# Patient Record
Sex: FEMALE | Race: WHITE | Employment: OTHER | URBAN - METROPOLITAN AREA
[De-identification: names, ages, dates, MRNs, and addresses within clinical notes are randomized per-mention and may not be internally consistent; named-entity substitution may affect disease eponyms.]

---

## 2017-01-02 ENCOUNTER — ANESTHESIA EVENT (OUTPATIENT)
Dept: OPERATING ROOM | Age: 52
End: 2017-01-02
Payer: MEDICARE

## 2017-01-03 ENCOUNTER — ANESTHESIA (OUTPATIENT)
Dept: OPERATING ROOM | Age: 52
End: 2017-01-03
Payer: MEDICARE

## 2017-01-03 ENCOUNTER — APPOINTMENT (OUTPATIENT)
Dept: GENERAL RADIOLOGY | Age: 52
End: 2017-01-03
Attending: ORTHOPAEDIC SURGERY
Payer: MEDICARE

## 2017-01-03 VITALS — DIASTOLIC BLOOD PRESSURE: 48 MMHG | TEMPERATURE: 96.6 F | OXYGEN SATURATION: 97 % | SYSTOLIC BLOOD PRESSURE: 80 MMHG

## 2017-01-03 PROCEDURE — 73630 X-RAY EXAM OF FOOT: CPT

## 2017-01-03 PROCEDURE — 2580000003 HC RX 258: Performed by: STUDENT IN AN ORGANIZED HEALTH CARE EDUCATION/TRAINING PROGRAM

## 2017-01-03 PROCEDURE — 2580000003 HC RX 258: Performed by: ORTHOPAEDIC SURGERY

## 2017-01-03 PROCEDURE — 6360000002 HC RX W HCPCS: Performed by: NURSE ANESTHETIST, CERTIFIED REGISTERED

## 2017-01-03 PROCEDURE — 6360000002 HC RX W HCPCS: Performed by: ORTHOPAEDIC SURGERY

## 2017-01-03 PROCEDURE — 76000 FLUOROSCOPY <1 HR PHYS/QHP: CPT

## 2017-01-03 PROCEDURE — 2500000003 HC RX 250 WO HCPCS: Performed by: NURSE ANESTHETIST, CERTIFIED REGISTERED

## 2017-01-03 RX ORDER — LIDOCAINE HYDROCHLORIDE 20 MG/ML
INJECTION, SOLUTION INFILTRATION; PERINEURAL PRN
Status: DISCONTINUED | OUTPATIENT
Start: 2017-01-03 | End: 2017-01-03 | Stop reason: SDUPTHER

## 2017-01-03 RX ORDER — DEXAMETHASONE SODIUM PHOSPHATE 4 MG/ML
INJECTION, SOLUTION INTRA-ARTICULAR; INTRALESIONAL; INTRAMUSCULAR; INTRAVENOUS; SOFT TISSUE PRN
Status: DISCONTINUED | OUTPATIENT
Start: 2017-01-03 | End: 2017-01-03 | Stop reason: SDUPTHER

## 2017-01-03 RX ORDER — PROPOFOL 10 MG/ML
INJECTION, EMULSION INTRAVENOUS PRN
Status: DISCONTINUED | OUTPATIENT
Start: 2017-01-03 | End: 2017-01-03 | Stop reason: SDUPTHER

## 2017-01-03 RX ORDER — FENTANYL CITRATE 50 UG/ML
INJECTION, SOLUTION INTRAMUSCULAR; INTRAVENOUS PRN
Status: DISCONTINUED | OUTPATIENT
Start: 2017-01-03 | End: 2017-01-03 | Stop reason: SDUPTHER

## 2017-01-03 RX ORDER — ONDANSETRON 2 MG/ML
INJECTION INTRAMUSCULAR; INTRAVENOUS PRN
Status: DISCONTINUED | OUTPATIENT
Start: 2017-01-03 | End: 2017-01-03 | Stop reason: SDUPTHER

## 2017-01-03 RX ORDER — MIDAZOLAM HYDROCHLORIDE 1 MG/ML
INJECTION INTRAMUSCULAR; INTRAVENOUS PRN
Status: DISCONTINUED | OUTPATIENT
Start: 2017-01-03 | End: 2017-01-03 | Stop reason: SDUPTHER

## 2017-01-03 RX ADMIN — PROPOFOL 180 MG: 10 INJECTION, EMULSION INTRAVENOUS at 07:35

## 2017-01-03 RX ADMIN — ONDANSETRON HYDROCHLORIDE 4 MG: 2 INJECTION, SOLUTION INTRAVENOUS at 07:40

## 2017-01-03 RX ADMIN — FENTANYL CITRATE 25 MCG: 50 INJECTION, SOLUTION INTRAMUSCULAR; INTRAVENOUS at 07:55

## 2017-01-03 RX ADMIN — DEXAMETHASONE SODIUM PHOSPHATE 4 MG: 4 INJECTION, SOLUTION INTRAMUSCULAR; INTRAVENOUS at 07:40

## 2017-01-03 RX ADMIN — LIDOCAINE HYDROCHLORIDE 50 MG: 20 INJECTION, SOLUTION INFILTRATION; PERINEURAL at 07:35

## 2017-01-03 RX ADMIN — SODIUM CHLORIDE, POTASSIUM CHLORIDE, SODIUM LACTATE AND CALCIUM CHLORIDE: 600; 310; 30; 20 INJECTION, SOLUTION INTRAVENOUS at 07:25

## 2017-01-03 RX ADMIN — MIDAZOLAM HYDROCHLORIDE 2 MG: 1 INJECTION, SOLUTION INTRAMUSCULAR; INTRAVENOUS at 07:30

## 2017-01-03 RX ADMIN — VANCOMYCIN HYDROCHLORIDE 1 G: 1 INJECTION, POWDER, LYOPHILIZED, FOR SOLUTION INTRAVENOUS at 07:33

## 2017-01-03 RX ADMIN — FENTANYL CITRATE 25 MCG: 50 INJECTION, SOLUTION INTRAMUSCULAR; INTRAVENOUS at 08:00

## 2017-01-03 RX ADMIN — FENTANYL CITRATE 50 MCG: 50 INJECTION, SOLUTION INTRAMUSCULAR; INTRAVENOUS at 07:35

## 2017-01-18 RX ORDER — FLUCONAZOLE 100 MG/1
100 TABLET ORAL DAILY
Qty: 5 TABLET | Refills: 0 | Status: SHIPPED | OUTPATIENT
Start: 2017-01-18 | End: 2017-01-25

## 2017-02-05 ENCOUNTER — HOSPITAL ENCOUNTER (EMERGENCY)
Age: 52
Discharge: HOME | End: 2017-02-05
Attending: EMERGENCY MEDICINE
Payer: MEDICARE

## 2017-02-05 VITALS
WEIGHT: 150 LBS | OXYGEN SATURATION: 97 % | RESPIRATION RATE: 18 BRPM | DIASTOLIC BLOOD PRESSURE: 66 MMHG | BODY MASS INDEX: 22.73 KG/M2 | SYSTOLIC BLOOD PRESSURE: 118 MMHG | TEMPERATURE: 97.5 F | HEART RATE: 109 BPM | HEIGHT: 68 IN

## 2017-02-05 DIAGNOSIS — R10.13 ABDOMINAL PAIN, EPIGASTRIC: Primary | ICD-10-CM

## 2017-02-05 DIAGNOSIS — K29.90 GASTRITIS AND DUODENITIS: ICD-10-CM

## 2017-02-05 LAB
ALBUMIN SERPL-MCNC: 3.8 G/DL (ref 3.9–4.9)
ALP BLD-CCNC: 98 U/L (ref 40–130)
ALT SERPL-CCNC: 6 U/L (ref 0–33)
ANION GAP SERPL CALCULATED.3IONS-SCNC: 16 MEQ/L (ref 7–13)
AST SERPL-CCNC: 12 U/L (ref 0–35)
BASOPHILS ABSOLUTE: 0 K/UL (ref 0–0.2)
BASOPHILS RELATIVE PERCENT: 0.4 %
BILIRUB SERPL-MCNC: 0.3 MG/DL (ref 0–1.2)
BILIRUBIN DIRECT: 0.2 MG/DL (ref 0–0.3)
BILIRUBIN, INDIRECT: 0.1 MG/DL (ref 0–0.6)
BUN BLDV-MCNC: 6 MG/DL (ref 6–20)
CALCIUM SERPL-MCNC: 9.3 MG/DL (ref 8.6–10.2)
CHLORIDE BLD-SCNC: 103 MEQ/L (ref 98–107)
CO2: 24 MEQ/L (ref 22–29)
CREAT SERPL-MCNC: 1.3 MG/DL (ref 0.5–0.9)
EOSINOPHILS ABSOLUTE: 0.1 K/UL (ref 0–0.7)
EOSINOPHILS RELATIVE PERCENT: 1.5 %
GFR AFRICAN AMERICAN: 52.6
GFR NON-AFRICAN AMERICAN: 43.5
GLUCOSE BLD-MCNC: 166 MG/DL (ref 60–115)
GLUCOSE BLD-MCNC: 169 MG/DL (ref 74–109)
HCT VFR BLD CALC: 40.9 % (ref 37–47)
HEMOGLOBIN: 13.8 G/DL (ref 12–16)
LIPASE: 28 U/L (ref 13–60)
LYMPHOCYTES ABSOLUTE: 1.5 K/UL (ref 1–4.8)
LYMPHOCYTES RELATIVE PERCENT: 17.1 %
MCH RBC QN AUTO: 29.1 PG (ref 27–31.3)
MCHC RBC AUTO-ENTMCNC: 33.7 % (ref 33–37)
MCV RBC AUTO: 86.3 FL (ref 82–100)
MONOCYTES ABSOLUTE: 0.5 K/UL (ref 0.2–0.8)
MONOCYTES RELATIVE PERCENT: 5.5 %
NEUTROPHILS ABSOLUTE: 6.6 K/UL (ref 1.4–6.5)
NEUTROPHILS RELATIVE PERCENT: 75.5 %
PDW BLD-RTO: 12.8 % (ref 11.5–14.5)
PERFORMED ON: ABNORMAL
PLATELET # BLD: 248 K/UL (ref 130–400)
POTASSIUM SERPL-SCNC: 3.3 MEQ/L (ref 3.5–5.1)
RBC # BLD: 4.74 M/UL (ref 4.2–5.4)
SODIUM BLD-SCNC: 143 MEQ/L (ref 132–144)
TOTAL PROTEIN: 6.9 G/DL (ref 6.4–8.1)
WBC # BLD: 8.8 K/UL (ref 4.8–10.8)

## 2017-02-05 PROCEDURE — 96375 TX/PRO/DX INJ NEW DRUG ADDON: CPT

## 2017-02-05 PROCEDURE — 99284 EMERGENCY DEPT VISIT MOD MDM: CPT

## 2017-02-05 PROCEDURE — 2580000003 HC RX 258: Performed by: EMERGENCY MEDICINE

## 2017-02-05 PROCEDURE — 93005 ELECTROCARDIOGRAM TRACING: CPT

## 2017-02-05 PROCEDURE — 2500000003 HC RX 250 WO HCPCS: Performed by: EMERGENCY MEDICINE

## 2017-02-05 PROCEDURE — 36415 COLL VENOUS BLD VENIPUNCTURE: CPT

## 2017-02-05 PROCEDURE — 80048 BASIC METABOLIC PNL TOTAL CA: CPT

## 2017-02-05 PROCEDURE — S0028 INJECTION, FAMOTIDINE, 20 MG: HCPCS | Performed by: EMERGENCY MEDICINE

## 2017-02-05 PROCEDURE — 96374 THER/PROPH/DIAG INJ IV PUSH: CPT

## 2017-02-05 PROCEDURE — 6360000002 HC RX W HCPCS: Performed by: EMERGENCY MEDICINE

## 2017-02-05 PROCEDURE — 80076 HEPATIC FUNCTION PANEL: CPT

## 2017-02-05 PROCEDURE — 83690 ASSAY OF LIPASE: CPT

## 2017-02-05 PROCEDURE — 85025 COMPLETE CBC W/AUTO DIFF WBC: CPT

## 2017-02-05 RX ORDER — 0.9 % SODIUM CHLORIDE 0.9 %
1000 INTRAVENOUS SOLUTION INTRAVENOUS ONCE
Status: COMPLETED | OUTPATIENT
Start: 2017-02-05 | End: 2017-02-05

## 2017-02-05 RX ORDER — SODIUM CHLORIDE 9 MG/ML
INJECTION, SOLUTION INTRAVENOUS CONTINUOUS
Status: DISCONTINUED | OUTPATIENT
Start: 2017-02-05 | End: 2017-02-05 | Stop reason: HOSPADM

## 2017-02-05 RX ORDER — SUCRALFATE ORAL 1 G/10ML
1 SUSPENSION ORAL 4 TIMES DAILY
Qty: 400 ML | Refills: 0 | Status: SHIPPED | OUTPATIENT
Start: 2017-02-05 | End: 2017-02-21

## 2017-02-05 RX ORDER — ONDANSETRON 2 MG/ML
4 INJECTION INTRAMUSCULAR; INTRAVENOUS ONCE
Status: COMPLETED | OUTPATIENT
Start: 2017-02-05 | End: 2017-02-05

## 2017-02-05 RX ADMIN — FAMOTIDINE 20 MG: 10 INJECTION, SOLUTION INTRAVENOUS at 15:27

## 2017-02-05 RX ADMIN — ONDANSETRON HYDROCHLORIDE 4 MG: 2 SOLUTION INTRAMUSCULAR; INTRAVENOUS at 15:27

## 2017-02-05 RX ADMIN — SODIUM CHLORIDE 1000 ML: 9 INJECTION, SOLUTION INTRAVENOUS at 15:27

## 2017-02-05 ASSESSMENT — ENCOUNTER SYMPTOMS
TROUBLE SWALLOWING: 0
BACK PAIN: 0
RHINORRHEA: 0
COUGH: 0
ABDOMINAL PAIN: 1
BLOOD IN STOOL: 0
DIARRHEA: 0
WHEEZING: 0
NAUSEA: 0
CHEST TIGHTNESS: 0
SHORTNESS OF BREATH: 0
EYE PAIN: 0
VOMITING: 0

## 2017-02-05 ASSESSMENT — PAIN SCALES - GENERAL: PAINLEVEL_OUTOF10: 10

## 2017-02-05 ASSESSMENT — PAIN DESCRIPTION - LOCATION: LOCATION: ABDOMEN

## 2017-02-05 ASSESSMENT — PAIN DESCRIPTION - DESCRIPTORS: DESCRIPTORS: BURNING

## 2017-02-05 ASSESSMENT — PAIN DESCRIPTION - PROGRESSION: CLINICAL_PROGRESSION: GRADUALLY WORSENING

## 2017-02-05 ASSESSMENT — PAIN DESCRIPTION - FREQUENCY: FREQUENCY: CONTINUOUS

## 2017-02-21 ENCOUNTER — OFFICE VISIT (OUTPATIENT)
Dept: INTERNAL MEDICINE | Age: 52
End: 2017-02-21

## 2017-02-21 VITALS
HEART RATE: 94 BPM | RESPIRATION RATE: 16 BRPM | SYSTOLIC BLOOD PRESSURE: 114 MMHG | TEMPERATURE: 98.7 F | HEIGHT: 68 IN | BODY MASS INDEX: 24.25 KG/M2 | DIASTOLIC BLOOD PRESSURE: 76 MMHG | OXYGEN SATURATION: 97 % | WEIGHT: 160 LBS

## 2017-02-21 DIAGNOSIS — E11.9 TYPE 2 DIABETES MELLITUS WITHOUT COMPLICATION, WITHOUT LONG-TERM CURRENT USE OF INSULIN (HCC): Primary | ICD-10-CM

## 2017-02-21 DIAGNOSIS — R10.13 ACUTE EPIGASTRIC PAIN: ICD-10-CM

## 2017-02-21 DIAGNOSIS — E11.9 TYPE 2 DIABETES MELLITUS WITHOUT COMPLICATION, WITHOUT LONG-TERM CURRENT USE OF INSULIN (HCC): ICD-10-CM

## 2017-02-21 LAB — HBA1C MFR BLD: 6 % (ref 4.8–5.9)

## 2017-02-21 PROCEDURE — G8427 DOCREV CUR MEDS BY ELIG CLIN: HCPCS | Performed by: INTERNAL MEDICINE

## 2017-02-21 PROCEDURE — 3045F PR MOST RECENT HEMOGLOBIN A1C LEVEL 7.0-9.0%: CPT | Performed by: INTERNAL MEDICINE

## 2017-02-21 PROCEDURE — G8484 FLU IMMUNIZE NO ADMIN: HCPCS | Performed by: INTERNAL MEDICINE

## 2017-02-21 PROCEDURE — 99214 OFFICE O/P EST MOD 30 MIN: CPT | Performed by: INTERNAL MEDICINE

## 2017-02-21 PROCEDURE — G8420 CALC BMI NORM PARAMETERS: HCPCS | Performed by: INTERNAL MEDICINE

## 2017-02-21 PROCEDURE — 1036F TOBACCO NON-USER: CPT | Performed by: INTERNAL MEDICINE

## 2017-02-21 RX ORDER — GREEN TEA/HOODIA GORDONII 315-12.5MG
1 CAPSULE ORAL 2 TIMES DAILY
Qty: 30 TABLET | Refills: 2 | Status: SHIPPED | OUTPATIENT
Start: 2017-02-21 | End: 2017-05-12 | Stop reason: SDUPTHER

## 2017-02-21 ASSESSMENT — ENCOUNTER SYMPTOMS
COUGH: 1
VOMITING: 0
ABDOMINAL PAIN: 1
CONSTIPATION: 0
SHORTNESS OF BREATH: 0
NAUSEA: 1
SORE THROAT: 0
BELCHING: 1
RHINORRHEA: 0
DIARRHEA: 1
FLATUS: 1

## 2017-02-21 ASSESSMENT — CROHNS DISEASE ACTIVITY INDEX (CDAI): CDAI SCORE: 0

## 2017-02-24 ENCOUNTER — HOSPITAL ENCOUNTER (EMERGENCY)
Age: 52
Discharge: HOME | End: 2017-02-24
Attending: EMERGENCY MEDICINE
Payer: MEDICARE

## 2017-02-24 VITALS
BODY MASS INDEX: 22.81 KG/M2 | WEIGHT: 150 LBS | SYSTOLIC BLOOD PRESSURE: 114 MMHG | RESPIRATION RATE: 16 BRPM | TEMPERATURE: 98.9 F | HEART RATE: 98 BPM | DIASTOLIC BLOOD PRESSURE: 70 MMHG | OXYGEN SATURATION: 96 %

## 2017-02-24 DIAGNOSIS — G43.909 MIGRAINE WITHOUT STATUS MIGRAINOSUS, NOT INTRACTABLE, UNSPECIFIED MIGRAINE TYPE: Primary | ICD-10-CM

## 2017-02-24 PROCEDURE — 99283 EMERGENCY DEPT VISIT LOW MDM: CPT

## 2017-02-24 PROCEDURE — 6370000000 HC RX 637 (ALT 250 FOR IP): Performed by: EMERGENCY MEDICINE

## 2017-02-24 PROCEDURE — 96372 THER/PROPH/DIAG INJ SC/IM: CPT

## 2017-02-24 PROCEDURE — 6360000002 HC RX W HCPCS: Performed by: EMERGENCY MEDICINE

## 2017-02-24 RX ORDER — PROMETHAZINE HYDROCHLORIDE 25 MG/ML
25 INJECTION, SOLUTION INTRAMUSCULAR; INTRAVENOUS ONCE
Status: COMPLETED | OUTPATIENT
Start: 2017-02-24 | End: 2017-02-24

## 2017-02-24 RX ORDER — OXYCODONE HYDROCHLORIDE AND ACETAMINOPHEN 5; 325 MG/1; MG/1
1-2 TABLET ORAL EVERY 6 HOURS PRN
Qty: 10 TABLET | Refills: 0 | Status: SHIPPED | OUTPATIENT
Start: 2017-02-24 | End: 2017-03-03

## 2017-02-24 RX ORDER — OXYCODONE HYDROCHLORIDE AND ACETAMINOPHEN 5; 325 MG/1; MG/1
2 TABLET ORAL ONCE
Status: COMPLETED | OUTPATIENT
Start: 2017-02-24 | End: 2017-02-24

## 2017-02-24 RX ORDER — PROMETHAZINE HYDROCHLORIDE 25 MG/1
25 TABLET ORAL EVERY 6 HOURS PRN
Qty: 20 TABLET | Refills: 0 | Status: SHIPPED | OUTPATIENT
Start: 2017-02-24 | End: 2017-03-03

## 2017-02-24 RX ADMIN — OXYCODONE HYDROCHLORIDE AND ACETAMINOPHEN 2 TABLET: 5; 325 TABLET ORAL at 02:57

## 2017-02-24 RX ADMIN — PROMETHAZINE HYDROCHLORIDE 25 MG: 25 INJECTION INTRAMUSCULAR; INTRAVENOUS at 02:58

## 2017-02-24 ASSESSMENT — ENCOUNTER SYMPTOMS
PHOTOPHOBIA: 1
RHINORRHEA: 0
SORE THROAT: 0
EYE PAIN: 0
DIARRHEA: 0
SINUS PRESSURE: 0
APNEA: 0
CONSTIPATION: 0
BACK PAIN: 0
COUGH: 0
ABDOMINAL DISTENTION: 0
VOMITING: 0
ABDOMINAL PAIN: 0
SHORTNESS OF BREATH: 0
COLOR CHANGE: 0
NAUSEA: 1
WHEEZING: 0

## 2017-02-24 ASSESSMENT — PAIN SCALES - GENERAL
PAINLEVEL_OUTOF10: 10
PAINLEVEL_OUTOF10: 7

## 2017-02-24 ASSESSMENT — PAIN DESCRIPTION - LOCATION: LOCATION: HEAD

## 2017-03-07 ENCOUNTER — OFFICE VISIT (OUTPATIENT)
Dept: GASTROENTEROLOGY | Age: 52
End: 2017-03-07

## 2017-03-07 VITALS
DIASTOLIC BLOOD PRESSURE: 89 MMHG | WEIGHT: 156 LBS | BODY MASS INDEX: 23.72 KG/M2 | SYSTOLIC BLOOD PRESSURE: 117 MMHG | HEART RATE: 101 BPM

## 2017-03-07 DIAGNOSIS — R10.13 ABDOMINAL PAIN, EPIGASTRIC: Primary | ICD-10-CM

## 2017-03-07 DIAGNOSIS — R13.19 OTHER DYSPHAGIA: ICD-10-CM

## 2017-03-07 DIAGNOSIS — R63.4 WEIGHT LOSS: ICD-10-CM

## 2017-03-07 PROCEDURE — G8427 DOCREV CUR MEDS BY ELIG CLIN: HCPCS | Performed by: INTERNAL MEDICINE

## 2017-03-07 PROCEDURE — G8484 FLU IMMUNIZE NO ADMIN: HCPCS | Performed by: INTERNAL MEDICINE

## 2017-03-07 PROCEDURE — G8420 CALC BMI NORM PARAMETERS: HCPCS | Performed by: INTERNAL MEDICINE

## 2017-03-07 PROCEDURE — 1036F TOBACCO NON-USER: CPT | Performed by: INTERNAL MEDICINE

## 2017-03-07 PROCEDURE — 99203 OFFICE O/P NEW LOW 30 MIN: CPT | Performed by: INTERNAL MEDICINE

## 2017-03-08 ENCOUNTER — ANESTHESIA (OUTPATIENT)
Dept: ENDOSCOPY | Age: 52
End: 2017-03-08
Payer: MEDICARE

## 2017-03-08 ENCOUNTER — HOSPITAL ENCOUNTER (OUTPATIENT)
Age: 52
Setting detail: OUTPATIENT SURGERY
Discharge: HOME | End: 2017-03-08
Attending: INTERNAL MEDICINE | Admitting: INTERNAL MEDICINE
Payer: MEDICARE

## 2017-03-08 ENCOUNTER — ANESTHESIA EVENT (OUTPATIENT)
Dept: ENDOSCOPY | Age: 52
End: 2017-03-08
Payer: MEDICARE

## 2017-03-08 ENCOUNTER — SURGERY (OUTPATIENT)
Age: 52
End: 2017-03-08

## 2017-03-08 VITALS
OXYGEN SATURATION: 99 % | DIASTOLIC BLOOD PRESSURE: 68 MMHG | SYSTOLIC BLOOD PRESSURE: 141 MMHG | RESPIRATION RATE: 15 BRPM

## 2017-03-08 VITALS
TEMPERATURE: 98.5 F | DIASTOLIC BLOOD PRESSURE: 76 MMHG | BODY MASS INDEX: 23.34 KG/M2 | WEIGHT: 154 LBS | HEART RATE: 82 BPM | OXYGEN SATURATION: 98 % | RESPIRATION RATE: 16 BRPM | SYSTOLIC BLOOD PRESSURE: 115 MMHG | HEIGHT: 68 IN

## 2017-03-08 PROCEDURE — 6360000002 HC RX W HCPCS: Performed by: NURSE ANESTHETIST, CERTIFIED REGISTERED

## 2017-03-08 PROCEDURE — 88305 TISSUE EXAM BY PATHOLOGIST: CPT

## 2017-03-08 PROCEDURE — 88342 IMHCHEM/IMCYTCHM 1ST ANTB: CPT

## 2017-03-08 PROCEDURE — 7100000010 HC PHASE II RECOVERY - FIRST 15 MIN: Performed by: INTERNAL MEDICINE

## 2017-03-08 PROCEDURE — 2500000003 HC RX 250 WO HCPCS: Performed by: NURSE ANESTHETIST, CERTIFIED REGISTERED

## 2017-03-08 PROCEDURE — 3609017100 HC EGD: Performed by: INTERNAL MEDICINE

## 2017-03-08 PROCEDURE — 2580000003 HC RX 258: Performed by: ANESTHESIOLOGY

## 2017-03-08 RX ORDER — LIDOCAINE HYDROCHLORIDE 20 MG/ML
INJECTION, SOLUTION INFILTRATION; PERINEURAL PRN
Status: DISCONTINUED | OUTPATIENT
Start: 2017-03-08 | End: 2017-03-08 | Stop reason: SDUPTHER

## 2017-03-08 RX ORDER — ONDANSETRON 2 MG/ML
4 INJECTION INTRAMUSCULAR; INTRAVENOUS
Status: DISCONTINUED | OUTPATIENT
Start: 2017-03-08 | End: 2017-03-08 | Stop reason: HOSPADM

## 2017-03-08 RX ORDER — PROPOFOL 10 MG/ML
INJECTION, EMULSION INTRAVENOUS PRN
Status: DISCONTINUED | OUTPATIENT
Start: 2017-03-08 | End: 2017-03-08 | Stop reason: SDUPTHER

## 2017-03-08 RX ORDER — SODIUM CHLORIDE 9 MG/ML
INJECTION, SOLUTION INTRAVENOUS CONTINUOUS
Status: DISCONTINUED | OUTPATIENT
Start: 2017-03-08 | End: 2017-03-08 | Stop reason: HOSPADM

## 2017-03-08 RX ADMIN — PROPOFOL 210 MG: 10 INJECTION, EMULSION INTRAVENOUS at 09:25

## 2017-03-08 RX ADMIN — SODIUM CHLORIDE: 900 INJECTION, SOLUTION INTRAVENOUS at 09:20

## 2017-03-08 RX ADMIN — LIDOCAINE HYDROCHLORIDE 30 MG: 20 INJECTION, SOLUTION INFILTRATION; PERINEURAL at 09:25

## 2017-03-08 ASSESSMENT — PAIN - FUNCTIONAL ASSESSMENT: PAIN_FUNCTIONAL_ASSESSMENT: 0-10

## 2017-03-16 ENCOUNTER — OFFICE VISIT (OUTPATIENT)
Dept: GASTROENTEROLOGY | Age: 52
End: 2017-03-16

## 2017-03-16 VITALS
BODY MASS INDEX: 23.87 KG/M2 | HEART RATE: 98 BPM | WEIGHT: 157 LBS | DIASTOLIC BLOOD PRESSURE: 84 MMHG | SYSTOLIC BLOOD PRESSURE: 124 MMHG

## 2017-03-16 DIAGNOSIS — R11.0 NAUSEA: ICD-10-CM

## 2017-03-16 DIAGNOSIS — R13.19 OTHER DYSPHAGIA: ICD-10-CM

## 2017-03-16 DIAGNOSIS — R68.81 EARLY SATIETY: Primary | ICD-10-CM

## 2017-03-16 PROCEDURE — G8420 CALC BMI NORM PARAMETERS: HCPCS | Performed by: INTERNAL MEDICINE

## 2017-03-16 PROCEDURE — G8484 FLU IMMUNIZE NO ADMIN: HCPCS | Performed by: INTERNAL MEDICINE

## 2017-03-16 PROCEDURE — 1036F TOBACCO NON-USER: CPT | Performed by: INTERNAL MEDICINE

## 2017-03-16 PROCEDURE — G8427 DOCREV CUR MEDS BY ELIG CLIN: HCPCS | Performed by: INTERNAL MEDICINE

## 2017-03-16 PROCEDURE — 99213 OFFICE O/P EST LOW 20 MIN: CPT | Performed by: INTERNAL MEDICINE

## 2017-03-17 LAB
EKG ATRIAL RATE: 117 BPM
EKG P AXIS: 42 DEGREES
EKG P-R INTERVAL: 136 MS
EKG Q-T INTERVAL: 300 MS
EKG QRS DURATION: 64 MS
EKG QTC CALCULATION (BAZETT): 418 MS
EKG R AXIS: 72 DEGREES
EKG T AXIS: 14 DEGREES
EKG VENTRICULAR RATE: 117 BPM

## 2017-03-20 ENCOUNTER — OFFICE VISIT (OUTPATIENT)
Dept: INTERNAL MEDICINE | Age: 52
End: 2017-03-20

## 2017-03-20 ENCOUNTER — TELEPHONE (OUTPATIENT)
Dept: INTERNAL MEDICINE | Age: 52
End: 2017-03-20

## 2017-03-20 VITALS
TEMPERATURE: 98.4 F | BODY MASS INDEX: 24.48 KG/M2 | HEART RATE: 96 BPM | WEIGHT: 156 LBS | RESPIRATION RATE: 14 BRPM | SYSTOLIC BLOOD PRESSURE: 108 MMHG | DIASTOLIC BLOOD PRESSURE: 70 MMHG | HEIGHT: 67 IN

## 2017-03-20 DIAGNOSIS — R79.89 HIGH SERUM CREATINE: Primary | ICD-10-CM

## 2017-03-20 DIAGNOSIS — R79.89 HIGH SERUM CREATINE: ICD-10-CM

## 2017-03-20 DIAGNOSIS — J01.10 ACUTE NON-RECURRENT FRONTAL SINUSITIS: ICD-10-CM

## 2017-03-20 DIAGNOSIS — E55.9 VITAMIN D DEFICIENCY: ICD-10-CM

## 2017-03-20 DIAGNOSIS — M32.9 SLE (SYSTEMIC LUPUS ERYTHEMATOSUS RELATED SYNDROME) (HCC): ICD-10-CM

## 2017-03-20 LAB
CREATININE URINE: 186.8 MG/DL
MICROALBUMIN UR-MCNC: <1.2 MG/DL
MICROALBUMIN/CREAT UR-RTO: NORMAL MG/G (ref 0–30)

## 2017-03-20 PROCEDURE — 99214 OFFICE O/P EST MOD 30 MIN: CPT | Performed by: INTERNAL MEDICINE

## 2017-03-20 PROCEDURE — G8427 DOCREV CUR MEDS BY ELIG CLIN: HCPCS | Performed by: INTERNAL MEDICINE

## 2017-03-20 PROCEDURE — G8420 CALC BMI NORM PARAMETERS: HCPCS | Performed by: INTERNAL MEDICINE

## 2017-03-20 PROCEDURE — 1036F TOBACCO NON-USER: CPT | Performed by: INTERNAL MEDICINE

## 2017-03-20 PROCEDURE — G8484 FLU IMMUNIZE NO ADMIN: HCPCS | Performed by: INTERNAL MEDICINE

## 2017-03-20 RX ORDER — AZITHROMYCIN 250 MG/1
TABLET, FILM COATED ORAL
Qty: 1 PACKET | Refills: 0 | Status: SHIPPED | OUTPATIENT
Start: 2017-03-20 | End: 2017-03-28 | Stop reason: ALTCHOICE

## 2017-03-20 RX ORDER — GUAIFENESIN 100 MG/5ML
10 SYRUP ORAL 2 TIMES DAILY PRN
Qty: 1 BOTTLE | Refills: 0 | Status: SHIPPED | OUTPATIENT
Start: 2017-03-20 | End: 2017-03-24 | Stop reason: SDUPTHER

## 2017-03-23 ENCOUNTER — TELEPHONE (OUTPATIENT)
Dept: INTERNAL MEDICINE | Age: 52
End: 2017-03-23

## 2017-03-23 PROBLEM — J01.10 ACUTE NON-RECURRENT FRONTAL SINUSITIS: Status: ACTIVE | Noted: 2017-03-23

## 2017-03-24 RX ORDER — GUAIFENESIN 100 MG/5ML
10 SYRUP ORAL 2 TIMES DAILY PRN
Qty: 1 BOTTLE | Refills: 0 | Status: SHIPPED | OUTPATIENT
Start: 2017-03-24 | End: 2017-04-03

## 2017-03-28 ENCOUNTER — OFFICE VISIT (OUTPATIENT)
Dept: INTERNAL MEDICINE | Age: 52
End: 2017-03-28

## 2017-03-28 VITALS
WEIGHT: 156 LBS | OXYGEN SATURATION: 98 % | SYSTOLIC BLOOD PRESSURE: 124 MMHG | DIASTOLIC BLOOD PRESSURE: 70 MMHG | RESPIRATION RATE: 14 BRPM | TEMPERATURE: 98.1 F | HEIGHT: 68 IN | BODY MASS INDEX: 23.64 KG/M2 | HEART RATE: 92 BPM

## 2017-03-28 DIAGNOSIS — H61.23 BILATERAL IMPACTED CERUMEN: ICD-10-CM

## 2017-03-28 DIAGNOSIS — J30.2 SEASONAL ALLERGIC RHINITIS, UNSPECIFIED ALLERGIC RHINITIS TRIGGER: Primary | ICD-10-CM

## 2017-03-28 PROCEDURE — 99213 OFFICE O/P EST LOW 20 MIN: CPT | Performed by: PHYSICIAN ASSISTANT

## 2017-03-28 PROCEDURE — 1036F TOBACCO NON-USER: CPT | Performed by: PHYSICIAN ASSISTANT

## 2017-03-28 PROCEDURE — G8484 FLU IMMUNIZE NO ADMIN: HCPCS | Performed by: PHYSICIAN ASSISTANT

## 2017-03-28 PROCEDURE — G8420 CALC BMI NORM PARAMETERS: HCPCS | Performed by: PHYSICIAN ASSISTANT

## 2017-03-28 PROCEDURE — G8427 DOCREV CUR MEDS BY ELIG CLIN: HCPCS | Performed by: PHYSICIAN ASSISTANT

## 2017-03-28 RX ORDER — FLUTICASONE PROPIONATE 50 MCG
1 SPRAY, SUSPENSION (ML) NASAL DAILY
Qty: 1 BOTTLE | Refills: 3 | Status: SHIPPED | OUTPATIENT
Start: 2017-03-28 | End: 2017-08-02

## 2017-03-28 RX ORDER — FEXOFENADINE HCL 180 MG/1
180 TABLET ORAL NIGHTLY PRN
Qty: 30 TABLET | Refills: 2 | Status: SHIPPED | OUTPATIENT
Start: 2017-03-28 | End: 2017-07-12 | Stop reason: ALTCHOICE

## 2017-03-28 RX ORDER — BENZONATATE 200 MG/1
200 CAPSULE ORAL 3 TIMES DAILY PRN
Qty: 30 CAPSULE | Refills: 0 | Status: SHIPPED | OUTPATIENT
Start: 2017-03-28 | End: 2017-04-07 | Stop reason: ALTCHOICE

## 2017-03-28 ASSESSMENT — ENCOUNTER SYMPTOMS
ABDOMINAL PAIN: 0
COUGH: 1
DIARRHEA: 0
VOMITING: 0
NAUSEA: 0
WHEEZING: 0
EYE PAIN: 0
SHORTNESS OF BREATH: 0
SORE THROAT: 1
SPUTUM PRODUCTION: 0
BACK PAIN: 0
CONSTIPATION: 0

## 2017-04-07 ENCOUNTER — OFFICE VISIT (OUTPATIENT)
Dept: INTERNAL MEDICINE | Age: 52
End: 2017-04-07

## 2017-04-07 VITALS
OXYGEN SATURATION: 98 % | HEART RATE: 82 BPM | WEIGHT: 158 LBS | TEMPERATURE: 98.2 F | RESPIRATION RATE: 16 BRPM | BODY MASS INDEX: 23.95 KG/M2 | DIASTOLIC BLOOD PRESSURE: 88 MMHG | HEIGHT: 68 IN | SYSTOLIC BLOOD PRESSURE: 118 MMHG

## 2017-04-07 DIAGNOSIS — H61.23 HEARING LOSS DUE TO CERUMEN IMPACTION, BILATERAL: Primary | ICD-10-CM

## 2017-04-07 PROCEDURE — G8420 CALC BMI NORM PARAMETERS: HCPCS | Performed by: PHYSICIAN ASSISTANT

## 2017-04-07 PROCEDURE — 1036F TOBACCO NON-USER: CPT | Performed by: PHYSICIAN ASSISTANT

## 2017-04-07 PROCEDURE — G8427 DOCREV CUR MEDS BY ELIG CLIN: HCPCS | Performed by: PHYSICIAN ASSISTANT

## 2017-04-07 PROCEDURE — 99214 OFFICE O/P EST MOD 30 MIN: CPT | Performed by: PHYSICIAN ASSISTANT

## 2017-04-07 PROCEDURE — 69209 REMOVE IMPACTED EAR WAX UNI: CPT | Performed by: PHYSICIAN ASSISTANT

## 2017-04-07 ASSESSMENT — ENCOUNTER SYMPTOMS
COUGH: 1
GASTROINTESTINAL NEGATIVE: 1
EYES NEGATIVE: 1

## 2017-04-11 ENCOUNTER — OFFICE VISIT (OUTPATIENT)
Dept: GASTROENTEROLOGY | Age: 52
End: 2017-04-11

## 2017-04-11 VITALS
SYSTOLIC BLOOD PRESSURE: 119 MMHG | WEIGHT: 156 LBS | DIASTOLIC BLOOD PRESSURE: 92 MMHG | BODY MASS INDEX: 23.72 KG/M2 | HEART RATE: 113 BPM

## 2017-04-11 DIAGNOSIS — R13.19 OTHER DYSPHAGIA: Primary | ICD-10-CM

## 2017-04-11 DIAGNOSIS — K31.84 GASTROPARESIS: ICD-10-CM

## 2017-04-11 PROCEDURE — 1036F TOBACCO NON-USER: CPT | Performed by: INTERNAL MEDICINE

## 2017-04-11 PROCEDURE — G8420 CALC BMI NORM PARAMETERS: HCPCS | Performed by: INTERNAL MEDICINE

## 2017-04-11 PROCEDURE — 99212 OFFICE O/P EST SF 10 MIN: CPT | Performed by: INTERNAL MEDICINE

## 2017-04-11 PROCEDURE — G8427 DOCREV CUR MEDS BY ELIG CLIN: HCPCS | Performed by: INTERNAL MEDICINE

## 2017-04-24 ENCOUNTER — OFFICE VISIT (OUTPATIENT)
Dept: INTERNAL MEDICINE | Age: 52
End: 2017-04-24

## 2017-04-24 VITALS
SYSTOLIC BLOOD PRESSURE: 118 MMHG | RESPIRATION RATE: 14 BRPM | TEMPERATURE: 98.1 F | OXYGEN SATURATION: 98 % | HEART RATE: 93 BPM | DIASTOLIC BLOOD PRESSURE: 74 MMHG | BODY MASS INDEX: 24.55 KG/M2 | HEIGHT: 68 IN | WEIGHT: 162 LBS

## 2017-04-24 DIAGNOSIS — R30.0 DYSURIA: ICD-10-CM

## 2017-04-24 DIAGNOSIS — M54.50 ACUTE LEFT-SIDED LOW BACK PAIN WITHOUT SCIATICA: Primary | ICD-10-CM

## 2017-04-24 LAB
AMORPHOUS: NORMAL
BACTERIA: NORMAL /HPF
BILIRUBIN URINE: NEGATIVE
BILIRUBIN, POC: NORMAL
BLOOD URINE, POC: NORMAL
BLOOD, URINE: NEGATIVE
CLARITY, POC: NORMAL
CLARITY: ABNORMAL
COLOR, POC: YELLOW
COLOR: YELLOW
GLUCOSE URINE, POC: NORMAL
GLUCOSE URINE: NEGATIVE MG/DL
KETONES, POC: NORMAL
KETONES, URINE: NEGATIVE MG/DL
LEUKOCYTE EST, POC: NORMAL
LEUKOCYTE ESTERASE, URINE: ABNORMAL
NITRITE, POC: NORMAL
NITRITE, URINE: NEGATIVE
PH UA: 5 (ref 5–9)
PH, POC: 5.5
PROTEIN UA: ABNORMAL MG/DL
PROTEIN, POC: NORMAL
RBC UA: NORMAL /HPF (ref 0–2)
SPECIFIC GRAVITY UA: 1.03 (ref 1–1.03)
SPECIFIC GRAVITY, POC: 1.03
UROBILINOGEN, POC: NORMAL
UROBILINOGEN, URINE: 0.2 E.U./DL
WBC UA: NORMAL /HPF (ref 0–5)

## 2017-04-24 PROCEDURE — 99213 OFFICE O/P EST LOW 20 MIN: CPT | Performed by: PHYSICIAN ASSISTANT

## 2017-04-24 PROCEDURE — G8427 DOCREV CUR MEDS BY ELIG CLIN: HCPCS | Performed by: PHYSICIAN ASSISTANT

## 2017-04-24 PROCEDURE — G8420 CALC BMI NORM PARAMETERS: HCPCS | Performed by: PHYSICIAN ASSISTANT

## 2017-04-24 PROCEDURE — 81002 URINALYSIS NONAUTO W/O SCOPE: CPT | Performed by: PHYSICIAN ASSISTANT

## 2017-04-24 PROCEDURE — 1036F TOBACCO NON-USER: CPT | Performed by: PHYSICIAN ASSISTANT

## 2017-04-24 RX ORDER — NAPROXEN 500 MG/1
500 TABLET ORAL 2 TIMES DAILY WITH MEALS
Qty: 60 TABLET | Refills: 3 | Status: SHIPPED | OUTPATIENT
Start: 2017-04-24 | End: 2017-05-12

## 2017-04-24 RX ORDER — CYCLOBENZAPRINE HCL 10 MG
10 TABLET ORAL EVERY 8 HOURS PRN
Qty: 30 TABLET | Refills: 0 | Status: SHIPPED | OUTPATIENT
Start: 2017-04-24 | End: 2017-05-04

## 2017-04-24 ASSESSMENT — ENCOUNTER SYMPTOMS
ABDOMINAL PAIN: 0
COUGH: 0
SORE THROAT: 0
DIARRHEA: 0
SHORTNESS OF BREATH: 0
VOMITING: 0
NAUSEA: 0

## 2017-04-26 LAB — URINE CULTURE, ROUTINE: NORMAL

## 2017-05-02 ENCOUNTER — TELEPHONE (OUTPATIENT)
Dept: INTERNAL MEDICINE | Age: 52
End: 2017-05-02

## 2017-05-02 RX ORDER — METHYLPREDNISOLONE 4 MG/1
TABLET ORAL
Qty: 1 KIT | Refills: 0 | Status: SHIPPED | OUTPATIENT
Start: 2017-05-02 | End: 2017-05-08

## 2017-05-06 ENCOUNTER — HOSPITAL ENCOUNTER (EMERGENCY)
Age: 52
Discharge: HOME | End: 2017-05-06
Attending: EMERGENCY MEDICINE
Payer: MEDICARE

## 2017-05-06 VITALS
DIASTOLIC BLOOD PRESSURE: 82 MMHG | BODY MASS INDEX: 22.73 KG/M2 | RESPIRATION RATE: 16 BRPM | HEIGHT: 68 IN | WEIGHT: 150 LBS | HEART RATE: 81 BPM | SYSTOLIC BLOOD PRESSURE: 127 MMHG | OXYGEN SATURATION: 98 % | TEMPERATURE: 98.2 F

## 2017-05-06 DIAGNOSIS — S33.5XXA LOW BACK SPRAIN, INITIAL ENCOUNTER: Primary | ICD-10-CM

## 2017-05-06 PROCEDURE — 99282 EMERGENCY DEPT VISIT SF MDM: CPT

## 2017-05-06 PROCEDURE — 6360000002 HC RX W HCPCS: Performed by: EMERGENCY MEDICINE

## 2017-05-06 PROCEDURE — 96372 THER/PROPH/DIAG INJ SC/IM: CPT

## 2017-05-06 RX ORDER — HYDROCODONE BITARTRATE AND ACETAMINOPHEN 5; 325 MG/1; MG/1
1 TABLET ORAL EVERY 6 HOURS PRN
Qty: 15 TABLET | Refills: 0 | Status: SHIPPED | OUTPATIENT
Start: 2017-05-06 | End: 2017-05-12 | Stop reason: ALTCHOICE

## 2017-05-06 RX ORDER — DIAZEPAM 5 MG/ML
5 INJECTION, SOLUTION INTRAMUSCULAR; INTRAVENOUS ONCE
Status: COMPLETED | OUTPATIENT
Start: 2017-05-06 | End: 2017-05-06

## 2017-05-06 RX ADMIN — DIAZEPAM 5 MG: 5 INJECTION, SOLUTION INTRAMUSCULAR; INTRAVENOUS at 17:05

## 2017-05-06 ASSESSMENT — ENCOUNTER SYMPTOMS
TROUBLE SWALLOWING: 0
SORE THROAT: 0
ABDOMINAL PAIN: 0
SHORTNESS OF BREATH: 0
EYE DISCHARGE: 0
EYE PAIN: 0
DIARRHEA: 0
VOICE CHANGE: 0
CHOKING: 0
CHEST TIGHTNESS: 0
BACK PAIN: 1
VOMITING: 0
SINUS PRESSURE: 0
BLOOD IN STOOL: 0
WHEEZING: 0
CONSTIPATION: 0
EYE REDNESS: 0
COUGH: 0
FACIAL SWELLING: 0
STRIDOR: 0

## 2017-05-06 ASSESSMENT — PAIN DESCRIPTION - PAIN TYPE: TYPE: ACUTE PAIN

## 2017-05-06 ASSESSMENT — PAIN DESCRIPTION - DESCRIPTORS: DESCRIPTORS: ACHING;SHOOTING;SORE

## 2017-05-06 ASSESSMENT — PAIN SCALES - GENERAL: PAINLEVEL_OUTOF10: 9

## 2017-05-06 ASSESSMENT — PAIN DESCRIPTION - FREQUENCY: FREQUENCY: CONTINUOUS

## 2017-05-06 ASSESSMENT — PAIN DESCRIPTION - LOCATION: LOCATION: BACK

## 2017-05-06 ASSESSMENT — PAIN DESCRIPTION - ORIENTATION: ORIENTATION: MID

## 2017-05-06 ASSESSMENT — PAIN DESCRIPTION - PROGRESSION: CLINICAL_PROGRESSION: GRADUALLY WORSENING

## 2017-05-06 ASSESSMENT — PAIN SCALES - WONG BAKER: WONGBAKER_NUMERICALRESPONSE: 0

## 2017-05-12 ENCOUNTER — HOSPITAL ENCOUNTER (OUTPATIENT)
Dept: GENERAL RADIOLOGY | Age: 52
Discharge: HOME | End: 2017-05-12
Payer: MEDICARE

## 2017-05-12 ENCOUNTER — OFFICE VISIT (OUTPATIENT)
Dept: INTERNAL MEDICINE | Age: 52
End: 2017-05-12

## 2017-05-12 VITALS
HEIGHT: 68 IN | HEART RATE: 69 BPM | TEMPERATURE: 98.1 F | DIASTOLIC BLOOD PRESSURE: 78 MMHG | BODY MASS INDEX: 25.16 KG/M2 | RESPIRATION RATE: 14 BRPM | WEIGHT: 166 LBS | OXYGEN SATURATION: 96 % | SYSTOLIC BLOOD PRESSURE: 118 MMHG

## 2017-05-12 DIAGNOSIS — M54.50 ACUTE MIDLINE LOW BACK PAIN WITHOUT SCIATICA: Primary | ICD-10-CM

## 2017-05-12 DIAGNOSIS — E11.65 TYPE 2 DIABETES MELLITUS WITH HYPERGLYCEMIA, WITHOUT LONG-TERM CURRENT USE OF INSULIN (HCC): ICD-10-CM

## 2017-05-12 DIAGNOSIS — M32.9 LUPUS (HCC): ICD-10-CM

## 2017-05-12 DIAGNOSIS — M62.830 MUSCLE SPASM OF BACK: ICD-10-CM

## 2017-05-12 DIAGNOSIS — K21.9 GASTROESOPHAGEAL REFLUX DISEASE WITHOUT ESOPHAGITIS: ICD-10-CM

## 2017-05-12 DIAGNOSIS — E03.9 HYPOTHYROIDISM, UNSPECIFIED TYPE: ICD-10-CM

## 2017-05-12 LAB
ALBUMIN SERPL-MCNC: 3.7 G/DL (ref 3.9–4.9)
ALP BLD-CCNC: 104 U/L (ref 40–130)
ALT SERPL-CCNC: 16 U/L (ref 0–33)
ANION GAP SERPL CALCULATED.3IONS-SCNC: 11 MEQ/L (ref 7–13)
AST SERPL-CCNC: 21 U/L (ref 0–35)
BILIRUB SERPL-MCNC: 0.4 MG/DL (ref 0–1.2)
BUN BLDV-MCNC: 11 MG/DL (ref 6–20)
CALCIUM SERPL-MCNC: 8.8 MG/DL (ref 8.6–10.2)
CHLORIDE BLD-SCNC: 99 MEQ/L (ref 98–107)
CHOLESTEROL, TOTAL: 189 MG/DL (ref 0–199)
CO2: 27 MEQ/L (ref 22–29)
CREAT SERPL-MCNC: 1.13 MG/DL (ref 0.5–0.9)
GFR AFRICAN AMERICAN: >60
GFR NON-AFRICAN AMERICAN: 51
GLOBULIN: 2.9 G/DL (ref 2.3–3.5)
GLUCOSE BLD-MCNC: 94 MG/DL (ref 74–109)
HDLC SERPL-MCNC: 92 MG/DL (ref 40–59)
LDL CHOLESTEROL CALCULATED: 32 MG/DL (ref 0–129)
POTASSIUM SERPL-SCNC: 4.5 MEQ/L (ref 3.5–5.1)
SODIUM BLD-SCNC: 137 MEQ/L (ref 132–144)
TOTAL PROTEIN: 6.6 G/DL (ref 6.4–8.1)
TRIGL SERPL-MCNC: 323 MG/DL (ref 0–200)
TSH SERPL DL<=0.05 MIU/L-ACNC: 2.5 UIU/ML (ref 0.27–4.2)

## 2017-05-12 PROCEDURE — G8427 DOCREV CUR MEDS BY ELIG CLIN: HCPCS | Performed by: PHYSICIAN ASSISTANT

## 2017-05-12 PROCEDURE — 1036F TOBACCO NON-USER: CPT | Performed by: PHYSICIAN ASSISTANT

## 2017-05-12 PROCEDURE — 3044F HG A1C LEVEL LT 7.0%: CPT | Performed by: PHYSICIAN ASSISTANT

## 2017-05-12 PROCEDURE — 36415 COLL VENOUS BLD VENIPUNCTURE: CPT | Performed by: PHYSICIAN ASSISTANT

## 2017-05-12 PROCEDURE — G8419 CALC BMI OUT NRM PARAM NOF/U: HCPCS | Performed by: PHYSICIAN ASSISTANT

## 2017-05-12 PROCEDURE — 99213 OFFICE O/P EST LOW 20 MIN: CPT | Performed by: PHYSICIAN ASSISTANT

## 2017-05-12 PROCEDURE — 72110 X-RAY EXAM L-2 SPINE 4/>VWS: CPT

## 2017-05-12 RX ORDER — BACLOFEN 10 MG/1
10 TABLET ORAL 3 TIMES DAILY
Qty: 30 TABLET | Refills: 1 | Status: SHIPPED | OUTPATIENT
Start: 2017-05-12 | End: 2017-07-12 | Stop reason: ALTCHOICE

## 2017-05-12 RX ORDER — GREEN TEA/HOODIA GORDONII 315-12.5MG
1 CAPSULE ORAL 2 TIMES DAILY
Qty: 60 TABLET | Refills: 3 | Status: SHIPPED | OUTPATIENT
Start: 2017-05-12 | End: 2017-05-14

## 2017-05-12 RX ORDER — PANTOPRAZOLE SODIUM 40 MG/1
40 TABLET, DELAYED RELEASE ORAL DAILY
Qty: 90 TABLET | Refills: 3 | Status: ON HOLD | OUTPATIENT
Start: 2017-05-12 | End: 2021-08-17

## 2017-05-12 ASSESSMENT — ENCOUNTER SYMPTOMS
EYE REDNESS: 0
CONSTIPATION: 0
BLURRED VISION: 0
NAUSEA: 0
BACK PAIN: 1
SORE THROAT: 0
WHEEZING: 0
EYE PAIN: 0
COUGH: 0
ABDOMINAL PAIN: 0
SHORTNESS OF BREATH: 0
SPUTUM PRODUCTION: 0
DIARRHEA: 0
HEARTBURN: 1

## 2017-05-12 ASSESSMENT — PATIENT HEALTH QUESTIONNAIRE - PHQ9
SUM OF ALL RESPONSES TO PHQ9 QUESTIONS 1 & 2: 0
2. FEELING DOWN, DEPRESSED OR HOPELESS: 0
SUM OF ALL RESPONSES TO PHQ QUESTIONS 1-9: 0
1. LITTLE INTEREST OR PLEASURE IN DOING THINGS: 0

## 2017-05-14 ENCOUNTER — HOSPITAL ENCOUNTER (EMERGENCY)
Age: 52
Discharge: HOME | End: 2017-05-14
Attending: EMERGENCY MEDICINE
Payer: MEDICARE

## 2017-05-14 VITALS
TEMPERATURE: 100 F | HEART RATE: 88 BPM | BODY MASS INDEX: 22.73 KG/M2 | SYSTOLIC BLOOD PRESSURE: 137 MMHG | HEIGHT: 68 IN | RESPIRATION RATE: 16 BRPM | WEIGHT: 150 LBS | OXYGEN SATURATION: 95 % | DIASTOLIC BLOOD PRESSURE: 88 MMHG

## 2017-05-14 DIAGNOSIS — G43.909 MIGRAINE WITHOUT STATUS MIGRAINOSUS, NOT INTRACTABLE, UNSPECIFIED MIGRAINE TYPE: Primary | ICD-10-CM

## 2017-05-14 PROCEDURE — 6360000002 HC RX W HCPCS: Performed by: EMERGENCY MEDICINE

## 2017-05-14 PROCEDURE — 99283 EMERGENCY DEPT VISIT LOW MDM: CPT

## 2017-05-14 PROCEDURE — 6370000000 HC RX 637 (ALT 250 FOR IP): Performed by: EMERGENCY MEDICINE

## 2017-05-14 PROCEDURE — 96372 THER/PROPH/DIAG INJ SC/IM: CPT

## 2017-05-14 RX ORDER — HYDROCODONE BITARTRATE AND ACETAMINOPHEN 5; 325 MG/1; MG/1
1 TABLET ORAL EVERY 6 HOURS PRN
Qty: 10 TABLET | Refills: 0 | Status: SHIPPED | OUTPATIENT
Start: 2017-05-14 | End: 2017-05-21

## 2017-05-14 RX ORDER — PROMETHAZINE HYDROCHLORIDE 25 MG/ML
25 INJECTION, SOLUTION INTRAMUSCULAR; INTRAVENOUS ONCE
Status: COMPLETED | OUTPATIENT
Start: 2017-05-14 | End: 2017-05-14

## 2017-05-14 RX ORDER — ONDANSETRON 4 MG/1
4 TABLET, ORALLY DISINTEGRATING ORAL ONCE
Status: COMPLETED | OUTPATIENT
Start: 2017-05-14 | End: 2017-05-14

## 2017-05-14 RX ORDER — PROMETHAZINE HYDROCHLORIDE 25 MG/1
25 TABLET ORAL EVERY 6 HOURS PRN
Qty: 20 TABLET | Refills: 0 | Status: SHIPPED | OUTPATIENT
Start: 2017-05-14 | End: 2017-05-21

## 2017-05-14 RX ORDER — HYDROCODONE BITARTRATE AND ACETAMINOPHEN 5; 325 MG/1; MG/1
1 TABLET ORAL ONCE
Status: COMPLETED | OUTPATIENT
Start: 2017-05-14 | End: 2017-05-14

## 2017-05-14 RX ADMIN — PROMETHAZINE HYDROCHLORIDE 25 MG: 25 INJECTION INTRAMUSCULAR; INTRAVENOUS at 01:06

## 2017-05-14 RX ADMIN — HYDROCODONE BITARTRATE AND ACETAMINOPHEN 1 TABLET: 5; 325 TABLET ORAL at 01:06

## 2017-05-14 RX ADMIN — ONDANSETRON 4 MG: 4 TABLET, ORALLY DISINTEGRATING ORAL at 01:06

## 2017-05-14 ASSESSMENT — ENCOUNTER SYMPTOMS
NAUSEA: 1
CONSTIPATION: 0
SINUS PRESSURE: 0
VOMITING: 0
PHOTOPHOBIA: 1
SORE THROAT: 0
APNEA: 0
COLOR CHANGE: 0
DIARRHEA: 0
RHINORRHEA: 0
COUGH: 0
ABDOMINAL PAIN: 0
WHEEZING: 0
BACK PAIN: 0
EYE PAIN: 0
ABDOMINAL DISTENTION: 0
SHORTNESS OF BREATH: 0

## 2017-05-14 ASSESSMENT — PAIN DESCRIPTION - PAIN TYPE: TYPE: ACUTE PAIN

## 2017-05-14 ASSESSMENT — PAIN DESCRIPTION - DESCRIPTORS: DESCRIPTORS: HEADACHE

## 2017-05-14 ASSESSMENT — PAIN DESCRIPTION - FREQUENCY: FREQUENCY: CONTINUOUS

## 2017-05-14 ASSESSMENT — PAIN SCALES - GENERAL
PAINLEVEL_OUTOF10: 10
PAINLEVEL_OUTOF10: 10

## 2017-06-08 RX ORDER — ROSUVASTATIN CALCIUM 40 MG/1
40 TABLET, COATED ORAL DAILY
Qty: 90 TABLET | Refills: 3 | Status: SHIPPED | OUTPATIENT
Start: 2017-06-08 | End: 2022-03-09

## 2017-07-12 ENCOUNTER — OFFICE VISIT (OUTPATIENT)
Dept: INTERNAL MEDICINE | Age: 52
End: 2017-07-12

## 2017-07-12 VITALS
SYSTOLIC BLOOD PRESSURE: 100 MMHG | HEIGHT: 68 IN | RESPIRATION RATE: 16 BRPM | OXYGEN SATURATION: 96 % | WEIGHT: 162.8 LBS | DIASTOLIC BLOOD PRESSURE: 82 MMHG | HEART RATE: 96 BPM | BODY MASS INDEX: 24.67 KG/M2 | TEMPERATURE: 99.2 F

## 2017-07-12 DIAGNOSIS — Z23 NEED FOR TETANUS BOOSTER: ICD-10-CM

## 2017-07-12 DIAGNOSIS — M25.539 ACUTE WRIST PAIN, UNSPECIFIED LATERALITY: ICD-10-CM

## 2017-07-12 DIAGNOSIS — M79.641 PAIN IN BOTH HANDS: ICD-10-CM

## 2017-07-12 DIAGNOSIS — M25.521 ARTHRALGIA OF BOTH ELBOWS: Primary | ICD-10-CM

## 2017-07-12 DIAGNOSIS — M79.642 PAIN IN BOTH HANDS: ICD-10-CM

## 2017-07-12 DIAGNOSIS — M25.522 ARTHRALGIA OF BOTH ELBOWS: Primary | ICD-10-CM

## 2017-07-12 PROCEDURE — G8420 CALC BMI NORM PARAMETERS: HCPCS | Performed by: PHYSICIAN ASSISTANT

## 2017-07-12 PROCEDURE — 99213 OFFICE O/P EST LOW 20 MIN: CPT | Performed by: PHYSICIAN ASSISTANT

## 2017-07-12 PROCEDURE — 90715 TDAP VACCINE 7 YRS/> IM: CPT | Performed by: PHYSICIAN ASSISTANT

## 2017-07-12 PROCEDURE — G8427 DOCREV CUR MEDS BY ELIG CLIN: HCPCS | Performed by: PHYSICIAN ASSISTANT

## 2017-07-12 PROCEDURE — 90471 IMMUNIZATION ADMIN: CPT | Performed by: PHYSICIAN ASSISTANT

## 2017-07-12 PROCEDURE — 1036F TOBACCO NON-USER: CPT | Performed by: PHYSICIAN ASSISTANT

## 2017-07-12 RX ORDER — HYDROCODONE BITARTRATE AND ACETAMINOPHEN 5; 325 MG/1; MG/1
1 TABLET ORAL EVERY 8 HOURS PRN
Qty: 21 TABLET | Refills: 0 | Status: SHIPPED | OUTPATIENT
Start: 2017-07-12 | End: 2017-07-19

## 2017-07-12 RX ORDER — METOCLOPRAMIDE 5 MG/1
5 TABLET ORAL 4 TIMES DAILY
COMMUNITY
End: 2017-11-21

## 2017-07-12 RX ORDER — PREDNISONE 1 MG/1
5 TABLET ORAL DAILY
COMMUNITY
End: 2017-11-21

## 2017-07-12 RX ORDER — PREGABALIN 75 MG/1
75 CAPSULE ORAL 2 TIMES DAILY
COMMUNITY
End: 2017-11-21

## 2017-07-12 ASSESSMENT — ENCOUNTER SYMPTOMS
BLURRED VISION: 0
EYE REDNESS: 0
WHEEZING: 0
EYE PAIN: 0
BACK PAIN: 0
VOMITING: 0
EYE DISCHARGE: 0
CONSTIPATION: 0
HEARTBURN: 0
ABDOMINAL PAIN: 0
NAUSEA: 0
DIARRHEA: 0
SHORTNESS OF BREATH: 0
COUGH: 0

## 2017-07-13 ENCOUNTER — TELEPHONE (OUTPATIENT)
Dept: INTERNAL MEDICINE | Age: 52
End: 2017-07-13

## 2017-07-26 ENCOUNTER — TELEPHONE (OUTPATIENT)
Dept: INTERNAL MEDICINE | Age: 52
End: 2017-07-26

## 2017-07-26 DIAGNOSIS — M25.522 ARTHRALGIA OF BOTH ELBOWS: ICD-10-CM

## 2017-07-26 DIAGNOSIS — M79.641 PAIN IN BOTH HANDS: ICD-10-CM

## 2017-07-26 DIAGNOSIS — M32.9: Primary | ICD-10-CM

## 2017-07-26 DIAGNOSIS — M79.642 PAIN IN BOTH HANDS: ICD-10-CM

## 2017-07-26 DIAGNOSIS — M32.9 SLE (SYSTEMIC LUPUS ERYTHEMATOSUS RELATED SYNDROME) (HCC): ICD-10-CM

## 2017-07-26 DIAGNOSIS — M25.521 ARTHRALGIA OF BOTH ELBOWS: ICD-10-CM

## 2017-07-26 DIAGNOSIS — M25.539 ACUTE WRIST PAIN, UNSPECIFIED LATERALITY: ICD-10-CM

## 2017-07-26 RX ORDER — HYDROCODONE BITARTRATE AND ACETAMINOPHEN 5; 325 MG/1; MG/1
1 TABLET ORAL EVERY 8 HOURS PRN
Qty: 21 TABLET | Refills: 0 | OUTPATIENT
Start: 2017-07-26 | End: 2017-08-02

## 2017-07-26 RX ORDER — ACETAMINOPHEN 325 MG/1
650 TABLET ORAL EVERY 6 HOURS PRN
Qty: 120 TABLET | Refills: 3 | Status: SHIPPED | OUTPATIENT
Start: 2017-07-26 | End: 2017-11-21

## 2017-08-02 ENCOUNTER — OFFICE VISIT (OUTPATIENT)
Dept: INTERNAL MEDICINE | Age: 52
End: 2017-08-02

## 2017-08-02 ENCOUNTER — TELEPHONE (OUTPATIENT)
Dept: INTERNAL MEDICINE | Age: 52
End: 2017-08-02

## 2017-08-02 VITALS
HEART RATE: 63 BPM | DIASTOLIC BLOOD PRESSURE: 84 MMHG | WEIGHT: 161.6 LBS | HEIGHT: 68 IN | OXYGEN SATURATION: 98 % | TEMPERATURE: 98 F | SYSTOLIC BLOOD PRESSURE: 112 MMHG | RESPIRATION RATE: 16 BRPM | BODY MASS INDEX: 24.49 KG/M2

## 2017-08-02 DIAGNOSIS — M32.9 SYSTEMIC LUPUS ERYTHEMATOSUS, UNSPECIFIED SLE TYPE, UNSPECIFIED ORGAN INVOLVEMENT STATUS (HCC): ICD-10-CM

## 2017-08-02 DIAGNOSIS — M25.50 ARTHRALGIA, UNSPECIFIED JOINT: ICD-10-CM

## 2017-08-02 DIAGNOSIS — E11.65 TYPE 2 DIABETES MELLITUS WITH HYPERGLYCEMIA, WITHOUT LONG-TERM CURRENT USE OF INSULIN (HCC): ICD-10-CM

## 2017-08-02 DIAGNOSIS — E78.1 HYPERTRIGLYCERIDEMIA: Primary | ICD-10-CM

## 2017-08-02 LAB
ALBUMIN SERPL-MCNC: 4.2 G/DL (ref 3.9–4.9)
ALP BLD-CCNC: 92 U/L (ref 40–130)
ALT SERPL-CCNC: 6 U/L (ref 0–33)
AMPHETAMINE SCREEN, URINE: POSITIVE
ANION GAP SERPL CALCULATED.3IONS-SCNC: 15 MEQ/L (ref 7–13)
AST SERPL-CCNC: 12 U/L (ref 0–35)
BARBITURATE SCREEN URINE: ABNORMAL
BENZODIAZEPINE SCREEN, URINE: ABNORMAL
BILIRUB SERPL-MCNC: 0.3 MG/DL (ref 0–1.2)
BUN BLDV-MCNC: 16 MG/DL (ref 6–20)
CALCIUM SERPL-MCNC: 8.9 MG/DL (ref 8.6–10.2)
CANNABINOID SCREEN URINE: ABNORMAL
CHLORIDE BLD-SCNC: 100 MEQ/L (ref 98–107)
CHOLESTEROL, TOTAL: 182 MG/DL (ref 0–199)
CO2: 24 MEQ/L (ref 22–29)
COCAINE METABOLITE SCREEN URINE: ABNORMAL
CREAT SERPL-MCNC: 0.99 MG/DL (ref 0.5–0.9)
GFR AFRICAN AMERICAN: >60
GFR NON-AFRICAN AMERICAN: 59.4
GLOBULIN: 2.8 G/DL (ref 2.3–3.5)
GLUCOSE BLD-MCNC: 174 MG/DL (ref 74–109)
HBA1C MFR BLD: 6.4 % (ref 4.8–5.9)
HDLC SERPL-MCNC: 73 MG/DL (ref 40–59)
LDL CHOLESTEROL CALCULATED: 56 MG/DL (ref 0–129)
Lab: ABNORMAL
OPIATE SCREEN URINE: ABNORMAL
PHENCYCLIDINE SCREEN URINE: ABNORMAL
POTASSIUM SERPL-SCNC: 4.7 MEQ/L (ref 3.5–5.1)
SODIUM BLD-SCNC: 139 MEQ/L (ref 132–144)
TOTAL PROTEIN: 7 G/DL (ref 6.4–8.1)
TRIGL SERPL-MCNC: 264 MG/DL (ref 0–200)

## 2017-08-02 PROCEDURE — 36415 COLL VENOUS BLD VENIPUNCTURE: CPT | Performed by: PHYSICIAN ASSISTANT

## 2017-08-02 PROCEDURE — 3046F HEMOGLOBIN A1C LEVEL >9.0%: CPT | Performed by: PHYSICIAN ASSISTANT

## 2017-08-02 PROCEDURE — 99213 OFFICE O/P EST LOW 20 MIN: CPT | Performed by: PHYSICIAN ASSISTANT

## 2017-08-02 PROCEDURE — G8427 DOCREV CUR MEDS BY ELIG CLIN: HCPCS | Performed by: PHYSICIAN ASSISTANT

## 2017-08-02 PROCEDURE — 1036F TOBACCO NON-USER: CPT | Performed by: PHYSICIAN ASSISTANT

## 2017-08-02 PROCEDURE — G8420 CALC BMI NORM PARAMETERS: HCPCS | Performed by: PHYSICIAN ASSISTANT

## 2017-08-02 RX ORDER — ACETAMINOPHEN AND CODEINE PHOSPHATE 300; 30 MG/1; MG/1
1-2 TABLET ORAL EVERY 6 HOURS PRN
Qty: 30 TABLET | Refills: 0 | Status: SHIPPED | OUTPATIENT
Start: 2017-08-02 | End: 2017-11-21

## 2017-08-02 RX ORDER — SERTRALINE HYDROCHLORIDE 25 MG/1
25 TABLET, FILM COATED ORAL DAILY
Qty: 30 TABLET | Refills: 3 | Status: SHIPPED | OUTPATIENT
Start: 2017-08-02 | End: 2017-11-21

## 2017-08-02 ASSESSMENT — ENCOUNTER SYMPTOMS
SHORTNESS OF BREATH: 0
BLURRED VISION: 0
COUGH: 0
ABDOMINAL PAIN: 0
HEARTBURN: 1
NAUSEA: 0
CONSTIPATION: 0
WHEEZING: 0
BACK PAIN: 0
VOMITING: 0
DIARRHEA: 0
SORE THROAT: 0
EYE PAIN: 0

## 2017-09-05 ENCOUNTER — TELEPHONE (OUTPATIENT)
Dept: INTERNAL MEDICINE | Age: 52
End: 2017-09-05

## 2017-09-22 ENCOUNTER — HOSPITAL ENCOUNTER (OUTPATIENT)
Dept: GENERAL RADIOLOGY | Age: 52
Discharge: HOME | End: 2017-09-22
Payer: MEDICARE

## 2017-09-22 DIAGNOSIS — M25.542 ARTHRALGIA OF METACARPOPHALANGEAL JOINT, LEFT: ICD-10-CM

## 2017-09-22 DIAGNOSIS — M25.541 ARTHRALGIA OF METACARPOPHALANGEAL JOINT, RIGHT: ICD-10-CM

## 2017-09-22 PROCEDURE — 73130 X-RAY EXAM OF HAND: CPT

## 2017-11-01 ENCOUNTER — HOSPITAL ENCOUNTER (OUTPATIENT)
Dept: NEUROLOGY | Age: 52
Discharge: HOME | End: 2017-11-01
Payer: MEDICARE

## 2017-11-01 PROCEDURE — 95886 MUSC TEST DONE W/N TEST COMP: CPT

## 2017-11-01 PROCEDURE — 95910 NRV CNDJ TEST 7-8 STUDIES: CPT

## 2017-11-12 ENCOUNTER — APPOINTMENT (OUTPATIENT)
Dept: CT IMAGING | Age: 52
End: 2017-11-12
Payer: MEDICARE

## 2017-11-12 ENCOUNTER — HOSPITAL ENCOUNTER (EMERGENCY)
Age: 52
Discharge: HOME | End: 2017-11-12
Attending: EMERGENCY MEDICINE
Payer: MEDICARE

## 2017-11-12 VITALS
HEART RATE: 85 BPM | OXYGEN SATURATION: 98 % | DIASTOLIC BLOOD PRESSURE: 83 MMHG | SYSTOLIC BLOOD PRESSURE: 144 MMHG | WEIGHT: 155 LBS | BODY MASS INDEX: 23.49 KG/M2 | TEMPERATURE: 98.1 F | HEIGHT: 68 IN | RESPIRATION RATE: 20 BRPM

## 2017-11-12 DIAGNOSIS — R10.9 LEFT FLANK PAIN: Primary | ICD-10-CM

## 2017-11-12 LAB
BACTERIA: NORMAL /HPF
BILIRUBIN URINE: NEGATIVE
BLOOD, URINE: NORMAL
CLARITY: CLEAR
COLOR: YELLOW
EPITHELIAL CELLS, UA: NORMAL /HPF
GLUCOSE URINE: NEGATIVE MG/DL
KETONES, URINE: NEGATIVE MG/DL
LEUKOCYTE ESTERASE, URINE: NORMAL
NITRITE, URINE: NEGATIVE
PH UA: 5.5 (ref 5–9)
PROTEIN UA: NEGATIVE MG/DL
RBC UA: NORMAL /HPF (ref 0–2)
SPECIFIC GRAVITY UA: <=1.005 (ref 1–1.03)
URINE REFLEX TO CULTURE: YES
UROBILINOGEN, URINE: 0.2 E.U./DL
WBC UA: NORMAL /HPF (ref 0–5)

## 2017-11-12 PROCEDURE — 81001 URINALYSIS AUTO W/SCOPE: CPT

## 2017-11-12 PROCEDURE — 87086 URINE CULTURE/COLONY COUNT: CPT

## 2017-11-12 PROCEDURE — 6360000002 HC RX W HCPCS: Performed by: EMERGENCY MEDICINE

## 2017-11-12 PROCEDURE — 6370000000 HC RX 637 (ALT 250 FOR IP): Performed by: EMERGENCY MEDICINE

## 2017-11-12 PROCEDURE — 74176 CT ABD & PELVIS W/O CONTRAST: CPT

## 2017-11-12 PROCEDURE — 96372 THER/PROPH/DIAG INJ SC/IM: CPT

## 2017-11-12 PROCEDURE — 99284 EMERGENCY DEPT VISIT MOD MDM: CPT

## 2017-11-12 RX ORDER — 0.9 % SODIUM CHLORIDE 0.9 %
1000 INTRAVENOUS SOLUTION INTRAVENOUS ONCE
Status: DISCONTINUED | OUTPATIENT
Start: 2017-11-12 | End: 2017-11-12

## 2017-11-12 RX ORDER — MORPHINE SULFATE 4 MG/ML
4 INJECTION, SOLUTION INTRAMUSCULAR; INTRAVENOUS ONCE
Status: COMPLETED | OUTPATIENT
Start: 2017-11-12 | End: 2017-11-12

## 2017-11-12 RX ORDER — OXYCODONE HYDROCHLORIDE AND ACETAMINOPHEN 5; 325 MG/1; MG/1
1 TABLET ORAL EVERY 6 HOURS PRN
Qty: 15 TABLET | Refills: 0 | Status: SHIPPED | OUTPATIENT
Start: 2017-11-12 | End: 2017-11-19

## 2017-11-12 RX ORDER — MORPHINE SULFATE 4 MG/ML
4 INJECTION, SOLUTION INTRAMUSCULAR; INTRAVENOUS ONCE
Status: DISCONTINUED | OUTPATIENT
Start: 2017-11-12 | End: 2017-11-12

## 2017-11-12 RX ORDER — OXYCODONE HYDROCHLORIDE AND ACETAMINOPHEN 5; 325 MG/1; MG/1
1 TABLET ORAL ONCE
Status: COMPLETED | OUTPATIENT
Start: 2017-11-12 | End: 2017-11-12

## 2017-11-12 RX ADMIN — OXYCODONE HYDROCHLORIDE AND ACETAMINOPHEN 1 TABLET: 5; 325 TABLET ORAL at 15:29

## 2017-11-12 RX ADMIN — MORPHINE SULFATE 4 MG: 4 INJECTION INTRAVENOUS at 16:17

## 2017-11-12 ASSESSMENT — ENCOUNTER SYMPTOMS
SORE THROAT: 0
DIARRHEA: 0
NAUSEA: 0
ABDOMINAL PAIN: 0
VOMITING: 0
BACK PAIN: 0
SHORTNESS OF BREATH: 0

## 2017-11-12 ASSESSMENT — PAIN SCALES - GENERAL
PAINLEVEL_OUTOF10: 7
PAINLEVEL_OUTOF10: 10
PAINLEVEL_OUTOF10: 2

## 2017-11-12 ASSESSMENT — PAIN DESCRIPTION - DESCRIPTORS: DESCRIPTORS: SHARP

## 2017-11-12 ASSESSMENT — PAIN SCALES - WONG BAKER: WONGBAKER_NUMERICALRESPONSE: 0

## 2017-11-12 ASSESSMENT — PAIN DESCRIPTION - ORIENTATION: ORIENTATION: LEFT

## 2017-11-12 ASSESSMENT — PAIN DESCRIPTION - FREQUENCY: FREQUENCY: CONTINUOUS

## 2017-11-12 ASSESSMENT — PAIN DESCRIPTION - PAIN TYPE
TYPE: ACUTE PAIN
TYPE: ACUTE PAIN

## 2017-11-12 ASSESSMENT — PAIN DESCRIPTION - LOCATION: LOCATION: FLANK

## 2017-11-12 NOTE — ED PROVIDER NOTES
Anxiety     Asthma     as child    Depression     Depression     dysthymia - following lupus dx age 23    Diabetes mellitus ([address]     Foot fracture, left     GERD (gastroesophageal reflux disease)     Hyperlipidemia     Hypothyroidism     Kidney stones     Lupus     dr Ashanti Banks -     Migraine     PONV (postoperative nausea and vomiting)        SURGICAL HISTORY       Past Surgical History:   Procedure Laterality Date    APPENDECTOMY       SECTION      CHOLECYSTECTOMY      COLONOSCOPY      FOOT CLOSED REDUCTION Left 1/3/2017    LEFT FOOT PERCUTANEOUS PINNING Tinnie Antis FRACTURE performed by Kian Berkowitz MD at [facility] ESOPHAGOGASTRODUODENOSCOPY TRANSORAL DIAGNOSTIC N/A 3/8/2017    EGD ESOPHAGOGASTRODUODENOSCOPY WITH DILATION performed by Glenn Aguirre MD at [facility]       Discharge Medication List as of 2017  4:44 PM      CONTINUE these medications which have NOT CHANGED    Details   pregabalin (LYRICA) 75 MG capsule Take 75 mg by mouth 2 times dailyHistorical Med      rosuvastatin (CRESTOR) 40 MG tablet Take 1 tablet by mouth daily, Disp-90 tablet, R-3Normal      pantoprazole (PROTONIX) 40 MG tablet Take 1 tablet by mouth daily, Disp-90 tablet, R-3Normal      metFORMIN (GLUCOPHAGE) 1000 MG tablet Take 1 tablet by mouth 2 times daily (with meals), Disp-180 tablet, R-3Normal      levothyroxine (SYNTHROID) 50 MCG tablet Take 1 tablet by mouth daily, Disp-90 tablet, R-3      Esterified Estrogens (MENEST) 0.3 MG TABS Take 1 tablet by mouth daily      Blood Glucose Monitoring Suppl (TRUE TRACK BLOOD GLUCOSE) CAROLE Disp-100 Device, R-5, NormalCheck blood sugars twice daily or as directed. DRUG MART UNILET LANCETS 30G MISC Disp-100 each, R-5, NormalCheck blood sugars twice daily or as directed.       glucose blood VI test 1. Left flank pain          DISPOSITION/PLAN   DISPOSITION Decision to Discharge    PATIENT REFERRED TO:  Husam Michelle MD  [address] [id number] [address]  [phone]      ASAP      DISCHARGE MEDICATIONS:  Discharge Medication List as of 11/12/2017  4:44 PM      START taking these medications    Details   oxyCODONE-acetaminophen (PERCOCET) 5-325 MG per tablet Take 1 tablet by mouth every 6 hours as needed for Pain ., Disp-15 tablet, R-0Print                (Please note that portions of this note were completed with a voice recognition program.  Efforts were made to edit the dictations but occasionally words and phrases are mis-transcribed.)    Elizabeth Dang MD (electronically signed)  Attending Emergency Physician              Bharat Stratton MD  11/12/17 [id number]

## 2017-11-12 NOTE — ED TRIAGE NOTES
Patient presents to ED with c/o left flank painthat started on Thursday.  States she has history of kidney stones and this feels similar

## 2017-11-14 LAB — URINE CULTURE, ROUTINE: NORMAL

## 2017-11-21 ENCOUNTER — OFFICE VISIT (OUTPATIENT)
Dept: UROLOGY | Age: 52
End: 2017-11-21

## 2017-11-21 VITALS
BODY MASS INDEX: 23.49 KG/M2 | SYSTOLIC BLOOD PRESSURE: 112 MMHG | HEIGHT: 68 IN | WEIGHT: 155 LBS | DIASTOLIC BLOOD PRESSURE: 82 MMHG | HEART RATE: 74 BPM

## 2017-11-21 DIAGNOSIS — R33.9 URINARY RETENTION: ICD-10-CM

## 2017-11-21 DIAGNOSIS — R30.0 DYSURIA: Primary | ICD-10-CM

## 2017-11-21 LAB
BILIRUBIN, POC: NORMAL
BLOOD URINE, POC: NORMAL
CLARITY, POC: CLEAR
COLOR, POC: YELLOW
GLUCOSE URINE, POC: NORMAL
KETONES, POC: NORMAL
LEUKOCYTE EST, POC: NORMAL
NITRITE, POC: NORMAL
PH, POC: 5
POST VOID RESIDUAL (PVR): 32 ML
PROTEIN, POC: NORMAL
SPECIFIC GRAVITY, POC: 1.02
UROBILINOGEN, POC: 0.2

## 2017-11-21 PROCEDURE — 99203 OFFICE O/P NEW LOW 30 MIN: CPT | Performed by: UROLOGY

## 2017-11-21 PROCEDURE — 81003 URINALYSIS AUTO W/O SCOPE: CPT | Performed by: UROLOGY

## 2017-11-21 PROCEDURE — 51798 US URINE CAPACITY MEASURE: CPT | Performed by: UROLOGY

## 2017-11-21 PROCEDURE — G8484 FLU IMMUNIZE NO ADMIN: HCPCS | Performed by: UROLOGY

## 2017-11-21 PROCEDURE — 3017F COLORECTAL CA SCREEN DOC REV: CPT | Performed by: UROLOGY

## 2017-11-21 PROCEDURE — 1036F TOBACCO NON-USER: CPT | Performed by: UROLOGY

## 2017-11-21 PROCEDURE — G8427 DOCREV CUR MEDS BY ELIG CLIN: HCPCS | Performed by: UROLOGY

## 2017-11-21 PROCEDURE — G8420 CALC BMI NORM PARAMETERS: HCPCS | Performed by: UROLOGY

## 2017-11-21 NOTE — PROGRESS NOTES
[facility] EVALUATION NOTE                                                 H&P                                                                                                                                                 Reason for Visit  Renal colic    History of Present Illness  82-year-old female with history of nephrolithiasis  Multiple urologic procedures done in [place]  Recent episode of renal colic  CT scan showed findings consistent with a recently passed stone  Patient currently is asymptomatic      Urologic Review of Systems/Symptoms  Denies hematuria  Denies dysuria  Denies incontinence  Denies flank pain  Other Urologic: Renal colic    Review of Systems  Head and neck: No issues/reviewed  Cardiac: No recent issues/reviewed  Pulmonary: No issues/reviewed  Gastrointestinal: No issues/reviewed  Neurologic: No recent issues/reviewed  Extremities: No issues/reviewed  Lymphatics: No lymphadenopathy no change  Genitourinary: See above  Skin: No issues/reviewed  Hospitalization: For renal colic  Currently on no medications for stone disease has history of lupus  All 14 categories of Review of Systems otherwise reviewed no other findings reported.     Past Medical History:   Diagnosis Date    Anxiety     Asthma     as child    Depression     Depression     dysthymia - following lupus dx age 23    Diabetes mellitus ([address]     Foot fracture, left     GERD (gastroesophageal reflux disease)     Hyperlipidemia     Hypothyroidism     Kidney stones     Lupus     dr Priyank Ledbetter -     Migraine     PONV (postoperative nausea and vomiting)      Past Surgical History:   Procedure Laterality Date    APPENDECTOMY       SECTION      CHOLECYSTECTOMY      COLONOSCOPY      FOOT CLOSED REDUCTION Left 1/3/2017    LEFT FOOT PERCUTANEOUS PINNING [place] Ny FRACTURE performed by Tanesha Hall MD at [address]      [place] ESOPHAGOGASTRODUODENOSCOPY TRANSORAL DIAGNOSTIC N/A 3/8/2017    EGD ESOPHAGOGASTRODUODENOSCOPY WITH DILATION performed by Gabriela Haines MD at [facility]       Social History     Social History    Marital status: Legally      Spouse name: N/A    Number of children: N/A    Years of education: N/A     Social History Main Topics    Smoking status: Never Smoker    Smokeless tobacco: Never Used    Alcohol use No      Comment: denies    Drug use: No      Comment: denies    Sexual activity: Yes     Partners: Male     Other Topics Concern    None     Social History Narrative    None     Family History   Problem Relation Age of Onset    Adopted: Yes     Current Outpatient Prescriptions   Medication Sig Dispense Refill    rosuvastatin (CRESTOR) 40 MG tablet Take 1 tablet by mouth daily 90 tablet 3    pantoprazole (PROTONIX) 40 MG tablet Take 1 tablet by mouth daily 90 tablet 3    metFORMIN (GLUCOPHAGE) 1000 MG tablet Take 1 tablet by mouth 2 times daily (with meals) 180 tablet 3    levothyroxine (SYNTHROID) 50 MCG tablet Take 1 tablet by mouth daily 90 tablet 3    Blood Glucose Monitoring Suppl (TRUE TRACK BLOOD GLUCOSE) CAROLE Check blood sugars twice daily or as directed. 100 Device 5    DRUG MART UNILET LANCETS 30G MISC Check blood sugars twice daily or as directed. 100 each 5    glucose blood VI test strips (ASCENSIA AUTODISC VI;ONE TOUCH ULTRA TEST VI) strip Check blood sugars twice daily or as directed. 200 strip 6    hydroxychloroquine (PLAQUENIL) 200 MG tablet Take 1 tablet by mouth 2 times daily  5     No current facility-administered medications for this visit. Aspirin; Imitrex [sumatriptan]; Pcn [penicillins];  Seasonal; Toradol [ketorolac tromethamine]; and Ultram [tramadol]  All reviewed and verified by Dr Isa Bustamante on today's visit    No results found for: PSA, PSADIA  Results for POC orders placed in visit on 11/21/17   POCT Urinalysis No Micro (Auto)   Result Value Ref Range    Color, UA yellow     Clarity, UA clear     Glucose, UA POC neg     Bilirubin, UA neg     Ketones, UA neg     Spec Grav, UA 1.025     Blood, UA POC trace-intact     pH, UA 5.0     Protein, UA POC 30mg/dL     Urobilinogen, UA 0.2     Leukocytes, UA trace     Nitrite, UA neg    poct post void residual   Result Value Ref Range    post void residual 32 ml    Narrative    A point of care test   Post Void Residual was completed by performing  ultrasound scan of the bladder and  reviewed by Dr Esteban Jimenez       Physical Exam  Vitals:    11/21/17 1103   BP: 112/82   Pulse: 74   Weight: 155 lb (70.3 kg)   Height: 5' 8\" (1.727 m)     Constitutional: patient is oriented to person, place, and time. patient appears well-developed. pleasant and cooperative. Ears: Adequate hearing/no hearing loss  Head: Normocephalic. Atraumatic  Neck: Normal range of motion. Cardiovascular: Normal rate, BP reviewed. sinus rhythm  Pulmonary/Chest: Normal respiratory effort  no shortness of breath  Abdominal: Not distended. No suprapubic pain  Urologic Exam  CT scan reviewed. No evidence of flank tenderness. Pelvic exam not indicated . Musculoskeletal: Normal range of motion. Patient is ambulatory. Extremities: No edema   Neurological: Cranial nerves intact   Skin: Skin is warm and dry. No lesions. No rashes   Psychiatric:  Alert and oriented ×3. Assessment  Recent renal colic  No evidence of stone at this time  Small nephrocalcinosis type lesion in the right kidney  Plan  Continue diet modification  Dietary modification discussed with patient  Follow-up p.r.n. Greater than 50% of 30 minutes spent consulting patient face-to-face  Orders Placed This Encounter   Procedures    POCT Urinalysis No Micro (Auto)    poct post void residual     No orders of the defined types were placed in this encounter.     Kaylan Hubbard MD       Please note this report has been partially produced using speech recognition software  And may cause contain errors related to that system including grammar, punctuation and spelling as well as words and phrases that may seem inappropriate. If there are questions or concerns please feel free to contact me to clarify.

## 2018-02-16 ENCOUNTER — HOSPITAL ENCOUNTER (EMERGENCY)
Age: 53
Discharge: HOME | End: 2018-02-16
Payer: MEDICARE

## 2018-02-16 ENCOUNTER — OFFICE VISIT (OUTPATIENT)
Dept: FAMILY MEDICINE CLINIC | Age: 53
End: 2018-02-16
Payer: MEDICARE

## 2018-02-16 VITALS
TEMPERATURE: 97.6 F | DIASTOLIC BLOOD PRESSURE: 80 MMHG | RESPIRATION RATE: 22 BRPM | HEIGHT: 68 IN | BODY MASS INDEX: 25.16 KG/M2 | HEART RATE: 124 BPM | WEIGHT: 166 LBS | OXYGEN SATURATION: 99 % | SYSTOLIC BLOOD PRESSURE: 128 MMHG

## 2018-02-16 VITALS
SYSTOLIC BLOOD PRESSURE: 112 MMHG | BODY MASS INDEX: 23.49 KG/M2 | RESPIRATION RATE: 18 BRPM | TEMPERATURE: 98.3 F | DIASTOLIC BLOOD PRESSURE: 63 MMHG | OXYGEN SATURATION: 99 % | HEIGHT: 68 IN | WEIGHT: 155 LBS | HEART RATE: 99 BPM

## 2018-02-16 DIAGNOSIS — R11.0 NAUSEA: ICD-10-CM

## 2018-02-16 DIAGNOSIS — G43.019 INTRACTABLE MIGRAINE WITHOUT AURA AND WITHOUT STATUS MIGRAINOSUS: Primary | ICD-10-CM

## 2018-02-16 DIAGNOSIS — N30.00 ACUTE CYSTITIS WITHOUT HEMATURIA: ICD-10-CM

## 2018-02-16 DIAGNOSIS — G44.219 EPISODIC TENSION-TYPE HEADACHE, NOT INTRACTABLE: Primary | ICD-10-CM

## 2018-02-16 LAB
ALBUMIN SERPL-MCNC: 4.8 G/DL (ref 3.9–4.9)
ALP BLD-CCNC: 119 U/L (ref 40–130)
ALT SERPL-CCNC: 12 U/L (ref 0–33)
ANION GAP SERPL CALCULATED.3IONS-SCNC: 14 MEQ/L (ref 7–13)
AST SERPL-CCNC: 14 U/L (ref 0–35)
BACTERIA: ABNORMAL /HPF
BASOPHILS ABSOLUTE: 0.2 K/UL (ref 0–0.2)
BASOPHILS RELATIVE PERCENT: 1.3 %
BILIRUB SERPL-MCNC: 0.4 MG/DL (ref 0–1.2)
BILIRUBIN URINE: NEGATIVE
BLOOD, URINE: NEGATIVE
BUN BLDV-MCNC: 11 MG/DL (ref 6–20)
CALCIUM SERPL-MCNC: 9.9 MG/DL (ref 8.6–10.2)
CHLORIDE BLD-SCNC: 100 MEQ/L (ref 98–107)
CLARITY: ABNORMAL
CO2: 27 MEQ/L (ref 22–29)
COLOR: YELLOW
CREAT SERPL-MCNC: 0.9 MG/DL (ref 0.5–0.9)
EOSINOPHILS ABSOLUTE: 0.4 K/UL (ref 0–0.7)
EOSINOPHILS RELATIVE PERCENT: 3.1 %
EPITHELIAL CELLS, UA: ABNORMAL /HPF
GFR AFRICAN AMERICAN: >60
GFR NON-AFRICAN AMERICAN: >60
GLOBULIN: 3.3 G/DL (ref 2.3–3.5)
GLUCOSE BLD-MCNC: 129 MG/DL
GLUCOSE BLD-MCNC: 129 MG/DL (ref 60–115)
GLUCOSE BLD-MCNC: 131 MG/DL (ref 74–109)
GLUCOSE URINE: NEGATIVE MG/DL
HCT VFR BLD CALC: 45.2 % (ref 37–47)
HEMOGLOBIN: 15.4 G/DL (ref 12–16)
KETONES, URINE: NEGATIVE MG/DL
LEUKOCYTE ESTERASE, URINE: ABNORMAL
LYMPHOCYTES ABSOLUTE: 2.9 K/UL (ref 1–4.8)
LYMPHOCYTES RELATIVE PERCENT: 22.4 %
MCH RBC QN AUTO: 29.6 PG (ref 27–31.3)
MCHC RBC AUTO-ENTMCNC: 34.2 % (ref 33–37)
MCV RBC AUTO: 86.7 FL (ref 82–100)
MONOCYTES ABSOLUTE: 0.7 K/UL (ref 0.2–0.8)
MONOCYTES RELATIVE PERCENT: 5.4 %
NEUTROPHILS ABSOLUTE: 8.6 K/UL (ref 1.4–6.5)
NEUTROPHILS RELATIVE PERCENT: 67.8 %
NITRITE, URINE: NEGATIVE
PDW BLD-RTO: 13.5 % (ref 11.5–14.5)
PERFORMED ON: ABNORMAL
PH UA: 6 (ref 5–9)
PLATELET # BLD: 319 K/UL (ref 130–400)
POTASSIUM SERPL-SCNC: 4 MEQ/L (ref 3.5–5.1)
PROTEIN UA: NEGATIVE MG/DL
RBC # BLD: 5.21 M/UL (ref 4.2–5.4)
RBC UA: ABNORMAL /HPF (ref 0–2)
SODIUM BLD-SCNC: 141 MEQ/L (ref 132–144)
SPECIFIC GRAVITY UA: 1.01 (ref 1–1.03)
TOTAL PROTEIN: 8.1 G/DL (ref 6.4–8.1)
URINE REFLEX TO CULTURE: YES
UROBILINOGEN, URINE: 0.2 E.U./DL
WBC # BLD: 12.7 K/UL (ref 4.8–10.8)
WBC UA: ABNORMAL /HPF (ref 0–5)

## 2018-02-16 PROCEDURE — 96372 THER/PROPH/DIAG INJ SC/IM: CPT | Performed by: NURSE PRACTITIONER

## 2018-02-16 PROCEDURE — 96375 TX/PRO/DX INJ NEW DRUG ADDON: CPT

## 2018-02-16 PROCEDURE — 6370000000 HC RX 637 (ALT 250 FOR IP): Performed by: NURSE PRACTITIONER

## 2018-02-16 PROCEDURE — 3017F COLORECTAL CA SCREEN DOC REV: CPT | Performed by: NURSE PRACTITIONER

## 2018-02-16 PROCEDURE — 96365 THER/PROPH/DIAG IV INF INIT: CPT

## 2018-02-16 PROCEDURE — G8427 DOCREV CUR MEDS BY ELIG CLIN: HCPCS | Performed by: NURSE PRACTITIONER

## 2018-02-16 PROCEDURE — 80053 COMPREHEN METABOLIC PANEL: CPT

## 2018-02-16 PROCEDURE — 81001 URINALYSIS AUTO W/SCOPE: CPT

## 2018-02-16 PROCEDURE — 85025 COMPLETE CBC W/AUTO DIFF WBC: CPT

## 2018-02-16 PROCEDURE — 1036F TOBACCO NON-USER: CPT | Performed by: NURSE PRACTITIONER

## 2018-02-16 PROCEDURE — G8484 FLU IMMUNIZE NO ADMIN: HCPCS | Performed by: NURSE PRACTITIONER

## 2018-02-16 PROCEDURE — 99283 EMERGENCY DEPT VISIT LOW MDM: CPT

## 2018-02-16 PROCEDURE — 6360000002 HC RX W HCPCS: Performed by: NURSE PRACTITIONER

## 2018-02-16 PROCEDURE — 36415 COLL VENOUS BLD VENIPUNCTURE: CPT

## 2018-02-16 PROCEDURE — G8420 CALC BMI NORM PARAMETERS: HCPCS | Performed by: NURSE PRACTITIONER

## 2018-02-16 PROCEDURE — 99213 OFFICE O/P EST LOW 20 MIN: CPT | Performed by: NURSE PRACTITIONER

## 2018-02-16 PROCEDURE — 87086 URINE CULTURE/COLONY COUNT: CPT

## 2018-02-16 PROCEDURE — 2580000003 HC RX 258: Performed by: NURSE PRACTITIONER

## 2018-02-16 RX ORDER — SULFAMETHOXAZOLE AND TRIMETHOPRIM 800; 160 MG/1; MG/1
1 TABLET ORAL 2 TIMES DAILY
Qty: 20 TABLET | Refills: 0 | Status: SHIPPED | OUTPATIENT
Start: 2018-02-16 | End: 2018-02-26

## 2018-02-16 RX ORDER — 0.9 % SODIUM CHLORIDE 0.9 %
1000 INTRAVENOUS SOLUTION INTRAVENOUS ONCE
Status: COMPLETED | OUTPATIENT
Start: 2018-02-16 | End: 2018-02-16

## 2018-02-16 RX ORDER — DIPHENHYDRAMINE HYDROCHLORIDE 50 MG/ML
25 INJECTION INTRAMUSCULAR; INTRAVENOUS ONCE
Status: COMPLETED | OUTPATIENT
Start: 2018-02-16 | End: 2018-02-16

## 2018-02-16 RX ORDER — MAGNESIUM SULFATE IN WATER 40 MG/ML
4 INJECTION, SOLUTION INTRAVENOUS ONCE
Status: COMPLETED | OUTPATIENT
Start: 2018-02-16 | End: 2018-02-16

## 2018-02-16 RX ORDER — SULFAMETHOXAZOLE AND TRIMETHOPRIM 800; 160 MG/1; MG/1
1 TABLET ORAL ONCE
Status: COMPLETED | OUTPATIENT
Start: 2018-02-16 | End: 2018-02-16

## 2018-02-16 RX ORDER — NARATRIPTAN 2.5 MG/1
2.5 TABLET ORAL
COMMUNITY
End: 2019-07-03

## 2018-02-16 RX ORDER — PROMETHAZINE HYDROCHLORIDE 25 MG/ML
25 INJECTION, SOLUTION INTRAMUSCULAR; INTRAVENOUS ONCE
Status: COMPLETED | OUTPATIENT
Start: 2018-02-16 | End: 2018-02-16

## 2018-02-16 RX ORDER — METOCLOPRAMIDE HYDROCHLORIDE 5 MG/ML
10 INJECTION INTRAMUSCULAR; INTRAVENOUS ONCE
Status: COMPLETED | OUTPATIENT
Start: 2018-02-16 | End: 2018-02-16

## 2018-02-16 RX ADMIN — SODIUM CHLORIDE 1000 ML: 9 INJECTION, SOLUTION INTRAVENOUS at 18:45

## 2018-02-16 RX ADMIN — PROMETHAZINE HYDROCHLORIDE 25 MG: 25 INJECTION, SOLUTION INTRAMUSCULAR; INTRAVENOUS at 13:18

## 2018-02-16 RX ADMIN — SULFAMETHOXAZOLE AND TRIMETHOPRIM 1 TABLET: 800; 160 TABLET ORAL at 19:46

## 2018-02-16 RX ADMIN — METOCLOPRAMIDE 10 MG: 5 INJECTION, SOLUTION INTRAMUSCULAR; INTRAVENOUS at 18:48

## 2018-02-16 RX ADMIN — MAGNESIUM SULFATE IN WATER 4 G: 40 INJECTION, SOLUTION INTRAVENOUS at 19:46

## 2018-02-16 RX ADMIN — DIPHENHYDRAMINE HYDROCHLORIDE 25 MG: 50 INJECTION, SOLUTION INTRAMUSCULAR; INTRAVENOUS at 18:45

## 2018-02-16 ASSESSMENT — ENCOUNTER SYMPTOMS
COUGH: 0
SORE THROAT: 0
PHOTOPHOBIA: 1
COUGH: 0
BLURRED VISION: 0
SHORTNESS OF BREATH: 0
VOMITING: 0
CONSTIPATION: 0
COLOR CHANGE: 0
SHORTNESS OF BREATH: 0
NAUSEA: 1
DIARRHEA: 0
NAUSEA: 1
VOMITING: 0
ABDOMINAL PAIN: 0
VOICE CHANGE: 0
CONSTIPATION: 0
BACK PAIN: 0
TROUBLE SWALLOWING: 0
PHOTOPHOBIA: 1
DIARRHEA: 0

## 2018-02-16 ASSESSMENT — PAIN SCALES - GENERAL
PAINLEVEL_OUTOF10: 9
PAINLEVEL_OUTOF10: 10

## 2018-02-16 ASSESSMENT — PAIN DESCRIPTION - DIRECTION: RADIATING_TOWARDS: BACK OF HEAD

## 2018-02-16 ASSESSMENT — PAIN DESCRIPTION - DESCRIPTORS: DESCRIPTORS: DISCOMFORT;POUNDING

## 2018-02-16 ASSESSMENT — PAIN DESCRIPTION - ORIENTATION: ORIENTATION: LEFT

## 2018-02-16 ASSESSMENT — PAIN DESCRIPTION - LOCATION: LOCATION: EYE;HEAD

## 2018-02-16 ASSESSMENT — PAIN DESCRIPTION - FREQUENCY: FREQUENCY: CONTINUOUS

## 2018-02-16 NOTE — ED PROVIDER NOTES
[facility] ED  eMERGENCY dEPARTMENT eNCOUnter      Pt Name: Randi Leyva  MRN: [id number]  [place] 1965  Date of evaluation: 2/16/2018  Provider: Patrice Mendes NP     CHIEF COMPLAINT       Chief Complaint   Patient presents with    Headache     since 1900 yesterday. Nauseated. HISTORY OF PRESENT ILLNESS   (Location/Symptom, Timing/Onset, Context/Setting, Quality, Duration, Modifying Factors, Severity) Note limiting factors. STEPHANIE Puente is a 50year old female patient per chart review with a history of diabetes, hyperlipidemia, hypothyroid, vitamin d deficiency, SLE, panic disorder, allergic rhinitis, raynaud's, disseminated lupus erythematosus and HTN. She presents to the ER with complaints of a headache since 1900 yesterday evening with nausea. She denies any thunderclap headache and states that this is the same headache she gets all of the time however her medications and sleep haven't helped. She states that it is a constant throbbing in her forehead and the back of her head. She denies any blurred or double vision and admits to photophobia and denies phonophobia. She denies any modifying factors and denies any fever or chill, recent cold or cough with sputum production    Nursing Notes were reviewed. REVIEW OF SYSTEMS    (2+ for level 4; 10+ for level 5)     Review of Systems   Constitutional: Negative for appetite change and fever. HENT: Negative for drooling, ear pain, sore throat, trouble swallowing and voice change. Eyes: Positive for photophobia. Negative for visual disturbance. Respiratory: Negative for cough and shortness of breath. Cardiovascular: Negative for chest pain. Gastrointestinal: Positive for nausea. Negative for abdominal pain, constipation, diarrhea and vomiting. Genitourinary: Negative for decreased urine volume and dysuria. Musculoskeletal: Negative for arthralgias and back pain. Skin: Negative for color change. Neurological: Positive for headaches. Negative for dizziness, weakness and light-headedness. Psychiatric/Behavioral: Negative for agitation and behavioral problems. Except as noted above the remainder of the review of systems was reviewed and negative.      PAST MEDICAL HISTORY     Past Medical History:   Diagnosis Date    Anxiety     Asthma     as child    Depression     Depression     dysthymia - following lupus dx age 23    Diabetes mellitus ([address]     Foot fracture, left     GERD (gastroesophageal reflux disease)     Hyperlipidemia     Hypothyroidism     Kidney stones     Lupus     dr Sarah Sanders -     Migraine     PONV (postoperative nausea and vomiting)        SURGICAL HISTORY       Past Surgical History:   Procedure Laterality Date    APPENDECTOMY       SECTION      CHOLECYSTECTOMY      COLONOSCOPY      FOOT CLOSED REDUCTION Left 1/3/2017    LEFT FOOT PERCUTANEOUS PINNING Sadie Anoop performed by Amelie Slater MD at [facility]      [place] ESOPHAGOGASTRODUODENOSCOPY TRANSORAL DIAGNOSTIC N/A 3/8/2017    EGD ESOPHAGOGASTRODUODENOSCOPY WITH DILATION performed by Javan Darden MD at [facility]       Discharge Medication List as of 2018  8:28 PM      CONTINUE these medications which have NOT CHANGED    Details   naratriptan (AMERGE) 2.5 MG tablet Take 2.5 mg by mouth once as needed for Migraine 2.5 mg at onset of headache, may repeat in 4 hours if neededHistorical Med      rosuvastatin (CRESTOR) 40 MG tablet Take 1 tablet by mouth daily, Disp-90 tablet, R-3Normal      pantoprazole (PROTONIX) 40 MG tablet Take 1 tablet by mouth daily, Disp-90 tablet, R-3Normal      metFORMIN (GLUCOPHAGE) 1000 MG tablet Take 1 tablet by mouth 2 times daily (with meals), Disp-180 tablet, R-3Normal      levothyroxine (SYNTHROID) 50 MCG tablet

## 2018-02-16 NOTE — PATIENT INSTRUCTIONS
Prochlorperazine 10mg injection in office  Follow up with rheumatologist for migraine headache  If not improvement go to emergency department    Educated on symptoms that would prompt an emergency room visit    Patient Education        Migraine Headache: Care Instructions  Your Care Instructions  Migraines are painful, throbbing headaches that often start on one side of the head. They may cause nausea and vomiting and make you sensitive to light, sound, or smell. Without treatment, migraines can last from 4 hours to a few days. Medicines can help prevent migraines or stop them after they have started. Your doctor can help you find which ones work best for you. Follow-up care is a key part of your treatment and safety. Be sure to make and go to all appointments, and call your doctor if you are having problems. It's also a good idea to know your test results and keep a list of the medicines you take. How can you care for yourself at home? · Do not drive if you have taken a prescription pain medicine. · Rest in a quiet, dark room until your headache is gone. Close your eyes, and try to relax or go to sleep. Don't watch TV or read. · Put a cold, moist cloth or cold pack on the painful area for 10 to 20 minutes at a time. Put a thin cloth between the cold pack and your skin. · Use a warm, moist towel or a heating pad set on low to relax tight shoulder and neck muscles. · Have someone gently massage your neck and shoulders. · Take your medicines exactly as prescribed. Call your doctor if you think you are having a problem with your medicine. You will get more details on the specific medicines your doctor prescribes. · Be careful not to take pain medicine more often than the instructions allow. You could get worse or more frequent headaches when the medicine wears off. To prevent migraines  · Keep a headache diary so you can figure out what triggers your headaches.  Avoiding triggers may help you prevent

## 2018-02-16 NOTE — PROGRESS NOTES
(TRUE TRACK BLOOD GLUCOSE) CAROLE Check blood sugars twice daily or as directed. 100 Device 5    DRUG MART UNILET LANCETS 30G MISC Check blood sugars twice daily or as directed. 100 each 5    glucose blood VI test strips (ASCENSIA AUTODISC VI;ONE TOUCH ULTRA TEST VI) strip Check blood sugars twice daily or as directed. 200 strip 6    hydroxychloroquine (PLAQUENIL) 200 MG tablet Take 1 tablet by mouth 2 times daily  5    naratriptan (AMERGE) 2.5 MG tablet Take 2.5 mg by mouth once as needed for Migraine 2.5 mg at onset of headache, may repeat in 4 hours if needed      sulfamethoxazole-trimethoprim (BACTRIM DS) 800-160 MG per tablet Take 1 tablet by mouth 2 times daily for 10 days 20 tablet 0     No current facility-administered medications for this visit. Review of Systems   HENT: Negative. Eyes: Positive for photophobia. Negative for blurred vision. Respiratory: Negative for cough and shortness of breath. Cardiovascular: Negative for chest pain. Gastrointestinal: Positive for nausea. Negative for constipation, diarrhea and vomiting. Genitourinary: Negative. Musculoskeletal: Negative for myalgias, neck pain and neck stiffness. Skin: Negative for rash. Allergic/Immunologic: Positive for environmental allergies. Neurological: Positive for headaches. Negative for dizziness, tingling, tremors, seizures, facial asymmetry, speech difficulty, weakness, light-headedness and numbness. Psychiatric/Behavioral: Negative for confusion and hallucinations. PMH, Surgical Hx, Family Hx, and Social Hx reviewed and updated. Health Maintenance reviewed. Objective    Vitals:    02/16/18 1233   BP: 128/80   Pulse: 124   Resp: 22   Temp: 97.6 °F (36.4 °C)   TempSrc: Tympanic   SpO2: 99%   Weight: 166 lb (75.3 kg)   Height: 5' 8\" (1.727 m)       Physical Exam   Constitutional: She is oriented to person, place, and time. Vital signs are normal. She appears well-developed and well-nourished.  She is

## 2018-02-17 NOTE — ED NOTES
Patient ambulates to bathroom with steady gait. Pt states her pain is much better. She rates it a 6/10 at this time.       Justin Marsh RN  02/16/18 2016

## 2018-02-18 LAB — URINE CULTURE, ROUTINE: NORMAL

## 2018-05-03 ENCOUNTER — APPOINTMENT (OUTPATIENT)
Dept: GENERAL RADIOLOGY | Age: 53
End: 2018-05-03
Payer: MEDICARE

## 2018-05-03 ENCOUNTER — APPOINTMENT (OUTPATIENT)
Dept: CT IMAGING | Age: 53
End: 2018-05-03
Payer: MEDICARE

## 2018-05-03 ENCOUNTER — HOSPITAL ENCOUNTER (EMERGENCY)
Age: 53
Discharge: HOME | End: 2018-05-03
Attending: EMERGENCY MEDICINE
Payer: MEDICARE

## 2018-05-03 ENCOUNTER — HOSPITAL ENCOUNTER (OUTPATIENT)
Age: 53
Discharge: HOME | End: 2018-05-05
Payer: MEDICARE

## 2018-05-03 VITALS
RESPIRATION RATE: 19 BRPM | DIASTOLIC BLOOD PRESSURE: 77 MMHG | SYSTOLIC BLOOD PRESSURE: 153 MMHG | HEIGHT: 68 IN | TEMPERATURE: 98.6 F | BODY MASS INDEX: 23.95 KG/M2 | WEIGHT: 158 LBS | HEART RATE: 112 BPM | OXYGEN SATURATION: 98 %

## 2018-05-03 DIAGNOSIS — R10.13 ABDOMINAL PAIN, EPIGASTRIC: Primary | ICD-10-CM

## 2018-05-03 LAB
ALBUMIN SERPL-MCNC: 4.8 G/DL (ref 3.9–4.9)
ALP BLD-CCNC: 131 U/L (ref 40–130)
ALT SERPL-CCNC: 13 U/L (ref 0–33)
ANION GAP SERPL CALCULATED.3IONS-SCNC: 16 MEQ/L (ref 7–13)
APTT: 21 SEC (ref 21.6–35.4)
AST SERPL-CCNC: 16 U/L (ref 0–35)
BACTERIA: NORMAL /HPF
BASOPHILS ABSOLUTE: 0.1 K/UL (ref 0–0.2)
BASOPHILS RELATIVE PERCENT: 0.9 %
BILIRUB SERPL-MCNC: 0.2 MG/DL (ref 0–1.2)
BILIRUBIN URINE: NEGATIVE
BLOOD, URINE: NEGATIVE
BUN BLDV-MCNC: 14 MG/DL (ref 6–20)
CALCIUM SERPL-MCNC: 10.1 MG/DL (ref 8.6–10.2)
CHLORIDE BLD-SCNC: 102 MEQ/L (ref 98–107)
CLARITY: CLEAR
CO2: 25 MEQ/L (ref 22–29)
COLOR: YELLOW
CREAT SERPL-MCNC: 0.94 MG/DL (ref 0.5–0.9)
EKG ATRIAL RATE: 102 BPM
EKG P AXIS: 20 DEGREES
EKG P-R INTERVAL: 134 MS
EKG Q-T INTERVAL: 324 MS
EKG QRS DURATION: 64 MS
EKG QTC CALCULATION (BAZETT): 422 MS
EKG R AXIS: 58 DEGREES
EKG T AXIS: 37 DEGREES
EKG VENTRICULAR RATE: 102 BPM
EOSINOPHILS ABSOLUTE: 0.2 K/UL (ref 0–0.7)
EOSINOPHILS RELATIVE PERCENT: 1.5 %
EPITHELIAL CELLS, UA: NORMAL /HPF
GFR AFRICAN AMERICAN: >60
GFR NON-AFRICAN AMERICAN: >60
GLOBULIN: 3 G/DL (ref 2.3–3.5)
GLUCOSE BLD-MCNC: 147 MG/DL (ref 74–109)
GLUCOSE URINE: NEGATIVE MG/DL
HCT VFR BLD CALC: 44.2 % (ref 37–47)
HEMOGLOBIN: 14.9 G/DL (ref 12–16)
INR BLD: 0.9
KETONES, URINE: NEGATIVE MG/DL
LACTIC ACID: 1.5 MMOL/L (ref 0.5–2.2)
LEUKOCYTE ESTERASE, URINE: NORMAL
LIPASE: 42 U/L (ref 13–60)
LYMPHOCYTES ABSOLUTE: 3.1 K/UL (ref 1–4.8)
LYMPHOCYTES RELATIVE PERCENT: 22.3 %
MCH RBC QN AUTO: 28.8 PG (ref 27–31.3)
MCHC RBC AUTO-ENTMCNC: 33.6 % (ref 33–37)
MCV RBC AUTO: 85.6 FL (ref 82–100)
MONOCYTES ABSOLUTE: 0.5 K/UL (ref 0.2–0.8)
MONOCYTES RELATIVE PERCENT: 3.4 %
NEUTROPHILS ABSOLUTE: 9.8 K/UL (ref 1.4–6.5)
NEUTROPHILS RELATIVE PERCENT: 71.9 %
NITRITE, URINE: NEGATIVE
PDW BLD-RTO: 13.2 % (ref 11.5–14.5)
PH UA: 5 (ref 5–9)
PLATELET # BLD: 288 K/UL (ref 130–400)
POTASSIUM SERPL-SCNC: 4.1 MEQ/L (ref 3.5–5.1)
PROTEIN UA: NEGATIVE MG/DL
PROTHROMBIN TIME: 9.5 SEC (ref 9.6–12.3)
RBC # BLD: 5.16 M/UL (ref 4.2–5.4)
RBC UA: NORMAL /HPF (ref 0–2)
SODIUM BLD-SCNC: 143 MEQ/L (ref 132–144)
SPECIFIC GRAVITY UA: <=1.005 (ref 1–1.03)
TOTAL PROTEIN: 7.8 G/DL (ref 6.4–8.1)
URINE REFLEX TO CULTURE: YES
UROBILINOGEN, URINE: 0.2 E.U./DL
WBC # BLD: 13.7 K/UL (ref 4.8–10.8)
WBC UA: NORMAL /HPF (ref 0–5)

## 2018-05-03 PROCEDURE — 87186 SC STD MICRODIL/AGAR DIL: CPT

## 2018-05-03 PROCEDURE — 83690 ASSAY OF LIPASE: CPT

## 2018-05-03 PROCEDURE — 71045 X-RAY EXAM CHEST 1 VIEW: CPT

## 2018-05-03 PROCEDURE — 81001 URINALYSIS AUTO W/SCOPE: CPT

## 2018-05-03 PROCEDURE — 93005 ELECTROCARDIOGRAM TRACING: CPT

## 2018-05-03 PROCEDURE — 87077 CULTURE AEROBIC IDENTIFY: CPT

## 2018-05-03 PROCEDURE — 6360000002 HC RX W HCPCS: Performed by: EMERGENCY MEDICINE

## 2018-05-03 PROCEDURE — 85730 THROMBOPLASTIN TIME PARTIAL: CPT

## 2018-05-03 PROCEDURE — 83605 ASSAY OF LACTIC ACID: CPT

## 2018-05-03 PROCEDURE — 74176 CT ABD & PELVIS W/O CONTRAST: CPT

## 2018-05-03 PROCEDURE — 96374 THER/PROPH/DIAG INJ IV PUSH: CPT

## 2018-05-03 PROCEDURE — 87086 URINE CULTURE/COLONY COUNT: CPT

## 2018-05-03 PROCEDURE — 99285 EMERGENCY DEPT VISIT HI MDM: CPT

## 2018-05-03 PROCEDURE — 2580000003 HC RX 258: Performed by: EMERGENCY MEDICINE

## 2018-05-03 PROCEDURE — 85025 COMPLETE CBC W/AUTO DIFF WBC: CPT

## 2018-05-03 PROCEDURE — 85610 PROTHROMBIN TIME: CPT

## 2018-05-03 PROCEDURE — 80053 COMPREHEN METABOLIC PANEL: CPT

## 2018-05-03 PROCEDURE — 96375 TX/PRO/DX INJ NEW DRUG ADDON: CPT

## 2018-05-03 PROCEDURE — 96376 TX/PRO/DX INJ SAME DRUG ADON: CPT

## 2018-05-03 RX ORDER — PREGABALIN 75 MG/1
75 CAPSULE ORAL DAILY
COMMUNITY
End: 2019-11-25

## 2018-05-03 RX ORDER — ONDANSETRON 2 MG/ML
4 INJECTION INTRAMUSCULAR; INTRAVENOUS ONCE
Status: COMPLETED | OUTPATIENT
Start: 2018-05-03 | End: 2018-05-03

## 2018-05-03 RX ORDER — HYDROMORPHONE HCL 110MG/55ML
PATIENT CONTROLLED ANALGESIA SYRINGE INTRAVENOUS
Status: DISCONTINUED
Start: 2018-05-03 | End: 2018-05-03 | Stop reason: HOSPADM

## 2018-05-03 RX ORDER — ONDANSETRON 4 MG/1
4 TABLET, ORALLY DISINTEGRATING ORAL EVERY 8 HOURS PRN
Qty: 12 TABLET | Refills: 0 | Status: SHIPPED | OUTPATIENT
Start: 2018-05-03 | End: 2018-11-02

## 2018-05-03 RX ORDER — FAMOTIDINE 20 MG/1
20 TABLET, FILM COATED ORAL 2 TIMES DAILY
Qty: 30 TABLET | Refills: 0 | Status: SHIPPED | OUTPATIENT
Start: 2018-05-03 | End: 2018-11-02

## 2018-05-03 RX ORDER — SODIUM CHLORIDE 9 MG/ML
INJECTION, SOLUTION INTRAVENOUS CONTINUOUS
Status: DISCONTINUED | OUTPATIENT
Start: 2018-05-03 | End: 2018-05-03 | Stop reason: HOSPADM

## 2018-05-03 RX ORDER — 0.9 % SODIUM CHLORIDE 0.9 %
1000 INTRAVENOUS SOLUTION INTRAVENOUS ONCE
Status: COMPLETED | OUTPATIENT
Start: 2018-05-03 | End: 2018-05-03

## 2018-05-03 RX ORDER — DICYCLOMINE HYDROCHLORIDE 10 MG/1
10 CAPSULE ORAL
Qty: 30 CAPSULE | Refills: 0 | Status: SHIPPED | OUTPATIENT
Start: 2018-05-03 | End: 2018-11-02

## 2018-05-03 RX ORDER — HYDROMORPHONE HCL 110MG/55ML
1 PATIENT CONTROLLED ANALGESIA SYRINGE INTRAVENOUS
Status: COMPLETED | OUTPATIENT
Start: 2018-05-03 | End: 2018-05-03

## 2018-05-03 RX ORDER — MORPHINE SULFATE 4 MG/ML
4 INJECTION, SOLUTION INTRAMUSCULAR; INTRAVENOUS ONCE
Status: COMPLETED | OUTPATIENT
Start: 2018-05-03 | End: 2018-05-03

## 2018-05-03 RX ADMIN — HYDROMORPHONE HYDROCHLORIDE 1 MG: 2 INJECTION, SOLUTION INTRAMUSCULAR; INTRAVENOUS; SUBCUTANEOUS at 16:21

## 2018-05-03 RX ADMIN — MORPHINE SULFATE 4 MG: 4 INJECTION INTRAVENOUS at 19:40

## 2018-05-03 RX ADMIN — SODIUM CHLORIDE: 9 INJECTION, SOLUTION INTRAVENOUS at 17:16

## 2018-05-03 RX ADMIN — ONDANSETRON 4 MG: 2 SOLUTION INTRAMUSCULAR; INTRAVENOUS at 16:21

## 2018-05-03 RX ADMIN — HYDROMORPHONE HYDROCHLORIDE 1 MG: 2 INJECTION, SOLUTION INTRAMUSCULAR; INTRAVENOUS; SUBCUTANEOUS at 17:17

## 2018-05-03 RX ADMIN — SODIUM CHLORIDE 1000 ML: 9 INJECTION, SOLUTION INTRAVENOUS at 16:21

## 2018-05-03 RX ADMIN — ONDANSETRON 4 MG: 2 SOLUTION INTRAMUSCULAR; INTRAVENOUS at 19:40

## 2018-05-03 ASSESSMENT — ENCOUNTER SYMPTOMS
SHORTNESS OF BREATH: 0
NAUSEA: 1
RHINORRHEA: 0
ABDOMINAL PAIN: 1
ABDOMINAL DISTENTION: 1
VOMITING: 0
ALLERGIC/IMMUNOLOGIC NEGATIVE: 1
WHEEZING: 0
EYES NEGATIVE: 1
TROUBLE SWALLOWING: 0

## 2018-05-03 ASSESSMENT — PAIN DESCRIPTION - DESCRIPTORS
DESCRIPTORS: STABBING
DESCRIPTORS: SHARP
DESCRIPTORS: ACHING

## 2018-05-03 ASSESSMENT — PAIN DESCRIPTION - LOCATION
LOCATION: ABDOMEN

## 2018-05-03 ASSESSMENT — PAIN DESCRIPTION - PAIN TYPE
TYPE: ACUTE PAIN
TYPE: ACUTE PAIN

## 2018-05-03 ASSESSMENT — PAIN SCALES - GENERAL
PAINLEVEL_OUTOF10: 7
PAINLEVEL_OUTOF10: 9
PAINLEVEL_OUTOF10: 8
PAINLEVEL_OUTOF10: 3
PAINLEVEL_OUTOF10: 10

## 2018-05-03 ASSESSMENT — PAIN - FUNCTIONAL ASSESSMENT: PAIN_FUNCTIONAL_ASSESSMENT: 0-10

## 2018-05-03 ASSESSMENT — PAIN DESCRIPTION - ONSET: ONSET: ON-GOING

## 2018-05-03 ASSESSMENT — PAIN DESCRIPTION - FREQUENCY
FREQUENCY: CONTINUOUS
FREQUENCY: CONTINUOUS

## 2018-05-03 ASSESSMENT — PAIN DESCRIPTION - PROGRESSION: CLINICAL_PROGRESSION: GRADUALLY IMPROVING

## 2018-05-06 LAB
ORGANISM: ABNORMAL
URINE CULTURE, ROUTINE: ABNORMAL
URINE CULTURE, ROUTINE: ABNORMAL

## 2018-05-16 ENCOUNTER — OFFICE VISIT (OUTPATIENT)
Dept: BEHAVIORAL/MENTAL HEALTH CLINIC | Age: 53
End: 2018-05-16
Payer: MEDICARE

## 2018-05-16 DIAGNOSIS — F40.01 PANIC DISORDER WITH AGORAPHOBIA: ICD-10-CM

## 2018-05-16 DIAGNOSIS — F33.1 MAJOR DEPRESSIVE DISORDER, RECURRENT EPISODE, MODERATE WITH ANXIOUS DISTRESS (HCC): Primary | ICD-10-CM

## 2018-05-16 PROCEDURE — 90791 PSYCH DIAGNOSTIC EVALUATION: CPT | Performed by: PSYCHOLOGIST

## 2018-05-16 ASSESSMENT — PATIENT HEALTH QUESTIONNAIRE - PHQ9
3. TROUBLE FALLING OR STAYING ASLEEP: 3
8. MOVING OR SPEAKING SO SLOWLY THAT OTHER PEOPLE COULD HAVE NOTICED. OR THE OPPOSITE, BEING SO FIGETY OR RESTLESS THAT YOU HAVE BEEN MOVING AROUND A LOT MORE THAN USUAL: 3
1. LITTLE INTEREST OR PLEASURE IN DOING THINGS: 3
5. POOR APPETITE OR OVEREATING: 3
6. FEELING BAD ABOUT YOURSELF - OR THAT YOU ARE A FAILURE OR HAVE LET YOURSELF OR YOUR FAMILY DOWN: 3
SUM OF ALL RESPONSES TO PHQ QUESTIONS 1-9: 25
7. TROUBLE CONCENTRATING ON THINGS, SUCH AS READING THE NEWSPAPER OR WATCHING TELEVISION: 3
4. FEELING TIRED OR HAVING LITTLE ENERGY: 3
2. FEELING DOWN, DEPRESSED OR HOPELESS: 3
9. THOUGHTS THAT YOU WOULD BE BETTER OFF DEAD, OR OF HURTING YOURSELF: 1
10. IF YOU CHECKED OFF ANY PROBLEMS, HOW DIFFICULT HAVE THESE PROBLEMS MADE IT FOR YOU TO DO YOUR WORK, TAKE CARE OF THINGS AT HOME, OR GET ALONG WITH OTHER PEOPLE: 3
SUM OF ALL RESPONSES TO PHQ9 QUESTIONS 1 & 2: 6

## 2018-06-25 ENCOUNTER — APPOINTMENT (OUTPATIENT)
Dept: GENERAL RADIOLOGY | Age: 53
End: 2018-06-25
Payer: MEDICARE

## 2018-06-25 ENCOUNTER — HOSPITAL ENCOUNTER (EMERGENCY)
Age: 53
Discharge: HOME | End: 2018-06-25
Attending: EMERGENCY MEDICINE
Payer: MEDICARE

## 2018-06-25 VITALS
BODY MASS INDEX: 23.49 KG/M2 | TEMPERATURE: 98.5 F | SYSTOLIC BLOOD PRESSURE: 100 MMHG | HEIGHT: 68 IN | HEART RATE: 105 BPM | OXYGEN SATURATION: 97 % | WEIGHT: 155 LBS | RESPIRATION RATE: 18 BRPM | DIASTOLIC BLOOD PRESSURE: 59 MMHG

## 2018-06-25 DIAGNOSIS — S90.31XA CONTUSION OF RIGHT FOOT, INITIAL ENCOUNTER: Primary | ICD-10-CM

## 2018-06-25 PROCEDURE — 73630 X-RAY EXAM OF FOOT: CPT

## 2018-06-25 PROCEDURE — 99283 EMERGENCY DEPT VISIT LOW MDM: CPT

## 2018-06-25 RX ORDER — HYDROCODONE BITARTRATE AND ACETAMINOPHEN 5; 325 MG/1; MG/1
1 TABLET ORAL EVERY 8 HOURS PRN
Qty: 12 TABLET | Refills: 0 | Status: SHIPPED | OUTPATIENT
Start: 2018-06-25 | End: 2018-06-29

## 2018-06-25 ASSESSMENT — ENCOUNTER SYMPTOMS
COUGH: 0
TROUBLE SWALLOWING: 0
ABDOMINAL PAIN: 0
DIARRHEA: 0
SORE THROAT: 0
VOMITING: 0
SINUS PRESSURE: 0
SHORTNESS OF BREATH: 0
CHOKING: 0
VOICE CHANGE: 0
WHEEZING: 0
FACIAL SWELLING: 0
EYE DISCHARGE: 0
EYE PAIN: 0
BLOOD IN STOOL: 0
CHEST TIGHTNESS: 0
STRIDOR: 0
CONSTIPATION: 0
BACK PAIN: 0
EYE REDNESS: 0

## 2018-06-25 ASSESSMENT — PAIN DESCRIPTION - DESCRIPTORS
DESCRIPTORS: ACHING
DESCRIPTORS: ACHING

## 2018-06-25 ASSESSMENT — PAIN DESCRIPTION - PAIN TYPE
TYPE: ACUTE PAIN
TYPE: ACUTE PAIN

## 2018-06-25 ASSESSMENT — PAIN DESCRIPTION - ORIENTATION
ORIENTATION: RIGHT
ORIENTATION: RIGHT

## 2018-06-25 ASSESSMENT — PAIN DESCRIPTION - LOCATION
LOCATION: FOOT
LOCATION: FOOT

## 2018-06-25 ASSESSMENT — PAIN SCALES - GENERAL
PAINLEVEL_OUTOF10: 5
PAINLEVEL_OUTOF10: 9

## 2018-08-13 ENCOUNTER — APPOINTMENT (OUTPATIENT)
Dept: GENERAL RADIOLOGY | Age: 53
End: 2018-08-13
Payer: MEDICARE

## 2018-08-13 ENCOUNTER — HOSPITAL ENCOUNTER (EMERGENCY)
Age: 53
Discharge: HOME | End: 2018-08-13
Attending: EMERGENCY MEDICINE
Payer: MEDICARE

## 2018-08-13 VITALS
WEIGHT: 160 LBS | BODY MASS INDEX: 24.25 KG/M2 | HEART RATE: 112 BPM | OXYGEN SATURATION: 95 % | HEIGHT: 68 IN | RESPIRATION RATE: 20 BRPM | TEMPERATURE: 99.9 F | SYSTOLIC BLOOD PRESSURE: 128 MMHG | DIASTOLIC BLOOD PRESSURE: 84 MMHG

## 2018-08-13 DIAGNOSIS — J45.20 MILD INTERMITTENT ASTHMA WITHOUT COMPLICATION: ICD-10-CM

## 2018-08-13 DIAGNOSIS — R05.9 COUGH: Primary | ICD-10-CM

## 2018-08-13 PROCEDURE — 94640 AIRWAY INHALATION TREATMENT: CPT

## 2018-08-13 PROCEDURE — 6360000002 HC RX W HCPCS: Performed by: EMERGENCY MEDICINE

## 2018-08-13 PROCEDURE — 99283 EMERGENCY DEPT VISIT LOW MDM: CPT

## 2018-08-13 PROCEDURE — 71046 X-RAY EXAM CHEST 2 VIEWS: CPT

## 2018-08-13 RX ORDER — ALBUTEROL SULFATE 90 UG/1
2 AEROSOL, METERED RESPIRATORY (INHALATION) EVERY 6 HOURS PRN
Qty: 1 INHALER | Refills: 0 | Status: SHIPPED | OUTPATIENT
Start: 2018-08-13

## 2018-08-13 RX ADMIN — ALBUTEROL SULFATE 5 MG: 2.5 SOLUTION RESPIRATORY (INHALATION) at 22:11

## 2018-08-13 ASSESSMENT — ENCOUNTER SYMPTOMS
ABDOMINAL PAIN: 0
COUGH: 1

## 2018-08-14 NOTE — ED PROVIDER NOTES
[address] [place] [address] ED  eMERGENCY dEPARTMENT eNCOUnter      Pt Name: Andre Dill  MRN: [id number]  [place] 1965  Date of evaluation: 2018  Provider: Clair Rodríguez MD    [address] [place] [address]       Chief Complaint   Patient presents with    Cough     x 1 week         HISTORY OF PRESENT ILLNESS   (Location/Symptom, Timing/Onset, Context/Setting, Quality, Duration, Modifying Factors, Severity)  Note limiting factors. Andre Dill is a 50 y.o. female who presents to the emergency department With persistent cough for the past week. No fever or chills. No upper respiratory sinus congestion. She has history of asthma but not many pain active for at least 2 years. She takes no medications for it presently. She has chest pain with coughing now. No abdominal pain nausea 5 diarrhea dysuria or other associated symptoms. The history is provided by the patient. Nursing Notes were reviewed. REVIEW OF SYSTEMS    (2-9 systems for level 4, 10 or more for level 5)     Review of Systems   Constitutional: Negative for chills and fever. HENT: Negative for congestion. Respiratory: Positive for cough. Cardiovascular: Positive for chest pain. Gastrointestinal: Negative for abdominal pain. Genitourinary: Negative for dysuria. Except as noted above the remainder of the review of systems was reviewed and negative.        PAST MEDICAL HISTORY     Past Medical History:   Diagnosis Date    Anxiety     Asthma     as child    Depression     Depression     dysthymia - following lupus dx age 23    Diabetes mellitus ([address]     Foot fracture, left     GERD (gastroesophageal reflux disease)     Hyperlipidemia     Hypothyroidism     Kidney stones     Lupus     dr Edda Metz -     Migraine     PONV (postoperative nausea and vomiting)          SURGICAL HISTORY       Past Surgical History:   Procedure Laterality Date    APPENDECTOMY       SECTION      CHOLECYSTECTOMY      COLONOSCOPY      FOOT CLOSED REDUCTION Left 1/3/2017    LEFT FOOT PERCUTANEOUS PINNING MOTA FRACTURE performed by Clair Betancourt MD at [facility]      [place] ESOPHAGOGASTRODUODENOSCOPY TRANSORAL DIAGNOSTIC N/A 3/8/2017    EGD ESOPHAGOGASTRODUODENOSCOPY WITH DILATION performed by Yanira Malave MD at [address] [place] [address]       Previous Medications    BLOOD GLUCOSE MONITORING SUPPL (TRUE TRACK BLOOD GLUCOSE) CAROLE    Check blood sugars twice daily or as directed. DICYCLOMINE (BENTYL) 10 MG CAPSULE    Take 1 capsule by mouth 4 times daily (before meals and nightly)    DRUG MART UNILET LANCETS 30G MISC    Check blood sugars twice daily or as directed. FAMOTIDINE (PEPCID) 20 MG TABLET    Take 1 tablet by mouth 2 times daily    GLUCOSE BLOOD VI TEST STRIPS (ASCENSIA AUTODISC VI;ONE TOUCH ULTRA TEST VI) STRIP    Check blood sugars twice daily or as directed. HYDROXYCHLOROQUINE (PLAQUENIL) 200 MG TABLET    Take 1 tablet by mouth 2 times daily    LEVOTHYROXINE (SYNTHROID) 50 MCG TABLET    Take 1 tablet by mouth daily    NARATRIPTAN (AMERGE) 2.5 MG TABLET    Take 2.5 mg by mouth once as needed for Migraine 2.5 mg at onset of headache, may repeat in 4 hours if needed    ONDANSETRON (ZOFRAN ODT) 4 MG DISINTEGRATING TABLET    Take 1 tablet by mouth every 8 hours as needed for Nausea or Vomiting    PANTOPRAZOLE (PROTONIX) 40 MG TABLET    Take 1 tablet by mouth daily    PREGABALIN (LYRICA) 75 MG CAPSULE    Take 75 mg by mouth daily. Marie Sousa ROSUVASTATIN (CRESTOR) 40 MG TABLET    Take 1 tablet by mouth daily       ALLERGIES     Aspirin; Imitrex [sumatriptan]; Pcn [penicillins];  Seasonal; Toradol [ketorolac tromethamine]; and Ultram [tramadol]    FAMILY HISTORY       Family History   Problem Relation Age of Onset    Adopted: Yes          SOCIAL HISTORY       Social History of this note:    XR CHEST STANDARD (2 VW)    (Results Pending)         ED BEDSIDE ULTRASOUND:   Performed by ED Physician - none    LABS:  Labs Reviewed - No data to display    All other labs were within normal range or not returned as of this dictation. EMERGENCY DEPARTMENT COURSE and DIFFERENTIAL DIAGNOSIS/MDM:   Vitals:    Vitals:    08/13/18 2201   BP: (!) 139/90   Pulse: 141   Resp: 20   Temp: 99.9 °F (37.7 °C)   TempSrc: Oral   SpO2: 92%   Weight: 160 lb (72.6 kg)   Height: 5' 8\" (1.727 m)       Patient is cough possibly from underlying reactive airway disease related to her asthma, hot humid weather and allergy season starting which have all bothered her previously. She started on Ventolin inhaler. Hycodan for cough control. She is to see her previous respiratory specialist if not improved with this treatment. MDM    CRITICAL CARE TIME   Total Critical Care time was  minutes, excluding separately reportable procedures. There was a high probability of clinically significant/life threatening deterioration in the patient's condition which required my urgent intervention. CONSULTS:  None    PROCEDURES:  Unless otherwise noted below, none     Procedures    FINAL IMPRESSION      1. Cough    2. Mild intermittent asthma without complication          DISPOSITION/PLAN   DISPOSITION Decision To Discharge 08/13/2018 10:34:52 PM      PATIENT REFERRED TO:  your asthma specialist  Call if not improving with treatment over the next 2-3 days          DISCHARGE MEDICATIONS:  New Prescriptions    ALBUTEROL SULFATE HFA (VENTOLIN HFA) 108 (90 BASE) MCG/ACT INHALER    Inhale 2 puffs into the lungs every 6 hours as needed for Wheezing    HYDROCODONE-HOMATROPINE (HYCODAN) 5-1.5 MG/5ML SYRUP    Take 5 mLs by mouth every 4 hours as needed (cough) for up to 3 days. .          (Please note that portions of this note were completed with a voice recognition program.  Efforts were made to edit the dictations but occasionally words are mis-transcribed.)    Anthony Koenig MD (electronically signed)  Attending Emergency Physician         Grady Castro MD  08/13/18 [phone]

## 2018-10-04 ENCOUNTER — HOSPITAL ENCOUNTER (OUTPATIENT)
Dept: DIABETES SERVICES | Age: 53
Setting detail: THERAPIES SERIES
Discharge: HOME | End: 2018-10-04
Payer: MEDICARE

## 2018-10-04 VITALS
DIASTOLIC BLOOD PRESSURE: 84 MMHG | WEIGHT: 170 LBS | BODY MASS INDEX: 25.76 KG/M2 | SYSTOLIC BLOOD PRESSURE: 128 MMHG | HEIGHT: 68 IN

## 2018-10-04 PROCEDURE — G0108 DIAB MANAGE TRN  PER INDIV: HCPCS

## 2018-10-04 ASSESSMENT — SLEEP AND FATIGUE QUESTIONNAIRES
HAVE YOU BEEN TOLD, OR NOTICED ON YOUR OWN, THAT YOU STOP BREATHING OR STRUGGLE TO BREATHE IN YOUR SLEEP: NO
HOW MANY HOURS OF SLEEP ARE YOU GETTING, ON AVERAGE: <6
HOW DO YOU RATE THE QUALITY OF YOUR SLEEP: POOR
HAVE YOU EVER BEEN TESTED FOR SLEEP APNEA: NO

## 2018-10-04 ASSESSMENT — PROBLEM AREAS IN DIABETES QUESTIONNAIRE (PAID)
COPING WITH COMPLICATIONS OF DIABETES: 0
WORRYING ABOUT THE FUTURE AND THE POSSIBILITY OF SERIOUS COMPLICATIONS: 2
FEELING THAT DIABETES IS TAKING UP TOO MUCH OF YOUR MENTAL AND PHYSICAL ENERGY EVERY DAY: 0
FEELING DEPRESSED WHEN YOU THINK ABOUT LIVING WITH DIABETES: 1

## 2018-10-04 ASSESSMENT — PATIENT HEALTH QUESTIONNAIRE - PHQ9
SUM OF ALL RESPONSES TO PHQ QUESTIONS 1-9: 1
SUM OF ALL RESPONSES TO PHQ QUESTIONS 1-9: 1

## 2018-11-02 ENCOUNTER — HOSPITAL ENCOUNTER (EMERGENCY)
Age: 53
Discharge: HOME | End: 2018-11-02
Attending: EMERGENCY MEDICINE
Payer: MEDICARE

## 2018-11-02 ENCOUNTER — APPOINTMENT (OUTPATIENT)
Dept: CT IMAGING | Age: 53
End: 2018-11-02
Payer: MEDICARE

## 2018-11-02 VITALS
BODY MASS INDEX: 24.25 KG/M2 | WEIGHT: 160 LBS | RESPIRATION RATE: 20 BRPM | OXYGEN SATURATION: 97 % | DIASTOLIC BLOOD PRESSURE: 80 MMHG | TEMPERATURE: 99.2 F | SYSTOLIC BLOOD PRESSURE: 154 MMHG | HEIGHT: 68 IN | HEART RATE: 110 BPM

## 2018-11-02 DIAGNOSIS — K52.9 GASTROENTERITIS: Primary | ICD-10-CM

## 2018-11-02 DIAGNOSIS — R11.0 NAUSEA: ICD-10-CM

## 2018-11-02 DIAGNOSIS — N30.00 ACUTE CYSTITIS WITHOUT HEMATURIA: ICD-10-CM

## 2018-11-02 LAB
ALBUMIN SERPL-MCNC: 4.5 G/DL (ref 3.9–4.9)
ALP BLD-CCNC: 124 U/L (ref 40–130)
ALT SERPL-CCNC: 22 U/L (ref 0–33)
AMYLASE: 43 U/L (ref 28–100)
ANION GAP SERPL CALCULATED.3IONS-SCNC: 15 MEQ/L (ref 7–13)
AST SERPL-CCNC: 17 U/L (ref 0–35)
BACTERIA: ABNORMAL /HPF
BASOPHILS ABSOLUTE: 0.1 K/UL (ref 0–0.2)
BASOPHILS RELATIVE PERCENT: 0.8 %
BILIRUB SERPL-MCNC: 0.3 MG/DL (ref 0–1.2)
BILIRUBIN URINE: NEGATIVE
BLOOD, URINE: NORMAL
BUN BLDV-MCNC: 11 MG/DL (ref 6–20)
CALCIUM SERPL-MCNC: 9.5 MG/DL (ref 8.6–10.2)
CHLORIDE BLD-SCNC: 101 MEQ/L (ref 98–107)
CLARITY: CLEAR
CO2: 26 MEQ/L (ref 22–29)
COLOR: YELLOW
CREAT SERPL-MCNC: 1.07 MG/DL (ref 0.5–0.9)
EOSINOPHILS ABSOLUTE: 0.1 K/UL (ref 0–0.7)
EOSINOPHILS RELATIVE PERCENT: 1 %
EPITHELIAL CELLS, UA: ABNORMAL /HPF
GFR AFRICAN AMERICAN: >60
GFR NON-AFRICAN AMERICAN: 54
GLOBULIN: 3.7 G/DL (ref 2.3–3.5)
GLUCOSE BLD-MCNC: 169 MG/DL (ref 74–109)
GLUCOSE URINE: NEGATIVE MG/DL
HCT VFR BLD CALC: 46.2 % (ref 37–47)
HEMOGLOBIN: 15.7 G/DL (ref 12–16)
KETONES, URINE: NEGATIVE MG/DL
LEUKOCYTE ESTERASE, URINE: NORMAL
LIPASE: 29 U/L (ref 13–60)
LYMPHOCYTES ABSOLUTE: 3.8 K/UL (ref 1–4.8)
LYMPHOCYTES RELATIVE PERCENT: 25.6 %
MCH RBC QN AUTO: 29 PG (ref 27–31.3)
MCHC RBC AUTO-ENTMCNC: 33.9 % (ref 33–37)
MCV RBC AUTO: 85.3 FL (ref 82–100)
MONOCYTES ABSOLUTE: 0.8 K/UL (ref 0.2–0.8)
MONOCYTES RELATIVE PERCENT: 5.2 %
NEUTROPHILS ABSOLUTE: 9.9 K/UL (ref 1.4–6.5)
NEUTROPHILS RELATIVE PERCENT: 67.4 %
NITRITE, URINE: NEGATIVE
PDW BLD-RTO: 14.8 % (ref 11.5–14.5)
PH UA: 5.5 (ref 5–9)
PLATELET # BLD: 260 K/UL (ref 130–400)
POTASSIUM SERPL-SCNC: 3.8 MEQ/L (ref 3.5–5.1)
PROTEIN UA: NEGATIVE MG/DL
RBC # BLD: 5.41 M/UL (ref 4.2–5.4)
RBC UA: ABNORMAL /HPF (ref 0–2)
SODIUM BLD-SCNC: 142 MEQ/L (ref 132–144)
SPECIFIC GRAVITY UA: 1.01 (ref 1–1.03)
TOTAL PROTEIN: 8.2 G/DL (ref 6.4–8.1)
URINE REFLEX TO CULTURE: YES
UROBILINOGEN, URINE: 0.2 E.U./DL
WBC # BLD: 14.8 K/UL (ref 4.8–10.8)
WBC UA: ABNORMAL /HPF (ref 0–5)

## 2018-11-02 PROCEDURE — 6360000002 HC RX W HCPCS: Performed by: EMERGENCY MEDICINE

## 2018-11-02 PROCEDURE — 82150 ASSAY OF AMYLASE: CPT

## 2018-11-02 PROCEDURE — 96374 THER/PROPH/DIAG INJ IV PUSH: CPT

## 2018-11-02 PROCEDURE — 96375 TX/PRO/DX INJ NEW DRUG ADDON: CPT

## 2018-11-02 PROCEDURE — 6370000000 HC RX 637 (ALT 250 FOR IP): Performed by: EMERGENCY MEDICINE

## 2018-11-02 PROCEDURE — 74176 CT ABD & PELVIS W/O CONTRAST: CPT

## 2018-11-02 PROCEDURE — 2580000003 HC RX 258

## 2018-11-02 PROCEDURE — 83690 ASSAY OF LIPASE: CPT

## 2018-11-02 PROCEDURE — 85025 COMPLETE CBC W/AUTO DIFF WBC: CPT

## 2018-11-02 PROCEDURE — 36415 COLL VENOUS BLD VENIPUNCTURE: CPT

## 2018-11-02 PROCEDURE — 2580000003 HC RX 258: Performed by: EMERGENCY MEDICINE

## 2018-11-02 PROCEDURE — 99284 EMERGENCY DEPT VISIT MOD MDM: CPT

## 2018-11-02 PROCEDURE — 81001 URINALYSIS AUTO W/SCOPE: CPT

## 2018-11-02 PROCEDURE — 80053 COMPREHEN METABOLIC PANEL: CPT

## 2018-11-02 PROCEDURE — 87086 URINE CULTURE/COLONY COUNT: CPT

## 2018-11-02 RX ORDER — FLUCONAZOLE 150 MG/1
150 TABLET ORAL DAILY
Qty: 2 TABLET | Refills: 0 | Status: SHIPPED | OUTPATIENT
Start: 2018-11-02 | End: 2018-12-15

## 2018-11-02 RX ORDER — FENTANYL CITRATE 50 UG/ML
100 INJECTION, SOLUTION INTRAMUSCULAR; INTRAVENOUS ONCE
Status: COMPLETED | OUTPATIENT
Start: 2018-11-02 | End: 2018-11-02

## 2018-11-02 RX ORDER — PROMETHAZINE HYDROCHLORIDE 25 MG/1
25 TABLET ORAL EVERY 6 HOURS PRN
Qty: 15 TABLET | Refills: 0 | Status: SHIPPED | OUTPATIENT
Start: 2018-11-02 | End: 2018-11-07

## 2018-11-02 RX ORDER — DIPHENHYDRAMINE HYDROCHLORIDE 50 MG/ML
25 INJECTION INTRAMUSCULAR; INTRAVENOUS ONCE
Status: COMPLETED | OUTPATIENT
Start: 2018-11-02 | End: 2018-11-02

## 2018-11-02 RX ORDER — 0.9 % SODIUM CHLORIDE 0.9 %
1000 INTRAVENOUS SOLUTION INTRAVENOUS ONCE
Status: COMPLETED | OUTPATIENT
Start: 2018-11-02 | End: 2018-11-02

## 2018-11-02 RX ORDER — METOCLOPRAMIDE HYDROCHLORIDE 5 MG/ML
10 INJECTION INTRAMUSCULAR; INTRAVENOUS ONCE
Status: COMPLETED | OUTPATIENT
Start: 2018-11-02 | End: 2018-11-02

## 2018-11-02 RX ORDER — CIPROFLOXACIN 500 MG/1
500 TABLET, FILM COATED ORAL 2 TIMES DAILY
Qty: 14 TABLET | Refills: 0 | Status: SHIPPED | OUTPATIENT
Start: 2018-11-02 | End: 2018-11-09

## 2018-11-02 RX ORDER — HYDROCODONE BITARTRATE AND ACETAMINOPHEN 5; 325 MG/1; MG/1
1 TABLET ORAL EVERY 6 HOURS PRN
Qty: 10 TABLET | Refills: 0 | Status: SHIPPED | OUTPATIENT
Start: 2018-11-02 | End: 2018-11-09

## 2018-11-02 RX ORDER — SODIUM CHLORIDE 9 MG/ML
INJECTION, SOLUTION INTRAVENOUS
Status: COMPLETED
Start: 2018-11-02 | End: 2018-11-02

## 2018-11-02 RX ORDER — ACETAMINOPHEN 500 MG
1000 TABLET ORAL ONCE
Status: COMPLETED | OUTPATIENT
Start: 2018-11-02 | End: 2018-11-02

## 2018-11-02 RX ADMIN — SODIUM CHLORIDE 1000 ML: 9 INJECTION, SOLUTION INTRAVENOUS at 21:05

## 2018-11-02 RX ADMIN — CEFTRIAXONE 1 G: 1 INJECTION, POWDER, FOR SOLUTION INTRAMUSCULAR; INTRAVENOUS at 21:31

## 2018-11-02 RX ADMIN — DIPHENHYDRAMINE HYDROCHLORIDE 25 MG: 50 INJECTION, SOLUTION INTRAMUSCULAR; INTRAVENOUS at 21:05

## 2018-11-02 RX ADMIN — Medication 1000 ML: at 21:05

## 2018-11-02 RX ADMIN — FENTANYL CITRATE 100 MCG: 50 INJECTION INTRAMUSCULAR; INTRAVENOUS at 21:05

## 2018-11-02 RX ADMIN — METOCLOPRAMIDE 10 MG: 5 INJECTION, SOLUTION INTRAMUSCULAR; INTRAVENOUS at 21:06

## 2018-11-02 RX ADMIN — ACETAMINOPHEN 1000 MG: 500 TABLET, FILM COATED ORAL at 21:31

## 2018-11-02 ASSESSMENT — PAIN DESCRIPTION - LOCATION
LOCATION: ABDOMEN
LOCATION: ABDOMEN

## 2018-11-02 ASSESSMENT — ENCOUNTER SYMPTOMS
SORE THROAT: 0
BLOOD IN STOOL: 0
CHEST TIGHTNESS: 0
VOMITING: 1
EYE REDNESS: 0
SHORTNESS OF BREATH: 0
TROUBLE SWALLOWING: 0
ABDOMINAL PAIN: 1
CONSTIPATION: 0
NAUSEA: 1
FACIAL SWELLING: 0
STRIDOR: 0
EYE DISCHARGE: 0
VOICE CHANGE: 0
DIARRHEA: 1
CHOKING: 0
SINUS PRESSURE: 0
WHEEZING: 0
COUGH: 0
BACK PAIN: 1
EYE PAIN: 0

## 2018-11-02 ASSESSMENT — PAIN SCALES - GENERAL
PAINLEVEL_OUTOF10: 6
PAINLEVEL_OUTOF10: 10
PAINLEVEL_OUTOF10: 10

## 2018-11-02 ASSESSMENT — PAIN DESCRIPTION - DESCRIPTORS
DESCRIPTORS: STABBING;CONSTANT
DESCRIPTORS: CRAMPING

## 2018-11-02 ASSESSMENT — PAIN DESCRIPTION - ORIENTATION
ORIENTATION: RIGHT;UPPER
ORIENTATION: RIGHT

## 2018-11-02 ASSESSMENT — PAIN DESCRIPTION - ONSET
ONSET: ON-GOING
ONSET: ON-GOING

## 2018-11-02 ASSESSMENT — PAIN DESCRIPTION - PAIN TYPE
TYPE: ACUTE PAIN
TYPE: ACUTE PAIN

## 2018-11-02 ASSESSMENT — PAIN DESCRIPTION - PROGRESSION
CLINICAL_PROGRESSION: GRADUALLY IMPROVING
CLINICAL_PROGRESSION: GRADUALLY WORSENING

## 2018-11-02 ASSESSMENT — PAIN DESCRIPTION - FREQUENCY
FREQUENCY: INTERMITTENT
FREQUENCY: CONTINUOUS

## 2018-11-03 NOTE — ED PROVIDER NOTES
Genitourinary: Negative for dysuria, flank pain, frequency, genital sores and urgency. Musculoskeletal: Positive for arthralgias and back pain. Negative for joint swelling, neck pain and neck stiffness. Skin: Negative for pallor and rash. Neurological: Negative for tremors, seizures, syncope, weakness, numbness and headaches. Hematological: Negative for adenopathy. Does not bruise/bleed easily. Psychiatric/Behavioral: Negative for agitation, behavioral problems, hallucinations and sleep disturbance. The patient is nervous/anxious. The patient is not hyperactive. All other systems reviewed and are negative. Except as noted above the remainder of the review of systems was reviewed and negative.        PAST MEDICAL HISTORY     Past Medical History:   Diagnosis Date    Anxiety     Asthma     as child    Depression     Depression     dysthymia - following lupus dx age 23    Diabetes mellitus ([facility] [address]     Foot fracture, left     GERD (gastroesophageal reflux disease)     Hyperlipidemia     Hypothyroidism     Kidney stones     Lupus     dr Hoda Suazo -     Migraine     PONV (postoperative nausea and vomiting)          SURGICALHISTORY       Past Surgical History:   Procedure Laterality Date    APPENDECTOMY       SECTION      CHOLECYSTECTOMY      COLONOSCOPY      FOOT CLOSED REDUCTION Left 1/3/2017    LEFT FOOT PERCUTANEOUS PINNING Selestino Torres FRACTURE performed by Zelda Pavon MD at [address] [place] [address]      CA ESOPHAGOGASTRODUODENOSCOPY TRANSORAL DIAGNOSTIC N/A 3/8/2017    EGD ESOPHAGOGASTRODUODENOSCOPY WITH DILATION performed by Tamika Quintanilla MD at [address] [place] [address]       Previous Medications    ALBUTEROL SULFATE HFA (VENTOLIN HFA) 108 (90 BASE) MCG/ACT INHALER    Inhale 2 puffs into the lungs every 6 hours as needed for Wheezing    BLOOD GLUCOSE MONITORING SUPPL (TRUE TRACK BLOOD GLUCOSE) CAROLE    Check blood sugars twice daily or as directed. [employer] UNILET LANCETS 30G MISC    Check blood sugars twice daily or as directed. GLUCOSE BLOOD VI TEST STRIPS (ASCENSIA AUTODISC VI;ONE TOUCH ULTRA TEST VI) STRIP    Check blood sugars twice daily or as directed. HYDROXYCHLOROQUINE (PLAQUENIL) 200 MG TABLET    Take 1 tablet by mouth 2 times daily    LEVOTHYROXINE (SYNTHROID) 50 MCG TABLET    Take 1 tablet by mouth daily    NARATRIPTAN (AMERGE) 2.5 MG TABLET    Take 2.5 mg by mouth once as needed for Migraine 2.5 mg at onset of headache, may repeat in 4 hours if needed    PANTOPRAZOLE (PROTONIX) 40 MG TABLET    Take 1 tablet by mouth daily    PREGABALIN (LYRICA) 75 MG CAPSULE    Take 75 mg by mouth daily. Garlon Odor ROSUVASTATIN (CRESTOR) 40 MG TABLET    Take 1 tablet by mouth daily       ALLERGIES     Aspirin; Imitrex [sumatriptan]; Pcn [penicillins];  Seasonal; Toradol [ketorolac tromethamine]; and Ultram [tramadol]    FAMILY HISTORY       Family History   Problem Relation Age of Onset    Adopted: Yes          SOCIAL HISTORY       Social History     Social History    Marital status: Legally      Spouse name: N/A    Number of children: N/A    Years of education: N/A     Social History Main Topics    Smoking status: Never Smoker    Smokeless tobacco: Never Used    Alcohol use No      Comment: denies    Drug use: No      Comment: denies    Sexual activity: Yes     Partners: Male     Other Topics Concern    Not on file     Social History Narrative    No narrative on file       SCREENINGS    Aries Coma Scale  Eye Opening: Spontaneous  Best Verbal Response: Oriented  Best Motor Response: Obeys commands  Aries Coma Scale Score: 15 @FLOW([id number])@      PHYSICAL EXAM    (up to 7 for level 4, 8 or more for level 5)     ED Triage Vitals [11/02/18 2045]   BP Temp Temp Source Pulse Resp SpO2 Height Weight   (!) 154/80 99.2 °F (37.3 °C) Oral 127 20 97 % 5' 8\" (1.727 m) 160 lb (72.6 kg)       Physical Exam   Constitutional: She is oriented to person, place, and time. She appears well-developed and well-nourished. HENT:   Head: Normocephalic. Right Ear: External ear normal.   Left Ear: External ear normal.   Nose: Nose normal.   Mouth/Throat: Oropharynx is clear and moist.   Eyes: Pupils are equal, round, and reactive to light. Neck: Normal range of motion. Neck supple. No JVD present. No tracheal deviation present. No thyromegaly present. Cardiovascular: Normal rate and normal heart sounds. Exam reveals no gallop. No murmur heard. Pulmonary/Chest: Breath sounds normal. No respiratory distress. She has no wheezes. Abdominal: Soft. Bowel sounds are normal. She exhibits no mass. There is no rebound. Attention Dr. abdomen mildly distended but soft no rebound tenderness patient has diffuse mild tenderness mostly to the epigastric area   Musculoskeletal: Normal range of motion. She exhibits no edema or tenderness. Lymphadenopathy:     She has no cervical adenopathy. Neurological: She is alert and oriented to person, place, and time. No cranial nerve deficit. She exhibits normal muscle tone. Skin: Skin is warm. No rash noted. No erythema. Psychiatric: Her behavior is normal. Thought content normal.   Vitals reviewed.       DIAGNOSTIC RESULTS     EKG: All EKG's are interpreted by the Emergency Department Physician who either signs or Co-signsthis chart in the absence of a cardiologist.        RADIOLOGY:   Dietra Ales such as CT, Ultrasound and MRI are read by the radiologist. Plain radiographic images are visualized and preliminarily interpreted by the emergency physician with the below findings:        Interpretation per the Radiologist below, if available at the time ofthis note:    CT ABDOMEN PELVIS WO CONTRAST Additional Contrast? None    (Results Pending)         ED BEDSIDE ULTRASOUND:   Performed by ED Physician - none    LABS:  Labs CONSULTS:  None    PROCEDURES:  Unless otherwise noted below, none     Procedures    FINAL IMPRESSION      1. Gastroenteritis    2. Nausea    3. Acute cystitis without hematuria          DISPOSITION/PLAN   DISPOSITION        PATIENT REFERRED TO:  No follow-up provider specified.     DISCHARGE MEDICATIONS:  New Prescriptions    No medications on file          (Please note that portions of this note were completed with a voice recognition program.  Efforts were made to edit the dictations but occasionally words are mis-transcribed.)    Murtaza Mercado MD (electronically signed)  Attending Emergency Physician       Murtaza Mercado MD  11/02/18 4785       Murtaza Mercado MD  11/02/18 5344

## 2018-11-05 LAB — URINE CULTURE, ROUTINE: NORMAL

## 2018-11-06 ENCOUNTER — OFFICE VISIT (OUTPATIENT)
Dept: GASTROENTEROLOGY | Age: 53
End: 2018-11-06
Payer: MEDICARE

## 2018-11-06 VITALS
SYSTOLIC BLOOD PRESSURE: 123 MMHG | DIASTOLIC BLOOD PRESSURE: 76 MMHG | HEART RATE: 124 BPM | WEIGHT: 174 LBS | BODY MASS INDEX: 26.46 KG/M2

## 2018-11-06 DIAGNOSIS — R13.10 DYSPHAGIA, UNSPECIFIED TYPE: ICD-10-CM

## 2018-11-06 DIAGNOSIS — R10.11 RUQ ABDOMINAL PAIN: Primary | ICD-10-CM

## 2018-11-06 DIAGNOSIS — R10.13 ABDOMINAL PAIN, EPIGASTRIC: ICD-10-CM

## 2018-11-06 PROCEDURE — G8484 FLU IMMUNIZE NO ADMIN: HCPCS | Performed by: INTERNAL MEDICINE

## 2018-11-06 PROCEDURE — G8427 DOCREV CUR MEDS BY ELIG CLIN: HCPCS | Performed by: INTERNAL MEDICINE

## 2018-11-06 PROCEDURE — 1036F TOBACCO NON-USER: CPT | Performed by: INTERNAL MEDICINE

## 2018-11-06 PROCEDURE — 99214 OFFICE O/P EST MOD 30 MIN: CPT | Performed by: INTERNAL MEDICINE

## 2018-11-06 PROCEDURE — G8419 CALC BMI OUT NRM PARAM NOF/U: HCPCS | Performed by: INTERNAL MEDICINE

## 2018-11-06 PROCEDURE — 3017F COLORECTAL CA SCREEN DOC REV: CPT | Performed by: INTERNAL MEDICINE

## 2018-11-06 RX ORDER — METOCLOPRAMIDE 10 MG/1
10 TABLET ORAL PRN
COMMUNITY
Start: 2018-05-05 | End: 2020-08-24 | Stop reason: ALTCHOICE

## 2018-11-06 RX ORDER — DICYCLOMINE HCL 20 MG
20 TABLET ORAL
COMMUNITY
Start: 2018-05-05 | End: 2019-07-03

## 2018-11-07 ENCOUNTER — HOSPITAL ENCOUNTER (OUTPATIENT)
Age: 53
Setting detail: OUTPATIENT SURGERY
Discharge: HOME | End: 2018-11-07
Attending: INTERNAL MEDICINE | Admitting: INTERNAL MEDICINE
Payer: MEDICARE

## 2018-11-07 ENCOUNTER — ANESTHESIA (OUTPATIENT)
Dept: ENDOSCOPY | Age: 53
End: 2018-11-07
Payer: MEDICARE

## 2018-11-07 ENCOUNTER — ANESTHESIA EVENT (OUTPATIENT)
Dept: ENDOSCOPY | Age: 53
End: 2018-11-07
Payer: MEDICARE

## 2018-11-07 VITALS
TEMPERATURE: 98.4 F | SYSTOLIC BLOOD PRESSURE: 130 MMHG | BODY MASS INDEX: 25.01 KG/M2 | HEART RATE: 121 BPM | WEIGHT: 165 LBS | DIASTOLIC BLOOD PRESSURE: 74 MMHG | HEIGHT: 68 IN | RESPIRATION RATE: 20 BRPM | OXYGEN SATURATION: 97 %

## 2018-11-07 VITALS
OXYGEN SATURATION: 93 % | SYSTOLIC BLOOD PRESSURE: 120 MMHG | DIASTOLIC BLOOD PRESSURE: 60 MMHG | RESPIRATION RATE: 17 BRPM

## 2018-11-07 PROCEDURE — 2500000003 HC RX 250 WO HCPCS: Performed by: NURSE ANESTHETIST, CERTIFIED REGISTERED

## 2018-11-07 PROCEDURE — 43251 EGD REMOVE LESION SNARE: CPT | Performed by: INTERNAL MEDICINE

## 2018-11-07 PROCEDURE — 43239 EGD BIOPSY SINGLE/MULTIPLE: CPT | Performed by: INTERNAL MEDICINE

## 2018-11-07 PROCEDURE — 6360000002 HC RX W HCPCS: Performed by: NURSE ANESTHETIST, CERTIFIED REGISTERED

## 2018-11-07 PROCEDURE — 6360000002 HC RX W HCPCS: Performed by: INTERNAL MEDICINE

## 2018-11-07 PROCEDURE — 7100000010 HC PHASE II RECOVERY - FIRST 15 MIN: Performed by: INTERNAL MEDICINE

## 2018-11-07 PROCEDURE — 3700000001 HC ADD 15 MINUTES (ANESTHESIA): Performed by: INTERNAL MEDICINE

## 2018-11-07 PROCEDURE — 88342 IMHCHEM/IMCYTCHM 1ST ANTB: CPT

## 2018-11-07 PROCEDURE — 88305 TISSUE EXAM BY PATHOLOGIST: CPT

## 2018-11-07 PROCEDURE — 7100000011 HC PHASE II RECOVERY - ADDTL 15 MIN: Performed by: INTERNAL MEDICINE

## 2018-11-07 PROCEDURE — 3609017100 HC EGD: Performed by: INTERNAL MEDICINE

## 2018-11-07 PROCEDURE — 2580000003 HC RX 258: Performed by: INTERNAL MEDICINE

## 2018-11-07 PROCEDURE — 43248 EGD GUIDE WIRE INSERTION: CPT | Performed by: INTERNAL MEDICINE

## 2018-11-07 PROCEDURE — 3700000000 HC ANESTHESIA ATTENDED CARE: Performed by: INTERNAL MEDICINE

## 2018-11-07 RX ORDER — PROPOFOL 10 MG/ML
INJECTION, EMULSION INTRAVENOUS PRN
Status: DISCONTINUED | OUTPATIENT
Start: 2018-11-07 | End: 2018-11-07 | Stop reason: SDUPTHER

## 2018-11-07 RX ORDER — ONDANSETRON 2 MG/ML
4 INJECTION INTRAMUSCULAR; INTRAVENOUS
Status: COMPLETED | OUTPATIENT
Start: 2018-11-07 | End: 2018-11-07

## 2018-11-07 RX ORDER — GLYCOPYRROLATE 1 MG/5 ML
SYRINGE (ML) INTRAVENOUS PRN
Status: DISCONTINUED | OUTPATIENT
Start: 2018-11-07 | End: 2018-11-07 | Stop reason: SDUPTHER

## 2018-11-07 RX ORDER — SODIUM CHLORIDE 9 MG/ML
INJECTION, SOLUTION INTRAVENOUS CONTINUOUS
Status: DISCONTINUED | OUTPATIENT
Start: 2018-11-07 | End: 2018-11-07 | Stop reason: HOSPADM

## 2018-11-07 RX ORDER — LIDOCAINE HYDROCHLORIDE 20 MG/ML
INJECTION, SOLUTION INFILTRATION; PERINEURAL PRN
Status: DISCONTINUED | OUTPATIENT
Start: 2018-11-07 | End: 2018-11-07 | Stop reason: SDUPTHER

## 2018-11-07 RX ADMIN — LIDOCAINE HYDROCHLORIDE 40 MG: 20 INJECTION, SOLUTION INFILTRATION; PERINEURAL at 10:53

## 2018-11-07 RX ADMIN — SODIUM CHLORIDE: 9 INJECTION, SOLUTION INTRAVENOUS at 10:22

## 2018-11-07 RX ADMIN — PROPOFOL 30 MG: 10 INJECTION, EMULSION INTRAVENOUS at 10:57

## 2018-11-07 RX ADMIN — PROPOFOL 20 MG: 10 INJECTION, EMULSION INTRAVENOUS at 10:54

## 2018-11-07 RX ADMIN — ONDANSETRON 4 MG: 2 INJECTION INTRAMUSCULAR; INTRAVENOUS at 11:26

## 2018-11-07 RX ADMIN — Medication 0.2 MG: at 10:54

## 2018-11-07 RX ADMIN — PROPOFOL 40 MG: 10 INJECTION, EMULSION INTRAVENOUS at 11:00

## 2018-11-07 RX ADMIN — PROPOFOL 50 MG: 10 INJECTION, EMULSION INTRAVENOUS at 10:53

## 2018-11-07 RX ADMIN — PROPOFOL 30 MG: 10 INJECTION, EMULSION INTRAVENOUS at 10:56

## 2018-11-07 RX ADMIN — PROPOFOL 40 MG: 10 INJECTION, EMULSION INTRAVENOUS at 10:55

## 2018-11-07 RX ADMIN — PROPOFOL 40 MG: 10 INJECTION, EMULSION INTRAVENOUS at 10:59

## 2018-11-07 RX ADMIN — PROPOFOL 30 MG: 10 INJECTION, EMULSION INTRAVENOUS at 10:58

## 2018-11-07 RX ADMIN — PROPOFOL 40 MG: 10 INJECTION, EMULSION INTRAVENOUS at 11:01

## 2018-11-15 ENCOUNTER — OFFICE VISIT (OUTPATIENT)
Dept: GASTROENTEROLOGY | Age: 53
End: 2018-11-15
Payer: MEDICARE

## 2018-11-15 VITALS
SYSTOLIC BLOOD PRESSURE: 119 MMHG | HEART RATE: 96 BPM | BODY MASS INDEX: 27.06 KG/M2 | WEIGHT: 178 LBS | DIASTOLIC BLOOD PRESSURE: 105 MMHG

## 2018-11-15 DIAGNOSIS — R10.11 RUQ ABDOMINAL PAIN: ICD-10-CM

## 2018-11-15 DIAGNOSIS — R13.10 DYSPHAGIA, UNSPECIFIED TYPE: Primary | ICD-10-CM

## 2018-11-15 PROCEDURE — 99212 OFFICE O/P EST SF 10 MIN: CPT | Performed by: INTERNAL MEDICINE

## 2018-11-15 PROCEDURE — G8419 CALC BMI OUT NRM PARAM NOF/U: HCPCS | Performed by: INTERNAL MEDICINE

## 2018-11-15 PROCEDURE — G8484 FLU IMMUNIZE NO ADMIN: HCPCS | Performed by: INTERNAL MEDICINE

## 2018-11-15 PROCEDURE — G8427 DOCREV CUR MEDS BY ELIG CLIN: HCPCS | Performed by: INTERNAL MEDICINE

## 2018-11-15 PROCEDURE — 3017F COLORECTAL CA SCREEN DOC REV: CPT | Performed by: INTERNAL MEDICINE

## 2018-11-15 PROCEDURE — 1036F TOBACCO NON-USER: CPT | Performed by: INTERNAL MEDICINE

## 2018-11-29 ENCOUNTER — HOSPITAL ENCOUNTER (EMERGENCY)
Age: 53
Discharge: HOME | End: 2018-11-29
Attending: EMERGENCY MEDICINE
Payer: MEDICARE

## 2018-11-29 VITALS
HEART RATE: 107 BPM | SYSTOLIC BLOOD PRESSURE: 116 MMHG | WEIGHT: 178 LBS | RESPIRATION RATE: 20 BRPM | DIASTOLIC BLOOD PRESSURE: 66 MMHG | BODY MASS INDEX: 26.98 KG/M2 | HEIGHT: 68 IN | OXYGEN SATURATION: 96 % | TEMPERATURE: 98.2 F

## 2018-11-29 DIAGNOSIS — J06.9 UPPER RESPIRATORY TRACT INFECTION, UNSPECIFIED TYPE: Primary | ICD-10-CM

## 2018-11-29 PROCEDURE — 94640 AIRWAY INHALATION TREATMENT: CPT

## 2018-11-29 PROCEDURE — 99283 EMERGENCY DEPT VISIT LOW MDM: CPT

## 2018-11-29 PROCEDURE — 6360000002 HC RX W HCPCS: Performed by: EMERGENCY MEDICINE

## 2018-11-29 RX ORDER — GUAIFENESIN AND CODEINE PHOSPHATE 100; 10 MG/5ML; MG/5ML
5 SOLUTION ORAL 4 TIMES DAILY PRN
Qty: 118 ML | Refills: 0 | Status: SHIPPED | OUTPATIENT
Start: 2018-11-29 | End: 2018-12-06

## 2018-11-29 RX ORDER — ALBUTEROL SULFATE 2.5 MG/3ML
2.5 SOLUTION RESPIRATORY (INHALATION) ONCE
Status: COMPLETED | OUTPATIENT
Start: 2018-11-29 | End: 2018-11-29

## 2018-11-29 RX ADMIN — ALBUTEROL SULFATE 2.5 MG: 2.5 SOLUTION RESPIRATORY (INHALATION) at 08:47

## 2018-11-29 NOTE — ED PROVIDER NOTES
[address] [place] [address] ED  eMERGENCY dEPARTMENT eNCOUnter      Pt Name: Dulce Irene  MRN: [id number]  [place] 1965  Date of evaluation: 2018  Provider: Jose Mahoney       Chief Complaint   Patient presents with    Cough     started saturday. non productive.  Pharyngitis           HISTORY OF PRESENT ILLNESS  (Location/Symptom, Timing/Onset, Context/Setting, Quality, Duration, Modifying Factors, Severity.)   Suzanne Kim is a 48 y.o. female who presents to the emergency department with A dry cough for the past 5 days. Reports mild shortness of breath, but denies chest pain, high fever, abdominal pain, nausea or vomiting. Tobacco use: Denies  Location/Symptom: cough  Duration: Intermittent  Modifying Factors: worse when laying flat, better sitting up  Severity: mild    Nursing Notes were reviewed. REVIEW OF SYSTEMS    (2-9 systems for level 4, 10 or more for level 5)     Review of Systems   C/o cough with No sputum, occasional wheezing  Mild shortness of breath, denies chest pain, denies any fevers or chills, denies any abdominal pain nausea or vomiting    Except as noted above the remainder of the review of systems was reviewed and negative.        PAST MEDICAL HISTORY     Past Medical History:   Diagnosis Date    Anxiety     Asthma     as child    Depression     Depression     dysthymia - following lupus dx age 23    Diabetes mellitus ([facility] [address]     Foot fracture, left     GERD (gastroesophageal reflux disease)     Hyperlipidemia     Hypothyroidism     Kidney stones     Lupus     dr Doris Denton -     Migraine     PONV (postoperative nausea and vomiting)      None otherwise stated in nurses notes    SURGICAL HISTORY       Past Surgical History:   Procedure Laterality Date    APPENDECTOMY       SECTION      CHOLECYSTECTOMY      COLONOSCOPY      FOOT CLOSED REDUCTION Left 1/3/2017    LEFT FOOT PERCUTANEOUS PINNING Gabby Moscow FRACTURE performed by Giles Neal in the absence of a cardiologist.        RADIOLOGY:   All plain film, CT, MRI, and formal ultrasound images (except ED bedside ultrasound) are read by the radiologist and the images and interpretations are directly viewed by the emergency physician. No orders to display         None      LABS:  Labs Reviewed - No data to display    All other labs were within normal range or not returned as of this dictation. EMERGENCY DEPARTMENT COURSE and DIFFERENTIAL DIAGNOSIS/MDM:   Vitals:    Vitals:    11/29/18 0839   BP: 116/66   Pulse: 107   Resp: 20   Temp: 98.2 °F (36.8 °C)   TempSrc: Oral   SpO2: 96%   Weight: 178 lb (80.7 kg)   Height: 5' 8\" (1.727 m)       Patient's symptoms seem suggestive of a viral upper respiratory infection. She does state that she gets them frequently. Usually once or twice a year. She states she usually benefits from a cough medicine, and breathing treatments. Patient does have a hand-held inhaler at home. Pt is resting comfortably, no difficulty breathing, no distress. Instructed to return to the emergency room if symptoms worsen, return, or any other concern right away which is agreed by the patient. Instructed to f/u with PCP in 2-3 days for re-evaluation. ED MEDS:  Orders Placed This Encounter   Medications    albuterol (PROVENTIL) nebulizer solution 2.5 mg    guaiFENesin-codeine (TUSSI-ORGANIDIN NR) 100-10 MG/5ML syrup     Sig: Take 5 mLs by mouth 4 times daily as needed for Cough for up to 7 days. .     Dispense:  118 mL     Refill:  0         CONSULTS:  None    PROCEDURES:  None      FINAL IMPRESSION      1.  Upper respiratory tract infection, unspecified type          DISPOSITION/PLAN   DISPOSITION Decision To Discharge    PATIENT REFERRED TO:  [facility] ED  [address] [address] [phone]    As needed, If symptoms worsen      DISCHARGE MEDICATIONS:  Discharge Medication List as of 11/29/2018  9:12 AM      START taking these medications

## 2018-12-15 ENCOUNTER — HOSPITAL ENCOUNTER (EMERGENCY)
Age: 53
Discharge: HOME | End: 2018-12-15
Attending: EMERGENCY MEDICINE
Payer: MEDICARE

## 2018-12-15 VITALS
SYSTOLIC BLOOD PRESSURE: 128 MMHG | OXYGEN SATURATION: 99 % | HEART RATE: 117 BPM | BODY MASS INDEX: 24.25 KG/M2 | TEMPERATURE: 98.6 F | RESPIRATION RATE: 22 BRPM | DIASTOLIC BLOOD PRESSURE: 76 MMHG | WEIGHT: 160 LBS | HEIGHT: 68 IN

## 2018-12-15 DIAGNOSIS — R10.9 LEFT FLANK PAIN: ICD-10-CM

## 2018-12-15 DIAGNOSIS — N30.00 ACUTE CYSTITIS WITHOUT HEMATURIA: Primary | ICD-10-CM

## 2018-12-15 LAB
BACTERIA: ABNORMAL /HPF
BILIRUBIN URINE: NEGATIVE
BLOOD, URINE: ABNORMAL
CLARITY: CLEAR
COLOR: YELLOW
EPITHELIAL CELLS, UA: ABNORMAL /HPF
GLUCOSE URINE: NEGATIVE MG/DL
KETONES, URINE: NEGATIVE MG/DL
LEUKOCYTE ESTERASE, URINE: ABNORMAL
NITRITE, URINE: NEGATIVE
PH UA: 6.5 (ref 5–9)
PROTEIN UA: 30 MG/DL
RBC UA: ABNORMAL /HPF (ref 0–2)
SPECIFIC GRAVITY UA: 1.01 (ref 1–1.03)
URINE REFLEX TO CULTURE: YES
UROBILINOGEN, URINE: 0.2 E.U./DL
WBC UA: ABNORMAL /HPF (ref 0–5)

## 2018-12-15 PROCEDURE — 6370000000 HC RX 637 (ALT 250 FOR IP): Performed by: EMERGENCY MEDICINE

## 2018-12-15 PROCEDURE — 87086 URINE CULTURE/COLONY COUNT: CPT

## 2018-12-15 PROCEDURE — 81001 URINALYSIS AUTO W/SCOPE: CPT

## 2018-12-15 PROCEDURE — 99284 EMERGENCY DEPT VISIT MOD MDM: CPT

## 2018-12-15 RX ORDER — PHENAZOPYRIDINE HYDROCHLORIDE 200 MG/1
200 TABLET, FILM COATED ORAL 3 TIMES DAILY PRN
Qty: 6 TABLET | Refills: 0 | Status: SHIPPED | OUTPATIENT
Start: 2018-12-15 | End: 2018-12-18

## 2018-12-15 RX ORDER — CIPROFLOXACIN 500 MG/1
500 TABLET, FILM COATED ORAL ONCE
Status: COMPLETED | OUTPATIENT
Start: 2018-12-15 | End: 2018-12-15

## 2018-12-15 RX ORDER — CIPROFLOXACIN 500 MG/1
500 TABLET, FILM COATED ORAL 2 TIMES DAILY
Qty: 14 TABLET | Refills: 0 | Status: SHIPPED | OUTPATIENT
Start: 2018-12-15 | End: 2018-12-22

## 2018-12-15 RX ORDER — PHENAZOPYRIDINE HYDROCHLORIDE 100 MG/1
200 TABLET, FILM COATED ORAL ONCE
Status: COMPLETED | OUTPATIENT
Start: 2018-12-15 | End: 2018-12-15

## 2018-12-15 RX ADMIN — CIPROFLOXACIN HYDROCHLORIDE 500 MG: 500 TABLET, FILM COATED ORAL at 22:53

## 2018-12-15 RX ADMIN — PHENAZOPYRIDINE HYDROCHLORIDE 200 MG: 100 TABLET ORAL at 22:53

## 2018-12-15 ASSESSMENT — ENCOUNTER SYMPTOMS
EYE PAIN: 0
DIARRHEA: 0
CHEST TIGHTNESS: 0
BACK PAIN: 1
EYE DISCHARGE: 0
ABDOMINAL PAIN: 0
WHEEZING: 0
EYE REDNESS: 0
CONSTIPATION: 0
SORE THROAT: 0
FACIAL SWELLING: 0
BLOOD IN STOOL: 0
SINUS PRESSURE: 0
CHOKING: 0
SHORTNESS OF BREATH: 0
STRIDOR: 0
COUGH: 0
VOMITING: 0
TROUBLE SWALLOWING: 0
VOICE CHANGE: 0

## 2018-12-15 ASSESSMENT — PAIN DESCRIPTION - LOCATION: LOCATION: FLANK

## 2018-12-15 ASSESSMENT — PAIN DESCRIPTION - PROGRESSION: CLINICAL_PROGRESSION: GRADUALLY WORSENING

## 2018-12-15 ASSESSMENT — PAIN SCALES - GENERAL: PAINLEVEL_OUTOF10: 10

## 2018-12-15 ASSESSMENT — PAIN DESCRIPTION - PAIN TYPE: TYPE: ACUTE PAIN

## 2018-12-15 ASSESSMENT — PAIN DESCRIPTION - ORIENTATION: ORIENTATION: LEFT

## 2018-12-15 ASSESSMENT — PAIN DESCRIPTION - ONSET: ONSET: ON-GOING

## 2018-12-15 ASSESSMENT — PAIN DESCRIPTION - DESCRIPTORS: DESCRIPTORS: CONSTANT;SHARP

## 2018-12-15 ASSESSMENT — PAIN DESCRIPTION - FREQUENCY: FREQUENCY: CONTINUOUS

## 2018-12-16 NOTE — ED PROVIDER NOTES
[address] [place] [address] ED  eMERGENCY dEPARTMENT eNCOUnter      Pt Name: Dulce Irene  MRN: [id number]  [place] 1965  Date of evaluation: 12/15/2018  Provider: Jared Bustos MD    [address] [place] [address]       Chief Complaint   Patient presents with    Flank Pain     dysuria, frequency since wednesday       HISTORY OF PRESENT ILLNESS   (Location/Symptom, Timing/Onset,Context/Setting, Quality, Duration, Modifying Factors, Severity)  Note limiting factors. Dulce Irene is a 48 y.o. female who presents to the emergency department Patient is known to me from previous multiple encounter in this emergency for various reasons including migraine headache and chronic pain patient has his MDM likely hernia surgery repair donel pain management at this time with the pain medication history of hyperlipidemia anxiety panic disorder diabetes, with frequency and burning urination no documented fever no nausea no vomiting flank pain for the last few days time    HPI    NursingNotes were reviewed. REVIEW OF SYSTEMS    (2-9 systems for level 4, 10 or more for level 5)     Review of Systems   Constitutional: Negative. Negative for activity change and fever. HENT: Negative for congestion, drooling, facial swelling, mouth sores, nosebleeds, sinus pressure, sore throat, trouble swallowing and voice change. Eyes: Negative for pain, discharge, redness and visual disturbance. Respiratory: Negative for cough, choking, chest tightness, shortness of breath, wheezing and stridor. Cardiovascular: Negative for chest pain, palpitations and leg swelling. Gastrointestinal: Negative for abdominal pain, blood in stool, constipation, diarrhea and vomiting. Endocrine: Negative for cold intolerance, polyphagia and polyuria. Genitourinary: Positive for difficulty urinating, dysuria, flank pain and frequency. Negative for genital sores and urgency. Musculoskeletal: Positive for back pain.  Negative for joint swelling, neck pain

## 2018-12-18 LAB — URINE CULTURE, ROUTINE: NORMAL

## 2019-01-04 ENCOUNTER — HOSPITAL ENCOUNTER (EMERGENCY)
Age: 54
Discharge: HOME | End: 2019-01-04
Payer: MEDICARE

## 2019-01-04 VITALS
BODY MASS INDEX: 25.16 KG/M2 | SYSTOLIC BLOOD PRESSURE: 142 MMHG | WEIGHT: 166 LBS | DIASTOLIC BLOOD PRESSURE: 79 MMHG | RESPIRATION RATE: 18 BRPM | HEART RATE: 88 BPM | OXYGEN SATURATION: 96 % | HEIGHT: 68 IN | TEMPERATURE: 98.6 F

## 2019-01-04 DIAGNOSIS — J01.10 ACUTE FRONTAL SINUSITIS, RECURRENCE NOT SPECIFIED: ICD-10-CM

## 2019-01-04 DIAGNOSIS — R05.9 COUGH: Primary | ICD-10-CM

## 2019-01-04 PROCEDURE — 99283 EMERGENCY DEPT VISIT LOW MDM: CPT

## 2019-01-04 RX ORDER — AZITHROMYCIN 250 MG/1
TABLET, FILM COATED ORAL
Qty: 6 TABLET | Refills: 0 | Status: SHIPPED | OUTPATIENT
Start: 2019-01-04 | End: 2019-01-14

## 2019-01-04 RX ORDER — GUAIFENESIN AND CODEINE PHOSPHATE 100; 10 MG/5ML; MG/5ML
10 SOLUTION ORAL NIGHTLY
Qty: 200 ML | Refills: 0 | Status: SHIPPED | OUTPATIENT
Start: 2019-01-04 | End: 2019-01-14

## 2019-01-04 ASSESSMENT — ENCOUNTER SYMPTOMS
STRIDOR: 0
EYE REDNESS: 0
WHEEZING: 0
SINUS PRESSURE: 1
EYE DISCHARGE: 0
ABDOMINAL PAIN: 0
BLOOD IN STOOL: 0
SORE THROAT: 0
EYE PAIN: 0
VOICE CHANGE: 0
COUGH: 1
TROUBLE SWALLOWING: 0
BACK PAIN: 0
SHORTNESS OF BREATH: 0
CHOKING: 0
SINUS PAIN: 1
CHEST TIGHTNESS: 0
RHINORRHEA: 1
CONSTIPATION: 0
FACIAL SWELLING: 0
VOMITING: 0
DIARRHEA: 0

## 2019-01-04 ASSESSMENT — PAIN DESCRIPTION - LOCATION: LOCATION: FACE

## 2019-01-04 ASSESSMENT — PAIN DESCRIPTION - DESCRIPTORS: DESCRIPTORS: ACHING

## 2019-01-04 ASSESSMENT — PAIN DESCRIPTION - FREQUENCY: FREQUENCY: CONTINUOUS

## 2019-01-04 ASSESSMENT — PAIN SCALES - GENERAL: PAINLEVEL_OUTOF10: 9

## 2019-01-14 ENCOUNTER — HOSPITAL ENCOUNTER (EMERGENCY)
Age: 54
Discharge: HOME | End: 2019-01-14
Attending: EMERGENCY MEDICINE
Payer: MEDICARE

## 2019-01-14 ENCOUNTER — APPOINTMENT (OUTPATIENT)
Dept: CT IMAGING | Age: 54
End: 2019-01-14
Payer: MEDICARE

## 2019-01-14 VITALS
HEIGHT: 68 IN | HEART RATE: 100 BPM | BODY MASS INDEX: 24.25 KG/M2 | RESPIRATION RATE: 20 BRPM | SYSTOLIC BLOOD PRESSURE: 89 MMHG | WEIGHT: 160 LBS | TEMPERATURE: 98.3 F | OXYGEN SATURATION: 98 % | DIASTOLIC BLOOD PRESSURE: 53 MMHG

## 2019-01-14 DIAGNOSIS — R10.9 FLANK PAIN: Primary | ICD-10-CM

## 2019-01-14 DIAGNOSIS — S39.012A BACK STRAIN, INITIAL ENCOUNTER: ICD-10-CM

## 2019-01-14 DIAGNOSIS — S20.221A BACK CONTUSION, RIGHT, INITIAL ENCOUNTER: ICD-10-CM

## 2019-01-14 LAB
BILIRUBIN URINE: NEGATIVE
BLOOD, URINE: NEGATIVE
CLARITY: CLEAR
COLOR: YELLOW
GLUCOSE URINE: NEGATIVE MG/DL
KETONES, URINE: NEGATIVE MG/DL
LEUKOCYTE ESTERASE, URINE: NEGATIVE
NITRITE, URINE: NEGATIVE
PH UA: 5 (ref 5–9)
PROTEIN UA: NEGATIVE MG/DL
SPECIFIC GRAVITY UA: <=1.005 (ref 1–1.03)
URINE REFLEX TO CULTURE: NORMAL
UROBILINOGEN, URINE: 0.2 E.U./DL

## 2019-01-14 PROCEDURE — 6360000002 HC RX W HCPCS: Performed by: EMERGENCY MEDICINE

## 2019-01-14 PROCEDURE — 81003 URINALYSIS AUTO W/O SCOPE: CPT

## 2019-01-14 PROCEDURE — 96372 THER/PROPH/DIAG INJ SC/IM: CPT

## 2019-01-14 PROCEDURE — 99284 EMERGENCY DEPT VISIT MOD MDM: CPT

## 2019-01-14 PROCEDURE — 74176 CT ABD & PELVIS W/O CONTRAST: CPT

## 2019-01-14 RX ORDER — CYCLOBENZAPRINE HCL 10 MG
10 TABLET ORAL 3 TIMES DAILY PRN
Qty: 15 TABLET | Refills: 0 | Status: SHIPPED | OUTPATIENT
Start: 2019-01-14 | End: 2019-01-19

## 2019-01-14 RX ORDER — HYDROCODONE BITARTRATE AND ACETAMINOPHEN 5; 325 MG/1; MG/1
1 TABLET ORAL EVERY 6 HOURS PRN
Qty: 10 TABLET | Refills: 0 | Status: SHIPPED | OUTPATIENT
Start: 2019-01-14 | End: 2019-01-17

## 2019-01-14 RX ORDER — ONDANSETRON 4 MG/1
4 TABLET, ORALLY DISINTEGRATING ORAL ONCE
Status: COMPLETED | OUTPATIENT
Start: 2019-01-14 | End: 2019-01-14

## 2019-01-14 RX ADMIN — ONDANSETRON 4 MG: 4 TABLET, ORALLY DISINTEGRATING ORAL at 17:52

## 2019-01-14 RX ADMIN — HYDROMORPHONE HYDROCHLORIDE 1 MG: 1 INJECTION, SOLUTION INTRAMUSCULAR; INTRAVENOUS; SUBCUTANEOUS at 17:52

## 2019-01-14 ASSESSMENT — ENCOUNTER SYMPTOMS
CHEST TIGHTNESS: 0
ABDOMINAL PAIN: 0
WHEEZING: 0
EYE REDNESS: 0
SINUS PRESSURE: 0
VOMITING: 0
CONSTIPATION: 0
FACIAL SWELLING: 0
DIARRHEA: 0
SHORTNESS OF BREATH: 0
BACK PAIN: 1
CHOKING: 0
COUGH: 0
SORE THROAT: 0
BLOOD IN STOOL: 0
TROUBLE SWALLOWING: 0
STRIDOR: 0
EYE DISCHARGE: 0
EYE PAIN: 0
VOICE CHANGE: 0

## 2019-01-14 ASSESSMENT — PAIN DESCRIPTION - ORIENTATION: ORIENTATION: LEFT

## 2019-01-14 ASSESSMENT — PAIN DESCRIPTION - DESCRIPTORS: DESCRIPTORS: SHARP;SQUEEZING

## 2019-01-14 ASSESSMENT — PAIN DESCRIPTION - PAIN TYPE: TYPE: ACUTE PAIN

## 2019-01-14 ASSESSMENT — PAIN SCALES - GENERAL
PAINLEVEL_OUTOF10: 10
PAINLEVEL_OUTOF10: 10

## 2019-01-14 ASSESSMENT — PAIN DESCRIPTION - FREQUENCY: FREQUENCY: CONTINUOUS

## 2019-02-27 ENCOUNTER — HOSPITAL ENCOUNTER (EMERGENCY)
Age: 54
Discharge: HOME | End: 2019-02-27
Attending: EMERGENCY MEDICINE
Payer: MEDICARE

## 2019-02-27 VITALS
HEIGHT: 69 IN | DIASTOLIC BLOOD PRESSURE: 77 MMHG | OXYGEN SATURATION: 95 % | SYSTOLIC BLOOD PRESSURE: 124 MMHG | TEMPERATURE: 98.4 F | WEIGHT: 165 LBS | HEART RATE: 88 BPM | BODY MASS INDEX: 24.44 KG/M2 | RESPIRATION RATE: 18 BRPM

## 2019-02-27 DIAGNOSIS — R05.9 COUGH: Primary | ICD-10-CM

## 2019-02-27 DIAGNOSIS — J40 BRONCHITIS: ICD-10-CM

## 2019-02-27 DIAGNOSIS — J01.10 ACUTE FRONTAL SINUSITIS, RECURRENCE NOT SPECIFIED: ICD-10-CM

## 2019-02-27 PROCEDURE — 6360000002 HC RX W HCPCS: Performed by: EMERGENCY MEDICINE

## 2019-02-27 PROCEDURE — 6370000000 HC RX 637 (ALT 250 FOR IP): Performed by: EMERGENCY MEDICINE

## 2019-02-27 PROCEDURE — 99283 EMERGENCY DEPT VISIT LOW MDM: CPT

## 2019-02-27 RX ORDER — DEXAMETHASONE 4 MG/1
8 TABLET ORAL ONCE
Status: COMPLETED | OUTPATIENT
Start: 2019-02-27 | End: 2019-02-27

## 2019-02-27 RX ORDER — AZITHROMYCIN 250 MG/1
TABLET, FILM COATED ORAL
Qty: 6 TABLET | Refills: 0 | Status: SHIPPED | OUTPATIENT
Start: 2019-02-27 | End: 2019-03-09

## 2019-02-27 RX ORDER — GUAIFENESIN AND PSEUDOEPHEDRINE HCL 1200; 120 MG/1; MG/1
1 TABLET, EXTENDED RELEASE ORAL 2 TIMES DAILY
Qty: 20 TABLET | Refills: 0 | Status: SHIPPED | OUTPATIENT
Start: 2019-02-27 | End: 2019-06-24 | Stop reason: ALTCHOICE

## 2019-02-27 RX ORDER — AZITHROMYCIN 250 MG/1
500 TABLET, FILM COATED ORAL ONCE
Status: COMPLETED | OUTPATIENT
Start: 2019-02-27 | End: 2019-02-27

## 2019-02-27 RX ADMIN — AZITHROMYCIN 500 MG: 250 TABLET, FILM COATED ORAL at 10:20

## 2019-02-27 RX ADMIN — DEXAMETHASONE 8 MG: 4 TABLET ORAL at 10:20

## 2019-02-27 ASSESSMENT — ENCOUNTER SYMPTOMS
EYE PAIN: 0
BLOOD IN STOOL: 0
RHINORRHEA: 1
BACK PAIN: 0
COUGH: 1
TROUBLE SWALLOWING: 0
EYE DISCHARGE: 0
CONSTIPATION: 0
SHORTNESS OF BREATH: 0
CHEST TIGHTNESS: 0
CHOKING: 0
WHEEZING: 0
DIARRHEA: 0
VOICE CHANGE: 0
SINUS PAIN: 1
STRIDOR: 0
EYE REDNESS: 0
SORE THROAT: 1
ABDOMINAL PAIN: 0
SINUS PRESSURE: 1
VOMITING: 0
FACIAL SWELLING: 0

## 2019-02-27 ASSESSMENT — PAIN DESCRIPTION - DIRECTION: RADIATING_TOWARDS: PAIN WITH COUGHING

## 2019-02-27 ASSESSMENT — PAIN DESCRIPTION - ONSET: ONSET: ON-GOING

## 2019-02-27 ASSESSMENT — PAIN SCALES - GENERAL: PAINLEVEL_OUTOF10: 9

## 2019-02-27 ASSESSMENT — PAIN DESCRIPTION - LOCATION: LOCATION: CHEST;THROAT

## 2019-02-27 ASSESSMENT — PAIN DESCRIPTION - DESCRIPTORS: DESCRIPTORS: ACHING

## 2019-03-28 ENCOUNTER — OFFICE VISIT (OUTPATIENT)
Dept: GASTROENTEROLOGY | Age: 54
End: 2019-03-28
Payer: MEDICARE

## 2019-03-28 VITALS
WEIGHT: 175 LBS | SYSTOLIC BLOOD PRESSURE: 84 MMHG | BODY MASS INDEX: 25.84 KG/M2 | DIASTOLIC BLOOD PRESSURE: 59 MMHG | HEART RATE: 61 BPM

## 2019-03-28 DIAGNOSIS — R13.10 DYSPHAGIA, UNSPECIFIED TYPE: ICD-10-CM

## 2019-03-28 DIAGNOSIS — R10.13 ABDOMINAL PAIN, EPIGASTRIC: Primary | ICD-10-CM

## 2019-03-28 PROCEDURE — 1036F TOBACCO NON-USER: CPT | Performed by: INTERNAL MEDICINE

## 2019-03-28 PROCEDURE — 3017F COLORECTAL CA SCREEN DOC REV: CPT | Performed by: INTERNAL MEDICINE

## 2019-03-28 PROCEDURE — G8427 DOCREV CUR MEDS BY ELIG CLIN: HCPCS | Performed by: INTERNAL MEDICINE

## 2019-03-28 PROCEDURE — G8484 FLU IMMUNIZE NO ADMIN: HCPCS | Performed by: INTERNAL MEDICINE

## 2019-03-28 PROCEDURE — G8419 CALC BMI OUT NRM PARAM NOF/U: HCPCS | Performed by: INTERNAL MEDICINE

## 2019-03-28 PROCEDURE — 99212 OFFICE O/P EST SF 10 MIN: CPT | Performed by: INTERNAL MEDICINE

## 2019-03-28 RX ORDER — METOCLOPRAMIDE 5 MG/1
5 TABLET ORAL 4 TIMES DAILY
Qty: 120 TABLET | Refills: 3 | Status: SHIPPED | OUTPATIENT
Start: 2019-03-28 | End: 2020-08-24 | Stop reason: ALTCHOICE

## 2019-04-03 ENCOUNTER — APPOINTMENT (OUTPATIENT)
Dept: CT IMAGING | Age: 54
End: 2019-04-03
Payer: MEDICARE

## 2019-04-03 ENCOUNTER — HOSPITAL ENCOUNTER (EMERGENCY)
Age: 54
Discharge: HOME | End: 2019-04-03
Attending: EMERGENCY MEDICINE
Payer: MEDICARE

## 2019-04-03 VITALS
HEIGHT: 68 IN | RESPIRATION RATE: 18 BRPM | HEART RATE: 98 BPM | OXYGEN SATURATION: 98 % | DIASTOLIC BLOOD PRESSURE: 60 MMHG | TEMPERATURE: 98.2 F | SYSTOLIC BLOOD PRESSURE: 130 MMHG | WEIGHT: 160 LBS | BODY MASS INDEX: 24.25 KG/M2

## 2019-04-03 DIAGNOSIS — G89.29 CHRONIC LEFT-SIDED LOW BACK PAIN WITHOUT SCIATICA: Primary | ICD-10-CM

## 2019-04-03 DIAGNOSIS — M54.50 CHRONIC LEFT-SIDED LOW BACK PAIN WITHOUT SCIATICA: Primary | ICD-10-CM

## 2019-04-03 DIAGNOSIS — R31.9 HEMATURIA, UNSPECIFIED TYPE: ICD-10-CM

## 2019-04-03 LAB
AMORPHOUS: ABNORMAL
BACTERIA: ABNORMAL /HPF
BILIRUBIN URINE: NEGATIVE
BLOOD, URINE: ABNORMAL
CLARITY: ABNORMAL
COLOR: YELLOW
EPITHELIAL CELLS, UA: ABNORMAL /HPF
GLUCOSE URINE: NEGATIVE MG/DL
KETONES, URINE: NEGATIVE MG/DL
LEUKOCYTE ESTERASE, URINE: NEGATIVE
NITRITE, URINE: NEGATIVE
PH UA: 5 (ref 5–9)
PROTEIN UA: 100 MG/DL
RBC UA: ABNORMAL /HPF (ref 0–2)
SPECIFIC GRAVITY UA: >=1.03 (ref 1–1.03)
URINE REFLEX TO CULTURE: YES
URINE TYPE: ABNORMAL
UROBILINOGEN, URINE: 0.2 E.U./DL
WBC UA: ABNORMAL /HPF (ref 0–5)

## 2019-04-03 PROCEDURE — 74176 CT ABD & PELVIS W/O CONTRAST: CPT

## 2019-04-03 PROCEDURE — 81001 URINALYSIS AUTO W/SCOPE: CPT

## 2019-04-03 PROCEDURE — 87086 URINE CULTURE/COLONY COUNT: CPT

## 2019-04-03 PROCEDURE — 6370000000 HC RX 637 (ALT 250 FOR IP): Performed by: EMERGENCY MEDICINE

## 2019-04-03 PROCEDURE — 99284 EMERGENCY DEPT VISIT MOD MDM: CPT

## 2019-04-03 RX ORDER — ONDANSETRON 4 MG/1
4 TABLET, ORALLY DISINTEGRATING ORAL EVERY 8 HOURS PRN
Qty: 10 TABLET | Refills: 0 | Status: ON HOLD | OUTPATIENT
Start: 2019-04-03 | End: 2019-11-26

## 2019-04-03 RX ORDER — HYDROCODONE BITARTRATE AND ACETAMINOPHEN 5; 325 MG/1; MG/1
1 TABLET ORAL EVERY 6 HOURS PRN
Qty: 10 TABLET | Refills: 0 | Status: SHIPPED | OUTPATIENT
Start: 2019-04-03 | End: 2019-04-06

## 2019-04-03 RX ORDER — HYDROCODONE BITARTRATE AND ACETAMINOPHEN 5; 325 MG/1; MG/1
1 TABLET ORAL ONCE
Status: COMPLETED | OUTPATIENT
Start: 2019-04-03 | End: 2019-04-03

## 2019-04-03 RX ORDER — ONDANSETRON 4 MG/1
4 TABLET, ORALLY DISINTEGRATING ORAL ONCE
Status: COMPLETED | OUTPATIENT
Start: 2019-04-03 | End: 2019-04-03

## 2019-04-03 RX ADMIN — ONDANSETRON 4 MG: 4 TABLET, ORALLY DISINTEGRATING ORAL at 09:40

## 2019-04-03 RX ADMIN — HYDROCODONE BITARTRATE AND ACETAMINOPHEN 1 TABLET: 5; 325 TABLET ORAL at 09:40

## 2019-04-03 ASSESSMENT — PAIN DESCRIPTION - DESCRIPTORS: DESCRIPTORS: ACHING

## 2019-04-03 ASSESSMENT — PAIN DESCRIPTION - LOCATION: LOCATION: BACK;FLANK

## 2019-04-03 ASSESSMENT — PAIN SCALES - GENERAL: PAINLEVEL_OUTOF10: 10

## 2019-04-03 ASSESSMENT — ENCOUNTER SYMPTOMS
BACK PAIN: 1
NAUSEA: 1

## 2019-04-03 NOTE — ED PROVIDER NOTES
[facility] ED  eMERGENCY dEPARTMENT eNCOUnter      Pt Name: Chyna Wong  MRN: [id number]  [place] 1965  Date of evaluation: 4/3/2019  Provider: Cheryle Arm, MD    [address] [place] [address]       Chief Complaint   Patient presents with    Back Pain    Flank Pain       HISTORY OF PRESENT ILLNESS   (Location/Symptom, Timing/Onset,Context/Setting, Quality, Duration, Modifying Factors, Severity)  Note limiting factors. Chyna Wong is a 48 y.o. female who presents to the emergency department patient is known to me from previous multiple encounter for various complaints including migraine headache back pain history of SLE asthma panic disorder  Raynaud  Phenomena, patient recently seen GI doctors as well as seen a primary care doctor's 1 week ago and found to have blood in the urine patient has no history of kidney stone undergone various CAT scans and with a nonobstructive that lives in bilateral kidneys has increasing pain for the last few days slightly more to the left chronic nausea no document a fever no UTI symptoms here for further evaluation patient not take any blood thinners,     HPI    NursingNotes were reviewed. REVIEW OF SYSTEMS    (2-9 systems for level 4, 10 or more for level 5)     Review of Systems   Gastrointestinal: Positive for nausea. Genitourinary: Positive for flank pain. Musculoskeletal: Positive for back pain. Except as noted above the remainder of the review of systems was reviewed and negative.        PAST MEDICAL HISTORY     Past Medical History:   Diagnosis Date    Anxiety     Asthma     as child    Depression     Depression     dysthymia - following lupus dx age 23    [facility] mellitus ([facility] [address]     Foot fracture, left     GERD (gastroesophageal reflux disease)     Hyperlipidemia     Hypothyroidism     Kidney stones     Lupus     dr Vera [facility] -     Migraine     PONV (postoperative nausea and vomiting)          SURGICALHISTORY       Past Surgical History: Procedure Laterality Date    APPENDECTOMY       SECTION      CHOLECYSTECTOMY      COLONOSCOPY      FOOT CLOSED REDUCTION Left 1/3/2017    LEFT FOOT PERCUTANEOUS PINNING Alton Sables FRACTURE performed by Flores Gloria MD at [address] [facility] ESOPHAGOGASTRODUODENOSCOPY TRANSORAL DIAGNOSTIC N/A 3/8/2017    EGD ESOPHAGOGASTRODUODENOSCOPY WITH DILATION performed by Ghada Rosales MD at [address] ESOPHAGOGASTRODUODENOSCOPY TRANSORAL DIAGNOSTIC N/A 2018    EGD ESOPHAGOGASTRODUODENOSCOPY WITH DILATION performed by Ghada Rosales MD at [address] [place] [address]       Previous Medications    ALBUTEROL SULFATE HFA (VENTOLIN HFA) 108 (90 BASE) MCG/ACT INHALER    Inhale 2 puffs into the lungs every 6 hours as needed for Wheezing    BLOOD GLUCOSE MONITORING SUPPL (TRUE TRACK BLOOD GLUCOSE) CAROLE    Check blood sugars twice daily or as directed. DICYCLOMINE (BENTYL) 20 MG TABLET    Take 20 mg by mouth    [employer] UNILET LANCETS 30G MISC    Check blood sugars twice daily or as directed. GLUCOSE BLOOD VI TEST STRIPS (ASCENSIA AUTODISC VI;ONE TOUCH ULTRA TEST VI) STRIP    Check blood sugars twice daily or as directed.     HYDROXYCHLOROQUINE (PLAQUENIL) 200 MG TABLET    Take 1 tablet by mouth 2 times daily    LEVOTHYROXINE (SYNTHROID) 50 MCG TABLET    Take 1 tablet by mouth daily    METFORMIN (GLUCOPHAGE) 1000 MG TABLET    Take 1,000 mg by mouth 2 times daily (with meals)    METOCLOPRAMIDE (REGLAN) 10 MG TABLET    Take 10 mg by mouth    METOCLOPRAMIDE (REGLAN) 5 MG TABLET    Take 1 tablet by mouth 4 times daily 1/2 hour before meal and at bedtime    NARATRIPTAN (AMERGE) 2.5 MG TABLET    Take 2.5 mg by mouth once as needed for Migraine 2.5 mg at onset of headache, may repeat in 4 hours if needed    PANTOPRAZOLE (PROTONIX) 40 MG TABLET    Take 1 tablet by mouth daily    PREGABALIN (LYRICA) 75 MG CAPSULE    Take 75 mg by mouth daily. Neftali Suárez PSEUDOEPHEDRINE-GUAIFENESIN (MUCINEX D MAX STRENGTH) 120-1200 MG TB12    Take 1 tablet by mouth 2 times daily    ROSUVASTATIN (CRESTOR) 40 MG TABLET    Take 1 tablet by mouth daily       ALLERGIES     Aspirin; Imitrex [sumatriptan]; Pcn [penicillins];  Seasonal; Toradol [ketorolac tromethamine]; and Ultram [tramadol]    FAMILY HISTORY       Family History   Adopted: Yes          SOCIAL HISTORY       Social History     Socioeconomic History    Marital status: Legally      Spouse name: None    Number of children: None    Years of education: None    Highest education level: None   Occupational History    None   Social Needs    Financial resource strain: None    Food insecurity:     Worry: None     Inability: None    Transportation needs:     Medical: None     Non-medical: None   Tobacco Use    Smoking status: Never Smoker    Smokeless tobacco: Never Used   Substance and Sexual Activity    Alcohol use: No     Alcohol/week: 0.0 oz     Comment: denies    Drug use: No     Comment: denies    Sexual activity: None   Lifestyle    Physical activity:     Days per week: None     Minutes per session: None    Stress: None   Relationships    Social connections:     Talks on phone: None     Gets together: None     Attends [employer] service: None     Active member of club or organization: None     Attends meetings of clubs or organizations: None     Relationship status: None    Intimate partner violence:     Fear of current or ex partner: None     Emotionally abused: None     Physically abused: None     Forced sexual activity: None   Other Topics Concern    None   Social History Narrative    None       SCREENINGS      @FLOW([id number])@      PHYSICAL EXAM    (up to 7 for level 4, 8 or more for level 5)     ED Triage Vitals   BP Temp Temp Source Pulse Resp SpO2 Height Weight   04/03/19 0923 04/03/19 0920 04/03/19 0920 04/03/19 0920 04/03/19 0920 04/03/19 0920 04/03/19 0920 04/03/19 0920   130/60 98.2 °F (36.8 °C) Oral 118 18 98 % 5' 8\" (1.727 m) 160 lb (72.6 kg)       Physical Exam   Constitutional: She is oriented to person, place, and time. She appears well-developed and well-nourished. Alert cooperative patient evidence of dehydration, antinausea at this time otherwise cooperative patient evidence of dehydration    Non toxic appearance   HENT:   Head: Normocephalic and atraumatic. Right Ear: External ear normal.   Left Ear: External ear normal.   Nose: Nose normal.   Mouth/Throat: Oropharynx is clear and moist.   Eyes: Pupils are equal, round, and reactive to light. Conjunctivae are normal.   Neck: Normal range of motion. Neck supple. Cardiovascular: Normal rate and normal heart sounds. Exam reveals no gallop. No murmur heard. Pulmonary/Chest: Breath sounds normal. No respiratory distress. She has no wheezes. Abdominal: Soft. Bowel sounds are normal. She exhibits no mass. There is no rebound. Musculoskeletal: Normal range of motion. She exhibits tenderness. She exhibits no edema. Attention given to the back examined in supine and sitting position patient points to the left paralumbar area/CVA tenderness patient has no bruises no point tenderness straight-leg raising is negative   Neurological: She is alert and oriented to person, place, and time. No cranial nerve deficit. She exhibits normal muscle tone. Skin: Skin is warm. No rash noted. No erythema. Psychiatric: Her behavior is normal. Thought content normal.   Vitals reviewed.       DIAGNOSTIC RESULTS     EKG: All EKG's are interpreted by the Emergency Department Physician who either signs or Co-signsthis chart in the absence of a cardiologist.        RADIOLOGY:   Non-plain filmimages such as CT, Ultrasound and MRI are read by the radiologist. Plain radiographic images are visualized and preliminarily interpreted by the emergency physician with the below findings:        Interpretation per the Radiologist below, if available at the time ofthis note:    CT ABDOMEN PELVIS WO CONTRAST Additional Contrast? None   Final Result   No CT evidence of acute abdominal pathology on a non-contrasted examination. Bilateral nonobstructing nephroliths are seen. All CT scans at this facility use dose modulation, iterative reconstruction, and/or weight based dosing when appropriate to reduce radiation dose to as low as reasonably achievable. ED BEDSIDE ULTRASOUND:   Performed by ED Physician - none    LABS:  Labs Reviewed   URINE RT REFLEX TO CULTURE - Abnormal; Notable for the following components:       Result Value    Clarity, UA SL CLOUDY (*)     Blood, Urine MODERATE (*)     Protein,  (*)     All other components within normal limits   MICROSCOPIC URINALYSIS - Abnormal; Notable for the following components:    RBC, UA 3-5 (*)     All other components within normal limits   URINE CULTURE       All other labs were within normal range or not returned as of this dictation. EMERGENCY DEPARTMENT COURSE and DIFFERENTIAL DIAGNOSIS/MDM:   Vitals:    Vitals:    04/03/19 0920 04/03/19 0923 04/03/19 1035   BP:  130/60    Pulse: 118  98   Resp: 18     Temp: 98.2 °F (36.8 °C)     TempSrc: Oral     SpO2: 98%  98%   Weight: 160 lb (72.6 kg)     Height: 5' 8\" (1.727 m)             MDM  Number of Diagnoses or Management Options  Chronic left-sided low back pain without sciatica: established and improving  Hematuria, unspecified type: established and improving  Diagnosis management comments: Patient with microscopic hematuria will be referred to urology chronic back pain patient has an infected came back perfect last 3 CAT scans were normal nonobstructive stones      CRITICAL CARE TIME   Total Critical Care time was  minutes, excluding separately reportableprocedures.   There was a high probability of clinicallysignificant/life threatening deterioration in the

## 2019-04-03 NOTE — ED TRIAGE NOTES
Patient complains of back pain and flank pain for the past 4 days. She was sent here by PCP they found RBC in urine yesterday.

## 2019-04-04 LAB — URINE CULTURE, ROUTINE: NORMAL

## 2019-04-08 ENCOUNTER — OFFICE VISIT (OUTPATIENT)
Dept: UROLOGY | Age: 54
End: 2019-04-08
Payer: MEDICARE

## 2019-04-08 VITALS
DIASTOLIC BLOOD PRESSURE: 82 MMHG | SYSTOLIC BLOOD PRESSURE: 126 MMHG | BODY MASS INDEX: 25.31 KG/M2 | WEIGHT: 167 LBS | HEIGHT: 68 IN | HEART RATE: 118 BPM

## 2019-04-08 DIAGNOSIS — R31.9 HEMATURIA, UNSPECIFIED TYPE: Primary | ICD-10-CM

## 2019-04-08 LAB
BILIRUBIN, POC: ABNORMAL
BLOOD URINE, POC: ABNORMAL
CLARITY, POC: CLEAR
COLOR, POC: YELLOW
GLUCOSE URINE, POC: ABNORMAL
KETONES, POC: ABNORMAL
LEUKOCYTE EST, POC: ABNORMAL
NITRITE, POC: ABNORMAL
PH, POC: 6.5
PROTEIN, POC: ABNORMAL
SPECIFIC GRAVITY, POC: 1.02
UROBILINOGEN, POC: 0.2

## 2019-04-08 PROCEDURE — G8427 DOCREV CUR MEDS BY ELIG CLIN: HCPCS | Performed by: UROLOGY

## 2019-04-08 PROCEDURE — 99213 OFFICE O/P EST LOW 20 MIN: CPT | Performed by: UROLOGY

## 2019-04-08 PROCEDURE — 1036F TOBACCO NON-USER: CPT | Performed by: UROLOGY

## 2019-04-08 PROCEDURE — 81003 URINALYSIS AUTO W/O SCOPE: CPT | Performed by: UROLOGY

## 2019-04-08 PROCEDURE — 3017F COLORECTAL CA SCREEN DOC REV: CPT | Performed by: UROLOGY

## 2019-04-08 PROCEDURE — G8419 CALC BMI OUT NRM PARAM NOF/U: HCPCS | Performed by: UROLOGY

## 2019-04-08 NOTE — PROGRESS NOTES
[facility] EVALUATION NOTE                                                 H&P                                                                                                                                                 Reason for Visit   Back pain, nephrolithiasis    History of Present Illness  82-year-old female no previous history of smoking noted to have nephrocalcinosis involving the right kidney  Patient also has microhematuria  Frequency of urination  Nocturia of 2 times per night  No history of UTIs  1 vaginal delivery  Hysterectomy after that delivery 27 years ago          Urologic Review of Systems/Symptoms  Denies hematuria  Denies dysuria  Denies incontinence  Denies flank pain  Other Urologic: Denies incontinence    Review of Systems  Head and neck: No issues/reviewed  Cardiac: No recent issues/reviewed  Pulmonary: No issues/reviewed  Gastrointestinal: No issues/reviewed  Neurologic: No recent issues/reviewed  Extremities: No issues/reviewed  Lymphatics: No lymphadenopathy no change  Genitourinary: See above  Skin: No issues/reviewed  Hospitalization: None recent  Meds reviewed  All 14 categories of Review of Systems otherwise reviewed no other findings reported.     Past Medical History:   Diagnosis Date    Anxiety     Asthma     as child    Depression     Depression     dysthymia - following lupus dx age 23    Diabetes mellitus ([facility] [address]     Foot fracture, left     GERD (gastroesophageal reflux disease)     Hyperlipidemia     Hypothyroidism     Kidney stones     Lupus     dr [facility] -     Migraine     PONV (postoperative nausea and vomiting)      Past Surgical History:   Procedure Laterality Date    APPENDECTOMY       SECTION      CHOLECYSTECTOMY      COLONOSCOPY      FOOT CLOSED REDUCTION Left 1/3/2017    LEFT FOOT PERCUTANEOUS PINNING Nickolas Dowse FRACTURE performed by Esme Reyes MD at [facility] ESOPHAGOGASTRODUODENOSCOPY TRANSORAL DIAGNOSTIC N/A 3/8/2017    EGD ESOPHAGOGASTRODUODENOSCOPY WITH DILATION performed by Rosita Bullock MD at [address] ESOPHAGOGASTRODUODENOSCOPY TRANSORAL DIAGNOSTIC N/A 11/7/2018    EGD ESOPHAGOGASTRODUODENOSCOPY WITH DILATION performed by Rosita Bullock MD at [facility] ENDOSCOPY       Social History     Socioeconomic History    Marital status: Legally      Spouse name: None    Number of children: None    Years of education: None    Highest education level: None   Occupational History    None   Social Needs    Financial resource strain: None    Food insecurity:     Worry: None     Inability: None    Transportation needs:     Medical: None     Non-medical: None   Tobacco Use    Smoking status: Never Smoker    Smokeless tobacco: Never Used   Substance and Sexual Activity    Alcohol use: No     Alcohol/week: 0.0 oz     Comment: denies    Drug use: No     Comment: denies    Sexual activity: None   Lifestyle    Physical activity:     Days per week: None     Minutes per session: None    Stress: None   Relationships    Social connections:     Talks on phone: None     Gets together: None     Attends Hoahaoism service: None     Active member of club or organization: None     Attends meetings of clubs or organizations: None     Relationship status: None    Intimate partner violence:     Fear of current or ex partner: None     Emotionally abused: None     Physically abused: None     Forced sexual activity: None   Other Topics Concern    None   Social History Narrative    None     Family History   Adopted: Yes     Current Outpatient Medications   Medication Sig Dispense Refill    ondansetron (ZOFRAN ODT) 4 MG disintegrating tablet Take 1 tablet by mouth every 8 hours as needed for Nausea 10 tablet 0    metoclopramide (REGLAN) 5 MG tablet Take 1 tablet by mouth 4 times daily 1/2 hour before meal and at bedtime 120 tablet 3    Pseudoephedrine-guaiFENesin (MUCINEX D MAX STRENGTH) 120-1200 MG TB12 Take 1 tablet by mouth 2 times daily 20 tablet 0    metFORMIN (GLUCOPHAGE) 1000 MG tablet Take 1,000 mg by mouth 2 times daily (with meals)      dicyclomine (BENTYL) 20 MG tablet Take 20 mg by mouth      metoclopramide (REGLAN) 10 MG tablet Take 10 mg by mouth      albuterol sulfate HFA (VENTOLIN HFA) 108 (90 Base) MCG/ACT inhaler Inhale 2 puffs into the lungs every 6 hours as needed for Wheezing 1 Inhaler 0    pregabalin (LYRICA) 75 MG capsule Take 75 mg by mouth daily. Patience Monas naratriptan (AMERGE) 2.5 MG tablet Take 2.5 mg by mouth once as needed for Migraine 2.5 mg at onset of headache, may repeat in 4 hours if needed      rosuvastatin (CRESTOR) 40 MG tablet Take 1 tablet by mouth daily 90 tablet 3    pantoprazole (PROTONIX) 40 MG tablet Take 1 tablet by mouth daily 90 tablet 3    levothyroxine (SYNTHROID) 50 MCG tablet Take 1 tablet by mouth daily 90 tablet 3    Blood Glucose Monitoring Suppl (TRUE TRACK BLOOD GLUCOSE) CAROLE Check blood sugars twice daily or as directed. 100 Device 5    DRUG MART UNILET LANCETS 30G MISC Check blood sugars twice daily or as directed. 100 each 5    glucose blood VI test strips (ASCENSIA AUTODISC VI;ONE TOUCH ULTRA TEST VI) strip Check blood sugars twice daily or as directed. 200 strip 6    hydroxychloroquine (PLAQUENIL) 200 MG tablet Take 1 tablet by mouth 2 times daily  5     No current facility-administered medications for this visit. Aspirin; Imitrex [sumatriptan]; Pcn [penicillins];  Seasonal; Toradol [ketorolac tromethamine]; and Ultram [tramadol]  All reviewed and verified by Dr Xiomara Henry on today's visit    No results found for: PSA, PSADIA  Results for POC orders placed in visit on 04/08/19   POCT Urinalysis No Micro (Auto)   Result Value Ref Range    Color, UA yellow     Clarity, UA clear     Glucose, UA  mg     Bilirubin, UA neg Ketones, UA neg     Spec Grav, UA 1.020     Blood, UA POC small     pH, UA 6.5     Protein, UA  mg     Urobilinogen, UA 0.2     Leukocytes, UA neg     Nitrite, UA neg        Physical Exam  Vitals:    04/08/19 0809   BP: 126/82   Pulse: 118   Weight: 167 lb (75.8 kg)   Height: 5' 8\" (1.727 m)     Constitutional: patient is oriented to person, place, and time. patient appears well-developed. not in distress. Ears: Adequate hearing/no hearing loss  Head: Normocephalic. Atraumatic  Neck: Normal range of motion. Cardiovascular: Normal rate, BP reviewed. normal rhythm  Pulmonary/Chest: Normal respiratory effort  no wheezing  Abdominal: Not distended. No suprapubic discomfort  Urologic Exam  CT reviewed. Mild scoliosis noted on CT  Urinalysis shows blood and sugar. Vaginal exam deferred until time of cystoscopy . Musculoskeletal: Normal range of motion. Ambulatory. Severe spinous muscles tenderness indicative of muscle spasms of the back mild scoliosis noted on CT  Extremities: No edema   Neurological: Intact   Skin: Skin is warm and dry. No lesions. No rashes   Psychiatric:  Normal affect. Assessment/Medical Necessity-Decision Making  Microhematuria  Frequency urgency  Nephrocalcinosis  Plan  IVP  Operative cystoscopy  Greater than 50% of 30 minutes spent consulting patient face-to-face  Orders Placed This Encounter   Procedures    FL IVP     Standing Status:   Future     Standing Expiration Date:   4/8/2020     Order Specific Question:   Reason for exam:     Answer:   hematuria    POCT Urinalysis No Micro (Auto)     No orders of the defined types were placed in this encounter. Denman Councilman, MD       Please note this report has been partially produced using speech recognition software  And may cause contain errors related to that system including grammar, punctuation and spelling as well as words and phrases that may seem inappropriate.  If there are questions or concerns please feel free to contact me to clarify.

## 2019-04-11 ENCOUNTER — HOSPITAL ENCOUNTER (OUTPATIENT)
Dept: GENERAL RADIOLOGY | Age: 54
Discharge: HOME | End: 2019-04-13
Payer: MEDICARE

## 2019-04-11 DIAGNOSIS — R31.9 HEMATURIA, UNSPECIFIED TYPE: ICD-10-CM

## 2019-04-11 PROCEDURE — 74400 UROGRAPHY IV +-KUB TOMOG: CPT

## 2019-04-11 PROCEDURE — 6360000004 HC RX CONTRAST MEDICATION: Performed by: RADIOLOGY

## 2019-04-11 RX ADMIN — IOVERSOL 100 ML: 678 INJECTION INTRA-ARTERIAL; INTRAVENOUS at 10:35

## 2019-04-12 ENCOUNTER — PROCEDURE VISIT (OUTPATIENT)
Dept: UROLOGY | Age: 54
End: 2019-04-12
Payer: MEDICARE

## 2019-04-12 VITALS
HEART RATE: 127 BPM | SYSTOLIC BLOOD PRESSURE: 114 MMHG | WEIGHT: 165 LBS | DIASTOLIC BLOOD PRESSURE: 80 MMHG | HEIGHT: 68 IN | BODY MASS INDEX: 25.01 KG/M2

## 2019-04-12 DIAGNOSIS — R31.9 HEMATURIA, UNSPECIFIED TYPE: Primary | ICD-10-CM

## 2019-04-12 LAB
BILIRUBIN, POC: ABNORMAL
BLOOD URINE, POC: ABNORMAL
CLARITY, POC: CLEAR
COLOR, POC: YELLOW
GLUCOSE URINE, POC: ABNORMAL
KETONES, POC: ABNORMAL
LEUKOCYTE EST, POC: ABNORMAL
NITRITE, POC: ABNORMAL
PH, POC: 5
PROTEIN, POC: ABNORMAL
SPECIFIC GRAVITY, POC: >=1.03
UROBILINOGEN, POC: 0.2

## 2019-04-12 PROCEDURE — 81003 URINALYSIS AUTO W/O SCOPE: CPT | Performed by: UROLOGY

## 2019-04-12 PROCEDURE — 52000 CYSTOURETHROSCOPY: CPT | Performed by: UROLOGY

## 2019-04-12 RX ORDER — NITROFURANTOIN 25; 75 MG/1; MG/1
100 CAPSULE ORAL ONCE
Qty: 1 CAPSULE | Refills: 0 | COMMUNITY
Start: 2019-04-12 | End: 2019-04-12

## 2019-05-14 ENCOUNTER — APPOINTMENT (OUTPATIENT)
Dept: GENERAL RADIOLOGY | Age: 54
End: 2019-05-14
Payer: MEDICARE

## 2019-05-14 ENCOUNTER — HOSPITAL ENCOUNTER (EMERGENCY)
Age: 54
Discharge: HOME | End: 2019-05-14
Payer: MEDICARE

## 2019-05-14 VITALS
SYSTOLIC BLOOD PRESSURE: 144 MMHG | OXYGEN SATURATION: 100 % | WEIGHT: 160 LBS | DIASTOLIC BLOOD PRESSURE: 95 MMHG | BODY MASS INDEX: 27.31 KG/M2 | HEIGHT: 64 IN | HEART RATE: 108 BPM | TEMPERATURE: 98.8 F | RESPIRATION RATE: 20 BRPM

## 2019-05-14 DIAGNOSIS — M25.511 ACUTE PAIN OF RIGHT SHOULDER: Primary | ICD-10-CM

## 2019-05-14 PROCEDURE — 6370000000 HC RX 637 (ALT 250 FOR IP): Performed by: PHYSICIAN ASSISTANT

## 2019-05-14 PROCEDURE — 73030 X-RAY EXAM OF SHOULDER: CPT

## 2019-05-14 PROCEDURE — 6360000002 HC RX W HCPCS: Performed by: PHYSICIAN ASSISTANT

## 2019-05-14 PROCEDURE — 96372 THER/PROPH/DIAG INJ SC/IM: CPT

## 2019-05-14 PROCEDURE — 71046 X-RAY EXAM CHEST 2 VIEWS: CPT

## 2019-05-14 PROCEDURE — 99283 EMERGENCY DEPT VISIT LOW MDM: CPT

## 2019-05-14 RX ORDER — ORPHENADRINE CITRATE 30 MG/ML
60 INJECTION INTRAMUSCULAR; INTRAVENOUS ONCE
Status: COMPLETED | OUTPATIENT
Start: 2019-05-14 | End: 2019-05-14

## 2019-05-14 RX ORDER — FENTANYL CITRATE 50 UG/ML
50 INJECTION, SOLUTION INTRAMUSCULAR; INTRAVENOUS ONCE
Status: COMPLETED | OUTPATIENT
Start: 2019-05-14 | End: 2019-05-14

## 2019-05-14 RX ORDER — TIZANIDINE 4 MG/1
4 TABLET ORAL 3 TIMES DAILY PRN
Qty: 15 TABLET | Refills: 0 | Status: SHIPPED | OUTPATIENT
Start: 2019-05-14 | End: 2019-11-25

## 2019-05-14 RX ORDER — METHYLPREDNISOLONE ACETATE 80 MG/ML
80 INJECTION, SUSPENSION INTRA-ARTICULAR; INTRALESIONAL; INTRAMUSCULAR; SOFT TISSUE ONCE
Status: COMPLETED | OUTPATIENT
Start: 2019-05-14 | End: 2019-05-14

## 2019-05-14 RX ORDER — HYDROCODONE BITARTRATE AND ACETAMINOPHEN 5; 325 MG/1; MG/1
1 TABLET ORAL EVERY 8 HOURS PRN
Qty: 9 TABLET | Refills: 0 | Status: SHIPPED | OUTPATIENT
Start: 2019-05-14 | End: 2019-05-17

## 2019-05-14 RX ORDER — ONDANSETRON 4 MG/1
4 TABLET, ORALLY DISINTEGRATING ORAL ONCE
Status: COMPLETED | OUTPATIENT
Start: 2019-05-14 | End: 2019-05-14

## 2019-05-14 RX ADMIN — METHYLPREDNISOLONE ACETATE 80 MG: 80 INJECTION, SUSPENSION INTRA-ARTICULAR; INTRALESIONAL; INTRAMUSCULAR; SOFT TISSUE at 16:59

## 2019-05-14 RX ADMIN — ONDANSETRON 4 MG: 4 TABLET, ORALLY DISINTEGRATING ORAL at 16:59

## 2019-05-14 RX ADMIN — ORPHENADRINE CITRATE 60 MG: 30 INJECTION INTRAMUSCULAR; INTRAVENOUS at 16:58

## 2019-05-14 RX ADMIN — FENTANYL CITRATE 50 MCG: 50 INJECTION, SOLUTION INTRAMUSCULAR; INTRAVENOUS at 16:58

## 2019-05-14 ASSESSMENT — PAIN SCALES - GENERAL
PAINLEVEL_OUTOF10: 10
PAINLEVEL_OUTOF10: 10

## 2019-05-14 ASSESSMENT — PAIN DESCRIPTION - LOCATION: LOCATION: SHOULDER

## 2019-05-14 ASSESSMENT — ENCOUNTER SYMPTOMS
BACK PAIN: 1
TROUBLE SWALLOWING: 0
ABDOMINAL PAIN: 0
COLOR CHANGE: 0
SHORTNESS OF BREATH: 0
ALLERGIC/IMMUNOLOGIC NEGATIVE: 1
APNEA: 0
EYE PAIN: 0

## 2019-05-14 ASSESSMENT — PAIN DESCRIPTION - PROGRESSION: CLINICAL_PROGRESSION: GRADUALLY WORSENING

## 2019-05-14 ASSESSMENT — PAIN DESCRIPTION - PAIN TYPE: TYPE: ACUTE PAIN

## 2019-05-14 ASSESSMENT — PAIN DESCRIPTION - ONSET: ONSET: ON-GOING

## 2019-05-14 ASSESSMENT — PAIN DESCRIPTION - FREQUENCY: FREQUENCY: INTERMITTENT

## 2019-05-14 ASSESSMENT — PAIN DESCRIPTION - DESCRIPTORS: DESCRIPTORS: ACHING

## 2019-05-14 NOTE — ED PROVIDER NOTES
[address] [facility] ED  eMERGENCYdEPARTMENT eNCOUnter      Pt Name: Mika Jordan  MRN: [id number]  [place] 1965  Date of evaluation: 5/14/2019  Provider:Dada Payne PA-C    CHIEF COMPLAINT       Chief Complaint   Patient presents with    Shoulder Pain     right shoulder with back pain x1 week. HISTORY OF PRESENT ILLNESS  (Location/Symptom, Timing/Onset, Context/Setting, Quality, Duration, Modifying Factors, Severity.)   Suzanne Kim is a 48 y.o. female who presents to the emergency department with complaints of right shoulder pain ongoing for the past one week. Patient states that the pain is primarily through the trapezius region of the posterior shoulder and anterior pain near the clavicle. Patient denies any falls or blunt trauma. Patient states that the pain began mildly and is progressively worsened over the past week. Patient also states that when the pain gets bad she developed some tingling sensation like her arm is asleep. Patient denies any slurred speech, facial droop or lower extremity symptoms. Patient denies any decreased strength in the hand. Patient denies any shortness of breath. Pain increases with range of motion of the arm. Patient denies any neck pain, headaches or dizziness. HPI    Nursing Notes were reviewed and I agree. REVIEW OF SYSTEMS    (2-9 systems for level 4, 10 or more for level 5)     Review of Systems   Constitutional: Negative for diaphoresis and fever. HENT: Negative for hearing loss and trouble swallowing. Eyes: Negative for pain. Respiratory: Negative for apnea and shortness of breath. Cardiovascular: Negative for chest pain. Gastrointestinal: Negative for abdominal pain. Endocrine: Negative. Genitourinary: Negative for hematuria. Musculoskeletal: Positive for back pain. Negative for neck pain and neck stiffness. Skin: Negative for color change. Allergic/Immunologic: Negative.     Neurological: Negative for TEST VI) STRIP    Check blood sugars twice daily or as directed. HYDROXYCHLOROQUINE (PLAQUENIL) 200 MG TABLET    Take 1 tablet by mouth 2 times daily    LEVOTHYROXINE (SYNTHROID) 50 MCG TABLET    Take 1 tablet by mouth daily    METFORMIN (GLUCOPHAGE) 1000 MG TABLET    Take 1,000 mg by mouth 2 times daily (with meals)    METOCLOPRAMIDE (REGLAN) 10 MG TABLET    Take 10 mg by mouth    METOCLOPRAMIDE (REGLAN) 5 MG TABLET    Take 1 tablet by mouth 4 times daily 1/2 hour before meal and at bedtime    NARATRIPTAN (AMERGE) 2.5 MG TABLET    Take 2.5 mg by mouth once as needed for Migraine 2.5 mg at onset of headache, may repeat in 4 hours if needed    ONDANSETRON (ZOFRAN ODT) 4 MG DISINTEGRATING TABLET    Take 1 tablet by mouth every 8 hours as needed for Nausea    PANTOPRAZOLE (PROTONIX) 40 MG TABLET    Take 1 tablet by mouth daily    PREGABALIN (LYRICA) 75 MG CAPSULE    Take 75 mg by mouth daily. [employer] PSEUDOEPHEDRINE-GUAIFENESIN (MUCINEX D MAX STRENGTH) 120-1200 MG TB12    Take 1 tablet by mouth 2 times daily    ROSUVASTATIN (CRESTOR) 40 MG TABLET    Take 1 tablet by mouth daily       ALLERGIES     Aspirin; Imitrex [sumatriptan]; Pcn [penicillins];  Seasonal; Toradol [ketorolac tromethamine]; and Ultram [tramadol]    FAMILY HISTORY       Family History   Adopted: Yes          SOCIAL HISTORY       Social History     Socioeconomic History    Marital status: Legally      Spouse name: None    Number of children: None    Years of education: None    Highest education level: None   Occupational History    None   Social Needs    Financial resource strain: None    Food insecurity:     Worry: None     Inability: None    Transportation needs:     Medical: None     Non-medical: None   Tobacco Use    Smoking status: Never Smoker    Smokeless tobacco: Never Used   Substance and Sexual Activity    Alcohol use: No     Alcohol/week: 0.0 oz     Comment: denies    Drug use: No     Comment: denies    Sexual activity: None   Lifestyle    Physical activity:     Days per week: None     Minutes per session: None    Stress: None   Relationships    Social connections:     Talks on phone: None     Gets together: None     Attends Faith service: None     Active member of club or organization: None     Attends meetings of clubs or organizations: None     Relationship status: None    Intimate partner violence:     Fear of current or ex partner: None     Emotionally abused: None     Physically abused: None     Forced sexual activity: None   Other Topics Concern    None   Social History Narrative    None       SCREENINGS           PHYSICAL EXAM    (up to 7 forlevel 4, 8 or more for level 5)     ED Triage Vitals [05/14/19 1619]   BP Temp Temp Source Pulse Resp SpO2 Height Weight   (!) 144/95 98.8 °F (37.1 °C) Oral 108 20 100 % 5' 4\" (1.626 m) 160 lb (72.6 kg)       Physical Exam   Constitutional: She is oriented to person, place, and time. She appears well-developed and well-nourished. No distress. HENT:   Head: Normocephalic and atraumatic. Mouth/Throat: No oropharyngeal exudate. Eyes: Pupils are equal, round, and reactive to light. Conjunctivae and EOM are normal. No scleral icterus. Neck: Normal range of motion. Neck supple. No tracheal deviation present. Cardiovascular: Normal rate, normal heart sounds and intact distal pulses. Pulmonary/Chest: Effort normal and breath sounds normal. No respiratory distress. Abdominal: Soft. Bowel sounds are normal. She exhibits no distension. Musculoskeletal: Normal range of motion. Right shoulder: She exhibits tenderness, pain and spasm. Arms:  Neurological: She is alert and oriented to person, place, and time. No cranial nerve deficit. She exhibits normal muscle tone. Skin: Skin is warm and dry. No rash noted. She is not diaphoretic. No erythema. Psychiatric: She has a normal mood and affect.  Her behavior is normal. Judgment and thought content normal. Nursing note and vitals reviewed. DIAGNOSTIC RESULTS     RADIOLOGY:   Non-plain film images such as CT, Ultrasound and MRI are read by the radiologist. Plain radiographic images are visualized and preliminarilyinterpreted by Gilda Gross PA-C with the below findings:        Interpretation per the Radiologist below, if available at the time of this note:    XR SHOULDER RIGHT (MIN 2 VIEWS)    (Results Pending)   XR CHEST STANDARD (2 VW)    (Results Pending)       LABS:  Labs Reviewed - No data to display    All other labs were within normal range or not returnedas of this dictation. EMERGENCYDEPARTMENT COURSE and DIFFERENTIAL DIAGNOSIS/MDM:   Vitals:    Vitals:    05/14/19 1619   BP: (!) 144/95   Pulse: 108   Resp: 20   Temp: 98.8 °F (37.1 °C)   TempSrc: Oral   SpO2: 100%   Weight: 160 lb (72.6 kg)   Height: 5' 4\" (1.626 m)       REASSESSMENT         presented emergency department with reproducible right posterior shoulder pain. X-rays are all negative. Patient will be discharged home with referral to orthopedics for outpatient follow-up    MDM    PROCEDURES:    Procedures      FINAL IMPRESSION      1. Acute pain of right shoulder          DISPOSITION/PLAN   DISPOSITION Decision To Discharge 05/14/2019 06:12:05 PM      PATIENT REFERRED TO:  Jaun Medina DO  [address] [facility] [address] [facility] Rashi Hirsch MD  [address] [place] [address],[address]  [address] [facility]  [phone]    Call in 2 days        DISCHARGE MEDICATIONS:  New Prescriptions    HYDROCODONE-ACETAMINOPHEN (NORCO) 5-325 MG PER TABLET    Take 1 tablet by mouth every 8 hours as needed for Pain for up to 3 days. Intended supply: 3 days.  Take lowest dose possible to manage pain    TIZANIDINE (ZANAFLEX) 4 MG TABLET    Take 1 tablet by mouth 3 times daily as needed (pain/spasm)       (Please note that portions of this note were completed with a voice recognition program.  Efforts were made to edit the dictations but occasionally words are mis-transcribed.)    TRAM Gates PA-C  05/14/19 0035

## 2019-05-14 NOTE — ED TRIAGE NOTES
Patient was out to eat x1 week ago when she had right arm/shoulder pain radiating to back to left arm. Patient A&OX3. Skin pink, warm, and dry. msp intact. No distress noted. Patient complaint of tingling on/off in right arm.

## 2019-06-24 ENCOUNTER — HOSPITAL ENCOUNTER (EMERGENCY)
Age: 54
Discharge: HOME | End: 2019-06-24
Attending: EMERGENCY MEDICINE
Payer: MEDICARE

## 2019-06-24 VITALS
DIASTOLIC BLOOD PRESSURE: 79 MMHG | HEART RATE: 100 BPM | HEIGHT: 68 IN | SYSTOLIC BLOOD PRESSURE: 120 MMHG | BODY MASS INDEX: 23.95 KG/M2 | WEIGHT: 158 LBS | OXYGEN SATURATION: 97 % | TEMPERATURE: 99 F | RESPIRATION RATE: 17 BRPM

## 2019-06-24 DIAGNOSIS — G43.009 MIGRAINE WITHOUT AURA AND WITHOUT STATUS MIGRAINOSUS, NOT INTRACTABLE: Primary | ICD-10-CM

## 2019-06-24 PROCEDURE — 6360000002 HC RX W HCPCS: Performed by: EMERGENCY MEDICINE

## 2019-06-24 PROCEDURE — 99282 EMERGENCY DEPT VISIT SF MDM: CPT

## 2019-06-24 PROCEDURE — 96372 THER/PROPH/DIAG INJ SC/IM: CPT

## 2019-06-24 RX ORDER — METOCLOPRAMIDE HYDROCHLORIDE 5 MG/ML
10 INJECTION INTRAMUSCULAR; INTRAVENOUS ONCE
Status: COMPLETED | OUTPATIENT
Start: 2019-06-24 | End: 2019-06-24

## 2019-06-24 RX ORDER — DIPHENHYDRAMINE HYDROCHLORIDE 50 MG/ML
50 INJECTION INTRAMUSCULAR; INTRAVENOUS ONCE
Status: COMPLETED | OUTPATIENT
Start: 2019-06-24 | End: 2019-06-24

## 2019-06-24 RX ORDER — MORPHINE SULFATE 4 MG/ML
4 INJECTION, SOLUTION INTRAMUSCULAR; INTRAVENOUS ONCE
Status: COMPLETED | OUTPATIENT
Start: 2019-06-24 | End: 2019-06-24

## 2019-06-24 RX ADMIN — METOCLOPRAMIDE 10 MG: 5 INJECTION, SOLUTION INTRAMUSCULAR; INTRAVENOUS at 19:49

## 2019-06-24 RX ADMIN — MORPHINE SULFATE 4 MG: 4 INJECTION, SOLUTION INTRAMUSCULAR; INTRAVENOUS at 20:59

## 2019-06-24 RX ADMIN — DIPHENHYDRAMINE HYDROCHLORIDE 50 MG: 50 INJECTION INTRAMUSCULAR; INTRAVENOUS at 19:49

## 2019-06-24 ASSESSMENT — PAIN SCALES - GENERAL: PAINLEVEL_OUTOF10: 10

## 2019-06-24 ASSESSMENT — PAIN DESCRIPTION - LOCATION: LOCATION: HEAD

## 2019-06-24 ASSESSMENT — PAIN DESCRIPTION - PAIN TYPE: TYPE: ACUTE PAIN

## 2019-06-24 ASSESSMENT — PAIN DESCRIPTION - ORIENTATION: ORIENTATION: MID;POSTERIOR

## 2019-06-24 ASSESSMENT — PAIN DESCRIPTION - DESCRIPTORS: DESCRIPTORS: ACHING

## 2019-06-24 ASSESSMENT — PAIN DESCRIPTION - FREQUENCY: FREQUENCY: CONTINUOUS

## 2019-06-24 ASSESSMENT — PAIN - FUNCTIONAL ASSESSMENT: PAIN_FUNCTIONAL_ASSESSMENT: INTOLERABLE, UNABLE TO DO ANY ACTIVE OR PASSIVE ACTIVITIES

## 2019-06-24 ASSESSMENT — PAIN DESCRIPTION - ONSET: ONSET: GRADUAL

## 2019-06-24 ASSESSMENT — PAIN DESCRIPTION - PROGRESSION: CLINICAL_PROGRESSION: GRADUALLY WORSENING

## 2019-06-24 NOTE — ED PROVIDER NOTES
[address] [place] [address] ED  eMERGENCY dEPARTMENT eNCOUnter      Pt Name: James Jesus  MRN: [id number]  [place] 1965  Date of evaluation: 2019  [facility]       Chief Complaint   Patient presents with    Migraine     Since 0900 this morning. HISTORY OF PRESENT ILLNESS   (Location/Symptom, Timing/Onset,Context/Setting, Quality, Duration, Modifying Factors, Severity)  Note limiting factors. James Jesus is a 48 y.o. female who presents to the emergency department with a migraine headache. Patient states she has a long history of migraines. She hasn't had one in quite some time. Patient describes a gradual onset headache, not the worse headache of her life. It's of similar character to previous migraines. She tried taking some Tylenol earlier in the day with little relief. HPI    NursingNotes were reviewed. REVIEW OF SYSTEMS    (2-9 systems for level 4, 10 or more for level 5)   Patient reports photophobia, headache. She denies any fevers or chills. She is complaining and nausea with no vomiting. She denies any neck pain and stiffness or back pain. She denies any chest pain or shortness of breath. She denies any visual changes other than photophobia  Review of Systems    Except as noted above the remainder of the review of systems was reviewed and negative.        PAST MEDICAL HISTORY     Past Medical History:   Diagnosis Date    Anxiety     Asthma     as child    Depression     Depression     dysthymia - following lupus dx age 23    Diabetes mellitus ([address]     Foot fracture, left     GERD (gastroesophageal reflux disease)     Hyperlipidemia     Hypothyroidism     Kidney stones     Lupus ([facility] [address]     dr barrera -     Migraine     PONV (postoperative nausea and vomiting)          SURGICALHISTORY       Past Surgical History:   Procedure Laterality Date    APPENDECTOMY       SECTION      CHOLECYSTECTOMY      COLONOSCOPY      FOOT CLOSED REDUCTION Left 1/3/2017    LEFT FOOT PERCUTANEOUS PINNING MOTA FRACTURE performed by Janet Parrish MD at [facility]      ND ESOPHAGOGASTRODUODENOSCOPY TRANSORAL DIAGNOSTIC N/A 3/8/2017    EGD ESOPHAGOGASTRODUODENOSCOPY WITH DILATION performed by Wood Anthony MD at [address] ESOPHAGOGASTRODUODENOSCOPY TRANSORAL DIAGNOSTIC N/A 11/7/2018    EGD ESOPHAGOGASTRODUODENOSCOPY WITH DILATION performed by Wood Anthony MD at [address] [place] [address]       Previous Medications    ALBUTEROL SULFATE HFA (VENTOLIN HFA) 108 (90 BASE) MCG/ACT INHALER    Inhale 2 puffs into the lungs every 6 hours as needed for Wheezing    BLOOD GLUCOSE MONITORING SUPPL (TRUE TRACK BLOOD GLUCOSE) CAROLE    Check blood sugars twice daily or as directed. DICYCLOMINE (BENTYL) 20 MG TABLET    Take 20 mg by mouth    DRUG [employer]ART UNILET LANCETS 30G MISC    Check blood sugars twice daily or as directed. GLUCOSE BLOOD VI TEST STRIPS (ASCENSIA AUTODISC VI;ONE TOUCH ULTRA TEST VI) STRIP    Check blood sugars twice daily or as directed.     HYDROXYCHLOROQUINE (PLAQUENIL) 200 MG TABLET    Take 1 tablet by mouth 2 times daily    LEVOTHYROXINE (SYNTHROID) 50 MCG TABLET    Take 1 tablet by mouth daily    METFORMIN (GLUCOPHAGE) 1000 MG TABLET    Take 1,000 mg by mouth 2 times daily (with meals)    METOCLOPRAMIDE (REGLAN) 10 MG TABLET    Take 10 mg by mouth    METOCLOPRAMIDE (REGLAN) 5 MG TABLET    Take 1 tablet by mouth 4 times daily 1/2 hour before meal and at bedtime    NARATRIPTAN (AMERGE) 2.5 MG TABLET    Take 2.5 mg by mouth once as needed for Migraine 2.5 mg at onset of headache, may repeat in 4 hours if needed    ONDANSETRON (ZOFRAN ODT) 4 MG DISINTEGRATING TABLET    Take 1 tablet by mouth every 8 hours as needed for Nausea    PANTOPRAZOLE (PROTONIX) 40 MG TABLET    Take 1 Physical Exam      General Appearance:  Alert, cooperative, no distress, appears stated age. Head:  Normocephalic, without obvious abnormality, atraumatic. Eyes:  conjunctiva/corneas clear, EOM's intact. Sclera anicteric. ENT: Mucous membranes moist.   Neck: Supple, symmetrical, trachea midline. No jugular venous distention. Lungs:   No Respiratory Distress. Heart:  +S1/S2, No murmurs or gallops . Peripheral pulses 2+ and equal in all extremities. Extremities:  Moving all extremities        Skin:  No rashes or lesions to exposed skin. Neurologic: Alert. Motor grossly normal.  Speech clear. DIAGNOSTIC RESULTS             No orders to display           [address] [place] [address] and DIFFERENTIAL DIAGNOSIS/MDM:   Vitals:    Vitals:    06/24/19 1930   BP: 138/88   Pulse: 125   Resp: 16   Temp: 99 °F (37.2 °C)   TempSrc: Oral   Weight: 158 lb (71.7 kg)   Height: 5' 8\" (1.727 m)       Patient is allergic to Toradol she was treated with Reglan and Benadryl. Subsequently she received 1 dose of morphine. She is feeling better. Encouraged follow-up with her family doctor. MDM      PROCEDURES:  Unless otherwise noted below, none     Procedures    FINAL IMPRESSION      1.  Migraine without aura and without status migrainosus, not intractable        DISPOSITION/PLAN   DISPOSITION Decision To Discharge 06/24/2019 09:07:15 PM      PATIENT REFERRED TO:  Kirk Diogenesjena, [address] [facility] [address] [place] [address]    Call in 2 days        DISCHARGE MEDICATIONS:  New Prescriptions    No medications on file          (Please note that portions of this note were completed with a voice recognitionprogram.  Efforts were made to edit the dictations but occasionally words are mis-transcribed.)    Baljeet Kaur DO (electronically signed)  Attending Emergency Physician          Baljeet Kaur DO  06/24/19 4684

## 2019-07-03 ENCOUNTER — APPOINTMENT (OUTPATIENT)
Dept: GENERAL RADIOLOGY | Age: 54
End: 2019-07-03
Payer: MEDICARE

## 2019-07-03 ENCOUNTER — APPOINTMENT (OUTPATIENT)
Dept: CT IMAGING | Age: 54
End: 2019-07-03
Payer: MEDICARE

## 2019-07-03 ENCOUNTER — HOSPITAL ENCOUNTER (EMERGENCY)
Age: 54
Discharge: HOME | End: 2019-07-03
Attending: EMERGENCY MEDICINE
Payer: MEDICARE

## 2019-07-03 VITALS
HEIGHT: 68 IN | OXYGEN SATURATION: 99 % | WEIGHT: 150 LBS | RESPIRATION RATE: 16 BRPM | HEART RATE: 128 BPM | SYSTOLIC BLOOD PRESSURE: 165 MMHG | TEMPERATURE: 98.9 F | DIASTOLIC BLOOD PRESSURE: 71 MMHG | BODY MASS INDEX: 22.73 KG/M2

## 2019-07-03 DIAGNOSIS — N30.01 ACUTE CYSTITIS WITH HEMATURIA: ICD-10-CM

## 2019-07-03 DIAGNOSIS — R10.9 FLANK PAIN: ICD-10-CM

## 2019-07-03 DIAGNOSIS — R05.9 COUGH: Primary | ICD-10-CM

## 2019-07-03 LAB
BACTERIA: ABNORMAL /HPF
BILIRUBIN URINE: NEGATIVE
BLOOD, URINE: ABNORMAL
CLARITY: CLEAR
COLOR: YELLOW
EPITHELIAL CELLS, UA: ABNORMAL /HPF
GLUCOSE URINE: 100 MG/DL
KETONES, URINE: ABNORMAL MG/DL
LEUKOCYTE ESTERASE, URINE: ABNORMAL
NITRITE, URINE: NEGATIVE
PH UA: 5 (ref 5–9)
PROTEIN UA: 100 MG/DL
RBC UA: ABNORMAL /HPF (ref 0–2)
SPECIFIC GRAVITY UA: 1.02 (ref 1–1.03)
URINE REFLEX TO CULTURE: YES
UROBILINOGEN, URINE: 0.2 E.U./DL
WBC UA: ABNORMAL /HPF (ref 0–5)

## 2019-07-03 PROCEDURE — 6370000000 HC RX 637 (ALT 250 FOR IP): Performed by: EMERGENCY MEDICINE

## 2019-07-03 PROCEDURE — 96372 THER/PROPH/DIAG INJ SC/IM: CPT

## 2019-07-03 PROCEDURE — 87086 URINE CULTURE/COLONY COUNT: CPT

## 2019-07-03 PROCEDURE — 71046 X-RAY EXAM CHEST 2 VIEWS: CPT

## 2019-07-03 PROCEDURE — 99283 EMERGENCY DEPT VISIT LOW MDM: CPT

## 2019-07-03 PROCEDURE — 74176 CT ABD & PELVIS W/O CONTRAST: CPT

## 2019-07-03 PROCEDURE — 6360000002 HC RX W HCPCS: Performed by: EMERGENCY MEDICINE

## 2019-07-03 PROCEDURE — 81001 URINALYSIS AUTO W/SCOPE: CPT

## 2019-07-03 RX ORDER — CYCLOBENZAPRINE HCL 10 MG
10 TABLET ORAL 3 TIMES DAILY PRN
Qty: 15 TABLET | Refills: 0 | Status: SHIPPED | OUTPATIENT
Start: 2019-07-03 | End: 2019-07-08

## 2019-07-03 RX ORDER — CIPROFLOXACIN 500 MG/1
500 TABLET, FILM COATED ORAL 2 TIMES DAILY
Qty: 14 TABLET | Refills: 0 | Status: SHIPPED | OUTPATIENT
Start: 2019-07-03 | End: 2019-07-10

## 2019-07-03 RX ORDER — CIPROFLOXACIN 500 MG/1
500 TABLET, FILM COATED ORAL ONCE
Status: COMPLETED | OUTPATIENT
Start: 2019-07-03 | End: 2019-07-03

## 2019-07-03 RX ORDER — ONDANSETRON 4 MG/1
4 TABLET, ORALLY DISINTEGRATING ORAL ONCE
Status: COMPLETED | OUTPATIENT
Start: 2019-07-03 | End: 2019-07-03

## 2019-07-03 RX ORDER — MORPHINE SULFATE 4 MG/ML
4 INJECTION, SOLUTION INTRAMUSCULAR; INTRAVENOUS ONCE
Status: COMPLETED | OUTPATIENT
Start: 2019-07-03 | End: 2019-07-03

## 2019-07-03 RX ADMIN — MORPHINE SULFATE 4 MG: 4 INJECTION INTRAVENOUS at 20:32

## 2019-07-03 RX ADMIN — ONDANSETRON 4 MG: 4 TABLET, ORALLY DISINTEGRATING ORAL at 20:32

## 2019-07-03 RX ADMIN — CIPROFLOXACIN HYDROCHLORIDE 500 MG: 500 TABLET, FILM COATED ORAL at 21:48

## 2019-07-03 ASSESSMENT — ENCOUNTER SYMPTOMS
WHEEZING: 0
EYE PAIN: 0
EYE DISCHARGE: 0
VOMITING: 0
ABDOMINAL PAIN: 0
BACK PAIN: 0
SORE THROAT: 0
VOICE CHANGE: 0
EYE REDNESS: 0
FACIAL SWELLING: 0
STRIDOR: 0
TROUBLE SWALLOWING: 0
COUGH: 1
CONSTIPATION: 0
SINUS PRESSURE: 0
BLOOD IN STOOL: 0
DIARRHEA: 0
CHEST TIGHTNESS: 0
SHORTNESS OF BREATH: 0
CHOKING: 0

## 2019-07-03 ASSESSMENT — PAIN DESCRIPTION - PAIN TYPE
TYPE: ACUTE PAIN
TYPE: ACUTE PAIN

## 2019-07-03 ASSESSMENT — PAIN DESCRIPTION - DESCRIPTORS: DESCRIPTORS: SHARP

## 2019-07-03 ASSESSMENT — PAIN DESCRIPTION - LOCATION: LOCATION: RIB CAGE

## 2019-07-03 ASSESSMENT — PAIN SCALES - GENERAL
PAINLEVEL_OUTOF10: 10
PAINLEVEL_OUTOF10: 5
PAINLEVEL_OUTOF10: 10

## 2019-07-03 ASSESSMENT — PAIN DESCRIPTION - PROGRESSION: CLINICAL_PROGRESSION: GRADUALLY WORSENING

## 2019-07-03 ASSESSMENT — PAIN DESCRIPTION - ORIENTATION: ORIENTATION: LEFT

## 2019-07-03 ASSESSMENT — PAIN DESCRIPTION - ONSET: ONSET: SUDDEN

## 2019-07-03 ASSESSMENT — PAIN DESCRIPTION - FREQUENCY: FREQUENCY: CONTINUOUS

## 2019-07-03 NOTE — ED PROVIDER NOTES
status: Legally      Spouse name: None    Number of children: None    Years of education: None    Highest education level: None   Occupational History    None   Social Needs    Financial resource strain: None    Food insecurity:     Worry: None     Inability: None    Transportation needs:     Medical: None     Non-medical: None   Tobacco Use    Smoking status: Never Smoker    Smokeless tobacco: Never Used   Substance and Sexual Activity    Alcohol use: No     Alcohol/week: 0.0 oz     Comment: denies    Drug use: No     Comment: denies    Sexual activity: Not Currently   Lifestyle    Physical activity:     Days per week: None     Minutes per session: None    Stress: None   Relationships    Social connections:     Talks on phone: None     Gets together: None     Attends Sikh service: None     Active member of club or organization: None     Attends meetings of clubs or organizations: None     Relationship status: None    Intimate partner violence:     Fear of current or ex partner: None     Emotionally abused: None     Physically abused: None     Forced sexual activity: None   Other Topics Concern    None   Social History Narrative    None       SCREENINGS      @FLOW([id number])@      PHYSICAL EXAM    (up to 7 for level 4, 8 or more for level 5)     ED Triage Vitals   BP Temp Temp src Pulse Resp SpO2 Height Weight   -- -- -- -- -- -- -- --       Physical Exam   Constitutional: She is oriented to person, place, and time. She appears well-developed. Alert cooperative patient's nontoxic afebrile   HENT:   Head: Normocephalic and atraumatic. Right Ear: External ear normal.   Left Ear: External ear normal.   Mouth/Throat: Oropharynx is clear and moist.   Attention  to the oropharynx patient's very congested nasally   Eyes: Pupils are equal, round, and reactive to light. Conjunctivae and EOM are normal.   Neck: Normal range of motion. Neck supple.    Cardiovascular: Normal rate and normal

## 2019-07-06 LAB — URINE CULTURE, ROUTINE: NORMAL

## 2019-10-11 ENCOUNTER — HOSPITAL ENCOUNTER (OUTPATIENT)
Age: 54
Discharge: HOME | End: 2019-10-13
Payer: MEDICARE

## 2019-10-11 ENCOUNTER — HOSPITAL ENCOUNTER (OUTPATIENT)
Dept: GENERAL RADIOLOGY | Age: 54
Discharge: HOME | End: 2019-10-13
Payer: MEDICARE

## 2019-10-11 DIAGNOSIS — M79.672 LEFT FOOT PAIN: ICD-10-CM

## 2019-10-11 PROCEDURE — 73630 X-RAY EXAM OF FOOT: CPT

## 2019-11-04 ENCOUNTER — HOSPITAL ENCOUNTER (OUTPATIENT)
Age: 54
Discharge: HOME | End: 2019-11-04
Payer: MEDICARE

## 2019-11-04 ENCOUNTER — HOSPITAL ENCOUNTER (OUTPATIENT)
Dept: LAB | Age: 54
Discharge: HOME | End: 2019-11-04
Payer: MEDICARE

## 2019-11-04 LAB
ALBUMIN SERPL-MCNC: 4.3 G/DL (ref 3.5–4.6)
ALP BLD-CCNC: 115 U/L (ref 40–130)
ALT SERPL-CCNC: 24 U/L (ref 0–33)
ANION GAP SERPL CALCULATED.3IONS-SCNC: 12 MEQ/L (ref 9–15)
APTT: 25.6 SEC (ref 24.4–36.8)
AST SERPL-CCNC: 24 U/L (ref 0–35)
BACTERIA: NEGATIVE /HPF
BASOPHILS ABSOLUTE: 0.1 K/UL (ref 0–0.2)
BASOPHILS RELATIVE PERCENT: 1.1 %
BILIRUB SERPL-MCNC: 0.6 MG/DL (ref 0.2–0.7)
BILIRUBIN URINE: NEGATIVE
BLOOD, URINE: ABNORMAL
BUN BLDV-MCNC: 6 MG/DL (ref 6–20)
CALCIUM SERPL-MCNC: 9.3 MG/DL (ref 8.5–9.9)
CHLORIDE BLD-SCNC: 101 MEQ/L (ref 95–107)
CLARITY: ABNORMAL
CO2: 26 MEQ/L (ref 20–31)
COLOR: YELLOW
CREAT SERPL-MCNC: 1.19 MG/DL (ref 0.5–0.9)
EKG ATRIAL RATE: 97 BPM
EKG P AXIS: 43 DEGREES
EKG P-R INTERVAL: 134 MS
EKG Q-T INTERVAL: 352 MS
EKG QRS DURATION: 62 MS
EKG QTC CALCULATION (BAZETT): 447 MS
EKG R AXIS: 57 DEGREES
EKG T AXIS: 59 DEGREES
EKG VENTRICULAR RATE: 97 BPM
EOSINOPHILS ABSOLUTE: 0.2 K/UL (ref 0–0.7)
EOSINOPHILS RELATIVE PERCENT: 2 %
EPITHELIAL CELLS, UA: ABNORMAL /HPF (ref 0–5)
GFR AFRICAN AMERICAN: 57.6
GFR NON-AFRICAN AMERICAN: 47.6
GLOBULIN: 3.2 G/DL (ref 2.3–3.5)
GLUCOSE BLD-MCNC: 162 MG/DL (ref 70–99)
GLUCOSE URINE: NEGATIVE MG/DL
HBA1C MFR BLD: 9.4 % (ref 4.8–5.9)
HCT VFR BLD CALC: 44.1 % (ref 37–47)
HEMOGLOBIN: 14.5 G/DL (ref 12–16)
KETONES, URINE: NEGATIVE MG/DL
LEUKOCYTE ESTERASE, URINE: NEGATIVE
LYMPHOCYTES ABSOLUTE: 2.3 K/UL (ref 1–4.8)
LYMPHOCYTES RELATIVE PERCENT: 20.6 %
MAGNESIUM: 2.5 MG/DL (ref 1.7–2.4)
MCH RBC QN AUTO: 29.1 PG (ref 27–31.3)
MCHC RBC AUTO-ENTMCNC: 32.9 % (ref 33–37)
MCV RBC AUTO: 88.5 FL (ref 82–100)
MONOCYTES ABSOLUTE: 0.6 K/UL (ref 0.2–0.8)
MONOCYTES RELATIVE PERCENT: 5.6 %
NEUTROPHILS ABSOLUTE: 8 K/UL (ref 1.4–6.5)
NEUTROPHILS RELATIVE PERCENT: 70.7 %
NITRITE, URINE: NEGATIVE
PDW BLD-RTO: 14.8 % (ref 11.5–14.5)
PH UA: 5.5 (ref 5–9)
PLATELET # BLD: 334 K/UL (ref 130–400)
POTASSIUM SERPL-SCNC: 3.5 MEQ/L (ref 3.4–4.9)
PROTEIN UA: >=300 MG/DL
RBC # BLD: 4.98 M/UL (ref 4.2–5.4)
RBC UA: ABNORMAL /HPF (ref 0–5)
SODIUM BLD-SCNC: 139 MEQ/L (ref 135–144)
SPECIFIC GRAVITY UA: 1.02 (ref 1–1.03)
TOTAL PROTEIN: 7.5 G/DL (ref 6.3–8)
TSH REFLEX: 0.82 UIU/ML (ref 0.44–3.86)
UROBILINOGEN, URINE: 0.2 E.U./DL
WBC # BLD: 11.3 K/UL (ref 4.8–10.8)
WBC UA: ABNORMAL /HPF (ref 0–5)

## 2019-11-04 PROCEDURE — 83036 HEMOGLOBIN GLYCOSYLATED A1C: CPT

## 2019-11-04 PROCEDURE — 87086 URINE CULTURE/COLONY COUNT: CPT

## 2019-11-04 PROCEDURE — 84443 ASSAY THYROID STIM HORMONE: CPT

## 2019-11-04 PROCEDURE — 85730 THROMBOPLASTIN TIME PARTIAL: CPT

## 2019-11-04 PROCEDURE — 83735 ASSAY OF MAGNESIUM: CPT

## 2019-11-04 PROCEDURE — 80053 COMPREHEN METABOLIC PANEL: CPT

## 2019-11-04 PROCEDURE — 93005 ELECTROCARDIOGRAM TRACING: CPT

## 2019-11-04 PROCEDURE — 93010 ELECTROCARDIOGRAM REPORT: CPT | Performed by: INTERNAL MEDICINE

## 2019-11-04 PROCEDURE — 81001 URINALYSIS AUTO W/SCOPE: CPT

## 2019-11-04 PROCEDURE — 85025 COMPLETE CBC W/AUTO DIFF WBC: CPT

## 2019-11-04 PROCEDURE — 36415 COLL VENOUS BLD VENIPUNCTURE: CPT

## 2019-11-06 LAB — URINE CULTURE, ROUTINE: NORMAL

## 2019-11-25 RX ORDER — HYDROXYCHLOROQUINE SULFATE 200 MG/1
200 TABLET, FILM COATED ORAL 2 TIMES DAILY
COMMUNITY
End: 2019-11-25

## 2019-11-25 RX ORDER — VENLAFAXINE 75 MG/1
75 TABLET ORAL DAILY
Status: ON HOLD | COMMUNITY
Start: 2019-08-29 | End: 2020-06-28 | Stop reason: ALTCHOICE

## 2019-11-25 RX ORDER — GABAPENTIN 300 MG/1
300 CAPSULE ORAL 2 TIMES DAILY
Refills: 3 | COMMUNITY
Start: 2019-10-01 | End: 2020-08-24 | Stop reason: ALTCHOICE

## 2019-11-26 ENCOUNTER — ANESTHESIA (OUTPATIENT)
Dept: OPERATING ROOM | Age: 54
End: 2019-11-26
Payer: MEDICARE

## 2019-11-26 ENCOUNTER — APPOINTMENT (OUTPATIENT)
Dept: GENERAL RADIOLOGY | Age: 54
End: 2019-11-26
Attending: PODIATRIST
Payer: MEDICARE

## 2019-11-26 ENCOUNTER — HOSPITAL ENCOUNTER (OUTPATIENT)
Age: 54
Setting detail: OUTPATIENT SURGERY
Discharge: HOME | End: 2019-11-26
Attending: PODIATRIST | Admitting: PODIATRIST
Payer: MEDICARE

## 2019-11-26 ENCOUNTER — ANESTHESIA EVENT (OUTPATIENT)
Dept: OPERATING ROOM | Age: 54
End: 2019-11-26
Payer: MEDICARE

## 2019-11-26 ENCOUNTER — HOSPITAL ENCOUNTER (OUTPATIENT)
Dept: GENERAL RADIOLOGY | Age: 54
Setting detail: OUTPATIENT SURGERY
Discharge: HOME | End: 2019-11-28
Attending: PODIATRIST
Payer: MEDICARE

## 2019-11-26 VITALS — DIASTOLIC BLOOD PRESSURE: 95 MMHG | SYSTOLIC BLOOD PRESSURE: 146 MMHG | OXYGEN SATURATION: 94 %

## 2019-11-26 VITALS
SYSTOLIC BLOOD PRESSURE: 105 MMHG | DIASTOLIC BLOOD PRESSURE: 70 MMHG | TEMPERATURE: 96.4 F | BODY MASS INDEX: 24.25 KG/M2 | HEIGHT: 68 IN | OXYGEN SATURATION: 99 % | HEART RATE: 88 BPM | WEIGHT: 160 LBS | RESPIRATION RATE: 18 BRPM

## 2019-11-26 DIAGNOSIS — R52 PAIN: ICD-10-CM

## 2019-11-26 DIAGNOSIS — Z98.890 POST-OPERATIVE STATE: Primary | ICD-10-CM

## 2019-11-26 LAB
GLUCOSE BLD-MCNC: 213 MG/DL (ref 60–115)
PERFORMED ON: ABNORMAL

## 2019-11-26 PROCEDURE — C1776 JOINT DEVICE (IMPLANTABLE): HCPCS | Performed by: PODIATRIST

## 2019-11-26 PROCEDURE — 2500000003 HC RX 250 WO HCPCS: Performed by: PODIATRIST

## 2019-11-26 PROCEDURE — 2580000003 HC RX 258: Performed by: ANESTHESIOLOGY

## 2019-11-26 PROCEDURE — 6360000002 HC RX W HCPCS: Performed by: NURSE ANESTHETIST, CERTIFIED REGISTERED

## 2019-11-26 PROCEDURE — 7100000010 HC PHASE II RECOVERY - FIRST 15 MIN: Performed by: PODIATRIST

## 2019-11-26 PROCEDURE — 2780000010 HC IMPLANT OTHER: Performed by: PODIATRIST

## 2019-11-26 PROCEDURE — 3209999900 FLUORO FOR SURGICAL PROCEDURES

## 2019-11-26 PROCEDURE — 3700000000 HC ANESTHESIA ATTENDED CARE: Performed by: PODIATRIST

## 2019-11-26 PROCEDURE — 73630 X-RAY EXAM OF FOOT: CPT

## 2019-11-26 PROCEDURE — 3600000014 HC SURGERY LEVEL 4 ADDTL 15MIN: Performed by: PODIATRIST

## 2019-11-26 PROCEDURE — 2709999900 HC NON-CHARGEABLE SUPPLY: Performed by: PODIATRIST

## 2019-11-26 PROCEDURE — 3600000004 HC SURGERY LEVEL 4 BASE: Performed by: PODIATRIST

## 2019-11-26 PROCEDURE — C9359 IMPLNT,BON VOID FILLER-PUTTY: HCPCS | Performed by: PODIATRIST

## 2019-11-26 PROCEDURE — 2720000010 HC SURG SUPPLY STERILE: Performed by: PODIATRIST

## 2019-11-26 PROCEDURE — 2580000003 HC RX 258: Performed by: PODIATRIST

## 2019-11-26 PROCEDURE — 3700000001 HC ADD 15 MINUTES (ANESTHESIA): Performed by: PODIATRIST

## 2019-11-26 PROCEDURE — 7100000011 HC PHASE II RECOVERY - ADDTL 15 MIN: Performed by: PODIATRIST

## 2019-11-26 PROCEDURE — C1713 ANCHOR/SCREW BN/BN,TIS/BN: HCPCS | Performed by: PODIATRIST

## 2019-11-26 PROCEDURE — 6370000000 HC RX 637 (ALT 250 FOR IP): Performed by: PODIATRIST

## 2019-11-26 DEVICE — DBX PUTTY, 0.5CC
Type: IMPLANTABLE DEVICE | Site: FOOT | Status: FUNCTIONAL
Brand: DBX®

## 2019-11-26 DEVICE — SCREW BNE L28MM DIA3.5MM MINI FT ANK TI SELF DRL ST CANN: Type: IMPLANTABLE DEVICE | Status: FUNCTIONAL

## 2019-11-26 DEVICE — IMPLANTABLE DEVICE: Type: IMPLANTABLE DEVICE | Status: FUNCTIONAL

## 2019-11-26 DEVICE — SCREW BNE L16MM DIA3MM CORT FT ANK TI SELF DRL SLD FULL: Type: IMPLANTABLE DEVICE | Site: FIRST TOE | Status: FUNCTIONAL

## 2019-11-26 DEVICE — SCREW BNE L14MM DIA3MM FT ANK TI VAR ANG LCK LO PROF: Type: IMPLANTABLE DEVICE | Site: FIRST TOE | Status: FUNCTIONAL

## 2019-11-26 DEVICE — SCREW BNE L12MM DIA3MM FT ANK TI VAR ANG LOK LO PROF FOR: Type: IMPLANTABLE DEVICE | Site: FIRST TOE | Status: FUNCTIONAL

## 2019-11-26 DEVICE — SCREW BNE L18MM DIA3MM FT ANK TI VAR ANG LCK LO PROF: Type: IMPLANTABLE DEVICE | Site: FIRST TOE | Status: FUNCTIONAL

## 2019-11-26 RX ORDER — LIDOCAINE HYDROCHLORIDE 10 MG/ML
1 INJECTION, SOLUTION EPIDURAL; INFILTRATION; INTRACAUDAL; PERINEURAL
Status: DISCONTINUED | OUTPATIENT
Start: 2019-11-26 | End: 2019-11-26 | Stop reason: HOSPADM

## 2019-11-26 RX ORDER — BUPIVACAINE HYDROCHLORIDE 5 MG/ML
INJECTION, SOLUTION EPIDURAL; INTRACAUDAL PRN
Status: DISCONTINUED | OUTPATIENT
Start: 2019-11-26 | End: 2019-11-26 | Stop reason: ALTCHOICE

## 2019-11-26 RX ORDER — MEPERIDINE HYDROCHLORIDE 25 MG/ML
12.5 INJECTION INTRAMUSCULAR; INTRAVENOUS; SUBCUTANEOUS EVERY 5 MIN PRN
Status: DISCONTINUED | OUTPATIENT
Start: 2019-11-26 | End: 2019-11-26 | Stop reason: HOSPADM

## 2019-11-26 RX ORDER — SODIUM CHLORIDE, SODIUM LACTATE, POTASSIUM CHLORIDE, CALCIUM CHLORIDE 600; 310; 30; 20 MG/100ML; MG/100ML; MG/100ML; MG/100ML
INJECTION, SOLUTION INTRAVENOUS
Status: COMPLETED
Start: 2019-11-26 | End: 2019-11-26

## 2019-11-26 RX ORDER — ONDANSETRON 2 MG/ML
4 INJECTION INTRAMUSCULAR; INTRAVENOUS
Status: DISCONTINUED | OUTPATIENT
Start: 2019-11-26 | End: 2019-11-26 | Stop reason: HOSPADM

## 2019-11-26 RX ORDER — SODIUM CHLORIDE 0.9 % (FLUSH) 0.9 %
10 SYRINGE (ML) INJECTION EVERY 12 HOURS SCHEDULED
Status: DISCONTINUED | OUTPATIENT
Start: 2019-11-26 | End: 2019-11-26 | Stop reason: HOSPADM

## 2019-11-26 RX ORDER — SODIUM CHLORIDE 0.9 % (FLUSH) 0.9 %
10 SYRINGE (ML) INJECTION PRN
Status: DISCONTINUED | OUTPATIENT
Start: 2019-11-26 | End: 2019-11-26 | Stop reason: HOSPADM

## 2019-11-26 RX ORDER — OXYCODONE HYDROCHLORIDE AND ACETAMINOPHEN 5; 325 MG/1; MG/1
1 TABLET ORAL EVERY 6 HOURS PRN
Qty: 28 TABLET | Refills: 0 | Status: SHIPPED | OUTPATIENT
Start: 2019-11-26 | End: 2019-12-03

## 2019-11-26 RX ORDER — FENTANYL CITRATE 50 UG/ML
50 INJECTION, SOLUTION INTRAMUSCULAR; INTRAVENOUS EVERY 10 MIN PRN
Status: DISCONTINUED | OUTPATIENT
Start: 2019-11-26 | End: 2019-11-26 | Stop reason: HOSPADM

## 2019-11-26 RX ORDER — SODIUM CHLORIDE, SODIUM LACTATE, POTASSIUM CHLORIDE, CALCIUM CHLORIDE 600; 310; 30; 20 MG/100ML; MG/100ML; MG/100ML; MG/100ML
INJECTION, SOLUTION INTRAVENOUS CONTINUOUS
Status: DISCONTINUED | OUTPATIENT
Start: 2019-11-26 | End: 2019-11-26 | Stop reason: HOSPADM

## 2019-11-26 RX ORDER — PROPOFOL 10 MG/ML
INJECTION, EMULSION INTRAVENOUS CONTINUOUS PRN
Status: DISCONTINUED | OUTPATIENT
Start: 2019-11-26 | End: 2019-11-26 | Stop reason: SDUPTHER

## 2019-11-26 RX ORDER — DIPHENHYDRAMINE HYDROCHLORIDE 50 MG/ML
12.5 INJECTION INTRAMUSCULAR; INTRAVENOUS
Status: DISCONTINUED | OUTPATIENT
Start: 2019-11-26 | End: 2019-11-26 | Stop reason: HOSPADM

## 2019-11-26 RX ORDER — MAGNESIUM HYDROXIDE 1200 MG/15ML
LIQUID ORAL CONTINUOUS PRN
Status: COMPLETED | OUTPATIENT
Start: 2019-11-26 | End: 2019-11-26

## 2019-11-26 RX ORDER — FENTANYL CITRATE 50 UG/ML
INJECTION, SOLUTION INTRAMUSCULAR; INTRAVENOUS PRN
Status: DISCONTINUED | OUTPATIENT
Start: 2019-11-26 | End: 2019-11-26 | Stop reason: SDUPTHER

## 2019-11-26 RX ORDER — MIDAZOLAM HYDROCHLORIDE 1 MG/ML
INJECTION INTRAMUSCULAR; INTRAVENOUS PRN
Status: DISCONTINUED | OUTPATIENT
Start: 2019-11-26 | End: 2019-11-26 | Stop reason: SDUPTHER

## 2019-11-26 RX ORDER — CLINDAMYCIN PHOSPHATE 600 MG/50ML
600 INJECTION INTRAVENOUS
Status: COMPLETED | OUTPATIENT
Start: 2019-11-26 | End: 2019-11-26

## 2019-11-26 RX ORDER — METOCLOPRAMIDE HYDROCHLORIDE 5 MG/ML
10 INJECTION INTRAMUSCULAR; INTRAVENOUS
Status: DISCONTINUED | OUTPATIENT
Start: 2019-11-26 | End: 2019-11-26 | Stop reason: HOSPADM

## 2019-11-26 RX ORDER — PROMETHAZINE HYDROCHLORIDE 25 MG/1
25 TABLET ORAL 4 TIMES DAILY PRN
Qty: 20 TABLET | Refills: 0 | Status: SHIPPED | OUTPATIENT
Start: 2019-11-26 | End: 2019-12-03

## 2019-11-26 RX ORDER — OXYCODONE HYDROCHLORIDE AND ACETAMINOPHEN 5; 325 MG/1; MG/1
1 TABLET ORAL ONCE
Status: COMPLETED | OUTPATIENT
Start: 2019-11-26 | End: 2019-11-26

## 2019-11-26 RX ADMIN — PROPOFOL 120 MCG/KG/MIN: 10 INJECTION, EMULSION INTRAVENOUS at 13:24

## 2019-11-26 RX ADMIN — MIDAZOLAM HYDROCHLORIDE 2 MG: 2 INJECTION, SOLUTION INTRAMUSCULAR; INTRAVENOUS at 13:21

## 2019-11-26 RX ADMIN — OXYCODONE HYDROCHLORIDE AND ACETAMINOPHEN 1 TABLET: 5; 325 TABLET ORAL at 16:08

## 2019-11-26 RX ADMIN — FENTANYL CITRATE 25 MCG: 50 INJECTION, SOLUTION INTRAMUSCULAR; INTRAVENOUS at 13:45

## 2019-11-26 RX ADMIN — FENTANYL CITRATE 25 MCG: 50 INJECTION, SOLUTION INTRAMUSCULAR; INTRAVENOUS at 13:49

## 2019-11-26 RX ADMIN — PHENYLEPHRINE HYDROCHLORIDE 50 MCG: 10 INJECTION INTRAVENOUS at 14:08

## 2019-11-26 RX ADMIN — PHENYLEPHRINE HYDROCHLORIDE 50 MCG: 10 INJECTION INTRAVENOUS at 14:15

## 2019-11-26 RX ADMIN — FENTANYL CITRATE 50 MCG: 50 INJECTION, SOLUTION INTRAMUSCULAR; INTRAVENOUS at 13:24

## 2019-11-26 RX ADMIN — METOPROLOL TARTRATE 25 MG: 25 TABLET ORAL at 12:39

## 2019-11-26 RX ADMIN — CLINDAMYCIN IN 5 PERCENT DEXTROSE 600 MG: 12 INJECTION, SOLUTION INTRAVENOUS at 13:26

## 2019-11-26 RX ADMIN — METOPROLOL TARTRATE 25 MG: 25 TABLET ORAL at 12:26

## 2019-11-26 RX ADMIN — PHENYLEPHRINE HYDROCHLORIDE 50 MCG: 10 INJECTION INTRAVENOUS at 14:23

## 2019-11-26 RX ADMIN — PHENYLEPHRINE HYDROCHLORIDE 50 MCG: 10 INJECTION INTRAVENOUS at 14:29

## 2019-11-26 RX ADMIN — SODIUM CHLORIDE, POTASSIUM CHLORIDE, SODIUM LACTATE AND CALCIUM CHLORIDE: 600; 310; 30; 20 INJECTION, SOLUTION INTRAVENOUS at 12:26

## 2019-11-26 RX ADMIN — SODIUM CHLORIDE, POTASSIUM CHLORIDE, SODIUM LACTATE AND CALCIUM CHLORIDE: 600; 310; 30; 20 INJECTION, SOLUTION INTRAVENOUS at 14:07

## 2019-11-26 ASSESSMENT — PULMONARY FUNCTION TESTS
PIF_VALUE: 0
PIF_VALUE: 1
PIF_VALUE: 0
PIF_VALUE: 1
PIF_VALUE: 0
PIF_VALUE: 1
PIF_VALUE: 0
PIF_VALUE: 1
PIF_VALUE: 0

## 2019-11-26 ASSESSMENT — PAIN SCALES - GENERAL: PAINLEVEL_OUTOF10: 10

## 2020-01-11 ENCOUNTER — HOSPITAL ENCOUNTER (EMERGENCY)
Age: 55
Discharge: HOME | End: 2020-01-11
Payer: MEDICARE

## 2020-01-11 VITALS
OXYGEN SATURATION: 96 % | BODY MASS INDEX: 28.35 KG/M2 | TEMPERATURE: 97.2 F | WEIGHT: 160 LBS | HEART RATE: 78 BPM | DIASTOLIC BLOOD PRESSURE: 74 MMHG | HEIGHT: 63 IN | SYSTOLIC BLOOD PRESSURE: 115 MMHG | RESPIRATION RATE: 18 BRPM

## 2020-01-11 PROCEDURE — 99282 EMERGENCY DEPT VISIT SF MDM: CPT

## 2020-01-11 PROCEDURE — 6370000000 HC RX 637 (ALT 250 FOR IP): Performed by: PHYSICIAN ASSISTANT

## 2020-01-11 RX ORDER — CYCLOBENZAPRINE HCL 10 MG
10 TABLET ORAL 3 TIMES DAILY PRN
Qty: 15 TABLET | Refills: 0 | Status: SHIPPED | OUTPATIENT
Start: 2020-01-11 | End: 2020-01-26

## 2020-01-11 RX ORDER — PREDNISONE 20 MG/1
20 TABLET ORAL ONCE
Status: COMPLETED | OUTPATIENT
Start: 2020-01-11 | End: 2020-01-11

## 2020-01-11 RX ORDER — OXYCODONE HYDROCHLORIDE AND ACETAMINOPHEN 5; 325 MG/1; MG/1
1 TABLET ORAL ONCE
Status: COMPLETED | OUTPATIENT
Start: 2020-01-11 | End: 2020-01-11

## 2020-01-11 RX ORDER — METHYLPREDNISOLONE 4 MG/1
TABLET ORAL
Qty: 1 KIT | Refills: 0 | Status: SHIPPED | OUTPATIENT
Start: 2020-01-11 | End: 2020-01-17

## 2020-01-11 RX ADMIN — PREDNISONE 20 MG: 20 TABLET ORAL at 19:13

## 2020-01-11 RX ADMIN — OXYCODONE HYDROCHLORIDE AND ACETAMINOPHEN 1 TABLET: 5; 325 TABLET ORAL at 19:13

## 2020-01-11 ASSESSMENT — PAIN DESCRIPTION - DESCRIPTORS: DESCRIPTORS: SHOOTING;BURNING

## 2020-01-11 ASSESSMENT — PAIN DESCRIPTION - LOCATION: LOCATION: BACK

## 2020-01-11 ASSESSMENT — PAIN DESCRIPTION - ORIENTATION: ORIENTATION: LEFT

## 2020-01-11 ASSESSMENT — PAIN DESCRIPTION - FREQUENCY: FREQUENCY: INTERMITTENT

## 2020-01-11 ASSESSMENT — PAIN SCALES - GENERAL
PAINLEVEL_OUTOF10: 9
PAINLEVEL_OUTOF10: 9

## 2020-01-11 ASSESSMENT — PAIN DESCRIPTION - PAIN TYPE: TYPE: ACUTE PAIN

## 2020-01-11 ASSESSMENT — ENCOUNTER SYMPTOMS
ABDOMINAL PAIN: 0
DIARRHEA: 0
NAUSEA: 0
VOMITING: 0
BACK PAIN: 1

## 2020-01-11 ASSESSMENT — PAIN - FUNCTIONAL ASSESSMENT: PAIN_FUNCTIONAL_ASSESSMENT: PREVENTS OR INTERFERES SOME ACTIVE ACTIVITIES AND ADLS

## 2020-01-11 ASSESSMENT — PAIN SCALES - WONG BAKER: WONGBAKER_NUMERICALRESPONSE: 2

## 2020-01-11 ASSESSMENT — PAIN DESCRIPTION - PROGRESSION: CLINICAL_PROGRESSION: GRADUALLY WORSENING

## 2020-01-11 ASSESSMENT — PAIN DESCRIPTION - ONSET: ONSET: ON-GOING

## 2020-01-11 NOTE — ED PROVIDER NOTES
[facility] [facility] ED  eMERGENCYdEPARTMENT eNCOUnter      Pt Name: Ollie Salas  MRN: [id number]  [place] 1965of evaluation: 1/11/2020  Rudy Carlisle PA-C    CHIEF COMPLAINT       Chief Complaint   Patient presents with    Back Pain         HISTORY OF PRESENT ILLNESS  (Location/Symptom, Timing/Onset, Context/Setting, Quality, Duration, Modifying Factors, Severity.)   Suzanne Kim is a 48 y.o. female who presents to the emergency department with a 2 to 3-day history of left lower back pain radiating to posterior left thigh. Patient denies injury. Patient denies abdominal pain, flank pain, hematuria, dysuria, saddle anesthesia, fecal incontinence, or urinary retention. The history is provided by the patient. Nursing Notes were reviewed and I agree. REVIEW OF SYSTEMS    (2-9 systems for level 4, 10 or more for level 5)     Review of Systems   Gastrointestinal: Negative for abdominal pain, diarrhea, nausea and vomiting. Genitourinary: Negative for dysuria, flank pain and hematuria. Musculoskeletal: Positive for back pain. Neurological: Negative for weakness and numbness. as noted above the remainder of the review of systems was reviewed and negative.        PAST MEDICAL HISTORY     Past Medical History:   Diagnosis Date    Anxiety     Asthma     as child    Depression     Depression     dysthymia - following lupus dx age 23    Diabetes mellitus ([facility] [address]     Foot fracture, left     GERD (gastroesophageal reflux disease)     Hyperlipidemia     Hypothyroidism     Kidney stones     Lupus ([facility] [address]     dr barrera -     Migraine     PONV (postoperative nausea and vomiting)          SURGICAL HISTORY       Past Surgical History:   Procedure Laterality Date    APPENDECTOMY      ARTHRODESIS Left 11/26/2019    ARTHRODESIS OF THE FIRST METATARSAL PHALANGEAL JOINT LEFT FOOT performed by Yudith Olson DPM at [place] [address] PHYSICAL EXAM    (up to 7 forlevel 4, 8 or more for level 5)     ED Triage Vitals [01/11/20 1806]   BP Temp Temp Source Pulse Resp SpO2 Height Weight   115/74 97.2 °F (36.2 °C) Temporal 78 18 96 % 5' 3\" (1.6 m) 160 lb (72.6 kg)       Physical Exam  Vitals signs and nursing note reviewed. Constitutional:       Appearance: She is well-developed. HENT:      Head: Normocephalic and atraumatic. Eyes:      Conjunctiva/sclera: Conjunctivae normal.      Pupils: Pupils are equal, round, and reactive to light. Neck:      Musculoskeletal: Normal range of motion and neck supple. Abdominal:      Palpations: Abdomen is soft. There is no pulsatile mass. Tenderness: There is no tenderness. Musculoskeletal:      Lumbar back: She exhibits no tenderness. Comments: Normal spinal curvature. No spinal process tenderness. Mild to moderate tenderness to the left lumbar and SI region(s). Moderate left-sided posterior buttocks and thigh tenderness. Pain noted with flexion, right lateral bending, right rotation. Skin:     General: Skin is warm and dry. Findings: No bruising or rash. Neurological:      Mental Status: She is alert. Sensory: No sensory deficit. Gait: Gait normal.      Deep Tendon Reflexes: Reflexes are normal and symmetric. Reflex Scores:       Patellar reflexes are 2+ on the right side and 2+ on the left side. Comments: No foot drop. Rapid movements are equal bilaterally. Straight leg raises are negative bilaterally.            DIAGNOSTIC RESULTS     RADIOLOGY:   Non-plain film images such as CT, Ultrasound and MRI are read by the radiologist. Plain radiographic images are visualized and preliminarilyinterpreted by Tita Fermin PA-C with the below findings:      Interpretation per the Radiologist below, if available at the time of this note:    No orders to display       LABS:  Labs Reviewed - No data to display    All other labs were within normal range or not returnedas of this dictation. EMERGENCYDEPARTMENT COURSE and DIFFERENTIAL DIAGNOSIS/MDM:   Vitals:    Vitals:    01/11/20 1806   BP: 115/74   Pulse: 78   Resp: 18   Temp: 97.2 °F (36.2 °C)   TempSrc: Temporal   SpO2: 96%   Weight: 160 lb (72.6 kg)   Height: 5' 3\" (1.6 m)           MDM    Oars report ran: Multiple prescriptions for controlled substances so I feel reluctant to prescribe any narcotics today. She was given 20 mg of prednisone and 1 Percocet p.o. during her ER evaluation. Patient is not driving today. I will send patient home with a prescription for prednisone and Flexeril. Patient has multiple allergies including aspirin. She states she cannot take NSAIDs due to her renal dysfunction but can take prednisone and has taken this prednisone in the past for lupus. Patient is a diabetic and understands that steroids can increase her blood sugars. She has a pending appointment with endocrinologist to get better management of her diabetes. Patient counseled that she may need further studies such as an MRI if her symptoms do not improve. Patient counseled to return to the emergency department immediately if such things as fecal or urinary incontinence/retention or numbness to waist and hips develop. Patient voiced understanding. PROCEDURES:    Procedures      FINAL IMPRESSION      1.  Sciatica of left side          DISPOSITION/PLAN   DISPOSITION Decision To Discharge 01/11/2020 07:06:59 PM      PATIENT REFERRED TO:  Indigo Roche MD  [address] [place] [phone]    In 2 days        DISCHARGE MEDICATIONS:  Discharge Medication List as of 1/11/2020  7:09 PM      START taking these medications    Details   methylPREDNISolone (MEDROL, BRI,) 4 MG tablet By mouth., Disp-1 kit, R-0Print      cyclobenzaprine (FLEXERIL) 10 MG tablet Take 1 tablet by mouth 3 times daily as needed for Muscle spasms, Disp-15 tablet, R-0Print             (Please note thatportions of this note were completed with a voice recognition program.  Efforts were made to edit the dictations but occasionally words are mis-transcribed.)    Angele Babinski, PA-C Angele Babinski, PA-C  01/11/20 1955

## 2020-01-11 NOTE — ED TRIAGE NOTES
Pt reports that she has had a back pain for the last couple days on the left side pt alert ambulated to triage with a steady gate rr even alert x4

## 2020-01-16 ENCOUNTER — OFFICE VISIT (OUTPATIENT)
Dept: ENDOCRINOLOGY | Age: 55
End: 2020-01-16
Payer: MEDICARE

## 2020-01-16 VITALS
BODY MASS INDEX: 28.88 KG/M2 | SYSTOLIC BLOOD PRESSURE: 102 MMHG | HEIGHT: 63 IN | WEIGHT: 163 LBS | HEART RATE: 115 BPM | DIASTOLIC BLOOD PRESSURE: 71 MMHG

## 2020-01-16 LAB
CHP ED QC CHECK: NORMAL
GLUCOSE BLD-MCNC: 319 MG/DL

## 2020-01-16 PROCEDURE — 99203 OFFICE O/P NEW LOW 30 MIN: CPT | Performed by: INTERNAL MEDICINE

## 2020-01-16 PROCEDURE — 3017F COLORECTAL CA SCREEN DOC REV: CPT | Performed by: INTERNAL MEDICINE

## 2020-01-16 PROCEDURE — 95250 CONT GLUC MNTR PHYS/QHP EQP: CPT | Performed by: INTERNAL MEDICINE

## 2020-01-16 PROCEDURE — G8419 CALC BMI OUT NRM PARAM NOF/U: HCPCS | Performed by: INTERNAL MEDICINE

## 2020-01-16 PROCEDURE — G8484 FLU IMMUNIZE NO ADMIN: HCPCS | Performed by: INTERNAL MEDICINE

## 2020-01-16 PROCEDURE — 1036F TOBACCO NON-USER: CPT | Performed by: INTERNAL MEDICINE

## 2020-01-16 PROCEDURE — G8428 CUR MEDS NOT DOCUMENT: HCPCS | Performed by: INTERNAL MEDICINE

## 2020-01-16 RX ORDER — INSULIN LISPRO 100 [IU]/ML
INJECTION, SUSPENSION SUBCUTANEOUS
Qty: 10 PEN | Refills: 3 | Status: SHIPPED | OUTPATIENT
Start: 2020-01-16 | End: 2020-02-14 | Stop reason: ALTCHOICE

## 2020-01-17 ENCOUNTER — TELEPHONE (OUTPATIENT)
Dept: ENDOCRINOLOGY | Age: 55
End: 2020-01-17

## 2020-01-17 RX ORDER — LANCETS
EACH MISCELLANEOUS
Qty: 200 EACH | Refills: 3 | Status: SHIPPED | OUTPATIENT
Start: 2020-01-17 | End: 2020-10-22 | Stop reason: SDUPTHER

## 2020-01-20 ENCOUNTER — HOSPITAL ENCOUNTER (OUTPATIENT)
Dept: ULTRASOUND IMAGING | Age: 55
Discharge: HOME | End: 2020-01-22
Payer: MEDICARE

## 2020-01-20 PROCEDURE — 76536 US EXAM OF HEAD AND NECK: CPT

## 2020-01-20 RX ORDER — INSULIN ASPART 100 [IU]/ML
INJECTION, SUSPENSION SUBCUTANEOUS
Qty: 5 PEN | Refills: 3 | Status: SHIPPED | OUTPATIENT
Start: 2020-01-20 | End: 2020-05-13 | Stop reason: ALTCHOICE

## 2020-01-20 RX ORDER — LANCETS 30 GAUGE
EACH MISCELLANEOUS
Qty: 100 EACH | Refills: 3 | Status: SHIPPED | OUTPATIENT
Start: 2020-01-20

## 2020-01-20 RX ORDER — GLUCOSAMINE HCL/CHONDROITIN SU 500-400 MG
CAPSULE ORAL
Qty: 100 STRIP | Refills: 3 | Status: SHIPPED | OUTPATIENT
Start: 2020-01-20 | End: 2020-10-23 | Stop reason: SDUPTHER

## 2020-01-22 ENCOUNTER — TELEPHONE (OUTPATIENT)
Dept: GERIATRIC MEDICINE | Age: 55
End: 2020-01-22

## 2020-01-23 ASSESSMENT — ENCOUNTER SYMPTOMS: VISUAL CHANGE: 0

## 2020-01-23 NOTE — PROGRESS NOTES
exam:     Answer:   GOITER    POCT Glucose    [place] CONT GLUC MNTR PHYSICIAN/QHP PROVIDED EQUIPTMENT     Start patient on 75/25 Humalog 20 units twice a day A1c goal of less than 7 patient also educated about insulin pens we will also do 2-week glucose monitoring  A1c goal of less than 7 fasting blood sugar 100-1 40 postprandial 142 200        Orders Placed This Encounter   Medications    blood glucose monitor kit and supplies     Sig: Test 3 times a day & as needed for symptoms of irregular blood glucose. Dispense:  1 kit     Refill:  0     Brand per patient preference. May round up to next available package size.     insulin lispro protamine & lispro (HUMALOG MIX 75/25 KWIKPEN) (75-25) 100 UNIT per ML SUPN injection pen     Si units twice daily     Dispense:  10 pen     Refill:  3    Insulin Pen Needle (NOVOFINE) 32G X 6 MM MISC     Sig: BID     Dispense:  100 each     Refill:  3     More than 50% of 30 minutes spent in patient education counseling thank you Dr. Grayson Mcdonald for the referral          Chaka Byers MD

## 2020-01-24 ENCOUNTER — TELEPHONE (OUTPATIENT)
Dept: ENDOCRINOLOGY | Age: 55
End: 2020-01-24

## 2020-01-30 ENCOUNTER — NURSE ONLY (OUTPATIENT)
Dept: ENDOCRINOLOGY | Age: 55
End: 2020-01-30
Payer: MEDICARE

## 2020-01-30 PROCEDURE — 95251 CONT GLUC MNTR ANALYSIS I&R: CPT | Performed by: INTERNAL MEDICINE

## 2020-02-04 ENCOUNTER — HOSPITAL ENCOUNTER (OUTPATIENT)
Dept: ULTRASOUND IMAGING | Age: 55
Discharge: HOME | End: 2020-02-06
Payer: MEDICARE

## 2020-02-04 ENCOUNTER — HOSPITAL ENCOUNTER (OUTPATIENT)
Dept: LAB | Age: 55
Discharge: HOME | End: 2020-02-04
Payer: MEDICARE

## 2020-02-04 LAB
ALBUMIN SERPL-MCNC: 3.7 G/DL (ref 3.5–4.6)
ALP BLD-CCNC: 123 U/L (ref 40–130)
ALT SERPL-CCNC: 22 U/L (ref 0–33)
ANION GAP SERPL CALCULATED.3IONS-SCNC: 17 MEQ/L (ref 9–15)
AST SERPL-CCNC: 20 U/L (ref 0–35)
BILIRUB SERPL-MCNC: 0.3 MG/DL (ref 0.2–0.7)
BUN BLDV-MCNC: 13 MG/DL (ref 6–20)
CALCIUM SERPL-MCNC: 9.3 MG/DL (ref 8.5–9.9)
CHLORIDE BLD-SCNC: 98 MEQ/L (ref 95–107)
CO2: 23 MEQ/L (ref 20–31)
CREAT SERPL-MCNC: 1.06 MG/DL (ref 0.5–0.9)
CREATININE URINE: 43.3 MG/DL
GFR AFRICAN AMERICAN: >60
GFR NON-AFRICAN AMERICAN: 54.4
GLOBULIN: 3.2 G/DL (ref 2.3–3.5)
GLUCOSE BLD-MCNC: 411 MG/DL (ref 70–99)
HAV IGM SER IA-ACNC: NORMAL
HEPATITIS B CORE IGM ANTIBODY: NORMAL
HEPATITIS B SURFACE ANTIGEN INTERPRETATION: NORMAL
HEPATITIS C ANTIBODY INTERPRETATION: NORMAL
HEPATITIS INTERPRETATION:: NORMAL
MICROALBUMIN UR-MCNC: <1.2 MG/DL
MICROALBUMIN/CREAT UR-RTO: NORMAL MG/G (ref 0–30)
PARATHYROID HORMONE INTACT: 67.1 PG/ML (ref 15–65)
PHOSPHORUS: 3.2 MG/DL (ref 2.3–4.8)
POTASSIUM SERPL-SCNC: 4.2 MEQ/L (ref 3.4–4.9)
SEDIMENTATION RATE, ERYTHROCYTE: 16 MM (ref 0–30)
SODIUM BLD-SCNC: 138 MEQ/L (ref 135–144)
TOTAL PROTEIN: 6.9 G/DL (ref 6.3–8)
URIC ACID, SERUM: 4.6 MG/DL (ref 2.4–5.7)

## 2020-02-04 PROCEDURE — 84550 ASSAY OF BLOOD/URIC ACID: CPT

## 2020-02-04 PROCEDURE — 80053 COMPREHEN METABOLIC PANEL: CPT

## 2020-02-04 PROCEDURE — 93975 VASCULAR STUDY: CPT

## 2020-02-04 PROCEDURE — 84165 PROTEIN E-PHORESIS SERUM: CPT

## 2020-02-04 PROCEDURE — 36415 COLL VENOUS BLD VENIPUNCTURE: CPT

## 2020-02-04 PROCEDURE — 84100 ASSAY OF PHOSPHORUS: CPT

## 2020-02-04 PROCEDURE — 85652 RBC SED RATE AUTOMATED: CPT

## 2020-02-04 PROCEDURE — 82595 ASSAY OF CRYOGLOBULIN: CPT

## 2020-02-04 PROCEDURE — 82043 UR ALBUMIN QUANTITATIVE: CPT

## 2020-02-04 PROCEDURE — 84160 ASSAY OF PROTEIN ANY SOURCE: CPT

## 2020-02-04 PROCEDURE — 76770 US EXAM ABDO BACK WALL COMP: CPT

## 2020-02-04 PROCEDURE — 82570 ASSAY OF URINE CREATININE: CPT

## 2020-02-04 PROCEDURE — 83970 ASSAY OF PARATHORMONE: CPT

## 2020-02-04 PROCEDURE — 86160 COMPLEMENT ANTIGEN: CPT

## 2020-02-04 PROCEDURE — 80074 ACUTE HEPATITIS PANEL: CPT

## 2020-02-04 PROCEDURE — 86039 ANTINUCLEAR ANTIBODIES (ANA): CPT

## 2020-02-05 ENCOUNTER — HOSPITAL ENCOUNTER (EMERGENCY)
Age: 55
Discharge: HOME | End: 2020-02-05
Attending: EMERGENCY MEDICINE
Payer: MEDICARE

## 2020-02-05 ENCOUNTER — APPOINTMENT (OUTPATIENT)
Dept: CT IMAGING | Age: 55
End: 2020-02-05
Payer: MEDICARE

## 2020-02-05 VITALS
HEART RATE: 123 BPM | DIASTOLIC BLOOD PRESSURE: 57 MMHG | OXYGEN SATURATION: 97 % | SYSTOLIC BLOOD PRESSURE: 124 MMHG | BODY MASS INDEX: 23.49 KG/M2 | WEIGHT: 155 LBS | TEMPERATURE: 99.1 F | HEIGHT: 68 IN | RESPIRATION RATE: 17 BRPM

## 2020-02-05 LAB
BACTERIA: NORMAL /HPF
BILIRUBIN URINE: NEGATIVE
BLOOD, URINE: ABNORMAL
CLARITY: CLEAR
COLOR: YELLOW
COMPLEMENT C3: 175 MG/DL (ref 90–180)
COMPLEMENT C4: 26 MG/DL (ref 10–40)
EPITHELIAL CELLS, UA: NORMAL /HPF
GLUCOSE BLD-MCNC: 353 MG/DL
GLUCOSE BLD-MCNC: 353 MG/DL (ref 60–115)
GLUCOSE URINE: 500 MG/DL
KETONES, URINE: NEGATIVE MG/DL
LEUKOCYTE ESTERASE, URINE: NEGATIVE
NITRITE, URINE: NEGATIVE
PERFORMED ON: ABNORMAL
PH UA: 5 (ref 5–9)
PROTEIN UA: ABNORMAL MG/DL
RBC UA: NORMAL /HPF (ref 0–2)
SPECIFIC GRAVITY UA: 1.01 (ref 1–1.03)
URINE REFLEX TO CULTURE: YES
UROBILINOGEN, URINE: 0.2 E.U./DL
WBC UA: NORMAL /HPF (ref 0–5)

## 2020-02-05 PROCEDURE — 74176 CT ABD & PELVIS W/O CONTRAST: CPT

## 2020-02-05 PROCEDURE — 99284 EMERGENCY DEPT VISIT MOD MDM: CPT

## 2020-02-05 PROCEDURE — 87086 URINE CULTURE/COLONY COUNT: CPT

## 2020-02-05 PROCEDURE — 96372 THER/PROPH/DIAG INJ SC/IM: CPT

## 2020-02-05 PROCEDURE — 6360000002 HC RX W HCPCS: Performed by: EMERGENCY MEDICINE

## 2020-02-05 PROCEDURE — 6370000000 HC RX 637 (ALT 250 FOR IP): Performed by: EMERGENCY MEDICINE

## 2020-02-05 PROCEDURE — 81001 URINALYSIS AUTO W/SCOPE: CPT

## 2020-02-05 RX ORDER — ONDANSETRON 4 MG/1
4 TABLET, ORALLY DISINTEGRATING ORAL ONCE
Status: COMPLETED | OUTPATIENT
Start: 2020-02-05 | End: 2020-02-05

## 2020-02-05 RX ORDER — HYDROCODONE BITARTRATE AND ACETAMINOPHEN 5; 325 MG/1; MG/1
1 TABLET ORAL EVERY 6 HOURS PRN
Qty: 10 TABLET | Refills: 0 | Status: SHIPPED | OUTPATIENT
Start: 2020-02-05 | End: 2020-02-08

## 2020-02-05 RX ORDER — MORPHINE SULFATE 4 MG/ML
4 INJECTION, SOLUTION INTRAMUSCULAR; INTRAVENOUS ONCE
Status: COMPLETED | OUTPATIENT
Start: 2020-02-05 | End: 2020-02-05

## 2020-02-05 RX ADMIN — MORPHINE SULFATE 4 MG: 4 INJECTION, SOLUTION INTRAMUSCULAR; INTRAVENOUS at 15:19

## 2020-02-05 RX ADMIN — INSULIN HUMAN 6 UNITS: 100 INJECTION, SOLUTION PARENTERAL at 16:19

## 2020-02-05 RX ADMIN — ONDANSETRON 4 MG: 4 TABLET, ORALLY DISINTEGRATING ORAL at 15:19

## 2020-02-05 ASSESSMENT — ENCOUNTER SYMPTOMS
SHORTNESS OF BREATH: 0
BLOOD IN STOOL: 0
STRIDOR: 0
FACIAL SWELLING: 0
DIARRHEA: 0
EYE REDNESS: 0
WHEEZING: 0
CONSTIPATION: 0
NAUSEA: 1
SINUS PRESSURE: 0
SORE THROAT: 0
TROUBLE SWALLOWING: 0
CHEST TIGHTNESS: 0
COUGH: 0
VOMITING: 0
EYE PAIN: 0
ABDOMINAL PAIN: 1
CHOKING: 0
VOICE CHANGE: 0
BACK PAIN: 0
EYE DISCHARGE: 0

## 2020-02-05 ASSESSMENT — PAIN DESCRIPTION - PAIN TYPE: TYPE: CHRONIC PAIN

## 2020-02-05 ASSESSMENT — PAIN SCALES - GENERAL
PAINLEVEL_OUTOF10: 9
PAINLEVEL_OUTOF10: 7

## 2020-02-05 ASSESSMENT — PAIN DESCRIPTION - LOCATION: LOCATION: ABDOMEN

## 2020-02-05 NOTE — ED PROVIDER NOTES
[facility] ED  eMERGENCY dEPARTMENT eNCOUnter      Pt Name: Nisreen Turcios  MRN: [id number]  [place] 1965  Date of evaluation: 2/5/2020  Provider: Ananda Cr MD    [address] [place] [address]       Chief Complaint   Patient presents with    Abdominal Pain     Lower left ABD pain. Onset- 3 days. HISTORY OF PRESENT ILLNESS   (Location/Symptom, Timing/Onset,Context/Setting, Quality, Duration, Modifying Factors, Severity)  Note limiting factors. Nisreen Turcios is a 48 y.o. female who presents to the emergency department patient with multiple medical issues including diabetes mellitus lupus hyperlipidemia hypothyroidism anxiety depression hypertension chronic fibromyalgia patient was seen by Dr. Zhanna Olvera yesterday and underwent a blood test sugar at that time yesterday was 400 patient come to this emergency because of the ongoing pain for the left flank pain no documented fever patient is status post appendectomy cholecystectomy and hysterectomy patient has bilateral inguinal hernia as well as abdominal wall hernia with placement of mesh denies any constipation has bowel movement 2 hours ago  HPI    NursingNotes were reviewed. REVIEW OF SYSTEMS    (2-9 systems for level 4, 10 or more for level 5)     Review of Systems   Constitutional: Negative. Negative for activity change and fever. HENT: Negative for congestion, drooling, facial swelling, mouth sores, nosebleeds, sinus pressure, sore throat, trouble swallowing and voice change. Eyes: Negative for pain, discharge, redness and visual disturbance. Respiratory: Negative for cough, choking, chest tightness, shortness of breath, wheezing and stridor. Cardiovascular: Negative for chest pain, palpitations and leg swelling. Gastrointestinal: Positive for abdominal pain and nausea. Negative for blood in stool, constipation, diarrhea and vomiting. Endocrine: Negative for cold intolerance, polyphagia and polyuria.    Genitourinary: Negative for [penicillins]; Seasonal; Toradol [ketorolac tromethamine]; and Ultram [tramadol]    FAMILY HISTORY       Family History   Adopted: Yes          SOCIAL HISTORY       Social History     Socioeconomic History    Marital status: Legally      Spouse name: None    Number of children: None    Years of education: None    Highest education level: None   Occupational History    None   Social Needs    Financial resource strain: None    Food insecurity:     Worry: None     Inability: None    Transportation needs:     Medical: None     Non-medical: None   Tobacco Use    Smoking status: Never Smoker    Smokeless tobacco: Never Used   Substance and Sexual Activity    Alcohol use: No     Alcohol/week: 0.0 standard drinks     Comment: denies    Drug use: No     Comment: denies    Sexual activity: Not Currently   Lifestyle    Physical activity:     Days per week: None     Minutes per session: None    Stress: None   Relationships    Social connections:     Talks on phone: None     Gets together: None     Attends [employer] service: None     Active member of club or organization: None     Attends meetings of clubs or organizations: None     Relationship status: None    Intimate partner violence:     Fear of current or ex partner: None     Emotionally abused: None     Physically abused: None     Forced sexual activity: None   Other Topics Concern    None   Social History Narrative    None       SCREENINGS    Aries Coma Scale  Eye Opening: Spontaneous  Best Verbal Response: Oriented  Best Motor Response: Obeys commands  Aries Coma Scale Score: 15 @FLOW([id number])@      PHYSICAL EXAM    (up to 7 for level 4, 8 or more for level 5)     ED Triage Vitals [02/05/20 1452]   BP Temp Temp Source Pulse Resp SpO2 Height Weight   127/66 98.4 °F (36.9 °C) Oral 122 16 97 % 5' 8\" (1.727 m) 155 lb (70.3 kg)       Physical Exam  Vitals signs and nursing note reviewed. Constitutional:       Appearance: Normal appearance. She is well-developed. HENT:      Head: Normocephalic and atraumatic. Left Ear: Tympanic membrane normal.      Nose: Nose normal.      Mouth/Throat:      Mouth: Mucous membranes are moist.   Eyes:      Pupils: Pupils are equal, round, and reactive to light. Neck:      Musculoskeletal: Normal range of motion and neck supple. Cardiovascular:      Rate and Rhythm: Normal rate. Heart sounds: Normal heart sounds. No murmur. No gallop. Pulmonary:      Effort: No respiratory distress. Breath sounds: Normal breath sounds. No wheezing. Abdominal:      General: Bowel sounds are normal.      Palpations: Abdomen is soft. There is no mass. Tenderness: There is abdominal tenderness. There is no rebound. Comments: Patient come to the abdomen no distention no guarding no rebound tenderness patient has no obvious hernia but is mild diffuse tenderness to the left lower flank noted   Musculoskeletal: Normal range of motion. General: No tenderness. Skin:     General: Skin is warm. Findings: No erythema or rash. Neurological:      Mental Status: She is alert and oriented to person, place, and time. Cranial Nerves: No cranial nerve deficit. Motor: No abnormal muscle tone. Psychiatric:         Behavior: Behavior normal.         Thought Content: Thought content normal.         DIAGNOSTIC RESULTS     EKG: All EKG's are interpreted by the Emergency Department Physician who either signs or Co-signsthis chart in the absence of a cardiologist.        RADIOLOGY:   Curlene Pop such as CT, Ultrasound and MRI are read by the radiologist. Plain radiographic images are visualized and preliminarily interpreted by the emergency physician with the below findings:      Interpretation per the Radiologist below, if available at the time ofthis note:    CT ABDOMEN PELVIS WO CONTRAST Additional Contrast? None   Final Result   1.  Nonobstructing right renal calculus measuring

## 2020-02-06 LAB
ANTINUCLEAR AB INTERPRETIVE COMMENT: NORMAL
ANTINUCLEAR ANTIBODY, HEP-2, IGG: NORMAL

## 2020-02-07 LAB
ALBUMIN SERPL-MCNC: 3.63 G/DL (ref 3.75–5.01)
ALPHA-1-GLOBULIN: 0.33 G/DL (ref 0.19–0.46)
ALPHA-2-GLOBULIN: 0.94 G/DL (ref 0.48–1.05)
BETA GLOBULIN: 0.81 G/DL (ref 0.48–1.1)
GAMMA GLOBULIN: 0.88 G/DL (ref 0.62–1.51)
PROTEIN ELECTROPHORESIS, SERUM: ABNORMAL
SPE/IFE INTERPRETATION: ABNORMAL
TOTAL PROTEIN: 6.6 G/DL (ref 6.3–8.2)
URINE CULTURE, ROUTINE: NORMAL

## 2020-02-09 LAB — CRYOGLOBULIN, QUALITATIVE: NORMAL

## 2020-02-10 ENCOUNTER — HOSPITAL ENCOUNTER (OUTPATIENT)
Age: 55
Setting detail: SPECIMEN
Discharge: HOME | End: 2020-02-10
Payer: MEDICARE

## 2020-02-10 PROCEDURE — 83835 ASSAY OF METANEPHRINES: CPT

## 2020-02-10 PROCEDURE — 84156 ASSAY OF PROTEIN URINE: CPT

## 2020-02-10 PROCEDURE — 84105 ASSAY OF URINE PHOSPHORUS: CPT

## 2020-02-10 PROCEDURE — 82507 ASSAY OF CITRATE: CPT

## 2020-02-10 PROCEDURE — 83735 ASSAY OF MAGNESIUM: CPT

## 2020-02-10 PROCEDURE — 84133 ASSAY OF URINE POTASSIUM: CPT

## 2020-02-10 PROCEDURE — 82340 ASSAY OF CALCIUM IN URINE: CPT

## 2020-02-10 PROCEDURE — 83945 ASSAY OF OXALATE: CPT

## 2020-02-10 PROCEDURE — 84300 ASSAY OF URINE SODIUM: CPT

## 2020-02-10 PROCEDURE — 84560 ASSAY OF URINE/URIC ACID: CPT

## 2020-02-10 PROCEDURE — 83986 ASSAY PH BODY FLUID NOS: CPT

## 2020-02-10 PROCEDURE — 82575 CREATININE CLEARANCE TEST: CPT

## 2020-02-10 PROCEDURE — 82436 ASSAY OF URINE CHLORIDE: CPT

## 2020-02-13 ENCOUNTER — OFFICE VISIT (OUTPATIENT)
Dept: ENDOCRINOLOGY | Age: 55
End: 2020-02-13
Payer: MEDICARE

## 2020-02-13 VITALS
BODY MASS INDEX: 23.64 KG/M2 | HEIGHT: 68 IN | SYSTOLIC BLOOD PRESSURE: 104 MMHG | WEIGHT: 156 LBS | HEART RATE: 115 BPM | DIASTOLIC BLOOD PRESSURE: 73 MMHG

## 2020-02-13 LAB — HBA1C MFR BLD: 10.5 %

## 2020-02-13 PROCEDURE — 1036F TOBACCO NON-USER: CPT | Performed by: INTERNAL MEDICINE

## 2020-02-13 PROCEDURE — G8420 CALC BMI NORM PARAMETERS: HCPCS | Performed by: INTERNAL MEDICINE

## 2020-02-13 PROCEDURE — 99213 OFFICE O/P EST LOW 20 MIN: CPT | Performed by: INTERNAL MEDICINE

## 2020-02-13 PROCEDURE — 83036 HEMOGLOBIN GLYCOSYLATED A1C: CPT | Performed by: INTERNAL MEDICINE

## 2020-02-13 PROCEDURE — G8484 FLU IMMUNIZE NO ADMIN: HCPCS | Performed by: INTERNAL MEDICINE

## 2020-02-13 PROCEDURE — G8427 DOCREV CUR MEDS BY ELIG CLIN: HCPCS | Performed by: INTERNAL MEDICINE

## 2020-02-13 PROCEDURE — 3017F COLORECTAL CA SCREEN DOC REV: CPT | Performed by: INTERNAL MEDICINE

## 2020-02-13 RX ORDER — INSULIN LISPRO 100 [IU]/ML
15 INJECTION, SOLUTION INTRAVENOUS; SUBCUTANEOUS
Qty: 10 PEN | Refills: 3 | Status: SHIPPED | OUTPATIENT
Start: 2020-02-13 | End: 2020-05-13 | Stop reason: SDUPTHER

## 2020-02-14 NOTE — PROGRESS NOTES
Subjective:      Patient ID: Gabriel Bullock is a 48 y.o. female. 4-week follow-up on type 2 diabetes freestyle CGM review  Diabetes   She presents for her follow-up diabetic visit. She has type 2 diabetes mellitus. Current diabetic treatment includes insulin injections. She is currently taking insulin pre-breakfast and pre-dinner. Her overall blood glucose range is >200 mg/dl.  (Reviewed 2-week glucose monitoring average blood sugar was 312 but significant variability noted in her blood sugar logs overall  )     Patient Active Problem List   Diagnosis    DM [facility]diabetes mellitus) ([facility] [address]    Hyperlipemia    Hypothyroid    Vitamin D deficiency    SLE (systemic lupus erythematosus related syndrome) ([facility]    Asthma    Panic disorder with agoraphobia    Hereditary essential tremor    Allergic rhinitis    Type 2 diabetes mellitus with diabetic mononeuropathy ([facility])    Raynaud's phenomenon without gangrene    Essential hypertension    Type 2 diabetes mellitus without complication ([facility])    Disseminated lupus erythematosus ([facility])    Foot fracture, left    Acute non-recurrent frontal sinusitis    Gastritis without bleeding    Dysphagia    Gastric polyp     Allergies   Allergen Reactions    Aspirin Swelling     Bloody noses    Imitrex [Sumatriptan] Hives    Pcn [Penicillins] Swelling    Seasonal Itching     Itch, sneezing,runny nose, watery eyes    Toradol [Ketorolac Tromethamine] Hives     Shortness of breath    Ultram [Tramadol] Hives       Current Outpatient Medications:     insulin glargine (LANTUS SOLOSTAR) 100 UNIT/ML injection pen, Inject 60 Units into the skin nightly, Disp: 10 pen, Rfl: 3    insulin lispro, 1 Unit Dial, (HUMALOG KWIKPEN) 100 UNIT/ML SOPN, Inject 15 Units into the skin 3 times daily (before meals), Disp: 10 pen, Rfl: 3    Insulin Pen Needle (NOVOFINE) 32G X 6 MM MISC, qid, Disp: 300 each, Rfl: 3    NOVOLOG MIX 70/30 FLEXPEN (70-30) 100 UNIT/ML injection, Inject 20 units bid, Disp: 5 pen, Rfl: 3    blood glucose monitor strips, Pt test 3x daily Dx E11.65, Disp: 100 strip, Rfl: 3    blood glucose monitor kit and supplies, Please give 1 meter covered by insurance Dx E11.65, Disp: 1 kit, Rfl: 0    Lancets MISC, Pt test 3x daily Dx E11.65, Disp: 100 each, Rfl: 3    [employer] [facility] UNILET LANCETS 30G MISC, Check blood sugars twice daily or as directed., Disp: 200 each, Rfl: 3    blood glucose test strips (ASCENSIA AUTODISC VI;ONE TOUCH ULTRA TEST VI) strip, Check blood sugars twice daily or as directed., Disp: 200 strip, Rfl: 3    blood glucose monitor kit and supplies, Test 3 times a day & as needed for symptoms of irregular blood glucose., Disp: 1 kit, Rfl: 0    Insulin Pen Needle (NOVOFINE) 32G X 6 MM MISC, BID, Disp: 100 each, Rfl: 3    estradiol (CLIMARA) 0.025 MG/24HR, Place 0.025 patches onto the skin every 7 days, Disp: , Rfl: 11    gabapentin (NEURONTIN) 300 MG capsule, Take 300 mg by mouth 2 times daily. , Disp: , Rfl: 3    venlafaxine (EFFEXOR) 75 MG tablet, Take 75 mg by mouth daily, Disp: , Rfl:     metoclopramide (REGLAN) 5 MG tablet, Take 1 tablet by mouth 4 times daily 1/2 hour before meal and at bedtime, Disp: 120 tablet, Rfl: 3    metoclopramide (REGLAN) 10 MG tablet, Take 10 mg by mouth, Disp: , Rfl:     albuterol sulfate HFA (VENTOLIN HFA) 108 (90 Base) MCG/ACT inhaler, Inhale 2 puffs into the lungs every 6 hours as needed for Wheezing, Disp: 1 Inhaler, Rfl: 0    rosuvastatin (CRESTOR) 40 MG tablet, Take 1 tablet by mouth daily, Disp: 90 tablet, Rfl: 3    pantoprazole (PROTONIX) 40 MG tablet, Take 1 tablet by mouth daily, Disp: 90 tablet, Rfl: 3    levothyroxine (SYNTHROID) 50 MCG tablet, Take 1 tablet by mouth daily, Disp: 90 tablet, Rfl: 3    Blood Glucose Monitoring Suppl (TRUE TRACK BLOOD GLUCOSE) CAROLE, Check blood sugars twice daily or as directed., Disp: 100 Device, Rfl: 5    hydroxychloroquine (PLAQUENIL) 200 MG tablet, Take 1 tablet by mouth 2 times

## 2020-02-19 LAB
CALCIUM 24 HOUR URINE: 62 MG/D
CALCIUM URINE: 25 MG/DL
CHLORIDE 24 HOUR URINE: 32 MMOL/D (ref 140–250)
CHLORIDE URINE RANDOM: 126 MMOL/L
CITRIC ACID, U, 24HR: 172 MG/D (ref 320–1240)
CITRIC ACID, URINE: 688 MG/L
CREATININE 24 HOUR URINE: 238 MG/D (ref 500–1400)
CREATININE URINE: 95 MG/DL
HOURS COLLECTED: 24 HR
Lab: 43 MMOL/L
MAGNESIUM 24 HOUR URINE: 11 MG/D (ref 12–199)
MAGNESIUM URINE: 4.4 MG/DL
OXALATES, URINE 24HR: 6 MG/D (ref 13–40)
OXALATES, URINE: 26 MG/L
PH, URINE: 5.72 (ref 5–7.5)
PHOSPHORUS  URINE: 51 MG/DL
PHOSPHORUS, 24H UR: 128 MG/D (ref 400–1300)
POTASSIUM 24 HOUR URINE: 11 MMOL/D (ref 25–125)
SODIUM 24 HOUR URINE: 34 MMOL/D (ref 51–286)
SODIUM URINE: 135 MMOL/L
URIC ACID 24 HOUR URINE: 120 MG/D (ref 250–750)
URIC ACID, UR: 47.8 MG/DL
URINE TOTAL VOLUME: 250 ML

## 2020-04-28 ENCOUNTER — HOSPITAL ENCOUNTER (OUTPATIENT)
Dept: LAB | Age: 55
Discharge: HOME | End: 2020-04-28
Payer: MEDICARE

## 2020-04-28 LAB
ANION GAP SERPL CALCULATED.3IONS-SCNC: 13 MEQ/L (ref 9–15)
BUN BLDV-MCNC: 11 MG/DL (ref 6–20)
CALCIUM SERPL-MCNC: 8.7 MG/DL (ref 8.5–9.9)
CHLORIDE BLD-SCNC: 101 MEQ/L (ref 95–107)
CO2: 23 MEQ/L (ref 20–31)
CREAT SERPL-MCNC: 1.47 MG/DL (ref 0.5–0.9)
CREATININE URINE: 150.1 MG/DL
GFR AFRICAN AMERICAN: 45.1
GFR NON-AFRICAN AMERICAN: 37.2
GLUCOSE BLD-MCNC: 260 MG/DL (ref 70–99)
HBA1C MFR BLD: 10 % (ref 4.8–5.9)
MICROALBUMIN UR-MCNC: 10.9 MG/DL
MICROALBUMIN/CREAT UR-RTO: 72.6 MG/G (ref 0–30)
POTASSIUM SERPL-SCNC: 4.5 MEQ/L (ref 3.4–4.9)
SODIUM BLD-SCNC: 137 MEQ/L (ref 135–144)

## 2020-04-28 PROCEDURE — 82570 ASSAY OF URINE CREATININE: CPT

## 2020-04-28 PROCEDURE — 83036 HEMOGLOBIN GLYCOSYLATED A1C: CPT

## 2020-04-28 PROCEDURE — 82043 UR ALBUMIN QUANTITATIVE: CPT

## 2020-04-28 PROCEDURE — 36415 COLL VENOUS BLD VENIPUNCTURE: CPT

## 2020-04-28 PROCEDURE — 80048 BASIC METABOLIC PNL TOTAL CA: CPT

## 2020-05-13 ENCOUNTER — TELEPHONE (OUTPATIENT)
Dept: ENDOCRINOLOGY | Age: 55
End: 2020-05-13

## 2020-05-13 ENCOUNTER — VIRTUAL VISIT (OUTPATIENT)
Dept: ENDOCRINOLOGY | Age: 55
End: 2020-05-13
Payer: MEDICARE

## 2020-05-13 PROCEDURE — 99213 OFFICE O/P EST LOW 20 MIN: CPT | Performed by: INTERNAL MEDICINE

## 2020-05-13 RX ORDER — INSULIN GLARGINE 100 [IU]/ML
75 INJECTION, SOLUTION SUBCUTANEOUS NIGHTLY
Qty: 10 PEN | Refills: 3 | Status: ON HOLD
Start: 2020-05-13 | End: 2020-06-28 | Stop reason: ALTCHOICE

## 2020-05-13 RX ORDER — INSULIN LISPRO 100 [IU]/ML
20 INJECTION, SOLUTION INTRAVENOUS; SUBCUTANEOUS
Qty: 10 PEN | Refills: 3 | Status: ON HOLD | OUTPATIENT
Start: 2020-05-13 | End: 2021-05-05

## 2020-05-13 NOTE — PROGRESS NOTES
2020    TELEHEALTH EVALUATION -- Audio/Visual (During -82 public health emergency)    Due to [place] outbreak, patient's office visit was converted to a virtual visit. Patient was contacted and agreed to proceed with a virtual visit via Telephone Visit  The risks and benefits of converting to a virtual visit were discussed in light of the current infectious disease epidemic. Patient also understood that insurance coverage and co-pays are up to their individual insurance plans. HPI: Patient made aware this is a telephone visit billable visit agreed to proceed    Suzanne Kim (:  1965) has requested an audio/video evaluation for the following concern(s):    3 month follow-up on type 2 diabetes blood sugars are still high fasting as well as postprandial started 3 months ago Lantus 60 at night Humalog 15 with each meals A1c down to 10 from 10.5 currently also using freestyle jose      Results for Modesta Black (MRN [id number]) as of 2020 11:27   Ref. Range 2017 15:58 2017 19:06 2019 16:59 2020 11:16 2020 10:04   Hemoglobin A1C Latest Ref Range: 4.8 - 5.9 % 6.0 (H) 6.4 (H) 9.4 (H) 10.5 10.0 (H)     Results for Modesta Black (MRN [id number]) as of 2020 11:27   Ref. Range 2020 10:04   Sodium Latest Ref Range: 135 - 144 mEq/L 137   Potassium Latest Ref Range: 3.4 - 4.9 mEq/L 4.5   Chloride Latest Ref Range: 95 - 107 mEq/L 101   CO2 Latest Ref Range: 20 - 31 mEq/L 23   BUN Latest Ref Range: 6 - 20 mg/dL 11   Creatinine Latest Ref Range: 0.50 - 0.90 mg/dL 1.47 (H)   Anion Gap Latest Ref Range: 9 - 15 mEq/L 13   GFR Non- Latest Ref Range: >60  37.2 (L)   GFR  Latest Ref Range: >60  45.1 (L)   Glucose Latest Ref Range: 70 - 99 mg/dL 260 (H)   Calcium Latest Ref Range: 8.5 - 9.9 mg/dL 8.7   Hemoglobin A1C Latest Ref Range: 4.8 - 5.9 % 10.0 (H)         Review of Systems    Prior to Visit Medications    Medication Sig Taking?

## 2020-05-21 ENCOUNTER — APPOINTMENT (OUTPATIENT)
Dept: CT IMAGING | Age: 55
End: 2020-05-21
Payer: MEDICARE

## 2020-05-21 ENCOUNTER — HOSPITAL ENCOUNTER (EMERGENCY)
Age: 55
Discharge: HOME | End: 2020-05-21
Payer: MEDICARE

## 2020-05-21 VITALS
HEART RATE: 112 BPM | BODY MASS INDEX: 24.25 KG/M2 | SYSTOLIC BLOOD PRESSURE: 107 MMHG | DIASTOLIC BLOOD PRESSURE: 86 MMHG | WEIGHT: 160 LBS | OXYGEN SATURATION: 98 % | RESPIRATION RATE: 16 BRPM | HEIGHT: 68 IN | TEMPERATURE: 98.5 F

## 2020-05-21 LAB
ALBUMIN SERPL-MCNC: 4.3 G/DL (ref 3.5–4.6)
ALP BLD-CCNC: 125 U/L (ref 40–130)
ALT SERPL-CCNC: 14 U/L (ref 0–33)
ANION GAP SERPL CALCULATED.3IONS-SCNC: 11 MEQ/L (ref 9–15)
AST SERPL-CCNC: 17 U/L (ref 0–35)
BACTERIA: ABNORMAL /HPF
BASOPHILS ABSOLUTE: 0.1 K/UL (ref 0–0.2)
BASOPHILS RELATIVE PERCENT: 1 %
BILIRUB SERPL-MCNC: 0.3 MG/DL (ref 0.2–0.7)
BILIRUBIN URINE: NEGATIVE
BLOOD, URINE: NEGATIVE
BUN BLDV-MCNC: 8 MG/DL (ref 6–20)
CALCIUM SERPL-MCNC: 9.4 MG/DL (ref 8.5–9.9)
CHLORIDE BLD-SCNC: 97 MEQ/L (ref 95–107)
CLARITY: CLEAR
CO2: 26 MEQ/L (ref 20–31)
COLOR: YELLOW
CREAT SERPL-MCNC: 1.16 MG/DL (ref 0.5–0.9)
EOSINOPHILS ABSOLUTE: 1.1 K/UL (ref 0–0.7)
EOSINOPHILS RELATIVE PERCENT: 9 %
EPITHELIAL CELLS, UA: ABNORMAL /HPF (ref 0–5)
GFR AFRICAN AMERICAN: 59.2
GFR NON-AFRICAN AMERICAN: 48.9
GLOBULIN: 3.3 G/DL (ref 2.3–3.5)
GLUCOSE BLD-MCNC: 242 MG/DL (ref 70–99)
GLUCOSE URINE: 500 MG/DL
HCT VFR BLD CALC: 42.9 % (ref 37–47)
HEMOGLOBIN: 14.5 G/DL (ref 12–16)
HYALINE CASTS: ABNORMAL /HPF (ref 0–5)
KETONES, URINE: NEGATIVE MG/DL
LEUKOCYTE ESTERASE, URINE: ABNORMAL
LYMPHOCYTES ABSOLUTE: 3.5 K/UL (ref 1–4.8)
LYMPHOCYTES RELATIVE PERCENT: 30 %
MCH RBC QN AUTO: 28.2 PG (ref 27–31.3)
MCHC RBC AUTO-ENTMCNC: 33.9 % (ref 33–37)
MCV RBC AUTO: 83.2 FL (ref 82–100)
MONOCYTES ABSOLUTE: 0.7 K/UL (ref 0.2–0.8)
MONOCYTES RELATIVE PERCENT: 5.7 %
NEUTROPHILS ABSOLUTE: 6.4 K/UL (ref 1.4–6.5)
NEUTROPHILS RELATIVE PERCENT: 55 %
NITRITE, URINE: NEGATIVE
PDW BLD-RTO: 13.8 % (ref 11.5–14.5)
PH UA: 5 (ref 5–9)
PLATELET # BLD: 285 K/UL (ref 130–400)
PLATELET SLIDE REVIEW: NORMAL
POTASSIUM SERPL-SCNC: 4.2 MEQ/L (ref 3.4–4.9)
PROTEIN UA: 30 MG/DL
RBC # BLD: 5.15 M/UL (ref 4.2–5.4)
RBC UA: ABNORMAL /HPF (ref 0–5)
SLIDE REVIEW: ABNORMAL
SMUDGE CELLS: 1.9
SODIUM BLD-SCNC: 134 MEQ/L (ref 135–144)
SPECIFIC GRAVITY UA: 1.01 (ref 1–1.03)
TOTAL PROTEIN: 7.6 G/DL (ref 6.3–8)
URINE REFLEX TO CULTURE: YES
UROBILINOGEN, URINE: 0.2 E.U./DL
WBC # BLD: 11.7 K/UL (ref 4.8–10.8)
WBC UA: ABNORMAL /HPF (ref 0–5)

## 2020-05-21 PROCEDURE — 81001 URINALYSIS AUTO W/SCOPE: CPT

## 2020-05-21 PROCEDURE — 6370000000 HC RX 637 (ALT 250 FOR IP): Performed by: PHYSICIAN ASSISTANT

## 2020-05-21 PROCEDURE — 96375 TX/PRO/DX INJ NEW DRUG ADDON: CPT

## 2020-05-21 PROCEDURE — 96376 TX/PRO/DX INJ SAME DRUG ADON: CPT

## 2020-05-21 PROCEDURE — 85025 COMPLETE CBC W/AUTO DIFF WBC: CPT

## 2020-05-21 PROCEDURE — 36415 COLL VENOUS BLD VENIPUNCTURE: CPT

## 2020-05-21 PROCEDURE — 80053 COMPREHEN METABOLIC PANEL: CPT

## 2020-05-21 PROCEDURE — 2580000003 HC RX 258: Performed by: PHYSICIAN ASSISTANT

## 2020-05-21 PROCEDURE — 99284 EMERGENCY DEPT VISIT MOD MDM: CPT

## 2020-05-21 PROCEDURE — 6360000002 HC RX W HCPCS: Performed by: PHYSICIAN ASSISTANT

## 2020-05-21 PROCEDURE — 87086 URINE CULTURE/COLONY COUNT: CPT

## 2020-05-21 PROCEDURE — 96374 THER/PROPH/DIAG INJ IV PUSH: CPT

## 2020-05-21 PROCEDURE — 74150 CT ABDOMEN W/O CONTRAST: CPT

## 2020-05-21 PROCEDURE — 96361 HYDRATE IV INFUSION ADD-ON: CPT

## 2020-05-21 RX ORDER — NITROFURANTOIN 25; 75 MG/1; MG/1
100 CAPSULE ORAL 2 TIMES DAILY
Qty: 20 CAPSULE | Refills: 0 | Status: SHIPPED | OUTPATIENT
Start: 2020-05-21 | End: 2020-05-31

## 2020-05-21 RX ORDER — 0.9 % SODIUM CHLORIDE 0.9 %
1000 INTRAVENOUS SOLUTION INTRAVENOUS ONCE
Status: COMPLETED | OUTPATIENT
Start: 2020-05-21 | End: 2020-05-21

## 2020-05-21 RX ORDER — FLUCONAZOLE 150 MG/1
150 TABLET ORAL ONCE
Qty: 2 TABLET | Refills: 0 | Status: SHIPPED | OUTPATIENT
Start: 2020-05-21 | End: 2020-05-21

## 2020-05-21 RX ORDER — MORPHINE SULFATE 2 MG/ML
4 INJECTION, SOLUTION INTRAMUSCULAR; INTRAVENOUS ONCE
Status: COMPLETED | OUTPATIENT
Start: 2020-05-21 | End: 2020-05-21

## 2020-05-21 RX ORDER — ONDANSETRON 4 MG/1
4 TABLET, ORALLY DISINTEGRATING ORAL EVERY 8 HOURS PRN
Qty: 20 TABLET | Refills: 0 | Status: SHIPPED | OUTPATIENT
Start: 2020-05-21 | End: 2020-06-23

## 2020-05-21 RX ORDER — NITROFURANTOIN 25; 75 MG/1; MG/1
100 CAPSULE ORAL ONCE
Status: COMPLETED | OUTPATIENT
Start: 2020-05-21 | End: 2020-05-21

## 2020-05-21 RX ORDER — MORPHINE SULFATE 2 MG/ML
2 INJECTION, SOLUTION INTRAMUSCULAR; INTRAVENOUS ONCE
Status: COMPLETED | OUTPATIENT
Start: 2020-05-21 | End: 2020-05-21

## 2020-05-21 RX ORDER — HYDROCODONE BITARTRATE AND ACETAMINOPHEN 5; 325 MG/1; MG/1
1 TABLET ORAL EVERY 6 HOURS PRN
Qty: 10 TABLET | Refills: 0 | Status: SHIPPED | OUTPATIENT
Start: 2020-05-21 | End: 2020-05-24

## 2020-05-21 RX ORDER — ONDANSETRON 2 MG/ML
4 INJECTION INTRAMUSCULAR; INTRAVENOUS ONCE
Status: COMPLETED | OUTPATIENT
Start: 2020-05-21 | End: 2020-05-21

## 2020-05-21 RX ADMIN — ONDANSETRON 4 MG: 2 INJECTION INTRAMUSCULAR; INTRAVENOUS at 20:31

## 2020-05-21 RX ADMIN — MORPHINE SULFATE 4 MG: 2 INJECTION, SOLUTION INTRAMUSCULAR; INTRAVENOUS at 19:18

## 2020-05-21 RX ADMIN — SODIUM CHLORIDE 1000 ML: 9 INJECTION, SOLUTION INTRAVENOUS at 18:35

## 2020-05-21 RX ADMIN — MORPHINE SULFATE 2 MG: 2 INJECTION, SOLUTION INTRAMUSCULAR; INTRAVENOUS at 20:31

## 2020-05-21 RX ADMIN — MORPHINE SULFATE 4 MG: 2 INJECTION, SOLUTION INTRAMUSCULAR; INTRAVENOUS at 18:37

## 2020-05-21 RX ADMIN — ONDANSETRON 4 MG: 2 INJECTION INTRAMUSCULAR; INTRAVENOUS at 18:35

## 2020-05-21 RX ADMIN — NITROFURANTOIN (MONOHYDRATE/MACROCRYSTALS) 100 MG: 75; 25 CAPSULE ORAL at 20:31

## 2020-05-21 ASSESSMENT — PAIN SCALES - GENERAL
PAINLEVEL_OUTOF10: 5
PAINLEVEL_OUTOF10: 10
PAINLEVEL_OUTOF10: 10
PAINLEVEL_OUTOF10: 5
PAINLEVEL_OUTOF10: 9

## 2020-05-21 ASSESSMENT — ENCOUNTER SYMPTOMS
COLOR CHANGE: 0
SHORTNESS OF BREATH: 0
APNEA: 0
ALLERGIC/IMMUNOLOGIC NEGATIVE: 1
EYE PAIN: 0
ABDOMINAL PAIN: 1
TROUBLE SWALLOWING: 0

## 2020-05-21 ASSESSMENT — PAIN DESCRIPTION - ORIENTATION: ORIENTATION: LEFT

## 2020-05-21 ASSESSMENT — PAIN DESCRIPTION - LOCATION: LOCATION: FLANK

## 2020-05-21 NOTE — ED PROVIDER NOTES
[facility] ED  eMERGENCYdEPARTMENT eNCOUnter      Pt Name: Brenna Oviedo  MRN: [id number]  [place] 1965  Date of evaluation: 5/21/2020  Provider:Dada Garcia PA-C    CHIEF COMPLAINT       Chief Complaint   Patient presents with    Flank Pain     left side, started yesterday         HISTORY OF PRESENT ILLNESS  (Location/Symptom, Timing/Onset, Context/Setting, Quality, Duration, Modifying Factors, Severity.)   Suzanne Kim is a 48 y.o. female who presents to the emergency department with complaints of left flank pain that radiates in the left lower quadrant of the abdomen, began yesterday and acutely worsened in severity today. Patient has a history of kidney stones in the past with similar symptoms, last stone being approximately 1 year ago. Patient rates her pain at \"11\" out of 10 today and states that yesterday it started as a dull aching pain with urinary frequency but no dysuria. Patient has not noticed blood in her urine. Patient denies any nausea, vomiting or diarrhea. Patient denies any recent cough, shortness of breath, fevers or chills    HPI    Nursing Notes were reviewed and I agree. REVIEW OF SYSTEMS    (2-9 systems for level 4, 10 or more for level 5)     Review of Systems   Constitutional: Negative for diaphoresis and fever. HENT: Negative for hearing loss and trouble swallowing. Eyes: Negative for pain. Respiratory: Negative for apnea and shortness of breath. Cardiovascular: Negative for chest pain. Gastrointestinal: Positive for abdominal pain. Endocrine: Negative. Genitourinary: Positive for flank pain and frequency. Negative for dysuria and hematuria. Musculoskeletal: Negative for neck pain and neck stiffness. Skin: Negative for color change. Allergic/Immunologic: Negative. Neurological: Negative for dizziness and numbness. Hematological: Negative. Psychiatric/Behavioral: Negative. All other systems reviewed and are negative. Except as noted above the remainder of the review of systems was reviewed and negative. PAST MEDICAL HISTORY     Past Medical History:   Diagnosis Date    Anxiety     Asthma     as child    Depression     Depression     dysthymia - following lupus dx age 23    Diabetes mellitus ([facility] [address]     Foot fracture, left     GERD (gastroesophageal reflux disease)     Hyperlipidemia     Hypothyroidism     Kidney stones     Lupus ([facility] [address]     dr barrera -     Migraine     PONV (postoperative nausea and vomiting)          SURGICAL HISTORY       Past Surgical History:   Procedure Laterality Date    APPENDECTOMY      ARTHRODESIS Left 11/26/2019    ARTHRODESIS OF THE FIRST METATARSAL PHALANGEAL JOINT LEFT FOOT performed by Dona Dos Santos DPM at [facility]      COLONOSCOPY      FOOT CLOSED REDUCTION Left 1/3/2017    LEFT FOOT PERCUTANEOUS PINNING Noretta Jerson FRACTURE performed by Andrew Shore MD at [address]      UT ESOPHAGOGASTRODUODENOSCOPY TRANSORAL DIAGNOSTIC N/A 3/8/2017    EGD ESOPHAGOGASTRODUODENOSCOPY WITH DILATION performed by Ahsan Barrett MD at [address] ESOPHAGOGASTRODUODENOSCOPY TRANSORAL DIAGNOSTIC N/A 11/7/2018    EGD ESOPHAGOGASTRODUODENOSCOPY WITH DILATION performed by Ahsan Barrett MD at [address] [place] [address]       Previous Medications    ALBUTEROL SULFATE HFA (VENTOLIN HFA) 108 (90 BASE) MCG/ACT INHALER    Inhale 2 puffs into the lungs every 6 hours as needed for Wheezing    BLOOD GLUCOSE MONITOR KIT AND SUPPLIES    Test 3 times a day & as needed for symptoms of irregular blood glucose.     BLOOD GLUCOSE MONITOR KIT AND SUPPLIES    Please give 1 meter covered by insurance Dx E11.65    BLOOD GLUCOSE MONITOR STRIPS    Pt test 3x daily Dx E11.65    BLOOD GLUCOSE Worry: None     Inability: None    Transportation needs     Medical: None     Non-medical: None   Tobacco Use    Smoking status: Never Smoker    Smokeless tobacco: Never Used   Substance and Sexual Activity    Alcohol use: No     Alcohol/week: 0.0 standard drinks     Comment: denies    Drug use: No     Comment: denies    Sexual activity: Not Currently   Lifestyle    Physical activity     Days per week: None     Minutes per session: None    Stress: None   Relationships    Social connections     Talks on phone: None     Gets together: None     Attends Confucianism service: None     Active member of club or organization: None     Attends meetings of clubs or organizations: None     Relationship status: None    Intimate partner violence     Fear of current or ex partner: None     Emotionally abused: None     Physically abused: None     Forced sexual activity: None   Other Topics Concern    None   Social History Narrative    None       SCREENINGS           PHYSICAL EXAM    (up to 7 forlevel 4, 8 or more for level 5)     ED Triage Vitals [05/21/20 1810]   BP Temp Temp Source Pulse Resp SpO2 Height Weight   136/84 98.5 °F (36.9 °C) Temporal 71 19 99 % 5' 8\" (1.727 m) 160 lb (72.6 kg)       Physical Exam  Vitals signs and nursing note reviewed. Constitutional:       General: She is not in acute distress. Appearance: She is well-developed. She is not diaphoretic. HENT:      Head: Normocephalic and atraumatic. Mouth/Throat:      Pharynx: No oropharyngeal exudate. Eyes:      General: No scleral icterus. Conjunctiva/sclera: Conjunctivae normal.      Pupils: Pupils are equal, round, and reactive to light. Neck:      Musculoskeletal: Normal range of motion and neck supple. Trachea: No tracheal deviation. Cardiovascular:      Rate and Rhythm: Normal rate. Heart sounds: Normal heart sounds. Pulmonary:      Effort: Pulmonary effort is normal. No respiratory distress.       Breath sounds: note that portions of this note were completed with a voice recognition program.  Efforts were made to edit the dictations but occasionally words are mis-transcribed.)    TRAM Bishop PA-C  05/21/20 203

## 2020-05-22 ENCOUNTER — CARE COORDINATION (OUTPATIENT)
Dept: CARE COORDINATION | Age: 55
End: 2020-05-22

## 2020-05-22 NOTE — CARE COORDINATION
diagnosis. Reviewed and educated them on any new and changed medications related to discharge diagnosis. Advised obtaining a 90-day supply of all daily and as-needed medications. Education provided regarding infection prevention, and signs and symptoms of COVID-19 and when to seek medical attention with patient who verbalized understanding. Discussed exposure protocols and quarantine from [address] you at higher risk for severe illness 2019 and given an opportunity for questions and concerns. The patient agrees to contact the COVID-19 hotline [phone] or PCP office for questions related to their healthcare. CTN/ACM provided contact information for future reference. From CDC: Are you at higher risk for severe illness?  Wash your hands often.  Avoid close contact (6 feet, which is about two arm lengths) with people who are sick.  Put distance between yourself and other people if COVID-19 is spreading in your community.  Clean and disinfect frequently touched surfaces.  Avoid all cruise travel and non-essential air travel.  Call your healthcare professional if you have concerns about COVID-19 and your underlying condition or if you are sick. For more information on steps you can take to protect yourself, see CDC's How to Som for follow-up call in 5-7 days based on severity of symptoms and risk factors. Call to aleks due to recent er visit. She reports continued flank pain and urinary frequency. She has not obtained rx per er and will do so today. She states will schedule pcp and urology appt on Tuesday. She declined flu clinic phone number.

## 2020-05-23 LAB — URINE CULTURE, ROUTINE: NORMAL

## 2020-05-28 ENCOUNTER — CARE COORDINATION (OUTPATIENT)
Dept: CARE COORDINATION | Age: 55
End: 2020-05-28

## 2020-05-28 NOTE — CARE COORDINATION
Patient contacted regarding COVID-19 risk and screening. Discussed COVID-19 related testing which was no testing at this time. Test results were no testing. Patient informed of results, if available? No testing. Care Transition Nurse/ Ambulatory Care Manager contacted the patient by telephone to perform follow-up assessment. Verified name and  with patient as identifiers. Patient has following risk factors of: diabetes. Symptoms reviewed with patient who verbalized the following symptoms: pain or aching joints, no new symptoms and no worsening symptoms. Due to not [facility] pcp encounter was not routed to provider for escalation. Education provided regarding infection prevention, and signs and symptoms of COVID-19 and when to seek medical attention with patient who verbalized understanding. Discussed exposure protocols and quarantine from [address] you at higher risk for severe illness  and given an opportunity for questions and concerns. The patient agrees to contact the COVID-19 hotline [phone] or PCP office for questions related to their healthcare. CTN/ACM provided contact information for future reference. From CDC: Are you at higher risk for severe illness?  Wash your hands often.  Avoid close contact (6 feet, which is about two arm lengths) with people who are sick.  Put distance between yourself and other people if COVID-19 is spreading in your community.  Clean and disinfect frequently touched surfaces.  Avoid all cruise travel and non-essential air travel.  Call your healthcare professional if you have concerns about COVID-19 and your underlying condition or if you are sick. For more information on steps you can take to protect yourself, see CDC's How to Som for follow-up call in 7-14 days based on severity of symptoms and risk factors. Call to [facility] for covid follow up. She reports less pain with urination.  Continues with flank pain but is also less. She reports compliance with medication per er and has appt with urology next week. She has appt with pcp in July.

## 2020-06-08 ENCOUNTER — CARE COORDINATION (OUTPATIENT)
Dept: CARE COORDINATION | Age: 55
End: 2020-06-08

## 2020-06-08 NOTE — CARE COORDINATION
You Patient resolved from the Care Transitions episode on 6/8/20  Discussed COVID-19 related testing which was no testing at this time. Test results were no testing. Patient informed of results, if available? No testing    Patient/family has been provided the following resources and education related to COVID-19:                         Signs, symptoms and red flags related to COVID-19            [facility] exposure and quarantine guidelines            [facility] exposure contact - [phone]            Contact for their local Department of Health                 Patient currently reports that the following symptoms have improved:  no new/worsening symptoms     No further outreach scheduled with this CTN/ACM. Episode of Care resolved. Patient has this CTN/ACM contact information if future needs arise. Call to [facility]ks to resolve covid episode. She reports improvement with no further symptoms.  She has completed all medication

## 2020-06-23 ENCOUNTER — APPOINTMENT (OUTPATIENT)
Dept: CT IMAGING | Age: 55
End: 2020-06-23
Payer: MEDICARE

## 2020-06-23 ENCOUNTER — HOSPITAL ENCOUNTER (EMERGENCY)
Age: 55
Discharge: HOME | End: 2020-06-23
Payer: MEDICARE

## 2020-06-23 VITALS
BODY MASS INDEX: 23.49 KG/M2 | HEIGHT: 68 IN | DIASTOLIC BLOOD PRESSURE: 78 MMHG | WEIGHT: 155 LBS | OXYGEN SATURATION: 100 % | HEART RATE: 108 BPM | SYSTOLIC BLOOD PRESSURE: 122 MMHG | TEMPERATURE: 97.9 F | RESPIRATION RATE: 18 BRPM

## 2020-06-23 LAB
ALBUMIN SERPL-MCNC: 4.2 G/DL (ref 3.5–4.6)
ALP BLD-CCNC: 125 U/L (ref 40–130)
ALT SERPL-CCNC: 15 U/L (ref 0–33)
AMYLASE: 40 U/L (ref 22–93)
ANION GAP SERPL CALCULATED.3IONS-SCNC: 9 MEQ/L (ref 9–15)
AST SERPL-CCNC: 25 U/L (ref 0–35)
BACTERIA: NEGATIVE /HPF
BASOPHILS ABSOLUTE: 0.3 K/UL (ref 0–0.2)
BASOPHILS RELATIVE PERCENT: 2.3 %
BILIRUB SERPL-MCNC: 0.4 MG/DL (ref 0.2–0.7)
BILIRUBIN URINE: NEGATIVE
BLOOD, URINE: ABNORMAL
BUN BLDV-MCNC: 11 MG/DL (ref 6–20)
CALCIUM SERPL-MCNC: 9.6 MG/DL (ref 8.5–9.9)
CHLORIDE BLD-SCNC: 103 MEQ/L (ref 95–107)
CLARITY: CLEAR
CO2: 25 MEQ/L (ref 20–31)
COLOR: YELLOW
CREAT SERPL-MCNC: 1.04 MG/DL (ref 0.5–0.9)
EOSINOPHILS ABSOLUTE: 0.3 K/UL (ref 0–0.7)
EOSINOPHILS RELATIVE PERCENT: 2.5 %
EPITHELIAL CELLS, UA: ABNORMAL /HPF (ref 0–5)
GFR AFRICAN AMERICAN: >60
GFR NON-AFRICAN AMERICAN: 55.5
GLOBULIN: 3.2 G/DL (ref 2.3–3.5)
GLUCOSE BLD-MCNC: 115 MG/DL (ref 70–99)
GLUCOSE URINE: NEGATIVE MG/DL
HCT VFR BLD CALC: 46.9 % (ref 37–47)
HEMOGLOBIN: 15.4 G/DL (ref 12–16)
HYALINE CASTS: ABNORMAL /HPF (ref 0–5)
KETONES, URINE: NEGATIVE MG/DL
LEUKOCYTE ESTERASE, URINE: ABNORMAL
LIPASE: 29 U/L (ref 12–95)
LYMPHOCYTES ABSOLUTE: 3.9 K/UL (ref 1–4.8)
LYMPHOCYTES RELATIVE PERCENT: 28.5 %
MCH RBC QN AUTO: 27.6 PG (ref 27–31.3)
MCHC RBC AUTO-ENTMCNC: 32.9 % (ref 33–37)
MCV RBC AUTO: 84.1 FL (ref 82–100)
MONOCYTES ABSOLUTE: 0.6 K/UL (ref 0.2–0.8)
MONOCYTES RELATIVE PERCENT: 4.6 %
NEUTROPHILS ABSOLUTE: 8.4 K/UL (ref 1.4–6.5)
NEUTROPHILS RELATIVE PERCENT: 62.1 %
NITRITE, URINE: NEGATIVE
PDW BLD-RTO: 14.2 % (ref 11.5–14.5)
PH UA: 5.5 (ref 5–9)
PLATELET # BLD: 281 K/UL (ref 130–400)
PLATELET SLIDE REVIEW: ADEQUATE
POTASSIUM SERPL-SCNC: 5.1 MEQ/L (ref 3.4–4.9)
PROTEIN UA: NEGATIVE MG/DL
RBC # BLD: 5.58 M/UL (ref 4.2–5.4)
RBC UA: ABNORMAL /HPF (ref 0–5)
SODIUM BLD-SCNC: 137 MEQ/L (ref 135–144)
SPECIFIC GRAVITY UA: 1.01 (ref 1–1.03)
TOTAL PROTEIN: 7.4 G/DL (ref 6.3–8)
URINE REFLEX TO CULTURE: ABNORMAL
UROBILINOGEN, URINE: 0.2 E.U./DL
WBC # BLD: 13.6 K/UL (ref 4.8–10.8)
WBC UA: ABNORMAL /HPF (ref 0–5)

## 2020-06-23 PROCEDURE — 85025 COMPLETE CBC W/AUTO DIFF WBC: CPT

## 2020-06-23 PROCEDURE — 82150 ASSAY OF AMYLASE: CPT

## 2020-06-23 PROCEDURE — 74150 CT ABDOMEN W/O CONTRAST: CPT

## 2020-06-23 PROCEDURE — 80053 COMPREHEN METABOLIC PANEL: CPT

## 2020-06-23 PROCEDURE — 99284 EMERGENCY DEPT VISIT MOD MDM: CPT

## 2020-06-23 PROCEDURE — 83690 ASSAY OF LIPASE: CPT

## 2020-06-23 PROCEDURE — 96374 THER/PROPH/DIAG INJ IV PUSH: CPT

## 2020-06-23 PROCEDURE — 6360000002 HC RX W HCPCS: Performed by: PHYSICIAN ASSISTANT

## 2020-06-23 PROCEDURE — 36415 COLL VENOUS BLD VENIPUNCTURE: CPT

## 2020-06-23 PROCEDURE — 81001 URINALYSIS AUTO W/SCOPE: CPT

## 2020-06-23 PROCEDURE — 96375 TX/PRO/DX INJ NEW DRUG ADDON: CPT

## 2020-06-23 PROCEDURE — 2580000003 HC RX 258: Performed by: PHYSICIAN ASSISTANT

## 2020-06-23 RX ORDER — 0.9 % SODIUM CHLORIDE 0.9 %
1000 INTRAVENOUS SOLUTION INTRAVENOUS ONCE
Status: COMPLETED | OUTPATIENT
Start: 2020-06-23 | End: 2020-06-23

## 2020-06-23 RX ORDER — ORPHENADRINE CITRATE 30 MG/ML
60 INJECTION INTRAMUSCULAR; INTRAVENOUS ONCE
Status: COMPLETED | OUTPATIENT
Start: 2020-06-23 | End: 2020-06-23

## 2020-06-23 RX ORDER — ONDANSETRON 4 MG/1
4 TABLET, ORALLY DISINTEGRATING ORAL EVERY 8 HOURS PRN
Qty: 20 TABLET | Refills: 0 | Status: SHIPPED | OUTPATIENT
Start: 2020-06-23 | End: 2020-08-24 | Stop reason: ALTCHOICE

## 2020-06-23 RX ORDER — MORPHINE SULFATE 2 MG/ML
4 INJECTION, SOLUTION INTRAMUSCULAR; INTRAVENOUS ONCE
Status: COMPLETED | OUTPATIENT
Start: 2020-06-23 | End: 2020-06-23

## 2020-06-23 RX ORDER — DICYCLOMINE HYDROCHLORIDE 10 MG/1
10 CAPSULE ORAL EVERY 6 HOURS PRN
Qty: 20 CAPSULE | Refills: 0 | Status: ON HOLD | OUTPATIENT
Start: 2020-06-23 | End: 2020-06-28 | Stop reason: ALTCHOICE

## 2020-06-23 RX ORDER — TIZANIDINE 4 MG/1
4 TABLET ORAL 3 TIMES DAILY PRN
Qty: 30 TABLET | Refills: 0 | Status: SHIPPED | OUTPATIENT
Start: 2020-06-23 | End: 2020-08-05 | Stop reason: SDUPTHER

## 2020-06-23 RX ORDER — ONDANSETRON 2 MG/ML
4 INJECTION INTRAMUSCULAR; INTRAVENOUS ONCE
Status: COMPLETED | OUTPATIENT
Start: 2020-06-23 | End: 2020-06-23

## 2020-06-23 RX ADMIN — SODIUM CHLORIDE 1000 ML: 9 INJECTION, SOLUTION INTRAVENOUS at 16:30

## 2020-06-23 RX ADMIN — MORPHINE SULFATE 4 MG: 2 INJECTION, SOLUTION INTRAMUSCULAR; INTRAVENOUS at 16:30

## 2020-06-23 RX ADMIN — ONDANSETRON 4 MG: 2 INJECTION INTRAMUSCULAR; INTRAVENOUS at 16:30

## 2020-06-23 RX ADMIN — ORPHENADRINE CITRATE 60 MG: 30 INJECTION INTRAMUSCULAR; INTRAVENOUS at 17:19

## 2020-06-23 ASSESSMENT — ENCOUNTER SYMPTOMS
DIARRHEA: 0
COLOR CHANGE: 0
ALLERGIC/IMMUNOLOGIC NEGATIVE: 1
APNEA: 0
VOMITING: 0
NAUSEA: 1
EYE PAIN: 0
TROUBLE SWALLOWING: 0
ABDOMINAL PAIN: 1
SHORTNESS OF BREATH: 0

## 2020-06-23 ASSESSMENT — PAIN DESCRIPTION - ORIENTATION
ORIENTATION: RIGHT
ORIENTATION: RIGHT

## 2020-06-23 ASSESSMENT — PAIN DESCRIPTION - LOCATION
LOCATION: FLANK
LOCATION: FLANK

## 2020-06-23 ASSESSMENT — PAIN DESCRIPTION - DESCRIPTORS: DESCRIPTORS: ACHING;THROBBING

## 2020-06-23 ASSESSMENT — PAIN DESCRIPTION - PAIN TYPE
TYPE: ACUTE PAIN
TYPE: ACUTE PAIN

## 2020-06-23 ASSESSMENT — PAIN SCALES - GENERAL
PAINLEVEL_OUTOF10: 10
PAINLEVEL_OUTOF10: 4
PAINLEVEL_OUTOF10: 10

## 2020-06-23 ASSESSMENT — PAIN DESCRIPTION - FREQUENCY: FREQUENCY: CONTINUOUS

## 2020-06-23 NOTE — ED TRIAGE NOTES
Pt c/o flank pain that started at midnight. Pt denies trauma and pain while urinating. Pt is A&Ox4, skin intact, msps intact, afebrile, breaths are equal and unlabored.

## 2020-06-23 NOTE — ED PROVIDER NOTES
CAROLE    Check blood sugars twice daily or as directed. BLOOD GLUCOSE TEST STRIPS (ASCENSIA AUTODISC VI;ONE TOUCH ULTRA TEST VI) STRIP    Check blood sugars twice daily or as directed. [employer] [facility] UNILET LANCETS 30G MISC    Check blood sugars twice daily or as directed. ESTRADIOL (CLIMARA) 0.025 MG/24HR    Place 0.025 patches onto the skin every 7 days    GABAPENTIN (NEURONTIN) 300 MG CAPSULE    Take 300 mg by mouth 2 times daily. HYDROXYCHLOROQUINE (PLAQUENIL) 200 MG TABLET    Take 1 tablet by mouth 2 times daily    INSULIN GLARGINE (LANTUS SOLOSTAR) 100 UNIT/ML INJECTION PEN    Inject 75 Units into the skin nightly    INSULIN LISPRO, 1 UNIT DIAL, (HUMALOG KWIKPEN) 100 UNIT/ML SOPN    Inject 20 Units into the skin 3 times daily (before meals)    INSULIN PEN NEEDLE (NOVOFINE) 32G X 6 MM MISC    BID    INSULIN PEN NEEDLE (NOVOFINE) 32G X 6 MM MISC    qid    LANCETS MISC    Pt test 3x daily Dx E11.65    LEVOTHYROXINE (SYNTHROID) 50 MCG TABLET    Take 1 tablet by mouth daily    METOCLOPRAMIDE (REGLAN) 10 MG TABLET    Take 10 mg by mouth    METOCLOPRAMIDE (REGLAN) 5 MG TABLET    Take 1 tablet by mouth 4 times daily 1/2 hour before meal and at bedtime    PANTOPRAZOLE (PROTONIX) 40 MG TABLET    Take 1 tablet by mouth daily    ROSUVASTATIN (CRESTOR) 40 MG TABLET    Take 1 tablet by mouth daily    VENLAFAXINE (EFFEXOR) 75 MG TABLET    Take 75 mg by mouth daily       ALLERGIES     Aspirin; Imitrex [sumatriptan]; Pcn [penicillins];  Seasonal; Toradol [ketorolac tromethamine]; and Ultram [tramadol]    FAMILY HISTORY       Family History   Adopted: Yes          SOCIAL HISTORY       Social History     Socioeconomic History    Marital status: Legally      Spouse name: None    Number of children: None    Years of education: None    Highest education level: None   Occupational History    None   Social Needs    Financial resource strain: None    Food insecurity     Worry: None     Inability: None    Transportation needs     Medical: None     Non-medical: None   Tobacco Use    Smoking status: Never Smoker    Smokeless tobacco: Never Used   Substance and Sexual Activity    Alcohol use: No     Alcohol/week: 0.0 standard drinks     Comment: denies    Drug use: No     Comment: denies    Sexual activity: Not Currently   Lifestyle    Physical activity     Days per week: None     Minutes per session: None    Stress: None   Relationships    Social connections     Talks on phone: None     Gets together: None     Attends Christianity service: None     Active member of club or organization: None     Attends meetings of clubs or organizations: None     Relationship status: None    Intimate partner violence     Fear of current or ex partner: None     Emotionally abused: None     Physically abused: None     Forced sexual activity: None   Other Topics Concern    None   Social History Narrative    None       SCREENINGS           PHYSICAL EXAM    (up to 7 forlevel 4, 8 or more for level 5)     ED Triage Vitals [06/23/20 1550]   BP Temp Temp Source Pulse Resp SpO2 Height Weight   122/78 97.9 °F (36.6 °C) Temporal 120 18 100 % 5' 8\" (1.727 m) 155 lb (70.3 kg)       Physical Exam  Vitals signs and nursing note reviewed. Constitutional:       General: She is not in acute distress. Appearance: She is well-developed. She is not diaphoretic. HENT:      Head: Normocephalic and atraumatic. Mouth/Throat:      Pharynx: No oropharyngeal exudate. Eyes:      General: No scleral icterus. Conjunctiva/sclera: Conjunctivae normal.      Pupils: Pupils are equal, round, and reactive to light. Neck:      Musculoskeletal: Normal range of motion and neck supple. Trachea: No tracheal deviation. Cardiovascular:      Rate and Rhythm: Normal rate. Heart sounds: Normal heart sounds. Pulmonary:      Effort: Pulmonary effort is normal. No respiratory distress. Breath sounds: Normal breath sounds.    Abdominal: General: Bowel sounds are normal. There is no distension. Palpations: Abdomen is soft. Tenderness: There is abdominal tenderness in the right lower quadrant. There is right CVA tenderness. Musculoskeletal: Normal range of motion. Skin:     General: Skin is warm and dry. Findings: No erythema or rash. Neurological:      Mental Status: She is alert and oriented to person, place, and time. Cranial Nerves: No cranial nerve deficit. Motor: No abnormal muscle tone. Psychiatric:         Behavior: Behavior normal.         Thought Content:  Thought content normal.         Judgment: Judgment normal.           DIAGNOSTIC RESULTS     RADIOLOGY:   Non-plain film images such as CT, Ultrasound and MRI are read by the radiologist. Royce Sands radiographic images are visualized and preliminarilyinterpreted by Elliot Montemayor PA-C with the below findings:    Neg    Interpretation per the Radiologist below, if available at the time of this note:    CT KIDNEY WO CONTRAST   Final Result          LABS:  Labs Reviewed   COMPREHENSIVE METABOLIC PANEL - Abnormal; Notable for the following components:       Result Value    Potassium 5.1 (*)     Glucose 115 (*)     CREATININE 1.04 (*)     GFR Non- 55.5 (*)     All other components within normal limits   CBC WITH AUTO DIFFERENTIAL - Abnormal; Notable for the following components:    WBC 13.6 (*)     RBC 5.58 (*)     MCHC 32.9 (*)     Neutrophils Absolute 8.4 (*)     Basophils Absolute 0.3 (*)     All other components within normal limits   URINE RT REFLEX TO CULTURE - Abnormal; Notable for the following components:    Blood, Urine SMALL (*)     Leukocyte Esterase, Urine TRACE (*)     All other components within normal limits   MICROSCOPIC URINALYSIS - Abnormal; Notable for the following components:    RBC, UA 3-5 (*)     All other components within normal limits   AMYLASE   LIPASE       All other labs were within normal range or not returnedas of

## 2020-06-24 ENCOUNTER — CARE COORDINATION (OUTPATIENT)
Dept: CARE COORDINATION | Age: 55
End: 2020-06-24

## 2020-06-24 ENCOUNTER — TELEPHONE (OUTPATIENT)
Dept: UROLOGY | Age: 55
End: 2020-06-24

## 2020-06-25 ENCOUNTER — CARE COORDINATION (OUTPATIENT)
Dept: CARE COORDINATION | Age: 55
End: 2020-06-25

## 2020-06-25 NOTE — CARE COORDINATION
Second and final attempt to contact patient for post ED COVID-19 Monitoring. Unable to reach patient by phone. Message left regarding the purpose of this call. Number provided and call back requested.   Episode completed/resolved

## 2020-06-28 ENCOUNTER — APPOINTMENT (OUTPATIENT)
Dept: CT IMAGING | Age: 55
DRG: 313 | End: 2020-06-28
Payer: MEDICARE

## 2020-06-28 ENCOUNTER — HOSPITAL ENCOUNTER (INPATIENT)
Age: 55
LOS: 1 days | Discharge: HOME | DRG: 313 | End: 2020-06-29
Attending: NEUROMUSCULOSKELETAL MEDICINE, SPORTS MEDICINE | Admitting: INTERNAL MEDICINE
Payer: MEDICARE

## 2020-06-28 PROBLEM — R07.9 CHEST PAIN: Status: ACTIVE | Noted: 2020-06-28

## 2020-06-28 LAB
ALBUMIN SERPL-MCNC: 4.2 G/DL (ref 3.5–4.6)
ALP BLD-CCNC: 135 U/L (ref 40–130)
ALT SERPL-CCNC: 14 U/L (ref 0–33)
ANION GAP SERPL CALCULATED.3IONS-SCNC: 12 MEQ/L (ref 9–15)
AST SERPL-CCNC: 16 U/L (ref 0–35)
BASE EXCESS ARTERIAL: 1 (ref -3–3)
BASOPHILS ABSOLUTE: 0 K/UL (ref 0–0.2)
BASOPHILS RELATIVE PERCENT: 0.2 %
BILIRUB SERPL-MCNC: 0.3 MG/DL (ref 0.2–0.7)
BUN BLDV-MCNC: 14 MG/DL (ref 6–20)
CALCIUM IONIZED: 1.22 MMOL/L (ref 1.12–1.32)
CALCIUM SERPL-MCNC: 9.7 MG/DL (ref 8.5–9.9)
CHLORIDE BLD-SCNC: 99 MEQ/L (ref 95–107)
CO2: 25 MEQ/L (ref 20–31)
CREAT SERPL-MCNC: 1.29 MG/DL (ref 0.5–0.9)
D DIMER: 0.61 MG/L FEU (ref 0–0.5)
EKG ATRIAL RATE: 115 BPM
EKG P AXIS: 31 DEGREES
EKG P-R INTERVAL: 100 MS
EKG Q-T INTERVAL: 304 MS
EKG QRS DURATION: 58 MS
EKG QTC CALCULATION (BAZETT): 420 MS
EKG R AXIS: 95 DEGREES
EKG T AXIS: 68 DEGREES
EKG VENTRICULAR RATE: 115 BPM
EOSINOPHILS ABSOLUTE: 0.5 K/UL (ref 0–0.7)
EOSINOPHILS RELATIVE PERCENT: 3.9 %
GFR AFRICAN AMERICAN: 48
GFR AFRICAN AMERICAN: 52.4
GFR NON-AFRICAN AMERICAN: 39
GFR NON-AFRICAN AMERICAN: 43.3
GLOBULIN: 3.3 G/DL (ref 2.3–3.5)
GLUCOSE BLD-MCNC: 140 MG/DL (ref 60–115)
GLUCOSE BLD-MCNC: 164 MG/DL (ref 60–115)
GLUCOSE BLD-MCNC: 191 MG/DL (ref 70–99)
GLUCOSE BLD-MCNC: 211 MG/DL (ref 60–115)
HCO3 ARTERIAL: 24.6 MMOL/L (ref 21–29)
HCT VFR BLD CALC: 43.3 % (ref 37–47)
HEMOGLOBIN: 13.5 GM/DL (ref 12–16)
HEMOGLOBIN: 14.4 G/DL (ref 12–16)
LACTATE: 1.34 MMOL/L (ref 0.4–2)
LYMPHOCYTES ABSOLUTE: 2 K/UL (ref 1–4.8)
LYMPHOCYTES RELATIVE PERCENT: 16.7 %
MCH RBC QN AUTO: 27.9 PG (ref 27–31.3)
MCHC RBC AUTO-ENTMCNC: 33.4 % (ref 33–37)
MCV RBC AUTO: 83.5 FL (ref 82–100)
MONOCYTES ABSOLUTE: 0.6 K/UL (ref 0.2–0.8)
MONOCYTES RELATIVE PERCENT: 5.2 %
NEUTROPHILS ABSOLUTE: 8.7 K/UL (ref 1.4–6.5)
NEUTROPHILS RELATIVE PERCENT: 74 %
O2 SAT, ARTERIAL: 97 % (ref 93–100)
PCO2 ARTERIAL: 34 MM HG (ref 35–45)
PDW BLD-RTO: 13.7 % (ref 11.5–14.5)
PERFORMED ON: ABNORMAL
PH ARTERIAL: 7.47 (ref 7.35–7.45)
PLATELET # BLD: 314 K/UL (ref 130–400)
PO2 ARTERIAL: 84 MM HG (ref 75–108)
POC CHLORIDE: 105 MEQ/L (ref 99–110)
POC CREATININE: 1.4 MG/DL (ref 0.6–1.1)
POC HEMATOCRIT: 40 % (ref 36–48)
POC POTASSIUM: 3.8 MEQ/L (ref 3.5–5.1)
POC SAMPLE TYPE: ABNORMAL
POC SODIUM: 139 MEQ/L (ref 136–145)
POTASSIUM SERPL-SCNC: 4.1 MEQ/L (ref 3.4–4.9)
RBC # BLD: 5.18 M/UL (ref 4.2–5.4)
SARS-COV-2, NAAT: NOT DETECTED
SODIUM BLD-SCNC: 136 MEQ/L (ref 135–144)
TCO2 ARTERIAL: 26 (ref 22–29)
TOTAL PROTEIN: 7.5 G/DL (ref 6.3–8)
TROPONIN: <0.01 NG/ML (ref 0–0.01)
TROPONIN: <0.01 NG/ML (ref 0–0.01)
WBC # BLD: 11.8 K/UL (ref 4.8–10.8)

## 2020-06-28 PROCEDURE — 6370000000 HC RX 637 (ALT 250 FOR IP): Performed by: NEUROMUSCULOSKELETAL MEDICINE, SPORTS MEDICINE

## 2020-06-28 PROCEDURE — U0002 COVID-19 LAB TEST NON-CDC: HCPCS

## 2020-06-28 PROCEDURE — 36600 WITHDRAWAL OF ARTERIAL BLOOD: CPT

## 2020-06-28 PROCEDURE — 36415 COLL VENOUS BLD VENIPUNCTURE: CPT

## 2020-06-28 PROCEDURE — 83605 ASSAY OF LACTIC ACID: CPT

## 2020-06-28 PROCEDURE — 82565 ASSAY OF CREATININE: CPT

## 2020-06-28 PROCEDURE — 93005 ELECTROCARDIOGRAM TRACING: CPT | Performed by: NEUROMUSCULOSKELETAL MEDICINE, SPORTS MEDICINE

## 2020-06-28 PROCEDURE — 82803 BLOOD GASES ANY COMBINATION: CPT

## 2020-06-28 PROCEDURE — 85014 HEMATOCRIT: CPT

## 2020-06-28 PROCEDURE — 80053 COMPREHEN METABOLIC PANEL: CPT

## 2020-06-28 PROCEDURE — 6360000002 HC RX W HCPCS: Performed by: INTERNAL MEDICINE

## 2020-06-28 PROCEDURE — 6370000000 HC RX 637 (ALT 250 FOR IP): Performed by: INTERNAL MEDICINE

## 2020-06-28 PROCEDURE — 85025 COMPLETE CBC W/AUTO DIFF WBC: CPT

## 2020-06-28 PROCEDURE — 2580000003 HC RX 258: Performed by: NEUROMUSCULOSKELETAL MEDICINE, SPORTS MEDICINE

## 2020-06-28 PROCEDURE — 6360000002 HC RX W HCPCS: Performed by: NEUROMUSCULOSKELETAL MEDICINE, SPORTS MEDICINE

## 2020-06-28 PROCEDURE — 84484 ASSAY OF TROPONIN QUANT: CPT

## 2020-06-28 PROCEDURE — 84132 ASSAY OF SERUM POTASSIUM: CPT

## 2020-06-28 PROCEDURE — 99222 1ST HOSP IP/OBS MODERATE 55: CPT | Performed by: INTERNAL MEDICINE

## 2020-06-28 PROCEDURE — 82330 ASSAY OF CALCIUM: CPT

## 2020-06-28 PROCEDURE — 85379 FIBRIN DEGRADATION QUANT: CPT

## 2020-06-28 PROCEDURE — 94664 DEMO&/EVAL PT USE INHALER: CPT

## 2020-06-28 PROCEDURE — 99285 EMERGENCY DEPT VISIT HI MDM: CPT

## 2020-06-28 PROCEDURE — 6360000004 HC RX CONTRAST MEDICATION: Performed by: NEUROMUSCULOSKELETAL MEDICINE, SPORTS MEDICINE

## 2020-06-28 PROCEDURE — 84295 ASSAY OF SERUM SODIUM: CPT

## 2020-06-28 PROCEDURE — 71275 CT ANGIOGRAPHY CHEST: CPT

## 2020-06-28 PROCEDURE — 96374 THER/PROPH/DIAG INJ IV PUSH: CPT

## 2020-06-28 PROCEDURE — 96375 TX/PRO/DX INJ NEW DRUG ADDON: CPT

## 2020-06-28 PROCEDURE — 82435 ASSAY OF BLOOD CHLORIDE: CPT

## 2020-06-28 PROCEDURE — 2060000000 HC ICU INTERMEDIATE R&B

## 2020-06-28 RX ORDER — ACETAMINOPHEN 650 MG/1
650 SUPPOSITORY RECTAL EVERY 6 HOURS PRN
Status: DISCONTINUED | OUTPATIENT
Start: 2020-06-28 | End: 2020-06-29 | Stop reason: HOSPADM

## 2020-06-28 RX ORDER — DEXTROSE MONOHYDRATE 50 MG/ML
100 INJECTION, SOLUTION INTRAVENOUS PRN
Status: DISCONTINUED | OUTPATIENT
Start: 2020-06-28 | End: 2020-06-29 | Stop reason: HOSPADM

## 2020-06-28 RX ORDER — SODIUM CHLORIDE 0.9 % (FLUSH) 0.9 %
10 SYRINGE (ML) INJECTION PRN
Status: DISCONTINUED | OUTPATIENT
Start: 2020-06-28 | End: 2020-06-29 | Stop reason: HOSPADM

## 2020-06-28 RX ORDER — LORAZEPAM 1 MG/1
2 TABLET ORAL ONCE
Status: COMPLETED | OUTPATIENT
Start: 2020-06-28 | End: 2020-06-28

## 2020-06-28 RX ORDER — METOCLOPRAMIDE 5 MG/1
5 TABLET ORAL 4 TIMES DAILY
Status: DISCONTINUED | OUTPATIENT
Start: 2020-06-28 | End: 2020-06-29 | Stop reason: HOSPADM

## 2020-06-28 RX ORDER — SODIUM CHLORIDE 0.9 % (FLUSH) 0.9 %
10 SYRINGE (ML) INJECTION EVERY 12 HOURS SCHEDULED
Status: DISCONTINUED | OUTPATIENT
Start: 2020-06-28 | End: 2020-06-29 | Stop reason: HOSPADM

## 2020-06-28 RX ORDER — ONDANSETRON 2 MG/ML
4 INJECTION INTRAMUSCULAR; INTRAVENOUS ONCE
Status: COMPLETED | OUTPATIENT
Start: 2020-06-28 | End: 2020-06-28

## 2020-06-28 RX ORDER — MORPHINE SULFATE 2 MG/ML
2 INJECTION, SOLUTION INTRAMUSCULAR; INTRAVENOUS EVERY 4 HOURS PRN
Status: DISCONTINUED | OUTPATIENT
Start: 2020-06-28 | End: 2020-06-29 | Stop reason: HOSPADM

## 2020-06-28 RX ORDER — ONDANSETRON 4 MG/1
4 TABLET, ORALLY DISINTEGRATING ORAL EVERY 8 HOURS PRN
Status: DISCONTINUED | OUTPATIENT
Start: 2020-06-28 | End: 2020-06-29 | Stop reason: HOSPADM

## 2020-06-28 RX ORDER — LORAZEPAM 1 MG/1
1 TABLET ORAL EVERY 6 HOURS PRN
Status: DISCONTINUED | OUTPATIENT
Start: 2020-06-28 | End: 2020-06-29 | Stop reason: HOSPADM

## 2020-06-28 RX ORDER — ACETAMINOPHEN 325 MG/1
650 TABLET ORAL EVERY 6 HOURS PRN
Status: DISCONTINUED | OUTPATIENT
Start: 2020-06-28 | End: 2020-06-29 | Stop reason: HOSPADM

## 2020-06-28 RX ORDER — HYDROXYCHLOROQUINE SULFATE 200 MG/1
200 TABLET, FILM COATED ORAL 2 TIMES DAILY
Status: DISCONTINUED | OUTPATIENT
Start: 2020-06-28 | End: 2020-06-29 | Stop reason: HOSPADM

## 2020-06-28 RX ORDER — PROMETHAZINE HYDROCHLORIDE 25 MG/1
12.5 TABLET ORAL EVERY 6 HOURS PRN
Status: DISCONTINUED | OUTPATIENT
Start: 2020-06-28 | End: 2020-06-29 | Stop reason: HOSPADM

## 2020-06-28 RX ORDER — ONDANSETRON 2 MG/ML
4 INJECTION INTRAMUSCULAR; INTRAVENOUS EVERY 6 HOURS PRN
Status: DISCONTINUED | OUTPATIENT
Start: 2020-06-28 | End: 2020-06-29 | Stop reason: HOSPADM

## 2020-06-28 RX ORDER — TIZANIDINE 4 MG/1
4 TABLET ORAL 3 TIMES DAILY PRN
Status: DISCONTINUED | OUTPATIENT
Start: 2020-06-28 | End: 2020-06-29 | Stop reason: HOSPADM

## 2020-06-28 RX ORDER — PANTOPRAZOLE SODIUM 40 MG/1
40 TABLET, DELAYED RELEASE ORAL DAILY
Status: DISCONTINUED | OUTPATIENT
Start: 2020-06-28 | End: 2020-06-29 | Stop reason: HOSPADM

## 2020-06-28 RX ORDER — ATORVASTATIN CALCIUM 40 MG/1
80 TABLET, FILM COATED ORAL NIGHTLY
Status: DISCONTINUED | OUTPATIENT
Start: 2020-06-28 | End: 2020-06-28 | Stop reason: SDUPTHER

## 2020-06-28 RX ORDER — ALBUTEROL SULFATE 2.5 MG/3ML
2.5 SOLUTION RESPIRATORY (INHALATION) EVERY 4 HOURS PRN
Status: DISCONTINUED | OUTPATIENT
Start: 2020-06-28 | End: 2020-06-29 | Stop reason: HOSPADM

## 2020-06-28 RX ORDER — GABAPENTIN 300 MG/1
300 CAPSULE ORAL 2 TIMES DAILY
Status: DISCONTINUED | OUTPATIENT
Start: 2020-06-28 | End: 2020-06-29 | Stop reason: HOSPADM

## 2020-06-28 RX ORDER — METOCLOPRAMIDE 10 MG/1
10 TABLET ORAL PRN
Status: DISCONTINUED | OUTPATIENT
Start: 2020-06-28 | End: 2020-06-29 | Stop reason: HOSPADM

## 2020-06-28 RX ORDER — DEXTROSE MONOHYDRATE 25 G/50ML
12.5 INJECTION, SOLUTION INTRAVENOUS PRN
Status: DISCONTINUED | OUTPATIENT
Start: 2020-06-28 | End: 2020-06-29 | Stop reason: HOSPADM

## 2020-06-28 RX ORDER — 0.9 % SODIUM CHLORIDE 0.9 %
1000 INTRAVENOUS SOLUTION INTRAVENOUS ONCE
Status: COMPLETED | OUTPATIENT
Start: 2020-06-28 | End: 2020-06-28

## 2020-06-28 RX ORDER — NICOTINE POLACRILEX 4 MG
15 LOZENGE BUCCAL PRN
Status: DISCONTINUED | OUTPATIENT
Start: 2020-06-28 | End: 2020-06-29 | Stop reason: HOSPADM

## 2020-06-28 RX ORDER — LEVOTHYROXINE SODIUM 0.05 MG/1
50 TABLET ORAL DAILY
Status: DISCONTINUED | OUTPATIENT
Start: 2020-06-28 | End: 2020-06-29 | Stop reason: HOSPADM

## 2020-06-28 RX ORDER — MIDAZOLAM HYDROCHLORIDE 1 MG/ML
2 INJECTION INTRAMUSCULAR; INTRAVENOUS ONCE
Status: DISCONTINUED | OUTPATIENT
Start: 2020-06-28 | End: 2020-06-28

## 2020-06-28 RX ORDER — ROSUVASTATIN CALCIUM 20 MG/1
40 TABLET, COATED ORAL DAILY
Status: DISCONTINUED | OUTPATIENT
Start: 2020-06-28 | End: 2020-06-29 | Stop reason: HOSPADM

## 2020-06-28 RX ADMIN — TIZANIDINE 4 MG: 4 TABLET ORAL at 22:11

## 2020-06-28 RX ADMIN — HYDROXYCHLOROQUINE SULFATE 200 MG: 200 TABLET, FILM COATED ORAL at 22:11

## 2020-06-28 RX ADMIN — ROSUVASTATIN CALCIUM 40 MG: 20 TABLET, FILM COATED ORAL at 22:10

## 2020-06-28 RX ADMIN — IOPAMIDOL 100 ML: 612 INJECTION, SOLUTION INTRAVENOUS at 04:41

## 2020-06-28 RX ADMIN — Medication 10 ML: at 22:11

## 2020-06-28 RX ADMIN — LORAZEPAM 2 MG: 1 TABLET ORAL at 03:51

## 2020-06-28 RX ADMIN — PANTOPRAZOLE SODIUM 40 MG: 40 TABLET, DELAYED RELEASE ORAL at 16:45

## 2020-06-28 RX ADMIN — LORAZEPAM 1 MG: 1 TABLET ORAL at 22:10

## 2020-06-28 RX ADMIN — HYDROXYCHLOROQUINE SULFATE 200 MG: 200 TABLET, FILM COATED ORAL at 16:45

## 2020-06-28 RX ADMIN — TIZANIDINE 4 MG: 4 TABLET ORAL at 16:46

## 2020-06-28 RX ADMIN — METOCLOPRAMIDE 5 MG: 5 TABLET ORAL at 22:10

## 2020-06-28 RX ADMIN — Medication 2 MG: at 19:17

## 2020-06-28 RX ADMIN — METOCLOPRAMIDE 5 MG: 5 TABLET ORAL at 16:45

## 2020-06-28 RX ADMIN — ONDANSETRON 4 MG: 2 INJECTION INTRAMUSCULAR; INTRAVENOUS at 04:58

## 2020-06-28 RX ADMIN — GABAPENTIN 300 MG: 300 CAPSULE ORAL at 22:11

## 2020-06-28 RX ADMIN — Medication 2 MG: at 14:25

## 2020-06-28 RX ADMIN — SODIUM CHLORIDE 1000 ML: 9 INJECTION, SOLUTION INTRAVENOUS at 04:59

## 2020-06-28 RX ADMIN — HYDROMORPHONE HYDROCHLORIDE 0.5 MG: 1 INJECTION, SOLUTION INTRAMUSCULAR; INTRAVENOUS; SUBCUTANEOUS at 07:44

## 2020-06-28 RX ADMIN — INSULIN LISPRO 20 UNITS: 100 INJECTION, SOLUTION INTRAVENOUS; SUBCUTANEOUS at 16:47

## 2020-06-28 RX ADMIN — ACETAMINOPHEN 650 MG: 325 TABLET, FILM COATED ORAL at 19:16

## 2020-06-28 ASSESSMENT — ENCOUNTER SYMPTOMS
SHORTNESS OF BREATH: 0
NAUSEA: 0
GASTROINTESTINAL NEGATIVE: 1
EYES NEGATIVE: 1
ABDOMINAL PAIN: 0
ALLERGIC/IMMUNOLOGIC NEGATIVE: 1
VOMITING: 0
WHEEZING: 0

## 2020-06-28 ASSESSMENT — PAIN SCALES - GENERAL
PAINLEVEL_OUTOF10: 0
PAINLEVEL_OUTOF10: 10
PAINLEVEL_OUTOF10: 9
PAINLEVEL_OUTOF10: 9
PAINLEVEL_OUTOF10: 0
PAINLEVEL_OUTOF10: 10

## 2020-06-28 ASSESSMENT — PAIN DESCRIPTION - PAIN TYPE
TYPE: ACUTE PAIN
TYPE: ACUTE PAIN

## 2020-06-28 ASSESSMENT — PAIN DESCRIPTION - DESCRIPTORS: DESCRIPTORS: SORE

## 2020-06-28 ASSESSMENT — PAIN DESCRIPTION - LOCATION
LOCATION: CHEST
LOCATION: CHEST

## 2020-06-28 ASSESSMENT — PAIN DESCRIPTION - ORIENTATION
ORIENTATION: ANTERIOR
ORIENTATION: LEFT

## 2020-06-28 NOTE — ED TRIAGE NOTES
Pt states left side chest pain 1 hour PTA while watching TV consistent. Denies N, V, SOB, sweating. No meds taken at home.

## 2020-06-28 NOTE — ED PROVIDER NOTES
[address] [facility] ED  eMERGENCYdEPARTMENT eNCOUnter      Pt Name: Kevin Martinez  MRN: [id number]  [place] 1965  Date of evaluation: 6/28/2020  Provider:SHARMAINE BEAR MD    CHIEF COMPLAINT           HPI  Kevin Martinez is a 48 y.o. female per chart review has a h/o complains of sudden onset of pain to the left precordial area which occurred at rest.  It is pleuritic. She denies shortness of breath however. Denies diaphoresis. Denies radiation of the pain. Patient has a history of diabetes hyperlipidemia hypothyroidism. ROS  Review of Systems  Unless otherwise stated in this report or unable to obtain because of the patient's clinical or mental status as evidenced by the medical record, the patient's positive and negative responses for review of systems for the constitutional, eyes, ENT,  cardiovascular, respiratory, gastrointestinal, neurological, genitourinary, musculoskeletal, and integument systems and related systems to the presenting problem are either stated in the HPI or were not pertinent or were negative for the symptoms and/or  complaints related to the present medical problems. Except as noted above the remainder of the review of systems was reviewed and negative.        PAST MEDICAL HISTORY     Past Medical History:   Diagnosis Date    Anxiety     Asthma     as child    Depression     Depression     dysthymia - following lupus dx age 23    Diabetes mellitus ([address]     Foot fracture, left     GERD (gastroesophageal reflux disease)     Hyperlipidemia     Hypothyroidism     Kidney stones     Lupus ([facility]     dr barrera -     Migraine     PONV (postoperative nausea and vomiting)          SURGICAL HISTORY       Past Surgical History:   Procedure Laterality Date    APPENDECTOMY      ARTHRODESIS Left 11/26/2019    ARTHRODESIS OF THE FIRST METATARSAL PHALANGEAL JOINT LEFT FOOT performed by Luda Hernandez DPM at [address] [place] [address] (HUMALOG KWIKPEN) 100 UNIT/ML SOPN    Inject 20 Units into the skin 3 times daily (before meals)    INSULIN PEN NEEDLE (NOVOFINE) 32G X 6 MM MISC    BID    INSULIN PEN NEEDLE (NOVOFINE) 32G X 6 MM MISC    qid    LANCETS MISC    Pt test 3x daily Dx E11.65    LEVOTHYROXINE (SYNTHROID) 50 MCG TABLET    Take 1 tablet by mouth daily    METOCLOPRAMIDE (REGLAN) 10 MG TABLET    Take 10 mg by mouth    METOCLOPRAMIDE (REGLAN) 5 MG TABLET    Take 1 tablet by mouth 4 times daily 1/2 hour before meal and at bedtime    ONDANSETRON (ZOFRAN ODT) 4 MG DISINTEGRATING TABLET    Take 1 tablet by mouth every 8 hours as needed for Nausea    PANTOPRAZOLE (PROTONIX) 40 MG TABLET    Take 1 tablet by mouth daily    ROSUVASTATIN (CRESTOR) 40 MG TABLET    Take 1 tablet by mouth daily    TIZANIDINE (ZANAFLEX) 4 MG TABLET    Take 1 tablet by mouth 3 times daily as needed (back pain/spasm)    VENLAFAXINE (EFFEXOR) 75 MG TABLET    Take 75 mg by mouth daily       ALLERGIES     Aspirin; Imitrex [sumatriptan]; Pcn [penicillins];  Seasonal; Toradol [ketorolac tromethamine]; and Ultram [tramadol]    FAMILY HISTORY       Family History   Adopted: Yes          SOCIAL HISTORY       Social History     Socioeconomic History    Marital status: Legally      Spouse name: None    Number of children: None    Years of education: None    Highest education level: None   Occupational History    None   Social Needs    Financial resource strain: None    Food insecurity     Worry: None     Inability: None    Transportation needs     Medical: None     Non-medical: None   Tobacco Use    Smoking status: Never Smoker    Smokeless tobacco: Never Used   Substance and Sexual Activity    Alcohol use: No     Alcohol/week: 0.0 standard drinks     Comment: denies    Drug use: No     Comment: denies    Sexual activity: Not Currently   Lifestyle    Physical activity     Days per week: None     Minutes per session: None    Stress: None   Relationships    Social connections     Talks on phone: None     Gets together: None     Attends Voodoo service: None     Active member of club or organization: None     Attends meetings of clubs or organizations: None     Relationship status: None    Intimate partner violence     Fear of current or ex partner: None     Emotionally abused: None     Physically abused: None     Forced sexual activity: None   Other Topics Concern    None   Social History Narrative    None         PHYSICAL EXAM       ED Triage Vitals [06/28/20 0229]   BP Temp Temp Source Pulse Resp SpO2 Height Weight   117/73 98.2 °F (36.8 °C) Oral 120 18 99 % 5' 8\" (1.727 m) 157 lb (71.2 kg)       Physical Exam    Vitals signs and nursing note reviewed. Constitutional:       Appearance: Normal appearance. He is well-developed and normal weight. HENT:      Head: Normocephalic and atraumatic. Right Ear: Tympanic membrane, ear canal and external ear normal.      Left Ear: Tympanic membrane, ear canal and external ear normal.      Nose: Nose normal.      Mouth/Throat:      Mouth: Mucous membranes are moist.      Pharynx: Oropharynx is clear. Eyes:      Extraocular Movements: Extraocular movements intact. Conjunctiva/sclera: Conjunctivae normal.      Pupils: Pupils are equal, round, and reactive to light. Neck:      Musculoskeletal: Normal range of motion and neck supple. Cardiovascular:      Rate and Rhythm: Normal rate and regular rhythm. Pulses: Normal pulses. Heart sounds: Normal heart sounds. Pulmonary:      Effort: Pulmonary effort is normal.      Breath sounds: Normal breath sounds. Abdominal:      General: Abdomen is flat. Bowel sounds are normal.      Palpations: Abdomen is soft. Musculoskeletal: Normal range of motion. Skin:     General: Skin is warm and dry. Capillary Refill: Capillary refill takes less than 2 seconds. Neurological:      General: No focal deficit present.       Mental Status: He is alert and oriented for age. Psychiatric:         Mood and Affect: Mood normal.         Behavior: Behavior normal.         Thought Content: Thought content normal.         Judgment: Judgment normal.         MDM  Results for Nilson Polanco (MRN [id number]) as of 6/28/2020 04:54   Ref.  Range 6/28/2020 02:45 6/28/2020 03:00 6/28/2020 03:20 6/28/2020 04:38   Sodium Latest Ref Range: 135 - 144 mEq/L  136     Potassium Latest Ref Range: 3.4 - 4.9 mEq/L  4.1     Chloride Latest Ref Range: 95 - 107 mEq/L  99     CO2 Latest Ref Range: 20 - 31 mEq/L  25     BUN Latest Ref Range: 6 - 20 mg/dL  14     Creatinine Latest Ref Range: 0.50 - 0.90 mg/dL  1.29 (H)     Anion Gap Latest Ref Range: 9 - 15 mEq/L  12     Calcium, Ion Latest Ref Range: 1.12 - 1.32 mmol/L   1.22    GFR Non-African American Latest Ref Range: >60   43.3 (L) 39 (A)    GFR  Latest Ref Range: >60   52.4 (L) 48 (A)    Lactate, ser/plas Latest Ref Range: 0.40 - 2.00 mmol/L   1.34    Glucose Latest Ref Range: 70 - 99 mg/dL  191 (H)     POC Glucose Latest Ref Range: 60 - 115 mg/dl   211 (H)    Calcium Latest Ref Range: 8.5 - 9.9 mg/dL  9.7     Total Protein Latest Ref Range: 6.3 - 8.0 g/dL  7.5     POC Sodium Latest Ref Range: 136 - 145 mEq/L   139    POC Potassium Latest Ref Range: 3.5 - 5.1 mEq/L   3.8    POC Chloride Latest Ref Range: 99 - 110 mEq/L   105    POC Creatinine Latest Ref Range: 0.6 - 1.1 mg/dL   1.4 (H)    POC Hematocrit Latest Ref Range: 36 - 48 %   40    Albumin Latest Ref Range: 3.5 - 4.6 g/dL  4.2     Globulin Latest Ref Range: 2.3 - 3.5 g/dL  3.3     Alk Phos Latest Ref Range: 40 - 130 U/L  135 (H)     ALT Latest Ref Range: 0 - 33 U/L  14     AST Latest Ref Range: 0 - 35 U/L  16     Bilirubin Latest Ref Range: 0.2 - 0.7 mg/dL  0.3     WBC Latest Ref Range: 4.8 - 10.8 K/uL  11.8 (H)     RBC Latest Ref Range: 4.20 - 5.40 M/uL  5.18     Hemoglobin Quant Latest Ref Range: 12.0 - 16.0 gm/dL  14.4 13.5    Hematocrit Latest Ref Range: 37.0 - 47.0 %  43.3     MCV Latest Ref Range: 82.0 - 100.0 fL  83.5     MCH Latest Ref Range: 27.0 - 31.3 pg  27.9     MCHC Latest Ref Range: 33.0 - 37.0 %  33.4     RDW Latest Ref Range: 11.5 - 14.5 %  13.7     Platelet Count Latest Ref Range: 130 - 400 K/uL  314     Neutrophils % Latest Units: %  74.0     Lymphocyte % Latest Units: %  16.7     Monocytes % Latest Units: %  5.2     Eosinophils % Latest Units: %  3.9     Basophils % Latest Units: %  0.2     Neutrophils Absolute Latest Ref Range: 1.4 - 6.5 K/uL  8.7 (H)     Lymphocytes Absolute Latest Ref Range: 1.0 - 4.8 K/uL  2.0     Monocytes Absolute Latest Ref Range: 0.2 - 0.8 K/uL  0.6     Eosinophils Absolute Latest Ref Range: 0.0 - 0.7 K/uL  0.5     Basophils Absolute Latest Ref Range: 0.0 - 0.2 K/uL  0.0     pH, Arterial Latest Ref Range: 7.350 - 7.450    7.466 (H)    pCO2, Arterial Latest Ref Range: 35 - 45 mm Hg   34 (L)    pO2, Arterial Latest Ref Range: 75 - 108 mm Hg   84 (HH)    HCO3, Arterial Latest Ref Range: 21.0 - 29.0 mmol/L   24.6    TCO2 (calc), Art Latest Ref Range: 22 - 29    26    Base Excess, Arterial Latest Ref Range: -3 - 3    1    O2 Sat, Arterial Latest Ref Range: 93 - 100 %   97 (HH)        EKG shows sinus tachycardia with , rightward axis, short DC interval, low voltage QRS. non specific STT changes. FINAL IMPRESSION      1.  Chest pain, unspecified type          DISPOSITION/PLAN   DISPOSITION          DISCHARGE MEDICATIONS:  Fransisca Lamas MD(electronically signed)  Attending Emergency Physician            Tatianna Child MD  06/28/20 0329

## 2020-06-28 NOTE — ED NOTES
Unsuccessful attempts at peripheral IV insertion. Ultrasound to be performed.       Ledy Ro RN  06/28/20 0977

## 2020-06-28 NOTE — PROGRESS NOTES
years  []   Pulmonary Disorder  (acute or chronic)  [x]   Severe or Chronic w/ Exacerbation  []     Surgical Status No [x]   Surgeries     General []   Surgery Lower []   Abdominal Thoracic or []   Upper Abdominal Thoracic with  PulmonaryDisorder  []     Chest X-ray Clear/Not  Ordered     [x]  Chronic Changes  Results Pending  []  Infiltrates, atelectasis, pleural effusion, or edema  []  Infiltrates in more than one lobe []  Infiltrate + Atelectasis, &/or pleural effusion  []    Respiratory Pattern Regular,  RR = 12-20 [x]  Increased,  RR = 21-25 []  CHARLES, irregular,  or RR = 26-30 []  Decreased FEV1  or RR = 31-35 []  Severe SOB, use  of accessory muscles, or RR ? 35  []    Mental Status Alert, oriented,  Cooperative [x]  Confused but Follows commands []  Lethargic or unable to follow commands []  Obtunded  []  Comatose  []    Breath Sounds Clear to  auscultation  [x]  Decreased unilaterally or  in bases only []  Decreased  bilaterally  []  Crackles or intermittent wheezes []  Wheezes []    Cough Strong, Spontan., & nonproductive [x]  Strong,  spontaneous, &  productive []  Weak,  Nonproductive []  Weak, productive or  with wheezes []  No spontaneous  cough or may require suctioning []    Level of Activity Ambulatory [x]  Ambulatory w/ Assist  []  Non-ambulatory []  Paraplegic []  Quadriplegic []    Total    Score:___3____     Triage Score:__5______      Tri       Triage:     1. (>20) Freq: Q3    2. (16-20) Freq: Q4   3. (11-15) Freq: QID & Albuterol Q2 PRN    4. (6-10) Freq: TID & Albuterol Q2 PRN    5. (0-5) Freq Q4prn

## 2020-06-28 NOTE — H&P
Chief complaint: Chest pain     Patient is a 48 y.o. female who presents with a chief complaint of chest pain. Patient is followed on a regular basis by Dr. Jarad Son MD.  Patient with past medical history of psych disorder, hypertension, hyperlipidemia, diabetes and lupus who presented to [facility] emergency department with complaint of midsternal chest discomfort. Patient appears to be groggy during our interview and falling asleep. She received Dilaudid in the emergency department. Patient's pain is completely reproducible on my physical exam.  She denies any history of myocardial infarction, congestive heart failure or arrhythmia. No previous cardiac testing such as stress test or cardiac catheterization according to our records. Her initial cardiac enzymes are negative x3 and her EKG appears to be benign. CTA of the chest was negative for any pulmonary embolism.       Past Medical History:   Diagnosis Date    Anxiety     Asthma     as child    Depression     Depression     dysthymia - following lupus dx age 23    Diabetes mellitus ([address]     Foot fracture, left     GERD (gastroesophageal reflux disease)     Hyperlipidemia     Hypothyroidism     Kidney stones     Lupus ([address]     dr barrera -     Migraine     PONV (postoperative nausea and vomiting)       Patient Active Problem List   Diagnosis    DM (diabetes mellitus) ([address]    Hyperlipemia    Hypothyroid    Vitamin D deficiency    SLE (systemic lupus erythematosus related syndrome) (HCC)    Asthma    Panic disorder with agoraphobia    Hereditary essential tremor    Allergic rhinitis    Type 2 diabetes mellitus with diabetic mononeuropathy ([address]    Raynaud's phenomenon without gangrene    Essential hypertension    Type 2 diabetes mellitus without complication ([address]    Disseminated lupus erythematosus (HCC)    Foot fracture, left    Acute non-recurrent frontal sinusitis    Gastritis without bleeding    Dysphagia    Gastric polyp    Chest pain       Past Surgical History:   Procedure Laterality Date    APPENDECTOMY      ARTHRODESIS Left 11/26/2019    ARTHRODESIS OF THE FIRST METATARSAL PHALANGEAL JOINT LEFT FOOT performed by Angle Mendoza DPM at [place] [address]      COLONOSCOPY      FOOT CLOSED REDUCTION Left 1/3/2017    LEFT FOOT PERCUTANEOUS PINNING Darius Manners FRACTURE performed by Corine Snellen, MD at [address] [address]      HYSTERECTOMY      CO ESOPHAGOGASTRODUODENOSCOPY TRANSORAL DIAGNOSTIC N/A 3/8/2017    EGD ESOPHAGOGASTRODUODENOSCOPY WITH DILATION performed by Dylan Beyer MD at [facility] [address] ESOPHAGOGASTRODUODENOSCOPY TRANSORAL DIAGNOSTIC N/A 11/7/2018    EGD ESOPHAGOGASTRODUODENOSCOPY WITH DILATION performed by Dylan Beyer MD at [facility] ENDOSCOPY         Social History     Socioeconomic History    Marital status: Legally      Spouse name: None    Number of children: None    Years of education: None    Highest education level: None   Occupational History    None   Social Needs    Financial resource strain: None    Food insecurity     Worry: None     Inability: None    Transportation needs     Medical: None     Non-medical: None   Tobacco Use    Smoking status: Never Smoker    Smokeless tobacco: Never Used   Substance and Sexual Activity    Alcohol use: No     Alcohol/week: 0.0 standard drinks     Comment: denies    Drug use: No     Comment: denies    Sexual activity: Not Currently   Lifestyle    Physical activity     Days per week: None     Minutes per session: None    Stress: None   Relationships    Social connections     Talks on phone: None     Gets together: None     Attends Latter-day service: None     Active member of club or organization: None     Attends meetings of clubs or organizations: None Relationship status: None    Intimate partner violence     Fear of current or ex partner: None     Emotionally abused: None     Physically abused: None     Forced sexual activity: None   Other Topics Concern    None   Social History Narrative    None       Family History   Adopted: Yes       Current Facility-Administered Medications   Medication Dose Route Frequency Provider Last Rate Last Dose    sodium chloride flush 0.9 % injection 10 mL  10 mL Intravenous 2 times per day Indira Jeff MD        sodium chloride flush 0.9 % injection 10 mL  10 mL Intravenous PRN Indira Jeff MD        acetaminophen (TYLENOL) tablet 650 mg  650 mg Oral Q6H PRN Indira Jeff MD        Or    acetaminophen (TYLENOL) suppository 650 mg  650 mg Rectal Q6H PRN Indira Jeff MD        magnesium hydroxide (MILK OF MAGNESIA) 400 MG/5ML suspension 30 mL  30 mL Oral Daily PRN Indira Jeff MD        promethazine (PHENERGAN) tablet 12.5 mg  12.5 mg Oral Q6H PRN Indira Jeff MD        Or    [facility] injection 4 mg  4 mg Intravenous Q6H PRN Indira Jeff MD        atorvastatin (LIPITOR) tablet 80 mg  80 mg Oral Nightly Indira Jeff MD        morphine (PF) injection 2 mg  2 mg Intravenous Q4H PRN Sekou GARCIA Holiday, DO        LORazepam (ATIVAN) tablet 1 mg  1 mg Oral Q6H PRN Sekou Dennisiday, DO           ALLERGIES: Aspirin; Imitrex [sumatriptan]; Pcn [penicillins]; Seasonal; Toradol [ketorolac tromethamine]; and Ultram [tramadol]    Review of Systems   Constitutional: Negative. Negative for chills and fever. Eyes: Negative. Respiratory: Negative for shortness of breath and wheezing. Cardiovascular: Negative for chest pain, palpitations and leg swelling. Gastrointestinal: Negative. Negative for abdominal pain, nausea and vomiting. Endocrine: Negative. Genitourinary: Negative. Skin: Negative. Negative for rash. Allergic/Immunologic: Negative.     Neurological: Negative for dizziness, weakness and headaches. Hematological: Negative. VITALS:  Blood pressure 104/62, pulse 80, temperature 97.7 °F (36.5 °C), temperature source Oral, resp. rate 16, height 5' 8\" (1.727 m), weight 165 lb 3.2 oz (74.9 kg), SpO2 92 %, not currently breastfeeding. Body mass index is 25.12 kg/m². Physical Exam   Constitutional: She is oriented to person, place, and time. She appears well-developed and well-nourished. HENT:   Head: Normocephalic and atraumatic. Eyes: Pupils are equal, round, and reactive to light. Neck: Normal range of motion. Neck supple. No JVD present. No tracheal deviation present. No thyromegaly present. Cardiovascular: Normal rate, regular rhythm, normal heart sounds and intact distal pulses. PMI is not displaced. Exam reveals no gallop, no S3, no distant heart sounds and no friction rub. No murmur heard. Pulmonary/Chest: No respiratory distress. She has no wheezes. She has no rales. She exhibits tenderness. Abdominal: Soft. Bowel sounds are normal. She exhibits no distension and no mass. There is no abdominal tenderness. There is no rebound and no guarding. Musculoskeletal:         General: No edema. Neurological: She is alert and oriented to person, place, and time. No cranial nerve deficit. Skin: Skin is warm and dry. No rash noted. She is not diaphoretic. No erythema. No pallor. Psychiatric: She has a normal mood and affect.  Her behavior is normal. Judgment and thought content normal.       LABS:  Recent Results (from the past 24 hour(s))   EKG 12 Lead    Collection Time: 06/28/20  2:45 AM   Result Value Ref Range    Ventricular Rate 115 BPM    Atrial Rate 115 BPM    P-R Interval 100 ms    QRS Duration 58 ms    Q-T Interval 304 ms    QTc Calculation (Bazett) 420 ms    P Axis 31 degrees    R Axis 95 degrees    T Axis 68 degrees   CBC Auto Differential    Collection Time: 06/28/20  3:00 AM   Result Value Ref Range    WBC 11.8 (H) 4.8 - 10.8 K/uL    RBC 5.18 4.20 - 5.40 M/uL    Hemoglobin 14.4 12.0 - 16.0 g/dL    Hematocrit 43.3 37.0 - 47.0 %    MCV 83.5 82.0 - 100.0 fL    MCH 27.9 27.0 - 31.3 pg    MCHC 33.4 33.0 - 37.0 %    RDW 13.7 11.5 - 14.5 %    Platelets 638 207 - 012 K/uL    Neutrophils % 74.0 %    Lymphocytes % 16.7 %    Monocytes % 5.2 %    Eosinophils % 3.9 %    Basophils % 0.2 %    Neutrophils Absolute 8.7 (H) 1.4 - 6.5 K/uL    Lymphocytes Absolute 2.0 1.0 - 4.8 K/uL    Monocytes Absolute 0.6 0.2 - 0.8 K/uL    Eosinophils Absolute 0.5 0.0 - 0.7 K/uL    Basophils Absolute 0.0 0.0 - 0.2 K/uL   Comprehensive Metabolic Panel    Collection Time: 06/28/20  3:00 AM   Result Value Ref Range    Sodium 136 135 - 144 mEq/L    Potassium 4.1 3.4 - 4.9 mEq/L    Chloride 99 95 - 107 mEq/L    CO2 25 20 - 31 mEq/L    Anion Gap 12 9 - 15 mEq/L    Glucose 191 (H) 70 - 99 mg/dL    BUN 14 6 - 20 mg/dL    CREATININE 1.29 (H) 0.50 - 0.90 mg/dL    GFR Non- 43.3 (L) >60    GFR  52.4 (L) >60    Calcium 9.7 8.5 - 9.9 mg/dL    Total Protein 7.5 6.3 - 8.0 g/dL    Alb 4.2 3.5 - 4.6 g/dL    Total Bilirubin 0.3 0.2 - 0.7 mg/dL    Alkaline Phosphatase 135 (H) 40 - 130 U/L    ALT 14 0 - 33 U/L    AST 16 0 - 35 U/L    Globulin 3.3 2.3 - 3.5 g/dL   D-Dimer, Quantitative    Collection Time: 06/28/20  3:00 AM   Result Value Ref Range    D-Dimer, Quant 0.61 (HH) 0.00 - 0.50 mg/L FEU   Troponin    Collection Time: 06/28/20  3:00 AM   Result Value Ref Range    Troponin <0.010 0.000 - 0.010 ng/mL   POCT Arterial    Collection Time: 06/28/20  3:20 AM   Result Value Ref Range    POC Sodium 139 136 - 145 mEq/L    POC Potassium 3.8 3.5 - 5.1 mEq/L    POC Chloride 105 99 - 110 mEq/L    POC Glucose 211 (H) 60 - 115 mg/dl    POC Creatinine 1.4 (H) 0.6 - 1.1 mg/dL    GFR Non-African American 39 (A) >60    GFR  48 (A) >60    Calcium, Ion 1.22 1.12 - 1.32 mmol/L    pH, Arterial 7.466 (H) 7.350 - 7.450    pCO2, Arterial 34 (L) 35 - 45 mm Hg    pO2, Arterial 84 (HH) 75 - 108 mm Hg    HCO3, Arterial 24.6 21.0 - 29.0 mmol/L    Base Excess, Arterial 1 -3 - 3    O2 Sat, Arterial 97 (HH) 93 - 100 %    TCO2, Arterial 26 22 - 29    Lactate 1.34 0.40 - 2.00 mmol/L    POC Hematocrit 40 36 - 48 %    Hemoglobin 13.5 12.0 - 16.0 gm/dL    Sample Type ART     Performed on SEE BELOW    COVID-19    Collection Time: 06/28/20  6:45 AM   Result Value Ref Range    SARS-CoV-2, NAAT Not Detected Not Detected   Troponin    Collection Time: 06/28/20 10:13 AM   Result Value Ref Range    Troponin <0.010 0.000 - 0.010 ng/mL     Troponin:   Lab Results   Component Value Date    TROPONINI <0.010 06/28/2020       EKG: normal sinus rhythm, nonspecific ST and T waves changes      ASSESSMENT:    Active Hospital Problems    Diagnosis Date Noted    Chest pain [R07.9] 06/28/2020     Priority: High     Atypical chest pain, doubt cardiac in origin. Likely musculoskeletal as chest pain is reproducible    Essential hypertension    Hyperlipidemia     history of diabetes    Anxiety    History of lupus    PLAN:   1. As always, aggressive risk factor modification is strongly recommended. We should adhere to the JNC VIII guidelines for HTN management and the NCEPATP III guidelines for LDL-C management. 2. ACS rule out  3. Maximize cardiac medications  4. Resume appropriate home medications  5. Pain control  6. Likely DC home tomorrow a.m. and plan for outpatient noninvasive testing if warranted by symptoms.   7. Monitor on telemetry      Electronically signed by Yolette Dsouza DO on 6/28/2020 at 2:10 PM

## 2020-06-29 VITALS
HEIGHT: 68 IN | WEIGHT: 165.2 LBS | DIASTOLIC BLOOD PRESSURE: 61 MMHG | RESPIRATION RATE: 18 BRPM | BODY MASS INDEX: 25.04 KG/M2 | TEMPERATURE: 97.3 F | HEART RATE: 70 BPM | OXYGEN SATURATION: 96 % | SYSTOLIC BLOOD PRESSURE: 101 MMHG

## 2020-06-29 LAB
GLUCOSE BLD-MCNC: 151 MG/DL (ref 60–115)
GLUCOSE BLD-MCNC: 178 MG/DL (ref 60–115)
GLUCOSE BLD-MCNC: 206 MG/DL (ref 60–115)
HCT VFR BLD CALC: 38.2 % (ref 37–47)
HEMOGLOBIN: 12.6 G/DL (ref 12–16)
MCH RBC QN AUTO: 28 PG (ref 27–31.3)
MCHC RBC AUTO-ENTMCNC: 33 % (ref 33–37)
MCV RBC AUTO: 84.9 FL (ref 82–100)
PDW BLD-RTO: 14.1 % (ref 11.5–14.5)
PERFORMED ON: ABNORMAL
PLATELET # BLD: 295 K/UL (ref 130–400)
RBC # BLD: 4.5 M/UL (ref 4.2–5.4)
SARS-COV-2, NAAT: NOT DETECTED
WBC # BLD: 8.1 K/UL (ref 4.8–10.8)

## 2020-06-29 PROCEDURE — 99238 HOSP IP/OBS DSCHRG MGMT 30/<: CPT | Performed by: INTERNAL MEDICINE

## 2020-06-29 PROCEDURE — 6360000002 HC RX W HCPCS: Performed by: INTERNAL MEDICINE

## 2020-06-29 PROCEDURE — 85027 COMPLETE CBC AUTOMATED: CPT

## 2020-06-29 PROCEDURE — 93010 ELECTROCARDIOGRAM REPORT: CPT | Performed by: INTERNAL MEDICINE

## 2020-06-29 PROCEDURE — U0002 COVID-19 LAB TEST NON-CDC: HCPCS

## 2020-06-29 PROCEDURE — 36415 COLL VENOUS BLD VENIPUNCTURE: CPT

## 2020-06-29 PROCEDURE — 6370000000 HC RX 637 (ALT 250 FOR IP): Performed by: INTERNAL MEDICINE

## 2020-06-29 RX ADMIN — LORAZEPAM 1 MG: 1 TABLET ORAL at 13:04

## 2020-06-29 RX ADMIN — METOCLOPRAMIDE 5 MG: 5 TABLET ORAL at 08:44

## 2020-06-29 RX ADMIN — PANTOPRAZOLE SODIUM 40 MG: 40 TABLET, DELAYED RELEASE ORAL at 08:43

## 2020-06-29 RX ADMIN — GABAPENTIN 300 MG: 300 CAPSULE ORAL at 08:43

## 2020-06-29 RX ADMIN — METOCLOPRAMIDE 5 MG: 5 TABLET ORAL at 17:30

## 2020-06-29 RX ADMIN — Medication 2 MG: at 14:24

## 2020-06-29 RX ADMIN — INSULIN LISPRO 20 UNITS: 100 INJECTION, SOLUTION INTRAVENOUS; SUBCUTANEOUS at 11:54

## 2020-06-29 RX ADMIN — TIZANIDINE 4 MG: 4 TABLET ORAL at 13:05

## 2020-06-29 RX ADMIN — Medication 2 MG: at 09:38

## 2020-06-29 RX ADMIN — HYDROXYCHLOROQUINE SULFATE 200 MG: 200 TABLET, FILM COATED ORAL at 08:44

## 2020-06-29 RX ADMIN — LEVOTHYROXINE SODIUM 50 MCG: 0.05 TABLET ORAL at 08:43

## 2020-06-29 RX ADMIN — Medication 2 MG: at 04:51

## 2020-06-29 RX ADMIN — INSULIN LISPRO 20 UNITS: 100 INJECTION, SOLUTION INTRAVENOUS; SUBCUTANEOUS at 17:07

## 2020-06-29 RX ADMIN — INSULIN LISPRO 20 UNITS: 100 INJECTION, SOLUTION INTRAVENOUS; SUBCUTANEOUS at 08:45

## 2020-06-29 RX ADMIN — METOCLOPRAMIDE 5 MG: 5 TABLET ORAL at 14:23

## 2020-06-29 ASSESSMENT — PAIN SCALES - GENERAL
PAINLEVEL_OUTOF10: 8
PAINLEVEL_OUTOF10: 4
PAINLEVEL_OUTOF10: 8
PAINLEVEL_OUTOF10: 9

## 2020-06-29 ASSESSMENT — PAIN DESCRIPTION - ORIENTATION: ORIENTATION: MID

## 2020-06-29 ASSESSMENT — PAIN DESCRIPTION - LOCATION: LOCATION: CHEST;BACK

## 2020-06-29 NOTE — DISCHARGE SUMMARY
LABGLOM 39 06/28/2020    GFRAA 48 06/28/2020    PROT 7.5 06/28/2020    CALCIUM 9.7 06/28/2020     PT/INR:    Lab Results   Component Value Date    PROTIME 9.5 05/03/2018    INR 0.9 05/03/2018     PTT:   Lab Results   Component Value Date    APTT 25.6 11/04/2019     FLP:    Lab Results   Component Value Date    CHOL 182 08/02/2017    TRIG 264 08/02/2017    HDL 73 08/02/2017     U/A:    Lab Results   Component Value Date    COLORU Yellow 06/23/2020    SPECGRAV 1.006 06/23/2020    PHUR 5.5 06/23/2020    PROTEINU Negative 06/23/2020    GLUCOSEU Negative 06/23/2020    KETUA Negative 06/23/2020    BILIRUBINUR Negative 06/23/2020    BILIRUBINUR neg 04/12/2019    BILIRUBINUR NEGATIVE 04/08/2019    UROBILINOGEN 0.2 06/23/2020    NITRU Negative 06/23/2020    LEUKOCYTESUR TRACE 06/23/2020     TSH:    Lab Results   Component Value Date    TSH 2.500 05/12/2017       Radiology Last 7 Days:  Cta Chest W Wo Contrast    Result Date: 6/28/2020  No CT evidence pulmonary embolism. Right lower lung atelectasis. Cholecystectomy. All CT scans at this facility use dose modulation, iterative reconstruction, and/or weight based dosing when appropriate to reduce radiation dose to as low as reasonably achievable. Ct Kidney Wo Contrast    Result Date: 6/23/2020  No acute intra-abdominal process. No obstructing urinary tract calculi or hydronephrosis. Nonobstructing right renal calculus. All CT scans at this facility use dose modulation, iterative reconstruction, and/or weight based dosing when appropriate to reduce radiation dose to as low as reasonably achievable.       Discharge Plan   Disposition: Home    Provider Follow-Up:   Servando Souza MD  [address] [place] [address]  [phone]             Hospital/Incidental Findings Requiring Follow-Up:  CP, non cardiac workup for CP    Patient Instructions   Diet: cardiac diet    Activity: activity as tolerated    Other Instructions:       Discharge Medications symptoms of irregular blood glucose. * blood glucose monitor kit and supplies  Please give 1 meter covered by insurance Dx E11.65         * This list has 11 medication(s) that are the same as other medications prescribed for you. Read the directions carefully, and ask your doctor or other care provider to review them with you.             STOP taking these medications    dicyclomine 10 MG capsule  Commonly known as:  Bentyl     venlafaxine 75 MG tablet  Commonly known as:  NPXLMCQ            Electronically signed by Feliciano Kim,  on 6/29/20 at 2:29 PM EDT

## 2020-06-29 NOTE — FLOWSHEET NOTE
Discharge instructions reviewed with pt and spouse. SL dc'd tele dc'd questions answered. Transport called.

## 2020-06-29 NOTE — CARE COORDINATION
[facility] [place] Case Management Initial Discharge Assessment     Met with Patient to discuss discharge plan.          PCP: Zachariah Ortiz MD                                Date of Last Visit:  APRIL     If no PCP, list provided? N/A     Discharge Planning     Living Arrangements: independently at home     Who do you live with? SPOUSE     Who helps you with your care:  self     If lives at home:                Do you have any barriers navigating in your home? no     Patient can perform ADL? Yes     Current Services (outpatient and in home) :  None     Dialysis: No     Is transportation available to get to your appointments? Yes     DME Equipment:  no     Respiratory equipment: None     Respiratory provider:  no     Pharmacy:  yes - [facility]     Consult with Medication Assistance Program?  No       Patient agreeable to [place] [id number]? Declined     Patient agreeable to SNF/Rehab? N/A     Other discharge needs identified? N/A     Freedom of choice list provided with basic dialogue that supports the patient's individualized plan of care/goals and shares the quality data associated with the providers. N/A        The plan for Transition of Care is related to the following treatment goals: CARDIO CONSULT     Initial Discharge Plan? (Note: please see concurrent daily documentation for any updates after initial note).     DENIES NEEDS AT DC     The Patient and/or patient representative: PATIENT was provided with choice of any post-acute providers for care and equipment and agrees with discharge plan  Yes     Electronically signed by Capri Glover RN on 6/29/2020 at 2:53 PM

## 2020-07-01 LAB
GFR AFRICAN AMERICAN: 52
GFR NON-AFRICAN AMERICAN: 43
PERFORMED ON: ABNORMAL
POC CREATININE: 1.3 MG/DL (ref 0.6–1.1)
POC SAMPLE TYPE: ABNORMAL

## 2020-07-06 ENCOUNTER — HOSPITAL ENCOUNTER (EMERGENCY)
Age: 55
Discharge: HOME | End: 2020-07-06
Attending: EMERGENCY MEDICINE
Payer: MEDICARE

## 2020-07-06 VITALS
SYSTOLIC BLOOD PRESSURE: 133 MMHG | DIASTOLIC BLOOD PRESSURE: 66 MMHG | BODY MASS INDEX: 23.95 KG/M2 | WEIGHT: 158 LBS | HEART RATE: 102 BPM | RESPIRATION RATE: 17 BRPM | HEIGHT: 68 IN | TEMPERATURE: 98.4 F | OXYGEN SATURATION: 99 %

## 2020-07-06 PROCEDURE — 6360000002 HC RX W HCPCS: Performed by: EMERGENCY MEDICINE

## 2020-07-06 PROCEDURE — 99283 EMERGENCY DEPT VISIT LOW MDM: CPT

## 2020-07-06 PROCEDURE — 96372 THER/PROPH/DIAG INJ SC/IM: CPT

## 2020-07-06 RX ORDER — DEXAMETHASONE SODIUM PHOSPHATE 10 MG/ML
6 INJECTION, SOLUTION INTRAMUSCULAR; INTRAVENOUS ONCE
Status: COMPLETED | OUTPATIENT
Start: 2020-07-06 | End: 2020-07-06

## 2020-07-06 RX ORDER — METHYLPREDNISOLONE 4 MG/1
TABLET ORAL
Qty: 1 KIT | Refills: 0 | Status: SHIPPED | OUTPATIENT
Start: 2020-07-06 | End: 2020-07-12

## 2020-07-06 RX ADMIN — DEXAMETHASONE SODIUM PHOSPHATE 6 MG: 10 INJECTION, SOLUTION INTRAMUSCULAR; INTRAVENOUS at 09:59

## 2020-07-06 ASSESSMENT — PAIN DESCRIPTION - ORIENTATION: ORIENTATION: RIGHT

## 2020-07-06 ASSESSMENT — PAIN SCALES - GENERAL: PAINLEVEL_OUTOF10: 7

## 2020-07-06 ASSESSMENT — PAIN DESCRIPTION - DESCRIPTORS: DESCRIPTORS: BURNING

## 2020-07-06 ASSESSMENT — PAIN DESCRIPTION - PAIN TYPE: TYPE: ACUTE PAIN

## 2020-07-06 NOTE — ED TRIAGE NOTES
Pt arrives to ED, from home, via personal vehicle. Pt has hx of Lupus and began having a flare two days. Pt has swelling and pain to her right arm and right leg. Dr. Reji Walterbinder in to examine pt.

## 2020-07-06 NOTE — ED NOTES
Pt is given d/c instructions and one script. Pt voiced understanding of d/c instructions without further questions.       Shelbie Fuller RN  07/06/20 [id number]

## 2020-07-06 NOTE — ED PROVIDER NOTES
HPI:  20, Time: 9:47 AM EDT         Christofer Rosa is a 48 y.o. female presenting to the ED for swelling red arms, beginning 3 days ago. The complaint has been persistent, moderate in severity, and worsened by nothing. Patient states she has had some redness pain and swelling with lesions on her arms and legs. She states this happens when she has a lupus flareup she has had no chest pain no shortness of breath. She has had no fever no joint pain. No upper respiratory symptoms. No difficulty swallowing or breathing    ROS:   Pertinent positives and negatives are stated within HPI, all other systems reviewed and are negative.  --------------------------------------------- PAST HISTORY ---------------------------------------------  Past Medical History:  has a past medical history of Anxiety, Asthma, Depression, Depression, Diabetes mellitus ([address] Foot fracture, left, GERD (gastroesophageal reflux disease), Hyperlipidemia, Hypothyroidism, Kidney stones, Lupus ([address] Migraine, and PONV (postoperative nausea and vomiting). Past Surgical History:  has a past surgical history that includes Tonsillectomy;  section; Appendectomy; Cholecystectomy; Hysterectomy; hernia repair; Colonoscopy; Upper gastrointestinal endoscopy; Foot Closed Reduction (Left, 1/3/2017); Tooth Extraction; pr esophagogastroduodenoscopy transoral diagnostic (N/A, 3/8/2017); pr esophagogastroduodenoscopy transoral diagnostic (N/A, 2018); arthrodesis (Left, 2019); and fracture surgery. Social History:  reports that she has never smoked. She has never used smokeless tobacco. She reports that she does not drink alcohol or use drugs. Family History: family history is not on file. She was adopted. The patients home medications have been reviewed. Allergies: Aspirin; Imitrex [sumatriptan]; Pcn [penicillins];  Seasonal; Toradol [ketorolac tromethamine]; and Ultram [tramadol]    ---------------------------------------------------PHYSICAL EXAM--------------------------------------     Constitutional/General: Alert and oriented x3, well appearing, non toxic in NAD  Head: Normocephalic and atraumatic  Eyes: PERRL, EOMI  Mouth: Oropharynx clear, handling secretions, no trismus  Neck: Supple, full ROM, non tender to palpation in the midline, no stridor, no crepitus, no meningeal signs  Pulmonary: Lungs clear to auscultation bilaterally, no wheezes, rales, or rhonchi. Not in respiratory distress  Cardiovascular:  Regular rate. Regular rhythm. No murmurs, gallops, or rubs. 2+ distal pulses  Chest: no chest wall tenderness  Abdomen: Soft. Non tender. Non distended. +BS. No rebound, guarding, or rigidity. No pulsatile masses appreciated. Musculoskeletal: Moves all extremities x 4. Warm and well perfused, no clubbing, cyanosis, or edema. Capillary refill <3 seconds  Skin: warm and dry. There are erythematous discoid lesions consistent with lupus erythematosus of the arms and legs. Neurologic: GCS 15, CN 2-12 grossly intact, no focal deficits, symmetric strength 5/5 in the upper and lower extremities bilaterally  Psych: Normal Affect    -------------------------------------------------- RESULTS -------------------------------------------------  I have personally reviewed all laboratory and imaging results for this patient. Results are listed below. LABS:  No results found for this visit on 07/06/20. RADIOLOGY:  Interpreted by Radiologist.  No orders to display             ------------------------- NURSING NOTES AND VITALS REVIEWED ---------------------------   The nursing notes within the ED encounter and vital signs as below have been reviewed by myself.   /66   Pulse 102   Temp 98.4 °F (36.9 °C) (Oral)   Resp 17   Ht 5' 8\" (1.727 m)   Wt 158 lb (71.7 kg)   LMP  (LMP Unknown)   SpO2 99%   BMI 24.02 kg/m²   Oxygen Saturation Interpretation: Normal    The patients available past medical records and past encounters were reviewed. ------------------------------ ED COURSE/MEDICAL DECISION MAKING----------------------  Medications   dexamethasone (PF) (DECADRON) injection 6 mg (has no administration in time range)             Medical Decision Making:    Patient states she is received steroid injections in the past she does have sliding scale insulin coverage. She was given Decadron 6 mg IM and then instructed to start a Medrol Dosepak tomorrow she was advised to monitor her blood sugar 4 times a day and that should her blood sugars go above 250 she should present to the emergency room for evaluation            This patient's ED course included: a personal history and physicial eaxmination    This patient has remained hemodynamically stable during their ED course. Counseling: The emergency provider has spoken with the patient and discussed todays results, in addition to providing specific details for the plan of care and counseling regarding the diagnosis and prognosis. Questions are answered at this time and they are agreeable with the plan.       --------------------------------- IMPRESSION AND DISPOSITION ---------------------------------    IMPRESSION  1. Lupus erythematosus, unspecified form        DISPOSITION  Disposition: Discharge to home  Patient condition is good        NOTE: This report was transcribed using voice recognition software.  Every effort was made to ensure accuracy; however, inadvertent computerized transcription errors may be present          Samantha Calzada MD  07/06/20 0323

## 2020-07-17 ENCOUNTER — HOSPITAL ENCOUNTER (EMERGENCY)
Age: 55
Discharge: HOME | End: 2020-07-17
Attending: EMERGENCY MEDICINE
Payer: MEDICARE

## 2020-07-17 VITALS
BODY MASS INDEX: 24.25 KG/M2 | HEART RATE: 85 BPM | DIASTOLIC BLOOD PRESSURE: 66 MMHG | TEMPERATURE: 98.5 F | HEIGHT: 68 IN | WEIGHT: 160 LBS | OXYGEN SATURATION: 98 % | RESPIRATION RATE: 18 BRPM | SYSTOLIC BLOOD PRESSURE: 103 MMHG

## 2020-07-17 PROCEDURE — 96375 TX/PRO/DX INJ NEW DRUG ADDON: CPT

## 2020-07-17 PROCEDURE — 96372 THER/PROPH/DIAG INJ SC/IM: CPT

## 2020-07-17 PROCEDURE — 6360000002 HC RX W HCPCS: Performed by: EMERGENCY MEDICINE

## 2020-07-17 PROCEDURE — 96374 THER/PROPH/DIAG INJ IV PUSH: CPT

## 2020-07-17 PROCEDURE — 99283 EMERGENCY DEPT VISIT LOW MDM: CPT

## 2020-07-17 RX ORDER — HYDROMORPHONE HCL 110MG/55ML
1.5 PATIENT CONTROLLED ANALGESIA SYRINGE INTRAVENOUS ONCE
Status: COMPLETED | OUTPATIENT
Start: 2020-07-17 | End: 2020-07-17

## 2020-07-17 RX ORDER — PROMETHAZINE HYDROCHLORIDE 25 MG/1
25 TABLET ORAL EVERY 8 HOURS PRN
Qty: 15 TABLET | Refills: 0 | Status: SHIPPED | OUTPATIENT
Start: 2020-07-17 | End: 2020-07-22

## 2020-07-17 RX ORDER — ONDANSETRON 2 MG/ML
4 INJECTION INTRAMUSCULAR; INTRAVENOUS ONCE
Status: COMPLETED | OUTPATIENT
Start: 2020-07-17 | End: 2020-07-17

## 2020-07-17 RX ORDER — PROMETHAZINE HYDROCHLORIDE 25 MG/ML
25 INJECTION, SOLUTION INTRAMUSCULAR; INTRAVENOUS ONCE
Status: COMPLETED | OUTPATIENT
Start: 2020-07-17 | End: 2020-07-17

## 2020-07-17 RX ORDER — ONDANSETRON 4 MG/1
4 TABLET, ORALLY DISINTEGRATING ORAL ONCE
Status: DISCONTINUED | OUTPATIENT
Start: 2020-07-17 | End: 2020-07-17 | Stop reason: HOSPADM

## 2020-07-17 RX ORDER — BUTALBITAL, ACETAMINOPHEN AND CAFFEINE 300; 40; 50 MG/1; MG/1; MG/1
1 CAPSULE ORAL EVERY 6 HOURS PRN
Qty: 12 CAPSULE | Refills: 0 | Status: SHIPPED | OUTPATIENT
Start: 2020-07-17 | End: 2020-12-29 | Stop reason: ALTCHOICE

## 2020-07-17 RX ADMIN — ONDANSETRON 4 MG: 2 INJECTION INTRAMUSCULAR; INTRAVENOUS at 18:36

## 2020-07-17 RX ADMIN — HYDROMORPHONE HYDROCHLORIDE 1.5 MG: 2 INJECTION, SOLUTION INTRAMUSCULAR; INTRAVENOUS; SUBCUTANEOUS at 18:36

## 2020-07-17 RX ADMIN — PROMETHAZINE HYDROCHLORIDE 25 MG: 25 INJECTION INTRAMUSCULAR; INTRAVENOUS at 18:36

## 2020-07-17 ASSESSMENT — PAIN DESCRIPTION - DESCRIPTORS
DESCRIPTORS: THROBBING
DESCRIPTORS: SHARP;CONSTANT;SHOOTING

## 2020-07-17 ASSESSMENT — PAIN DESCRIPTION - PROGRESSION
CLINICAL_PROGRESSION: GRADUALLY WORSENING
CLINICAL_PROGRESSION: GRADUALLY IMPROVING

## 2020-07-17 ASSESSMENT — ENCOUNTER SYMPTOMS
DIARRHEA: 0
CONSTIPATION: 0
VOMITING: 0
STRIDOR: 0
EYE PAIN: 0
EYE REDNESS: 0
EYE DISCHARGE: 0
SORE THROAT: 0
NAUSEA: 1
PHOTOPHOBIA: 1
COUGH: 0
WHEEZING: 0
SINUS PRESSURE: 0
FACIAL SWELLING: 0
CHEST TIGHTNESS: 0
TROUBLE SWALLOWING: 0
ABDOMINAL PAIN: 0
BACK PAIN: 0
SHORTNESS OF BREATH: 0
CHOKING: 0
BLOOD IN STOOL: 0
VOICE CHANGE: 0

## 2020-07-17 ASSESSMENT — PAIN DESCRIPTION - PAIN TYPE
TYPE: ACUTE PAIN
TYPE: ACUTE PAIN

## 2020-07-17 ASSESSMENT — PAIN - FUNCTIONAL ASSESSMENT: PAIN_FUNCTIONAL_ASSESSMENT: 0-10

## 2020-07-17 ASSESSMENT — PAIN SCALES - GENERAL
PAINLEVEL_OUTOF10: 2
PAINLEVEL_OUTOF10: 9

## 2020-07-17 ASSESSMENT — PAIN DESCRIPTION - FREQUENCY
FREQUENCY: CONTINUOUS
FREQUENCY: CONTINUOUS

## 2020-07-17 ASSESSMENT — PAIN DESCRIPTION - LOCATION
LOCATION: HEAD
LOCATION: HEAD

## 2020-07-17 ASSESSMENT — PAIN DESCRIPTION - ONSET: ONSET: AWAKENED FROM SLEEP

## 2020-07-17 NOTE — ED TRIAGE NOTES
Pt reporting migraine starting last night, pt has nausea and photosensitivity. Hx of migrains but it has been 2 years.

## 2020-07-17 NOTE — ED PROVIDER NOTES
[facility] ED  eMERGENCY dEPARTMENT eNCOUnter      Pt Name: Anil Almanzar  MRN: [id number]  [place] 1965  Date of evaluation: 7/17/2020  Provider: MD Rowan Tomlinson       Chief Complaint   Patient presents with    Migraine     x 1 day \"since last night\"         HISTORY OF PRESENT ILLNESS   (Location/Symptom, Timing/Onset,Context/Setting, Quality, Duration, Modifying Factors, Severity)  Note limiting factors. Anil Almanzar is a 48 y.o. female who presents to the emergency department patient well-known to me from previous multiple encounters for similar nature of injury especially headaches patient with history of hyperlipidemia hypothyroidism anxiety diabetes mellitus and lupus patient seen here for lupus exacerbation and redness of the arm which is much better after steroid therapy as per patient no fever no injury or fall but has similar headache as before along with nausea and less activity  HPI    NursingNotes were reviewed. REVIEW OF SYSTEMS    (2-9 systems for level 4, 10 or more for level 5)     Review of Systems   Constitutional: Negative. Negative for activity change and fever. HENT: Negative for congestion, drooling, facial swelling, mouth sores, nosebleeds, sinus pressure, sore throat, trouble swallowing and voice change. Eyes: Positive for photophobia. Negative for pain, discharge, redness and visual disturbance. Respiratory: Negative for cough, choking, chest tightness, shortness of breath, wheezing and stridor. Cardiovascular: Negative for chest pain, palpitations and leg swelling. Gastrointestinal: Positive for nausea. Negative for abdominal pain, blood in stool, constipation, diarrhea and vomiting. Endocrine: Negative for cold intolerance, polyphagia and polyuria. Genitourinary: Negative for dysuria, flank pain, frequency, genital sores and urgency. Musculoskeletal: Negative for back pain, joint swelling, neck pain and neck stiffness. Skin: Negative for pallor and rash. Neurological: Positive for headaches. Negative for tremors, seizures, syncope, weakness and numbness. Hematological: Negative for adenopathy. Does not bruise/bleed easily. Psychiatric/Behavioral: Negative for agitation, behavioral problems, hallucinations and sleep disturbance. The patient is not hyperactive. All other systems reviewed and are negative. Except as noted above the remainder of the review of systems was reviewed and negative.        PAST MEDICAL HISTORY     Past Medical History:   Diagnosis Date    Anxiety     Asthma     as child    Depression     Depression     dysthymia - following lupus dx age 23    Diabetes mellitus ([facility] [address]     Foot fracture, left     GERD (gastroesophageal reflux disease)     Hyperlipidemia     Hypothyroidism     Kidney stones     Lupus ([facility] [address]      diab -     Migraine     PONV (postoperative nausea and vomiting)          SURGICALHISTORY       Past Surgical History:   Procedure Laterality Date    APPENDECTOMY      ARTHRODESIS Left 11/26/2019    ARTHRODESIS OF THE FIRST METATARSAL PHALANGEAL JOINT LEFT FOOT performed by Mary Jo Harris DPM at [place] [address]      COLONOSCOPY      FOOT CLOSED REDUCTION Left 1/3/2017    LEFT FOOT PERCUTANEOUS PINNING Isha Sheen FRACTURE performed by Shai Metzger MD at [address] [address]      HYSTERECTOMY      [facility] ESOPHAGOGASTRODUODENOSCOPY TRANSORAL DIAGNOSTIC N/A 3/8/2017    EGD ESOPHAGOGASTRODUODENOSCOPY WITH DILATION performed by Priyank Galvez MD at [address] ESOPHAGOGASTRODUODENOSCOPY TRANSORAL DIAGNOSTIC N/A 11/7/2018    EGD ESOPHAGOGASTRODUODENOSCOPY WITH DILATION performed by Priyank Galvez MD at [address] [place] [address]       Previous Medications    ALBUTEROL SULFATE HFA (VENTOLIN HFA) 108 (90 BASE) MCG/ACT INHALER    Inhale 2 puffs into the lungs every 6 hours as needed for Wheezing    BLOOD GLUCOSE MONITOR KIT AND SUPPLIES    Test 3 times a day & as needed for symptoms of irregular blood glucose. BLOOD GLUCOSE MONITOR KIT AND SUPPLIES    Please give 1 meter covered by insurance Dx E11.65    BLOOD GLUCOSE MONITOR STRIPS    Pt test 3x daily Dx E11.65    BLOOD GLUCOSE MONITORING SUPPL (TRUE TRACK BLOOD GLUCOSE) CAROLE    Check blood sugars twice daily or as directed. BLOOD GLUCOSE TEST STRIPS (ASCENSIA AUTODISC VI;ONE TOUCH ULTRA TEST VI) STRIP    Check blood sugars twice daily or as directed. [employer] UNILET LANCETS 30G MISC    Check blood sugars twice daily or as directed. ESTRADIOL (CLIMARA) 0.025 MG/24HR    Place 0.025 patches onto the skin every 7 days On mondays    GABAPENTIN (NEURONTIN) 300 MG CAPSULE    Take 300 mg by mouth 2 times daily.     HYDROXYCHLOROQUINE (PLAQUENIL) 200 MG TABLET    Take 1 tablet by mouth 2 times daily    INSULIN LISPRO, 1 UNIT DIAL, (HUMALOG KWIKPEN) 100 UNIT/ML SOPN    Inject 20 Units into the skin 3 times daily (before meals)    INSULIN PEN NEEDLE (NOVOFINE) 32G X 6 MM MISC    BID    INSULIN PEN NEEDLE (NOVOFINE) 32G X 6 MM MISC    qid    LANCETS MISC    Pt test 3x daily Dx E11.65    LEVOTHYROXINE (SYNTHROID) 50 MCG TABLET    Take 1 tablet by mouth daily    METOCLOPRAMIDE (REGLAN) 10 MG TABLET    Take 10 mg by mouth as needed     METOCLOPRAMIDE (REGLAN) 5 MG TABLET    Take 1 tablet by mouth 4 times daily 1/2 hour before meal and at bedtime    ONDANSETRON (ZOFRAN ODT) 4 MG DISINTEGRATING TABLET    Take 1 tablet by mouth every 8 hours as needed for Nausea    PANTOPRAZOLE (PROTONIX) 40 MG TABLET    Take 1 tablet by mouth daily    ROSUVASTATIN (CRESTOR) 40 MG TABLET    Take 1 tablet by mouth daily    TIZANIDINE (ZANAFLEX) 4 MG TABLET    Take 1 tablet by mouth 3 times daily as needed (back pain/spasm)       ALLERGIES     Aspirin; Imitrex [sumatriptan]; Pcn [penicillins]; Seasonal; Toradol [ketorolac tromethamine]; and Ultram [tramadol]    FAMILY HISTORY       Family History   Adopted: Yes          SOCIAL HISTORY       Social History     Socioeconomic History    Marital status: Legally      Spouse name: None    Number of children: None    Years of education: None    Highest education level: None   Occupational History    None   Social Needs    Financial resource strain: None    Food insecurity     Worry: None     Inability: None    Transportation needs     Medical: None     Non-medical: None   Tobacco Use    Smoking status: Never Smoker    Smokeless tobacco: Never Used   Substance and Sexual Activity    Alcohol use: No     Alcohol/week: 0.0 standard drinks     Comment: denies    Drug use: No     Comment: denies    Sexual activity: Not Currently   Lifestyle    Physical activity     Days per week: None     Minutes per session: None    Stress: None   Relationships    Social connections     Talks on phone: None     Gets together: None     Attends Congregational service: None     Active member of club or organization: None     Attends meetings of clubs or organizations: None     Relationship status: None    Intimate partner violence     Fear of current or ex partner: None     Emotionally abused: None     Physically abused: None     Forced sexual activity: None   Other Topics Concern    None   Social History Narrative    None       SCREENINGS   NIH Stroke Scale  NIH Stroke Scale Assessed: Yes  Interval: Baseline  Level of Consciousness (1a. ): Alert  LOC Questions (1b. ):  Answers both correctly  LOC Commands (1c. ): Performs both tasks correctly  Best Gaze (2. ): Normal  Visual (3. ): No visual loss  Facial Palsy (4. ): Normal symmetrical movement  Motor Arm, Left (5a. ): No drift  Motor Arm, Right (5b. ): No drift  Motor Leg, Left (6a. ): No drift  Motor Leg, Right (6b. ): No drift  Limb Ataxia (7. ): Absent  Sensory (8. ): Normal  Best Language (9. ): No aphasia  Dysarthria (10. ): Normal  Extinction and Inattention (11): No abnormality  Total: 0Glasgow Coma Scale  Eye Opening: Spontaneous  Best Verbal Response: Oriented  Best Motor Response: Obeys commands  Aries Coma Scale Score: 15 @FLOW([id number])@      PHYSICAL EXAM    (up to 7 for level 4, 8 or more for level 5)     ED Triage Vitals   BP Temp Temp src Pulse Resp SpO2 Height Weight   -- -- -- -- -- -- -- --       Physical Exam  Vitals signs and nursing note reviewed. Constitutional:       Appearance: Normal appearance. She is well-developed and normal weight. Comments: Active alert cooperative patient nontoxic appearance afebrile   HENT:      Head: Normocephalic. Right Ear: Tympanic membrane, ear canal and external ear normal.      Left Ear: Tympanic membrane, ear canal and external ear normal.      Nose: Nose normal.      Mouth/Throat:      Mouth: Mucous membranes are moist.   Eyes:      Pupils: Pupils are equal, round, and reactive to light. Neck:      Musculoskeletal: Normal range of motion and neck supple. No neck rigidity. Cardiovascular:      Rate and Rhythm: Normal rate and regular rhythm. Pulses: Normal pulses. Heart sounds: Normal heart sounds. No murmur. No gallop. Pulmonary:      Effort: No respiratory distress. Breath sounds: Normal breath sounds. No wheezing. Abdominal:      General: Bowel sounds are normal. There is no distension. Palpations: Abdomen is soft. There is no mass. Tenderness: There is no abdominal tenderness. There is no rebound. Musculoskeletal: Normal range of motion. General: No tenderness. Lymphadenopathy:      Cervical: No cervical adenopathy. Skin:     General: Skin is warm. Capillary Refill: Capillary refill takes less than 2 seconds. Findings: No erythema or rash. Neurological:      General: No focal deficit present.       Mental Status: She is alert and oriented to person, needed      DISCHARGE MEDICATIONS:  New Prescriptions    BUTALBITAL-APAP-CAFFEINE (FIORICET) -40 MG CAPS PER CAPSULE    Take 1 capsule by mouth every 6 hours as needed for Headaches or Migraine    PROMETHAZINE (PHENERGAN) 25 MG TABLET    Take 1 tablet by mouth every 8 hours as needed for Nausea WARNING:  May cause drowsiness. May impair ability to operate vehicles or machinery. Do not use in combination with alcohol.           (Please note that portions of this note were completed with a voice recognition program.  Efforts were made to edit the dictations but occasionally words are mis-transcribed.)    Andrew Whitmore MD (electronically signed)  Attending Emergency Physician       Andrew Whitmore MD  07/17/20 [id number]

## 2020-07-18 ENCOUNTER — APPOINTMENT (OUTPATIENT)
Dept: GENERAL RADIOLOGY | Age: 55
End: 2020-07-18
Payer: MEDICARE

## 2020-07-18 ENCOUNTER — HOSPITAL ENCOUNTER (EMERGENCY)
Age: 55
Discharge: HOME | End: 2020-07-19
Payer: MEDICARE

## 2020-07-18 LAB
BASOPHILS ABSOLUTE: 0.2 K/UL (ref 0–0.2)
BASOPHILS RELATIVE PERCENT: 1.7 %
EKG ATRIAL RATE: 97 BPM
EKG P AXIS: 53 DEGREES
EKG P-R INTERVAL: 134 MS
EKG Q-T INTERVAL: 342 MS
EKG QRS DURATION: 56 MS
EKG QTC CALCULATION (BAZETT): 434 MS
EKG R AXIS: 82 DEGREES
EKG T AXIS: 79 DEGREES
EKG VENTRICULAR RATE: 97 BPM
EOSINOPHILS ABSOLUTE: 0.7 K/UL (ref 0–0.7)
EOSINOPHILS RELATIVE PERCENT: 5.2 %
HCT VFR BLD CALC: 38.5 % (ref 37–47)
HEMOGLOBIN: 13 G/DL (ref 12–16)
LYMPHOCYTES ABSOLUTE: 3.1 K/UL (ref 1–4.8)
LYMPHOCYTES RELATIVE PERCENT: 24.4 %
MCH RBC QN AUTO: 27.9 PG (ref 27–31.3)
MCHC RBC AUTO-ENTMCNC: 33.8 % (ref 33–37)
MCV RBC AUTO: 82.7 FL (ref 82–100)
MONOCYTES ABSOLUTE: 0.8 K/UL (ref 0.2–0.8)
MONOCYTES RELATIVE PERCENT: 6.2 %
NEUTROPHILS ABSOLUTE: 8 K/UL (ref 1.4–6.5)
NEUTROPHILS RELATIVE PERCENT: 62.5 %
PDW BLD-RTO: 13.9 % (ref 11.5–14.5)
PLATELET # BLD: 294 K/UL (ref 130–400)
RBC # BLD: 4.65 M/UL (ref 4.2–5.4)
WBC # BLD: 12.7 K/UL (ref 4.8–10.8)

## 2020-07-18 PROCEDURE — 84484 ASSAY OF TROPONIN QUANT: CPT

## 2020-07-18 PROCEDURE — 6360000002 HC RX W HCPCS: Performed by: PHYSICIAN ASSISTANT

## 2020-07-18 PROCEDURE — 80053 COMPREHEN METABOLIC PANEL: CPT

## 2020-07-18 PROCEDURE — 99285 EMERGENCY DEPT VISIT HI MDM: CPT

## 2020-07-18 PROCEDURE — 2580000003 HC RX 258: Performed by: PHYSICIAN ASSISTANT

## 2020-07-18 PROCEDURE — 82553 CREATINE MB FRACTION: CPT

## 2020-07-18 PROCEDURE — 71045 X-RAY EXAM CHEST 1 VIEW: CPT

## 2020-07-18 PROCEDURE — 83735 ASSAY OF MAGNESIUM: CPT

## 2020-07-18 PROCEDURE — 85025 COMPLETE CBC W/AUTO DIFF WBC: CPT

## 2020-07-18 PROCEDURE — 93005 ELECTROCARDIOGRAM TRACING: CPT | Performed by: EMERGENCY MEDICINE

## 2020-07-18 PROCEDURE — 6370000000 HC RX 637 (ALT 250 FOR IP): Performed by: PHYSICIAN ASSISTANT

## 2020-07-18 PROCEDURE — 36415 COLL VENOUS BLD VENIPUNCTURE: CPT

## 2020-07-18 PROCEDURE — 96374 THER/PROPH/DIAG INJ IV PUSH: CPT

## 2020-07-18 PROCEDURE — 82550 ASSAY OF CK (CPK): CPT

## 2020-07-18 RX ORDER — ONDANSETRON 2 MG/ML
4 INJECTION INTRAMUSCULAR; INTRAVENOUS ONCE
Status: COMPLETED | OUTPATIENT
Start: 2020-07-18 | End: 2020-07-18

## 2020-07-18 RX ORDER — NITROGLYCERIN 0.4 MG/1
0.4 TABLET SUBLINGUAL EVERY 5 MIN PRN
Status: DISCONTINUED | OUTPATIENT
Start: 2020-07-18 | End: 2020-07-19 | Stop reason: HOSPADM

## 2020-07-18 RX ORDER — MORPHINE SULFATE 2 MG/ML
4 INJECTION, SOLUTION INTRAMUSCULAR; INTRAVENOUS
Status: DISCONTINUED | OUTPATIENT
Start: 2020-07-18 | End: 2020-07-19 | Stop reason: HOSPADM

## 2020-07-18 RX ORDER — 0.9 % SODIUM CHLORIDE 0.9 %
1000 INTRAVENOUS SOLUTION INTRAVENOUS ONCE
Status: COMPLETED | OUTPATIENT
Start: 2020-07-18 | End: 2020-07-19

## 2020-07-18 RX ADMIN — SODIUM CHLORIDE 1000 ML: 9 INJECTION, SOLUTION INTRAVENOUS at 23:30

## 2020-07-18 RX ADMIN — ONDANSETRON 4 MG: 2 INJECTION INTRAMUSCULAR; INTRAVENOUS at 23:30

## 2020-07-18 RX ADMIN — MORPHINE SULFATE 4 MG: 2 INJECTION, SOLUTION INTRAMUSCULAR; INTRAVENOUS at 23:56

## 2020-07-18 RX ADMIN — NITROGLYCERIN 0.4 MG: 0.4 TABLET, ORALLY DISINTEGRATING SUBLINGUAL at 23:30

## 2020-07-18 ASSESSMENT — PAIN DESCRIPTION - DESCRIPTORS
DESCRIPTORS: SHARP;STABBING
DESCRIPTORS: SHARP;STABBING

## 2020-07-18 ASSESSMENT — PAIN DESCRIPTION - ORIENTATION
ORIENTATION: LEFT
ORIENTATION: LEFT

## 2020-07-18 ASSESSMENT — PAIN DESCRIPTION - LOCATION
LOCATION: ARM;CHEST;SHOULDER
LOCATION: CHEST;ARM

## 2020-07-18 ASSESSMENT — PAIN DESCRIPTION - FREQUENCY
FREQUENCY: CONTINUOUS
FREQUENCY: INTERMITTENT

## 2020-07-18 ASSESSMENT — PAIN DESCRIPTION - ONSET
ONSET: ON-GOING
ONSET: ON-GOING

## 2020-07-18 ASSESSMENT — PAIN SCALES - GENERAL
PAINLEVEL_OUTOF10: 10

## 2020-07-19 VITALS
HEART RATE: 86 BPM | TEMPERATURE: 98.8 F | DIASTOLIC BLOOD PRESSURE: 77 MMHG | RESPIRATION RATE: 18 BRPM | BODY MASS INDEX: 23.95 KG/M2 | OXYGEN SATURATION: 95 % | SYSTOLIC BLOOD PRESSURE: 113 MMHG | WEIGHT: 158 LBS | HEIGHT: 68 IN

## 2020-07-19 LAB
ALBUMIN SERPL-MCNC: 3.7 G/DL (ref 3.5–4.6)
ALP BLD-CCNC: 125 U/L (ref 40–130)
ALT SERPL-CCNC: 13 U/L (ref 0–33)
ANION GAP SERPL CALCULATED.3IONS-SCNC: 11 MEQ/L (ref 9–15)
AST SERPL-CCNC: 16 U/L (ref 0–35)
BILIRUB SERPL-MCNC: 0.3 MG/DL (ref 0.2–0.7)
BUN BLDV-MCNC: 12 MG/DL (ref 6–20)
CALCIUM SERPL-MCNC: 9 MG/DL (ref 8.5–9.9)
CHLORIDE BLD-SCNC: 98 MEQ/L (ref 95–107)
CK MB: 2 NG/ML (ref 0–3.8)
CO2: 28 MEQ/L (ref 20–31)
CREAT SERPL-MCNC: 1.22 MG/DL (ref 0.5–0.9)
CREATINE KINASE-MB INDEX: 0.7 % (ref 0–3.5)
GFR AFRICAN AMERICAN: 55.8
GFR NON-AFRICAN AMERICAN: 46.1
GLOBULIN: 2.9 G/DL (ref 2.3–3.5)
GLUCOSE BLD-MCNC: 276 MG/DL (ref 70–99)
MAGNESIUM: 2.1 MG/DL (ref 1.7–2.4)
POTASSIUM SERPL-SCNC: 4.4 MEQ/L (ref 3.4–4.9)
SODIUM BLD-SCNC: 137 MEQ/L (ref 135–144)
TOTAL CK: 273 U/L (ref 0–170)
TOTAL PROTEIN: 6.6 G/DL (ref 6.3–8)
TROPONIN: <0.01 NG/ML (ref 0–0.01)

## 2020-07-19 PROCEDURE — 6370000000 HC RX 637 (ALT 250 FOR IP): Performed by: PHYSICIAN ASSISTANT

## 2020-07-19 RX ORDER — PREDNISONE 20 MG/1
20 TABLET ORAL DAILY
Qty: 5 TABLET | Refills: 0 | Status: SHIPPED | OUTPATIENT
Start: 2020-07-19 | End: 2020-07-24

## 2020-07-19 RX ADMIN — PREDNISONE 70 MG: 20 TABLET ORAL at 00:26

## 2020-07-19 ASSESSMENT — ENCOUNTER SYMPTOMS
NAUSEA: 1
EYE REDNESS: 0
COLOR CHANGE: 0
ABDOMINAL PAIN: 0
COUGH: 0
DIARRHEA: 0
VOMITING: 0
EYE DISCHARGE: 0
BACK PAIN: 0
SINUS PAIN: 0
CHEST TIGHTNESS: 0
RHINORRHEA: 0
SHORTNESS OF BREATH: 1

## 2020-07-19 ASSESSMENT — PAIN DESCRIPTION - ORIENTATION: ORIENTATION: LEFT

## 2020-07-19 ASSESSMENT — PAIN DESCRIPTION - LOCATION: LOCATION: CHEST;SHOULDER;ARM

## 2020-07-19 ASSESSMENT — PAIN DESCRIPTION - FREQUENCY: FREQUENCY: INTERMITTENT

## 2020-07-19 ASSESSMENT — HEART SCORE: ECG: 0

## 2020-07-19 ASSESSMENT — PAIN DESCRIPTION - ONSET: ONSET: ON-GOING

## 2020-07-19 ASSESSMENT — PAIN SCALES - GENERAL: PAINLEVEL_OUTOF10: 9

## 2020-07-19 NOTE — ED TRIAGE NOTES
Patient came to the ED for left sided chest pain. Patient states the chest pain began one hour ago and is worse with deep inspiration. Patient describes the pain as sharp/stabbing and states the pain radiates to left arm. Pt took tylenol PTA. A&Ox4. Respirations even and unlabored.

## 2020-07-19 NOTE — ED PROVIDER NOTES
Psychiatric/Behavioral: Negative for agitation and behavioral problems. The patient is not nervous/anxious. Except as noted above the remainder of the review of systems was reviewed and negative. PAST MEDICAL HISTORY     Past Medical History:   Diagnosis Date    Anxiety     Asthma     as child    Depression     Depression     dysthymia - following lupus dx age 23    Diabetes mellitus ([facility]     Foot fracture, left     GERD (gastroesophageal reflux disease)     Hyperlipidemia     Hypothyroidism     Kidney stones     Lupus ([facility]      diab -     Migraine     PONV (postoperative nausea and vomiting)          SURGICALHISTORY       Past Surgical History:   Procedure Laterality Date    APPENDECTOMY      ARTHRODESIS Left 11/26/2019    ARTHRODESIS OF THE FIRST METATARSAL PHALANGEAL JOINT LEFT FOOT performed by Jasmyne Carney DPM at [place] [address]      COLONOSCOPY      FOOT CLOSED REDUCTION Left 1/3/2017    LEFT FOOT PERCUTANEOUS PINNING Durenda Gu FRACTURE performed by Zachariah Connors MD at [address] [address]      HYSTERECTOMY      DC ESOPHAGOGASTRODUODENOSCOPY TRANSORAL DIAGNOSTIC N/A 3/8/2017    EGD ESOPHAGOGASTRODUODENOSCOPY WITH DILATION performed by Melva Moore MD at [facility] ESOPHAGOGASTRODUODENOSCOPY TRANSORAL DIAGNOSTIC N/A 11/7/2018    EGD ESOPHAGOGASTRODUODENOSCOPY WITH DILATION performed by Melva Moore MD at [address] [place] [address]       Previous Medications    ALBUTEROL SULFATE HFA (VENTOLIN HFA) 108 (90 BASE) MCG/ACT INHALER    Inhale 2 puffs into the lungs every 6 hours as needed for Wheezing    BLOOD GLUCOSE MONITOR KIT AND SUPPLIES    Test 3 times a day & as needed for symptoms of irregular blood glucose.     BLOOD GLUCOSE MONITOR KIT AND SUPPLIES    Please give 1 meter covered by insurance Dx E11.65    BLOOD GLUCOSE MONITOR STRIPS    Pt test 3x daily Dx E11.65    BLOOD GLUCOSE MONITORING SUPPL (TRUE TRACK BLOOD GLUCOSE) CAROLE    Check blood sugars twice daily or as directed. BLOOD GLUCOSE TEST STRIPS (ASCENSIA AUTODISC VI;ONE TOUCH ULTRA TEST VI) STRIP    Check blood sugars twice daily or as directed. BUTALBITAL-APAP-CAFFEINE (FIORICET) -40 MG CAPS PER CAPSULE    Take 1 capsule by mouth every 6 hours as needed for Headaches or Migraine    [employer] UNILET LANCETS 30G MISC    Check blood sugars twice daily or as directed. ESTRADIOL (CLIMARA) 0.025 MG/24HR    Place 0.025 patches onto the skin every 7 days On mondays    GABAPENTIN (NEURONTIN) 300 MG CAPSULE    Take 300 mg by mouth 2 times daily. HYDROXYCHLOROQUINE (PLAQUENIL) 200 MG TABLET    Take 1 tablet by mouth 2 times daily    INSULIN LISPRO, 1 UNIT DIAL, (HUMALOG KWIKPEN) 100 UNIT/ML SOPN    Inject 20 Units into the skin 3 times daily (before meals)    INSULIN PEN NEEDLE (NOVOFINE) 32G X 6 MM MISC    BID    INSULIN PEN NEEDLE (NOVOFINE) 32G X 6 MM MISC    qid    LANCETS MISC    Pt test 3x daily Dx E11.65    LEVOTHYROXINE (SYNTHROID) 50 MCG TABLET    Take 1 tablet by mouth daily    METOCLOPRAMIDE (REGLAN) 10 MG TABLET    Take 10 mg by mouth as needed     METOCLOPRAMIDE (REGLAN) 5 MG TABLET    Take 1 tablet by mouth 4 times daily 1/2 hour before meal and at bedtime    ONDANSETRON (ZOFRAN ODT) 4 MG DISINTEGRATING TABLET    Take 1 tablet by mouth every 8 hours as needed for Nausea    PANTOPRAZOLE (PROTONIX) 40 MG TABLET    Take 1 tablet by mouth daily    PROMETHAZINE (PHENERGAN) 25 MG TABLET    Take 1 tablet by mouth every 8 hours as needed for Nausea WARNING:  May cause drowsiness. May impair ability to operate vehicles or machinery. Do not use in combination with alcohol.     ROSUVASTATIN (CRESTOR) 40 MG TABLET    Take 1 tablet by mouth daily    TIZANIDINE (ZANAFLEX) 4 MG TABLET    Take 1 tablet by mouth 3 times daily as needed (back pain/spasm)       ALLERGIES     Aspirin; Imitrex [sumatriptan]; Pcn [penicillins]; Seasonal; Toradol [ketorolac tromethamine]; and Ultram [tramadol]    FAMILY HISTORY       Family History   Adopted: Yes          SOCIAL HISTORY       Social History     Socioeconomic History    Marital status: Legally      Spouse name: None    Number of children: None    Years of education: None    Highest education level: None   Occupational History    None   Social Needs    Financial resource strain: None    Food insecurity     Worry: None     Inability: None    Transportation needs     Medical: None     Non-medical: None   Tobacco Use    Smoking status: Never Smoker    Smokeless tobacco: Never Used   Substance and Sexual Activity    Alcohol use: No     Alcohol/week: 0.0 standard drinks     Comment: denies    Drug use: No     Comment: denies    Sexual activity: Not Currently   Lifestyle    Physical activity     Days per week: None     Minutes per session: None    Stress: None   Relationships    Social connections     Talks on phone: None     Gets together: None     Attends Alevism service: None     Active member of club or organization: None     Attends meetings of clubs or organizations: None     Relationship status: None    Intimate partner violence     Fear of current or ex partner: None     Emotionally abused: None     Physically abused: None     Forced sexual activity: None   Other Topics Concern    None   Social History Narrative    None       SCREENINGS              PHYSICAL EXAM    (up to 7 for level 4, 8 or more for level 5)     ED Triage Vitals [07/18/20 2301]   BP Temp Temp Source Pulse Resp SpO2 Height Weight   116/64 99.3 °F (37.4 °C) Oral 96 20 98 % 5' 8\" (1.727 m) 158 lb (71.7 kg)       Physical Exam  Vitals signs and nursing note reviewed. Constitutional:       General: She is not in acute distress. Appearance: Normal appearance. She is normal weight.    HENT: Head: Normocephalic. Right Ear: Tympanic membrane, ear canal and external ear normal.      Left Ear: Tympanic membrane, ear canal and external ear normal.      Nose: Nose normal.      Mouth/Throat:      Mouth: Mucous membranes are moist.      Pharynx: Oropharynx is clear. No oropharyngeal exudate or posterior oropharyngeal erythema. Eyes:      Conjunctiva/sclera: Conjunctivae normal.      Pupils: Pupils are equal, round, and reactive to light. Neck:      Musculoskeletal: Normal range of motion. No neck rigidity. Cardiovascular:      Rate and Rhythm: Normal rate and regular rhythm. Pulses: Normal pulses. Heart sounds: Normal heart sounds. No murmur. No friction rub. Pulmonary:      Effort: Pulmonary effort is normal. No respiratory distress. Breath sounds: Normal breath sounds. No stridor. Abdominal:      General: Abdomen is flat. Bowel sounds are normal.      Palpations: Abdomen is soft. Genitourinary:     Vagina: No vaginal discharge. Musculoskeletal: Normal range of motion. General: No swelling or tenderness. Neurological:      General: No focal deficit present. Mental Status: She is alert. Psychiatric:         Mood and Affect: Mood normal.         Behavior: Behavior normal.         DIAGNOSTIC RESULTS     EKG: All EKG's are interpreted by the Emergency Department Physician who either signs or Co-signsthis chart in the absence of a cardiologist.    Normal sinus rhythm, ventricular rate of 97 bpm.  WI interval within normal limits. QTC of 434 ms. No axis deviation. No ST segment changes. No signs of acute ischemia. Unchanged from EKG obtained 6/28. Abnormal EKG    RADIOLOGY:   Non-plain filmimages such as CT, Ultrasound and MRI are read by the radiologist. Plain radiographic images are visualized and preliminarily interpreted by the emergency physician with the below findings:    Negative for any acute cardiopulmonary process.     Interpretation per the Radiologist below, if available at the time ofthis note:    XR CHEST PORTABLE    (Results Pending)         ED BEDSIDE ULTRASOUND:   Performed by ED Physician - none    LABS:  Labs Reviewed   COMPREHENSIVE METABOLIC PANEL - Abnormal; Notable for the following components:       Result Value    Glucose 276 (*)     CREATININE 1.22 (*)     GFR Non- 46.1 (*)     GFR  55.8 (*)     All other components within normal limits   CBC WITH AUTO DIFFERENTIAL - Abnormal; Notable for the following components:    WBC 12.7 (*)     Neutrophils Absolute 8.0 (*)     All other components within normal limits   CK-MB INDEX - Abnormal; Notable for the following components: Total  (*)     All other components within normal limits   TROPONIN   MAGNESIUM       All other labs were within normal range or not returned as of this dictation. EMERGENCY DEPARTMENT COURSE and DIFFERENTIAL DIAGNOSIS/MDM:   Vitals:    Vitals:    07/18/20 2330 07/18/20 2345 07/19/20 0000 07/19/20 0015   BP: 105/71 94/72 113/79    Pulse:  92 97 91   Resp:  18 20 18   Temp:       TempSrc:       SpO2:  94% 96% 91%   Weight:       Height:           55-year of age female presents with chest pain that started acutely 1 hour ago. CBC, CMP, troponin, mag, CK-MB, twelve-lead, chest x-ray will be obtained. Patient will be given nitro and symptoms assessed. Patient will not receive aspirin secondary to a allergy. MDM        REASSESSMENT      Reassessment nitro given without relief patient hypotensive 99J systolic. Patient given morphine which did improve patient's pain. Patient's troponin and cardiac work-up all benign. I believe patient's symptoms are secondary to a lupus flare. Patient will be given a dose of 1 mg/kg steroids here discharged home in stable condition with prescription for 5-day course of steroids.   Patient advised to follow-up closely with her rheumatologist.  Patient understands and is agreeable to this plan.      CONSULTS:  None    PROCEDURES:  Unless otherwise noted below, none     Procedures    FINAL IMPRESSION      1. Pleuritic chest pain    2. Chest wall pain          DISPOSITION/PLAN   DISPOSITION Decision To Discharge 07/19/2020 12:36:43 AM      PATIENT REFERREDTO:  Lawanda Taylor MD  [address] [place] [address]  [phone]    Call in 1 day      Also call your rheumatologist on Monday    Call in 2 days        DISCHARGEMEDICATIONS:  New Prescriptions    PREDNISONE (DELTASONE) 20 MG TABLET    Take 1 tablet by mouth daily for 5 days     Controlled Substances Monitoring:     RX Monitoring 7/26/2017   Periodic Controlled Substance Monitoring No signs of potential drug abuse or diversion identified.        (Please note that portions of this note were completed with a voice recognition program.  Efforts were made to edit the dictations but occasionally words are mis-transcribed.)    Cipriano Acevedo PA-C (electronically signed)           Cipriano Acevedo PA-C  07/19/20 0109

## 2020-07-20 ENCOUNTER — CARE COORDINATION (OUTPATIENT)
Dept: CARE COORDINATION | Age: 55
End: 2020-07-20

## 2020-07-20 NOTE — CARE COORDINATION
Patient contacted regarding Bernard Silverio. Discussed COVID-19 related testing which was available at this time. Test results were negative. Patient informed of results, if available? Yes and completed 2020 and 2020    Care Transition Nurse/ Ambulatory Care Manager contacted the patient by telephone to perform post discharge assessment. Verified name and  with patient as identifiers. Provided introduction to self, and explanation of the CTN/ACM role, and reason for call due to risk factors for infection and/or exposure to COVID-19. Symptoms reviewed with patient who verbalized the following symptoms: chest pain, no new symptoms and no worsening symptoms. Due to no new or worsening symptoms encounter was not routed to provider for escalation. Discussed follow-up appointments. If no appointment was previously scheduled, appointment scheduling offered: Yes  [facility] follow up appointment(s):   Future Appointments   Date Time Provider Kings Mcadams   2020 11:30 AM Miguelina Brar  [facility] follow up appointment(s): Rheumatoidology     Advance Care Planning:   Does patient have an Advance Directive:  reviewed and current. Patient has following risk factors of: Lupus. CTN/ACM reviewed discharge instructions, medical action plan and red flags such as increased shortness of breath, increasing fever and signs of decompensation with patient who verbalized understanding. Discussed exposure protocols and quarantine with CDC Guidelines What to do if you are sick with coronavirus disease .  Patient was given an opportunity for questions and concerns. The patient agrees to contact the [facility] exposure line [phone], local [facility] [place] Department [facility] Health: ([phone]) and PCP office for questions related to their healthcare. CTN/ACM provided contact information for future needs.     Reviewed and educated patient on any new and changed medications related to discharge diagnosis     Patient/family/caregiver given information for [employer] and agrees to enroll no  Patient's preferred e-mail:   Patient's preferred phone number:   Based on Loop alert triggers, patient will be contacted by nurse care manager for worsening symptoms. Plan for follow-up call in 5-7 days based on severity of symptoms and risk factors. Leonora Garcia says that she is still has chest pain and pain with inspiration. She states that it is improving. She did  the medication ordered and she is taking it as prescribed. She tells me that she was made aware of symptoms related to COVID and she is very good about ensuring that she is doing all preventative protocols to decrease the spread of COVID. She adds that she does have the phone numbers for both [facility] ([facility]) and the [employer] for Flu Screening. I spoke with her about the [address] but she declines this service. She verbalizes understanding of the information discussed.

## 2020-07-21 PROCEDURE — 93010 ELECTROCARDIOGRAM REPORT: CPT | Performed by: INTERNAL MEDICINE

## 2020-07-27 ENCOUNTER — CARE COORDINATION (OUTPATIENT)
Dept: CARE COORDINATION | Age: 55
End: 2020-07-27

## 2020-07-29 ENCOUNTER — HOSPITAL ENCOUNTER (EMERGENCY)
Age: 55
Discharge: HOME | End: 2020-07-29
Attending: EMERGENCY MEDICINE
Payer: MEDICARE

## 2020-07-29 VITALS
HEIGHT: 68 IN | RESPIRATION RATE: 20 BRPM | TEMPERATURE: 98.5 F | HEART RATE: 106 BPM | WEIGHT: 156 LBS | OXYGEN SATURATION: 96 % | BODY MASS INDEX: 23.64 KG/M2 | DIASTOLIC BLOOD PRESSURE: 63 MMHG | SYSTOLIC BLOOD PRESSURE: 139 MMHG

## 2020-07-29 PROCEDURE — 96372 THER/PROPH/DIAG INJ SC/IM: CPT

## 2020-07-29 PROCEDURE — 6360000002 HC RX W HCPCS: Performed by: EMERGENCY MEDICINE

## 2020-07-29 PROCEDURE — 6370000000 HC RX 637 (ALT 250 FOR IP): Performed by: EMERGENCY MEDICINE

## 2020-07-29 PROCEDURE — 99282 EMERGENCY DEPT VISIT SF MDM: CPT

## 2020-07-29 RX ORDER — METHYLPREDNISOLONE 4 MG/1
TABLET ORAL
Qty: 1 KIT | Refills: 0 | Status: SHIPPED | OUTPATIENT
Start: 2020-07-29 | End: 2020-08-24 | Stop reason: ALTCHOICE

## 2020-07-29 RX ORDER — CYCLOBENZAPRINE HCL 10 MG
10 TABLET ORAL NIGHTLY PRN
Qty: 10 TABLET | Refills: 0 | Status: SHIPPED | OUTPATIENT
Start: 2020-07-29 | End: 2020-08-05 | Stop reason: SDUPTHER

## 2020-07-29 RX ORDER — ONDANSETRON 4 MG/1
4 TABLET, ORALLY DISINTEGRATING ORAL ONCE
Status: COMPLETED | OUTPATIENT
Start: 2020-07-29 | End: 2020-07-29

## 2020-07-29 RX ORDER — METHYLPREDNISOLONE SODIUM SUCCINATE 125 MG/2ML
125 INJECTION, POWDER, LYOPHILIZED, FOR SOLUTION INTRAMUSCULAR; INTRAVENOUS ONCE
Status: COMPLETED | OUTPATIENT
Start: 2020-07-29 | End: 2020-07-29

## 2020-07-29 RX ORDER — OXYCODONE HYDROCHLORIDE AND ACETAMINOPHEN 5; 325 MG/1; MG/1
1-2 TABLET ORAL EVERY 6 HOURS PRN
Qty: 20 TABLET | Refills: 0 | Status: SHIPPED | OUTPATIENT
Start: 2020-07-29 | End: 2020-08-01

## 2020-07-29 RX ADMIN — METHYLPREDNISOLONE SODIUM SUCCINATE 125 MG: 125 INJECTION, POWDER, FOR SOLUTION INTRAMUSCULAR; INTRAVENOUS at 20:35

## 2020-07-29 RX ADMIN — HYDROMORPHONE HYDROCHLORIDE 1.5 MG: 1 INJECTION, SOLUTION INTRAMUSCULAR; INTRAVENOUS; SUBCUTANEOUS at 20:35

## 2020-07-29 RX ADMIN — ONDANSETRON 4 MG: 4 TABLET, ORALLY DISINTEGRATING ORAL at 20:38

## 2020-07-29 ASSESSMENT — PAIN DESCRIPTION - DIRECTION: RADIATING_TOWARDS: DOWN LEFT LEG

## 2020-07-29 ASSESSMENT — PAIN DESCRIPTION - FREQUENCY: FREQUENCY: CONTINUOUS

## 2020-07-29 ASSESSMENT — PAIN - FUNCTIONAL ASSESSMENT: PAIN_FUNCTIONAL_ASSESSMENT: PREVENTS OR INTERFERES WITH MANY ACTIVE NOT PASSIVE ACTIVITIES

## 2020-07-29 ASSESSMENT — PAIN DESCRIPTION - DESCRIPTORS: DESCRIPTORS: BURNING;PINS AND NEEDLES

## 2020-07-29 ASSESSMENT — PAIN DESCRIPTION - PROGRESSION: CLINICAL_PROGRESSION: GRADUALLY WORSENING

## 2020-07-29 ASSESSMENT — PAIN SCALES - GENERAL
PAINLEVEL_OUTOF10: 9
PAINLEVEL_OUTOF10: 9

## 2020-07-29 ASSESSMENT — PAIN DESCRIPTION - LOCATION: LOCATION: BUTTOCKS

## 2020-07-29 ASSESSMENT — PAIN DESCRIPTION - ONSET: ONSET: SUDDEN

## 2020-07-29 ASSESSMENT — PAIN DESCRIPTION - ORIENTATION: ORIENTATION: LEFT

## 2020-07-30 NOTE — ED PROVIDER NOTES
[facility] ED  eMERGENCY dEPARTMENT eNCOUnter      Pt Name: Hammad Trimble  MRN: [id number]  [place] 1965  Date of evaluation: 7/29/2020  Provider: Laurian Gottron, MD    [address] [place] [address]       Chief Complaint   Patient presents with    Leg Pain     starts in left glute, radiates down back of leg         HISTORY OF PRESENT ILLNESS   (Location/Symptom, Timing/Onset,Context/Setting, Quality, Duration, Modifying Factors, Severity)  Note limiting factors. Hammad Trimble is a 48 y.o. female who presents to the emergency department with left-sided back pain radiating down the sciatic distribution after playing catch with her dog yesterday. She woke up with the pain. There is no weakness numbness or bowel bladder difficulty. She is a diabetic and sugars are running in the 110s all day. She has not had this before. There is no other symptom and no COVID symptoms    HPI    NursingNotes were reviewed. REVIEW OF SYSTEMS    (2-9 systems for level 4, 10 or more for level 5)     Review of Systems     Constitutional symptoms:  no Fatigue, no fever, no chills. Skin symptoms:  Negative except as documented in HPI. ENMT symptoms:  Negative except as documented in HPI. Respiratory symptoms:  Negative except as documented in HPI. Cardiovascular symptoms:  Negative except as documented in HPI. Gastrointestinal symptoms: Negative except for documented as above in the HPI   Genitourinary symptoms:  Negative except as documented in HPI. Musculoskeletal symptoms:  Negative except as documented in HPI. Back pain with sciatica as above  Neurologic symptoms:  Negative except as documented in HPI. Remainder of 10 systems, all negative except for mentioned above      Except as noted above the remainder of the review of systems was reviewed and negative.        PAST MEDICAL HISTORY     Past Medical History:   Diagnosis Date    Anxiety     Asthma     as child    Depression     Depression     dysthymia No           -Abnormal masses: No    EXT: Gross appearance and use of all four extremities: Normal     SKIN: -Good turgor warm and dry. -Apparent lesions or rashes: No  There is paraspinous spasm lumbar 4, bilaterally, but no midline tenderness. Heel and toe walking are normal. Straight leg raise is essentially normal.     NEURO: -Patient: alert                -Oriented to: person, place and time. -Appearance and judgment: appropriate.                -Cranial Nerves: Normal.                -Speech: Normal          DIAGNOSTIC RESULTS     EKG: All EKG's are interpreted by the Emergency Department Physician who either signs or Co-signsthis chart in the absence of a cardiologist.        RADIOLOGY:   [facility] such as CT, Ultrasound and MRI are read by the radiologist. Plain radiographic images are visualized and preliminarily interpreted by the emergency physician with the below findings:        Interpretation per the Radiologist below, if available at the time ofthis note:    No orders to display         ED BEDSIDE ULTRASOUND:   Performed by ED Physician - none    LABS:  Labs Reviewed - No data to display    All other labs were within normal range or not returned as of this dictation. EMERGENCY DEPARTMENT COURSE and DIFFERENTIAL DIAGNOSIS/MDM:   Vitals:    Vitals:    07/29/20 2016   BP: 139/63   Pulse: 106   Resp: 20   Temp: 98.5 °F (36.9 °C)   TempSrc: Oral   SpO2: 96%   Weight: 156 lb (70.8 kg)   Height: 5' 8\" (1.727 m)           MDM     Patient return for worsening weakening especially if associated with fever, she will return for bowel bladder difficulty as well, symptoms of sciatica were gone over and she will see  right away for those. She will see her doctor in 1 week with a report. She currently does not work    CRITICAL CARE TIME   Total [address] time was  minutes, excluding separately reportableprocedures.   There was a high probability of clinicallysignificant/life threatening deterioration in the patient's condition which required my urgent intervention. CONSULTS:  None    PROCEDURES:  Unless otherwise noted below, none     Procedures    FINAL IMPRESSION      1. Degenerative disc disease, lumbar    2. Sciatica of left side          DISPOSITION/PLAN   DISPOSITION Decision To Discharge 07/29/2020 08:27:43 PM      PATIENT REFERRED TO:  Chris Parks MD  [address] [place] [address]  [phone]    In 1 week  Return for weakness, numbness, urine problems      DISCHARGE MEDICATIONS:  New Prescriptions    CYCLOBENZAPRINE (FLEXERIL) 10 MG TABLET    Take 1 tablet by mouth nightly as needed for Muscle spasms    METHYLPREDNISOLONE (MEDROL, BRI,) 4 MG TABLET    Take by mouth. OXYCODONE-ACETAMINOPHEN (PERCOCET) 5-325 MG PER TABLET    Take 1-2 tablets by mouth every 6 hours as needed for Pain for up to 3 days. WARNING:  May cause drowsiness. May impair ability to operate vehicles or machinery. Do not use in combination with alcohol.           (Please note that portions of this note were completed with a voice recognition program.  Efforts were made to edit the dictations but occasionally words are mis-transcribed.)    Roly Parks MD (electronically signed)  Attending Emergency Physician          Roly Parks MD  07/29/20 6927

## 2020-08-01 ENCOUNTER — CARE COORDINATION (OUTPATIENT)
Dept: CARE COORDINATION | Age: 55
End: 2020-08-01

## 2020-08-04 ENCOUNTER — HOSPITAL ENCOUNTER (OUTPATIENT)
Dept: LAB | Age: 55
Discharge: HOME | End: 2020-08-04
Payer: MEDICARE

## 2020-08-04 LAB
ANION GAP SERPL CALCULATED.3IONS-SCNC: 11 MEQ/L (ref 9–15)
BUN BLDV-MCNC: 11 MG/DL (ref 6–20)
CALCIUM SERPL-MCNC: 8.4 MG/DL (ref 8.5–9.9)
CHLORIDE BLD-SCNC: 96 MEQ/L (ref 95–107)
CO2: 26 MEQ/L (ref 20–31)
CREAT SERPL-MCNC: 1.17 MG/DL (ref 0.5–0.9)
GFR AFRICAN AMERICAN: 58.6
GFR NON-AFRICAN AMERICAN: 48.4
GLUCOSE BLD-MCNC: 260 MG/DL (ref 70–99)
HBA1C MFR BLD: 9.2 % (ref 4.8–5.9)
POTASSIUM SERPL-SCNC: 4.6 MEQ/L (ref 3.4–4.9)
SODIUM BLD-SCNC: 133 MEQ/L (ref 135–144)

## 2020-08-04 PROCEDURE — 36415 COLL VENOUS BLD VENIPUNCTURE: CPT

## 2020-08-04 PROCEDURE — 83036 HEMOGLOBIN GLYCOSYLATED A1C: CPT

## 2020-08-04 PROCEDURE — 80048 BASIC METABOLIC PNL TOTAL CA: CPT

## 2020-08-05 ENCOUNTER — HOSPITAL ENCOUNTER (EMERGENCY)
Age: 55
Discharge: HOME | End: 2020-08-05
Attending: EMERGENCY MEDICINE
Payer: MEDICARE

## 2020-08-05 VITALS
DIASTOLIC BLOOD PRESSURE: 60 MMHG | OXYGEN SATURATION: 98 % | HEIGHT: 68 IN | BODY MASS INDEX: 23.79 KG/M2 | RESPIRATION RATE: 18 BRPM | SYSTOLIC BLOOD PRESSURE: 140 MMHG | WEIGHT: 157 LBS | TEMPERATURE: 98.2 F | HEART RATE: 90 BPM

## 2020-08-05 PROCEDURE — 6370000000 HC RX 637 (ALT 250 FOR IP): Performed by: EMERGENCY MEDICINE

## 2020-08-05 PROCEDURE — 99282 EMERGENCY DEPT VISIT SF MDM: CPT

## 2020-08-05 PROCEDURE — 6360000002 HC RX W HCPCS: Performed by: EMERGENCY MEDICINE

## 2020-08-05 PROCEDURE — 96372 THER/PROPH/DIAG INJ SC/IM: CPT

## 2020-08-05 RX ORDER — LIDOCAINE 4 G/G
1 PATCH TOPICAL ONCE
Status: DISCONTINUED | OUTPATIENT
Start: 2020-08-05 | End: 2020-08-05 | Stop reason: HOSPADM

## 2020-08-05 RX ORDER — HYDROCODONE BITARTRATE AND ACETAMINOPHEN 5; 325 MG/1; MG/1
1 TABLET ORAL EVERY 4 HOURS PRN
Qty: 9 TABLET | Refills: 0 | Status: SHIPPED | OUTPATIENT
Start: 2020-08-05 | End: 2020-08-08

## 2020-08-05 RX ORDER — CYCLOBENZAPRINE HCL 10 MG
10 TABLET ORAL 3 TIMES DAILY PRN
Qty: 20 TABLET | Refills: 0 | Status: SHIPPED | OUTPATIENT
Start: 2020-08-05 | End: 2020-08-12

## 2020-08-05 RX ORDER — ORPHENADRINE CITRATE 30 MG/ML
60 INJECTION INTRAMUSCULAR; INTRAVENOUS ONCE
Status: COMPLETED | OUTPATIENT
Start: 2020-08-05 | End: 2020-08-05

## 2020-08-05 RX ORDER — HYDROCODONE BITARTRATE AND ACETAMINOPHEN 5; 325 MG/1; MG/1
1 TABLET ORAL ONCE
Status: COMPLETED | OUTPATIENT
Start: 2020-08-05 | End: 2020-08-05

## 2020-08-05 RX ORDER — LIDOCAINE 50 MG/G
1 PATCH TOPICAL EVERY 24 HOURS
Qty: 30 PATCH | Refills: 0 | Status: SHIPPED | OUTPATIENT
Start: 2020-08-05 | End: 2020-08-24 | Stop reason: ALTCHOICE

## 2020-08-05 RX ADMIN — HYDROCODONE BITARTRATE AND ACETAMINOPHEN 1 TABLET: 5; 325 TABLET ORAL at 19:58

## 2020-08-05 RX ADMIN — ORPHENADRINE CITRATE 60 MG: 30 INJECTION INTRAMUSCULAR; INTRAVENOUS at 19:58

## 2020-08-05 ASSESSMENT — PAIN SCALES - GENERAL
PAINLEVEL_OUTOF10: 9
PAINLEVEL_OUTOF10: 3

## 2020-08-05 ASSESSMENT — PAIN DESCRIPTION - LOCATION
LOCATION: BACK
LOCATION: BACK

## 2020-08-05 ASSESSMENT — PAIN DESCRIPTION - ONSET
ONSET: SUDDEN
ONSET: ON-GOING

## 2020-08-05 ASSESSMENT — PAIN DESCRIPTION - DESCRIPTORS
DESCRIPTORS: BURNING
DESCRIPTORS: ACHING

## 2020-08-05 ASSESSMENT — PAIN DESCRIPTION - ORIENTATION
ORIENTATION: LEFT
ORIENTATION: LOWER

## 2020-08-05 ASSESSMENT — PAIN DESCRIPTION - PROGRESSION
CLINICAL_PROGRESSION: GRADUALLY WORSENING
CLINICAL_PROGRESSION: GRADUALLY IMPROVING

## 2020-08-05 ASSESSMENT — PAIN DESCRIPTION - FREQUENCY
FREQUENCY: CONTINUOUS
FREQUENCY: CONTINUOUS

## 2020-08-05 ASSESSMENT — PAIN DESCRIPTION - PAIN TYPE
TYPE: ACUTE PAIN
TYPE: CHRONIC PAIN

## 2020-08-06 NOTE — ED PROVIDER NOTES
eMERGENCY dEPARTMENT eNCOUnter      [address] [facility]    Chief Complaint   Patient presents with    Back Pain     x 1 week, pt states it has gotten worse upon waking this am        HPI    Shereen Fang is a 48 y.o. female who presentsto ED from home  By private car  With complaint of low back pain  Onset 1 week  Intensity of symptoms mild  Location of symptoms lower back, moderate in intensity, with radiation to the left thigh and leg. Patient denies any trauma. Patient denies any bowel bladder incontinence. Patient is able to ambulate but complains of pain with ambulation. Patient was taking Medrol Dosepak and pain meds which she is out of.       PAST MEDICAL HISTORY    Past Medical History:   Diagnosis Date    Anxiety     Asthma     as child    Depression     Depression     dysthymia - following lupus dx age 23    [facility]     Foot fracture, left     GERD (gastroesophageal reflux disease)     Hyperlipidemia     Hypothyroidism     Kidney stones     Lupus ([facility]     dr barrera -     Migraine     PONV (postoperative nausea and vomiting)        SURGICAL HISTORY    Past Surgical History:   Procedure Laterality Date    APPENDECTOMY      ARTHRODESIS Left 11/26/2019    ARTHRODESIS OF THE FIRST METATARSAL PHALANGEAL JOINT LEFT FOOT performed by Leisa Roman DPM at [place] [address]      COLONOSCOPY      FOOT CLOSED REDUCTION Left 1/3/2017    LEFT FOOT PERCUTANEOUS PINNING Candelario Caroli FRACTURE performed by Rocael Jones MD at [address] [address]      HYSTERECTOMY      [place] ESOPHAGOGASTRODUODENOSCOPY TRANSORAL DIAGNOSTIC N/A 3/8/2017    EGD ESOPHAGOGASTRODUODENOSCOPY WITH DILATION performed by Jaime Mcleod MD at [address] ESOPHAGOGASTRODUODENOSCOPY TRANSORAL DIAGNOSTIC N/A 11/7/2018    EGD ESOPHAGOGASTRODUODENOSCOPY WITH DILATION performed by Jaime Mcleod MD at [facility] TONSILLECTOMY      TOOTH EXTRACTION      UPPER GASTROINTESTINAL ENDOSCOPY         CURRENT MEDICATIONS    Current Outpatient Rx   Medication Sig Dispense Refill    lidocaine (LIDODERM) 5 % Place 1 patch onto the skin every 24 hours Place 1 patch onto the skin daily 12 hours on, 12 hours off. 30 patch 0    cyclobenzaprine (FLEXERIL) 10 MG tablet Take 1 tablet by mouth 3 times daily as needed for Muscle spasms 20 tablet 0    HYDROcodone-acetaminophen (NORCO) 5-325 MG per tablet Take 1 tablet by mouth every 4 hours as needed for Pain for up to 3 days. Intended supply: 3 days. Take lowest dose possible to manage pain 9 tablet 0    methylPREDNISolone (MEDROL, BRI,) 4 MG tablet Take by mouth. 1 kit 0    butalbital-APAP-caffeine (FIORICET) -40 MG CAPS per capsule Take 1 capsule by mouth every 6 hours as needed for Headaches or Migraine 12 capsule 0    ondansetron (ZOFRAN ODT) 4 MG disintegrating tablet Take 1 tablet by mouth every 8 hours as needed for Nausea 20 tablet 0    insulin lispro, 1 Unit Dial, (HUMALOG KWIKPEN) 100 UNIT/ML SOPN Inject 20 Units into the skin 3 times daily (before meals) 10 pen 3    Insulin Pen Needle (NOVOFINE) 32G X 6 MM MISC qid 300 each 3    blood glucose monitor strips Pt test 3x daily Dx E11.65 100 strip 3    blood glucose monitor kit and supplies Please give 1 meter covered by insurance Dx E11.65 1 kit 0    Lancets MISC Pt test 3x daily Dx E11.65 100 each 3    DRUG MART UNILET LANCETS 30G MISC Check blood sugars twice daily or as directed. 200 each 3    blood glucose test strips (ASCENSIA AUTODISC VI;ONE TOUCH ULTRA TEST VI) strip Check blood sugars twice daily or as directed. 200 strip 3    blood glucose monitor kit and supplies Test 3 times a day & as needed for symptoms of irregular blood glucose.  1 kit 0    Insulin Pen Needle (NOVOFINE) 32G X 6 MM MISC  each 3    estradiol (CLIMARA) 0.025 MG/24HR Place 0.025 patches onto the skin every 7 days On mondays 11  gabapentin (NEURONTIN) 300 MG capsule Take 300 mg by mouth 2 times daily. 3    metoclopramide (REGLAN) 5 MG tablet Take 1 tablet by mouth 4 times daily 1/2 hour before meal and at bedtime 120 tablet 3    metoclopramide (REGLAN) 10 MG tablet Take 10 mg by mouth as needed       albuterol sulfate HFA (VENTOLIN HFA) 108 (90 Base) MCG/ACT inhaler Inhale 2 puffs into the lungs every 6 hours as needed for Wheezing 1 Inhaler 0    rosuvastatin (CRESTOR) 40 MG tablet Take 1 tablet by mouth daily 90 tablet 3    pantoprazole (PROTONIX) 40 MG tablet Take 1 tablet by mouth daily 90 tablet 3    levothyroxine (SYNTHROID) 50 MCG tablet Take 1 tablet by mouth daily 90 tablet 3    Blood Glucose Monitoring Suppl (TRUE TRACK BLOOD GLUCOSE) CAROLE Check blood sugars twice daily or as directed.  100 Device 5    hydroxychloroquine (PLAQUENIL) 200 MG tablet Take 1 tablet by mouth 2 times daily  5       ALLERGIES    Allergies   Allergen Reactions    Aspirin Swelling     Bloody noses    Imitrex [Sumatriptan] Hives    Pcn [Penicillins] Swelling    Seasonal Itching     Itch, sneezing,runny nose, watery eyes    Toradol [Ketorolac Tromethamine] Hives     Shortness of breath    Ultram [Tramadol] Hives       FAMILY HISTORY    Family History   Adopted: Yes       SOCIAL HISTORY    Social History     Socioeconomic History    Marital status: Legally      Spouse name: None    Number of children: None    Years of education: None    Highest education level: None   Occupational History    None   Social Needs    Financial resource strain: None    Food insecurity     Worry: None     Inability: None    Transportation needs     Medical: None     Non-medical: None   Tobacco Use    Smoking status: Never Smoker    Smokeless tobacco: Never Used   Substance and Sexual Activity    Alcohol use: No     Alcohol/week: 0.0 standard drinks     Comment: denies    Drug use: No     Comment: denies    Sexual activity: Not Currently rate, Normal rhythm, No murmurs, No rubs, No gallops. GI:  Bowel sounds normal, Soft, No tenderness, No masses, No pulsatile masses. : No CVA tenderness. Musculoskeletal: Lower lumbar tenderness present, SLR positive on the left side at 30 degrees, reflexes lower extremity normal   intact distal pulses, No edema, No tenderness, No cyanosis, No clubbing. Good range of motion in all major joints. No tenderness to palpation or major deformities noted. Integument:  Warm, Dry, No erythema, No rash. Lymphatic:  No lymphadenopathy noted. Neurologic:  Alert & oriented x 3, Normal motor function, Normal sensory function, No focal deficits noted. Psychiatric:  Affect normal, Judgment normal, Mood normal.       REEVALUATION   Patient is ambulating. Patient was given referral to pain management. Summation      Patient Course:     ED Medications administered this visit:    Medications   lidocaine 4 % external patch 1 patch (1 patch Transdermal Patch Applied 8/5/20 1959)   orphenadrine (NORFLEX) injection 60 mg (60 mg Intramuscular Given 8/5/20 1958)   HYDROcodone-acetaminophen (NORCO) 5-325 MG per tablet 1 tablet (1 tablet Oral Given 8/5/20 1958)       New Prescriptions from this visit:    New Prescriptions    CYCLOBENZAPRINE (FLEXERIL) 10 MG TABLET    Take 1 tablet by mouth 3 times daily as needed for Muscle spasms    HYDROCODONE-ACETAMINOPHEN (NORCO) 5-325 MG PER TABLET    Take 1 tablet by mouth every 4 hours as needed for Pain for up to 3 days. Intended supply: 3 days. Take lowest dose possible to manage pain    LIDOCAINE (LIDODERM) 5 %    Place 1 patch onto the skin every 24 hours Place 1 patch onto the skin daily 12 hours on, 12 hours off. Follow-up:  Danni Banks MD  [address] [facility] [phone]    Call in 1 day      Lakesha Diaz MD  [address] [place] [address]  [phone]    Call in 1 day          Final Impression:   1.  Acute back pain with

## 2020-08-13 ENCOUNTER — VIRTUAL VISIT (OUTPATIENT)
Dept: ENDOCRINOLOGY | Age: 55
End: 2020-08-13
Payer: MEDICARE

## 2020-08-13 ENCOUNTER — TELEPHONE (OUTPATIENT)
Dept: ENDOCRINOLOGY | Age: 55
End: 2020-08-13

## 2020-08-13 PROCEDURE — 99442 PR PHYS/QHP TELEPHONE EVALUATION 11-20 MIN: CPT | Performed by: INTERNAL MEDICINE

## 2020-08-13 RX ORDER — INSULIN GLARGINE 100 [IU]/ML
INJECTION, SOLUTION SUBCUTANEOUS
Qty: 15 PEN | Refills: 3 | Status: SHIPPED | OUTPATIENT
Start: 2020-08-13 | End: 2020-10-12 | Stop reason: SDUPTHER

## 2020-08-13 RX ORDER — INSULIN LISPRO 100 [IU]/ML
INJECTION, SOLUTION INTRAVENOUS; SUBCUTANEOUS
Qty: 10 PEN | Refills: 3 | Status: SHIPPED | OUTPATIENT
Start: 2020-08-13 | End: 2020-12-02 | Stop reason: SDUPTHER

## 2020-08-13 NOTE — PROGRESS NOTES
2020    TELEHEALTH EVALUATION -- Audio/Visual (During RASHE-56 public health emergency)    Due to [place] outbreak, patient's office visit was converted to a virtual visit. Patient was contacted and agreed to proceed with a virtual visit via Telephone Visit  The risks and benefits of converting to a virtual visit were discussed in light of the current infectious disease epidemic. Patient also understood that insurance coverage and co-pays are up to their individual insurance plans. HPI: Patient made aware is a telephone visit billable visit agreed to proceed    Suzanne Kim (:  1965) has requested an audio/video evaluation for the following concern(s):    Follow-up on type 2 diabetes complications include chronic kidney disease patient testing blood sugar only once a day average blood sugars are in the 200 range currently on Lantus 60 units at night Humalog 20 units with each meals    Results for Jaquelin Castro (MRN [id number]) as of 2020 11:28   Ref. Range 2020 11:33   Sodium Latest Ref Range: 135 - 144 mEq/L 133 (L)   Potassium Latest Ref Range: 3.4 - 4.9 mEq/L 4.6   Chloride Latest Ref Range: 95 - 107 mEq/L 96   CO2 Latest Ref Range: 20 - 31 mEq/L 26   BUN Latest Ref Range: 6 - 20 mg/dL 11   Creatinine Latest Ref Range: 0.50 - 0.90 mg/dL 1.17 (H)   Anion Gap Latest Ref Range: 9 - 15 mEq/L 11   GFR Non- Latest Ref Range: >60  48.4 (L)   GFR  Latest Ref Range: >60  58.6 (L)   Glucose Latest Ref Range: 70 - 99 mg/dL 260 (H)   Calcium Latest Ref Range: 8.5 - 9.9 mg/dL 8.4 (L)   Hemoglobin A1C Latest Ref Range: 4.8 - 5.9 % 9.2 (H)         Results for Jaquelin Castro (MRN [id number]) as of 2020 11:28   Ref.  Range 2017 19:06 2019 16:59 2020 11:16 2020 10:04 2020 11:33   Hemoglobin A1C Latest Ref Range: 4.8 - 5.9 % 6.4 (H) 9.4 (H) 10.5 10.0 (H) 9.2 (H)     Patient Active Problem List   Diagnosis    DM [facility] ([facility]  Hyperlipemia    Hypothyroid    Vitamin D deficiency    SLE (systemic lupus erythematosus related syndrome) (HCC)    Asthma    Panic disorder with agoraphobia    Hereditary essential tremor    Allergic rhinitis    Type 2 diabetes mellitus with diabetic mononeuropathy ([facility] [address] [facility]    Raynaud's phenomenon without gangrene    Essential hypertension    Type 2 diabetes mellitus without complication (HCC)    Disseminated lupus erythematosus (HCC)    Foot fracture, left    Acute non-recurrent frontal sinusitis    Gastritis without bleeding    Dysphagia    Gastric polyp    Chest pain       Review of Systems    Prior to Visit Medications    Medication Sig Taking? Authorizing Provider   lidocaine (LIDODERM) 5 % Place 1 patch onto the skin every 24 hours Place 1 patch onto the skin daily 12 hours on, 12 hours off. Leigh Garrison MD   methylPREDNISolone (MEDROL, BRI,) 4 MG tablet Take by mouth. Angy Williamson MD   butalbital-APAP-caffeine (FIORICET) -40 MG CAPS per capsule Take 1 capsule by mouth every 6 hours as needed for Headaches or Migraine  Gracy Barry MD   ondansetron (ZOFRAN ODT) 4 MG disintegrating tablet Take 1 tablet by mouth every 8 hours as needed for Nausea  Bailey Mcintosh PA-C   insulin lispro, 1 Unit Dial, (HUMALOG KWIKPEN) 100 UNIT/ML SOPN Inject 20 Units into the skin 3 times daily (before meals)  Mateus Costello MD   Insulin Pen Needle (NOVOFINE) 32G X 6 MM MISC qid  Raul Sim MD   blood glucose monitor strips Pt test 3x daily Dx E11.65  Raul Sim MD   blood glucose monitor kit and supplies Please give 1 meter covered by insurance Dx E11.65  Raul Sim MD   Lancets MISC Pt test 3x daily Dx W25.57  Raul Sim MD   [employer] UNILET LANCETS 30G MISC Check blood sugars twice daily or as directed. Raul Sim MD   blood glucose test strips (ASCENSIA AUTODISC VI;ONE TOUCH ULTRA TEST VI) strip Check blood sugars twice daily or as directed.   Raul Sim MD   blood glucose monitor kit and supplies Test 3 times a day & as needed for symptoms of irregular blood glucose. America Norwood MD   Insulin Pen Needle (NOVOFINE) 32G X 6 MM MISC BID  Mateus Costello MD   estradiol (CLIMARA) 0.025 MG/24HR Place 0.025 patches onto the skin every 7 days On mondays  Historical Provider, MD   gabapentin (NEURONTIN) 300 MG capsule Take 300 mg by mouth 2 times daily. Historical Provider, MD   metoclopramide (REGLAN) 5 MG tablet Take 1 tablet by mouth 4 times daily 1/2 hour before meal and at bedtime  Shreya Guthrie MD   metoclopramide (REGLAN) 10 MG tablet Take 10 mg by mouth as needed   Historical Provider, MD   albuterol sulfate HFA (VENTOLIN HFA) 108 (90 Base) MCG/ACT inhaler Inhale 2 puffs into the lungs every 6 hours as needed for Wheezing  Francoise Aj MD   rosuvastatin (CRESTOR) 40 MG tablet Take 1 tablet by mouth daily  Farnaz Trevino MD   pantoprazole (PROTONIX) 40 MG tablet Take 1 tablet by mouth daily  Coleen Munoz PA-C   levothyroxine (SYNTHROID) 50 MCG tablet Take 1 tablet by mouth daily  Kita Wheat MD   Blood Glucose Monitoring Suppl (TRUE TRACK BLOOD GLUCOSE) CAROLE Check blood sugars twice daily or as directed.   Mena Vegas MD   hydroxychloroquine (PLAQUENIL) 200 MG tablet Take 1 tablet by mouth 2 times daily  Historical Provider, MD       Social History     Tobacco Use    Smoking status: Never Smoker    Smokeless tobacco: Never Used   Substance Use Topics    Alcohol use: No     Alcohol/week: 0.0 standard drinks     Comment: denies    Drug use: No     Comment: denies            PHYSICAL EXAMINATION:  [ INSTRUCTIONS:  \"[x]\" Indicates a positive item  \"[]\" Indicates a negative item  -- DELETE ALL ITEMS NOT EXAMINED]  [] Alert  [] Oriented to person/place/time    [] No apparent distress  [] Toxic appearing    [] Face flushed appearing [] Sclera clear  [] Lips are cyanotic      [] Breathing appears normal  [] Appears tachypneic      [] Rash on visible skin    [] Cranial Nerves II-XII grossly intact    [] Motor grossly intact in visible upper extremities    [] Motor grossly intact in visible lower extremities    [] Normal Mood  [] Anxious appearing    [] Depressed appearing  [] Confused appearing      [] Poor short term memory  [] Poor long term memory    [] OTHER:      Due to this being a TeleHealth encounter, evaluation of the following organ systems is limited: Vitals/Constitutional/EENT/Resp/CV/GI//MS/Neuro/Skin/Heme-Lymph-Imm. ASSESSMENT/PLAN:     Diagnosis Orders   1. Type 2 diabetes mellitus with other specified complication, with long-term current use of insulin (HCC)         Orders Placed This Encounter   Procedures    Basic Metabolic Panel     Standing Status:   Future     Standing Expiration Date:   2021    Hemoglobin A1C     Standing Status:   Future     Standing Expiration Date:   2021     Orders Placed This Encounter   Medications    insulin glargine (LANTUS SOLOSTAR) 100 UNIT/ML injection pen     Si units at bedtime     Dispense:  15 pen     Refill:  3    insulin lispro, 1 Unit Dial, (HUMALOG KWIKPEN) 100 UNIT/ML SOPN     Si units with each meals     Dispense:  10 pen     Refill:  3    Insulin Pen Needle (NOVOFINE) 32G X 6 MM MISC     Sig: qid     Dispense:  300 each     Refill:  3     Increase Lantus to 80 units at bedtime Humalog 30 with each meals repeat labs in 2 to 3 months time A1c goal of 7 or lower  Advised patient to test blood sugar 3-4 times daily    Total time spent with patient was 16 minutes      An  electronic signature was used to authenticate this note.     --Gillian Ho MD on 2020 at 11:28 AM        Pursuant to the emergency declaration under the [facility], [address] [place] [address] and the [employer] Act, this Virtual  Visit was conducted, with patient's consent, to reduce the patient's risk of exposure to COVID-19 and provide continuity of care for an established patient. Services were provided through a video synchronous discussion virtually to substitute for in-person clinic visit.

## 2020-08-13 NOTE — TELEPHONE ENCOUNTER
Patient calling to request a new RX for Colin Urbina because that is the insulin that is covered by her insurance. Send RX to [facility].

## 2020-08-17 RX ORDER — INSULIN ASPART 100 [IU]/ML
INJECTION, SOLUTION INTRAVENOUS; SUBCUTANEOUS
Qty: 15 PEN | Refills: 3 | Status: SHIPPED | OUTPATIENT
Start: 2020-08-17 | End: 2020-10-12 | Stop reason: SDUPTHER

## 2020-08-24 ENCOUNTER — HOSPITAL ENCOUNTER (EMERGENCY)
Age: 55
Discharge: HOME | End: 2020-08-24
Attending: EMERGENCY MEDICINE
Payer: MEDICARE

## 2020-08-24 VITALS
DIASTOLIC BLOOD PRESSURE: 66 MMHG | OXYGEN SATURATION: 97 % | TEMPERATURE: 98.8 F | RESPIRATION RATE: 16 BRPM | SYSTOLIC BLOOD PRESSURE: 138 MMHG | WEIGHT: 158 LBS | HEIGHT: 68 IN | BODY MASS INDEX: 23.95 KG/M2 | HEART RATE: 83 BPM

## 2020-08-24 PROCEDURE — 6360000002 HC RX W HCPCS: Performed by: EMERGENCY MEDICINE

## 2020-08-24 PROCEDURE — 96372 THER/PROPH/DIAG INJ SC/IM: CPT

## 2020-08-24 PROCEDURE — 6370000000 HC RX 637 (ALT 250 FOR IP): Performed by: EMERGENCY MEDICINE

## 2020-08-24 PROCEDURE — 99283 EMERGENCY DEPT VISIT LOW MDM: CPT

## 2020-08-24 RX ORDER — HYDROCODONE BITARTRATE AND ACETAMINOPHEN 5; 325 MG/1; MG/1
1 TABLET ORAL EVERY 6 HOURS PRN
Qty: 15 TABLET | Refills: 0 | Status: SHIPPED | OUTPATIENT
Start: 2020-08-24 | End: 2020-08-26 | Stop reason: SDUPTHER

## 2020-08-24 RX ORDER — CYCLOBENZAPRINE HCL 10 MG
10 TABLET ORAL 3 TIMES DAILY PRN
Qty: 21 TABLET | Refills: 0 | Status: SHIPPED | OUTPATIENT
Start: 2020-08-24 | End: 2020-09-03

## 2020-08-24 RX ORDER — DEXAMETHASONE 4 MG/1
4 TABLET ORAL ONCE
Status: COMPLETED | OUTPATIENT
Start: 2020-08-24 | End: 2020-08-24

## 2020-08-24 RX ORDER — ONDANSETRON 4 MG/1
4 TABLET, ORALLY DISINTEGRATING ORAL ONCE
Status: COMPLETED | OUTPATIENT
Start: 2020-08-24 | End: 2020-08-24

## 2020-08-24 RX ADMIN — ONDANSETRON 4 MG: 4 TABLET, ORALLY DISINTEGRATING ORAL at 17:19

## 2020-08-24 RX ADMIN — DEXAMETHASONE 4 MG: 4 TABLET ORAL at 17:19

## 2020-08-24 RX ADMIN — HYDROMORPHONE HYDROCHLORIDE 1 MG: 1 INJECTION, SOLUTION INTRAMUSCULAR; INTRAVENOUS; SUBCUTANEOUS at 17:19

## 2020-08-24 ASSESSMENT — PAIN DESCRIPTION - FREQUENCY: FREQUENCY: CONTINUOUS

## 2020-08-24 ASSESSMENT — ENCOUNTER SYMPTOMS
SINUS PRESSURE: 0
WHEEZING: 0
TROUBLE SWALLOWING: 0
VOICE CHANGE: 0
BACK PAIN: 1
EYE PAIN: 0
SORE THROAT: 0
VOMITING: 0
CHEST TIGHTNESS: 0
DIARRHEA: 0
SHORTNESS OF BREATH: 0
CHOKING: 0
EYE REDNESS: 0
STRIDOR: 0
COUGH: 0
BLOOD IN STOOL: 0
FACIAL SWELLING: 0
EYE DISCHARGE: 0
CONSTIPATION: 0
ABDOMINAL PAIN: 0

## 2020-08-24 ASSESSMENT — PAIN DESCRIPTION - PAIN TYPE: TYPE: ACUTE PAIN

## 2020-08-24 ASSESSMENT — PAIN SCALES - GENERAL
PAINLEVEL_OUTOF10: 10
PAINLEVEL_OUTOF10: 2
PAINLEVEL_OUTOF10: 10

## 2020-08-24 ASSESSMENT — PAIN DESCRIPTION - LOCATION: LOCATION: BACK;LEG

## 2020-08-24 ASSESSMENT — PAIN DESCRIPTION - ONSET
ONSET: ON-GOING
ONSET: ON-GOING

## 2020-08-24 ASSESSMENT — PAIN DESCRIPTION - ORIENTATION: ORIENTATION: LEFT

## 2020-08-24 NOTE — ED PROVIDER NOTES
Negative for dysuria, flank pain, frequency, genital sores and urgency. Musculoskeletal: Positive for arthralgias and back pain. Negative for joint swelling, neck pain and neck stiffness. Skin: Negative for pallor and rash. Neurological: Positive for numbness. Negative for tremors, seizures, syncope, weakness and headaches. Hematological: Negative for adenopathy. Does not bruise/bleed easily. Psychiatric/Behavioral: Negative for agitation, behavioral problems, hallucinations and sleep disturbance. The patient is not hyperactive. All other systems reviewed and are negative. Except as noted above the remainder of the review of systems was reviewed and negative.        PAST MEDICAL HISTORY     Past Medical History:   Diagnosis Date    Anxiety     Asthma     as child    Depression     Depression     dysthymia - following lupus dx age 23    [facility] ([facility] [address]     Foot fracture, left     GERD (gastroesophageal reflux disease)     Hyperlipidemia     Hypothyroidism     Kidney stones     Lupus ([facility] [address]     dr barrera -     Migraine     PONV (postoperative nausea and vomiting)          SURGICALHISTORY       Past Surgical History:   Procedure Laterality Date    APPENDECTOMY      ARTHRODESIS Left 11/26/2019    ARTHRODESIS OF THE FIRST METATARSAL PHALANGEAL JOINT LEFT FOOT performed by Lexii De La Torre DPM at [place] [address]      COLONOSCOPY      FOOT CLOSED REDUCTION Left 1/3/2017    LEFT FOOT PERCUTANEOUS PINNING Elfida Pinch FRACTURE performed by Abigail Scruggs MD at [address] [address]      HYSTERECTOMY      UT ESOPHAGOGASTRODUODENOSCOPY TRANSORAL DIAGNOSTIC N/A 3/8/2017    EGD ESOPHAGOGASTRODUODENOSCOPY WITH DILATION performed by Brenda Gómez MD at [address] ESOPHAGOGASTRODUODENOSCOPY TRANSORAL DIAGNOSTIC N/A 11/7/2018    EGD ESOPHAGOGASTRODUODENOSCOPY WITH DILATION performed by Brenda Gómez MD at [facility]      UPPER GASTROINTESTINAL ENDOSCOPY           CURRENT MEDICATIONS       Previous Medications    ALBUTEROL SULFATE HFA (VENTOLIN HFA) 108 (90 BASE) MCG/ACT INHALER    Inhale 2 puffs into the lungs every 6 hours as needed for Wheezing    BLOOD GLUCOSE MONITOR KIT AND SUPPLIES    Test 3 times a day & as needed for symptoms of irregular blood glucose. BLOOD GLUCOSE MONITOR KIT AND SUPPLIES    Please give 1 meter covered by insurance Dx E11.65    BLOOD GLUCOSE MONITOR STRIPS    Pt test 3x daily Dx E11.65    BLOOD GLUCOSE MONITORING SUPPL (TRUE TRACK BLOOD GLUCOSE) CAROLE    Check blood sugars twice daily or as directed. BLOOD GLUCOSE TEST STRIPS (ASCENSIA AUTODISC VI;ONE TOUCH ULTRA TEST VI) STRIP    Check blood sugars twice daily or as directed. BUTALBITAL-APAP-CAFFEINE (FIORICET) -40 MG CAPS PER CAPSULE    Take 1 capsule by mouth every 6 hours as needed for Headaches or Migraine    [employer] UNILET LANCETS 30G MISC    Check blood sugars twice daily or as directed.     ESTRADIOL (CLIMARA) 0.025 MG/24HR    Place 0.025 patches onto the skin every 7 days On mondays    HYDROXYCHLOROQUINE (PLAQUENIL) 200 MG TABLET    Take 1 tablet by mouth 2 times daily    INSULIN ASPART (NOVOLOG FLEXPEN) 100 UNIT/ML INJECTION PEN    Inject 30 units tid with meals    INSULIN GLARGINE (LANTUS SOLOSTAR) 100 UNIT/ML INJECTION PEN    80 units at bedtime    INSULIN LISPRO, 1 UNIT DIAL, (HUMALOG KWIKPEN) 100 UNIT/ML SOPN    Inject 20 Units into the skin 3 times daily (before meals)    INSULIN LISPRO, 1 UNIT DIAL, (HUMALOG KWIKPEN) 100 UNIT/ML SOPN    30 units with each meals    INSULIN PEN NEEDLE (NOVOFINE) 32G X 6 MM MISC    BID    INSULIN PEN NEEDLE (NOVOFINE) 32G X 6 MM MISC    qid    LANCETS MISC    Pt test 3x daily Dx E11.65    LEVOTHYROXINE (SYNTHROID) 50 MCG TABLET    Take 1 tablet by mouth daily    PANTOPRAZOLE (PROTONIX) 40 MG TABLET    Take 1 tablet by mouth daily    ROSUVASTATIN (CRESTOR) 40 MG TABLET    Take 1 tablet by mouth daily       ALLERGIES     Aspirin; Imitrex [sumatriptan]; Pcn [penicillins]; Seasonal; Toradol [ketorolac tromethamine]; and Ultram [tramadol]    FAMILY HISTORY       Family History   Adopted: Yes          SOCIAL HISTORY       Social History     Socioeconomic History    Marital status: Legally      Spouse name: None    Number of children: None    Years of education: None    Highest education level: None   Occupational History    None   Social Needs    Financial resource strain: None    Food insecurity     Worry: None     Inability: None    Transportation needs     Medical: None     Non-medical: None   Tobacco Use    Smoking status: Never Smoker    Smokeless tobacco: Never Used   Substance and Sexual Activity    Alcohol use: No     Alcohol/week: 0.0 standard drinks     Comment: denies    Drug use: No     Comment: denies    Sexual activity: Not Currently   Lifestyle    Physical activity     Days per week: None     Minutes per session: None    Stress: None   Relationships    Social connections     Talks on phone: None     Gets together: None     Attends [employer] service: None     Active member of club or organization: None     Attends meetings of clubs or organizations: None     Relationship status: None    Intimate partner violence     Fear of current or ex partner: None     Emotionally abused: None     Physically abused: None     Forced sexual activity: None   Other Topics Concern    None   Social History Narrative    None       SCREENINGS      @FLOW([id number])@      PHYSICAL EXAM    (up to 7 for level 4, 8 or more for level 5)     ED Triage Vitals [08/24/20 1604]   BP Temp Temp Source Pulse Resp SpO2 Height Weight   138/66 98.8 °F (37.1 °C) Oral 83 16 97 % 5' 8\" (1.727 m) 158 lb (71.7 kg)       Physical Exam  Vitals signs and nursing note reviewed. Constitutional:       Appearance: Normal appearance.  She is well-developed. Comments: Alert cooperative patient slightly anxious ambulatory   HENT:      Head: Normocephalic. Right Ear: Tympanic membrane, ear canal and external ear normal.      Left Ear: Tympanic membrane, ear canal and external ear normal.      Mouth/Throat:      Mouth: Mucous membranes are moist.      Pharynx: No oropharyngeal exudate or posterior oropharyngeal erythema. Eyes:      General:         Right eye: No discharge. Extraocular Movements: Extraocular movements intact. Pupils: Pupils are equal, round, and reactive to light. Neck:      Musculoskeletal: Normal range of motion and neck supple. No neck rigidity. Cardiovascular:      Rate and Rhythm: Normal rate and regular rhythm. Heart sounds: Normal heart sounds. No murmur. No gallop. Pulmonary:      Effort: No respiratory distress. Breath sounds: Normal breath sounds. No wheezing. Abdominal:      General: Bowel sounds are normal. There is no distension. Palpations: Abdomen is soft. There is no mass. Tenderness: There is no abdominal tenderness. There is no guarding or rebound. Musculoskeletal: Normal range of motion. General: Tenderness present. Comments: Attention come to the back examined in supine and sitting position patient has a paralumbar spasm tenderness to the paralumbar area no point tenderness no hematoma no bruise noted straight leg raising is negative   Lymphadenopathy:      Cervical: No cervical adenopathy. Skin:     General: Skin is warm. Capillary Refill: Capillary refill takes less than 2 seconds. Findings: No erythema or rash. Neurological:      General: No focal deficit present. Mental Status: She is alert and oriented to person, place, and time. Cranial Nerves: No cranial nerve deficit. Sensory: No sensory deficit. Motor: No weakness or abnormal muscle tone.       Coordination: Coordination normal.      Gait: Gait normal.      Deep Tendon Reflexes: Reflexes normal.   Psychiatric:         Behavior: Behavior normal.         Thought Content: Thought content normal.         DIAGNOSTIC RESULTS     EKG: All EKG's are interpreted by the Emergency Department Physician who either signs or Co-signsthis chart in the absence of a cardiologist.        RADIOLOGY:   Felipa Tracie such as CT, Ultrasound and MRI are read by the radiologist. Plain radiographic images are visualized and preliminarily interpreted by the emergency physician with the below findings:        Interpretation per the Radiologist below, if available at the time ofthis note:    No orders to display         ED BEDSIDE ULTRASOUND:   Performed by ED Physician - none    LABS:  Labs Reviewed - No data to display    All other labs were within normal range or not returned as of this dictation. EMERGENCY DEPARTMENT COURSE and DIFFERENTIAL DIAGNOSIS/MDM:   Vitals:    Vitals:    08/24/20 1604   BP: 138/66   Pulse: 83   Resp: 16   Temp: 98.8 °F (37.1 °C)   TempSrc: Oral   SpO2: 97%   Weight: 158 lb (71.7 kg)   Height: 5' 8\" (1.727 m)           MDM    CRITICAL CARE TIME   Total Critical Care time was  minutes, excluding separately reportableprocedures. There was a high probability of clinicallysignificant/life threatening deterioration in the patient's condition which required my urgent intervention. ONSULTS:  None    PROCEDURES:  Unless otherwise noted below, none     Procedures    FINAL IMPRESSION      1. Acute exacerbation of chronic low back pain    2.  Sciatica of left side          DISPOSITION/PLAN   DISPOSITION Discharge - Pending Orders Complete 08/24/2020 04:59:42 PM      PATIENT REFERRED TO:  Janis Caceres MD  [address]  [phone]    In 1 week        DISCHARGE MEDICATIONS:  New Prescriptions    CYCLOBENZAPRINE (FLEXERIL) 10 MG TABLET    Take 1 tablet by mouth 3 times daily as needed for Muscle spasms    HYDROCODONE-ACETAMINOPHEN (NORCO) 5-325 MG PER TABLET    Take 1 tablet by mouth every 6 hours as needed for Pain for up to 5 days.           (Please note that portions of this note were completed with a voice recognition program.  Efforts were made to edit the dictations but occasionally words are mis-transcribed.)    Dianna Carey MD (electronically signed)  Attending Emergency Physician       Dianna Carey MD  08/24/20 1700

## 2020-08-26 ENCOUNTER — HOSPITAL ENCOUNTER (EMERGENCY)
Age: 55
Discharge: HOME | End: 2020-08-26
Attending: EMERGENCY MEDICINE
Payer: MEDICARE

## 2020-08-26 VITALS
RESPIRATION RATE: 17 BRPM | SYSTOLIC BLOOD PRESSURE: 122 MMHG | HEIGHT: 68 IN | TEMPERATURE: 99.2 F | BODY MASS INDEX: 23.95 KG/M2 | OXYGEN SATURATION: 97 % | WEIGHT: 158 LBS | HEART RATE: 89 BPM | DIASTOLIC BLOOD PRESSURE: 70 MMHG

## 2020-08-26 PROCEDURE — 99283 EMERGENCY DEPT VISIT LOW MDM: CPT

## 2020-08-26 PROCEDURE — 6370000000 HC RX 637 (ALT 250 FOR IP): Performed by: EMERGENCY MEDICINE

## 2020-08-26 RX ORDER — ORPHENADRINE CITRATE 100 MG/1
100 TABLET, EXTENDED RELEASE ORAL 2 TIMES DAILY
Qty: 20 TABLET | Refills: 0 | Status: SHIPPED | OUTPATIENT
Start: 2020-08-26 | End: 2020-09-05

## 2020-08-26 RX ORDER — HYDROCODONE BITARTRATE AND ACETAMINOPHEN 5; 325 MG/1; MG/1
1 TABLET ORAL EVERY 4 HOURS PRN
Qty: 12 TABLET | Refills: 0 | Status: SHIPPED | OUTPATIENT
Start: 2020-08-26 | End: 2020-08-29

## 2020-08-26 RX ORDER — HYDROCODONE BITARTRATE AND ACETAMINOPHEN 5; 325 MG/1; MG/1
1 TABLET ORAL ONCE
Status: COMPLETED | OUTPATIENT
Start: 2020-08-26 | End: 2020-08-26

## 2020-08-26 RX ADMIN — HYDROCODONE BITARTRATE AND ACETAMINOPHEN 1 TABLET: 5; 325 TABLET ORAL at 22:12

## 2020-08-26 ASSESSMENT — PAIN DESCRIPTION - PAIN TYPE: TYPE: ACUTE PAIN

## 2020-08-26 ASSESSMENT — PAIN DESCRIPTION - DESCRIPTORS: DESCRIPTORS: BURNING

## 2020-08-26 ASSESSMENT — PAIN DESCRIPTION - ORIENTATION: ORIENTATION: LOWER

## 2020-08-26 ASSESSMENT — PAIN DESCRIPTION - LOCATION: LOCATION: BACK;BUTTOCKS

## 2020-08-26 ASSESSMENT — PAIN SCALES - GENERAL
PAINLEVEL_OUTOF10: 10
PAINLEVEL_OUTOF10: 10

## 2020-08-27 NOTE — ED TRIAGE NOTES
Patient reports low back and buttock pain that radiates into left leg. Patient states she has been treated for sciatica recently. She took a norco at 1500. A/0 x3, ambulatory, resps even and unlabored on room air.  Sig other at bedside during triage per patient request.

## 2020-08-31 NOTE — ED PROVIDER NOTES
eMERGENCY dEPARTMENT eNCOUnter      [facility]    Chief Complaint   Patient presents with    Back Pain     Radiates into left leg from buttock       HPI    Sekou Molina is a 48 y.o. female who presentsto ED from home  By private car  With complaint of low back pain  Onset months but worse for couple days  Intensity of symptoms moderate  Location of symptoms lower back  Patient has chronic back pain , she denies any new trauma or lifting heavy stuff . Pain is sharp and radiates behind the left upper thigh and worse with Ambulation   She denies Bowel Bladder incontinence or lower extremity weakness.        PAST MEDICAL HISTORY    Past Medical History:   Diagnosis Date    Anxiety     Asthma     as child    Depression     Depression     dysthymia - following lupus dx age 23    [facility] mellitus ([facility] [address]     Foot fracture, left     GERD (gastroesophageal reflux disease)     Hyperlipidemia     Hypothyroidism     Kidney stones     Lupus ([facility] [address]      diab -     Migraine     PONV (postoperative nausea and vomiting)        SURGICAL HISTORY    Past Surgical History:   Procedure Laterality Date    APPENDECTOMY      ARTHRODESIS Left 11/26/2019    ARTHRODESIS OF THE FIRST METATARSAL PHALANGEAL JOINT LEFT FOOT performed by Jay Moseley DPM at [place] [address]      COLONOSCOPY      FOOT CLOSED REDUCTION Left 1/3/2017    LEFT FOOT PERCUTANEOUS PINNING Rodriguez Monserrat FRACTURE performed by Jasbir Mccoy MD at [address] [address]      HYSTERECTOMY      MT ESOPHAGOGASTRODUODENOSCOPY TRANSORAL DIAGNOSTIC N/A 3/8/2017    EGD ESOPHAGOGASTRODUODENOSCOPY WITH DILATION performed by Padma [facility]aint MD [facility] ESOPHAGOGASTRODUODENOSCOPY TRANSORAL DIAGNOSTIC N/A 11/7/2018    EGD ESOPHAGOGASTRODUODENOSCOPY WITH DILATION performed by Padma Toussaint MD at [place] [address] tablet Take 1 tablet by mouth daily 90 tablet 3    pantoprazole (PROTONIX) 40 MG tablet Take 1 tablet by mouth daily 90 tablet 3    levothyroxine (SYNTHROID) 50 MCG tablet Take 1 tablet by mouth daily 90 tablet 3    Blood Glucose Monitoring Suppl (TRUE TRACK BLOOD GLUCOSE) CAROLE Check blood sugars twice daily or as directed.  100 Device 5    hydroxychloroquine (PLAQUENIL) 200 MG tablet Take 1 tablet by mouth 2 times daily  5       ALLERGIES    Allergies   Allergen Reactions    Aspirin Swelling     Bloody noses    Imitrex [Sumatriptan] Hives    Lidocaine     Pcn [Penicillins] Swelling    Seasonal Itching     Itch, sneezing,runny nose, watery eyes    Toradol [Ketorolac Tromethamine] Hives     Shortness of breath    Ultram [Tramadol] Hives       FAMILY HISTORY    Family History   Adopted: Yes       SOCIAL HISTORY    Social History     Socioeconomic History    Marital status: Legally      Spouse name: None    Number of children: None    Years of education: None    Highest education level: None   Occupational History    None   Social Needs    Financial resource strain: None    Food insecurity     Worry: None     Inability: None    Transportation needs     Medical: None     Non-medical: None   Tobacco Use    Smoking status: Never Smoker    Smokeless tobacco: Never Used   Substance and Sexual Activity    Alcohol use: No     Alcohol/week: 0.0 standard drinks     Comment: denies    Drug use: No     Comment: denies    Sexual activity: None   Lifestyle    Physical activity     Days per week: None     Minutes per session: None    Stress: None   Relationships    Social connections     Talks on phone: None     Gets together: None     Attends Catholic service: None     Active member of club or organization: None     Attends meetings of clubs or organizations: None     Relationship status: None    Intimate partner violence     Fear of current or ex partner: None     Emotionally abused: None No rash. Lymphatic:  No lymphadenopathy noted. Neurologic:  Alert & oriented x 3, Normal motor function, Normal sensory function, No focal deficits noted. Psychiatric:  Affect normal, Judgment normal, Mood normal.       RADIOLOGY    No orders to display       REEVALUATION   Pain control is adequate  Patient is ambulating        Summation      Patient Course:     ED Medications administered this visit:    Medications   HYDROcodone-acetaminophen (NORCO) 5-325 MG per tablet 1 tablet (1 tablet Oral Given 8/26/20 2212)       New Prescriptions from this visit:    Discharge Medication List as of 8/26/2020 10:13 PM      START taking these medications    Details   HYDROcodone-acetaminophen (NORCO) 5-325 MG per tablet Take 1 tablet by mouth every 4 hours as needed for Pain for up to 3 days. Intended supply: 3 days. Take lowest dose possible to manage pain, Disp-12 tablet,R-0Print      orphenadrine (NORFLEX) 100 MG extended release tablet Take 1 tablet by mouth 2 times daily for 10 days, Disp-20 tablet,R-0Print             Follow-up:  Darlene Sandoval MD  [address] [place] [phone]    Call in 1 day          Final Impression:   1.  Back pain with radiculopathy               (Please note that portions of this note were completed with a voice recognition program.  Efforts were made to edit the dictations but occasionally words are mis-transcribed.)            Teresa Bills MD  08/30/20 8258

## 2020-09-19 ENCOUNTER — HOSPITAL ENCOUNTER (EMERGENCY)
Age: 55
Discharge: HOME | End: 2020-09-19
Attending: INTERNAL MEDICINE
Payer: MEDICARE

## 2020-09-19 VITALS
DIASTOLIC BLOOD PRESSURE: 70 MMHG | SYSTOLIC BLOOD PRESSURE: 155 MMHG | OXYGEN SATURATION: 98 % | BODY MASS INDEX: 23.95 KG/M2 | HEART RATE: 105 BPM | WEIGHT: 158 LBS | RESPIRATION RATE: 18 BRPM | HEIGHT: 68 IN | TEMPERATURE: 99.1 F

## 2020-09-19 PROCEDURE — 6360000002 HC RX W HCPCS: Performed by: INTERNAL MEDICINE

## 2020-09-19 PROCEDURE — 96372 THER/PROPH/DIAG INJ SC/IM: CPT

## 2020-09-19 PROCEDURE — 99283 EMERGENCY DEPT VISIT LOW MDM: CPT

## 2020-09-19 RX ORDER — PREDNISONE 20 MG/1
40 TABLET ORAL DAILY
Qty: 10 TABLET | Refills: 0 | Status: SHIPPED | OUTPATIENT
Start: 2020-09-19 | End: 2020-09-24

## 2020-09-19 RX ORDER — METHYLPREDNISOLONE SODIUM SUCCINATE 125 MG/2ML
125 INJECTION, POWDER, LYOPHILIZED, FOR SOLUTION INTRAMUSCULAR; INTRAVENOUS ONCE
Status: COMPLETED | OUTPATIENT
Start: 2020-09-19 | End: 2020-09-19

## 2020-09-19 RX ORDER — CYCLOBENZAPRINE HCL 10 MG
10 TABLET ORAL 2 TIMES DAILY PRN
Qty: 20 TABLET | Refills: 0 | Status: SHIPPED | OUTPATIENT
Start: 2020-09-19 | End: 2020-09-29

## 2020-09-19 RX ADMIN — METHYLPREDNISOLONE SODIUM SUCCINATE 125 MG: 125 INJECTION, POWDER, FOR SOLUTION INTRAMUSCULAR; INTRAVENOUS at 22:28

## 2020-09-19 ASSESSMENT — PAIN SCALES - GENERAL
PAINLEVEL_OUTOF10: 7
PAINLEVEL_OUTOF10: 10

## 2020-09-19 ASSESSMENT — PAIN DESCRIPTION - LOCATION
LOCATION: BACK
LOCATION: BACK;LEG

## 2020-09-19 ASSESSMENT — PAIN DESCRIPTION - PAIN TYPE
TYPE: ACUTE PAIN
TYPE: CHRONIC PAIN

## 2020-09-19 ASSESSMENT — PAIN DESCRIPTION - FREQUENCY: FREQUENCY: CONTINUOUS

## 2020-09-19 ASSESSMENT — PAIN DESCRIPTION - DESCRIPTORS: DESCRIPTORS: SHARP;SHOOTING

## 2020-09-19 ASSESSMENT — PAIN DESCRIPTION - ORIENTATION
ORIENTATION: LOWER
ORIENTATION: LEFT

## 2020-09-19 ASSESSMENT — PAIN DESCRIPTION - ONSET: ONSET: ON-GOING

## 2020-09-19 ASSESSMENT — PAIN DESCRIPTION - PROGRESSION: CLINICAL_PROGRESSION: GRADUALLY IMPROVING

## 2020-09-19 ASSESSMENT — PAIN - FUNCTIONAL ASSESSMENT: PAIN_FUNCTIONAL_ASSESSMENT: 0-10

## 2020-09-20 NOTE — ED PROVIDER NOTES
Pupils are equal, round, and reactive to light. No scleral icterus. Neck: Normal range of motion. Neck supple. No tracheal deviation present. Cardiovascular: Normal rate, regular rhythm, normal heartsounds and intact distal pulses. Exam reveals no gallop or friction rub. No murmur heard. Pulmonary/Chest: Effort normal and breath sounds are symmetric and normal. No respiratory distress. There are no wheezes, rales or rhonchi. No tenderness is exhibited upon palpation of the chest wall. Abdominal: Soft. Bowel sounds are normal. No distension or no mass exhibitted. There is no tenderness, rebound, rigidity or guarding. Genitourinary:   No CVA tenderness noted on examination. Musculoskeletal: Normal range of motion of spine and extemities. Exam with palpation shows no spinous process tenderness and no stepoffs. There is left lower lumbar paraspinous tenderness extending to the left gluteus. Lymphadenopathy:  No cervical adenopathy. Neurological:   alert and oriented to person, place, and time. Normal speech, normal comprehension, normal cognition,  Reflexes are normal.  There are no cranial nerve deficits. Normal muscle tone, motor and sensory function including SILT exhibited. Coordination normal and gait normal.    Skin: Skin is warm and dry. No rash noted. No diaphoresis. No erythema. No pallor. Psychiatric: Pleasant and cooperative. Normal mood and affect. Behavior is  normal. Judgment and thought content normal.     DIAGNOSTIC RESULTS     EKG: All EKG's are interpreted by the Emergency Department Physician who either signs or Co-signs this chart in the absence of a cardiologist.    Not indicated. RADIOLOGY:   Non-plain film images such as CT, Ultrasoundand MRI are read by the radiologist. Plain radiographic images are visualized and preliminarily interpreted by the emergency physician with the below findings:    Not indicated.     Interpretation per the Radiologist below, if available at the time of this note:    No orders to display         ED BEDSIDE ULTRASOUND:   Performed by ED Physician - none    LABS:  Labs Reviewed - No data to display    All other labs were within normal range or not returned as of this dictation. EMERGENCY DEPARTMENT COURSE and DIFFERENTIAL DIAGNOSIS/MDM:   Vitals:    Vitals:    09/19/20 2207 09/19/20 2243   BP: (!) 162/72 (!) 155/70   Pulse: 116 105   Resp: 18 18   Temp: 99.1 °F (37.3 °C)    TempSrc: Oral    SpO2: 98% 98%   Weight: 158 lb (71.7 kg)    Height: 5' 8\" (1.727 m)        Noted    MDM    CRITICAL CARE TIME   Total Critical Care time was 0 minutes       EDCOURSE       CONSULTS:  None    PROCEDURES:  Unless otherwise noted below, none     Procedures      Summation    Suzanne Kim is a 50 y.o. female who has a history of diabetes mellitus, hyperlipidemia, hypothyroidism, lupus, asthma, panic disorder, hereditary tremor, presented for left sciatica but no loss of urine or bowel control to suggest myelopathy or spinal cord involvement. rx prednisone and flexeril. She will continue ibuprofen alternating with Tylenol to minimize effect on her one kidney. She is well, well hydrated, nontoxic, hemodynamically stable, neurologically intact, and satisfactory for discharge for outpatient management. Findings discussed at length with patient. I advised the patient that imaging is not indicated at this time. I instructed the patient to followup with the PCP for evaluation of response to management of acute exacerbation of chronic back pain, and   for management of hypertension      I instructed the patient to return to the ER if his condition worsens, if there is any concern for altered mental status, difficulty breathing, dehydration or loss of function.         Patient Course:        ED Medicationsadministered this visit:    Medications   methylPREDNISolone sodium (SOLU-MEDROL) injection 125 mg (125 mg Intramuscular Given 9/19/20 2228)       New Prescriptions mis-transcribed.)    Segundo Em MD (electronically signed)  Attending Emergency Physician           Segundo Em MD  09/20/20 4113

## 2020-09-28 ENCOUNTER — HOSPITAL ENCOUNTER (OUTPATIENT)
Dept: LAB | Age: 55
Discharge: HOME | End: 2020-09-28
Payer: MEDICARE

## 2020-09-28 LAB
BASOPHILS ABSOLUTE: 0.1 K/UL (ref 0–0.2)
BASOPHILS RELATIVE PERCENT: 0.9 %
EOSINOPHILS ABSOLUTE: 0.5 K/UL (ref 0–0.7)
EOSINOPHILS RELATIVE PERCENT: 4.6 %
HCT VFR BLD CALC: 41.2 % (ref 37–47)
HEMOGLOBIN: 13.5 G/DL (ref 12–16)
LYMPHOCYTES ABSOLUTE: 2 K/UL (ref 1–4.8)
LYMPHOCYTES RELATIVE PERCENT: 20.6 %
MCH RBC QN AUTO: 27.8 PG (ref 27–31.3)
MCHC RBC AUTO-ENTMCNC: 32.8 % (ref 33–37)
MCV RBC AUTO: 84.7 FL (ref 82–100)
MONOCYTES ABSOLUTE: 0.5 K/UL (ref 0.2–0.8)
MONOCYTES RELATIVE PERCENT: 5.5 %
NEUTROPHILS ABSOLUTE: 6.7 K/UL (ref 1.4–6.5)
NEUTROPHILS RELATIVE PERCENT: 68.4 %
PDW BLD-RTO: 13.8 % (ref 11.5–14.5)
PLATELET # BLD: 310 K/UL (ref 130–400)
RBC # BLD: 4.87 M/UL (ref 4.2–5.4)
URIC ACID, SERUM: 4.3 MG/DL (ref 2.4–5.7)
WBC # BLD: 9.8 K/UL (ref 4.8–10.8)

## 2020-09-28 PROCEDURE — 85025 COMPLETE CBC W/AUTO DIFF WBC: CPT

## 2020-09-28 PROCEDURE — 84550 ASSAY OF BLOOD/URIC ACID: CPT

## 2020-09-28 PROCEDURE — 36415 COLL VENOUS BLD VENIPUNCTURE: CPT

## 2020-10-12 ENCOUNTER — VIRTUAL VISIT (OUTPATIENT)
Dept: ENDOCRINOLOGY | Age: 55
End: 2020-10-12
Payer: MEDICARE

## 2020-10-12 PROCEDURE — 99442 PR PHYS/QHP TELEPHONE EVALUATION 11-20 MIN: CPT | Performed by: INTERNAL MEDICINE

## 2020-10-12 RX ORDER — INSULIN GLARGINE 100 [IU]/ML
INJECTION, SOLUTION SUBCUTANEOUS
Qty: 15 PEN | Refills: 3 | Status: SHIPPED | OUTPATIENT
Start: 2020-10-12 | End: 2020-12-02 | Stop reason: SDUPTHER

## 2020-10-12 RX ORDER — INSULIN ASPART 100 [IU]/ML
INJECTION, SOLUTION INTRAVENOUS; SUBCUTANEOUS
Qty: 15 PEN | Refills: 3 | Status: SHIPPED | OUTPATIENT
Start: 2020-10-12 | End: 2021-08-18 | Stop reason: SDUPTHER

## 2020-10-12 NOTE — PROGRESS NOTES
10/12/2020    TELEHEALTH EVALUATION -- Audio/Visual (During [id number]-[id number] public health emergency)    Due to COVID 19 outbreak, patient's office visit was converted to a virtual visit. Patient was contacted and agreed to proceed with a virtual visit via Telephone Visit  The risks and benefits of converting to a virtual visit were discussed in light of the current infectious disease epidemic. Patient also understood that insurance coverage and co-pays are up to their individual insurance plans. HPI: Patient was at home I was at my office    Suzanne Kim (:  1965) has requested an audio/video evaluation for the following concern(s):    Follow-up on type 2 diabetes lab review no recent labs done since August A1c was 8+ blood sugars are overall higher fasting and postprandial complication diabetes include chronic kidney disease currently on Lantus 80 units at bedtime plus NovoLog 30 units with each meals    Results for Jaclyn Yee (MRN [id number]) as of 10/17/2020 15:05   Ref.  Range 2020 11:33   Sodium Latest Ref Range: 135 - 144 mEq/L 133 (L)   Potassium Latest Ref Range: 3.4 - 4.9 mEq/L 4.6   Chloride Latest Ref Range: 95 - 107 mEq/L 96   CO2 Latest Ref Range: 20 - 31 mEq/L 26   BUN Latest Ref Range: 6 - 20 mg/dL 11   Creatinine Latest Ref Range: 0.50 - 0.90 mg/dL 1.17 (H)   Anion Gap Latest Ref Range: 9 - 15 mEq/L 11   GFR Non- Latest Ref Range: >60  48.4 (L)   GFR  Latest Ref Range: >60  58.6 (L)   Glucose Latest Ref Range: 70 - 99 mg/dL 260 (H)   Calcium Latest Ref Range: 8.5 - 9.9 mg/dL 8.4 (L)   Hemoglobin A1C Latest Ref Range: 4.8 - 5.9 % 9.2 (H)          Patient Active Problem List   Diagnosis    DM (diabetes mellitus) (HCC)    Hyperlipemia    Hypothyroid    Vitamin D deficiency    SLE (systemic lupus erythematosus related syndrome) (HCC)    Asthma    Panic disorder with agoraphobia    Hereditary essential tremor    Allergic rhinitis    Type 2 daily or as directed., Disp: 200 each, Rfl: 3    blood glucose test strips (ASCENSIA AUTODISC VI;ONE TOUCH ULTRA TEST VI) strip, Check blood sugars twice daily or as directed., Disp: 200 strip, Rfl: 3    blood glucose monitor kit and supplies, Test 3 times a day & as needed for symptoms of irregular blood glucose., Disp: 1 kit, Rfl: 0    Insulin Pen Needle (NOVOFINE) 32G X 6 MM MISC, BID, Disp: 100 each, Rfl: 3    estradiol (CLIMARA) 0.025 MG/24HR, Place 0.025 patches onto the skin every 7 days On mondays, Disp: , Rfl: 11    albuterol sulfate HFA (VENTOLIN HFA) 108 (90 Base) MCG/ACT inhaler, Inhale 2 puffs into the lungs every 6 hours as needed for Wheezing, Disp: 1 Inhaler, Rfl: 0    rosuvastatin (CRESTOR) 40 MG tablet, Take 1 tablet by mouth daily, Disp: 90 tablet, Rfl: 3    pantoprazole (PROTONIX) 40 MG tablet, Take 1 tablet by mouth daily, Disp: 90 tablet, Rfl: 3    levothyroxine (SYNTHROID) 50 MCG tablet, Take 1 tablet by mouth daily, Disp: 90 tablet, Rfl: 3    Blood Glucose Monitoring Suppl (TRUE TRACK BLOOD GLUCOSE) CAROLE, Check blood sugars twice daily or as directed., Disp: 100 Device, Rfl: 5    hydroxychloroquine (PLAQUENIL) 200 MG tablet, Take 1 tablet by mouth 2 times daily, Disp: , Rfl: 5    Review of Systems    Prior to Visit Medications    Medication Sig Taking?  Authorizing Provider   insulin aspart (NOVOLOG FLEXPEN) 100 UNIT/ML injection pen Inject 30 units tid with meals  Mateus Costello MD   insulin glargine (LANTUS SOLOSTAR) 100 UNIT/ML injection pen 80 units at bedtime  Kathlyn Riedel, MD   insulin lispro, 1 Unit Dial, (HUMALOG KWIKPEN) 100 UNIT/ML SOPN 30 units with each meals  Mateus Costello MD   Insulin Pen Needle (NOVOFINE) 32G X 6 MM MISC qid  Kathlyn Riedel, MD   butalbital-APAP-caffeine (FIORICET) -40 MG CAPS per capsule Take 1 capsule by mouth every 6 hours as needed for Headaches or Migraine  Zeke Bro MD   insulin lispro, 1 Unit Dial, (HUMALOG KWIKPEN) 100 UNIT/ML SOPN Inject 20 Units into the skin 3 times daily (before meals)  Xiomara Lomas MD   blood glucose monitor strips Pt test 3x daily Dx E11.65  Xiomara Lomas MD   blood glucose monitor kit and supplies Please give 1 meter covered by insurance Dx E11.65  Xiomara Lomas MD   Lancets MISC Pt test 3x daily Dx E11.65  Xiomara Lomas MD   [facility] [facility] UNILET LANCETS 30G MISC Check blood sugars twice daily or as directed. Xiomara Lomas MD   blood glucose test strips (ASCENSIA AUTODISC VI;ONE TOUCH ULTRA TEST VI) strip Check blood sugars twice daily or as directed. Xiomara Lomas MD   blood glucose monitor kit and supplies Test 3 times a day & as needed for symptoms of irregular blood glucose. Xiomara Lomas MD   Insulin Pen Needle (NOVOFINE) 32G X 6 MM MISC BID  Mateus Costello MD   estradiol (CLIMARA) 0.025 MG/24HR Place 0.025 patches onto the skin every 7 days On mondays  Historical Provider, MD   albuterol sulfate HFA (VENTOLIN HFA) 108 (90 Base) MCG/ACT inhaler Inhale 2 puffs into the lungs every 6 hours as needed for Wheezing  Brad Momin MD   rosuvastatin (CRESTOR) 40 MG tablet Take 1 tablet by mouth daily  Olman Friday, MD   pantoprazole (PROTONIX) 40 MG tablet Take 1 tablet by mouth daily  Coleen Munoz PA-C   levothyroxine (SYNTHROID) 50 MCG tablet Take 1 tablet by mouth daily  Kita Wheat MD   Blood Glucose Monitoring Suppl (TRUE TRACK BLOOD GLUCOSE) CAROLE Check blood sugars twice daily or as directed.   Renata Galvan MD   hydroxychloroquine (PLAQUENIL) 200 MG tablet Take 1 tablet by mouth 2 times daily  Historical Provider, MD       Social History     Tobacco Use    Smoking status: Never Smoker    Smokeless tobacco: Never Used   Substance Use Topics    Alcohol use: No     Alcohol/week: 0.0 standard drinks     Comment: denies    Drug use: No     Comment: denies            PHYSICAL EXAMINATION:  [ INSTRUCTIONS:  \"[x]\" Indicates a positive item  \"[]\" Indicates a negative item  -- DELETE ALL ITEMS NOT EXAMINED]  [] Alert  [] Oriented to person/place/time    [] No apparent distress  [] Toxic appearing    [] Face flushed appearing [] Sclera clear  [] Lips are cyanotic      [] Breathing appears normal  [] Appears tachypneic      [] Rash on visible skin    [] Cranial Nerves II-XII grossly intact    [] Motor grossly intact in visible upper extremities    [] Motor grossly intact in visible lower extremities    [] Normal Mood  [] Anxious appearing    [] Depressed appearing  [] Confused appearing      [] Poor short term memory  [] Poor long term memory    [] OTHER:      Due to this being a TeleHealth encounter, evaluation of the following organ systems is limited: Vitals/Constitutional/EENT/Resp/CV/GI//MS/Neuro/Skin/Heme-Lymph-Imm. ASSESSMENT/PLAN:   Diagnosis Orders   1. Type 2 diabetes mellitus with other specified complication, with long-term current use of insulin (HCC)  Basic Metabolic Panel    Hemoglobin A1C     Orders Placed This Encounter   Procedures    Basic Metabolic Panel     Standing Status:   Future     Standing Expiration Date:   10/12/2021    Hemoglobin A1C     Standing Status:   Future     Standing Expiration Date:   10/12/2021     Orders Placed This Encounter   Medications    insulin aspart (NOVOLOG FLEXPEN) 100 UNIT/ML injection pen     Sig: Inject 35 units tid with meals     Dispense:  15 pen     Refill:  3    insulin glargine (LANTUS SOLOSTAR) 100 UNIT/ML injection pen     Si units at bedtime     Dispense:  15 pen     Refill:  3     Increase her NovoLog to 35 units with each meals increase his Lantus to 100 units at bedtime A1c goal of 7 or lower    Total time spent with patient was 14 minutes    An  electronic signature was used to authenticate this note.     --Walt Jones MD on 10/12/2020 at 1:27 PM        Pursuant to the emergency declaration under the [facility], [address] [place] [address] and the [employer] Act, this Virtual  Visit was conducted, with patient's consent, to reduce the patient's risk of exposure to COVID-19 and provide continuity of care for an established patient. Services were provided through a video synchronous discussion virtually to substitute for in-person clinic visit.

## 2020-10-14 ENCOUNTER — APPOINTMENT (OUTPATIENT)
Dept: CT IMAGING | Age: 55
End: 2020-10-14
Payer: MEDICARE

## 2020-10-14 ENCOUNTER — HOSPITAL ENCOUNTER (EMERGENCY)
Age: 55
Discharge: HOME | End: 2020-10-14
Payer: MEDICARE

## 2020-10-14 VITALS
OXYGEN SATURATION: 97 % | SYSTOLIC BLOOD PRESSURE: 109 MMHG | HEIGHT: 68 IN | HEART RATE: 97 BPM | RESPIRATION RATE: 16 BRPM | TEMPERATURE: 98.8 F | DIASTOLIC BLOOD PRESSURE: 73 MMHG | WEIGHT: 160 LBS | BODY MASS INDEX: 24.25 KG/M2

## 2020-10-14 LAB
ALBUMIN SERPL-MCNC: 4.1 G/DL (ref 3.5–4.6)
ALP BLD-CCNC: 143 U/L (ref 40–130)
ALT SERPL-CCNC: 15 U/L (ref 0–33)
ANION GAP SERPL CALCULATED.3IONS-SCNC: 6 MEQ/L (ref 9–15)
AST SERPL-CCNC: 23 U/L (ref 0–35)
BACTERIA: NEGATIVE /HPF
BASOPHILS ABSOLUTE: 0.3 K/UL (ref 0–0.2)
BASOPHILS RELATIVE PERCENT: 2.4 %
BILIRUB SERPL-MCNC: 0.4 MG/DL (ref 0.2–0.7)
BILIRUBIN URINE: NEGATIVE
BLOOD, URINE: NEGATIVE
BUN BLDV-MCNC: 13 MG/DL (ref 6–20)
CALCIUM SERPL-MCNC: 9.2 MG/DL (ref 8.5–9.9)
CHLORIDE BLD-SCNC: 96 MEQ/L (ref 95–107)
CLARITY: CLEAR
CO2: 29 MEQ/L (ref 20–31)
COLOR: YELLOW
CREAT SERPL-MCNC: 1.16 MG/DL (ref 0.5–0.9)
EOSINOPHILS ABSOLUTE: 0.8 K/UL (ref 0–0.7)
EOSINOPHILS RELATIVE PERCENT: 6.2 %
EPITHELIAL CELLS, UA: NORMAL /HPF (ref 0–5)
GFR AFRICAN AMERICAN: 59.1
GFR NON-AFRICAN AMERICAN: 48.9
GLOBULIN: 3.1 G/DL (ref 2.3–3.5)
GLUCOSE BLD-MCNC: 198 MG/DL (ref 70–99)
GLUCOSE URINE: >=1000 MG/DL
HCT VFR BLD CALC: 44.5 % (ref 37–47)
HEMOGLOBIN: 14.7 G/DL (ref 12–16)
HYALINE CASTS: NORMAL /HPF (ref 0–5)
KETONES, URINE: NEGATIVE MG/DL
LEUKOCYTE ESTERASE, URINE: NEGATIVE
LYMPHOCYTES ABSOLUTE: 2.8 K/UL (ref 1–4.8)
LYMPHOCYTES RELATIVE PERCENT: 22.2 %
MCH RBC QN AUTO: 27.8 PG (ref 27–31.3)
MCHC RBC AUTO-ENTMCNC: 33.1 % (ref 33–37)
MCV RBC AUTO: 84 FL (ref 82–100)
MONOCYTES ABSOLUTE: 0.6 K/UL (ref 0.2–0.8)
MONOCYTES RELATIVE PERCENT: 4.8 %
NEUTROPHILS ABSOLUTE: 8.1 K/UL (ref 1.4–6.5)
NEUTROPHILS RELATIVE PERCENT: 64.4 %
NITRITE, URINE: NEGATIVE
PDW BLD-RTO: 13.8 % (ref 11.5–14.5)
PH UA: 6.5 (ref 5–9)
PLATELET # BLD: 315 K/UL (ref 130–400)
POTASSIUM SERPL-SCNC: 4.2 MEQ/L (ref 3.4–4.9)
PROTEIN UA: 30 MG/DL
RBC # BLD: 5.29 M/UL (ref 4.2–5.4)
RBC UA: NORMAL /HPF (ref 0–5)
SODIUM BLD-SCNC: 131 MEQ/L (ref 135–144)
SPECIFIC GRAVITY UA: 1.02 (ref 1–1.03)
TOTAL PROTEIN: 7.2 G/DL (ref 6.3–8)
URINE REFLEX TO CULTURE: ABNORMAL
UROBILINOGEN, URINE: 1 E.U./DL
WBC # BLD: 12.6 K/UL (ref 4.8–10.8)
WBC UA: NORMAL /HPF (ref 0–5)

## 2020-10-14 PROCEDURE — 99283 EMERGENCY DEPT VISIT LOW MDM: CPT

## 2020-10-14 PROCEDURE — 99282 EMERGENCY DEPT VISIT SF MDM: CPT

## 2020-10-14 PROCEDURE — 96372 THER/PROPH/DIAG INJ SC/IM: CPT

## 2020-10-14 PROCEDURE — 74176 CT ABD & PELVIS W/O CONTRAST: CPT

## 2020-10-14 PROCEDURE — 81001 URINALYSIS AUTO W/SCOPE: CPT

## 2020-10-14 PROCEDURE — 6360000002 HC RX W HCPCS: Performed by: PHYSICIAN ASSISTANT

## 2020-10-14 PROCEDURE — 85025 COMPLETE CBC W/AUTO DIFF WBC: CPT

## 2020-10-14 PROCEDURE — 6370000000 HC RX 637 (ALT 250 FOR IP): Performed by: PHYSICIAN ASSISTANT

## 2020-10-14 PROCEDURE — 36415 COLL VENOUS BLD VENIPUNCTURE: CPT

## 2020-10-14 PROCEDURE — 80053 COMPREHEN METABOLIC PANEL: CPT

## 2020-10-14 RX ORDER — MORPHINE SULFATE 2 MG/ML
4 INJECTION, SOLUTION INTRAMUSCULAR; INTRAVENOUS ONCE
Status: DISCONTINUED | OUTPATIENT
Start: 2020-10-14 | End: 2020-10-14

## 2020-10-14 RX ORDER — HYDROCODONE BITARTRATE AND ACETAMINOPHEN 5; 325 MG/1; MG/1
1 TABLET ORAL EVERY 6 HOURS PRN
Qty: 10 TABLET | Refills: 0 | Status: SHIPPED | OUTPATIENT
Start: 2020-10-14 | End: 2020-10-17

## 2020-10-14 RX ORDER — ONDANSETRON 2 MG/ML
4 INJECTION INTRAMUSCULAR; INTRAVENOUS ONCE
Status: DISCONTINUED | OUTPATIENT
Start: 2020-10-14 | End: 2020-10-14

## 2020-10-14 RX ORDER — ONDANSETRON 4 MG/1
4 TABLET, ORALLY DISINTEGRATING ORAL ONCE
Status: COMPLETED | OUTPATIENT
Start: 2020-10-14 | End: 2020-10-14

## 2020-10-14 RX ADMIN — HYDROMORPHONE HYDROCHLORIDE 1 MG: 1 INJECTION, SOLUTION INTRAMUSCULAR; INTRAVENOUS; SUBCUTANEOUS at 14:52

## 2020-10-14 RX ADMIN — ONDANSETRON 4 MG: 4 TABLET, ORALLY DISINTEGRATING ORAL at 14:52

## 2020-10-14 ASSESSMENT — ENCOUNTER SYMPTOMS
NAUSEA: 0
COUGH: 0
VOICE CHANGE: 0
ABDOMINAL DISTENTION: 0
APNEA: 0
EYE DISCHARGE: 0
ANAL BLEEDING: 0
PHOTOPHOBIA: 0
BACK PAIN: 1
VOMITING: 0

## 2020-10-14 ASSESSMENT — PAIN DESCRIPTION - DESCRIPTORS: DESCRIPTORS: CONSTANT

## 2020-10-14 ASSESSMENT — PAIN SCALES - GENERAL
PAINLEVEL_OUTOF10: 10
PAINLEVEL_OUTOF10: 10

## 2020-10-14 ASSESSMENT — PAIN DESCRIPTION - LOCATION: LOCATION: FLANK

## 2020-10-14 ASSESSMENT — PAIN DESCRIPTION - FREQUENCY: FREQUENCY: CONTINUOUS

## 2020-10-14 ASSESSMENT — PAIN DESCRIPTION - PAIN TYPE: TYPE: ACUTE PAIN

## 2020-10-14 ASSESSMENT — PAIN DESCRIPTION - ORIENTATION: ORIENTATION: RIGHT

## 2020-10-14 NOTE — ED NOTES
Patient given DC instructions education and scripts  Educated on proper use of medication   Up with steady gait at time of DC  Denies having any questions at time of DC  To Fu with Dr Fadi Mcdermott, RN  10/14/20 [phone]

## 2020-10-14 NOTE — ED TRIAGE NOTES
Pt c/o right flank pain that started this AM, denies trauma, and dysuria, Pt is A&OX3, calm, ambulatory, afebrile, breathes are equal and unlabored.

## 2020-10-14 NOTE — ED PROVIDER NOTES
[facility] ED  eMERGENCY dEPARTMENT eNCOUnter      Pt Name: Mary Ellen Najera  MRN: [id number]  [place] 1965  Date of evaluation: 10/14/2020  Provider: Blessing Betancourt Dr       Chief Complaint   Patient presents with    Flank Pain     pt c/o right side kidney pain that started approx 2am this morning         HISTORY OF PRESENT ILLNESS   (Location/Symptom, Timing/Onset,Context/Setting, Quality, Duration, Modifying Factors, Severity)  Note limiting factors. Mary Ellen Najera is a 50 y.o. female who presents to the emergency department patient presents with right-sided back pain flank pain has history of kidney stones denies urinary bleeding but has some discomfort with urination denies fever chills nausea vomiting chest pain shortness of breath. States symptoms began today touch or motion worsen symptoms nothing improves symptoms. HPI    NursingNotes were reviewed. REVIEW OF SYSTEMS    (2-9 systems for level 4, 10 or more for level 5)     Review of Systems   Constitutional: Negative for activity change, appetite change, chills, fever and unexpected weight change. HENT: Negative for ear discharge, ear pain, nosebleeds and voice change. Eyes: Negative for photophobia and discharge. Respiratory: Negative for apnea and cough. Cardiovascular: Negative for chest pain. Gastrointestinal: Negative for abdominal distention, anal bleeding, nausea and vomiting. Endocrine: Negative for cold intolerance, heat intolerance and polyphagia. Genitourinary: Positive for dysuria and flank pain. Negative for genital sores and hematuria. Musculoskeletal: Positive for back pain. Negative for joint swelling and neck pain. Skin: Negative for pallor. Allergic/Immunologic: Negative for immunocompromised state. Neurological: Negative for seizures and facial asymmetry. Hematological: Does not bruise/bleed easily.    Psychiatric/Behavioral: Negative for behavioral problems, self-injury and sleep disturbance. All other systems reviewed and are negative. Except as noted above the remainder of the review of systems was reviewed and negative. PAST MEDICAL HISTORY     Past Medical History:   Diagnosis Date    Anxiety     Asthma     as child    Depression     Depression     dysthymia - following lupus dx age 23    Diabetes mellitus ([facility] [address]     Foot fracture, left     GERD (gastroesophageal reflux disease)     Hyperlipidemia     Hypothyroidism     Kidney stones     Lupus ([facility] [address]     dr barrera -     Migraine     PONV (postoperative nausea and vomiting)          SURGICALHISTORY       Past Surgical History:   Procedure Laterality Date    APPENDECTOMY      ARTHRODESIS Left 11/26/2019    ARTHRODESIS OF THE FIRST METATARSAL PHALANGEAL JOINT LEFT FOOT performed by Moises Merino DPM at [place] [address]      COLONOSCOPY      FOOT CLOSED REDUCTION Left 1/3/2017    LEFT FOOT PERCUTANEOUS PINNING [place] FRACTURE performed by Heriberto Gilliland MD at [address] [address]      ME ESOPHAGOGASTRODUODENOSCOPY TRANSORAL DIAGNOSTIC N/A 3/8/2017    EGD ESOPHAGOGASTRODUODENOSCOPY WITH DILATION performed by Russell Harris MD at . [facility] ESOPHAGOGASTRODUODENOSCOPY TRANSORAL DIAGNOSTIC N/A 11/7/2018    EGD ESOPHAGOGASTRODUODENOSCOPY WITH DILATION performed by Russell Harris MD at [address] [place] [address]       Previous Medications    ALBUTEROL SULFATE HFA (VENTOLIN HFA) 108 (90 BASE) MCG/ACT INHALER    Inhale 2 puffs into the lungs every 6 hours as needed for Wheezing    BLOOD GLUCOSE MONITOR KIT AND SUPPLIES    Test 3 times a day & as needed for symptoms of irregular blood glucose.     BLOOD GLUCOSE MONITOR KIT AND SUPPLIES    Please give 1 meter covered by insurance Dx E11.65 BLOOD GLUCOSE MONITOR STRIPS    Pt test 3x daily Dx E11.65    BLOOD GLUCOSE MONITORING SUPPL (TRUE TRACK BLOOD GLUCOSE) CAROLE    Check blood sugars twice daily or as directed. BLOOD GLUCOSE TEST STRIPS (ASCENSIA AUTODISC VI;ONE TOUCH ULTRA TEST VI) STRIP    Check blood sugars twice daily or as directed. BUTALBITAL-APAP-CAFFEINE (FIORICET) -40 MG CAPS PER CAPSULE    Take 1 capsule by mouth every 6 hours as needed for Headaches or Migraine    [employer] UNILET LANCETS 30G MISC    Check blood sugars twice daily or as directed. ESTRADIOL (CLIMARA) 0.025 MG/24HR    Place 0.025 patches onto the skin every 7 days On mondays    HYDROXYCHLOROQUINE (PLAQUENIL) 200 MG TABLET    Take 1 tablet by mouth 2 times daily    INSULIN ASPART (NOVOLOG FLEXPEN) 100 UNIT/ML INJECTION PEN    Inject 35 units tid with meals    INSULIN GLARGINE (LANTUS SOLOSTAR) 100 UNIT/ML INJECTION PEN    100 units at bedtime    INSULIN LISPRO, 1 UNIT DIAL, (HUMALOG KWIKPEN) 100 UNIT/ML SOPN    Inject 20 Units into the skin 3 times daily (before meals)    INSULIN LISPRO, 1 UNIT DIAL, (HUMALOG KWIKPEN) 100 UNIT/ML SOPN    30 units with each meals    INSULIN PEN NEEDLE (NOVOFINE) 32G X 6 MM MISC    BID    INSULIN PEN NEEDLE (NOVOFINE) 32G X 6 MM MISC    qid    LANCETS MISC    Pt test 3x daily Dx E11.65    LEVOTHYROXINE (SYNTHROID) 50 MCG TABLET    Take 1 tablet by mouth daily    PANTOPRAZOLE (PROTONIX) 40 MG TABLET    Take 1 tablet by mouth daily    ROSUVASTATIN (CRESTOR) 40 MG TABLET    Take 1 tablet by mouth daily       ALLERGIES     Aspirin; Imitrex [sumatriptan]; Lidocaine; Pcn [penicillins];  Seasonal; Toradol [ketorolac tromethamine]; and Ultram [tramadol]    FAMILY HISTORY       Family History   Adopted: Yes          SOCIAL HISTORY       Social History     Socioeconomic History    Marital status: Legally      Spouse name: None    Number of children: None    Years of education: None    Highest education level: None Occupational History    None   Social Needs    Financial resource strain: None    Food insecurity     Worry: None     Inability: None    Transportation needs     Medical: None     Non-medical: None   Tobacco Use    Smoking status: Never Smoker    Smokeless tobacco: Never Used   Substance and Sexual Activity    Alcohol use: No     Alcohol/week: 0.0 standard drinks     Comment: denies    Drug use: No     Comment: denies    Sexual activity: None   Lifestyle    Physical activity     Days per week: None     Minutes per session: None    Stress: None   Relationships    Social connections     Talks on phone: None     Gets together: None     Attends Yarsanism service: None     Active member of club or organization: None     Attends meetings of clubs or organizations: None     Relationship status: None    Intimate partner violence     Fear of current or ex partner: None     Emotionally abused: None     Physically abused: None     Forced sexual activity: None   Other Topics Concern    None   Social History Narrative    None       SCREENINGS      @FLOW([id number])@      PHYSICAL EXAM    (up to 7 for level 4, 8 or more for level 5)     ED Triage Vitals [10/14/20 1343]   BP Temp Temp Source Pulse Resp SpO2 Height Weight   109/73 98.8 °F (37.1 °C) Oral 97 16 97 % 5' 8\" (1.727 m) 160 lb (72.6 kg)       Physical Exam  Vitals signs and nursing note reviewed. Constitutional:       General: She is not in acute distress. Appearance: She is well-developed. She is not ill-appearing. HENT:      Head: Normocephalic and atraumatic. Right Ear: External ear normal.      Left Ear: External ear normal.      Nose: Nose normal.      Mouth/Throat:      Mouth: Mucous membranes are moist.   Eyes:      General:         Right eye: No discharge. Left eye: No discharge. Pupils: Pupils are equal, round, and reactive to light. Neck:      Musculoskeletal: Normal range of motion and neck supple.    Cardiovascular: 143 (*)     All other components within normal limits   CBC WITH AUTO DIFFERENTIAL - Abnormal; Notable for the following components:    WBC 12.6 (*)     Neutrophils Absolute 8.1 (*)     Eosinophils Absolute 0.8 (*)     Basophils Absolute 0.3 (*)     All other components within normal limits   URINE RT REFLEX TO CULTURE - Abnormal; Notable for the following components:    Glucose, Ur >=1000 (*)     Protein, UA 30 (*)     All other components within normal limits   MICROSCOPIC URINALYSIS       All other labs were within normal range or not returned as of this dictation. EMERGENCY DEPARTMENT COURSE and DIFFERENTIAL DIAGNOSIS/MDM:   Vitals:    Vitals:    10/14/20 1343   BP: 109/73   Pulse: 97   Resp: 16   Temp: 98.8 °F (37.1 °C)   TempSrc: Oral   SpO2: 97%   Weight: 160 lb (72.6 kg)   Height: 5' 8\" (1.727 m)            MDM  Number of Diagnoses or Management Options     Amount and/or Complexity of Data Reviewed  Clinical lab tests: ordered  Tests in the radiology section of CPT®: ordered        CRITICAL CARE TIME     CONSULTS:  None    PROCEDURES:  Unless otherwise noted below, none     Procedures    FINAL IMPRESSION      1. Right flank pain    2. Nephrolithiasis    3. Other specified diabetes mellitus with hyperglycemia, with long-term current use of insulin [facility])          DISPOSITION/PLAN   DISPOSITION Decision To Discharge 10/14/2020 03:56:05 PM      PATIENT REFERRED TO:  Yu Cruz MD  [address] [place] [phone]    Call in 1 day      Stephan Daniels MD  [place] [address]  [phone]    Call in 1 day      [facility] ED  [address] [place] [address]  [phone]    If symptoms worsen      DISCHARGE MEDICATIONS:  New Prescriptions    HYDROCODONE-ACETAMINOPHEN (NORCO) 5-325 MG PER TABLET    Take 1 tablet by mouth every 6 hours as needed for Pain for up to 3 days.           (Please note that portions of this note were completed with a voice recognition program.  Efforts were made to edit the dictations but occasionally words are mis-transcribed.)    Kadi Thayer PA-C (electronically signed)  Attending Emergency Physician       Kadi Thayer PA-C  10/14/20 5220

## 2020-10-14 NOTE — DISCHARGE INSTR - COC
Continuity of Care Form    Patient Name: Blanca Barbour   :  1965  MRN:  [id number]    Admit date:  10/14/2020  Discharge date:  ***    Code Status Order: Prior   Advance Directives:     Admitting Physician:  No admitting provider for patient encounter.   PCP: Monico Cogan, MD    Discharging Nurse: [facility] Care Unit/Room#:   Discharging Unit Phone Number: ***    Emergency Contact:   Extended Emergency Contact Information  Primary Emergency Contact: Ivette Dejesus of [address] [place] [address] Phone: [phone]  Work Phone: [phone]  Mobile Phone: [phone]  Relation: Child    Past Surgical History:  Past Surgical History:   Procedure Laterality Date    APPENDECTOMY      ARTHRODESIS Left 2019    ARTHRODESIS OF THE FIRST METATARSAL PHALANGEAL JOINT LEFT FOOT performed by Brittany Freeman DPM at [place] [address]      COLONOSCOPY      FOOT CLOSED REDUCTION Left 1/3/2017    LEFT FOOT PERCUTANEOUS PINNING [address] performed by Delora Klinefelter, MD at [address]      MT ESOPHAGOGASTRODUODENOSCOPY TRANSORAL DIAGNOSTIC N/A 3/8/2017    EGD ESOPHAGOGASTRODUODENOSCOPY WITH DILATION performed by Holly Franklin MD at [address] ESOPHAGOGASTRODUODENOSCOPY TRANSORAL DIAGNOSTIC N/A 2018    EGD ESOPHAGOGASTRODUODENOSCOPY WITH DILATION performed by Holly Franklin MD at [facility] ENDOSCOPY         Immunization History:   Immunization History   Administered Date(s) Administered    Hepatitis B (Recombivax HB) 10/18/1999, 1999, 2000    Influenza Vaccine, unspecified formulation 2016    Influenza Virus Vaccine 10/21/2004, 2014, 2015    PPD Test 2005    Pneumococcal Conjugate 7-valent (Prevnar7) 2014    Pneumococcal Polysaccharide (Xibqupxew73) 09/15/2014    Td, unspecified formulation 10/18/1999, 02/16/2005, 08/06/2015    Tdap (Boostrix, Adacel) 07/12/2017       Active Problems:  Patient Active Problem List   Diagnosis Code    DM (diabetes mellitus) ([facility] [address] E11.9    Hyperlipemia E78.5    Hypothyroid E03.9    Vitamin D deficiency E55.9    SLE (systemic lupus erythematosus related syndrome) ([facility]) M32.9    Asthma J45.909    Panic disorder with agoraphobia F40.01    Hereditary essential tremor G25.0    Allergic rhinitis J30.9    Type 2 diabetes mellitus with diabetic mononeuropathy ([facility]) E11.41    Raynaud's phenomenon without gangrene I73.00    Essential hypertension I10    Type 2 diabetes mellitus without complication ([facility]) S52.8    Disseminated lupus erythematosus ([facility]) M32.9    Foot fracture, left S92.902A    Acute non-recurrent frontal sinusitis J01.10    Gastritis without bleeding K29.70    Dysphagia R13.10    Gastric polyp K31.7    Chest pain R07.9       Isolation/Infection:   Isolation          No Isolation        Patient Infection Status     Infection Onset Added Last Indicated Last Indicated By Review Planned Expiration Resolved Resolved By    None active    Resolved    COVID-19 Rule Out 06/29/20 06/29/20 06/29/20 COVID-19 (Ordered)   06/29/20 Rule-Out Test Resulted    COVID-19 Rule Out 06/28/20 06/28/20 06/28/20 COVID-19 (Ordered)   06/28/20 Rule-Out Test Resulted          Nurse Assessment:  Last Vital Signs: /73   Pulse 97   Temp 98.8 °F (37.1 °C) (Oral)   Resp 16   Ht 5' 8\" (1.727 m)   Wt 160 lb (72.6 kg)   LMP  (LMP Unknown)   SpO2 97%   BMI 24.33 kg/m²     Last documented pain score (0-10 scale): Pain Level: 10  Last Weight:   Wt Readings from Last 1 Encounters:   10/14/20 160 lb (72.6 kg)     Mental Status:  {IP PT MENTAL STATUS:20030}    IV Access:  {[facility] IV ACCESS:036440236}    Nursing Mobility/ADLs:  Walking   {[facility] CFQF:311103156}  Transfer  {THANG BRADLEY NULE:501668668}  Bathing  {THANG BRADLEY ZUWO:096677343}  Dressing  {CHP DME QJPY:980117820}  Toileting  {CHP DME WAPC:328766284}  Feeding  {CHP DME BKUJ:911542921}  Med Admin  {CHP DME IJF}  Med Delivery   { MARIBELL MED Delivery:106990120}    Wound Care Documentation and Therapy:        Elimination:  Continence:   · Bowel: {YES / TV:53862}  · Bladder: {YES / EH:21077}  Urinary Catheter: {Urinary Catheter:728361099}   Colostomy/Ileostomy/Ileal Conduit: {YES / HV:67157}       Date of Last BM: ***  No intake or output data in the 24 hours ending 10/14/20 1449  No intake/output data recorded.     Safety Concerns:     [address] Safety Concerns:737701953}    Impairments/Disabilities:      [address] Impairments/Disabilities:806304702}    Nutrition Therapy:  Current Nutrition Therapy:   [address] Diet List:595246131}    Routes of Feeding: {CHP DME Other Feedings:241995280}  Liquids: {Slp liquid thickness:66410}  Daily Fluid Restriction: {CHP DME Yes amt example:613644779}  Last Modified Barium Swallow with Video (Video Swallowing Test): {Done Not Done YJCK:319301210}    Treatments at the Time of Hospital Discharge:   Respiratory Treatments: ***  Oxygen Therapy:  {Therapy; copd oxygen:99797}  Ventilator:    { CC Vent YYDN:254288313}    Rehab Therapies: {THERAPEUTIC INTERVENTION:9921755020}  Weight Bearing Status/Restrictions: [address] Weight Bearin}  Other Medical Equipment (for information only, NOT a DME order):  {EQUIPMENT:358342561}  Other Treatments: ***    Patient's personal belongings (please select all that are sent with patient):  {P DME Belongings:555245173}    RN SIGNATURE:  {Esignature:299730115}    CASE MANAGEMENT/SOCIAL WORK SECTION    Inpatient Status Date: ***    Readmission Risk Assessment Score:  Readmission Risk              Risk of Unplanned Readmission:        0           Discharging to Facility/ Agency   · Name:   · Address:  · Phone:  · Fax:    Dialysis Facility (if applicable)   · Name:  · Address:  · Dialysis Schedule:  · Phone:  · Fax:    / signature: {Esignature:157245709}    PHYSICIAN SECTION    Prognosis: {Prognosis:9912992063}    Condition at Discharge: Jamie Clemente Patient Condition:274415484}    Rehab Potential (if transferring to Rehab): {Prognosis:5489470356}    Recommended Labs or Other Treatments After Discharge: ***    Physician Certification: I certify the above information and transfer of Judit Fatima  is necessary for the continuing treatment of the diagnosis listed and that she requires {Admit to Appropriate Level of Care:50043} for {GREATER/LESS:853866483} 30 days.      Update Admission H&P: {CHP DME Changes in YMGPS:512591171}    PHYSICIAN SIGNATURE:  {Esignature:159001973}

## 2020-10-22 RX ORDER — LANCETS
EACH MISCELLANEOUS
Qty: 200 EACH | Refills: 3 | Status: SHIPPED | OUTPATIENT
Start: 2020-10-22

## 2020-10-23 RX ORDER — GLUCOSAMINE HCL/CHONDROITIN SU 500-400 MG
CAPSULE ORAL
Qty: 100 STRIP | Refills: 3 | Status: SHIPPED | OUTPATIENT
Start: 2020-10-23 | End: 2022-03-09

## 2020-11-02 ENCOUNTER — HOSPITAL ENCOUNTER (OUTPATIENT)
Dept: LAB | Age: 55
Discharge: HOME | End: 2020-11-02
Payer: MEDICARE

## 2020-11-02 LAB
ANION GAP SERPL CALCULATED.3IONS-SCNC: 10 MEQ/L (ref 9–15)
BUN BLDV-MCNC: 11 MG/DL (ref 6–20)
CALCIUM SERPL-MCNC: 9.6 MG/DL (ref 8.5–9.9)
CHLORIDE BLD-SCNC: 98 MEQ/L (ref 95–107)
CO2: 27 MEQ/L (ref 20–31)
CREAT SERPL-MCNC: 1.06 MG/DL (ref 0.5–0.9)
GFR AFRICAN AMERICAN: >60
GFR NON-AFRICAN AMERICAN: 54.2
GLUCOSE BLD-MCNC: 299 MG/DL (ref 70–99)
POTASSIUM SERPL-SCNC: 5.1 MEQ/L (ref 3.4–4.9)
SODIUM BLD-SCNC: 135 MEQ/L (ref 135–144)

## 2020-11-02 PROCEDURE — 80048 BASIC METABOLIC PNL TOTAL CA: CPT

## 2020-11-02 PROCEDURE — 36415 COLL VENOUS BLD VENIPUNCTURE: CPT

## 2020-11-02 PROCEDURE — 83036 HEMOGLOBIN GLYCOSYLATED A1C: CPT

## 2020-11-03 LAB — HBA1C MFR BLD: 10.1 % (ref 4.8–5.9)

## 2020-11-13 ENCOUNTER — VIRTUAL VISIT (OUTPATIENT)
Dept: ENDOCRINOLOGY | Age: 55
End: 2020-11-13
Payer: MEDICARE

## 2020-11-13 PROCEDURE — 99442 PR PHYS/QHP TELEPHONE EVALUATION 11-20 MIN: CPT | Performed by: INTERNAL MEDICINE

## 2020-11-13 ASSESSMENT — ENCOUNTER SYMPTOMS: ROS SKIN COMMENTS: ITCHING

## 2020-11-13 NOTE — PROGRESS NOTES
2020    TELEHEALTH EVALUATION -- Audio/Visual (During [facility]QJB[facility]-64 public health emergency)    Due to COVID 19 outbreak, patient's office visit was converted to a virtual visit. Patient was contacted and agreed to proceed with a virtual visit via Telephone Visit  The risks and benefits of converting to a virtual visit were discussed in light of the current infectious disease epidemic. Patient also understood that insurance coverage and co-pays are up to their individual insurance plans. HPI: Telephone visit patient was at home follow-up on type 2 diabetes patient currently on Lantus 100 units at bedtime NovoLog 35 units with each meals patient testing blood sugar    4 times a day recently had a hypoglycemic episode after she missed a meal and was walking at a store A1c has been going from 9+ to 10  Complication diabetes include nephropathy    Patient is also trying to get the [employer] pump is in touch with the [employer] rep for follow-up    History of hypothyroidism following up with primary care TSH has been stable    Complaining of dry skin itching recently seen in the ER for kidney stone in October    Lab Results   Component Value Date    TSH 2.500 2017         Suzanne Kim (:  1965) has requested an audio/video evaluation for the following concern(s):          Results for Emily Coello (MRN [id number]) as of 2020 09:40   Ref.  Range 2020 09:18   Sodium Latest Ref Range: 135 - 144 mEq/L 135   Potassium Latest Ref Range: 3.4 - 4.9 mEq/L 5.1 (H)   Chloride Latest Ref Range: 95 - 107 mEq/L 98   CO2 Latest Ref Range: 20 - 31 mEq/L 27   BUN Latest Ref Range: 6 - 20 mg/dL 11   Creatinine Latest Ref Range: 0.50 - 0.90 mg/dL 1.06 (H)   Anion Gap Latest Ref Range: 9 - 15 mEq/L 10   GFR Non- Latest Ref Range: >60  54.2 (L)   GFR  Latest Ref Range: >60  >60.0   Glucose Latest Ref Range: 70 - 99 mg/dL 299 (H)   Calcium Latest Ref Range: 8.5 - 9.9 mg/dL 9.6 Hemoglobin A1C Latest Ref Range: 4.8 - 5.9 % 10.1 (H)       Review of Systems   Skin:        itching    All other systems reviewed and are negative. Patient Active Problem List   Diagnosis    DM (diabetes mellitus) ([facility] [address]    Hyperlipemia    Hypothyroid    Vitamin D deficiency    SLE (systemic lupus erythematosus related syndrome) ([facility])    Asthma    Panic disorder with agoraphobia    Hereditary essential tremor    Allergic rhinitis    Type 2 diabetes mellitus with diabetic mononeuropathy ([facility] [address]    Raynaud's phenomenon without gangrene    Essential hypertension    Type 2 diabetes mellitus without complication ([facility] [address]    Disseminated lupus erythematosus ([facility])    Foot fracture, left    Acute non-recurrent frontal sinusitis    Gastritis without bleeding    Dysphagia    Gastric polyp    Chest pain         Prior to Visit Medications    Medication Sig Taking? Authorizing Provider   blood glucose monitor strips Pt test 3x daily Dx E11.65  Amee Baryr MD   Drug [place] Unilet Lancets 30G MISC Check blood sugars twice daily or as directed.   Amee Barry MD   insulin aspart (NOVOLOG FLEXPEN) 100 UNIT/ML injection pen Inject 35 units tid with meals  Mateus Costello MD   insulin glargine (LANTUS SOLOSTAR) 100 UNIT/ML injection pen 100 units at bedtime  Amee Barry MD   insulin lispro, 1 Unit Dial, (HUMALOG KWIKPEN) 100 UNIT/ML SOPN 30 units with each meals  Mateus Costello MD   Insulin Pen Needle (NOVOFINE) 32G X 6 MM MISC qid  Amee Barry MD   butalbital-APAP-caffeine (FIORICET) -40 MG CAPS per capsule Take 1 capsule by mouth every 6 hours as needed for Headaches or Migraine  Josh Conteh MD   insulin lispro, 1 Unit Dial, (HUMALOG KWIKPEN) 100 UNIT/ML SOPN Inject 20 Units into the skin 3 times daily (before meals)  Amee Barry MD   blood glucose monitor kit and supplies Please give 1 meter covered by insurance Dx E11.65  Amee Barry MD   Lancets MISC Pt test 3x daily Dx G01.49  Amee Barry MD   blood glucose Depressed appearing  [] Confused appearing      [] Poor short term memory  [] Poor long term memory    [] OTHER:      Due to this being a TeleHealth encounter, evaluation of the following organ systems is limited: Vitals/Constitutional/EENT/Resp/CV/GI//MS/Neuro/Skin/Heme-Lymph-Imm. ASSESSMENT/PLAN:     Diagnosis Orders   1. Type 2 diabetes mellitus with diabetic mononeuropathy, without long-term current use of insulin (HCC)  Basic Metabolic Panel    Hemoglobin A1C    Microalbumin / Creatinine Urine Ratio     Orders Placed This Encounter   Procedures    Basic Metabolic Panel     Standing Status:   Future     Standing Expiration Date:   11/13/2021    Hemoglobin A1C     Standing Status:   Future     Standing Expiration Date:   11/13/2021    Microalbumin / Creatinine Urine Ratio     Standing Status:   Future     Standing Expiration Date:   11/13/2021     Continue patient on Lantus 100 units at bedtime NovoLog 35 with each meals patient to follow-up with [employer] rep to get started on insulin pump for better control long-term A1c goal of 7 or lower repeat labs in 2 months time patient educated about hypoglycemia    Total time spent was 12 minutes      An  electronic signature was used to authenticate this note. --Ld Rae MD on 11/13/2020 at 9:40 AM        Pursuant to the emergency declaration under the [address] [place] [address] and the [employer] and [employer] General Act, this Virtual  Visit was conducted, with patient's consent, to reduce the patient's risk of exposure to COVID-19 and provide continuity of care for an established patient. Services were provided through a video synchronous discussion virtually to substitute for in-person clinic visit.

## 2020-11-20 ENCOUNTER — HOSPITAL ENCOUNTER (OUTPATIENT)
Dept: LAB | Age: 55
Discharge: HOME | End: 2020-11-20
Payer: MEDICARE

## 2020-11-20 LAB
ANION GAP SERPL CALCULATED.3IONS-SCNC: 16 MEQ/L (ref 9–15)
BUN BLDV-MCNC: 13 MG/DL (ref 6–20)
CALCIUM SERPL-MCNC: 9.3 MG/DL (ref 8.5–9.9)
CHLORIDE BLD-SCNC: 100 MEQ/L (ref 95–107)
CO2: 23 MEQ/L (ref 20–31)
CREAT SERPL-MCNC: 1.18 MG/DL (ref 0.5–0.9)
CREATININE URINE: 80.8 MG/DL
GFR AFRICAN AMERICAN: 57.9
GFR NON-AFRICAN AMERICAN: 47.9
GLUCOSE BLD-MCNC: 241 MG/DL (ref 70–99)
HBA1C MFR BLD: 9.9 % (ref 4.8–5.9)
MICROALBUMIN UR-MCNC: 2.5 MG/DL
MICROALBUMIN/CREAT UR-RTO: 30.9 MG/G (ref 0–30)
POTASSIUM SERPL-SCNC: 4.5 MEQ/L (ref 3.4–4.9)
SODIUM BLD-SCNC: 139 MEQ/L (ref 135–144)

## 2020-11-20 PROCEDURE — 84681 ASSAY OF C-PEPTIDE: CPT

## 2020-11-20 PROCEDURE — 82043 UR ALBUMIN QUANTITATIVE: CPT

## 2020-11-20 PROCEDURE — 82570 ASSAY OF URINE CREATININE: CPT

## 2020-11-20 PROCEDURE — 83036 HEMOGLOBIN GLYCOSYLATED A1C: CPT

## 2020-11-20 PROCEDURE — 80048 BASIC METABOLIC PNL TOTAL CA: CPT

## 2020-11-20 PROCEDURE — 36415 COLL VENOUS BLD VENIPUNCTURE: CPT

## 2020-11-21 LAB — C-PEPTIDE: 5.7 NG/ML (ref 1.1–4.4)

## 2020-11-24 ENCOUNTER — TELEPHONE (OUTPATIENT)
Dept: ENDOCRINOLOGY | Age: 55
End: 2020-11-24

## 2020-11-24 NOTE — TELEPHONE ENCOUNTER
Hemoglobin A1c was 9.9  Kidney function tests show 48% function  C-peptide was high ruled out type 1 diabetes  Protein in the urine from diabetes    Can make an appointment in 1 to 2 weeks to discuss results

## 2020-12-02 ENCOUNTER — VIRTUAL VISIT (OUTPATIENT)
Dept: ENDOCRINOLOGY | Age: 55
End: 2020-12-02
Payer: MEDICARE

## 2020-12-02 PROCEDURE — 99442 PR PHYS/QHP TELEPHONE EVALUATION 11-20 MIN: CPT | Performed by: INTERNAL MEDICINE

## 2020-12-02 RX ORDER — INSULIN GLARGINE 100 [IU]/ML
INJECTION, SOLUTION SUBCUTANEOUS
Qty: 15 PEN | Refills: 3 | Status: SHIPPED | OUTPATIENT
Start: 2020-12-02 | End: 2021-01-14 | Stop reason: SDUPTHER

## 2020-12-02 RX ORDER — INSULIN LISPRO 100 [IU]/ML
INJECTION, SOLUTION INTRAVENOUS; SUBCUTANEOUS
Qty: 10 PEN | Refills: 3 | Status: SHIPPED | OUTPATIENT
Start: 2020-12-02 | End: 2021-01-14 | Stop reason: SDUPTHER

## 2020-12-02 NOTE — PROGRESS NOTES
2020    TELEHEALTH EVALUATION -- Audio/Visual (During [facility] public health emergency)    Due to COVID 19 outbreak, patient's office visit was converted to a virtual visit. Patient was contacted and agreed to proceed with a virtual visit via Telephone Visit  The risks and benefits of converting to a virtual visit were discussed in light of the current infectious disease epidemic. Patient also understood that insurance coverage and co-pays are up to their individual insurance plans. HPI: Telephone visit patient was at home I was at my office    Suzanne Kim (:  1965) has requested an audio/video evaluation for the following concern(s):    Follow-up on type 2 diabetes patient currently on Lantus 30 units at bedtime Humalog/NovoLog 25 units with meals A1c fluctuating between 9-10 patient in the process of getting a.m. insulin pump complicated diabetes include chronic kidney disease    Results for Jimena Pacheco (MRN [id number]) as of 2020 14:40   Ref.  Range 2020 09:18 2020 07:08 2020 07:09   Sodium Latest Ref Range: 135 - 144 mEq/L 135 139    Potassium Latest Ref Range: 3.4 - 4.9 mEq/L 5.1 (H) 4.5    Chloride Latest Ref Range: 95 - 107 mEq/L 98 100    CO2 Latest Ref Range: 20 - 31 mEq/L 27 23    BUN Latest Ref Range: 6 - 20 mg/dL 11 13    Creatinine Latest Ref Range: 0.50 - 0.90 mg/dL 1.06 (H) 1.18 (H)    Anion Gap Latest Ref Range: 9 - 15 mEq/L 10 16 (H)    GFR Non- Latest Ref Range: >60  54.2 (L) 47.9 (L)    GFR  Latest Ref Range: >60  >60.0 57.9 (L)    Glucose Latest Ref Range: 70 - 99 mg/dL 299 (H) 241 (H)    Calcium Latest Ref Range: 8.5 - 9.9 mg/dL 9.6 9.3    Hemoglobin A1C Latest Ref Range: 4.8 - 5.9 % 10.1 (H) 9.9 (H)    C-Peptide Latest Ref Range: 1.1 - 4.4 ng/mL  5.7 (H)    Creatinine, Ur Latest Ref Range: Not Established mg/dL   80.8   Microalbumin Creatinine Ratio Latest Ref Range: 0.0 - 30.0 mg/G   30.9 (H)   Microalbumin, Random Urine Latest Ref Range: Not Established mg/dL   2.50 (H)       Review of Systems    Prior to Visit Medications    Medication Sig Taking? Authorizing Provider   blood glucose monitor strips Pt test 3x daily Dx E11.65  Ivonne Fermin MD   Drug [place] Unilet Lancets 30G MISC Check blood sugars twice daily or as directed. Ivonne Fermin MD   insulin aspart (NOVOLOG FLEXPEN) 100 UNIT/ML injection pen Inject 35 units tid with meals  Mateus Costello MD   insulin glargine (LANTUS SOLOSTAR) 100 UNIT/ML injection pen 100 units at bedtime  Ivonne Fermin MD   insulin lispro, 1 Unit Dial, (HUMALOG KWIKPEN) 100 UNIT/ML SOPN 30 units with each meals  Mateus Costello MD   Insulin Pen Needle (NOVOFINE) 32G X 6 MM MISC qid  Ivonne Fermin MD   butalbital-APAP-caffeine (FIORICET) -40 MG CAPS per capsule Take 1 capsule by mouth every 6 hours as needed for Headaches or Migraine  Salazar Mojica MD   insulin lispro, 1 Unit Dial, (HUMALOG KWIKPEN) 100 UNIT/ML SOPN Inject 20 Units into the skin 3 times daily (before meals)  Ivonne Fermin MD   blood glucose monitor kit and supplies Please give 1 meter covered by insurance Dx E11.65  Ivonne Fermin MD   Lancets MISC Pt test 3x daily Dx T17.16  Ivonne Fermin MD   blood glucose test strips (ASCENSIA AUTODISC VI;ONE TOUCH ULTRA TEST VI) strip Check blood sugars twice daily or as directed. Ivonne Fermin MD   blood glucose monitor kit and supplies Test 3 times a day & as needed for symptoms of irregular blood glucose.   Ivonne Fermin MD   Insulin Pen Needle (NOVOFINE) 32G X 6 MM MISC BID  Mateus Costello MD   estradiol (CLIMARA) 0.025 MG/24HR Place 0.025 patches onto the skin every 7 days On mondays  Historical Provider, MD   albuterol sulfate HFA (VENTOLIN HFA) 108 (90 Base) MCG/ACT inhaler Inhale 2 puffs into the lungs every 6 hours as needed for Wheezing  Bhumi Patino MD   rosuvastatin (CRESTOR) 40 MG tablet Take 1 tablet by mouth daily  Ajay Lopez MD   pantoprazole (PROTONIX) 40 MG tablet Take 1 tablet by mouth daily  Coleen Munoz PA-C   levothyroxine (SYNTHROID) 50 MCG tablet Take 1 tablet by mouth daily  Kita Wheat MD   Blood Glucose Monitoring Suppl (TRUE TRACK BLOOD GLUCOSE) CAROLE Check blood sugars twice daily or as directed. Kimberly Patel MD   hydroxychloroquine (PLAQUENIL) 200 MG tablet Take 1 tablet by mouth 2 times daily  Historical Provider, MD       Social History     Tobacco Use    Smoking status: Never Smoker    Smokeless tobacco: Never Used   Substance Use Topics    Alcohol use: No     Alcohol/week: 0.0 standard drinks     Comment: denies    Drug use: No     Comment: denies            PHYSICAL EXAMINATION:  [ INSTRUCTIONS:  \"[x]\" Indicates a positive item  \"[]\" Indicates a negative item  -- DELETE ALL ITEMS NOT EXAMINED]  [] Alert  [] Oriented to person/place/time    [] No apparent distress  [] Toxic appearing    [] Face flushed appearing [] Sclera clear  [] Lips are cyanotic      [] Breathing appears normal  [] Appears tachypneic      [] Rash on visible skin    [] Cranial Nerves II-XII grossly intact    [] Motor grossly intact in visible upper extremities    [] Motor grossly intact in visible lower extremities    [] Normal Mood  [] Anxious appearing    [] Depressed appearing  [] Confused appearing      [] Poor short term memory  [] Poor long term memory    [] OTHER:      Due to this being a TeleHealth encounter, evaluation of the following organ systems is limited: Vitals/Constitutional/EENT/Resp/CV/GI//MS/Neuro/Skin/Heme-Lymph-Imm. ASSESSMENT/PLAN:     Diagnosis Orders   1. Type 2 diabetes mellitus with other specified complication, unspecified whether long term insulin use (HCC)       Continue current dose of Lantus 30 units at bedtime plus Humalog 35 with each meals A1c goal of 7 or lower follow-up in 3 to 4 weeks after she starts the insulin pump      An  electronic signature was used to authenticate this note.     --Myrna Martinez MD on 12/2/2020 at 2:40 PM        Pursuant to the emergency declaration under the [facility] waiver authority and the [employer] Act, this Virtual  Visit was conducted, with patient's consent, to reduce the patient's risk of exposure to COVID-19 and provide continuity of care for an established patient. Services were provided through a video synchronous discussion virtually to substitute for in-person clinic visit.

## 2020-12-29 ENCOUNTER — HOSPITAL ENCOUNTER (EMERGENCY)
Age: 55
Discharge: HOME | End: 2020-12-29
Attending: EMERGENCY MEDICINE
Payer: MEDICARE

## 2020-12-29 ENCOUNTER — APPOINTMENT (OUTPATIENT)
Dept: CT IMAGING | Age: 55
End: 2020-12-29
Payer: MEDICARE

## 2020-12-29 VITALS
DIASTOLIC BLOOD PRESSURE: 98 MMHG | RESPIRATION RATE: 18 BRPM | OXYGEN SATURATION: 99 % | WEIGHT: 158 LBS | HEART RATE: 87 BPM | BODY MASS INDEX: 23.95 KG/M2 | TEMPERATURE: 98.7 F | HEIGHT: 68 IN | SYSTOLIC BLOOD PRESSURE: 158 MMHG

## 2020-12-29 LAB
ALBUMIN SERPL-MCNC: 4.2 G/DL (ref 3.5–4.6)
ALP BLD-CCNC: 161 U/L (ref 40–130)
ALT SERPL-CCNC: 15 U/L (ref 0–33)
ANION GAP SERPL CALCULATED.3IONS-SCNC: 11 MEQ/L (ref 9–15)
AST SERPL-CCNC: 21 U/L (ref 0–35)
BASOPHILS ABSOLUTE: 0.2 K/UL (ref 0–0.2)
BASOPHILS RELATIVE PERCENT: 1.6 %
BILIRUB SERPL-MCNC: 0.3 MG/DL (ref 0.2–0.7)
BILIRUBIN URINE: NEGATIVE
BLOOD, URINE: NEGATIVE
BUN BLDV-MCNC: 12 MG/DL (ref 6–20)
CALCIUM SERPL-MCNC: 10 MG/DL (ref 8.5–9.9)
CHLORIDE BLD-SCNC: 101 MEQ/L (ref 95–107)
CLARITY: CLEAR
CO2: 25 MEQ/L (ref 20–31)
COLOR: YELLOW
CREAT SERPL-MCNC: 1.15 MG/DL (ref 0.5–0.9)
EOSINOPHILS ABSOLUTE: 0.6 K/UL (ref 0–0.7)
EOSINOPHILS RELATIVE PERCENT: 4.2 %
GFR AFRICAN AMERICAN: 59.7
GFR NON-AFRICAN AMERICAN: 49.3
GLOBULIN: 3.4 G/DL (ref 2.3–3.5)
GLUCOSE BLD-MCNC: 96 MG/DL (ref 70–99)
GLUCOSE URINE: NEGATIVE MG/DL
HCT VFR BLD CALC: 46.2 % (ref 37–47)
HEMOGLOBIN: 15 G/DL (ref 12–16)
KETONES, URINE: NEGATIVE MG/DL
LEUKOCYTE ESTERASE, URINE: NEGATIVE
LYMPHOCYTES ABSOLUTE: 3.7 K/UL (ref 1–4.8)
LYMPHOCYTES RELATIVE PERCENT: 26.1 %
MCH RBC QN AUTO: 28.4 PG (ref 27–31.3)
MCHC RBC AUTO-ENTMCNC: 32.5 % (ref 33–37)
MCV RBC AUTO: 87.5 FL (ref 82–100)
MONOCYTES ABSOLUTE: 1 K/UL (ref 0.2–0.8)
MONOCYTES RELATIVE PERCENT: 6.9 %
NEUTROPHILS ABSOLUTE: 8.7 K/UL (ref 1.4–6.5)
NEUTROPHILS RELATIVE PERCENT: 61.2 %
NITRITE, URINE: NEGATIVE
PDW BLD-RTO: 13.7 % (ref 11.5–14.5)
PH UA: 5.5 (ref 5–9)
PLATELET # BLD: 309 K/UL (ref 130–400)
POTASSIUM SERPL-SCNC: 4.5 MEQ/L (ref 3.4–4.9)
PROTEIN UA: NEGATIVE MG/DL
RBC # BLD: 5.28 M/UL (ref 4.2–5.4)
SODIUM BLD-SCNC: 137 MEQ/L (ref 135–144)
SPECIFIC GRAVITY UA: <=1.005 (ref 1–1.03)
TOTAL PROTEIN: 7.6 G/DL (ref 6.3–8)
URINE REFLEX TO CULTURE: NORMAL
UROBILINOGEN, URINE: 0.2 E.U./DL
WBC # BLD: 14.3 K/UL (ref 4.8–10.8)

## 2020-12-29 PROCEDURE — 74176 CT ABD & PELVIS W/O CONTRAST: CPT

## 2020-12-29 PROCEDURE — 96374 THER/PROPH/DIAG INJ IV PUSH: CPT

## 2020-12-29 PROCEDURE — 81003 URINALYSIS AUTO W/O SCOPE: CPT

## 2020-12-29 PROCEDURE — 96375 TX/PRO/DX INJ NEW DRUG ADDON: CPT

## 2020-12-29 PROCEDURE — 36415 COLL VENOUS BLD VENIPUNCTURE: CPT

## 2020-12-29 PROCEDURE — 80053 COMPREHEN METABOLIC PANEL: CPT

## 2020-12-29 PROCEDURE — 6360000002 HC RX W HCPCS: Performed by: EMERGENCY MEDICINE

## 2020-12-29 PROCEDURE — 99283 EMERGENCY DEPT VISIT LOW MDM: CPT

## 2020-12-29 PROCEDURE — 99282 EMERGENCY DEPT VISIT SF MDM: CPT

## 2020-12-29 PROCEDURE — 85025 COMPLETE CBC W/AUTO DIFF WBC: CPT

## 2020-12-29 RX ORDER — ORPHENADRINE CITRATE 30 MG/ML
60 INJECTION INTRAMUSCULAR; INTRAVENOUS ONCE
Status: DISCONTINUED | OUTPATIENT
Start: 2020-12-29 | End: 2020-12-29

## 2020-12-29 RX ORDER — OXYCODONE HYDROCHLORIDE AND ACETAMINOPHEN 5; 325 MG/1; MG/1
1 TABLET ORAL EVERY 6 HOURS PRN
Qty: 10 TABLET | Refills: 0 | Status: SHIPPED | OUTPATIENT
Start: 2020-12-29 | End: 2021-01-01

## 2020-12-29 RX ORDER — ORPHENADRINE CITRATE 30 MG/ML
60 INJECTION INTRAMUSCULAR; INTRAVENOUS ONCE
Status: COMPLETED | OUTPATIENT
Start: 2020-12-29 | End: 2020-12-29

## 2020-12-29 RX ORDER — TAMSULOSIN HYDROCHLORIDE 0.4 MG/1
0.4 CAPSULE ORAL DAILY
Qty: 10 CAPSULE | Refills: 0 | Status: SHIPPED | OUTPATIENT
Start: 2020-12-29 | End: 2021-04-16

## 2020-12-29 RX ORDER — ONDANSETRON 2 MG/ML
4 INJECTION INTRAMUSCULAR; INTRAVENOUS ONCE
Status: COMPLETED | OUTPATIENT
Start: 2020-12-29 | End: 2020-12-29

## 2020-12-29 RX ORDER — ORPHENADRINE CITRATE 100 MG/1
100 TABLET, EXTENDED RELEASE ORAL 2 TIMES DAILY
Qty: 20 TABLET | Refills: 0 | Status: SHIPPED | OUTPATIENT
Start: 2020-12-29 | End: 2021-01-08

## 2020-12-29 RX ADMIN — ONDANSETRON 4 MG: 2 INJECTION INTRAMUSCULAR; INTRAVENOUS at 17:11

## 2020-12-29 RX ADMIN — ORPHENADRINE CITRATE 60 MG: 30 INJECTION INTRAMUSCULAR; INTRAVENOUS at 16:43

## 2020-12-29 RX ADMIN — HYDROMORPHONE HYDROCHLORIDE 1 MG: 1 INJECTION, SOLUTION INTRAMUSCULAR; INTRAVENOUS; SUBCUTANEOUS at 17:11

## 2020-12-29 ASSESSMENT — ENCOUNTER SYMPTOMS
WHEEZING: 0
BACK PAIN: 1
SHORTNESS OF BREATH: 0
COLOR CHANGE: 0
NAUSEA: 0
ABDOMINAL PAIN: 0
DIARRHEA: 0
RHINORRHEA: 0
VOMITING: 0
APNEA: 0
SINUS PRESSURE: 0
EYE PAIN: 0
PHOTOPHOBIA: 0
COUGH: 0
CONSTIPATION: 0
SORE THROAT: 0
ABDOMINAL DISTENTION: 0

## 2020-12-29 ASSESSMENT — PAIN DESCRIPTION - LOCATION
LOCATION: FLANK;BACK
LOCATION: FLANK

## 2020-12-29 ASSESSMENT — PAIN DESCRIPTION - PROGRESSION
CLINICAL_PROGRESSION: NOT CHANGED
CLINICAL_PROGRESSION: GRADUALLY IMPROVING

## 2020-12-29 ASSESSMENT — PAIN SCALES - GENERAL
PAINLEVEL_OUTOF10: 9
PAINLEVEL_OUTOF10: 7
PAINLEVEL_OUTOF10: 10

## 2020-12-29 ASSESSMENT — PAIN DESCRIPTION - ONSET
ONSET: SUDDEN
ONSET: ON-GOING

## 2020-12-29 ASSESSMENT — PAIN DESCRIPTION - ORIENTATION
ORIENTATION: LEFT;RIGHT
ORIENTATION: LEFT

## 2020-12-29 ASSESSMENT — PAIN DESCRIPTION - PAIN TYPE
TYPE: ACUTE PAIN
TYPE: ACUTE PAIN

## 2020-12-29 ASSESSMENT — PAIN DESCRIPTION - DESCRIPTORS
DESCRIPTORS: SHARP;STABBING
DESCRIPTORS: SHARP

## 2020-12-29 ASSESSMENT — PAIN - FUNCTIONAL ASSESSMENT: PAIN_FUNCTIONAL_ASSESSMENT: 0-10

## 2020-12-29 ASSESSMENT — PAIN DESCRIPTION - FREQUENCY
FREQUENCY: CONTINUOUS
FREQUENCY: CONTINUOUS

## 2020-12-29 NOTE — ED PROVIDER NOTES
2000 [facility] ED  eMERGENCY dEPARTMENT eNCOUnter      Pt Name: Robie Curling  MRN: [id number]  [place] 1965  Date of evaluation: 12/29/2020  Provider: Kati Self MD    CHIEF COMPLAINT       Chief Complaint   Patient presents with    Back Pain     back pain on both flanks started last night has a past HX of kidney disease          HISTORY OF PRESENT ILLNESS   (Location/Symptom, Timing/Onset,Context/Setting, Quality, Duration, Modifying Factors, Severity)  Note limiting factors. Robie Curling is a 50 y.o. female who presents to the emergency department with complaint of bilateral flank pain since last night. Denies any injuries. Has history of kidney stones and stage III kidney failure from her lupus. No fever or chills. No nausea or vomiting. No diarrhea or constipation. Pain is 10 in a scale of 1-10. Pain is sharp. No dysuria. No other systemic symptoms. HPI    Nursing Notes were reviewed. REVIEW OF SYSTEMS    (2-9 systems for level 4, 10 or more for level 5)     Review of Systems   Constitutional: Negative. Negative for activity change, appetite change, chills, fatigue and fever. HENT: Negative for congestion, ear discharge, ear pain, hearing loss, rhinorrhea, sinus pressure and sore throat. Eyes: Negative for photophobia, pain and visual disturbance. Respiratory: Negative for apnea, cough, shortness of breath and wheezing. Cardiovascular: Negative for chest pain, palpitations and leg swelling. Gastrointestinal: Negative for abdominal distention, abdominal pain, constipation, diarrhea, nausea and vomiting. Endocrine: Negative for cold intolerance, heat intolerance and polyuria. Genitourinary: Positive for flank pain. Negative for dysuria, frequency and urgency. Musculoskeletal: Positive for back pain. Negative for arthralgias, gait problem, myalgias and neck stiffness. Skin: Negative for color change, pallor and rash.    Allergic/Immunologic: Negative for GLUCOSE) CAROLE Disp-100 Device, R-5, NormalCheck blood sugars twice daily or as directed. hydroxychloroquine (PLAQUENIL) 200 MG tablet Take 1 tablet by mouth 2 times daily, R-5       !! - Potential duplicate medications found. Please discuss with provider.           ALLERGIES     Aspirin, Imitrex [sumatriptan], Lidocaine, Pcn [penicillins], Seasonal, Toradol [ketorolac tromethamine], and Ultram [tramadol]    FAMILY HISTORY       Family History   Adopted: Yes          SOCIAL HISTORY       Social History     Socioeconomic History    Marital status: Legally      Spouse name: None    Number of children: None    Years of education: None    Highest education level: None   Occupational History    None   Social Needs    Financial resource strain: None    Food insecurity     Worry: None     Inability: None    Transportation needs     Medical: None     Non-medical: None   Tobacco Use    Smoking status: Never Smoker    Smokeless tobacco: Never Used   Substance and Sexual Activity    Alcohol use: No     Alcohol/week: 0.0 standard drinks     Comment: denies    Drug use: No     Comment: denies    Sexual activity: None   Lifestyle    Physical activity     Days per week: None     Minutes per session: None    Stress: None   Relationships    Social connections     Talks on phone: None     Gets together: None     Attends Anabaptist service: None     Active member of club or organization: None     Attends meetings of clubs or organizations: None     Relationship status: None    Intimate partner violence     Fear of current or ex partner: None     Emotionally abused: None     Physically abused: None     Forced sexual activity: None   Other Topics Concern    None   Social History Narrative    None       SCREENINGS             PHYSICAL EXAM    (up to 7 for level 4, 8 or more for level 5)     ED Triage Vitals [12/29/20 1607]   BP Temp Temp Source Pulse Resp SpO2 Height Weight   (!) 170/101 98.8 °F (37.1 °C) Oral 88 16 98 % 5' 8\" (1.727 m) 158 lb (71.7 kg)       Physical Exam  Vitals signs and nursing note reviewed. Constitutional:       General: She is in acute distress. Appearance: Normal appearance. She is well-developed and normal weight. She is not ill-appearing, toxic-appearing or diaphoretic. HENT:      Head: Normocephalic and atraumatic. Nose: No congestion. Mouth/Throat:      Mouth: Mucous membranes are moist.      Pharynx: Oropharynx is clear. No oropharyngeal exudate or posterior oropharyngeal erythema. Eyes:      General: No scleral icterus. Right eye: No discharge. Left eye: No discharge. Conjunctiva/sclera: Conjunctivae normal.      Pupils: Pupils are equal, round, and reactive to light. Neck:      Musculoskeletal: Normal range of motion and neck supple. No neck rigidity or muscular tenderness. Thyroid: No thyromegaly. Vascular: No carotid bruit or JVD. Trachea: No tracheal deviation. Cardiovascular:      Rate and Rhythm: Normal rate and regular rhythm. Heart sounds: Normal heart sounds. No murmur. No friction rub. No gallop. Pulmonary:      Effort: Pulmonary effort is normal. No respiratory distress. Breath sounds: Normal breath sounds. No stridor. No wheezing, rhonchi or rales. Chest:      Chest wall: No tenderness. Abdominal:      General: Bowel sounds are normal. There is no distension. Palpations: Abdomen is soft. There is no mass. Tenderness: There is no abdominal tenderness. There is right CVA tenderness and left CVA tenderness. There is no guarding or rebound. Hernia: No hernia is present. Musculoskeletal: Normal range of motion. General: No swelling, tenderness, deformity or signs of injury. Right lower leg: No edema. Left lower leg: No edema. Lymphadenopathy:      Cervical: No cervical adenopathy. Skin:     General: Skin is warm and dry.       Coloration: Skin is not jaundiced or pale.      Findings: No bruising, erythema, lesion or rash. Neurological:      Mental Status: She is alert and oriented to person, place, and time. Cranial Nerves: No cranial nerve deficit. Sensory: Sensory deficit present. Motor: No weakness or abnormal muscle tone. Coordination: Coordination normal.      Gait: Gait normal.      Deep Tendon Reflexes: Reflexes are normal and symmetric. Reflexes normal.   Psychiatric:         Behavior: Behavior normal.         Thought Content: Thought content normal.         Judgment: Judgment normal.         DIAGNOSTIC RESULTS     EKG: All EKG's are interpreted by the Emergency Department Physician who either signs or Co-signs this chart in the absence of a cardiologist.        RADIOLOGY:   Non-plain film images such as CT, Ultrasound and MRI are read by the radiologist. Aaliyah Easley radiographicimages are visualized and preliminarily interpreted by the emergency physician with the below findings:        Interpretation per the Radiologist below, if available at the time of this note:    CT ABDOMEN PELVIS WO CONTRAST Additional Contrast? None   Final Result   There is mild right hydronephrosis and hydroureter without radiopaque stones along the course of the ureter which may be secondary to a recently passed stone or a previous history of obstruction. There are intrarenal stones in the upper pole    of the right kidney measuring up to 8 mm. The left kidney is within normal limits.                      ED BEDSIDE ULTRASOUND:   Performed by ED Physician - none    LABS:  Labs Reviewed   CBC WITH AUTO DIFFERENTIAL - Abnormal; Notable for the following components:       Result Value    WBC 14.3 (*)     MCHC 32.5 (*)     Neutrophils Absolute 8.7 (*)     Monocytes Absolute 1.0 (*)     All other components within normal limits   COMPREHENSIVE METABOLIC PANEL - Abnormal; Notable for the following components:    CREATININE 1.15 (*)     GFR Non- 49.3 (*)     GFR  59.7 (*)     Calcium 10.0 (*)     Alkaline Phosphatase 161 (*)     All other components within normal limits   URINE RT REFLEX TO CULTURE       All other labs were within normal range or not returned as of this dictation. EMERGENCY DEPARTMENT COURSE and DIFFERENTIALDIAGNOSIS/MDM:   Vitals:    Vitals:    12/29/20 1607 12/29/20 1811   BP: (!) 170/101 (!) 158/98   Pulse: 88 87   Resp: 16 18   Temp: 98.8 °F (37.1 °C) 98.7 °F (37.1 °C)   TempSrc: Oral Oral   SpO2: 98% 99%   Weight: 158 lb (71.7 kg)    Height: 5' 8\" (1.727 m)            MDM  Number of Diagnoses or Management Options     Amount and/or Complexity of Data Reviewed  Clinical lab tests: reviewed and ordered  Tests in the radiology section of CPT®: reviewed and ordered    Risk of Complications, Morbidity, and/or Mortality  Presenting problems: moderate  Diagnostic procedures: moderate  Management options: moderate    Patient Progress  Patient progress: improved      CRITICAL CARE TIME   Total Critical Care time was  minutes, excluding separately reportable procedures. There was a high probability of clinically significant/life threatening deterioration in the patient's condition which required my urgentintervention.     CONSULTS:  None    PROCEDURES:  Unless otherwise noted below, none     Procedures    FINAL IMPRESSION      1. Kidney stone          DISPOSITION/PLAN   DISPOSITION Decision To Discharge 12/29/2020 05:16:43 PM      PATIENT REFERRED TO:  Madelyn Roman MD  [address]  [phone]    In 3 days      Pearland Overall, MD  [address] [place] [address]  [address] [place] [address]  [phone]    In 3 days        DISCHARGE MEDICATIONS:  Discharge Medication List as of 12/29/2020  5:16 PM      START taking these medications    Details   tamsulosin (FLOMAX) 0.4 MG capsule Take 1 capsule by mouth daily for 10 days, Disp-10 capsule, R-0Normal      oxyCODONE-acetaminophen (PERCOCET) 5-325 MG per tablet Take 1 tablet by mouth every 6 hours as needed for Pain for up to 3 days. WARNING:  May cause drowsiness. May impair ability to operate vehicles or machinery.   Do not use in combination with alcohol., Disp-10 tablet, R-0Print      orphenadrine (NORFLEX) 100 MG extended release tablet Take 1 tablet by mouth 2 times daily for 10 days, Disp-20 tablet, R-0Normal                (Please note that portions of this note were completed with a voice recognitionprogram.  Efforts were made to edit the dictations but occasionally words are mis-transcribed.)    Farzad Lewis MD (electronically signed)  Attending Emergency Physician          Farzad Lewis MD  12/29/20 2037       Farzad Lewis MD  12/29/20 2056       Farzad Lewis MD  12/29/20 2056

## 2021-01-04 ENCOUNTER — HOSPITAL ENCOUNTER (EMERGENCY)
Age: 56
Discharge: HOME | End: 2021-01-04
Attending: EMERGENCY MEDICINE
Payer: MEDICARE

## 2021-01-04 VITALS
HEIGHT: 68 IN | WEIGHT: 158 LBS | OXYGEN SATURATION: 99 % | SYSTOLIC BLOOD PRESSURE: 130 MMHG | RESPIRATION RATE: 18 BRPM | DIASTOLIC BLOOD PRESSURE: 85 MMHG | BODY MASS INDEX: 23.95 KG/M2 | HEART RATE: 84 BPM | TEMPERATURE: 98.1 F

## 2021-01-04 DIAGNOSIS — R52 PAIN MANAGEMENT: ICD-10-CM

## 2021-01-04 DIAGNOSIS — R10.9 FLANK PAIN: Primary | ICD-10-CM

## 2021-01-04 LAB
BILIRUBIN URINE: NEGATIVE
BLOOD, URINE: NEGATIVE
CLARITY: CLEAR
COLOR: YELLOW
GLUCOSE URINE: NEGATIVE MG/DL
KETONES, URINE: NEGATIVE MG/DL
LEUKOCYTE ESTERASE, URINE: NEGATIVE
NITRITE, URINE: NEGATIVE
PH UA: 5.5 (ref 5–9)
PROTEIN UA: NEGATIVE MG/DL
SPECIFIC GRAVITY UA: 1.01 (ref 1–1.03)
URINE REFLEX TO CULTURE: NORMAL
UROBILINOGEN, URINE: 0.2 E.U./DL

## 2021-01-04 PROCEDURE — 6360000002 HC RX W HCPCS: Performed by: EMERGENCY MEDICINE

## 2021-01-04 PROCEDURE — 6370000000 HC RX 637 (ALT 250 FOR IP): Performed by: EMERGENCY MEDICINE

## 2021-01-04 PROCEDURE — 96372 THER/PROPH/DIAG INJ SC/IM: CPT

## 2021-01-04 PROCEDURE — 99283 EMERGENCY DEPT VISIT LOW MDM: CPT

## 2021-01-04 PROCEDURE — 81003 URINALYSIS AUTO W/O SCOPE: CPT

## 2021-01-04 RX ORDER — ONDANSETRON 4 MG/1
4 TABLET, ORALLY DISINTEGRATING ORAL ONCE
Status: COMPLETED | OUTPATIENT
Start: 2021-01-04 | End: 2021-01-04

## 2021-01-04 RX ORDER — ONDANSETRON 4 MG/1
4 TABLET, ORALLY DISINTEGRATING ORAL EVERY 8 HOURS PRN
Qty: 10 TABLET | Refills: 0 | Status: SHIPPED | OUTPATIENT
Start: 2021-01-04 | End: 2021-04-16

## 2021-01-04 RX ORDER — HYDROCODONE BITARTRATE AND ACETAMINOPHEN 5; 325 MG/1; MG/1
1 TABLET ORAL EVERY 6 HOURS PRN
Qty: 15 TABLET | Refills: 0 | Status: SHIPPED | OUTPATIENT
Start: 2021-01-04 | End: 2021-01-09

## 2021-01-04 RX ADMIN — HYDROMORPHONE HYDROCHLORIDE 1 MG: 1 INJECTION, SOLUTION INTRAMUSCULAR; INTRAVENOUS; SUBCUTANEOUS at 18:32

## 2021-01-04 RX ADMIN — ONDANSETRON 4 MG: 4 TABLET, ORALLY DISINTEGRATING ORAL at 18:32

## 2021-01-04 ASSESSMENT — PAIN DESCRIPTION - PROGRESSION: CLINICAL_PROGRESSION: GRADUALLY IMPROVING

## 2021-01-04 ASSESSMENT — ENCOUNTER SYMPTOMS
ABDOMINAL PAIN: 1
BLOOD IN STOOL: 0
VOICE CHANGE: 0
CHEST TIGHTNESS: 0
NAUSEA: 1
DIARRHEA: 0
TROUBLE SWALLOWING: 0
VOMITING: 0
CONSTIPATION: 0
CHOKING: 0
EYE PAIN: 0
EYE DISCHARGE: 0
SHORTNESS OF BREATH: 0
SORE THROAT: 0
COUGH: 0
SINUS PRESSURE: 0
STRIDOR: 0
EYE REDNESS: 0
BACK PAIN: 0
FACIAL SWELLING: 0
WHEEZING: 0

## 2021-01-04 ASSESSMENT — PAIN DESCRIPTION - ORIENTATION: ORIENTATION: LEFT

## 2021-01-04 ASSESSMENT — PAIN DESCRIPTION - LOCATION: LOCATION: FLANK

## 2021-01-04 NOTE — ED PROVIDER NOTES
2000 \[facility]"" ED  eMERGENCY dEPARTMENT eNCOUnter      Pt Name: River Oakley  MRN: [id number]  [place] 1965  Date of evaluation: 1/4/2021  Provider: Sherine Hale MD    [address] [place] [address]       Chief Complaint   Patient presents with    Flank Pain     Bilateral flank pain. Dx with Bilat kidney stones 6 days ago. HISTORY OF PRESENT ILLNESS   (Location/Symptom, Timing/Onset,Context/Setting, Quality, Duration, Modifying Factors, Severity)  Note limiting factors. River Oakley is a 50 y.o. female who presents to the emergency department patient well-known to me from previous multiple encounters to the emergency patient has a total of 8 CAT scan done in the year 2020 the last CAT scan showed patient has a right hydronephrosis and right hydroureter without finding any obstructive stone patient has a intrarenal stone on the right kidney not on the left kidney history of diabetes mellitus lupus hypertension anxiety panic disorders and kidney stones patient has no fever no chills patient never followed with any doctors or any urology come to this emergency because of flank pain stating bilateral kidney pain. Pain is 7-8 out of 10  HPI    NursingNotes were reviewed. REVIEW OF SYSTEMS    (2-9 systems for level 4, 10 or more for level 5)     Review of Systems   Constitutional: Negative. Negative for activity change and fever. HENT: Negative for congestion, drooling, facial swelling, mouth sores, nosebleeds, sinus pressure, sore throat, trouble swallowing and voice change. Eyes: Negative for pain, discharge, redness and visual disturbance. Respiratory: Negative for cough, choking, chest tightness, shortness of breath, wheezing and stridor. Cardiovascular: Negative for chest pain, palpitations and leg swelling. Gastrointestinal: Positive for abdominal pain and nausea. Negative for blood in stool, constipation, diarrhea and vomiting.    Endocrine: Negative for cold intolerance, polyphagia and polyuria. Genitourinary: Positive for flank pain. Negative for dysuria, frequency, genital sores and urgency. Musculoskeletal: Negative for back pain, joint swelling, neck pain and neck stiffness. Skin: Negative for pallor and rash. Neurological: Negative for tremors, seizures, syncope, weakness, numbness and headaches. Hematological: Negative for adenopathy. Does not bruise/bleed easily. Psychiatric/Behavioral: Negative for agitation, behavioral problems, hallucinations and sleep disturbance. The patient is not hyperactive. All other systems reviewed and are negative. Except as noted above the remainder of the review of systems was reviewed and negative.        PAST MEDICAL HISTORY     Past Medical History:   Diagnosis Date    Anxiety     Asthma     as child    Depression     Depression     dysthymia - following lupus dx age 23    Diabetes mellitus ([facility]     Foot fracture, left     GERD (gastroesophageal reflux disease)     Hyperlipidemia     Hypothyroidism     Kidney stones     Lupus ([facility]     dr barrera -     Migraine     PONV (postoperative nausea and vomiting)          SURGICALHISTORY       Past Surgical History:   Procedure Laterality Date    APPENDECTOMY      ARTHRODESIS Left 11/26/2019    ARTHRODESIS OF THE FIRST METATARSAL PHALANGEAL JOINT LEFT FOOT performed by Lianne Ritter DPM at [place] [address]      COLONOSCOPY      FOOT CLOSED REDUCTION Left 1/3/2017    LEFT FOOT PERCUTANEOUS PINNING Ronalee Abu FRACTURE performed by Artis Greer MD at [address] [address]      HYSTERECTOMY      IA ESOPHAGOGASTRODUODENOSCOPY TRANSORAL DIAGNOSTIC N/A 3/8/2017    EGD ESOPHAGOGASTRODUODENOSCOPY WITH DILATION performed by Horacio Aquino MD at [address] ESOPHAGOGASTRODUODENOSCOPY TRANSORAL DIAGNOSTIC N/A 11/7/2018    EGD ESOPHAGOGASTRODUODENOSCOPY WITH DILATION performed by Horacio Aquino MD at [facility]      UPPER GASTROINTESTINAL ENDOSCOPY           CURRENT MEDICATIONS       Previous Medications    ALBUTEROL SULFATE HFA (VENTOLIN HFA) 108 (90 BASE) MCG/ACT INHALER    Inhale 2 puffs into the lungs every 6 hours as needed for Wheezing    BLOOD GLUCOSE MONITOR KIT AND SUPPLIES    Test 3 times a day & as needed for symptoms of irregular blood glucose. BLOOD GLUCOSE MONITOR KIT AND SUPPLIES    Please give 1 meter covered by insurance Dx E11.65    BLOOD GLUCOSE MONITOR STRIPS    Pt test 3x daily Dx E11.65    BLOOD GLUCOSE MONITORING SUPPL (TRUE TRACK BLOOD GLUCOSE) CAROLE    Check blood sugars twice daily or as directed. BLOOD GLUCOSE TEST STRIPS (ASCENSIA AUTODISC VI;ONE TOUCH ULTRA TEST VI) STRIP    Check blood sugars twice daily or as directed. [employer] UNILET LANCETS 30G MISC    Check blood sugars twice daily or as directed.     ESTRADIOL (CLIMARA) 0.025 MG/24HR    Place 0.025 patches onto the skin every 7 days On mondays    HYDROXYCHLOROQUINE (PLAQUENIL) 200 MG TABLET    Take 1 tablet by mouth 2 times daily    INSULIN ASPART (NOVOLOG FLEXPEN) 100 UNIT/ML INJECTION PEN    Inject 35 units tid with meals    INSULIN GLARGINE (LANTUS SOLOSTAR) 100 UNIT/ML INJECTION PEN    120 units at bedtime    INSULIN LISPRO, 1 UNIT DIAL, (HUMALOG KWIKPEN) 100 UNIT/ML SOPN    Inject 20 Units into the skin 3 times daily (before meals)    INSULIN LISPRO, 1 UNIT DIAL, (HUMALOG KWIKPEN) 100 UNIT/ML SOPN    40 units with each meals    INSULIN PEN NEEDLE (NOVOFINE) 32G X 6 MM MISC    BID    INSULIN PEN NEEDLE (NOVOFINE) 32G X 6 MM MISC    qid    LANCETS MISC    Pt test 3x daily Dx E11.65    LEVOTHYROXINE (SYNTHROID) 50 MCG TABLET    Take 1 tablet by mouth daily    ORPHENADRINE (NORFLEX) 100 MG EXTENDED RELEASE TABLET    Take 1 tablet by mouth 2 times daily for 10 days    PANTOPRAZOLE (PROTONIX) 40 MG TABLET    Take 1 tablet by mouth daily    ROSUVASTATIN (CRESTOR) 40 MG TABLET    Take 1 tablet by mouth daily    TAMSULOSIN (FLOMAX) 0.4 MG CAPSULE    Take 1 capsule by mouth daily for 10 days       ALLERGIES     Aspirin, Imitrex [sumatriptan], Lidocaine, Pcn [penicillins], Seasonal, Toradol [ketorolac tromethamine], and Ultram [tramadol]    FAMILY HISTORY       Family History   Adopted: Yes          SOCIAL HISTORY       Social History     Socioeconomic History    Marital status: Legally      Spouse name: Not on file    Number of children: Not on file    Years of education: Not on file    Highest education level: Not on file   Occupational History    Not on file   Social Needs    Financial resource strain: Not on file    Food insecurity     Worry: Not on file     Inability: Not on file    Transportation needs     Medical: Not on file     Non-medical: Not on file   Tobacco Use    Smoking status: Never Smoker    Smokeless tobacco: Never Used   Substance and Sexual Activity    Alcohol use: No     Alcohol/week: 0.0 standard drinks     Comment: denies    Drug use: No     Comment: denies    Sexual activity: Not on file   Lifestyle    Physical activity     Days per week: Not on file     Minutes per session: Not on file    Stress: Not on file   Relationships    Social connections     Talks on phone: Not on file     Gets together: Not on file     Attends Gnosticist service: Not on file     Active member of club or organization: Not on file     Attends meetings of clubs or organizations: Not on file     Relationship status: Not on file    Intimate partner violence     Fear of current or ex partner: Not on file     Emotionally abused: Not on file     Physically abused: Not on file     Forced sexual activity: Not on file   Other Topics Concern    Not on file   Social History Narrative    Not on file       SCREENINGS      @EDGW([id number])@      PHYSICAL EXAM    (up to 7 for level 4, 8 or more for level 5)     ED Triage Vitals [01/04/21 1804]   BP Temp Temp Source Pulse Resp SpO2 Height Weight   130/85 98.1 °F (36.7 °C) Oral 84 18 99 % 5' 8\" (1.727 m) 158 lb (71.7 kg)       Physical Exam  Vitals signs and nursing note reviewed. Constitutional:       Appearance: She is well-developed. Comments: Alert cooperative patient slightly anxious at this time slightly uncomfortable because of flank pain   HENT:      Head: Normocephalic. Right Ear: Ear canal and external ear normal. There is no impacted cerumen. Left Ear: Tympanic membrane, ear canal and external ear normal.      Nose: Nose normal. No rhinorrhea. Mouth/Throat:      Mouth: Mucous membranes are moist.      Pharynx: Oropharynx is clear. No oropharyngeal exudate or posterior oropharyngeal erythema. Eyes:      General:         Right eye: No discharge. Left eye: No discharge. Extraocular Movements: Extraocular movements intact. Neck:      Musculoskeletal: Neck supple. No muscular tenderness. Vascular: No carotid bruit. Cardiovascular:      Rate and Rhythm: Normal rate and regular rhythm. Heart sounds: Normal heart sounds. No murmur. No friction rub. No gallop. Pulmonary:      Effort: No respiratory distress. Breath sounds: Normal breath sounds. No wheezing or rales. Chest:      Chest wall: No tenderness. Abdominal:      General: Bowel sounds are normal. There is no distension. Palpations: Abdomen is soft. There is no mass. Tenderness: There is abdominal tenderness. There is no right CVA tenderness, guarding or rebound. Musculoskeletal: Normal range of motion. General: No tenderness. Lymphadenopathy:      Cervical: No cervical adenopathy. Skin:     General: Skin is warm. Coloration: Skin is not jaundiced. Findings: No bruising, erythema or rash. Neurological:      General: No focal deficit present. Mental Status: She is alert and oriented to person, place, and time. Cranial Nerves: No cranial nerve deficit.       Motor: No weakness or abnormal muscle tone. Gait: Gait normal.   Psychiatric:         Behavior: Behavior normal.         Thought Content: Thought content normal.         DIAGNOSTIC RESULTS     EKG: All EKG's are interpreted by the Emergency Department Physician who either signs or Co-signsthis chart in the absence of a cardiologist.    Krystal Gutiérrez:   Phylliss Dunks such as CT, Ultrasound and MRI are read by the radiologist. Plain radiographic images are visualized and preliminarily interpreted by the emergency physician with the below findings:    Interpretation per the Radiologist below, if available at the time ofthis note:    No orders to display         ED BEDSIDE ULTRASOUND:   Performed by ED Physician - none    LABS:  Labs Reviewed   URINE RT REFLEX TO CULTURE       All other labs were within normal range or not returned as of this dictation. EMERGENCY DEPARTMENT COURSE and DIFFERENTIAL DIAGNOSIS/MDM:   Vitals:    Vitals:    01/04/21 1804   BP: 130/85   Pulse: 84   Resp: 18   Temp: 98.1 °F (36.7 °C)   TempSrc: Oral   SpO2: 99%   Weight: 158 lb (71.7 kg)   Height: 5' 8\" (1.727 m)           MDM  Number of Diagnoses or Management Options  Diagnosis management comments: Patient no evidence of dehydration no fever bilateral flank pain patient had a total of 8 CAT scan half of them with IV contrast half of them without reviewed by me 7 of them year nonobstructive kidney stone most of the right side patient creatinine is 1.15 patient making good urine patient requesting pain management and has appointment with the urologist next week patient advised to have good hydration heavily given pain medication for the next few days      CRITICAL CARE TIME   Total Critical Care time was  minutes, excluding separately reportableprocedures. There was a high probability of clinicallysignificant/life threatening deterioration in the patient's condition which required my urgent intervention. CONSULTS:  None    PROCEDURES:  Unless otherwise noted below, none     Procedures    FINAL IMPRESSION      1. Flank pain    2. Pain management          DISPOSITION/PLAN   DISPOSITION Discharge - Pending Orders Complete 01/04/2021 06:45:12 PM      PATIENT REFERRED TO:  Onur Owens MD  [address] [place] [phone]      As needed    Jaswinder Santana MD  [address]  [phone]      As appointed next week      DISCHARGE MEDICATIONS:  New Prescriptions    HYDROCODONE-ACETAMINOPHEN (NORCO) 5-325 MG PER TABLET    Take 1 tablet by mouth every 6 hours as needed for Pain for up to 5 days.     ONDANSETRON (ZOFRAN ODT) 4 MG DISINTEGRATING TABLET    Take 1 tablet by mouth every 8 hours as needed for Nausea          (Please note that portions of this note were completed with a voice recognition program.  Efforts were made to edit the dictations but occasionally words are mis-transcribed.)    Pastora Foreman MD (electronically signed)  Attending Emergency Physician       Pastora Foreman MD  01/04/21 0634

## 2021-01-04 NOTE — ED TRIAGE NOTES
Pt was here last Tuesday and was told she has bilateral kidney stones. Pt states they have not passed and she was unable to get into urology because he was on vacation. Pt has a few pain pills left but came back to ED because it is so painful and is unable to get comfortable. Pt is alert and oriented.  Pt has no issues passing urine at this time

## 2021-01-05 ENCOUNTER — TELEPHONE (OUTPATIENT)
Dept: UROLOGY | Age: 56
End: 2021-01-05

## 2021-01-05 NOTE — TELEPHONE ENCOUNTER
CT done 12-  Would you like a KUB? Impression   There is mild right hydronephrosis and hydroureter without radiopaque stones along the course of the ureter which may be secondary to a recently passed stone or a previous history of obstruction. There are intrarenal stones in the upper pole    of the right kidney measuring up to 8 mm. The left kidney is within normal limits.

## 2021-01-14 ENCOUNTER — VIRTUAL VISIT (OUTPATIENT)
Dept: ENDOCRINOLOGY | Age: 56
End: 2021-01-14
Payer: MEDICARE

## 2021-01-14 DIAGNOSIS — E11.69 TYPE 2 DIABETES MELLITUS WITH OTHER SPECIFIED COMPLICATION, UNSPECIFIED WHETHER LONG TERM INSULIN USE (HCC): Primary | ICD-10-CM

## 2021-01-14 PROCEDURE — 99442 PR PHYS/QHP TELEPHONE EVALUATION 11-20 MIN: CPT | Performed by: INTERNAL MEDICINE

## 2021-01-14 RX ORDER — INSULIN GLARGINE 100 [IU]/ML
INJECTION, SOLUTION SUBCUTANEOUS
Qty: 15 PEN | Refills: 3 | Status: SHIPPED | OUTPATIENT
Start: 2021-01-14 | End: 2021-03-16 | Stop reason: SDUPTHER

## 2021-01-14 RX ORDER — INSULIN LISPRO 100 [IU]/ML
INJECTION, SOLUTION INTRAVENOUS; SUBCUTANEOUS
Qty: 10 PEN | Refills: 3 | Status: SHIPPED | OUTPATIENT
Start: 2021-01-14 | End: 2021-03-16 | Stop reason: SDUPTHER

## 2021-01-14 NOTE — PROGRESS NOTES
2021    TELEHEALTH EVALUATION -- Audio/Visual (During KUQEE-76 public health emergency)    Due to Ayesha 19 outbreak, patient's office visit was converted to a virtual visit. Patient was contacted and agreed to proceed with a virtual visit via Telephone Visit  The risks and benefits of converting to a virtual visit were discussed in light of the current infectious disease epidemic. Patient also understood that insurance coverage and co-pays are up to their individual insurance plans. HPI: Telephone visit patient was at home I was at my office    [facility] (:  1965) has requested an audio/video evaluation for the following concern(s):    Follow-up on type 2 diabetes patient currently on Lantus 120 units at bedtime Humalog 40 units with each meals testing blood sugar 3 times a day blood sugars averaging 1  no A1c to review last A1c was more than 9 in November closer to 10    Results for Samia Patel (MRN [id number]) as of 2021 09:46   Ref. Range 2020 11:16 2020 10:04 2020 11:33 2020 09:18 2020 07:08   Hemoglobin A1C Latest Ref Range: 4.8 - 5.9 % 10.5 10.0 (H) 9.2 (H) 10.1 (H) 9.9 (H)     Patient Active Problem List   Diagnosis    DM (diabetes mellitus) ([facility] [address]    Hyperlipemia    Hypothyroid    Vitamin D deficiency    SLE (systemic lupus erythematosus related syndrome) (HCC)    Asthma    Panic disorder with agoraphobia    Hereditary essential tremor    Allergic rhinitis    Type 2 diabetes mellitus with diabetic mononeuropathy ([address]    Raynaud's phenomenon without gangrene    Essential hypertension    Type 2 diabetes mellitus without complication (HCC)    Disseminated lupus erythematosus (HCC)    Foot fracture, left    Acute non-recurrent frontal sinusitis    Gastritis without bleeding    Dysphagia    Gastric polyp    Chest pain         Review of Systems    Prior to Visit Medications    Medication Sig Taking?  Authorizing Provider ondansetron (ZOFRAN ODT) 4 MG disintegrating tablet Take 1 tablet by mouth every 8 hours as needed for Nausea  Abbi Moyer MD   tamsulosin (FLOMAX) 0.4 MG capsule Take 1 capsule by mouth daily for 10 days  Nilsa Lopez MD   insulin glargine (LANTUS SOLOSTAR) 100 UNIT/ML injection pen 120 units at bedtime  Karen Cid MD   insulin lispro, 1 Unit Dial, (HUMALOG KWIKPEN) 100 UNIT/ML SOPN 40 units with each meals  Karen Cid MD   blood glucose monitor strips Pt test 3x daily Dx H99.45  Karen Cid MD   Drug [place] Unilet Lancets 30G MISC Check blood sugars twice daily or as directed. Karen Cid MD   insulin aspart (NOVOLOG FLEXPEN) 100 UNIT/ML injection pen Inject 35 units tid with meals  Mateus Costello MD   Insulin Pen Needle (NOVOFINE) 32G X 6 MM MISC qid  Karen Cid MD   insulin lispro, 1 Unit Dial, (HUMALOG KWIKPEN) 100 UNIT/ML SOPN Inject 20 Units into the skin 3 times daily (before meals)  Karen Cid MD   blood glucose monitor kit and supplies Please give 1 meter covered by insurance Dx E11.65  Karen Cid MD   Lancets MISC Pt test 3x daily Dx U87.55  Karen Cid MD   blood glucose test strips (ASCENSIA AUTODISC VI;ONE TOUCH ULTRA TEST VI) strip Check blood sugars twice daily or as directed. Karen Cid MD   blood glucose monitor kit and supplies Test 3 times a day & as needed for symptoms of irregular blood glucose.   Karen Cid MD   Insulin Pen Needle (NOVOFINE) 32G X 6 MM MISC BID  Mateus Costello MD   estradiol (CLIMARA) 0.025 MG/24HR Place 0.025 patches onto the skin every 7 days On mondays  Historical Provider, MD   albuterol sulfate HFA (VENTOLIN HFA) 108 (90 Base) MCG/ACT inhaler Inhale 2 puffs into the lungs every 6 hours as needed for Wheezing  Sprakers MD Sue   rosuvastatin (CRESTOR) 40 MG tablet Take 1 tablet by mouth daily  John Valencia MD   pantoprazole (PROTONIX) 40 MG tablet Take 1 tablet by mouth daily  Coleen Munoz PA-C  insulin glargine (LANTUS SOLOSTAR) 100 UNIT/ML injection pen     Si units at bedtime     Dispense:  15 pen     Refill:  3    insulin lispro, 1 Unit Dial, (HUMALOG KWIKPEN) 100 UNIT/ML SOPN     Si-50  units with each meals     Dispense:  10 pen     Refill:  3     Increase Lantus and Humalog dose of Lantus 240 units at bedtime Humalog 45-50 with each meals A1c goal of 7 or lower labs to be done 4 to 6 weeks time follow-up in 2 months  An  electronic signature was used to authenticate this note. --Trinidad Gorman MD on 2021 at 9:46 AM        Pursuant to the emergency declaration under the [address] [place] [address] authority and the [employer] Act, this Virtual  Visit was conducted, with patient's consent, to reduce the patient's risk of exposure to COVID-19 and provide continuity of care for an established patient. Services were provided through a video synchronous discussion virtually to substitute for in-person clinic visit.

## 2021-01-20 ENCOUNTER — HOSPITAL ENCOUNTER (EMERGENCY)
Age: 56
Discharge: HOME | End: 2021-01-20
Payer: MEDICARE

## 2021-01-20 VITALS
HEART RATE: 76 BPM | OXYGEN SATURATION: 96 % | TEMPERATURE: 96.5 F | DIASTOLIC BLOOD PRESSURE: 78 MMHG | WEIGHT: 160 LBS | SYSTOLIC BLOOD PRESSURE: 121 MMHG | HEIGHT: 68 IN | RESPIRATION RATE: 22 BRPM | BODY MASS INDEX: 24.25 KG/M2

## 2021-01-20 DIAGNOSIS — M54.32 SCIATICA OF LEFT SIDE: ICD-10-CM

## 2021-01-20 DIAGNOSIS — S39.012A STRAIN OF LUMBAR REGION, INITIAL ENCOUNTER: Primary | ICD-10-CM

## 2021-01-20 PROCEDURE — 96374 THER/PROPH/DIAG INJ IV PUSH: CPT

## 2021-01-20 PROCEDURE — 6360000002 HC RX W HCPCS: Performed by: NURSE PRACTITIONER

## 2021-01-20 PROCEDURE — 96372 THER/PROPH/DIAG INJ SC/IM: CPT

## 2021-01-20 PROCEDURE — 99283 EMERGENCY DEPT VISIT LOW MDM: CPT

## 2021-01-20 RX ORDER — METHOCARBAMOL 750 MG/1
750 TABLET, FILM COATED ORAL 4 TIMES DAILY
Qty: 20 TABLET | Refills: 0 | Status: SHIPPED | OUTPATIENT
Start: 2021-01-20 | End: 2021-01-25

## 2021-01-20 RX ORDER — METHYLPREDNISOLONE ACETATE 80 MG/ML
80 INJECTION, SUSPENSION INTRA-ARTICULAR; INTRALESIONAL; INTRAMUSCULAR; SOFT TISSUE ONCE
Status: COMPLETED | OUTPATIENT
Start: 2021-01-20 | End: 2021-01-20

## 2021-01-20 RX ORDER — ORPHENADRINE CITRATE 30 MG/ML
60 INJECTION INTRAMUSCULAR; INTRAVENOUS ONCE
Status: COMPLETED | OUTPATIENT
Start: 2021-01-20 | End: 2021-01-20

## 2021-01-20 RX ORDER — HYDROCODONE BITARTRATE AND ACETAMINOPHEN 5; 325 MG/1; MG/1
1 TABLET ORAL EVERY 6 HOURS PRN
Qty: 10 TABLET | Refills: 0 | Status: SHIPPED | OUTPATIENT
Start: 2021-01-20 | End: 2021-01-23

## 2021-01-20 RX ADMIN — METHYLPREDNISOLONE ACETATE 80 MG: 80 INJECTION, SUSPENSION INTRA-ARTICULAR; INTRALESIONAL; INTRAMUSCULAR; SOFT TISSUE at 19:04

## 2021-01-20 RX ADMIN — ORPHENADRINE CITRATE 60 MG: 30 INJECTION INTRAMUSCULAR; INTRAVENOUS at 19:04

## 2021-01-20 RX ADMIN — HYDROMORPHONE HYDROCHLORIDE 1 MG: 1 INJECTION, SOLUTION INTRAMUSCULAR; INTRAVENOUS; SUBCUTANEOUS at 19:41

## 2021-01-20 ASSESSMENT — ENCOUNTER SYMPTOMS
COUGH: 0
SHORTNESS OF BREATH: 0
BACK PAIN: 1
VOMITING: 0
NAUSEA: 0
DIARRHEA: 0
ABDOMINAL PAIN: 0

## 2021-01-20 ASSESSMENT — PAIN DESCRIPTION - PAIN TYPE: TYPE: ACUTE PAIN

## 2021-01-20 ASSESSMENT — PAIN SCALES - GENERAL: PAINLEVEL_OUTOF10: 10

## 2021-01-20 ASSESSMENT — PAIN DESCRIPTION - ORIENTATION: ORIENTATION: LEFT;MID

## 2021-01-20 ASSESSMENT — PAIN DESCRIPTION - LOCATION: LOCATION: BACK;BUTTOCKS

## 2021-01-20 NOTE — ED TRIAGE NOTES
Pt to ED with c/o low, left sided back pain, radiating into her buttock since cleaning the bathtub this afternoon. Ambulatory with slow, hunched gait. MSPs intact.

## 2021-01-20 NOTE — ED PROVIDER NOTES
[facility] ED  eMERGENCY dEPARTMENT eNCOUnter      Pt Name: Marti Mcclelland  MRN: [id number]  [place] 1965  Date of evaluation: 1/20/2021  Provider: CANDY Gonsales CNP      HISTORY OF PRESENT ILLNESS    Marti Mcclelland is a 50 y.o. female who presents to the Emergency Department with L low back pain that radiates down the leg. Pain started while she was cleaning the tub earlier today. Pain is severe. She denies saddle anesthesia, foot drop or incontinence of bowel or bladder. She is able to ambulate. REVIEW OF SYSTEMS       Review of Systems   Constitutional: Negative for fever. HENT: Negative for congestion. Respiratory: Negative for cough and shortness of breath. Cardiovascular: Negative for chest pain. Gastrointestinal: Negative for abdominal pain, diarrhea, nausea and vomiting. Genitourinary: Negative for dysuria. Musculoskeletal: Positive for back pain. Negative for arthralgias and neck pain. Skin: Negative for rash. All other systems reviewed and are negative.         PAST MEDICAL HISTORY     Past Medical History:   Diagnosis Date    Anxiety     Asthma     as child    Depression     Depression     dysthymia - following lupus dx age 23    Diabetes mellitus ([facility] [address]     Foot fracture, left     GERD (gastroesophageal reflux disease)     Hyperlipidemia     Hypothyroidism     Kidney stones     Lupus ([facility] [address]      diab -     Migraine     PONV (postoperative nausea and vomiting)          SURGICAL HISTORY       Past Surgical History:   Procedure Laterality Date    APPENDECTOMY      ARTHRODESIS Left 11/26/2019    ARTHRODESIS OF THE FIRST METATARSAL PHALANGEAL JOINT LEFT FOOT performed by Lianne Ritter DPM at [facility]      COLONOSCOPY      FOOT CLOSED REDUCTION Left 1/3/2017    LEFT FOOT PERCUTANEOUS PINNING MOTA FRACTURE performed by Artis Greer MD at [place] [address] REPAIR      HYSTERECTOMY      [place] ESOPHAGOGASTRODUODENOSCOPY TRANSORAL DIAGNOSTIC N/A 3/8/2017    EGD ESOPHAGOGASTRODUODENOSCOPY WITH DILATION performed by Joao Santos MD at [address] ESOPHAGOGASTRODUODENOSCOPY TRANSORAL DIAGNOSTIC N/A 11/7/2018    EGD ESOPHAGOGASTRODUODENOSCOPY WITH DILATION performed by Joao Santos MD at [address] [place] [address]       Previous Medications    ALBUTEROL SULFATE HFA (VENTOLIN HFA) 108 (90 BASE) MCG/ACT INHALER    Inhale 2 puffs into the lungs every 6 hours as needed for Wheezing    BLOOD GLUCOSE MONITOR KIT AND SUPPLIES    Test 3 times a day & as needed for symptoms of irregular blood glucose. BLOOD GLUCOSE MONITOR KIT AND SUPPLIES    Please give 1 meter covered by insurance Dx E11.65    BLOOD GLUCOSE MONITOR STRIPS    Pt test 3x daily Dx E11.65    BLOOD GLUCOSE MONITORING SUPPL (TRUE TRACK BLOOD GLUCOSE) CAROLE    Check blood sugars twice daily or as directed. BLOOD GLUCOSE TEST STRIPS (ASCENSIA AUTODISC VI;ONE TOUCH ULTRA TEST VI) STRIP    Check blood sugars twice daily or as directed. DRUG M[employer] UNILET LANCETS 30G MISC    Check blood sugars twice daily or as directed.     ESTRADIOL (CLIMARA) 0.025 MG/24HR    Place 0.025 patches onto the skin every 7 days On mondays    HYDROXYCHLOROQUINE (PLAQUENIL) 200 MG TABLET    Take 1 tablet by mouth 2 times daily    INSULIN ASPART (NOVOLOG FLEXPEN) 100 UNIT/ML INJECTION PEN    Inject 35 units tid with meals    INSULIN GLARGINE (LANTUS SOLOSTAR) 100 UNIT/ML INJECTION PEN    140 units at bedtime    INSULIN LISPRO, 1 UNIT DIAL, (HUMALOG KWIKPEN) 100 UNIT/ML SOPN    Inject 20 Units into the skin 3 times daily (before meals)    INSULIN LISPRO, 1 UNIT DIAL, (HUMALOG KWIKPEN) 100 UNIT/ML SOPN    45-50  units with each meals    INSULIN PEN NEEDLE (NOVOFINE) 32G X 6 MM MISC    BID    INSULIN PEN NEEDLE (NOVOFINE) 32G X 6 MM MISC    qid    LANCETS MISC    Pt test 3x daily Dx E11.65    LEVOTHYROXINE (SYNTHROID) 50 MCG TABLET    Take 1 tablet by mouth daily    ONDANSETRON (ZOFRAN ODT) 4 MG DISINTEGRATING TABLET    Take 1 tablet by mouth every 8 hours as needed for Nausea    PANTOPRAZOLE (PROTONIX) 40 MG TABLET    Take 1 tablet by mouth daily    ROSUVASTATIN (CRESTOR) 40 MG TABLET    Take 1 tablet by mouth daily    TAMSULOSIN (FLOMAX) 0.4 MG CAPSULE    Take 1 capsule by mouth daily for 10 days       ALLERGIES     Aspirin, Imitrex [sumatriptan], Lidocaine, Pcn [penicillins], Seasonal, Toradol [ketorolac tromethamine], and Ultram [tramadol]    FAMILY HISTORY       Family History   Adopted: Yes          SOCIAL HISTORY       Social History     Socioeconomic History    Marital status: Legally      Spouse name: None    Number of children: None    Years of education: None    Highest education level: None   Occupational History    None   Social Needs    Financial resource strain: None    Food insecurity     Worry: None     Inability: None    Transportation needs     Medical: None     Non-medical: None   Tobacco Use    Smoking status: Never Smoker    Smokeless tobacco: Never Used   Substance and Sexual Activity    Alcohol use: No     Alcohol/week: 0.0 standard drinks     Comment: denies    Drug use: No     Comment: denies    Sexual activity: None   Lifestyle    Physical activity     Days per week: None     Minutes per session: None    Stress: None   Relationships    Social connections     Talks on phone: None     Gets together: None     Attends Christian service: None     Active member of club or organization: None     Attends meetings of clubs or organizations: None     Relationship status: None    Intimate partner violence     Fear of current or ex partner: None     Emotionally abused: None     Physically abused: None     Forced sexual activity: None   Other Topics Concern    None   Social History Narrative    None       SCREENINGS      @FLOW([id number])@      PHYSICAL EXAM    (up to 7 for level 4, 8 or more for level 5)     ED Triage Vitals [01/20/21 1847]   BP Temp Temp Source Pulse Resp SpO2 Height Weight   121/78 96.5 °F (35.8 °C) Temporal 76 22 96 % 5' 8\" (1.727 m) 160 lb (72.6 kg)       Physical Exam  Vitals signs and nursing note reviewed. Constitutional:       Appearance: She is well-developed. HENT:      Head: Normocephalic and atraumatic. Right Ear: External ear normal.      Left Ear: External ear normal.   Eyes:      Conjunctiva/sclera: Conjunctivae normal.      Pupils: Pupils are equal, round, and reactive to light. Neck:      Musculoskeletal: Normal range of motion and neck supple. Cardiovascular:      Rate and Rhythm: Normal rate and regular rhythm. Pulmonary:      Effort: Pulmonary effort is normal.      Breath sounds: Normal breath sounds. Abdominal:      General: Bowel sounds are normal. There is no distension. Palpations: Abdomen is soft. Tenderness: There is no abdominal tenderness. Musculoskeletal: Normal range of motion. Lumbar back: She exhibits tenderness, pain and spasm. She exhibits normal range of motion, no bony tenderness, no swelling and no deformity. Back:    Skin:     General: Skin is warm and dry. Neurological:      Mental Status: She is alert and oriented to person, place, and time. Deep Tendon Reflexes: Reflexes are normal and symmetric. Psychiatric:         Judgment: Judgment normal.           All other labs were within normal range or not returned as of this dictation. EMERGENCY DEPARTMENT COURSE and DIFFERENTIALDIAGNOSIS/MDM:   Vitals:    Vitals:    01/20/21 1847   BP: 121/78   Pulse: 76   Resp: 22   Temp: 96.5 °F (35.8 °C)   TempSrc: Temporal   SpO2: 96%   Weight: 160 lb (72.6 kg)   Height: 5' 8\" (1.727 m)            50 yr old female with low back strain and L sciatica.   Prescription for Norco and Robaxin was given to the patient. F/U With PCP In 3 days. Patient is comfortale at discharge and verbalizes understanding. PROCEDURES:  Unless otherwise noted below, none     Procedures      FINAL IMPRESSION      1. Strain of lumbar region, initial encounter    2.  Sciatica of left side          DISPOSITION/PLAN   DISPOSITION Decision To Discharge 01/20/2021 08:05:24 PM          CANDY Ray CNP (electronically signed)  Attending Emergency Physician     CANDY Ray CNP  01/20/21 2007

## 2021-01-21 NOTE — ED NOTES
Patient is given D/C instructions along with follow up care appt. Patient is alert and orientated and with . Left ER with a steady gait and no incident.       Kalyan Robles RN  01/20/21 0260

## 2021-01-31 ENCOUNTER — HOSPITAL ENCOUNTER (EMERGENCY)
Age: 56
Discharge: HOME | End: 2021-01-31
Payer: MEDICARE

## 2021-01-31 VITALS
TEMPERATURE: 97.9 F | WEIGHT: 160 LBS | SYSTOLIC BLOOD PRESSURE: 118 MMHG | DIASTOLIC BLOOD PRESSURE: 77 MMHG | HEART RATE: 85 BPM | HEIGHT: 68 IN | BODY MASS INDEX: 24.25 KG/M2 | OXYGEN SATURATION: 99 % | RESPIRATION RATE: 16 BRPM

## 2021-01-31 DIAGNOSIS — G89.29 ACUTE EXACERBATION OF CHRONIC LOW BACK PAIN: ICD-10-CM

## 2021-01-31 DIAGNOSIS — M54.50 ACUTE EXACERBATION OF CHRONIC LOW BACK PAIN: ICD-10-CM

## 2021-01-31 DIAGNOSIS — M54.32 SCIATICA OF LEFT SIDE: Primary | ICD-10-CM

## 2021-01-31 PROCEDURE — 6370000000 HC RX 637 (ALT 250 FOR IP): Performed by: PHYSICIAN ASSISTANT

## 2021-01-31 PROCEDURE — 96372 THER/PROPH/DIAG INJ SC/IM: CPT

## 2021-01-31 PROCEDURE — 99283 EMERGENCY DEPT VISIT LOW MDM: CPT

## 2021-01-31 PROCEDURE — 6360000002 HC RX W HCPCS: Performed by: PHYSICIAN ASSISTANT

## 2021-01-31 RX ORDER — ORPHENADRINE CITRATE 30 MG/ML
60 INJECTION INTRAMUSCULAR; INTRAVENOUS ONCE
Status: COMPLETED | OUTPATIENT
Start: 2021-01-31 | End: 2021-01-31

## 2021-01-31 RX ORDER — CAPSAICIN 0.025 %
CREAM (GRAM) TOPICAL
Qty: 1 TUBE | Refills: 0 | Status: SHIPPED | OUTPATIENT
Start: 2021-01-31 | End: 2021-03-02

## 2021-01-31 RX ORDER — OXYCODONE HYDROCHLORIDE AND ACETAMINOPHEN 5; 325 MG/1; MG/1
1 TABLET ORAL ONCE
Status: COMPLETED | OUTPATIENT
Start: 2021-01-31 | End: 2021-01-31

## 2021-01-31 RX ORDER — METHYLPREDNISOLONE ACETATE 80 MG/ML
80 INJECTION, SUSPENSION INTRA-ARTICULAR; INTRALESIONAL; INTRAMUSCULAR; SOFT TISSUE ONCE
Status: COMPLETED | OUTPATIENT
Start: 2021-01-31 | End: 2021-01-31

## 2021-01-31 RX ORDER — METHYLPREDNISOLONE 4 MG/1
TABLET ORAL
Qty: 1 KIT | Refills: 0 | Status: SHIPPED | OUTPATIENT
Start: 2021-01-31 | End: 2021-02-06

## 2021-01-31 RX ORDER — CHLORZOXAZONE 500 MG/1
500 TABLET ORAL 4 TIMES DAILY PRN
Qty: 21 TABLET | Refills: 0 | Status: SHIPPED | OUTPATIENT
Start: 2021-01-31 | End: 2021-02-07

## 2021-01-31 RX ADMIN — OXYCODONE HYDROCHLORIDE AND ACETAMINOPHEN 1 TABLET: 5; 325 TABLET ORAL at 14:02

## 2021-01-31 RX ADMIN — METHYLPREDNISOLONE ACETATE 80 MG: 80 INJECTION, SUSPENSION INTRA-ARTICULAR; INTRALESIONAL; INTRAMUSCULAR; SOFT TISSUE at 14:01

## 2021-01-31 RX ADMIN — ORPHENADRINE CITRATE 60 MG: 30 INJECTION INTRAMUSCULAR; INTRAVENOUS at 14:01

## 2021-01-31 ASSESSMENT — ENCOUNTER SYMPTOMS
RESPIRATORY NEGATIVE: 1
BACK PAIN: 1
EYES NEGATIVE: 1
GASTROINTESTINAL NEGATIVE: 1

## 2021-01-31 ASSESSMENT — PAIN DESCRIPTION - LOCATION: LOCATION: BACK

## 2021-01-31 ASSESSMENT — PAIN DESCRIPTION - FREQUENCY: FREQUENCY: CONTINUOUS

## 2021-01-31 ASSESSMENT — PAIN DESCRIPTION - DESCRIPTORS: DESCRIPTORS: BURNING;SHARP

## 2021-01-31 NOTE — ED PROVIDER NOTES
[facility] ED  eMERGENCY dEPARTMENT eNCOUnter      Pt Name: Gurjit Jane  MRN: [id number]  [place] 1965  Date of evaluation: 1/31/2021  Provider: Mihaela Richards PA-C      HISTORY OF PRESENT ILLNESS    Gurjit Jane is a 50 y.o. female who presents to the Emergency Department with chief complaint of left-sided low back pain radiating down her left leg. Patient states she was seen and treated on the 20th. Patient states she had been cleaning her bathtub and that is what initially caused her back pain flareup. Patient states she has a history of chronic back pain and sciatica. Patient states today she woke up with return of the discomfort without a specific injury. Patient denies loss of bowel or bladder function also denies numbness or tingling the private area. Patient denies foot drop. REVIEW OF SYSTEMS       Review of Systems   Constitutional: Negative. HENT: Negative. Eyes: Negative. Respiratory: Negative. Cardiovascular: Negative. Gastrointestinal: Negative. Endocrine: Negative. Genitourinary: Negative. Musculoskeletal: Positive for back pain. Skin: Negative. Neurological: Negative. Psychiatric/Behavioral: Negative.           PAST MEDICAL HISTORY     Past Medical History:   Diagnosis Date    Anxiety     Asthma     as child    Depression     Depression     dysthymia - following lupus dx age 23    Diabetes mellitus ([facility] [address]     Foot fracture, left     GERD (gastroesophageal reflux disease)     Hyperlipidemia     Hypothyroidism     Kidney stones     Lupus ([facility] [address]     dr barrera -     Migraine     PONV (postoperative nausea and vomiting)          SURGICAL HISTORY       Past Surgical History:   Procedure Laterality Date    APPENDECTOMY      ARTHRODESIS Left 11/26/2019    ARTHRODESIS OF THE FIRST METATARSAL PHALANGEAL JOINT LEFT FOOT performed by Martínez Hernandez DPM at [address] [place] [address] COLONOSCOPY      FOOT CLOSED REDUCTION Left 1/3/2017    LEFT FOOT PERCUTANEOUS PINNING MOTA FRACTURE performed by Casey Hernandez MD at [address]      [place] ESOPHAGOGASTRODUODENOSCOPY TRANSORAL DIAGNOSTIC N/A 3/8/2017    EGD ESOPHAGOGASTRODUODENOSCOPY WITH DILATION performed by Della Gautam MD at [address] ESOPHAGOGASTRODUODENOSCOPY TRANSORAL DIAGNOSTIC N/A 11/7/2018    EGD ESOPHAGOGASTRODUODENOSCOPY WITH DILATION performed by Della Gautam MD at [address] [place] [address]       Previous Medications    ALBUTEROL SULFATE HFA (VENTOLIN HFA) 108 (90 BASE) MCG/ACT INHALER    Inhale 2 puffs into the lungs every 6 hours as needed for Wheezing    BLOOD GLUCOSE MONITOR KIT AND SUPPLIES    Test 3 times a day & as needed for symptoms of irregular blood glucose. BLOOD GLUCOSE MONITOR KIT AND SUPPLIES    Please give 1 meter covered by insurance Dx E11.65    BLOOD GLUCOSE MONITOR STRIPS    Pt test 3x daily Dx E11.65    BLOOD GLUCOSE MONITORING SUPPL (TRUE TRACK BLOOD GLUCOSE) CAROLE    Check blood sugars twice daily or as directed. BLOOD GLUCOSE TEST STRIPS (ASCENSIA AUTODISC VI;ONE TOUCH ULTRA TEST VI) STRIP    Check blood sugars twice daily or as directed. DRUG [employer] UNILET LANCETS 30G MISC    Check blood sugars twice daily or as directed.     ESTRADIOL (CLIMARA) 0.025 MG/24HR    Place 0.025 patches onto the skin every 7 days On mondays    HYDROXYCHLOROQUINE (PLAQUENIL) 200 MG TABLET    Take 1 tablet by mouth 2 times daily    INSULIN ASPART (NOVOLOG FLEXPEN) 100 UNIT/ML INJECTION PEN    Inject 35 units tid with meals    INSULIN GLARGINE (LANTUS SOLOSTAR) 100 UNIT/ML INJECTION PEN    140 units at bedtime    INSULIN LISPRO, 1 UNIT DIAL, (HUMALOG KWIKPEN) 100 UNIT/ML SOPN    Inject 20 Units into the skin 3 times daily (before meals) INSULIN LISPRO, 1 UNIT DIAL, (HUMALOG KWIKPEN) 100 UNIT/ML SOPN    45-50  units with each meals    INSULIN PEN NEEDLE (NOVOFINE) 32G X 6 MM MISC    BID    INSULIN PEN NEEDLE (NOVOFINE) 32G X 6 MM MISC    qid    LANCETS MISC    Pt test 3x daily Dx E11.65    LEVOTHYROXINE (SYNTHROID) 50 MCG TABLET    Take 1 tablet by mouth daily    ONDANSETRON (ZOFRAN ODT) 4 MG DISINTEGRATING TABLET    Take 1 tablet by mouth every 8 hours as needed for Nausea    PANTOPRAZOLE (PROTONIX) 40 MG TABLET    Take 1 tablet by mouth daily    ROSUVASTATIN (CRESTOR) 40 MG TABLET    Take 1 tablet by mouth daily    TAMSULOSIN (FLOMAX) 0.4 MG CAPSULE    Take 1 capsule by mouth daily for 10 days       ALLERGIES     Aspirin, Imitrex [sumatriptan], Lidocaine, Pcn [penicillins], Seasonal, Toradol [ketorolac tromethamine], and Ultram [tramadol]    FAMILY HISTORY       Family History   Adopted: Yes          SOCIAL HISTORY       Social History     Socioeconomic History    Marital status: Legally      Spouse name: None    Number of children: None    Years of education: None    Highest education level: None   Occupational History    None   Social Needs    Financial resource strain: None    Food insecurity     Worry: None     Inability: None    Transportation needs     Medical: None     Non-medical: None   Tobacco Use    Smoking status: Never Smoker    Smokeless tobacco: Never Used   Substance and Sexual Activity    Alcohol use: No     Alcohol/week: 0.0 standard drinks     Comment: denies    Drug use: No     Comment: denies    Sexual activity: None   Lifestyle    Physical activity     Days per week: None     Minutes per session: None    Stress: None   Relationships    Social connections     Talks on phone: None     Gets together: None     Attends Religion service: None     Active member of club or organization: None     Attends meetings of clubs or organizations: None     Relationship status: None    Intimate partner violence Fear of current or ex partner: None     Emotionally abused: None     Physically abused: None     Forced sexual activity: None   Other Topics Concern    None   Social History Narrative    None       SCREENINGS      @FLOW([id number])@      PHYSICAL EXAM    (up to 7 for level 4, 8 or more for level 5)     ED Triage Vitals [01/31/21 1321]   BP Temp Temp Source Pulse Resp SpO2 Height Weight   118/77 97.9 °F (36.6 °C) Oral 85 16 99 % 5' 8\" (1.727 m) 160 lb (72.6 kg)       Physical Exam  Constitutional:       General: She is not in acute distress. Appearance: She is well-developed. HENT:      Head: Normocephalic and atraumatic. Eyes:      Conjunctiva/sclera: Conjunctivae normal.      Pupils: Pupils are equal, round, and reactive to light. Neck:      Musculoskeletal: Normal range of motion and neck supple. Cardiovascular:      Rate and Rhythm: Normal rate and regular rhythm. Heart sounds: No murmur. Pulmonary:      Effort: No respiratory distress. Breath sounds: Normal breath sounds. No wheezing or rales. Abdominal:      General: There is no distension. Palpations: Abdomen is soft. Tenderness: There is no abdominal tenderness. Musculoskeletal: Normal range of motion. Lumbar back: She exhibits tenderness. Back:       Left upper leg: She exhibits tenderness. Legs:       Comments: Pain shooting down sciatic nerve   Skin:     General: Skin is warm and dry. Findings: No erythema or rash. Neurological:      Mental Status: She is alert and oriented to person, place, and time. Cranial Nerves: No cranial nerve deficit. Psychiatric:         Judgment: Judgment normal.           All other labs were within normal range or not returned as of this dictation.     EMERGENCY DEPARTMENT COURSE and DIFFERENTIALDIAGNOSIS/MDM:   Vitals:    Vitals:    01/31/21 1321   BP: 118/77   Pulse: 85   Resp: 16   Temp: 97.9 °F (36.6 °C)   TempSrc: Oral   SpO2: 99%   Weight: 160 lb (72.6 kg)   Height: 5' 8\" (1.727 m)          Patient given Norflex injection, Depo-Medrol injection, and Percocet for treatment while in the emergency room. Patient to monitor blood sugar at home. Patient OARRS reviewed and has been receiving frequent pain medication prescriptions from various providers. Patient to follow-up with primary care provider to manage her back pain as home-based or follow-up with pain management. PROCEDURES:  Unless otherwise noted below, none     Procedures      FINAL IMPRESSION      1. Sciatica of left side    2.  Acute exacerbation of chronic low back pain          DISPOSITION/PLAN   DISPOSITION            Selena Melo PA-C (electronically signed)  Attending Emergency Physician  [facility], [place]  01/31/21 7373

## 2021-01-31 NOTE — ED TRIAGE NOTES
Pt c/o back pain that radiates into her LLE, denies trauma, sensation and movement intact throughout extremities, Pt is A&OX3, calm, ambulatory, afebrile, breathes are equal and unlabored.

## 2021-02-01 ENCOUNTER — HOSPITAL ENCOUNTER (EMERGENCY)
Age: 56
Discharge: HOME | End: 2021-02-01
Attending: EMERGENCY MEDICINE
Payer: MEDICARE

## 2021-02-01 VITALS
WEIGHT: 160 LBS | TEMPERATURE: 98.4 F | RESPIRATION RATE: 18 BRPM | HEART RATE: 93 BPM | DIASTOLIC BLOOD PRESSURE: 98 MMHG | SYSTOLIC BLOOD PRESSURE: 131 MMHG | OXYGEN SATURATION: 98 % | HEIGHT: 68 IN | BODY MASS INDEX: 24.25 KG/M2

## 2021-02-01 DIAGNOSIS — M25.552 LEFT HIP PAIN: ICD-10-CM

## 2021-02-01 DIAGNOSIS — M54.32 SCIATICA OF LEFT SIDE: Primary | ICD-10-CM

## 2021-02-01 PROCEDURE — 99284 EMERGENCY DEPT VISIT MOD MDM: CPT

## 2021-02-01 PROCEDURE — 6360000002 HC RX W HCPCS: Performed by: EMERGENCY MEDICINE

## 2021-02-01 PROCEDURE — 96372 THER/PROPH/DIAG INJ SC/IM: CPT

## 2021-02-01 PROCEDURE — 6370000000 HC RX 637 (ALT 250 FOR IP): Performed by: EMERGENCY MEDICINE

## 2021-02-01 RX ORDER — ONDANSETRON 4 MG/1
4 TABLET, ORALLY DISINTEGRATING ORAL ONCE
Status: COMPLETED | OUTPATIENT
Start: 2021-02-01 | End: 2021-02-01

## 2021-02-01 RX ORDER — DEXAMETHASONE 4 MG/1
8 TABLET ORAL ONCE
Status: COMPLETED | OUTPATIENT
Start: 2021-02-01 | End: 2021-02-01

## 2021-02-01 RX ADMIN — HYDROMORPHONE HYDROCHLORIDE 1 MG: 1 INJECTION, SOLUTION INTRAMUSCULAR; INTRAVENOUS; SUBCUTANEOUS at 16:34

## 2021-02-01 RX ADMIN — DEXAMETHASONE 8 MG: 4 TABLET ORAL at 16:34

## 2021-02-01 RX ADMIN — ONDANSETRON 4 MG: 4 TABLET, ORALLY DISINTEGRATING ORAL at 16:34

## 2021-02-01 ASSESSMENT — ENCOUNTER SYMPTOMS
EYE REDNESS: 0
STRIDOR: 0
EYE DISCHARGE: 0
TROUBLE SWALLOWING: 0
SINUS PRESSURE: 0
ABDOMINAL PAIN: 0
CHEST TIGHTNESS: 0
COUGH: 0
CONSTIPATION: 0
VOMITING: 0
EYE PAIN: 0
DIARRHEA: 0
BACK PAIN: 1
VOICE CHANGE: 0
BLOOD IN STOOL: 0
SORE THROAT: 0
FACIAL SWELLING: 0
SHORTNESS OF BREATH: 0
WHEEZING: 0
CHOKING: 0

## 2021-02-01 ASSESSMENT — PAIN SCALES - GENERAL
PAINLEVEL_OUTOF10: 10
PAINLEVEL_OUTOF10: 4

## 2021-02-01 ASSESSMENT — PAIN DESCRIPTION - ONSET: ONSET: SUDDEN

## 2021-02-01 ASSESSMENT — PAIN DESCRIPTION - PAIN TYPE
TYPE: ACUTE PAIN
TYPE: CHRONIC PAIN

## 2021-02-01 NOTE — ED PROVIDER NOTES
[facility] ED  eMERGENCY dEPARTMENT eNCOUnter      Pt Name: Elias Paniagua  MRN: [id number]  [place] 1965  Date of evaluation: 2/1/2021  Provider: Lucy De La Garza MD    [address] [place] [address]       Chief Complaint   Patient presents with    Hip Pain     left hip pain. hx of sciatica. pt states she also fell on the ice on the left side this morning and now the pain is worse than normal.       HISTORY OF PRESENT ILLNESS   (Location/Symptom, Timing/Onset,Context/Setting, Quality, Duration, Modifying Factors, Severity)  Note limiting factors. Elias Paniagua is a 50 y.o. female who presents to the emergency department patient well-known to me from previous multiple encounters this emergency for similar situation in the past this time patient claimed that her pain is flaring up and has more flared up after she slipped and fell on the ice this morning no head neck injury no bleeding pain is typical of similar type in the past going to the left leg history of diabetes mellitus hyperlipidemia SLE and chronic pain management rated pain a 7-8 out of 10    HPI    NursingNotes were reviewed. REVIEW OF SYSTEMS    (2-9 systems for level 4, 10 or more for level 5)     Review of Systems   Constitutional: Negative. Negative for activity change and fever. HENT: Negative for congestion, drooling, facial swelling, mouth sores, nosebleeds, sinus pressure, sore throat, trouble swallowing and voice change. Eyes: Negative for pain, discharge, redness and visual disturbance. Respiratory: Negative for cough, choking, chest tightness, shortness of breath, wheezing and stridor. Cardiovascular: Negative for chest pain, palpitations and leg swelling. Gastrointestinal: Negative for abdominal pain, blood in stool, constipation, diarrhea and vomiting. Endocrine: Negative for cold intolerance, polyphagia and polyuria. Genitourinary: Negative for dysuria, flank pain, frequency, genital sores and urgency. Musculoskeletal: Positive for arthralgias, back pain, joint swelling and myalgias. Negative for neck pain and neck stiffness. Skin: Negative for pallor and rash. Neurological: Negative for tremors, seizures, syncope, weakness, numbness and headaches. Hematological: Negative for adenopathy. Does not bruise/bleed easily. Psychiatric/Behavioral: Negative for agitation, behavioral problems, hallucinations and sleep disturbance. The patient is not hyperactive. All other systems reviewed and are negative. Except as noted above the remainder of the review of systems was reviewed and negative.        PAST MEDICAL HISTORY     Past Medical History:   Diagnosis Date    Anxiety     Asthma     as child    Depression     Depression     dysthymia - following lupus dx age 23    Diabetes mellitus ([facility]     Foot fracture, left     GERD (gastroesophageal reflux disease)     Hyperlipidemia     Hypothyroidism     Kidney stones     Lupus ([facility]      diab -     Migraine     PONV (postoperative nausea and vomiting)     Sciatica     left sided         SURGICALHISTORY       Past Surgical History:   Procedure Laterality Date    APPENDECTOMY      ARTHRODESIS Left 11/26/2019    ARTHRODESIS OF THE FIRST METATARSAL PHALANGEAL JOINT LEFT FOOT performed by Sergio Oglesby DPM at [place] [address]      COLONOSCOPY      FOOT CLOSED REDUCTION Left 1/3/2017    LEFT FOOT PERCUTANEOUS PINNING Buzzy Elio FRACTURE performed by Rudy Olmstead MD at [address]      HYSTERECTOMY      CA ESOPHAGOGASTRODUODENOSCOPY TRANSORAL DIAGNOSTIC N/A 3/8/2017    EGD ESOPHAGOGASTRODUODENOSCOPY WITH DILATION performed by Bryn Thomas MD at [address] ESOPHAGOGASTRODUODENOSCOPY TRANSORAL DIAGNOSTIC N/A 11/7/2018    EGD ESOPHAGOGASTRODUODENOSCOPY WITH DILATION performed by Bryn Thomas MD at [address] [facility] TOOTH EXTRACTION      UPPER GASTROINTESTINAL ENDOSCOPY           CURRENT MEDICATIONS       Previous Medications    ALBUTEROL SULFATE HFA (VENTOLIN HFA) 108 (90 BASE) MCG/ACT INHALER    Inhale 2 puffs into the lungs every 6 hours as needed for Wheezing    BLOOD GLUCOSE MONITOR KIT AND SUPPLIES    Test 3 times a day & as needed for symptoms of irregular blood glucose. BLOOD GLUCOSE MONITOR KIT AND SUPPLIES    Please give 1 meter covered by insurance Dx E11.65    BLOOD GLUCOSE MONITOR STRIPS    Pt test 3x daily Dx E11.65    BLOOD GLUCOSE MONITORING SUPPL (TRUE TRACK BLOOD GLUCOSE) CAROLE    Check blood sugars twice daily or as directed. BLOOD GLUCOSE TEST STRIPS (ASCENSIA AUTODISC VI;ONE TOUCH ULTRA TEST VI) STRIP    Check blood sugars twice daily or as directed. CAPSAICIN (ZOSTRIX) 0.025 % CREAM    Apply topically 2 times daily to tender back. CHLORZOXAZONE (PARAFON FORTE DSC) 500 MG TABLET    Take 1 tablet by mouth 4 times daily as needed for Muscle spasms    [employer] UNILET LANCETS 30G MISC    Check blood sugars twice daily or as directed.     ESTRADIOL (CLIMARA) 0.025 MG/24HR    Place 0.025 patches onto the skin every 7 days On mondays    HYDROXYCHLOROQUINE (PLAQUENIL) 200 MG TABLET    Take 1 tablet by mouth 2 times daily    INSULIN ASPART (NOVOLOG FLEXPEN) 100 UNIT/ML INJECTION PEN    Inject 35 units tid with meals    INSULIN GLARGINE (LANTUS SOLOSTAR) 100 UNIT/ML INJECTION PEN    140 units at bedtime    INSULIN LISPRO, 1 UNIT DIAL, (HUMALOG KWIKPEN) 100 UNIT/ML SOPN    Inject 20 Units into the skin 3 times daily (before meals)    INSULIN LISPRO, 1 UNIT DIAL, (HUMALOG KWIKPEN) 100 UNIT/ML SOPN    45-50  units with each meals    INSULIN PEN NEEDLE (NOVOFINE) 32G X 6 MM MISC    BID    INSULIN PEN NEEDLE (NOVOFINE) 32G X 6 MM MISC    qid    LANCETS MISC    Pt test 3x daily Dx E11.65    LEVOTHYROXINE (SYNTHROID) 50 MCG TABLET    Take 1 tablet by mouth daily    METHYLPREDNISOLONE (MEDROL, BRI,) 4 MG TABLET Take by mouth.     ONDANSETRON (ZOFRAN ODT) 4 MG DISINTEGRATING TABLET    Take 1 tablet by mouth every 8 hours as needed for Nausea    PANTOPRAZOLE (PROTONIX) 40 MG TABLET    Take 1 tablet by mouth daily    ROSUVASTATIN (CRESTOR) 40 MG TABLET    Take 1 tablet by mouth daily    TAMSULOSIN (FLOMAX) 0.4 MG CAPSULE    Take 1 capsule by mouth daily for 10 days       ALLERGIES     Aspirin, Imitrex [sumatriptan], Lidocaine, Pcn [penicillins], Seasonal, Toradol [ketorolac tromethamine], and Ultram [tramadol]    FAMILY HISTORY       Family History   Adopted: Yes          SOCIAL HISTORY       Social History     Socioeconomic History    Marital status: Legally      Spouse name: None    Number of children: None    Years of education: None    Highest education level: None   Occupational History    None   Social Needs    Financial resource strain: None    Food insecurity     Worry: None     Inability: None    Transportation needs     Medical: None     Non-medical: None   Tobacco Use    Smoking status: Never Smoker    Smokeless tobacco: Never Used   Substance and Sexual Activity    Alcohol use: No     Alcohol/week: 0.0 standard drinks     Comment: denies    Drug use: No     Comment: denies    Sexual activity: None   Lifestyle    Physical activity     Days per week: None     Minutes per session: None    Stress: None   Relationships    Social connections     Talks on phone: None     Gets together: None     Attends Restorationist service: None     Active member of club or organization: None     Attends meetings of clubs or organizations: None     Relationship status: None    Intimate partner violence     Fear of current or ex partner: None     Emotionally abused: None     Physically abused: None     Forced sexual activity: None   Other Topics Concern    None   Social History Narrative    None       SCREENINGS      @FLOW([id number])@      PHYSICAL EXAM    (up to 7 for level 4, 8 or more for level 5)     ED Triage Vitals [02/01/21 1604]   BP Temp Temp Source Pulse Resp SpO2 Height Weight   (!) 131/98 98.4 °F (36.9 °C) Oral 93 18 98 % 5' 8\" (1.727 m) 160 lb (72.6 kg)       Physical Exam  Vitals signs and nursing note reviewed. Constitutional:       General: She is not in acute distress. Appearance: She is well-developed and normal weight. She is not ill-appearing, toxic-appearing or diaphoretic. Comments: Alert cooperative patient nontoxic appearance no evidence of dehydration patient ambulates slowly   HENT:      Head: Normocephalic and atraumatic. Right Ear: Tympanic membrane, ear canal and external ear normal. There is no impacted cerumen. Left Ear: Tympanic membrane, ear canal and external ear normal. There is no impacted cerumen. Nose: No congestion or rhinorrhea. Mouth/Throat:      Pharynx: No oropharyngeal exudate or posterior oropharyngeal erythema. Eyes:      General:         Right eye: No discharge. Left eye: No discharge. Extraocular Movements: Extraocular movements intact. Neck:      Musculoskeletal: Normal range of motion and neck supple. No neck rigidity or muscular tenderness. Vascular: No carotid bruit. Cardiovascular:      Rate and Rhythm: Normal rate and regular rhythm. Pulses: Normal pulses. Heart sounds: Normal heart sounds. No murmur. No gallop. Pulmonary:      Effort: No respiratory distress. Breath sounds: Normal breath sounds. No stridor. No wheezing or rhonchi. Abdominal:      General: Bowel sounds are normal. There is no distension. Palpations: Abdomen is soft. There is no mass. Tenderness: There is no right CVA tenderness, guarding or rebound. Musculoskeletal: Normal range of motion. General: Tenderness present.       Comments: Attention come to the back as well as to the left hip and to the lower extremities patient has no pitting edema no cellulitis lower extremities patient is straight leg raising raising is 15 degrees in the left side compared to 30 degrees right side no sensory deficit diffuse tenderness elicited in the lower back paralumbar spasm and tenderness as well as left hip no bruising or hematoma noted neurovascular is intact   Lymphadenopathy:      Cervical: No cervical adenopathy. Skin:     General: Skin is warm. Capillary Refill: Capillary refill takes less than 2 seconds. Coloration: Skin is not jaundiced. Findings: No bruising, erythema, lesion or rash. Neurological:      General: No focal deficit present. Mental Status: She is alert and oriented to person, place, and time. Cranial Nerves: No cranial nerve deficit. Sensory: No sensory deficit. Motor: No weakness or abnormal muscle tone. Coordination: Coordination normal.      Gait: Gait normal.      Deep Tendon Reflexes: Reflexes normal.   Psychiatric:         Behavior: Behavior normal.         Thought Content: Thought content normal.         DIAGNOSTIC RESULTS     EKG: All EKG's are interpreted by the Emergency Department Physician who either signs or Co-signsthis chart in the absence of a cardiologist.        RADIOLOGY:   Tarah Maxim such as CT, Ultrasound and MRI are read by the radiologist. Plain radiographic images are visualized and preliminarily interpreted by the emergency physician with the below findings:        Interpretation per the Radiologist below, if available at the time ofthis note:    No orders to display         ED BEDSIDE ULTRASOUND:   Performed by ED Physician - none    LABS:  Labs Reviewed - No data to display    All other labs were within normal range or not returned as of this dictation.     EMERGENCY DEPARTMENT COURSE and DIFFERENTIAL DIAGNOSIS/MDM:   Vitals:    Vitals:    02/01/21 1604   BP: (!) 131/98   Pulse: 93   Resp: 18   Temp: 98.4 °F (36.9 °C)   TempSrc: Oral   SpO2: 98%   Weight: 160 lb (72.6 kg)   Height: 5' 8\" (1.727 m)           MDM    CRITICAL CARE TIME   Total Critical Care time was minutes, excluding separately reportableprocedures. There was a high probability of clinicallysignificant/life threatening deterioration in the patient's condition which required my urgent intervention. ONSULTS:  None    PROCEDURES:  Unless otherwise noted below, none     Procedures    FINAL IMPRESSION      1. Sciatica of left side    2.  Left hip pain          DISPOSITION/PLAN   DISPOSITION        PATIENT REFERRED TO:  Wilda Soto MD  [address]  [phone]    In 1 week        DISCHARGE MEDICATIONS:  New Prescriptions    No medications on file          (Please note that portions of this note were completed with a voice recognition program.  Efforts were made to edit the dictations but occasionally words are mis-transcribed.)    Stephanie Venegas MD (electronically signed)  Attending Emergency Physician       Stephanie Venegas MD  02/01/21 3732

## 2021-02-01 NOTE — ED TRIAGE NOTES
Pt seen at [facility] yesterday for left sided sciatica pain, given medrol dose pack, began taking it this morning. Pt took Tylenol today for pain about 11:30am.  Pt  fell outside on the ice this morning and landed on left side. Pt now back at ER for left side hip and lower back pain from slipping on ice. Pt has Hx of sciatica and had a flare up recently.

## 2021-02-24 ENCOUNTER — HOSPITAL ENCOUNTER (OUTPATIENT)
Dept: LAB | Age: 56
Discharge: HOME | End: 2021-02-24
Payer: MEDICARE

## 2021-02-24 DIAGNOSIS — E11.69 TYPE 2 DIABETES MELLITUS WITH OTHER SPECIFIED COMPLICATION, UNSPECIFIED WHETHER LONG TERM INSULIN USE (HCC): ICD-10-CM

## 2021-02-24 LAB
ANION GAP SERPL CALCULATED.3IONS-SCNC: 14 MEQ/L (ref 9–15)
BUN BLDV-MCNC: 12 MG/DL (ref 6–20)
CALCIUM SERPL-MCNC: 9 MG/DL (ref 8.5–9.9)
CHLORIDE BLD-SCNC: 98 MEQ/L (ref 95–107)
CO2: 27 MEQ/L (ref 20–31)
CREAT SERPL-MCNC: 1.01 MG/DL (ref 0.5–0.9)
GFR AFRICAN AMERICAN: >60
GFR NON-AFRICAN AMERICAN: 57.2
GLUCOSE BLD-MCNC: 229 MG/DL (ref 70–99)
HBA1C MFR BLD: 9.8 % (ref 4.8–5.9)
POTASSIUM SERPL-SCNC: 4.5 MEQ/L (ref 3.4–4.9)
SODIUM BLD-SCNC: 139 MEQ/L (ref 135–144)

## 2021-02-24 PROCEDURE — 80048 BASIC METABOLIC PNL TOTAL CA: CPT

## 2021-02-24 PROCEDURE — 36415 COLL VENOUS BLD VENIPUNCTURE: CPT

## 2021-02-24 PROCEDURE — 83036 HEMOGLOBIN GLYCOSYLATED A1C: CPT

## 2021-02-26 ENCOUNTER — APPOINTMENT (OUTPATIENT)
Dept: CT IMAGING | Age: 56
End: 2021-02-26
Payer: MEDICARE

## 2021-02-26 ENCOUNTER — HOSPITAL ENCOUNTER (EMERGENCY)
Age: 56
Discharge: HOME | End: 2021-02-26
Payer: MEDICARE

## 2021-02-26 VITALS
WEIGHT: 160 LBS | HEART RATE: 63 BPM | SYSTOLIC BLOOD PRESSURE: 89 MMHG | DIASTOLIC BLOOD PRESSURE: 56 MMHG | RESPIRATION RATE: 16 BRPM | BODY MASS INDEX: 24.25 KG/M2 | TEMPERATURE: 97.9 F | OXYGEN SATURATION: 98 % | HEIGHT: 68 IN

## 2021-02-26 DIAGNOSIS — N20.0 NEPHROLITHIASIS: Primary | ICD-10-CM

## 2021-02-26 LAB
ALBUMIN SERPL-MCNC: 4 G/DL (ref 3.5–4.6)
ALP BLD-CCNC: 152 U/L (ref 40–130)
ALT SERPL-CCNC: 27 U/L (ref 0–33)
ANION GAP SERPL CALCULATED.3IONS-SCNC: 7 MEQ/L (ref 9–15)
AST SERPL-CCNC: 17 U/L (ref 0–35)
BACTERIA: ABNORMAL /HPF
BASOPHILS ABSOLUTE: 0.3 K/UL (ref 0–0.2)
BASOPHILS RELATIVE PERCENT: 2.1 %
BILIRUB SERPL-MCNC: 0.3 MG/DL (ref 0.2–0.7)
BILIRUBIN URINE: NEGATIVE
BLOOD, URINE: NEGATIVE
BUN BLDV-MCNC: 13 MG/DL (ref 6–20)
CALCIUM SERPL-MCNC: 9.1 MG/DL (ref 8.5–9.9)
CHLORIDE BLD-SCNC: 101 MEQ/L (ref 95–107)
CLARITY: ABNORMAL
CO2: 30 MEQ/L (ref 20–31)
COLOR: ABNORMAL
CREAT SERPL-MCNC: 1.11 MG/DL (ref 0.5–0.9)
EOSINOPHILS ABSOLUTE: 0.4 K/UL (ref 0–0.7)
EOSINOPHILS RELATIVE PERCENT: 2.9 %
EPITHELIAL CELLS, UA: ABNORMAL /HPF (ref 0–5)
GFR AFRICAN AMERICAN: >60
GFR NON-AFRICAN AMERICAN: 51.3
GLOBULIN: 2.9 G/DL (ref 2.3–3.5)
GLUCOSE BLD-MCNC: 115 MG/DL (ref 70–99)
GLUCOSE URINE: NEGATIVE MG/DL
HCT VFR BLD CALC: 43.7 % (ref 37–47)
HEMOGLOBIN: 14.2 G/DL (ref 12–16)
KETONES, URINE: ABNORMAL MG/DL
LACTIC ACID: 1.3 MMOL/L (ref 0.5–2.2)
LEUKOCYTE ESTERASE, URINE: NEGATIVE
LIPASE: 21 U/L (ref 12–95)
LYMPHOCYTES ABSOLUTE: 2.8 K/UL (ref 1–4.8)
LYMPHOCYTES RELATIVE PERCENT: 20.1 %
MCH RBC QN AUTO: 28.8 PG (ref 27–31.3)
MCHC RBC AUTO-ENTMCNC: 32.6 % (ref 33–37)
MCV RBC AUTO: 88.2 FL (ref 82–100)
MONOCYTES ABSOLUTE: 0.6 K/UL (ref 0.2–0.8)
MONOCYTES RELATIVE PERCENT: 4.4 %
NEUTROPHILS ABSOLUTE: 10 K/UL (ref 1.4–6.5)
NEUTROPHILS RELATIVE PERCENT: 70.5 %
NITRITE, URINE: NEGATIVE
PDW BLD-RTO: 14.3 % (ref 11.5–14.5)
PH UA: 5 (ref 5–9)
PLATELET # BLD: 331 K/UL (ref 130–400)
POTASSIUM SERPL-SCNC: 4.1 MEQ/L (ref 3.4–4.9)
PROTEIN UA: 30 MG/DL
RBC # BLD: 4.95 M/UL (ref 4.2–5.4)
RBC UA: ABNORMAL /HPF (ref 0–2)
SODIUM BLD-SCNC: 138 MEQ/L (ref 135–144)
SPECIFIC GRAVITY UA: 1.03 (ref 1–1.03)
TOTAL PROTEIN: 6.9 G/DL (ref 6.3–8)
URINE REFLEX TO CULTURE: YES
UROBILINOGEN, URINE: 1 E.U./DL
WBC # BLD: 14.2 K/UL (ref 4.8–10.8)
WBC UA: ABNORMAL /HPF (ref 0–5)

## 2021-02-26 PROCEDURE — 99284 EMERGENCY DEPT VISIT MOD MDM: CPT

## 2021-02-26 PROCEDURE — 74150 CT ABDOMEN W/O CONTRAST: CPT

## 2021-02-26 PROCEDURE — 2580000003 HC RX 258: Performed by: STUDENT IN AN ORGANIZED HEALTH CARE EDUCATION/TRAINING PROGRAM

## 2021-02-26 PROCEDURE — 83690 ASSAY OF LIPASE: CPT

## 2021-02-26 PROCEDURE — 36415 COLL VENOUS BLD VENIPUNCTURE: CPT

## 2021-02-26 PROCEDURE — 80053 COMPREHEN METABOLIC PANEL: CPT

## 2021-02-26 PROCEDURE — 87086 URINE CULTURE/COLONY COUNT: CPT

## 2021-02-26 PROCEDURE — 81001 URINALYSIS AUTO W/SCOPE: CPT

## 2021-02-26 PROCEDURE — 96365 THER/PROPH/DIAG IV INF INIT: CPT

## 2021-02-26 PROCEDURE — 96376 TX/PRO/DX INJ SAME DRUG ADON: CPT

## 2021-02-26 PROCEDURE — 96375 TX/PRO/DX INJ NEW DRUG ADDON: CPT

## 2021-02-26 PROCEDURE — 6360000002 HC RX W HCPCS: Performed by: STUDENT IN AN ORGANIZED HEALTH CARE EDUCATION/TRAINING PROGRAM

## 2021-02-26 PROCEDURE — 85025 COMPLETE CBC W/AUTO DIFF WBC: CPT

## 2021-02-26 PROCEDURE — 83605 ASSAY OF LACTIC ACID: CPT

## 2021-02-26 RX ORDER — FLUCONAZOLE 150 MG/1
150 TABLET ORAL ONCE
Qty: 1 TABLET | Refills: 0 | Status: SHIPPED | OUTPATIENT
Start: 2021-02-26 | End: 2021-02-26

## 2021-02-26 RX ORDER — ONDANSETRON 2 MG/ML
4 INJECTION INTRAMUSCULAR; INTRAVENOUS ONCE
Status: COMPLETED | OUTPATIENT
Start: 2021-02-26 | End: 2021-02-26

## 2021-02-26 RX ORDER — SULFAMETHOXAZOLE AND TRIMETHOPRIM 800; 160 MG/1; MG/1
1 TABLET ORAL 2 TIMES DAILY
Qty: 20 TABLET | Refills: 0 | Status: SHIPPED | OUTPATIENT
Start: 2021-02-26 | End: 2021-03-08

## 2021-02-26 RX ORDER — 0.9 % SODIUM CHLORIDE 0.9 %
1000 INTRAVENOUS SOLUTION INTRAVENOUS ONCE
Status: COMPLETED | OUTPATIENT
Start: 2021-02-26 | End: 2021-02-26

## 2021-02-26 RX ORDER — TAMSULOSIN HYDROCHLORIDE 0.4 MG/1
0.4 CAPSULE ORAL DAILY
Qty: 10 CAPSULE | Refills: 0 | Status: SHIPPED | OUTPATIENT
Start: 2021-02-26 | End: 2021-04-16

## 2021-02-26 RX ORDER — OXYCODONE HYDROCHLORIDE AND ACETAMINOPHEN 5; 325 MG/1; MG/1
1 TABLET ORAL EVERY 6 HOURS PRN
Qty: 12 TABLET | Refills: 0 | Status: SHIPPED | OUTPATIENT
Start: 2021-02-26 | End: 2021-03-01

## 2021-02-26 RX ORDER — ONDANSETRON 4 MG/1
4 TABLET, ORALLY DISINTEGRATING ORAL EVERY 8 HOURS PRN
Qty: 15 TABLET | Refills: 0 | Status: SHIPPED | OUTPATIENT
Start: 2021-02-26 | End: 2021-03-03

## 2021-02-26 RX ORDER — MORPHINE SULFATE 2 MG/ML
4 INJECTION, SOLUTION INTRAMUSCULAR; INTRAVENOUS
Status: DISCONTINUED | OUTPATIENT
Start: 2021-02-26 | End: 2021-02-27 | Stop reason: HOSPADM

## 2021-02-26 RX ADMIN — ONDANSETRON 4 MG: 2 INJECTION INTRAMUSCULAR; INTRAVENOUS at 19:18

## 2021-02-26 RX ADMIN — HYDROMORPHONE HYDROCHLORIDE 0.5 MG: 1 INJECTION, SOLUTION INTRAMUSCULAR; INTRAVENOUS; SUBCUTANEOUS at 20:11

## 2021-02-26 RX ADMIN — ONDANSETRON 4 MG: 2 INJECTION INTRAMUSCULAR; INTRAVENOUS at 20:11

## 2021-02-26 RX ADMIN — SODIUM CHLORIDE 1000 ML: 9 INJECTION, SOLUTION INTRAVENOUS at 19:18

## 2021-02-26 RX ADMIN — MORPHINE SULFATE 4 MG: 2 INJECTION, SOLUTION INTRAMUSCULAR; INTRAVENOUS at 19:18

## 2021-02-26 RX ADMIN — CEFTRIAXONE SODIUM 1000 MG: 1 INJECTION, POWDER, FOR SOLUTION INTRAMUSCULAR; INTRAVENOUS at 21:20

## 2021-02-26 ASSESSMENT — PAIN SCALES - GENERAL
PAINLEVEL_OUTOF10: 10

## 2021-02-26 ASSESSMENT — PAIN DESCRIPTION - ORIENTATION: ORIENTATION: RIGHT;LEFT

## 2021-02-26 NOTE — ED PROVIDER NOTES
[address] [facility] ED  EMERGENCY DEPARTMENT ENCOUNTER      Pt Name: Kath Caldera  MRN: [id number]  [place] 1965  Date of evaluation: 2/26/2021  Provider: [employer], [address] [place] [address]        Chief Complaint   Patient presents with    Flank Pain     pt c/o bilat flank pain, urgency, and polyuria for the past two days         HISTORY OF PRESENT ILLNESS   (Location/Symptom, Timing/Onset, Context/Setting, Quality, Duration, Modifying Factors, Severity)  Note limiting factors. Kath Caldera is a 50 y.o. female who per chart review has a past medical history of type 2 diabetes hyperlipidemia hypothyroidism lupus asthma essential tremor panic disorder hypertension gastritis presents to the emergency department with gradual onset, intermittent, moderate, non radiating bilateral flank pain (L>R), urinary frequency and urinary urgency for the last two days. Pt states she sensation of incomplete void as well. + for nausea. She has been taking otc medications without relief. She has a hx of kidney stones, states she is unsure if this pain is similar. She denies aggravating and alleviating factors. She denies fever, chills, vomiting, constipation, diarrhea, dysuria, hematuria, blood in the stool, chest pain, shortness of breath, back pain, bowel or bladder incontinence, vaginal discharge or pelvic pain. HPI    Nursing Notes were reviewed. REVIEW OF SYSTEMS    (2-9 systems for level 4, 10 or more for level 5)     Review of Systems   Constitutional: Negative for chills and fever. HENT: Negative for congestion. Eyes: Negative for photophobia. Respiratory: Negative for cough, shortness of breath and wheezing. Cardiovascular: Negative for chest pain and palpitations. Gastrointestinal: Positive for nausea. Negative for abdominal distention, abdominal pain, blood in stool, constipation, diarrhea and vomiting. Genitourinary: Positive for flank pain, frequency and urgency.  Negative for decreased urine volume, difficulty urinating, dysuria, enuresis, genital sores, hematuria, menstrual problem, pelvic pain, vaginal bleeding, vaginal discharge and vaginal pain. Musculoskeletal: Negative for myalgias. Allergic/Immunologic: Negative for immunocompromised state. Neurological: Negative for dizziness, weakness and headaches. All other systems reviewed and are negative. Except as noted above the remainder of the review of systems was reviewed and negative.        PAST MEDICAL HISTORY     Past Medical History:   Diagnosis Date    Anxiety     Asthma     as child    Depression     Depression     dysthymia - following lupus dx age 23    Diabetes mellitus ([facility]     Foot fracture, left     GERD (gastroesophageal reflux disease)     Hyperlipidemia     Hypothyroidism     Kidney stones     Lupus ([facility]     dr barrera -     Migraine     PONV (postoperative nausea and vomiting)     Sciatica     left sided         SURGICAL HISTORY       Past Surgical History:   Procedure Laterality Date    APPENDECTOMY      ARTHRODESIS Left 11/26/2019    ARTHRODESIS OF THE FIRST METATARSAL PHALANGEAL JOINT LEFT FOOT performed by Martínez Hernandez DPM at [place] [address]      COLONOSCOPY      FOOT CLOSED REDUCTION Left 1/3/2017    LEFT FOOT PERCUTANEOUS PINNING Jerene Darline FRACTURE performed by Namrata Stone MD at [address] [address]      HYSTERECTOMY      [place] ESOPHAGOGASTRODUODENOSCOPY TRANSORAL DIAGNOSTIC N/A 3/8/2017    EGD ESOPHAGOGASTRODUODENOSCOPY WITH DILATION performed by Damion Stone MD at [address] ESOPHAGOGASTRODUODENOSCOPY TRANSORAL DIAGNOSTIC N/A 11/7/2018    EGD ESOPHAGOGASTRODUODENOSCOPY WITH DILATION performed by Damion Stone MD at [address] [place] [address] Drive       Discharge Medication List as of hours as needed for Wheezing, Disp-1 Inhaler, R-0Print      rosuvastatin (CRESTOR) 40 MG tablet Take 1 tablet by mouth daily, Disp-90 tablet, R-3Normal      pantoprazole (PROTONIX) 40 MG tablet Take 1 tablet by mouth daily, Disp-90 tablet, R-3Normal      levothyroxine (SYNTHROID) 50 MCG tablet Take 1 tablet by mouth daily, Disp-90 tablet, R-3      !! Blood Glucose Monitoring Suppl (TRUE TRACK BLOOD GLUCOSE) CAROLE Disp-100 Device, R-5, NormalCheck blood sugars twice daily or as directed. hydroxychloroquine (PLAQUENIL) 200 MG tablet Take 1 tablet by mouth 2 times daily, R-5       !! - Potential duplicate medications found. Please discuss with provider.           ALLERGIES     Aspirin, Imitrex [sumatriptan], Lidocaine, Pcn [penicillins], Seasonal, Toradol [ketorolac tromethamine], and Ultram [tramadol]    FAMILY HISTORY       Family History   Adopted: Yes          SOCIAL HISTORY       Social History     Socioeconomic History    Marital status: Legally      Spouse name: None    Number of children: None    Years of education: None    Highest education level: None   Occupational History    None   Social Needs    Financial resource strain: None    Food insecurity     Worry: None     Inability: None    Transportation needs     Medical: None     Non-medical: None   Tobacco Use    Smoking status: Never Smoker    Smokeless tobacco: Never Used   Substance and Sexual Activity    Alcohol use: No     Alcohol/week: 0.0 standard drinks     Comment: denies    Drug use: No     Comment: denies    Sexual activity: None   Lifestyle    Physical activity     Days per week: None     Minutes per session: None    Stress: None   Relationships    Social connections     Talks on phone: None     Gets together: None     Attends [employer] service: None     Active member of club or organization: None     Attends meetings of clubs or organizations: None     Relationship status: None    Intimate partner violence     Fear of current or ex partner: None     Emotionally abused: None     Physically abused: None     Forced sexual activity: None   Other Topics Concern    None   Social History Narrative    None       SCREENINGS        [place] Coma Scale  Eye Opening: Spontaneous  Best Verbal Response: Oriented  Best Motor Response: Obeys commands  [place] Coma Scale Score: 15               PHYSICAL EXAM    (up to 7 for level 4, 8 or more for level 5)     ED Triage Vitals [02/26/21 1825]   BP Temp Temp Source Pulse Resp SpO2 Height Weight   (!) 89/58 97.9 °F (36.6 °C) Oral 85 18 98 % 5' 8\" (1.727 m) 160 lb (72.6 kg)       Physical Exam  Constitutional:       General: She is not in acute distress. Appearance: She is well-developed. She is not ill-appearing, toxic-appearing or diaphoretic. HENT:      Head: Normocephalic and atraumatic. Nose: Nose normal.      Mouth/Throat:      Mouth: Mucous membranes are moist.   Eyes:      Pupils: Pupils are equal, round, and reactive to light. Neck:      Musculoskeletal: Normal range of motion. Cardiovascular:      Rate and Rhythm: Normal rate and regular rhythm. Heart sounds: No murmur. No friction rub. No gallop. Pulmonary:      Effort: Pulmonary effort is normal.      Breath sounds: Normal breath sounds. No wheezing, rhonchi or rales. Abdominal:      General: Bowel sounds are normal. There is no distension. Palpations: Abdomen is soft. There is no mass. Tenderness: There is no abdominal tenderness. There is left CVA tenderness. There is no right CVA tenderness, guarding or rebound. Hernia: No hernia is present. Musculoskeletal:         General: No swelling. Right lower leg: No edema. Left lower leg: No edema. Skin:     General: Skin is warm and dry. Capillary Refill: Capillary refill takes less than 2 seconds. Findings: No rash. Neurological:      Mental Status: She is alert and oriented to person, place, and time.          DIAGNOSTIC RESULTS     EKG: All EKG's are interpreted by the Emergency Department Physician who either signs or Co-signs this chart in the absence of a cardiologist.        RADIOLOGY:   Non-plain film images such as CT, Ultrasound and MRI are read by the radiologist. Plain radiographic images are visualized and preliminarily interpreted by the emergency physician with the below findings:        Interpretation per the Radiologist below, if available at the time of this note:    [address]   Final Result      Previously visualize right renal calculi are not identified. No right hydronephrosis. Calculi as described, measuring 2 mm and 6 mm, proximal right ureter, without hydroureter. Constipation, greatest from cecum through transverse colon. Cholecystectomy. Appendectomy. Hysterectomy. All CT scans at this facility use dose modulation, iterative reconstruction, and/or weight based dosing when appropriate to reduce radiation dose to as low as reasonably achievable.                      ED BEDSIDE ULTRASOUND:   Performed by ED Physician - none    LABS:  Labs Reviewed   COMPREHENSIVE METABOLIC PANEL - Abnormal; Notable for the following components:       Result Value    Anion Gap 7 (*)     Glucose 115 (*)     CREATININE 1.11 (*)     GFR Non- 51.3 (*)     Alkaline Phosphatase 152 (*)     All other components within normal limits   CBC WITH AUTO DIFFERENTIAL - Abnormal; Notable for the following components:    WBC 14.2 (*)     MCHC 32.6 (*)     Neutrophils Absolute 10.0 (*)     Basophils Absolute 0.3 (*)     All other components within normal limits   URINE RT REFLEX TO CULTURE - Abnormal; Notable for the following components:    Color, UA DARK YELLOW (*)     Clarity, UA CLOUDY (*)     Ketones, Urine TRACE (*)     Protein, UA 30 (*)     All other components within normal limits   MICROSCOPIC URINALYSIS - Abnormal; Notable for the following components:    RBC, UA 3-5 (*) Bacteria, UA RARE (*)     WBC, UA 20-50 (*)     All other components within normal limits   CULTURE, URINE    Narrative:     ORDER#: [id number]                          ORDERED BY: NADINE JOINER  SOURCE: Urine Clean Catch                  COLLECTED:  02/26/21 20:00  ANTIBIOTICS AT LINA.:                      RECEIVED :  02/27/21 08:07   LACTIC ACID, PLASMA   LIPASE       All other labs were within normal range or not returned as of this dictation. EMERGENCY DEPARTMENT COURSE and DIFFERENTIAL DIAGNOSIS/MDM:   Vitals:    Vitals:    02/26/21 2100 02/26/21 2130 02/26/21 2145 02/26/21 2200   BP: (!) 88/56 (!) 88/56  (!) 89/56   Pulse: 64 70 69 63   Resp: 16 24 16    Temp:       TempSrc:       SpO2: 94% 95% 95% 98%   Weight:       Height:             MDM     Pt is a 49 yo F who presents to the ED With bilateral flank pain, urinary urgency and frequency. She is afebrile and hypotensive to 89/58. Pt states this is her baseline blood pressure. She was given 1 L IV NS, IV morphine, IV zofran in the ED. CMP remarkable for Cr 1.11 and GFR 51.3, patient's baseline. She has CKD III. CBC remarkable for wbc 14.2. UA shows wbc, rare bacteria but no leukocytes esterase or nitrites. Suspect contamination but will treat for UTI/possible pyelo. Pt given IV rocephin at this time. CT kidney shows 2 mm and 6 mm nephrolithiasis, proximal R ureter without hydroureter. No other acute abnormalities. Pt reassessed with continued pain, given IV dilaudid with complete relief. Pt is non toxic appearing with stable vitals. She is afebrile. Less concern for sepsis at this time. She is stable for discharge. Given scripts for bactrim, percocet, zofran, flomax and diflucan. Pt referred to urology for follow up in 1 day. Return to the ED for worsening sx, given warning signs for which she should return. Pt understands and agrees to plan, all questions answered.      REASSESSMENT          CRITICAL CARE TIME   Total Critical Care time was 0 minutes, excluding separately reportable procedures. There was a high probability of clinically significant/life threatening deterioration in the patient's condition which required my urgent intervention. CONSULTS:  None    PROCEDURES:  Unless otherwise noted below, none     Procedures        FINAL IMPRESSION      1. Nephrolithiasis          DISPOSITION/PLAN   DISPOSITION Decision To Discharge 02/26/2021 10:05:51 PM      PATIENT REFERRED TO:  Luis Moncada MD  [address] [place] [address]  [phone]    Schedule an appointment as soon as possible for a visit   As needed, If symptoms worsen    [facility]) ED  [address] [facility]  [phone]  Go to   As needed, If symptoms worsen    Manjit Smith MD  [address]  [phone]    Schedule an appointment as soon as possible for a visit in 1 day        DISCHARGE MEDICATIONS:  Discharge Medication List as of 2/26/2021  9:36 PM      START taking these medications    Details   oxyCODONE-acetaminophen (PERCOCET) 5-325 MG per tablet Take 1 tablet by mouth every 6 hours as needed for Pain for up to 3 days. Intended supply: 3 days. Take lowest dose possible to manage pain, Disp-12 tablet, R-0Print      !! ondansetron (ZOFRAN-ODT) 4 MG disintegrating tablet Place 1 tablet under the tongue every 8 hours as needed for Nausea or Vomiting, Disp-15 tablet, R-0Print      sulfamethoxazole-trimethoprim (BACTRIM DS) 800-160 MG per tablet Take 1 tablet by mouth 2 times daily for 10 days, Disp-20 tablet, R-0Print       !! - Potential duplicate medications found. Please discuss with provider. Controlled Substances Monitoring:     RX Monitoring 1/31/2021   Periodic Controlled Substance Monitoring Possible medication side effects, risk of tolerance/dependence & alternative treatments discussed.        (Please note that portions of this note were completed with a voice recognition program.  Efforts were made to edit the dictations but occasionally words are mis-transcribed.)    Stacy Srivastava PA-C (electronically signed)             Stacy Srivastava PA-C  03/01/21 1945

## 2021-02-26 NOTE — ED TRIAGE NOTES
Pt c/o bilat flank pain, urinary urgency and polyuria for the past two days, Pt is A&OX3, calm, ambulatory, afebrile, breathes are equal and unlabored,

## 2021-02-28 LAB — URINE CULTURE, ROUTINE: NORMAL

## 2021-03-01 ASSESSMENT — ENCOUNTER SYMPTOMS
ABDOMINAL DISTENTION: 0
CONSTIPATION: 0
SHORTNESS OF BREATH: 0
NAUSEA: 1
VOMITING: 0
PHOTOPHOBIA: 0
COUGH: 0
WHEEZING: 0
ABDOMINAL PAIN: 0
BLOOD IN STOOL: 0
DIARRHEA: 0

## 2021-03-08 ENCOUNTER — APPOINTMENT (OUTPATIENT)
Dept: CT IMAGING | Age: 56
End: 2021-03-08
Payer: MEDICARE

## 2021-03-08 ENCOUNTER — HOSPITAL ENCOUNTER (EMERGENCY)
Age: 56
Discharge: HOME | End: 2021-03-08
Attending: EMERGENCY MEDICINE
Payer: MEDICARE

## 2021-03-08 VITALS
OXYGEN SATURATION: 97 % | TEMPERATURE: 98.4 F | HEART RATE: 91 BPM | BODY MASS INDEX: 24.25 KG/M2 | DIASTOLIC BLOOD PRESSURE: 65 MMHG | WEIGHT: 160 LBS | SYSTOLIC BLOOD PRESSURE: 99 MMHG | RESPIRATION RATE: 18 BRPM | HEIGHT: 68 IN

## 2021-03-08 DIAGNOSIS — R10.9 LEFT FLANK PAIN: Primary | ICD-10-CM

## 2021-03-08 LAB
BACTERIA: NEGATIVE /HPF
BILIRUBIN URINE: NEGATIVE
BLOOD, URINE: NEGATIVE
CLARITY: CLEAR
COLOR: YELLOW
EPITHELIAL CELLS, UA: ABNORMAL /HPF (ref 0–5)
GLUCOSE URINE: 250 MG/DL
HYALINE CASTS: ABNORMAL /HPF (ref 0–5)
KETONES, URINE: NEGATIVE MG/DL
LEUKOCYTE ESTERASE, URINE: ABNORMAL
NITRITE, URINE: NEGATIVE
PH UA: 5 (ref 5–9)
PROTEIN UA: NEGATIVE MG/DL
RBC UA: ABNORMAL /HPF (ref 0–5)
SPECIFIC GRAVITY UA: 1.01 (ref 1–1.03)
URINE REFLEX TO CULTURE: YES
UROBILINOGEN, URINE: 0.2 E.U./DL
WBC UA: ABNORMAL /HPF (ref 0–5)

## 2021-03-08 PROCEDURE — 6360000002 HC RX W HCPCS: Performed by: EMERGENCY MEDICINE

## 2021-03-08 PROCEDURE — 81001 URINALYSIS AUTO W/SCOPE: CPT

## 2021-03-08 PROCEDURE — 74176 CT ABD & PELVIS W/O CONTRAST: CPT

## 2021-03-08 PROCEDURE — 96372 THER/PROPH/DIAG INJ SC/IM: CPT

## 2021-03-08 PROCEDURE — 87086 URINE CULTURE/COLONY COUNT: CPT

## 2021-03-08 PROCEDURE — 6370000000 HC RX 637 (ALT 250 FOR IP): Performed by: EMERGENCY MEDICINE

## 2021-03-08 PROCEDURE — 99284 EMERGENCY DEPT VISIT MOD MDM: CPT

## 2021-03-08 RX ORDER — 0.9 % SODIUM CHLORIDE 0.9 %
1000 INTRAVENOUS SOLUTION INTRAVENOUS ONCE
Status: DISCONTINUED | OUTPATIENT
Start: 2021-03-08 | End: 2021-03-08

## 2021-03-08 RX ORDER — OXYCODONE HYDROCHLORIDE AND ACETAMINOPHEN 5; 325 MG/1; MG/1
1-2 TABLET ORAL EVERY 6 HOURS PRN
Qty: 20 TABLET | Refills: 0 | Status: SHIPPED | OUTPATIENT
Start: 2021-03-08 | End: 2021-03-11

## 2021-03-08 RX ORDER — PHENAZOPYRIDINE HYDROCHLORIDE 200 MG/1
200 TABLET, FILM COATED ORAL 3 TIMES DAILY PRN
Qty: 6 TABLET | Refills: 0 | Status: SHIPPED | OUTPATIENT
Start: 2021-03-08 | End: 2021-03-11

## 2021-03-08 RX ORDER — ONDANSETRON 4 MG/1
4 TABLET, ORALLY DISINTEGRATING ORAL ONCE
Status: COMPLETED | OUTPATIENT
Start: 2021-03-08 | End: 2021-03-08

## 2021-03-08 RX ORDER — ONDANSETRON 2 MG/ML
4 INJECTION INTRAMUSCULAR; INTRAVENOUS ONCE
Status: DISCONTINUED | OUTPATIENT
Start: 2021-03-08 | End: 2021-03-08

## 2021-03-08 RX ORDER — MORPHINE SULFATE 2 MG/ML
4 INJECTION, SOLUTION INTRAMUSCULAR; INTRAVENOUS ONCE
Status: DISCONTINUED | OUTPATIENT
Start: 2021-03-08 | End: 2021-03-08

## 2021-03-08 RX ORDER — FENTANYL CITRATE 50 UG/ML
50 INJECTION, SOLUTION INTRAMUSCULAR; INTRAVENOUS ONCE
Status: COMPLETED | OUTPATIENT
Start: 2021-03-08 | End: 2021-03-08

## 2021-03-08 RX ADMIN — FENTANYL CITRATE 50 MCG: 50 INJECTION, SOLUTION INTRAMUSCULAR; INTRAVENOUS at 19:56

## 2021-03-08 RX ADMIN — ONDANSETRON 4 MG: 4 TABLET, ORALLY DISINTEGRATING ORAL at 19:56

## 2021-03-08 ASSESSMENT — PAIN DESCRIPTION - ORIENTATION: ORIENTATION: RIGHT;LEFT

## 2021-03-08 ASSESSMENT — ENCOUNTER SYMPTOMS
ABDOMINAL DISTENTION: 0
SHORTNESS OF BREATH: 0
PHOTOPHOBIA: 0
FACIAL SWELLING: 0
EYE DISCHARGE: 0
WHEEZING: 0
ABDOMINAL PAIN: 0
RHINORRHEA: 0
COLOR CHANGE: 0
VOMITING: 0

## 2021-03-08 ASSESSMENT — PAIN DESCRIPTION - LOCATION
LOCATION: FLANK
LOCATION: ABDOMEN;FLANK

## 2021-03-08 ASSESSMENT — PAIN SCALES - GENERAL
PAINLEVEL_OUTOF10: 10
PAINLEVEL_OUTOF10: 8
PAINLEVEL_OUTOF10: 10

## 2021-03-08 ASSESSMENT — PAIN DESCRIPTION - PAIN TYPE
TYPE: ACUTE PAIN
TYPE: ACUTE PAIN

## 2021-03-09 NOTE — ED PROVIDER NOTES
[address] [facility] ED  eMERGENCY dEPARTMENT eNCOUnter      Pt Name: Trey Flowers  MRN: [id number]  [place] 1965  Date of evaluation: 3/8/2021  Provider: Mario Conrad, [address] [place] [address]       Chief Complaint   Patient presents with    Flank Pain     both sides (pt states she was here 2 weeks ago for the same thing)         HISTORY OF PRESENT ILLNESS   (Location/Symptom, Timing/Onset,Context/Setting, Quality, Duration, Modifying Factors, Severity)  Note limiting factors. Trey Flowers is a 50 y.o. female who presents to the emergency department with a chief complaint of left-sided flank pain. She states that it hurts a little bit on both sides but much worse on the left. It hurts to the touch and hurts when she moves. She was here 2 weeks ago for the same type of pain and had a kidney stone diagnosis at that point. She did pass a stone. She was fine for a couple days and then this pain started and has gotten worse. She has not really taken much for it other than Zofran. She does have urgency and then spasming and pain after urination. HPI    NursingNotes were reviewed. REVIEW OF SYSTEMS    (2-9 systems for level 4, 10 or more for level 5)     Review of Systems   Constitutional: Negative for activity change and appetite change. HENT: Negative for congestion, facial swelling and rhinorrhea. Eyes: Negative for photophobia and discharge. Respiratory: Negative for shortness of breath and wheezing. Cardiovascular: Negative for chest pain. Gastrointestinal: Negative for abdominal distention, abdominal pain and vomiting. Endocrine: Negative for polydipsia and polyphagia. Genitourinary: Positive for flank pain and urgency. Negative for difficulty urinating, frequency, vaginal bleeding and vaginal discharge. Musculoskeletal: Negative for gait problem. Skin: Negative for color change. Allergic/Immunologic: Negative for immunocompromised state.    Neurological: Negative for dizziness, weakness and light-headedness. Hematological: Negative for adenopathy. Psychiatric/Behavioral: Negative for behavioral problems. Except as noted above the remainder of the review of systems was reviewed and negative.        PAST MEDICAL HISTORY     Past Medical History:   Diagnosis Date    Anxiety     Asthma     as child    Depression     Depression     dysthymia - following lupus dx age 23    [facility] mellitus ([facility] [address]     Foot fracture, left     GERD (gastroesophageal reflux disease)     Hyperlipidemia     Hypothyroidism     Kidney stones     Lupus ([facility] [address]     dr barrera -     Migraine     PONV (postoperative nausea and vomiting)     Sciatica     left sided         SURGICALHISTORY       Past Surgical History:   Procedure Laterality Date    APPENDECTOMY      ARTHRODESIS Left 11/26/2019    ARTHRODESIS OF THE FIRST METATARSAL PHALANGEAL JOINT LEFT FOOT performed by Aziza Garcia DPM at [place] [address]      COLONOSCOPY      FOOT CLOSED REDUCTION Left 1/3/2017    LEFT FOOT PERCUTANEOUS PINNING Laural Truong FRACTURE performed by Clara Spear MD at [address] [address]      HYSTERECTOMY      [place] ESOPHAGOGASTRODUODENOSCOPY TRANSORAL DIAGNOSTIC N/A 3/8/2017    EGD ESOPHAGOGASTRODUODENOSCOPY WITH DILATION performed by Christiana Otero MD at . [facility] ESOPHAGOGASTRODUODENOSCOPY TRANSORAL DIAGNOSTIC N/A 11/7/2018    EGD ESOPHAGOGASTRODUODENOSCOPY WITH DILATION performed by Christiana Otero MD at [address] [place] [address]       Discharge Medication List as of 3/8/2021  8:34 PM      CONTINUE these medications which have NOT CHANGED    Details   tamsulosin (FLOMAX) 0.4 MG capsule Take 1 capsule by mouth daily for 10 days, Disp-10 capsule, R-0Print      sulfamethoxazole-trimethoprim (BACTRIM DS) 800-160 MG per tablet Take 1 tablet by mouth 2 times daily for 10 days, Disp-20 tablet, R-0Print      insulin glargine (LANTUS SOLOSTAR) 100 UNIT/ML injection pen 140 units at bedtime, Disp-15 pen, R-3Normal      !! insulin lispro, 1 Unit Dial, (HUMALOG KWIKPEN) 100 UNIT/ML SOPN 45-50  units with each meals, Disp-10 pen, R-3Normal      ondansetron (ZOFRAN ODT) 4 MG disintegrating tablet Take 1 tablet by mouth every 8 hours as needed for Nausea, Disp-10 tablet, R-0Print      !! blood glucose monitor strips Pt test 3x daily Dx E11.65, Disp-100 strip,R-3, Normal      !! Drug Tyler Unilet Lancets 30G MISC Disp-200 each,R-3, NormalCheck blood sugars twice daily or as directed. insulin aspart (NOVOLOG FLEXPEN) 100 UNIT/ML injection pen Inject 35 units tid with meals, Disp-15 pen,R-3Normal      !! Insulin Pen Needle (NOVOFINE) 32G X 6 MM MISC Disp-300 each,R-3, Normalqid      !! insulin lispro, 1 Unit Dial, (HUMALOG KWIKPEN) 100 UNIT/ML SOPN Inject 20 Units into the skin 3 times daily (before meals), Disp-10 pen, R-3Normal      !! blood glucose monitor kit and supplies Please give 1 meter covered by insurance Dx E11.65, Disp-1 kit, R-0, Normal      !! Lancets MISC Disp-100 each, R-3, NormalPt test 3x daily Dx E11.65      !! blood glucose test strips (ASCENSIA AUTODISC VI;ONE TOUCH ULTRA TEST VI) strip Disp-200 strip, R-3, NormalCheck blood sugars twice daily or as directed. !! blood glucose monitor kit and supplies Test 3 times a day & as needed for symptoms of irregular blood glucose., Disp-1 kit, R-0, Normal      !!  Insulin Pen Needle (NOVOFINE) 32G X 6 MM MISC Disp-100 each, R-3, NormalBID      estradiol (CLIMARA) 0.025 MG/24HR Place 0.025 patches onto the skin every 7 days On mondays, R-11Historical Med      albuterol sulfate HFA (VENTOLIN HFA) 108 (90 Base) MCG/ACT inhaler Inhale 2 puffs into the lungs every 6 hours as needed for Wheezing, Disp-1 Inhaler, R-0Print      rosuvastatin (CRESTOR) 40 MG tablet Take 1 tablet by mouth daily, Disp-90 tablet, R-3Normal      pantoprazole (PROTONIX) 40 MG tablet Take 1 tablet by mouth daily, Disp-90 tablet, R-3Normal      levothyroxine (SYNTHROID) 50 MCG tablet Take 1 tablet by mouth daily, Disp-90 tablet, R-3      !! Blood Glucose Monitoring Suppl (TRUE TRACK BLOOD GLUCOSE) CAROLE Disp-100 Device, R-5, NormalCheck blood sugars twice daily or as directed. hydroxychloroquine (PLAQUENIL) 200 MG tablet Take 1 tablet by mouth 2 times daily, R-5       !! - Potential duplicate medications found. Please discuss with provider.           ALLERGIES     Aspirin, Imitrex [sumatriptan], Lidocaine, Pcn [penicillins], Seasonal, Toradol [ketorolac tromethamine], and Ultram [tramadol]    FAMILY HISTORY       Family History   Adopted: Yes          SOCIAL HISTORY       Social History     Socioeconomic History    Marital status: Legally      Spouse name: Not on file    Number of children: Not on file    Years of education: Not on file    Highest education level: Not on file   Occupational History    Not on file   Social Needs    Financial resource strain: Not on file    Food insecurity     Worry: Not on file     Inability: Not on file    Transportation needs     Medical: Not on file     Non-medical: Not on file   Tobacco Use    Smoking status: Never Smoker    Smokeless tobacco: Never Used   Substance and Sexual Activity    Alcohol use: No     Alcohol/week: 0.0 standard drinks     Comment: denies    Drug use: No     Comment: denies    Sexual activity: Not on file   Lifestyle    Physical activity     Days per week: Not on file     Minutes per session: Not on file    Stress: Not on file   Relationships    Social connections     Talks on phone: Not on file     Gets together: Not on file     Attends Rastafarian service: Not on file     Active member of club or organization: Not on file     Attends meetings of clubs or organizations: Not on file     Relationship status: Not on file   [facility] Intimate partner violence     Fear of current or ex partner: Not on file     Emotionally abused: Not on file     Physically abused: Not on file     Forced sexual activity: Not on file   Other Topics Concern    Not on file   Social History Narrative    Not on file       SCREENINGS    Aries Coma Scale  Eye Opening: Spontaneous  Best Verbal Response: Oriented  Best Motor Response: Obeys commands  [place] Coma Scale Score: 15 @FLOW([id number])@      PHYSICAL EXAM    (up to 7 for level 4, 8 or more for level 5)     ED Triage Vitals [03/08/21 1803]   BP Temp Temp Source Pulse Resp SpO2 Height Weight   99/65 98.4 °F (36.9 °C) Oral 91 18 97 % 5' 8\" (1.727 m) 160 lb (72.6 kg)       Physical Exam  Constitutional:       General: She is in acute distress. Appearance: She is well-developed. Comments: Patient is curled in a ball and rubbing her belly. HENT:      Head: Normocephalic and atraumatic. Eyes:      Conjunctiva/sclera: Conjunctivae normal.      Pupils: Pupils are equal, round, and reactive to light. Neck:      Musculoskeletal: Normal range of motion and neck supple. Cardiovascular:      Rate and Rhythm: Normal rate. Pulmonary:      Effort: Pulmonary effort is normal.   Abdominal:      General: Bowel sounds are normal.      Palpations: Abdomen is soft. Tenderness: There is abdominal tenderness. There is no right CVA tenderness or left CVA tenderness. Comments: Patient is tender to palpation over her bladder with CVA tenderness negative bilaterally   Musculoskeletal: Normal range of motion. Skin:     General: Skin is warm and dry. Findings: No bruising or rash. Neurological:      Mental Status: She is alert and oriented to person, place, and time. Deep Tendon Reflexes: Reflexes are normal and symmetric.          DIAGNOSTIC RESULTS     EKG: All EKG's are interpreted by the Emergency Department Physician who either signs or Co-signsthis chart in the absence of a of clinicallysignificant/life threatening deterioration in the patient's condition which required my urgent intervention. CONSULTS:  None    PROCEDURES:  Unless otherwise noted below, none     Procedures    FINAL IMPRESSION      1. Left flank pain          DISPOSITION/PLAN   DISPOSITION Decision To Discharge 03/08/2021 08:32:23 PM      PATIENT REFERRED TO:  Kyleigh Garcia MD  [address] [place] [address]  [phone]      As needed      DISCHARGE MEDICATIONS:  Discharge Medication List as of 3/8/2021  8:34 PM      START taking these medications    Details   oxyCODONE-acetaminophen (PERCOCET) 5-325 MG per tablet Take 1-2 tablets by mouth every 6 hours as needed for Pain for up to 3 days. WARNING:  May cause drowsiness. May impair ability to operate vehicles or machinery.   Do not use in combination with alcohol., Disp-20 tablet, R-0Print      phenazopyridine (PYRIDIUM) 200 MG tablet Take 1 tablet by mouth 3 times daily as needed for Pain (bladder spasm/pain), Disp-6 tablet, R-0Normal                (Please note that portions of this note were completed with a voice recognition program.  Efforts were made to edit the dictations but occasionally words are mis-transcribed.)    Nancy Pink DO (electronically signed)  Attending Emergency Physician          Nancy Pink DO  03/15/21 1040

## 2021-03-09 NOTE — ED NOTES
Pt c/o a history of kidney stones. Pt states this feels the same as her previous stones. Pt c/o LLQ and bilateral flank pain with urinary urgency and frequency without feeling empty. Pt is alert and oriented x 4, speaking in full sentences with unlabored breathing. Pt verbalizes understanding of discharge instructions. Pt ambulated from department without difficulty.      Sandip Croft RN  03/08/21 0073

## 2021-03-10 LAB — URINE CULTURE, ROUTINE: NORMAL

## 2021-03-16 ENCOUNTER — VIRTUAL VISIT (OUTPATIENT)
Dept: ENDOCRINOLOGY | Age: 56
End: 2021-03-16
Payer: MEDICARE

## 2021-03-16 DIAGNOSIS — E11.69 TYPE 2 DIABETES MELLITUS WITH OTHER SPECIFIED COMPLICATION, UNSPECIFIED WHETHER LONG TERM INSULIN USE (HCC): Primary | ICD-10-CM

## 2021-03-16 PROCEDURE — 99442 PR PHYS/QHP TELEPHONE EVALUATION 11-20 MIN: CPT | Performed by: INTERNAL MEDICINE

## 2021-03-16 RX ORDER — INSULIN LISPRO 100 [IU]/ML
INJECTION, SOLUTION INTRAVENOUS; SUBCUTANEOUS
Qty: 10 PEN | Refills: 3 | Status: ON HOLD | OUTPATIENT
Start: 2021-03-16 | End: 2021-05-05

## 2021-03-16 RX ORDER — INSULIN GLARGINE 100 [IU]/ML
INJECTION, SOLUTION SUBCUTANEOUS
Qty: 15 PEN | Refills: 3 | Status: ON HOLD | OUTPATIENT
Start: 2021-03-16 | End: 2021-05-05

## 2021-03-16 NOTE — PROGRESS NOTES
3/16/2021    TELEHEALTH EVALUATION -- Audio/Visual (During QQWID-21 public health emergency)    Due to Ayesha 19 outbreak, patient's office visit was converted to a virtual visit. Patient was contacted and agreed to proceed with a virtual visit via Telephone Visit  The risks and benefits of converting to a virtual visit were discussed in light of the current infectious disease epidemic. Patient also understood that insurance coverage and co-pays are up to their individual insurance plans. HPI: Telephone visit patient was at home I was at my office    [facility] (:  1965) has requested an audio/video evaluation for the following concern(s):    Follow-up on type 2 diabetes patient on Lantus 140 units at bedtime plus Humalog 45 to 50 units with each meals testing blood sugar 3 times a day average in the 200 range denies any hypoglycemia labs reviewed from end  A1c was around 9.8    Results for Annalisa Buckley (MRN [id number]) as of 3/16/2021 11:46   Ref. Range 2020 11:16 2020 10:04 2020 11:33 2020 09:18 2020 07:08 2021 10:43   Hemoglobin A1C Latest Ref Range: 4.8 - 5.9 % 10.5 10.0 (H) 9.2 (H) 10.1 (H) 9.9 (H) 9.8 (H)       Results for Annalisa Buckley (MRN [id number]) as of 3/16/2021 11:46   Ref.  Range 2021 10:43 2021 10:44   Sodium Latest Ref Range: 135 - 144 mEq/L  139   Potassium Latest Ref Range: 3.4 - 4.9 mEq/L  4.5   Chloride Latest Ref Range: 95 - 107 mEq/L  98   CO2 Latest Ref Range: 20 - 31 mEq/L  27   BUN Latest Ref Range: 6 - 20 mg/dL  12   Creatinine Latest Ref Range: 0.50 - 0.90 mg/dL  1.01 (H)   Anion Gap Latest Ref Range: 9 - 15 mEq/L  14   GFR Non- Latest Ref Range: >60   57.2 (L)   GFR  Latest Ref Range: >60   >60.0   Glucose Latest Ref Range: 70 - 99 mg/dL  229 (H)   Calcium Latest Ref Range: 8.5 - 9.9 mg/dL  9.0   Hemoglobin A1C Latest Ref Range: 4.8 - 5.9 % 9.8 (H)      Patient Active Problem List Diagnosis    DM (diabetes mellitus) ([facility] [address]    Hyperlipemia    Hypothyroid    Vitamin D deficiency    SLE (systemic lupus erythematosus related syndrome) (HCC)    Asthma    Panic disorder with agoraphobia    Hereditary essential tremor    Allergic rhinitis    Type 2 diabetes mellitus with diabetic mononeuropathy ([facility] [address]    Raynaud's phenomenon without gangrene    Essential hypertension    Type 2 diabetes mellitus without complication (HCC)    Disseminated lupus erythematosus (HCC)    Foot fracture, left    Acute non-recurrent frontal sinusitis    Gastritis without bleeding    Dysphagia    Gastric polyp    Chest pain         Review of Systems    Prior to Visit Medications    Medication Sig Taking? Authorizing Provider   tamsulosin (FLOMAX) 0.4 MG capsule Take 1 capsule by mouth daily for 10 days  [employer], PA-C   insulin glargine (LANTUS SOLOSTAR) 100 UNIT/ML injection pen 140 units at bedtime  Natan Greer MD   insulin lispro, 1 Unit Dial, (HUMALOG KWIKPEN) 100 UNIT/ML SOPN 45-50  units with each meals  Mateus Costello MD   ondansetron (ZOFRAN ODT) 4 MG disintegrating tablet Take 1 tablet by mouth every 8 hours as needed for Nausea  Heidi Francis MD   tamsulosin (FLOMAX) 0.4 MG capsule Take 1 capsule by mouth daily for 10 days  Therese Spear MD   blood glucose monitor strips Pt test 3x daily Dx G24.36  Natan Greer MD   Drug Rosario Case Lancets 30G MISC Check blood sugars twice daily or as directed.   Natan Greer MD   insulin aspart (NOVOLOG FLEXPEN) 100 UNIT/ML injection pen Inject 35 units tid with meals  Mateus Costello MD   Insulin Pen Needle (NOVOFINE) 32G X 6 MM MISC qid  Natan Greer MD   insulin lispro, 1 Unit Dial, (HUMALOG KWIKPEN) 100 UNIT/ML SOPN Inject 20 Units into the skin 3 times daily (before meals)  Natan Greer MD   blood glucose monitor kit and supplies Please give 1 meter covered by insurance Dx E11.65  Natan Greer MD   Lancets MISC Pt test 3x daily Dx Z91.61  Natan Greer MD   blood glucose test strips (ASCENSIA AUTODISC VI;ONE TOUCH ULTRA TEST VI) strip Check blood sugars twice daily or as directed. Amber Taylor MD   blood glucose monitor kit and supplies Test 3 times a day & as needed for symptoms of irregular blood glucose. Amber Taylor MD   Insulin Pen Needle (NOVOFINE) 32G X 6 MM MISC BID  Mateus Costello MD   estradiol (CLIMARA) 0.025 MG/24HR Place 0.025 patches onto the skin every 7 days On mondays  Historical Provider, MD   albuterol sulfate HFA (VENTOLIN HFA) 108 (90 Base) MCG/ACT inhaler Inhale 2 puffs into the lungs every 6 hours as needed for Wheezing  Olivia Mcneill MD   rosuvastatin (CRESTOR) 40 MG tablet Take 1 tablet by mouth daily  Janine Ryan MD   pantoprazole (PROTONIX) 40 MG tablet Take 1 tablet by mouth daily  Coleen Munoz PA-C   levothyroxine (SYNTHROID) 50 MCG tablet Take 1 tablet by mouth daily  Kita Wheat MD   Blood Glucose Monitoring Suppl (TRUE TRACK BLOOD GLUCOSE) CAROLE Check blood sugars twice daily or as directed.   Portia Nesbitt MD   hydroxychloroquine (PLAQUENIL) 200 MG tablet Take 1 tablet by mouth 2 times daily  Historical Provider, MD       Social History     Tobacco Use    Smoking status: Never Smoker    Smokeless tobacco: Never Used   Substance Use Topics    Alcohol use: No     Alcohol/week: 0.0 standard drinks     Comment: denies    Drug use: No     Comment: denies            PHYSICAL EXAMINATION:  [ INSTRUCTIONS:  \"[x]\" Indicates a positive item  \"[]\" Indicates a negative item  -- DELETE ALL ITEMS NOT EXAMINED]  [] Alert  [] Oriented to person/place/time    [] No apparent distress  [] Toxic appearing    [] Face flushed appearing [] Sclera clear  [] Lips are cyanotic      [] Breathing appears normal  [] Appears tachypneic      [] Rash on visible skin    [] Cranial Nerves II-XII grossly intact    [] Motor grossly intact in visible upper extremities    [] Motor grossly intact in visible lower extremities    [] Normal Mood  [] Anxious appearing [] Depressed appearing  [] Confused appearing      [] Poor short term memory  [] Poor long term memory    [] OTHER:      Due to this being a TeleHealth encounter, evaluation of the following organ systems is limited: Vitals/Constitutional/EENT/Resp/CV/GI//MS/Neuro/Skin/Heme-Lymph-Imm. ASSESSMENT/PLAN:     Diagnosis Orders   1. Type 2 diabetes mellitus with other specified complication, unspecified whether long term insulin use (HCC)  Basic Metabolic Panel    Hemoglobin A1C     Orders Placed This Encounter   Procedures    Basic Metabolic Panel     Standing Status:   Future     Standing Expiration Date:   3/16/2022    Hemoglobin A1C     Standing Status:   Future     Standing Expiration Date:   3/16/2022     Increase Lantus 260 units at bedtime plus Humalog 55 units with each meals repeat labs in 3 months time A1c goal of 7 or lower  Orders Placed This Encounter   Medications    insulin glargine (LANTUS SOLOSTAR) 100 UNIT/ML injection pen     Sig: 160  units at bedtime     Dispense:  15 pen     Refill:  3    insulin lispro, 1 Unit Dial, (HUMALOG KWIKPEN) 100 UNIT/ML SOPN     Si  units with each meals     Dispense:  10 pen     Refill:  3       An  electronic signature was used to authenticate this note. --Elio Garces MD on 3/16/2021 at 11:46 AM        Pursuant to the emergency declaration under the [facility] waiver authority and the [employer] Act, this Virtual  Visit was conducted, with patient's consent, to reduce the patient's risk of exposure to COVID-19 and provide continuity of care for an established patient. Services were provided through a video synchronous discussion virtually to substitute for in-person clinic visit.

## 2021-04-15 ENCOUNTER — HOSPITAL ENCOUNTER (EMERGENCY)
Age: 56
Discharge: HOME | End: 2021-04-15
Attending: EMERGENCY MEDICINE
Payer: MEDICARE

## 2021-04-15 VITALS
SYSTOLIC BLOOD PRESSURE: 111 MMHG | HEIGHT: 68 IN | WEIGHT: 160 LBS | HEART RATE: 77 BPM | DIASTOLIC BLOOD PRESSURE: 78 MMHG | OXYGEN SATURATION: 98 % | BODY MASS INDEX: 24.25 KG/M2 | RESPIRATION RATE: 16 BRPM | TEMPERATURE: 98.3 F

## 2021-04-15 DIAGNOSIS — F31.9 BIPOLAR 1 DISORDER (HCC): ICD-10-CM

## 2021-04-15 DIAGNOSIS — G43.909 MIGRAINE WITHOUT STATUS MIGRAINOSUS, NOT INTRACTABLE, UNSPECIFIED MIGRAINE TYPE: Primary | ICD-10-CM

## 2021-04-15 PROCEDURE — 96372 THER/PROPH/DIAG INJ SC/IM: CPT

## 2021-04-15 PROCEDURE — 6360000002 HC RX W HCPCS: Performed by: EMERGENCY MEDICINE

## 2021-04-15 PROCEDURE — 99283 EMERGENCY DEPT VISIT LOW MDM: CPT

## 2021-04-15 PROCEDURE — 6370000000 HC RX 637 (ALT 250 FOR IP): Performed by: EMERGENCY MEDICINE

## 2021-04-15 RX ORDER — METOCLOPRAMIDE HYDROCHLORIDE 5 MG/ML
10 INJECTION INTRAMUSCULAR; INTRAVENOUS ONCE
Status: COMPLETED | OUTPATIENT
Start: 2021-04-15 | End: 2021-04-15

## 2021-04-15 RX ORDER — DIPHENHYDRAMINE HYDROCHLORIDE 50 MG/ML
50 INJECTION INTRAMUSCULAR; INTRAVENOUS ONCE
Status: COMPLETED | OUTPATIENT
Start: 2021-04-15 | End: 2021-04-15

## 2021-04-15 RX ORDER — HYDROCODONE BITARTRATE AND ACETAMINOPHEN 5; 325 MG/1; MG/1
1 TABLET ORAL ONCE
Status: COMPLETED | OUTPATIENT
Start: 2021-04-15 | End: 2021-04-15

## 2021-04-15 RX ADMIN — HYDROCODONE BITARTRATE AND ACETAMINOPHEN 1 TABLET: 5; 325 TABLET ORAL at 19:56

## 2021-04-15 RX ADMIN — METOCLOPRAMIDE HYDROCHLORIDE 10 MG: 5 INJECTION INTRAMUSCULAR; INTRAVENOUS at 19:56

## 2021-04-15 RX ADMIN — DIPHENHYDRAMINE HYDROCHLORIDE 50 MG: 50 INJECTION INTRAMUSCULAR; INTRAVENOUS at 19:56

## 2021-04-15 ASSESSMENT — PAIN DESCRIPTION - PAIN TYPE: TYPE: ACUTE PAIN

## 2021-04-15 ASSESSMENT — PAIN DESCRIPTION - LOCATION: LOCATION: HEAD

## 2021-04-15 ASSESSMENT — PAIN SCALES - GENERAL: PAINLEVEL_OUTOF10: 10

## 2021-04-16 ENCOUNTER — APPOINTMENT (OUTPATIENT)
Dept: CT IMAGING | Age: 56
End: 2021-04-16
Payer: MEDICARE

## 2021-04-16 ENCOUNTER — HOSPITAL ENCOUNTER (EMERGENCY)
Age: 56
Discharge: HOME | End: 2021-04-16
Attending: EMERGENCY MEDICINE
Payer: MEDICARE

## 2021-04-16 VITALS
RESPIRATION RATE: 18 BRPM | HEART RATE: 84 BPM | HEIGHT: 68 IN | SYSTOLIC BLOOD PRESSURE: 121 MMHG | BODY MASS INDEX: 24.25 KG/M2 | TEMPERATURE: 98.3 F | DIASTOLIC BLOOD PRESSURE: 75 MMHG | WEIGHT: 160 LBS | OXYGEN SATURATION: 96 %

## 2021-04-16 DIAGNOSIS — N20.0 KIDNEY STONE: ICD-10-CM

## 2021-04-16 DIAGNOSIS — R10.9 ABDOMINAL PAIN, UNSPECIFIED ABDOMINAL LOCATION: Primary | ICD-10-CM

## 2021-04-16 LAB
ALBUMIN SERPL-MCNC: 4.3 G/DL (ref 3.5–4.6)
ALP BLD-CCNC: 175 U/L (ref 40–130)
ALT SERPL-CCNC: 44 U/L (ref 0–33)
ANION GAP SERPL CALCULATED.3IONS-SCNC: 13 MEQ/L (ref 9–15)
AST SERPL-CCNC: 41 U/L (ref 0–35)
BASOPHILS ABSOLUTE: 0 K/UL (ref 0–0.2)
BASOPHILS RELATIVE PERCENT: 0.3 %
BILIRUB SERPL-MCNC: 0.4 MG/DL (ref 0.2–0.7)
BILIRUBIN URINE: NEGATIVE
BLOOD, URINE: NEGATIVE
BUN BLDV-MCNC: 11 MG/DL (ref 6–20)
CALCIUM SERPL-MCNC: 8.8 MG/DL (ref 8.5–9.9)
CHLORIDE BLD-SCNC: 100 MEQ/L (ref 95–107)
CLARITY: CLEAR
CO2: 24 MEQ/L (ref 20–31)
COLOR: YELLOW
CREAT SERPL-MCNC: 0.97 MG/DL (ref 0.5–0.9)
EOSINOPHILS ABSOLUTE: 0.3 K/UL (ref 0–0.7)
EOSINOPHILS RELATIVE PERCENT: 2.9 %
GFR AFRICAN AMERICAN: >60
GFR NON-AFRICAN AMERICAN: 59.9
GLOBULIN: 3.5 G/DL (ref 2.3–3.5)
GLUCOSE BLD-MCNC: 230 MG/DL (ref 70–99)
GLUCOSE URINE: 250 MG/DL
HCT VFR BLD CALC: 46.3 % (ref 37–47)
HEMOGLOBIN: 15.6 G/DL (ref 12–16)
KETONES, URINE: NEGATIVE MG/DL
LEUKOCYTE ESTERASE, URINE: NEGATIVE
LIPASE: 17 U/L (ref 12–95)
LYMPHOCYTES ABSOLUTE: 2.4 K/UL (ref 1–4.8)
LYMPHOCYTES RELATIVE PERCENT: 20.7 %
MAGNESIUM: 2.1 MG/DL (ref 1.7–2.4)
MCH RBC QN AUTO: 29.5 PG (ref 27–31.3)
MCHC RBC AUTO-ENTMCNC: 33.6 % (ref 33–37)
MCV RBC AUTO: 87.8 FL (ref 82–100)
MONOCYTES ABSOLUTE: 0.6 K/UL (ref 0.2–0.8)
MONOCYTES RELATIVE PERCENT: 5 %
NEUTROPHILS ABSOLUTE: 8.3 K/UL (ref 1.4–6.5)
NEUTROPHILS RELATIVE PERCENT: 71.1 %
NITRITE, URINE: NEGATIVE
PDW BLD-RTO: 14.3 % (ref 11.5–14.5)
PH UA: 5 (ref 5–9)
PLATELET # BLD: 375 K/UL (ref 130–400)
POTASSIUM SERPL-SCNC: 4.2 MEQ/L (ref 3.4–4.9)
PROTEIN UA: NEGATIVE MG/DL
RBC # BLD: 5.27 M/UL (ref 4.2–5.4)
SODIUM BLD-SCNC: 137 MEQ/L (ref 135–144)
SPECIFIC GRAVITY UA: 1.01 (ref 1–1.03)
TOTAL PROTEIN: 7.8 G/DL (ref 6.3–8)
TROPONIN: <0.01 NG/ML (ref 0–0.01)
UROBILINOGEN, URINE: 0.2 E.U./DL
WBC # BLD: 11.7 K/UL (ref 4.8–10.8)

## 2021-04-16 PROCEDURE — 6360000002 HC RX W HCPCS: Performed by: EMERGENCY MEDICINE

## 2021-04-16 PROCEDURE — 85025 COMPLETE CBC W/AUTO DIFF WBC: CPT

## 2021-04-16 PROCEDURE — 2500000003 HC RX 250 WO HCPCS: Performed by: EMERGENCY MEDICINE

## 2021-04-16 PROCEDURE — 96374 THER/PROPH/DIAG INJ IV PUSH: CPT

## 2021-04-16 PROCEDURE — 74176 CT ABD & PELVIS W/O CONTRAST: CPT

## 2021-04-16 PROCEDURE — 84484 ASSAY OF TROPONIN QUANT: CPT

## 2021-04-16 PROCEDURE — 36415 COLL VENOUS BLD VENIPUNCTURE: CPT

## 2021-04-16 PROCEDURE — 83735 ASSAY OF MAGNESIUM: CPT

## 2021-04-16 PROCEDURE — 96376 TX/PRO/DX INJ SAME DRUG ADON: CPT

## 2021-04-16 PROCEDURE — 83690 ASSAY OF LIPASE: CPT

## 2021-04-16 PROCEDURE — 2580000003 HC RX 258: Performed by: EMERGENCY MEDICINE

## 2021-04-16 PROCEDURE — 80053 COMPREHEN METABOLIC PANEL: CPT

## 2021-04-16 PROCEDURE — 96375 TX/PRO/DX INJ NEW DRUG ADDON: CPT

## 2021-04-16 PROCEDURE — 99283 EMERGENCY DEPT VISIT LOW MDM: CPT

## 2021-04-16 PROCEDURE — 81003 URINALYSIS AUTO W/O SCOPE: CPT

## 2021-04-16 RX ORDER — 0.9 % SODIUM CHLORIDE 0.9 %
1000 INTRAVENOUS SOLUTION INTRAVENOUS ONCE
Status: COMPLETED | OUTPATIENT
Start: 2021-04-16 | End: 2021-04-16

## 2021-04-16 RX ORDER — MORPHINE SULFATE 2 MG/ML
4 INJECTION, SOLUTION INTRAMUSCULAR; INTRAVENOUS
Status: DISCONTINUED | OUTPATIENT
Start: 2021-04-16 | End: 2021-04-16 | Stop reason: HOSPADM

## 2021-04-16 RX ORDER — ONDANSETRON 2 MG/ML
4 INJECTION INTRAMUSCULAR; INTRAVENOUS ONCE
Status: COMPLETED | OUTPATIENT
Start: 2021-04-16 | End: 2021-04-16

## 2021-04-16 RX ORDER — TAMSULOSIN HYDROCHLORIDE 0.4 MG/1
0.4 CAPSULE ORAL DAILY
Qty: 30 CAPSULE | Refills: 0 | Status: ON HOLD | OUTPATIENT
Start: 2021-04-16 | End: 2021-04-26 | Stop reason: HOSPADM

## 2021-04-16 RX ORDER — ONDANSETRON 4 MG/1
4 TABLET, ORALLY DISINTEGRATING ORAL 3 TIMES DAILY PRN
Qty: 21 TABLET | Refills: 0 | Status: SHIPPED | OUTPATIENT
Start: 2021-04-16 | End: 2021-09-03 | Stop reason: ALTCHOICE

## 2021-04-16 RX ORDER — OXYCODONE HYDROCHLORIDE AND ACETAMINOPHEN 5; 325 MG/1; MG/1
1 TABLET ORAL EVERY 6 HOURS PRN
Qty: 12 TABLET | Refills: 0 | Status: SHIPPED | OUTPATIENT
Start: 2021-04-16 | End: 2021-04-19

## 2021-04-16 RX ADMIN — FAMOTIDINE 20 MG: 10 INJECTION, SOLUTION INTRAVENOUS at 13:44

## 2021-04-16 RX ADMIN — SODIUM CHLORIDE 1000 ML: 9 INJECTION, SOLUTION INTRAVENOUS at 13:44

## 2021-04-16 RX ADMIN — ONDANSETRON 4 MG: 2 INJECTION INTRAMUSCULAR; INTRAVENOUS at 14:43

## 2021-04-16 RX ADMIN — MORPHINE SULFATE 4 MG: 2 INJECTION, SOLUTION INTRAMUSCULAR; INTRAVENOUS at 13:43

## 2021-04-16 RX ADMIN — HYDROMORPHONE HYDROCHLORIDE 1 MG: 1 INJECTION, SOLUTION INTRAMUSCULAR; INTRAVENOUS; SUBCUTANEOUS at 14:46

## 2021-04-16 RX ADMIN — ONDANSETRON 4 MG: 2 INJECTION INTRAMUSCULAR; INTRAVENOUS at 13:44

## 2021-04-16 ASSESSMENT — ENCOUNTER SYMPTOMS
SHORTNESS OF BREATH: 0
ABDOMINAL PAIN: 0
COUGH: 0
SORE THROAT: 0
VOMITING: 0
BACK PAIN: 0
DIARRHEA: 0
NAUSEA: 1

## 2021-04-16 ASSESSMENT — PAIN DESCRIPTION - LOCATION: LOCATION: ABDOMEN

## 2021-04-16 ASSESSMENT — PAIN DESCRIPTION - ORIENTATION: ORIENTATION: LEFT;UPPER

## 2021-04-16 ASSESSMENT — PAIN DESCRIPTION - PAIN TYPE: TYPE: ACUTE PAIN

## 2021-04-16 NOTE — ED NOTES
Pt updated by Dr. Jojo Murguia and acknowledge understanding. Pt stated that she has no further questions at this time.      Kimberly Hernandez RN  04/16/21 9931

## 2021-04-16 NOTE — ED TRIAGE NOTES
Patient states she woke up this morning with a headache/migraine and took tylenol at 10am with no relief.

## 2021-04-16 NOTE — ED NOTES
Pt alert and cooperative at this time. Sitting in bed at this time.       Gold Lawrence RN  04/16/21 9037

## 2021-04-16 NOTE — ED PROVIDER NOTES
[address] [facility] ED  eMERGENCYdEPARTMENT eNCOUnter      Pt Name: Romulo Ritter  MRN: [id number]  Rafigfmendez 1965  Date of evaluation: 4/16/2021  Dee Hdz MD    CHIEF COMPLAINT           HPI  Suzanne Longoria is a 50 y.o. female per chart review has a h/o DM II, HTN, hpl, hypothyroidism, GERD, depression/anxiety presents to the ED with flank pain. Pt notes gradual onset, moderate, constant, sharp, L flank pain since this am.  +N/-v.  Pt denies fever, cp, sob, dysuria, diarrhea. ROS  Review of Systems   Constitutional: Negative for activity change, chills and fever. HENT: Negative for ear pain and sore throat. Eyes: Negative for visual disturbance. Respiratory: Negative for cough and shortness of breath. Cardiovascular: Negative for chest pain, palpitations and leg swelling. Gastrointestinal: Positive for nausea. Negative for abdominal pain, diarrhea and vomiting. Genitourinary: Positive for flank pain. Negative for dysuria. Musculoskeletal: Negative for back pain. Skin: Negative for rash. Neurological: Negative for dizziness and weakness. Except as noted above the remainder of the review of systems was reviewed and negative.        PAST MEDICAL HISTORY     Past Medical History:   Diagnosis Date    Anxiety     Asthma     as child    Depression     Depression     dysthymia - following lupus dx age 23    Diabetes mellitus ([address]     Foot fracture, left     GERD (gastroesophageal reflux disease)     Hyperlipidemia     Hypothyroidism     Kidney stones     Lupus ([facility] [address]     dr barrera -     Migraine     PONV (postoperative nausea and vomiting)     Sciatica     left sided         SURGICAL HISTORY       Past Surgical History:   Procedure Laterality Date    APPENDECTOMY      ARTHRODESIS Left 11/26/2019    ARTHRODESIS OF THE FIRST METATARSAL PHALANGEAL JOINT LEFT FOOT performed by Rebecca Layton DPM at [place] [address]  COLONOSCOPY      FOOT CLOSED REDUCTION Left 1/3/2017    LEFT FOOT PERCUTANEOUS PINNING MOTA FRACTURE performed by Allan Zavala MD at [address]      AL ESOPHAGOGASTRODUODENOSCOPY TRANSORAL DIAGNOSTIC N/A 3/8/2017    EGD ESOPHAGOGASTRODUODENOSCOPY WITH DILATION performed by Kurt Henry MD at [address] ESOPHAGOGASTRODUODENOSCOPY TRANSORAL DIAGNOSTIC N/A 11/7/2018    EGD ESOPHAGOGASTRODUODENOSCOPY WITH DILATION performed by Kurt Henry MD at [facility]       Previous Medications    ALBUTEROL SULFATE HFA (VENTOLIN HFA) 108 (90 BASE) MCG/ACT INHALER    Inhale 2 puffs into the lungs every 6 hours as needed for Wheezing    BLOOD GLUCOSE MONITOR KIT AND SUPPLIES    Test 3 times a day & as needed for symptoms of irregular blood glucose. BLOOD GLUCOSE MONITOR KIT AND SUPPLIES    Please give 1 meter covered by insurance Dx E11.65    BLOOD GLUCOSE MONITOR STRIPS    Pt test 3x daily Dx E11.65    BLOOD GLUCOSE MONITORING SUPPL (TRUE TRACK BLOOD GLUCOSE) CAROLE    Check blood sugars twice daily or as directed. BLOOD GLUCOSE TEST STRIPS (ASCENSIA AUTODISC VI;ONE TOUCH ULTRA TEST VI) STRIP    Check blood sugars twice daily or as directed. [employer] UNILET LANCETS 30G MISC    Check blood sugars twice daily or as directed.     ESTRADIOL (CLIMARA) 0.025 MG/24HR    Place 0.025 patches onto the skin every 7 days On mondays    HYDROXYCHLOROQUINE (PLAQUENIL) 200 MG TABLET    Take 1 tablet by mouth 2 times daily    INSULIN ASPART (NOVOLOG FLEXPEN) 100 UNIT/ML INJECTION PEN    Inject 35 units tid with meals    INSULIN GLARGINE (LANTUS SOLOSTAR) 100 UNIT/ML INJECTION PEN    160  units at bedtime    INSULIN LISPRO, 1 UNIT DIAL, (HUMALOG KWIKPEN) 100 UNIT/ML SOPN    Inject 20 Units into the skin 3 times daily (before meals) INSULIN LISPRO, 1 UNIT DIAL, (HUMALOG KWIKPEN) 100 UNIT/ML SOPN    55  units with each meals    INSULIN PEN NEEDLE (NOVOFINE) 32G X 6 MM MISC    BID    INSULIN PEN NEEDLE (NOVOFINE) 32G X 6 MM MISC    qid    LANCETS MISC    Pt test 3x daily Dx E11.65    LEVOTHYROXINE (SYNTHROID) 50 MCG TABLET    Take 1 tablet by mouth daily    PANTOPRAZOLE (PROTONIX) 40 MG TABLET    Take 1 tablet by mouth daily    ROSUVASTATIN (CRESTOR) 40 MG TABLET    Take 1 tablet by mouth daily       ALLERGIES     Aspirin, Cephalosporins, Imitrex [sumatriptan], Lidocaine, Pcn [penicillins], Seasonal, Toradol [ketorolac tromethamine], Ultram [tramadol], and Fentanyl    FAMILY HISTORY       Family History   Adopted: Yes          SOCIAL HISTORY       Social History     Socioeconomic History    Marital status: Legally      Spouse name: None    Number of children: None    Years of education: None    Highest education level: None   Occupational History    None   Social Needs    Financial resource strain: None    Food insecurity     Worry: None     Inability: None    Transportation needs     Medical: None     Non-medical: None   Tobacco Use    Smoking status: Never Smoker    Smokeless tobacco: Never Used   Substance and Sexual Activity    Alcohol use: No     Alcohol/week: 0.0 standard drinks     Comment: denies    Drug use: No     Comment: denies    Sexual activity: None   Lifestyle    Physical activity     Days per week: None     Minutes per session: None    Stress: None   Relationships    Social connections     Talks on phone: None     Gets together: None     Attends Zoroastrian service: None     Active member of club or organization: None     Attends meetings of clubs or organizations: None     Relationship status: None    Intimate partner violence     Fear of current or ex partner: None     Emotionally abused: None     Physically abused: None     Forced sexual activity: None   Other Topics Concern    None   Social History Narrative    None         PHYSICAL EXAM       ED Triage Vitals [04/16/21 1319]   BP Temp Temp Source Pulse Resp SpO2 Height Weight   129/75 98.3 °F (36.8 °C) Oral 82 16 98 % 5' 8\" (1.727 m) 160 lb (72.6 kg)       Physical Exam  Vitals signs and nursing note reviewed. Constitutional:       Appearance: She is well-developed. HENT:      Head: Normocephalic. Right Ear: External ear normal.      Left Ear: External ear normal.   Eyes:      Conjunctiva/sclera: Conjunctivae normal.      Pupils: Pupils are equal, round, and reactive to light. Neck:      Musculoskeletal: Normal range of motion and neck supple. Cardiovascular:      Rate and Rhythm: Normal rate and regular rhythm. Heart sounds: Normal heart sounds. Pulmonary:      Effort: Pulmonary effort is normal.      Breath sounds: Normal breath sounds. Abdominal:      General: Bowel sounds are normal. There is no distension. Palpations: Abdomen is soft. Tenderness: There is abdominal tenderness in the left upper quadrant. There is left CVA tenderness. Musculoskeletal: Normal range of motion. Skin:     General: Skin is warm and dry. Neurological:      Mental Status: She is alert and oriented to person, place, and time. MDM  49 yo female presents to the ED with L flank pain. Pt is afebrile, hemodynamically stable. Pt given 1 L NS, IV morphine, IV zofran, IV toradol, IV pepcid with moderate relief. Labs remarkable for glucose 230, WBC 12.  UA negative. CT AP shows 7 mm proximal R kidney stone. Pt reassessed and still in pain. Pt given IV dilaudid, IV zofran in the ED with complete relief. Pt educated about kidney stones and flank pain. Unsure of why pt's pain is on her L and pt's kidney stone is on the right. Possible referred pain vs chronic kidney stone in R ureter. Pt follows with Dr. Magali Casillas. Pt given prescription for percocet, zofran, flomax. Pt will f/u with urology. Pt understands plan.            FINAL IMPRESSION      1. Abdominal pain, unspecified abdominal location    2.  Kidney stone          DISPOSITION/PLAN   DISPOSITION Decision To Discharge 04/16/2021 02:56:42 PM        DISCHARGE MEDICATIONS:  [unfilled]         Momo Alanis MD(electronically signed)  Attending Emergency Physician            Momo Alanis MD  04/16/21 8989

## 2021-04-16 NOTE — ED TRIAGE NOTES
Pt presents to the ER with complaints of left upper abd pain that started this morning after she woke up  Pt complaints of nausea  Denies vomiting  Pt denies constipation and diarrhea   Bowel sounds active  Denies flank pain   Afebrile  Respirations even and unlabored

## 2021-04-16 NOTE — ED NOTES
Pt signed discharged at this time.  is picking up pt. Pt acknowledges understanding.  Pt given prescriptions to fill and understanding verbally     Abigail Tracey RN  04/16/21 6809

## 2021-04-25 ENCOUNTER — HOSPITAL ENCOUNTER (INPATIENT)
Age: 56
LOS: 1 days | Discharge: HOME | DRG: 661 | End: 2021-04-26
Attending: FAMILY MEDICINE | Admitting: INTERNAL MEDICINE
Payer: MEDICARE

## 2021-04-25 ENCOUNTER — APPOINTMENT (OUTPATIENT)
Dept: GENERAL RADIOLOGY | Age: 56
DRG: 661 | End: 2021-04-25
Payer: MEDICARE

## 2021-04-25 ENCOUNTER — APPOINTMENT (OUTPATIENT)
Dept: CT IMAGING | Age: 56
DRG: 661 | End: 2021-04-25
Payer: MEDICARE

## 2021-04-25 DIAGNOSIS — N20.1 CALCULUS OF URETEROVESICAL JUNCTION (UVJ): ICD-10-CM

## 2021-04-25 DIAGNOSIS — N20.1 URETERIC STONE: Primary | ICD-10-CM

## 2021-04-25 PROBLEM — N20.0 RENAL STONE: Status: ACTIVE | Noted: 2021-04-25

## 2021-04-25 LAB
ALBUMIN SERPL-MCNC: 4.4 G/DL (ref 3.5–4.6)
ALP BLD-CCNC: 139 U/L (ref 40–130)
ALT SERPL-CCNC: 23 U/L (ref 0–33)
ANION GAP SERPL CALCULATED.3IONS-SCNC: 14 MEQ/L (ref 9–15)
AST SERPL-CCNC: 22 U/L (ref 0–35)
BASOPHILS ABSOLUTE: 0.1 K/UL (ref 0–0.2)
BASOPHILS RELATIVE PERCENT: 1.2 %
BILIRUB SERPL-MCNC: 0.3 MG/DL (ref 0.2–0.7)
BILIRUBIN URINE: NEGATIVE
BLOOD, URINE: NEGATIVE
BUN BLDV-MCNC: 11 MG/DL (ref 6–20)
CALCIUM SERPL-MCNC: 9.6 MG/DL (ref 8.5–9.9)
CHLORIDE BLD-SCNC: 100 MEQ/L (ref 95–107)
CLARITY: ABNORMAL
CO2: 25 MEQ/L (ref 20–31)
COLOR: YELLOW
CREAT SERPL-MCNC: 1.23 MG/DL (ref 0.5–0.9)
EOSINOPHILS ABSOLUTE: 0.4 K/UL (ref 0–0.7)
EOSINOPHILS RELATIVE PERCENT: 3.6 %
GFR AFRICAN AMERICAN: 55.1
GFR NON-AFRICAN AMERICAN: 45.6
GLOBULIN: 3.4 G/DL (ref 2.3–3.5)
GLUCOSE BLD-MCNC: 143 MG/DL (ref 70–99)
GLUCOSE URINE: 100 MG/DL
HCT VFR BLD CALC: 44.9 % (ref 37–47)
HEMOGLOBIN: 15 G/DL (ref 12–16)
KETONES, URINE: ABNORMAL MG/DL
LEUKOCYTE ESTERASE, URINE: NEGATIVE
LYMPHOCYTES ABSOLUTE: 3.3 K/UL (ref 1–4.8)
LYMPHOCYTES RELATIVE PERCENT: 28.4 %
MCH RBC QN AUTO: 29.5 PG (ref 27–31.3)
MCHC RBC AUTO-ENTMCNC: 33.4 % (ref 33–37)
MCV RBC AUTO: 88.4 FL (ref 82–100)
MONOCYTES ABSOLUTE: 0.7 K/UL (ref 0.2–0.8)
MONOCYTES RELATIVE PERCENT: 5.7 %
NEUTROPHILS ABSOLUTE: 7 K/UL (ref 1.4–6.5)
NEUTROPHILS RELATIVE PERCENT: 61.1 %
NITRITE, URINE: NEGATIVE
PDW BLD-RTO: 14.1 % (ref 11.5–14.5)
PH UA: 5 (ref 5–9)
PLATELET # BLD: 362 K/UL (ref 130–400)
POTASSIUM REFLEX MAGNESIUM: 3.7 MEQ/L (ref 3.4–4.9)
PROTEIN UA: ABNORMAL MG/DL
RBC # BLD: 5.08 M/UL (ref 4.2–5.4)
SARS-COV-2, NAAT: NOT DETECTED
SODIUM BLD-SCNC: 139 MEQ/L (ref 135–144)
SPECIFIC GRAVITY UA: 1.03 (ref 1–1.03)
TOTAL PROTEIN: 7.8 G/DL (ref 6.3–8)
URINE REFLEX TO CULTURE: ABNORMAL
UROBILINOGEN, URINE: 0.2 E.U./DL
WBC # BLD: 11.5 K/UL (ref 4.8–10.8)

## 2021-04-25 PROCEDURE — 6360000002 HC RX W HCPCS: Performed by: FAMILY MEDICINE

## 2021-04-25 PROCEDURE — 81003 URINALYSIS AUTO W/O SCOPE: CPT

## 2021-04-25 PROCEDURE — 85025 COMPLETE CBC W/AUTO DIFF WBC: CPT

## 2021-04-25 PROCEDURE — 80053 COMPREHEN METABOLIC PANEL: CPT

## 2021-04-25 PROCEDURE — 74176 CT ABD & PELVIS W/O CONTRAST: CPT

## 2021-04-25 PROCEDURE — 99283 EMERGENCY DEPT VISIT LOW MDM: CPT

## 2021-04-25 PROCEDURE — 2580000003 HC RX 258: Performed by: FAMILY MEDICINE

## 2021-04-25 PROCEDURE — 36415 COLL VENOUS BLD VENIPUNCTURE: CPT

## 2021-04-25 PROCEDURE — 96375 TX/PRO/DX INJ NEW DRUG ADDON: CPT

## 2021-04-25 PROCEDURE — 74018 RADEX ABDOMEN 1 VIEW: CPT

## 2021-04-25 PROCEDURE — 96365 THER/PROPH/DIAG IV INF INIT: CPT

## 2021-04-25 PROCEDURE — 87635 SARS-COV-2 COVID-19 AMP PRB: CPT

## 2021-04-25 PROCEDURE — 1210000000 HC MED SURG R&B

## 2021-04-25 RX ORDER — ONDANSETRON 2 MG/ML
4 INJECTION INTRAMUSCULAR; INTRAVENOUS ONCE
Status: COMPLETED | OUTPATIENT
Start: 2021-04-25 | End: 2021-04-25

## 2021-04-25 RX ORDER — 0.9 % SODIUM CHLORIDE 0.9 %
1000 INTRAVENOUS SOLUTION INTRAVENOUS ONCE
Status: COMPLETED | OUTPATIENT
Start: 2021-04-25 | End: 2021-04-25

## 2021-04-25 RX ORDER — MORPHINE SULFATE 2 MG/ML
4 INJECTION, SOLUTION INTRAMUSCULAR; INTRAVENOUS ONCE
Status: DISCONTINUED | OUTPATIENT
Start: 2021-04-25 | End: 2021-04-26 | Stop reason: HOSPADM

## 2021-04-25 RX ORDER — CIPROFLOXACIN 2 MG/ML
400 INJECTION, SOLUTION INTRAVENOUS ONCE
Status: COMPLETED | OUTPATIENT
Start: 2021-04-25 | End: 2021-04-26

## 2021-04-25 RX ORDER — MORPHINE SULFATE 2 MG/ML
4 INJECTION, SOLUTION INTRAMUSCULAR; INTRAVENOUS
Status: DISCONTINUED | OUTPATIENT
Start: 2021-04-25 | End: 2021-04-26 | Stop reason: HOSPADM

## 2021-04-25 RX ADMIN — MORPHINE SULFATE 4 MG: 2 INJECTION, SOLUTION INTRAMUSCULAR; INTRAVENOUS at 19:48

## 2021-04-25 RX ADMIN — SODIUM CHLORIDE 1000 ML: 9 INJECTION, SOLUTION INTRAVENOUS at 19:48

## 2021-04-25 RX ADMIN — ONDANSETRON 4 MG: 2 INJECTION INTRAMUSCULAR; INTRAVENOUS at 19:48

## 2021-04-25 RX ADMIN — CIPROFLOXACIN 400 MG: 2 INJECTION, SOLUTION INTRAVENOUS at 23:01

## 2021-04-25 ASSESSMENT — PAIN SCALES - GENERAL
PAINLEVEL_OUTOF10: 8
PAINLEVEL_OUTOF10: 10

## 2021-04-25 ASSESSMENT — ENCOUNTER SYMPTOMS: RESPIRATORY NEGATIVE: 1

## 2021-04-25 ASSESSMENT — PAIN DESCRIPTION - LOCATION: LOCATION: FLANK

## 2021-04-25 ASSESSMENT — PAIN DESCRIPTION - PAIN TYPE: TYPE: ACUTE PAIN

## 2021-04-25 NOTE — Clinical Note
Patient Class: Inpatient [101]  REQUIRED: Diagnosis: Renal stone [383357]  Estimated Length of Stay: Estimated stay of more than 2 midnights  Future Attending Provider: Pasha Rosenthal [[id number]]

## 2021-04-26 ENCOUNTER — ANESTHESIA EVENT (OUTPATIENT)
Dept: OPERATING ROOM | Age: 56
DRG: 661 | End: 2021-04-26
Payer: MEDICARE

## 2021-04-26 ENCOUNTER — ANESTHESIA (OUTPATIENT)
Dept: OPERATING ROOM | Age: 56
DRG: 661 | End: 2021-04-26
Payer: MEDICARE

## 2021-04-26 ENCOUNTER — APPOINTMENT (OUTPATIENT)
Dept: GENERAL RADIOLOGY | Age: 56
DRG: 661 | End: 2021-04-26
Payer: MEDICARE

## 2021-04-26 VITALS
OXYGEN SATURATION: 97 % | WEIGHT: 160 LBS | TEMPERATURE: 98 F | BODY MASS INDEX: 24.25 KG/M2 | DIASTOLIC BLOOD PRESSURE: 84 MMHG | HEIGHT: 68 IN | RESPIRATION RATE: 11 BRPM | SYSTOLIC BLOOD PRESSURE: 129 MMHG | HEART RATE: 90 BPM

## 2021-04-26 VITALS — DIASTOLIC BLOOD PRESSURE: 59 MMHG | OXYGEN SATURATION: 93 % | SYSTOLIC BLOOD PRESSURE: 107 MMHG

## 2021-04-26 LAB
ANION GAP SERPL CALCULATED.3IONS-SCNC: 12 MEQ/L (ref 9–15)
BUN BLDV-MCNC: 11 MG/DL (ref 6–20)
CALCIUM SERPL-MCNC: 8.1 MG/DL (ref 8.5–9.9)
CHLORIDE BLD-SCNC: 105 MEQ/L (ref 95–107)
CO2: 24 MEQ/L (ref 20–31)
CREAT SERPL-MCNC: 1.08 MG/DL (ref 0.5–0.9)
GFR AFRICAN AMERICAN: >60
GFR NON-AFRICAN AMERICAN: 52.9
GLUCOSE BLD-MCNC: 161 MG/DL (ref 60–115)
GLUCOSE BLD-MCNC: 169 MG/DL (ref 60–115)
GLUCOSE BLD-MCNC: 179 MG/DL (ref 70–99)
GLUCOSE BLD-MCNC: 186 MG/DL (ref 60–115)
GLUCOSE BLD-MCNC: 198 MG/DL (ref 60–115)
GLUCOSE BLD-MCNC: 214 MG/DL (ref 60–115)
HCT VFR BLD CALC: 40.6 % (ref 37–47)
HEMOGLOBIN: 13.3 G/DL (ref 12–16)
MCH RBC QN AUTO: 29.2 PG (ref 27–31.3)
MCHC RBC AUTO-ENTMCNC: 32.8 % (ref 33–37)
MCV RBC AUTO: 88.9 FL (ref 82–100)
PDW BLD-RTO: 14 % (ref 11.5–14.5)
PERFORMED ON: ABNORMAL
PLATELET # BLD: 326 K/UL (ref 130–400)
POTASSIUM REFLEX MAGNESIUM: 4.2 MEQ/L (ref 3.4–4.9)
RBC # BLD: 4.57 M/UL (ref 4.2–5.4)
SODIUM BLD-SCNC: 141 MEQ/L (ref 135–144)
WBC # BLD: 10.3 K/UL (ref 4.8–10.8)

## 2021-04-26 PROCEDURE — 36415 COLL VENOUS BLD VENIPUNCTURE: CPT

## 2021-04-26 PROCEDURE — C1758 CATHETER, URETERAL: HCPCS | Performed by: UROLOGY

## 2021-04-26 PROCEDURE — 6370000000 HC RX 637 (ALT 250 FOR IP): Performed by: UROLOGY

## 2021-04-26 PROCEDURE — 6360000004 HC RX CONTRAST MEDICATION: Performed by: UROLOGY

## 2021-04-26 PROCEDURE — 6360000002 HC RX W HCPCS: Performed by: UROLOGY

## 2021-04-26 PROCEDURE — 0T768DZ DILATION OF RIGHT URETER WITH INTRALUMINAL DEVICE, VIA NATURAL OR ARTIFICIAL OPENING ENDOSCOPIC: ICD-10-PCS | Performed by: UROLOGY

## 2021-04-26 PROCEDURE — 6360000002 HC RX W HCPCS: Performed by: NURSE ANESTHETIST, CERTIFIED REGISTERED

## 2021-04-26 PROCEDURE — 2709999900 HC NON-CHARGEABLE SUPPLY: Performed by: UROLOGY

## 2021-04-26 PROCEDURE — 2580000003 HC RX 258: Performed by: UROLOGY

## 2021-04-26 PROCEDURE — 3600000014 HC SURGERY LEVEL 4 ADDTL 15MIN: Performed by: UROLOGY

## 2021-04-26 PROCEDURE — BT1D1ZZ FLUOROSCOPY OF RIGHT KIDNEY, URETER AND BLADDER USING LOW OSMOLAR CONTRAST: ICD-10-PCS | Performed by: UROLOGY

## 2021-04-26 PROCEDURE — 3700000001 HC ADD 15 MINUTES (ANESTHESIA): Performed by: UROLOGY

## 2021-04-26 PROCEDURE — 80048 BASIC METABOLIC PNL TOTAL CA: CPT

## 2021-04-26 PROCEDURE — 3209999900 FLUORO FOR SURGICAL PROCEDURES

## 2021-04-26 PROCEDURE — 94150 VITAL CAPACITY TEST: CPT

## 2021-04-26 PROCEDURE — 3700000000 HC ANESTHESIA ATTENDED CARE: Performed by: UROLOGY

## 2021-04-26 PROCEDURE — 6360000002 HC RX W HCPCS: Performed by: INTERNAL MEDICINE

## 2021-04-26 PROCEDURE — 52330 CYSTOSCOPY AND TREATMENT: CPT | Performed by: UROLOGY

## 2021-04-26 PROCEDURE — 85027 COMPLETE CBC AUTOMATED: CPT

## 2021-04-26 PROCEDURE — 7100000001 HC PACU RECOVERY - ADDTL 15 MIN: Performed by: UROLOGY

## 2021-04-26 PROCEDURE — 3600000004 HC SURGERY LEVEL 4 BASE: Performed by: UROLOGY

## 2021-04-26 PROCEDURE — C1894 INTRO/SHEATH, NON-LASER: HCPCS | Performed by: UROLOGY

## 2021-04-26 PROCEDURE — 2580000003 HC RX 258: Performed by: INTERNAL MEDICINE

## 2021-04-26 PROCEDURE — 52332 CYSTOSCOPY AND TREATMENT: CPT | Performed by: UROLOGY

## 2021-04-26 PROCEDURE — 2580000003 HC RX 258: Performed by: ANESTHESIOLOGY

## 2021-04-26 PROCEDURE — 7100000000 HC PACU RECOVERY - FIRST 15 MIN: Performed by: UROLOGY

## 2021-04-26 PROCEDURE — C1769 GUIDE WIRE: HCPCS | Performed by: UROLOGY

## 2021-04-26 PROCEDURE — 74420 UROGRAPHY RTRGR +-KUB: CPT | Performed by: UROLOGY

## 2021-04-26 PROCEDURE — C2625 STENT, NON-COR, TEM W/DEL SY: HCPCS | Performed by: UROLOGY

## 2021-04-26 DEVICE — STENT URET 6FR L26CM POLYMER BLEND PH FREE COAT GRADUAL TAPR: Type: IMPLANTABLE DEVICE | Site: URETER | Status: FUNCTIONAL

## 2021-04-26 RX ORDER — OXYCODONE HYDROCHLORIDE AND ACETAMINOPHEN 5; 325 MG/1; MG/1
1 TABLET ORAL EVERY 6 HOURS PRN
Qty: 20 TABLET | Refills: 0 | Status: SHIPPED | OUTPATIENT
Start: 2021-04-26 | End: 2021-05-01

## 2021-04-26 RX ORDER — DIPHENHYDRAMINE HYDROCHLORIDE 50 MG/ML
12.5 INJECTION INTRAMUSCULAR; INTRAVENOUS
Status: DISCONTINUED | OUTPATIENT
Start: 2021-04-26 | End: 2021-04-26 | Stop reason: HOSPADM

## 2021-04-26 RX ORDER — DEXTROSE MONOHYDRATE 50 MG/ML
100 INJECTION, SOLUTION INTRAVENOUS PRN
Status: DISCONTINUED | OUTPATIENT
Start: 2021-04-26 | End: 2021-04-26 | Stop reason: HOSPADM

## 2021-04-26 RX ORDER — PROPOFOL 10 MG/ML
INJECTION, EMULSION INTRAVENOUS PRN
Status: DISCONTINUED | OUTPATIENT
Start: 2021-04-26 | End: 2021-04-26 | Stop reason: SDUPTHER

## 2021-04-26 RX ORDER — NITROFURANTOIN 25; 75 MG/1; MG/1
100 CAPSULE ORAL 2 TIMES DAILY
Qty: 20 CAPSULE | Refills: 0 | Status: ON HOLD | OUTPATIENT
Start: 2021-04-26 | End: 2021-05-05 | Stop reason: ALTCHOICE

## 2021-04-26 RX ORDER — ACETAMINOPHEN 325 MG/1
650 TABLET ORAL EVERY 6 HOURS PRN
Status: DISCONTINUED | OUTPATIENT
Start: 2021-04-26 | End: 2021-04-26 | Stop reason: HOSPADM

## 2021-04-26 RX ORDER — METOCLOPRAMIDE HYDROCHLORIDE 5 MG/ML
10 INJECTION INTRAMUSCULAR; INTRAVENOUS
Status: DISCONTINUED | OUTPATIENT
Start: 2021-04-26 | End: 2021-04-26 | Stop reason: HOSPADM

## 2021-04-26 RX ORDER — ACETAMINOPHEN 650 MG/1
650 SUPPOSITORY RECTAL EVERY 6 HOURS PRN
Status: DISCONTINUED | OUTPATIENT
Start: 2021-04-26 | End: 2021-04-26 | Stop reason: HOSPADM

## 2021-04-26 RX ORDER — POLYETHYLENE GLYCOL 3350 17 G/17G
17 POWDER, FOR SOLUTION ORAL DAILY PRN
Status: DISCONTINUED | OUTPATIENT
Start: 2021-04-26 | End: 2021-04-26 | Stop reason: HOSPADM

## 2021-04-26 RX ORDER — PROMETHAZINE HYDROCHLORIDE 12.5 MG/1
12.5 TABLET ORAL EVERY 6 HOURS PRN
Status: DISCONTINUED | OUTPATIENT
Start: 2021-04-26 | End: 2021-04-26 | Stop reason: HOSPADM

## 2021-04-26 RX ORDER — GENTAMICIN SULFATE 40 MG/ML
INJECTION, SOLUTION INTRAMUSCULAR; INTRAVENOUS PRN
Status: DISCONTINUED | OUTPATIENT
Start: 2021-04-26 | End: 2021-04-26 | Stop reason: SDUPTHER

## 2021-04-26 RX ORDER — MEPERIDINE HYDROCHLORIDE 25 MG/ML
12.5 INJECTION INTRAMUSCULAR; INTRAVENOUS; SUBCUTANEOUS EVERY 5 MIN PRN
Status: DISCONTINUED | OUTPATIENT
Start: 2021-04-26 | End: 2021-04-26 | Stop reason: HOSPADM

## 2021-04-26 RX ORDER — DEXTROSE MONOHYDRATE 25 G/50ML
12.5 INJECTION, SOLUTION INTRAVENOUS PRN
Status: DISCONTINUED | OUTPATIENT
Start: 2021-04-26 | End: 2021-04-26 | Stop reason: HOSPADM

## 2021-04-26 RX ORDER — MIDAZOLAM HYDROCHLORIDE 2 MG/2ML
INJECTION, SOLUTION INTRAMUSCULAR; INTRAVENOUS PRN
Status: DISCONTINUED | OUTPATIENT
Start: 2021-04-26 | End: 2021-04-26 | Stop reason: SDUPTHER

## 2021-04-26 RX ORDER — MAGNESIUM HYDROXIDE 1200 MG/15ML
LIQUID ORAL PRN
Status: DISCONTINUED | OUTPATIENT
Start: 2021-04-26 | End: 2021-04-26 | Stop reason: ALTCHOICE

## 2021-04-26 RX ORDER — FLUCONAZOLE 150 MG/1
150 TABLET ORAL ONCE
Status: DISCONTINUED | OUTPATIENT
Start: 2021-04-26 | End: 2021-04-26 | Stop reason: HOSPADM

## 2021-04-26 RX ORDER — SODIUM CHLORIDE 0.9 % (FLUSH) 0.9 %
5-40 SYRINGE (ML) INJECTION PRN
Status: DISCONTINUED | OUTPATIENT
Start: 2021-04-26 | End: 2021-04-26 | Stop reason: HOSPADM

## 2021-04-26 RX ORDER — SODIUM CHLORIDE 0.9 % (FLUSH) 0.9 %
5-40 SYRINGE (ML) INJECTION EVERY 12 HOURS SCHEDULED
Status: DISCONTINUED | OUTPATIENT
Start: 2021-04-26 | End: 2021-04-26 | Stop reason: HOSPADM

## 2021-04-26 RX ORDER — ONDANSETRON 2 MG/ML
4 INJECTION INTRAMUSCULAR; INTRAVENOUS EVERY 6 HOURS PRN
Status: DISCONTINUED | OUTPATIENT
Start: 2021-04-26 | End: 2021-04-26 | Stop reason: HOSPADM

## 2021-04-26 RX ORDER — SODIUM CHLORIDE, SODIUM LACTATE, POTASSIUM CHLORIDE, CALCIUM CHLORIDE 600; 310; 30; 20 MG/100ML; MG/100ML; MG/100ML; MG/100ML
INJECTION, SOLUTION INTRAVENOUS CONTINUOUS
Status: DISCONTINUED | OUTPATIENT
Start: 2021-04-26 | End: 2021-04-26

## 2021-04-26 RX ORDER — SODIUM CHLORIDE 9 MG/ML
INJECTION, SOLUTION INTRAVENOUS CONTINUOUS
Status: DISCONTINUED | OUTPATIENT
Start: 2021-04-26 | End: 2021-04-26 | Stop reason: HOSPADM

## 2021-04-26 RX ORDER — SODIUM CHLORIDE 9 MG/ML
25 INJECTION, SOLUTION INTRAVENOUS PRN
Status: DISCONTINUED | OUTPATIENT
Start: 2021-04-26 | End: 2021-04-26 | Stop reason: HOSPADM

## 2021-04-26 RX ORDER — ONDANSETRON 2 MG/ML
4 INJECTION INTRAMUSCULAR; INTRAVENOUS
Status: DISCONTINUED | OUTPATIENT
Start: 2021-04-26 | End: 2021-04-26 | Stop reason: HOSPADM

## 2021-04-26 RX ORDER — NICOTINE POLACRILEX 4 MG
15 LOZENGE BUCCAL PRN
Status: DISCONTINUED | OUTPATIENT
Start: 2021-04-26 | End: 2021-04-26 | Stop reason: HOSPADM

## 2021-04-26 RX ADMIN — MIDAZOLAM HYDROCHLORIDE 2 MG: 1 INJECTION, SOLUTION INTRAMUSCULAR; INTRAVENOUS at 10:06

## 2021-04-26 RX ADMIN — PROPOFOL 100 MG: 10 INJECTION, EMULSION INTRAVENOUS at 10:11

## 2021-04-26 RX ADMIN — INSULIN LISPRO 1 UNITS: 100 INJECTION, SOLUTION INTRAVENOUS; SUBCUTANEOUS at 16:56

## 2021-04-26 RX ADMIN — PROPOFOL 50 MG: 10 INJECTION, EMULSION INTRAVENOUS at 10:14

## 2021-04-26 RX ADMIN — HYDROMORPHONE HYDROCHLORIDE 1 MG: 1 INJECTION, SOLUTION INTRAMUSCULAR; INTRAVENOUS; SUBCUTANEOUS at 06:41

## 2021-04-26 RX ADMIN — SODIUM CHLORIDE, POTASSIUM CHLORIDE, SODIUM LACTATE AND CALCIUM CHLORIDE: 600; 310; 30; 20 INJECTION, SOLUTION INTRAVENOUS at 10:06

## 2021-04-26 RX ADMIN — GENTAMICIN SULFATE 120 MG: 40 INJECTION, SOLUTION INTRAMUSCULAR; INTRAVENOUS at 10:08

## 2021-04-26 RX ADMIN — SODIUM CHLORIDE: 9 INJECTION, SOLUTION INTRAVENOUS at 14:50

## 2021-04-26 RX ADMIN — SODIUM CHLORIDE: 9 INJECTION, SOLUTION INTRAVENOUS at 01:16

## 2021-04-26 RX ADMIN — HYDROMORPHONE HYDROCHLORIDE 1 MG: 1 INJECTION, SOLUTION INTRAMUSCULAR; INTRAVENOUS; SUBCUTANEOUS at 02:58

## 2021-04-26 RX ADMIN — HYDROMORPHONE HYDROCHLORIDE 1 MG: 1 INJECTION, SOLUTION INTRAMUSCULAR; INTRAVENOUS; SUBCUTANEOUS at 12:02

## 2021-04-26 RX ADMIN — PROPOFOL 50 MG: 10 INJECTION, EMULSION INTRAVENOUS at 10:18

## 2021-04-26 RX ADMIN — INSULIN LISPRO 1 UNITS: 100 INJECTION, SOLUTION INTRAVENOUS; SUBCUTANEOUS at 12:25

## 2021-04-26 RX ADMIN — PROPOFOL 50 MG: 10 INJECTION, EMULSION INTRAVENOUS at 10:21

## 2021-04-26 RX ADMIN — HYDROMORPHONE HYDROCHLORIDE 1 MG: 1 INJECTION, SOLUTION INTRAMUSCULAR; INTRAVENOUS; SUBCUTANEOUS at 16:30

## 2021-04-26 ASSESSMENT — PULMONARY FUNCTION TESTS
PIF_VALUE: 1
PIF_VALUE: 2
PIF_VALUE: 1

## 2021-04-26 ASSESSMENT — PAIN SCALES - GENERAL
PAINLEVEL_OUTOF10: 10
PAINLEVEL_OUTOF10: 7

## 2021-04-26 ASSESSMENT — PAIN DESCRIPTION - FREQUENCY: FREQUENCY: CONTINUOUS

## 2021-04-26 ASSESSMENT — PAIN DESCRIPTION - DESCRIPTORS: DESCRIPTORS: CRAMPING

## 2021-04-26 NOTE — BRIEF OP NOTE
Brief Postoperative Note      Patient: Fermin Roberson  YOB: 1965  MRN: [id number]    Date of Procedure: 4/26/2021    Pre-Op Diagnosis: URETERAL STONE    Post-Op Diagnosis: Same       Procedure(s):  CYSTOSCOPY RIGHT DOUBLE J STENT PLACEMENT. MANIPULATION OF STONE WITHOUT REMOVAL  Surgeon(s):  Hipolito Borja MD    Assistant:  * No surgical staff found *    Anesthesia: General    Estimated Blood Loss (mL): Minimal    Complications: None    Specimens:   * No specimens in log *    Implants:  Implant Name Type Inv.  Item Serial No.  Lot No. LRB No. Used Action   STENT URET 6FR L26CM POLYMER BLEND PH FREE COAT GRADUAL TAPR  STENT URET 6FR L26CM POLYMER BLEND Karenn 30 FREE COAT GRADUAL TAPR  [employer]- [id number] Right 1 Implanted         Drains:   Ureteral Catheter Right ureter 6 fr (Active)       Findings: 3A62 STENT PLACED    Electronically signed by Hipolito Borja MD on 4/26/2021 at 10:29 AM

## 2021-04-26 NOTE — DISCHARGE SUMMARY
caliber. No significant retroperitoneal adenopathy. Visualized osseous structures are grossly unremarkable. 1. UNCHANGED 1.2 CM CALCULI REMAINS IN THE RIGHT UPJ JUNCTION. All CT scans at this facility use dose modulation, iterative reconstruction, and/or weight based dosing when appropriate to reduce radiation dose to as low as reasonably achievable. Xr Abdomen (kub) (single Ap View)    Result Date: 4/25/2021  EXAMINATION: KUB CLINICAL HISTORY: Right-sided pain. COMPARISON :None FINDINGS: Single view of the abdomen  submitted. Multiple surgical staples are seen overlying the lower pelvis Single view of the abdomen shows bowel gas in both large and small bowel. Bowel gas seen  to the  level of the rectum. The bowel gas pattern is nonspecific nonobstructive. There is a 1.3 cm calculi at the medial aspect of the right renal shadow. May reside within the region of the right renal pelvis/UPJ. NONSPECIFIC BOWEL GAS PATTERN CALCULI AT THE MEDIAL ASPECT OF THE RIGHT RENAL SHADOW. MAY RESIDE WITHIN THE REGION OF THE RIGHT RENAL PELVIS/UPJ. PLEASE SEE CT SCAN ABDOMEN PELVIS REPORT TO FOLLOW FOR ADDITIONAL DETAILS    Fluoro For Surgical Procedures    Result Date: 4/26/2021  FLUORO FOR SURGICAL PROCEDURES : CLINICAL HISTORY:  pain . COMPARISON: None available. Intraoperative fluoroscopy was provided for Dr. Nela Clayton procedure. A total of 38.9 seconds of fluoroscopy was used, with 4 fluoroscopic stills saved. No diagnostic images were obtained. Please see Dr. Nela Clayton surgical notes for completeness.        Discharge Medications:       Nicole Mason   Home Medication Instructions FRANKLYN:[id number]    Printed on:04/26/21 6617   Medication Information                      albuterol sulfate HFA (VENTOLIN HFA) 108 (90 Base) MCG/ACT inhaler  Inhale 2 puffs into the lungs every 6 hours as needed for Wheezing             blood glucose monitor kit and supplies  Test 3 times a day & as needed for symptoms of pantoprazole (PROTONIX) 40 MG tablet  Take 1 tablet by mouth daily             rosuvastatin (CRESTOR) 40 MG tablet  Take 1 tablet by mouth daily                 Disposition:   Discharged to Home. Any [facility] needs that were indicated and/or required as been addressed and set up by Social Work. Condition at discharge: Pt was medically stable at the time of discharge. Activity: activity as tolerated, fall precautions. Total time taken for discharging this patient: 40 minutes. Greater than 70% of time was spent focused exclusively on this patient. Time was taken to review chart, discuss plans with consultants, reconciling medications, discussing plan answering questions with patient. [facility] [place]  4/26/2021, 3:16 PM  ----------------------------------------------------------------------------------------------------------------------    Diana Garcia,     Please return to ER or call 911 if you develop any significant signs or symptoms. I may not have addressed all of your medical illnesses or the abnormal blood work or imaging therefore please ask your PCP Dulce Maria Heredia MD and other out patient specialists and providers  to obtain [facility] record entirely to follow up on all of the abnormal labs, imaging and findings that I have and have not addressed during your hospitalization. Discharging you from the hospital does not mean that your medical care ends here and now. You may still need additional work up, investigation, monitoring, and treatment to be handled from this point on by outside providers including your PCP, Dulce Maria Heredia MD , Specialists and other healthcare providers. Please review your list of discharge medications prior to resuming medications you might still have at home, as the medications you need to be taking, dosages or how often you must take them may have changed.  For medication questions, contact your retail pharmacy and your PCP, Dulce Maria Heredia MD .     ** I STRONGLY RECOMMEND that you follow up with Sumi Rob MD within 3 to 5 days for a post hospitalization evaluation. This specific office visit is covered by your insurance, and is not the same as your annual doctor visit/ check up. This office visit is important, as it may prevent need for repeat and/or future hospitalizations. **    Your medical team at [facility] ([facility]) appreciates the opportunity to work with you to get well!     Sincerely,  Isadora Coker

## 2021-04-26 NOTE — CARE COORDINATION
[facility] Case Management Initial Discharge Assessment    Met with Patient to discuss discharge plan. PCP: Monika Gan MD                                Date of Last Visit: March of 2021    If no PCP, list provided? N/A    Discharge Planning    Living Arrangements: independently at home    Who do you live with? Spouse    Who helps you with your care:  self    If lives at home:     Do you have any barriers navigating in your home? no    Patient can perform ADL? Yes    Current Services (outpatient and in home) :  None    Dialysis: No    Is transportation available to get to your appointments? Yes   - Pt drives    DME Equipment:  no    Respiratory equipment: None    Respiratory provider:  no     Pharmacy:  yes - [facility]pe in [address] [place] [address] with Medication Assistance Program?  No      Patient agreeable to [facility]? Declined    Patient agreeable to SNF/Rehab? N/A    Other discharge needs identified? N/A    Does Patient Have a High-Risk for Readmission Diagnosis (CHF, PN, MI, COPD)? No    Initial Discharge Plan? (Note: please see concurrent daily documentation for any updates after initial note). Plan is to return home with spouse and denies any needs.     Readmission Risk              Risk of Unplanned Readmission:        26         Electronically signed by Oswaldo Manning on 4/26/2021 at 2:25 PM

## 2021-04-26 NOTE — ED PROVIDER NOTES
[address]  eMERGENCY dEPARTMENT eNCOUnter      Pt Name: Fito Park  MRN: [id number]  [place] 1965  Date of evaluation: 4/25/2021  Provider: Rachael James MD    [address] [place] [address]       Chief Complaint   Patient presents with    Flank Pain     right         HISTORY OF PRESENT ILLNESS   (Location/Symptom, Timing/Onset,Context/Setting, Quality, Duration, Modifying Factors, Severity)  Note limiting factors. Fito Park is a 50 y.o. female who presents to the emergency department right flank pain  '    With known right ureteral stone within the ER twice no change in status today complaining of persistent pain    The history is provided by the patient. Flank Pain  This is a new problem. The current episode started more than 1 week ago. The problem occurs constantly. The problem has not changed since onset. Nothing aggravates the symptoms. NursingNotes were reviewed. REVIEW OF SYSTEMS    (2-9 systems for level 4, 10 or more for level 5)     Review of Systems   Constitutional: Negative. HENT: Negative. Respiratory: Negative. Cardiovascular: Negative. Genitourinary: Positive for flank pain. Skin: Negative. Psychiatric/Behavioral: Negative. Except as noted above the remainder of the review of systems was reviewed and negative.        PAST MEDICAL HISTORY     Past Medical History:   Diagnosis Date    Anxiety     Asthma     as child    Depression     Depression     dysthymia - following lupus dx age 23    Diabetes mellitus ([address]     Foot fracture, left     GERD (gastroesophageal reflux disease)     Hyperlipidemia     Hypothyroidism     Kidney stones     Lupus ([address]     dr barrera -     Migraine     PONV (postoperative nausea and vomiting)     Renal stone 4/25/2021    Sciatica     left sided         SURGICALHISTORY       Past Surgical History:   Procedure Laterality Date    APPENDECTOMY      ARTHRODESIS Left 11/26/2019    ARTHRODESIS OF THE FIRST METATARSAL PHALANGEAL JOINT LEFT FOOT performed by Farhan Cm DPM at [address] [facility] [place] [facility] N/A 4/26/2021    CYSTOSCOPY RIGHT DOUBLE J STENT PLACEMENT. performed by Tristan Arroyo MD at [facility]      COLONOSCOPY      FOOT CLOSED REDUCTION Left 1/3/2017    LEFT FOOT PERCUTANEOUS PINNING Dinah Fresh FRACTURE performed by Fidel Eason MD at [address]      HYSTERECTOMY      [place] ESOPHAGOGASTRODUODENOSCOPY TRANSORAL DIAGNOSTIC N/A 3/8/2017    EGD ESOPHAGOGASTRODUODENOSCOPY WITH DILATION performed by Ezra Hernandes MD at [address] ESOPHAGOGASTRODUODENOSCOPY TRANSORAL DIAGNOSTIC N/A 11/7/2018    EGD ESOPHAGOGASTRODUODENOSCOPY WITH DILATION performed by Ezra Hernandes MD at [address] [place] [address]       Discharge Medication List as of 4/26/2021  4:53 PM      CONTINUE these medications which have NOT CHANGED    Details   ondansetron (ZOFRAN-ODT) 4 MG disintegrating tablet Take 1 tablet by mouth 3 times daily as needed for Nausea or Vomiting, Disp-21 tablet, R-0Print      insulin glargine (LANTUS SOLOSTAR) 100 UNIT/ML injection pen 160  units at bedtime, Disp-15 pen, R-3Normal      !! insulin lispro, 1 Unit Dial, (HUMALOG KWIKPEN) 100 UNIT/ML SOPN 55  units with each meals, Disp-10 pen, R-3Normal      !! blood glucose monitor strips Pt test 3x daily Dx E11.65, Disp-100 strip,R-3, Normal      !! Drug [place] Unilet Lancets 30G MISC Disp-200 each,R-3, NormalCheck blood sugars twice daily or as directed. insulin aspart (NOVOLOG FLEXPEN) 100 UNIT/ML injection pen Inject 35 units tid with meals, Disp-15 pen,R-3Normal      !!  Insulin Pen Needle (NOVOFINE) 32G X 6 MM MISC Disp-300 each,R-3, Normalqid      !! insulin lispro, 1 Unit Dial, (HUMALOG KWIKPEN) 100 UNIT/ML SOPN Inject 20 Units into the skin 3 times daily (before meals), Disp-10 pen, R-3Normal      !! blood glucose monitor kit and supplies Please give 1 meter covered by insurance Dx E11.65, Disp-1 kit, R-0, Normal      !! Lancets MISC Disp-100 each, R-3, NormalPt test 3x daily Dx E11.65      !! blood glucose test strips (ASCENSIA AUTODISC VI;ONE TOUCH ULTRA TEST VI) strip Disp-200 strip, R-3, NormalCheck blood sugars twice daily or as directed. !! blood glucose monitor kit and supplies Test 3 times a day & as needed for symptoms of irregular blood glucose., Disp-1 kit, R-0, Normal      !! Insulin Pen Needle (NOVOFINE) 32G X 6 MM MISC Disp-100 each, R-3, NormalBID      estradiol (CLIMARA) 0.025 MG/24HR Place 0.025 patches onto the skin every 7 days On mondays, R-11Historical Med      rosuvastatin (CRESTOR) 40 MG tablet Take 1 tablet by mouth daily, Disp-90 tablet, R-3Normal      pantoprazole (PROTONIX) 40 MG tablet Take 1 tablet by mouth daily, Disp-90 tablet, R-3Normal      levothyroxine (SYNTHROID) 50 MCG tablet Take 1 tablet by mouth daily, Disp-90 tablet, R-3      !! Blood Glucose Monitoring Suppl (TRUE TRACK BLOOD GLUCOSE) CAROLE Disp-100 Device, R-5, NormalCheck blood sugars twice daily or as directed. hydroxychloroquine (PLAQUENIL) 200 MG tablet Take 1 tablet by mouth 2 times daily, R-5      albuterol sulfate HFA (VENTOLIN HFA) 108 (90 Base) MCG/ACT inhaler Inhale 2 puffs into the lungs every 6 hours as needed for Wheezing, Disp-1 Inhaler, R-0Print       !! - Potential duplicate medications found. Please discuss with provider.           ALLERGIES     Aspirin, Cephalosporins, Imitrex [sumatriptan], Lidocaine, Pcn [penicillins], Seasonal, Toradol [ketorolac tromethamine], Ultram [tramadol], and Fentanyl    FAMILY HISTORY       Family History   Adopted: Yes          SOCIAL HISTORY       Social History     Socioeconomic History    Marital status: Legally      Spouse name: None    Number of children: None    Years of education: None  Highest education level: None   Occupational History    None   Social Needs    Financial resource strain: None    Food insecurity     Worry: None     Inability: None    Transportation needs     Medical: None     Non-medical: None   Tobacco Use    Smoking status: Never Smoker    Smokeless tobacco: Never Used   Substance and Sexual Activity    Alcohol use: No     Alcohol/week: 0.0 standard drinks     Comment: denies    Drug use: No     Comment: denies    Sexual activity: None   Lifestyle    Physical activity     Days per week: None     Minutes per session: None    Stress: None   Relationships    Social connections     Talks on phone: None     Gets together: None     Attends Confucianism service: None     Active member of club or organization: None     Attends meetings of clubs or organizations: None     Relationship status: None    Intimate partner violence     Fear of current or ex partner: None     Emotionally abused: None     Physically abused: None     Forced sexual activity: None   Other Topics Concern    None   Social History Narrative    None       SCREENINGS    Aries Coma Scale  Eye Opening: Spontaneous  Best Verbal Response: Oriented  Best Motor Response: Obeys commands  M[place]ock Coma Scale Score: 15 @FLOW([id number])@      PHYSICAL EXAM    (up to 7 for level 4, 8 or more for level 5)     ED Triage Vitals [04/25/21 1916]   BP Temp Temp Source Pulse Resp SpO2 Height Weight   118/67 98.4 °F (36.9 °C) Oral 118 18 99 % 5' 8\" (1.727 m) 160 lb (72.6 kg)       Physical Exam  Vitals signs and nursing note reviewed. Constitutional:       Appearance: Normal appearance. She is well-developed. HENT:      Head: Normocephalic and atraumatic. Right Ear: Tympanic membrane and external ear normal.      Left Ear: Tympanic membrane and external ear normal.      Nose: Nose normal.      Mouth/Throat:      Mouth: Mucous membranes are moist.      Pharynx: Oropharynx is clear.    Eyes:      Extraocular Movements: Extraocular movements intact. Pupils: Pupils are equal, round, and reactive to light. Neck:      Musculoskeletal: Normal range of motion and neck supple. Cardiovascular:      Rate and Rhythm: Normal rate and regular rhythm. Pulses: Normal pulses. Heart sounds: Normal heart sounds. Pulmonary:      Effort: Pulmonary effort is normal. No respiratory distress. Breath sounds: Normal breath sounds. No stridor. No wheezing, rhonchi or rales. Chest:      Chest wall: No tenderness. Abdominal:      General: Abdomen is flat. Bowel sounds are normal.      Palpations: Abdomen is soft. Musculoskeletal: Normal range of motion. Skin:     General: Skin is warm and dry. Capillary Refill: Capillary refill takes less than 2 seconds. Neurological:      General: No focal deficit present. Mental Status: She is alert and oriented to person, place, and time. Cranial Nerves: No cranial nerve deficit. Sensory: No sensory deficit. Motor: No abnormal muscle tone. Coordination: Coordination normal.      Deep Tendon Reflexes: Reflexes normal.   Psychiatric:         Mood and Affect: Mood normal.         Behavior: Behavior normal.         Thought Content: Thought content normal.         Judgment: Judgment normal.         DIAGNOSTIC RESULTS     EKG: All EKG's are interpreted by the Emergency Department Physician who either signs or Co-signsthis chart in the absence of a cardiologist.      RADIOLOGY:   [place] such as CT, Ultrasound and MRI are read by the radiologist. Plain radiographic images are visualized and preliminarily interpreted by the emergency physician with the below findings:       Interpretation per the Radiologist below, if available at the time ofthis note:    [address]   Final Result      CT ABDOMEN PELVIS WO CONTRAST Additional Contrast? None   Final Result   1. UNCHANGED 1.2 CM CALCULI REMAINS IN THE RIGHT UPJ JUNCTION. All CT scans at this facility use dose modulation, iterative reconstruction, and/or weight based dosing when appropriate to reduce radiation dose to as low as reasonably achievable. XR ABDOMEN (KUB) (SINGLE AP VIEW)   Final Result   NONSPECIFIC BOWEL GAS PATTERN   CALCULI AT THE MEDIAL ASPECT OF THE RIGHT RENAL SHADOW. MAY RESIDE WITHIN THE REGION OF THE RIGHT RENAL PELVIS/UPJ.    PLEASE SEE CT SCAN ABDOMEN PELVIS REPORT TO FOLLOW FOR ADDITIONAL DETAILS         stable nonobstructing stone right proximal ureter at the retroperitoneal junction 10 x 6 x 7 mm cannot rule out infected stone    ED BEDSIDE ULTRASOUND:   Performed by ED Physician - none    LABS:  Labs Reviewed   CBC WITH AUTO DIFFERENTIAL - Abnormal; Notable for the following components:       Result Value    WBC 11.5 (*)     Neutrophils Absolute 7.0 (*)     All other components within normal limits   COMPREHENSIVE METABOLIC PANEL W/ REFLEX TO MG FOR LOW K - Abnormal; Notable for the following components:    Glucose 143 (*)     CREATININE 1.23 (*)     GFR Non- 45.6 (*)     GFR  55.1 (*)     Alkaline Phosphatase 139 (*)     All other components within normal limits   URINE RT REFLEX TO CULTURE - Abnormal; Notable for the following components:    Clarity, UA CLOUDY (*)     Glucose, Ur 100 (*)     Ketones, Urine TRACE (*)     Protein, UA TRACE (*)     All other components within normal limits   BASIC METABOLIC PANEL W/ REFLEX TO MG FOR LOW K - Abnormal; Notable for the following components:    Glucose 179 (*)     CREATININE 1.08 (*)     GFR Non- 52.9 (*)     Calcium 8.1 (*)     All other components within normal limits   CBC - Abnormal; Notable for the following components:    MCHC 32.8 (*)     All other components within normal limits   POCT GLUCOSE - Abnormal; Notable for the following components:    POC Glucose 186 (*)     All other components within normal limits   POCT GLUCOSE - Abnormal; Notable for the following components:    POC Glucose 214 (*)     All other components within normal limits   POCT GLUCOSE - Abnormal; Notable for the following components:    POC Glucose 169 (*)     All other components within normal limits   POCT GLUCOSE - Abnormal; Notable for the following components:    POC Glucose 198 (*)     All other components within normal limits   POCT GLUCOSE - Abnormal; Notable for the following components:    POC Glucose 161 (*)     All other components within normal limits   COVID-19, RAPID   POCT GLUCOSE   POCT GLUCOSE   POCT GLUCOSE       All other labs were within normal range or not returned as of this dictation. EMERGENCY DEPARTMENT COURSE and DIFFERENTIAL DIAGNOSIS/MDM:   Vitals:    Vitals:    04/26/21 1055 04/26/21 1100 04/26/21 1105 04/26/21 1111   BP:  129/84 129/84    Pulse: 88 86 95 90   Resp: 12 11 11    Temp:   98 °F (36.7 °C)    TempSrc:   Temporal    SpO2: 98% 97% 97%    Weight:       Height:                     MDM  Number of Diagnoses or Management Options  Calculus of ureterovesical junction (UVJ)  Ureteric stone  Diagnosis management comments: Years old presented to the ER with right flank pain that started 12 days ago was seen in the ER twice had a CAT scan both confirmed a right ureteropelvic junction stone 10 x 6 x 7 presented today for persistent pain and no improvement CAT scan confirmed persistent stone was possibly infected stone. Patient consistently is consulted with urology who agreed to admit the patient for further       Amount and/or Complexity of Data Reviewed  Clinical lab tests: ordered and reviewed  Tests in the radiology section of CPT®: ordered and reviewed        CONSULTS:  IP CONSULT TO UROLOGY    PROCEDURES:  Unless otherwise noted below, none     Procedures    FINAL IMPRESSION      1. Ureteric stone    2.  Calculus of ureterovesical junction (UVJ)          DISPOSITION/PLAN   DISPOSITION Decision To Admit 05/01/2021 09:42:49 AM      PATIENT REFERRED TO:  Torri Tompkins MD  [address] [place] Jaskaran [address] [place] [address]  [phone]    On 4/29/2021  @ 10:00AM 2041 [facility], [facility] Jeremy Mc [facility]  [address]  [phone]    Call  for a follow up      DISCHARGE MEDICATIONS:  Discharge Medication List as of 4/26/2021  4:53 PM      START taking these medications    Details   nitrofurantoin, macrocrystal-monohydrate, (MACROBID) 100 MG capsule Take 1 capsule by mouth 2 times daily for 10 days, Disp-20 capsule, R-0Normal      oxyCODONE-acetaminophen (PERCOCET) 5-325 MG per tablet Take 1 tablet by mouth every 6 hours as needed for Pain for up to 5 days. Intended supply: 5 days.  Take lowest dose possible to manage pain, Disp-20 tablet, R-0Print                (Please note thatportions of this note were completed with a voice recognition program.  Efforts were made to edit the dictations but occasionally words are mis-transcribed.)    Yoshi Loco MD (electronically signed)  Attending Emergency Physician          Cassi Lowery MD  05/01/21 [id number]

## 2021-04-26 NOTE — ANESTHESIA PRE PROCEDURE
Department of Anesthesiology  Preprocedure Note       Name:  Veto Bruno   Age:  50 y.o.  :  1965                                          MRN:  [id number]         Date:  2021      Surgeon: Sussy Escamilla):  Fredis Menendez MD    Procedure: Procedure(s):  CYSTOSCOPY RIGHT DOUBLE J STENT PLACEMENT. AVAILABLE 12:00 PM OR LATER. ROOM 478    Medications prior to admission:   Prior to Admission medications    Medication Sig Start Date End Date Taking? Authorizing Provider   ondansetron (ZOFRAN-ODT) 4 MG disintegrating tablet Take 1 tablet by mouth 3 times daily as needed for Nausea or Vomiting 21  Yes Emily Hernandez MD   [facility]) 0.4 MG capsule Take 1 capsule by mouth daily 21  Yes Emily Hernandez MD   insulin glargine (LANTUS SOLOSTAR) 100 UNIT/ML injection pen 160  units at bedtime 3/16/21  Yes Murray Orellana MD   insulin lispro, 1 Unit Dial, (HUMALOG KWIKPEN) 100 UNIT/ML SOPN 55  units with each meals 3/16/21  Yes Murray Orellana MD   blood glucose monitor strips Pt test 3x daily Dx E11.65 10/23/20  Yes Murray Orellana MD   Drug [place] Unilet Lancets 30G MISC Check blood sugars twice daily or as directed. 10/22/20  Yes Murray Orellana MD   insulin aspart (NOVOLOG FLEXPEN) 100 UNIT/ML injection pen Inject 35 units tid with meals 10/12/20  Yes Murray Orellnaa MD   Insulin Pen Needle (NOVOFINE) 32G X 6 MM MISC qid 20  Yes Murray Orellana MD   insulin lispro, 1 Unit Dial, (HUMALOG KWIKPEN) 100 UNIT/ML SOPN Inject 20 Units into the skin 3 times daily (before meals) 20  Yes Murray Orellana MD   blood glucose monitor kit and supplies Please give 1 meter covered by insurance Dx E11.65 20  Yes Murray Orellana MD   Lancets MISC Pt test 3x daily Dx E11.65 20  Yes Murray Orellana MD   blood glucose test strips (ASCENSIA AUTODISC VI;ONE TOUCH ULTRA TEST VI) strip Check blood sugars twice daily or as directed.  20  Yes Murray Orellana MD   blood glucose monitor kit and supplies Test 3 times a day & as needed for symptoms (TYLENOL) suppository 650 mg  650 mg Rectal Q6H PRN Khalida Graham MD        HYDROmorphone (DILAUDID) injection 0.5 mg  0.5 mg Intravenous Q3H PRN Khalida Graham MD        HYDROmorphone (DILAUDID) injection 1 mg  1 mg Intravenous Q4H PRN Khalida Graham MD   1 mg at 04/26/21 0641    insulin lispro (HUMALOG) injection vial 0-6 Units  0-6 Units Subcutaneous TID [facility] Khalida Graham MD   Stopped at 04/26/21 6332    insulin lispro (HUMALOG) injection vial 0-3 Units  0-3 Units Subcutaneous Nightly Khalida Graham MD   1 Units at 04/26/21 0155    glucose (GLUTOSE) 40 % oral gel 15 g  15 g Oral PRN Khalida Graham MD        dextrose 50 % IV solution  12.5 g Intravenous PRN Khalida Graham MD        glucagon (rDNA) injection 1 mg  1 mg Intramuscular PRN Khalida Graham MD        dextrose 5 % solution  100 mL/hr Intravenous PRN Khalida Graham MD        gentamicin (GARAMYCIN) 120 mg in dextrose 5 % 100 mL IVPB  120 mg Intravenous On Call to Aylin Grady MD        meperidine (DEMEROL) injection 12.5 mg  12.5 mg Intravenous Q5 Min PRN Sonido Robbins MD        ondansetron [facility]) injection 4 mg  4 mg Intravenous Once PRN Sonido Robbins MD        metoclopramide [facility]) injection 10 mg  10 mg Intravenous Once PRN Sonido Robbins MD        diphenhydrAMINE (BENADRYL) injection 12.5 mg  12.5 mg Intravenous Once PRN Sonido Robbins MD        morphine (PF) injection 4 mg  4 mg Intravenous Q15 Min PRN Katarzyna Lowery MD   4 mg at 04/25/21 1948    morphine (PF) injection 4 mg  4 mg Intravenous Once Mark Paz MD           Allergies:     Allergies   Allergen Reactions    Aspirin Swelling     Bloody noses    Cephalosporins     Imitrex [Sumatriptan] Hives    Lidocaine     Pcn [Penicillins] Swelling    Seasonal Itching     Itch, sneezing,runny nose, watery eyes    Toradol [Ketorolac Tromethamine] Hives     Shortness of breath    Ultram [Tramadol] Hives    Fentanyl Rash Problem List:    Patient Active Problem List   Diagnosis Code    DM (diabetes mellitus) ([facility] E11.9    Hyperlipemia E78.5    Hypothyroid E03.9    Vitamin D deficiency E55.9    SLE (systemic lupus erythematosus related syndrome) ([facility] M32.9    Asthma J45.909    Panic disorder with agoraphobia F40.01    Hereditary essential tremor G25.0    Allergic rhinitis J30.9    Type 2 diabetes mellitus with diabetic mononeuropathy (HCC) E11.41    Raynaud's phenomenon without gangrene I73.00    Essential hypertension I10    Type 2 diabetes mellitus without complication (HCC) S17.0    Disseminated lupus erythematosus ([facility] M32.9    Foot fracture, left S92.902A    Acute non-recurrent frontal sinusitis J01.10    Gastritis without bleeding K29.70    Dysphagia R13.10    Gastric polyp K31.7    Chest pain R07.9    Renal stone N20.0       Past Medical History:        Diagnosis Date    Anxiety     Asthma     as child    Depression     Depression     dysthymia - following lupus dx age 23    Diabetes mellitus ([facility]     Foot fracture, left     GERD (gastroesophageal reflux disease)     Hyperlipidemia     Hypothyroidism     Kidney stones     Lup[facility]     dr barrera -     Migraine     PONV (postoperative nausea and vomiting)     Renal stone 4/25/2021    Sciatica     left sided       Past Surgical History:        Procedure Laterality Date    APPENDECTOMY      ARTHRODESIS Left 11/26/2019    ARTHRODESIS OF THE FIRST METATARSAL PHALANGEAL JOINT LEFT FOOT performed by Rosanna Carvajal DPM at [address] REDUCTION Left 1/3/2017    LEFT FOOT PERCUTANEOUS PINNING Samara Favors FRACTURE performed by Krishan Morrow MD at [address]      [place] ESOPHAGOGASTRODUODENOSCOPY TRANSORAL DIAGNOSTIC N/A 3/8/2017    EGD ESOPHAGOGASTRODUODENOSCOPY WITH DILATION performed by Fritz Elias MD at [address] ESOPHAGOGASTRODUODENOSCOPY TRANSORAL DIAGNOSTIC N/A 11/7/2018    EGD ESOPHAGOGASTRODUODENOSCOPY WITH DILATION performed by Harlan Brown MD at [facility] ENDOSCOPY         Social History:    Social History     Tobacco Use    Smoking status: Never Smoker    Smokeless tobacco: Never Used   Substance Use Topics    Alcohol use: No     Alcohol/week: 0.0 standard drinks     Comment: denies                                Counseling given: Not Answered      Vital Signs (Current):   Vitals:    04/26/21 0042 04/26/21 0139 04/26/21 0315 04/26/21 0759   BP: 123/63 123/63 123/63 125/63   Pulse: 103 103 103 98   Resp:   18 16   Temp: 97.5 °F (36.4 °C)  97.5 °F (36.4 °C) 97.5 °F (36.4 °C)   TempSrc: Oral  Oral Oral   SpO2: 92%   94%   Weight:       Height:                                                  BP Readings from Last 3 Encounters:   04/26/21 125/63   04/16/21 121/75   04/15/21 111/78       NPO Status:                                                                                 BMI:   Wt Readings from Last 3 Encounters:   04/25/21 160 lb (72.6 kg)   04/16/21 160 lb (72.6 kg)   04/15/21 160 lb (72.6 kg)     Body mass index is 24.33 kg/m².     CBC:   Lab Results   Component Value Date    WBC 10.3 04/26/2021    RBC 4.57 04/26/2021    HGB 13.3 04/26/2021    HCT 40.6 04/26/2021    MCV 88.9 04/26/2021    RDW 14.0 04/26/2021     04/26/2021       CMP:   Lab Results   Component Value Date     04/26/2021    K 4.2 04/26/2021     04/26/2021    CO2 24 04/26/2021    BUN 11 04/26/2021    CREATININE 1.08 04/26/2021    GFRAA >60.0 04/26/2021    LABGLOM 52.9 04/26/2021    GLUCOSE 179 04/26/2021    GLUCOSE 228 04/08/2019    PROT 7.8 04/25/2021    CALCIUM 8.1 04/26/2021    BILITOT 0.3 04/25/2021    ALKPHOS 139 04/25/2021    AST 22 04/25/2021    ALT 23 04/25/2021       POC Tests:   Recent Labs 04/26/21  0757   POCGLU 169*       Coags:   Lab Results   Component Value Date    PROTIME 9.5 05/03/2018    INR 0.9 05/03/2018    APTT 25.6 11/04/2019       HCG (If Applicable): No results found for: PREGTESTUR, PREGSERUM, HCG, HCGQUANT     ABGs:   Lab Results   Component Value Date    PHART 7.466 06/28/2020    PO2ART 84 06/28/2020    DYG4AVJ 34 06/28/2020    WIL7CYW 24.6 06/28/2020    BEART 1 06/28/2020    H8HYLSBZ 97 06/28/2020        Type & Screen (If Applicable):  No results found for: LABABO, LABRH    Drug/Infectious Status (If Applicable):  No results found for: HIV, HEPCAB    COVID-19 Screening (If Applicable):   Lab Results   Component Value Date    COVID19 Not Detected 04/25/2021           Anesthesia Evaluation  Patient summary reviewed and Nursing notes reviewed   history of anesthetic complications: PONV. Airway: Mallampati: II  TM distance: >3 FB   Neck ROM: full  Mouth opening: > = 3 FB Dental: normal exam         Pulmonary:normal exam    (+) asthma:                            Cardiovascular:    (+) hypertension: no interval change,       ECG reviewed                        Neuro/Psych:   (+) neuromuscular disease:, psychiatric history:            GI/Hepatic/Renal:   (+) GERD:,           Endo/Other:    (+) Diabetes, hypothyroidism::., .                 Abdominal:           Vascular: negative vascular ROS. Anesthesia Plan      general     ASA 3 - emergent       Induction: intravenous. MIPS: Prophylactic antiemetics administered. Anesthetic plan and risks discussed with patient. Plan discussed with CRNA.     Attending anesthesiologist reviewed and agrees with Pre Eval content              Cassie Rojo MD   4/26/2021

## 2021-04-26 NOTE — DISCHARGE SUMMARY
Discharge instructions given with understanding. SL removed from left arm. Paper script for percocet given. Called for transporter.

## 2021-04-26 NOTE — PROGRESS NOTES
10:30 To PACU from OR monitor leads applied rec'd report and pt assessed. VSS     10:40 Offers no C/O placed on nasal cannula at 2 ltr. No distress noted. 10:50 New IV started pt tolerated well. 11:10 Report called to 4WT RN.
hours.    Urinalysis:      Lab Results   Component Value Date    NITRU Negative 04/25/2021    WBCUA 20-50 03/08/2021    BACTERIA Negative 03/08/2021    RBCUA 3-5 03/08/2021    BLOODU Negative 04/25/2021    SPECGRAV 1.027 04/25/2021    GLUCOSEU 100 04/25/2021       Radiology:  [facility]   Final Result      CT ABDOMEN PELVIS WO CONTRAST Additional Contrast? None   Final Result   1. UNCHANGED 1.2 CM CALCULI REMAINS IN THE RIGHT UPJ JUNCTION. All CT scans at this facility use dose modulation, iterative reconstruction, and/or weight based dosing when appropriate to reduce radiation dose to as low as reasonably achievable. XR ABDOMEN (KUB) (SINGLE AP VIEW)   Final Result   NONSPECIFIC BOWEL GAS PATTERN   CALCULI AT THE MEDIAL ASPECT OF THE RIGHT RENAL SHADOW. MAY RESIDE WITHIN THE REGION OF THE RIGHT RENAL PELVIS/UPJ.    PLEASE SEE CT SCAN ABDOMEN PELVIS REPORT TO FOLLOW FOR ADDITIONAL DETAILS              Assessment/Plan:    49 y/o female with history of asthma, IDDM2, GERD, SLE, CKD3 who presented with:    Large right UPJ stone  - s/p cystoscopy and JJ stent placement  - OK to d/c on Macrobid x 10 days and outpatient f/u per urology    IDDM2  - ISS     SLE  - on Plaquenil    CKD3  - stable renal function    Disposition - home today          Electronically signed by Pat Seo MD on 4/26/2021 at 3:02 PM

## 2021-04-26 NOTE — H&P
Hospital Medicine  History and Physical    Patient:  Everlina Siemens  MRN: [id number]    CHIEF COMPLAINT:    Chief Complaint   Patient presents with    Flank Pain     right       History Obtained From:  Patient, EMR  Primary Care Physician: Sherine Velazquez MD    HISTORY OF PRESENT ILLNESS:   The patient is a 50 y.o. female who presents with flank pain. Duration of symptoms: Days. Timing: Intermittent. Over the last 1 day the pain has become substantially worse. No aggravating relieving factors. Not associate with fever. Context: Patient has a longstanding history of kidney stones since her teenage years. This time when she had a stone she came to the emergency room her pain was relatively controlled and she was given hydration and discharge. She thought that she would be able to move on with conservative management however over the last 24 hours prior to admission the pain started to increase and during the day today prior to admission the pain was substantial requiring the patient to come in for further evaluation.     Past Medical History:      Diagnosis Date    Anxiety     Asthma     as child    Depression     Depression     dysthymia - following lupus dx age 23    [facility]     Foot fracture, left     GERD (gastroesophageal reflux disease)     Hyperlipidemia     Hypothyroidism     Kidney stones     Lupus ([facility] [address]     dr barrera -     Migraine     PONV (postoperative nausea and vomiting)     Renal stone 4/25/2021    Sciatica     left sided       Past Surgical History:      Procedure Laterality Date    APPENDECTOMY      ARTHRODESIS Left 11/26/2019    ARTHRODESIS OF THE FIRST METATARSAL PHALANGEAL JOINT LEFT FOOT performed by Brendan Ahumada DPM at [facility]      COLONOSCOPY      FOOT CLOSED REDUCTION Left 1/3/2017    LEFT FOOT PERCUTANEOUS PINNING Vesta Senters FRACTURE performed by Waylon Brown MD at [address] [place] [address] HERNIA REPAIR      HYSTERECTOMY      [place] ESOPHAGOGASTRODUODENOSCOPY TRANSORAL DIAGNOSTIC N/A 3/8/2017    EGD ESOPHAGOGASTRODUODENOSCOPY WITH DILATION performed by Fritz Elias MD at [address] ESOPHAGOGASTRODUODENOSCOPY TRANSORAL DIAGNOSTIC N/A 11/7/2018    EGD ESOPHAGOGASTRODUODENOSCOPY WITH DILATION performed by Fritz Elias MD at [facility] ENDOSCOPY         Medications Prior to Admission:    Prior to Admission medications    Medication Sig Start Date End Date Taking? Authorizing Provider   ondansetron (ZOFRAN-ODT) 4 MG disintegrating tablet Take 1 tablet by mouth 3 times daily as needed for Nausea or Vomiting 4/16/21   Yunior Jeff MD   [facility]) 0.4 MG capsule Take 1 capsule by mouth daily 4/16/21   Yunior Jeff MD   insulin glargine (LANTUS SOLOSTAR) 100 UNIT/ML injection pen 160  units at bedtime 3/16/21   Bertram Hays MD   insulin lispro, 1 Unit Dial, (HUMALOG KWIKPEN) 100 UNIT/ML SOPN 55  units with each meals 3/16/21   Bertram Hays MD   blood glucose monitor strips Pt test 3x daily Dx E11.65 10/23/20   Bertram Hays MD   Drug [place] Unilet Lancets 30G MISC Check blood sugars twice daily or as directed. 10/22/20   Bertram Hays MD   insulin aspart (NOVOLOG FLEXPEN) 100 UNIT/ML injection pen Inject 35 units tid with meals 10/12/20   Bertram Hays MD   Insulin Pen Needle (NOVOFINE) 32G X 6 MM MISC qid 8/13/20   Bertram Hays MD   insulin lispro, 1 Unit Dial, (HUMALOG KWIKPEN) 100 UNIT/ML SOPN Inject 20 Units into the skin 3 times daily (before meals) 5/13/20   Bertram Hays MD   blood glucose monitor kit and supplies Please give 1 meter covered by insurance Dx E11.65 1/20/20   Bertram Hays MD   Lancets MISC Pt test 3x daily Dx E11.65 1/20/20   Bertram Hays MD   blood glucose test strips (ASCENSIA AUTODISC VI;ONE TOUCH ULTRA TEST VI) strip Check blood sugars twice daily or as directed.  1/17/20   Bertram Hays MD blood glucose monitor kit and supplies Test 3 times a day & as needed for symptoms of irregular blood glucose. 1/16/20   Huy Deleon MD   Insulin Pen Needle (NOVOFINE) 32G X 6 MM MISC BID 1/16/20   Huy Deleon MD   estradiol (CLIMARA) 0.025 MG/24HR Place 0.025 patches onto the skin every 7 days On mondays 9/23/19   Historical Provider, MD   albuterol sulfate HFA (VENTOLIN HFA) 108 (90 Base) MCG/ACT inhaler Inhale 2 puffs into the lungs every 6 hours as needed for Wheezing 8/13/18   Isa Crisostomo MD   rosuvastatin (CRESTOR) 40 MG tablet Take 1 tablet by mouth daily 6/8/17   Dorcas Mohs, MD   pantoprazole (PROTONIX) 40 MG tablet Take 1 tablet by mouth daily 5/12/17   Coleen Munoz PA-C   levothyroxine (SYNTHROID) 50 MCG tablet Take 1 tablet by mouth daily 10/11/16   Kita Wheat MD   Blood Glucose Monitoring Suppl (TRUE TRACK BLOOD GLUCOSE) CAROLE Check blood sugars twice daily or as directed. 8/6/15   Kita Hughes MD   hydroxychloroquine (PLAQUENIL) 200 MG tablet Take 1 tablet by mouth 2 times daily 7/2/15   Historical Provider, MD       Allergies:  Aspirin, Cephalosporins, Imitrex [sumatriptan], Lidocaine, Pcn [penicillins], Seasonal, Toradol [ketorolac tromethamine], Ultram [tramadol], and Fentanyl    Social History:   TOBACCO:   reports that she has never smoked. She has never used smokeless tobacco.  ETOH:   reports no history of alcohol use. Family History:       Adopted: Yes       REVIEW OF SYSTEMS:  Ten systems reviewed and negative except as stated in the HPI    Physical Exam:    Vitals: BP (!) 147/93   Pulse 118   Temp 98.4 °F (36.9 °C) (Oral)   Resp 18   Ht 5' 8\" (1.727 m)   Wt 160 lb (72.6 kg)   LMP  (LMP Unknown)   SpO2 99%   BMI 24.33 kg/m²   General appearance: alert, appears stated age and cooperative  Skin: Skin color, texture, turgor normal. No rashes or lesions  HEENT: Head: Normocephalic, no lesions, without obvious abnormality.   Neck: no jugular venous urology on consult. Npo after midnight  2. No evidence of infection based on urinalysis and white count less than 12 without fever  3. History of lupus  4. Diabetes: Monitor glucose accordion adjust medications as needed  5. Hyperlipidemia: Statin  6. Hypothyroid  7. DVT ppx  8. Disposition: Dependent on hospital course. Will discharge once medically stable. SW on board for discharge planning.      Patient Active Problem List   Diagnosis Code    DM (diabetes mellitus) ([facility] [address] E11.9    Hyperlipemia E78.5    Hypothyroid E03.9    Vitamin D deficiency E55.9    SLE (systemic lupus erythematosus related syndrome) ([facility] [address] M32.9    Asthma J45.909    Panic disorder with agoraphobia F40.01    Hereditary essential tremor G25.0    Allergic rhinitis J30.9    Type 2 diabetes mellitus with diabetic mononeuropathy (HCC) E11.41    Raynaud's phenomenon without gangrene I73.00    Essential hypertension I10    Type 2 diabetes mellitus without complication (HCC) X24.5    Disseminated lupus erythematosus (HCC) M32.9    Foot fracture, left S92.902A    Acute non-recurrent frontal sinusitis J01.10    Gastritis without bleeding K29.70    Dysphagia R13.10    Gastric polyp K31.7    Chest pain R07.9    Renal stone N20.0       Esther Cr MD

## 2021-04-26 NOTE — DISCHARGE INSTR - DIET

## 2021-04-26 NOTE — OP NOTE
[address]                      [address], [address] [facility]                                OPERATIVE REPORT    PATIENT NAME: Rhona Durant                   :        1965  MED REC NO:   [id number]                            ROOM:       [id number]  ACCOUNT NO:   [de-identified]                           ADMIT DATE: 2021  PROVIDER:     Amee Ayoub MD    DATE OF PROCEDURE:  2021    PREOPERATIVE DIAGNOSES:  Right proximal ureteral calculus with  hydronephrosis and severe renal colic. POSTOPERATIVE DIAGNOSES:  Right proximal ureteral calculus with  hydronephrosis and severe renal colic. OPERATION PERFORMED:  Cystoscopy, manipulation of right ureteral calculi  without removal, right retrograde pyelogram, right double-J stent  placement. SURGEON:  Jamie Avila. Janet Pearce MD    ANESTHESIA:  General.    ESTIMATED BLOOD LOSS:  Not applicable. INDICATIONS:  The patient is a 53-year-old female with a history of  diabetes with two calculi, one approximately 4 mm in size and the other  one approximately 7 mm in size at the right UPJ noted on CT scan with  intractable pain, nausea and vomiting. The patient was admitted for  pain control and hydration. The patient is being taken to the operating  room on an emergent basis for placement of double-J stent. The patient  will later have these stones treated with shock wave lithotripsy in the  future. OPERATIVE PROCEDURE:  Consent was obtained. The patient was brought  into the operating room and placed on the operating table in dorsal  lithotomy position after initiation of general anesthetic. The patient  received gentamicin IV preoperatively. Time-out was taken. A 21-Setswana  cystoscope was used to perform a cystoscopy. Cystoscopic evaluation  showed no evidence of tumors, stones and/or other abnormalities. The  patient has a minimal cystocele.   A right retrograde pyelogram was  obtained, which showed obstruction secondary to stones at the level of  the right ureteropelvic junction. These stones were manipulated  superiorly into the kidney. Following this, a guidewire was placed  through the right collecting system over which a 6 x 26 double-J stent  was placed. A good curl was observed in the bladder and the kidney. The patient was discharged to recovery room and then to the floor in  stable condition. The patient will return to the office in 1 week to  get a KUB and scheduled shock wave lithotripsy.         Marie Green MD    D: 04/26/2021 12:34:20       T: 04/26/2021 12:37:50     [email or web address]  Job#: [id number]     Doc#: [id number]    CC:

## 2021-04-26 NOTE — FLOWSHEET NOTE
Patient is back from recovery after having a right double J stent placement per Dr Magali Casillas. Settled into bed, assisted up to bathroom and voided 100 ml. Of clear pink urine. Alert and oriented x4. Lungs clear.

## 2021-04-27 ENCOUNTER — TELEPHONE (OUTPATIENT)
Dept: SURGERY | Age: 56
End: 2021-04-27

## 2021-04-27 DIAGNOSIS — N20.0 KIDNEY STONE: Primary | ICD-10-CM

## 2021-04-29 ENCOUNTER — TELEPHONE (OUTPATIENT)
Dept: ENDOCRINOLOGY | Age: 56
End: 2021-04-29

## 2021-04-29 DIAGNOSIS — E11.69 TYPE 2 DIABETES MELLITUS WITH OTHER SPECIFIED COMPLICATION, UNSPECIFIED WHETHER LONG TERM INSULIN USE (HCC): Primary | ICD-10-CM

## 2021-04-29 RX ORDER — PEN NEEDLE, DIABETIC 33 GX5/32"
1 NEEDLE, DISPOSABLE MISCELLANEOUS 3 TIMES DAILY
Qty: 100 EACH | Refills: 2 | Status: SHIPPED | OUTPATIENT
Start: 2021-04-29 | End: 2021-08-04 | Stop reason: SDUPTHER

## 2021-04-29 NOTE — TELEPHONE ENCOUNTER
Patient is requesting to have a prescription for 4mm pen needles sent to drug [facility] for her insulin. The 6mm are too hard for her to use, she keeps spilling the insulin. Please call this in for the patient. Thanks!

## 2021-04-30 ENCOUNTER — TELEPHONE (OUTPATIENT)
Dept: UROLOGY | Age: 56
End: 2021-04-30

## 2021-04-30 NOTE — TELEPHONE ENCOUNTER
Pt called stating she has appt on Monday 5-3-21 for JJ stent removal. She states she has a lot of pain and pressure and does not think she can wait till the appt.  Pt can be reached at [phone]

## 2021-05-03 ENCOUNTER — HOSPITAL ENCOUNTER (OUTPATIENT)
Dept: GENERAL RADIOLOGY | Age: 56
Discharge: HOME | End: 2021-05-05
Payer: MEDICARE

## 2021-05-03 ENCOUNTER — OFFICE VISIT (OUTPATIENT)
Dept: UROLOGY | Age: 56
End: 2021-05-03
Payer: MEDICARE

## 2021-05-03 VITALS
DIASTOLIC BLOOD PRESSURE: 84 MMHG | SYSTOLIC BLOOD PRESSURE: 110 MMHG | HEART RATE: 92 BPM | WEIGHT: 160 LBS | HEIGHT: 68 IN | BODY MASS INDEX: 24.25 KG/M2

## 2021-05-03 DIAGNOSIS — N20.0 KIDNEY STONE: ICD-10-CM

## 2021-05-03 DIAGNOSIS — N20.0 RENAL STONE: Primary | ICD-10-CM

## 2021-05-03 DIAGNOSIS — E11.69 TYPE 2 DIABETES MELLITUS WITH OTHER SPECIFIED COMPLICATION, UNSPECIFIED WHETHER LONG TERM INSULIN USE (HCC): ICD-10-CM

## 2021-05-03 LAB
ANION GAP SERPL CALCULATED.3IONS-SCNC: 13 MEQ/L (ref 9–15)
BUN BLDV-MCNC: 13 MG/DL (ref 6–20)
CALCIUM SERPL-MCNC: 9 MG/DL (ref 8.5–9.9)
CHLORIDE BLD-SCNC: 106 MEQ/L (ref 95–107)
CO2: 23 MEQ/L (ref 20–31)
CREAT SERPL-MCNC: 1.24 MG/DL (ref 0.5–0.9)
GFR AFRICAN AMERICAN: 54.6
GFR NON-AFRICAN AMERICAN: 45.1
GLUCOSE BLD-MCNC: 123 MG/DL (ref 70–99)
HBA1C MFR BLD: 8.7 % (ref 4.8–5.9)
POTASSIUM SERPL-SCNC: 4 MEQ/L (ref 3.4–4.9)
SODIUM BLD-SCNC: 142 MEQ/L (ref 135–144)

## 2021-05-03 PROCEDURE — 1111F DSCHRG MED/CURRENT MED MERGE: CPT | Performed by: UROLOGY

## 2021-05-03 PROCEDURE — G8427 DOCREV CUR MEDS BY ELIG CLIN: HCPCS | Performed by: UROLOGY

## 2021-05-03 PROCEDURE — G8420 CALC BMI NORM PARAMETERS: HCPCS | Performed by: UROLOGY

## 2021-05-03 PROCEDURE — 74018 RADEX ABDOMEN 1 VIEW: CPT

## 2021-05-03 PROCEDURE — 3017F COLORECTAL CA SCREEN DOC REV: CPT | Performed by: UROLOGY

## 2021-05-03 PROCEDURE — 99214 OFFICE O/P EST MOD 30 MIN: CPT | Performed by: UROLOGY

## 2021-05-03 PROCEDURE — 1036F TOBACCO NON-USER: CPT | Performed by: UROLOGY

## 2021-05-03 NOTE — PROGRESS NOTES
[facility] EVALUATION NOTE                                                 H&P                                                                                                                                                 Reason for Visit  Status post right double-J stent placement    History of Present Illness  Right ureteral calculus that required emergent double-J stent placement 7 days ago  Patient here to schedule shockwave lithotripsy with possible stent removal      Urologic Review of Systems/Symptoms  History of calculi    Review of Systems  Hospitalization: Recent admission to hospital for right renal colic  All 14 categories of Review of Systems otherwise reviewed no other findings reported.     Past Medical History:   Diagnosis Date    Anxiety     Asthma     as child    Depression     Depression     dysthymia - following lupus dx age 23    Diabetes mellitus ([address]     Foot fracture, left     GERD (gastroesophageal reflux disease)     Hyperlipidemia     Hypothyroidism     Kidney stones     Lupus ([address]     dr barrera -     Migraine     PONV (postoperative nausea and vomiting)     Renal stone 4/25/2021    Sciatica     left sided     Past Surgical History:   Procedure Laterality Date    APPENDECTOMY      ARTHRODESIS Left 11/26/2019    ARTHRODESIS OF THE FIRST METATARSAL PHALANGEAL JOINT LEFT FOOT performed by Ryder Garcia DPM at [address] [facility] N/A 4/26/2021    CYSTOSCOPY RIGHT DOUBLE J STENT PLACEMENT. performed by Hipolito Borja MD at [facility] COLONOSCOPY      FOOT CLOSED REDUCTION Left 1/3/2017    LEFT FOOT PERCUTANEOUS PINNING Hannah Chi performed by Asia Smyth MD at [address]      [place] ESOPHAGOGASTRODUODENOSCOPY TRANSORAL DIAGNOSTIC N/A 3/8/2017    EGD ESOPHAGOGASTRODUODENOSCOPY WITH DILATION performed by Sayda Cedeno MD at [facility] as needed for Nausea or Vomiting 21 tablet 0    insulin glargine (LANTUS SOLOSTAR) 100 UNIT/ML injection pen 160  units at bedtime 15 pen 3    insulin lispro, 1 Unit Dial, (HUMALOG KWIKPEN) 100 UNIT/ML SOPN 55  units with each meals 10 pen 3    blood glucose monitor strips Pt test 3x daily Dx E11.65 100 strip 3    Drug [place] Unilet Lancets 30G MISC Check blood sugars twice daily or as directed. 200 each 3    insulin aspart (NOVOLOG FLEXPEN) 100 UNIT/ML injection pen Inject 35 units tid with meals 15 pen 3    Insulin Pen Needle (NOVOFINE) 32G X 6 MM MISC qid 300 each 3    insulin lispro, 1 Unit Dial, (HUMALOG KWIKPEN) 100 UNIT/ML SOPN Inject 20 Units into the skin 3 times daily (before meals) 10 pen 3    blood glucose monitor kit and supplies Please give 1 meter covered by insurance Dx E11.65 1 kit 0    Lancets MISC Pt test 3x daily Dx E11.65 100 each 3    blood glucose test strips (ASCENSIA AUTODISC VI;ONE TOUCH ULTRA TEST VI) strip Check blood sugars twice daily or as directed. 200 strip 3    blood glucose monitor kit and supplies Test 3 times a day & as needed for symptoms of irregular blood glucose. 1 kit 0    Insulin Pen Needle (NOVOFINE) 32G X 6 MM MISC  each 3    estradiol (CLIMARA) 0.025 MG/24HR Place 0.025 patches onto the skin every 7 days On mondays 11    albuterol sulfate HFA (VENTOLIN HFA) 108 (90 Base) MCG/ACT inhaler Inhale 2 puffs into the lungs every 6 hours as needed for Wheezing 1 Inhaler 0    rosuvastatin (CRESTOR) 40 MG tablet Take 1 tablet by mouth daily 90 tablet 3    pantoprazole (PROTONIX) 40 MG tablet Take 1 tablet by mouth daily 90 tablet 3    levothyroxine (SYNTHROID) 50 MCG tablet Take 1 tablet by mouth daily 90 tablet 3    Blood Glucose Monitoring Suppl (TRUE TRACK BLOOD GLUCOSE) CAROLE Check blood sugars twice daily or as directed.  100 Device 5    hydroxychloroquine (PLAQUENIL) 200 MG tablet Take 1 tablet by mouth 2 times daily  5     No current

## 2021-05-05 ENCOUNTER — ANESTHESIA EVENT (OUTPATIENT)
Dept: OPERATING ROOM | Age: 56
End: 2021-05-05
Payer: MEDICARE

## 2021-05-05 ENCOUNTER — ANESTHESIA (OUTPATIENT)
Dept: OPERATING ROOM | Age: 56
End: 2021-05-05
Payer: MEDICARE

## 2021-05-05 ENCOUNTER — HOSPITAL ENCOUNTER (OUTPATIENT)
Age: 56
Setting detail: OUTPATIENT SURGERY
Discharge: HOME | End: 2021-05-05
Attending: UROLOGY | Admitting: UROLOGY
Payer: MEDICARE

## 2021-05-05 ENCOUNTER — APPOINTMENT (OUTPATIENT)
Dept: GENERAL RADIOLOGY | Age: 56
End: 2021-05-05
Attending: UROLOGY
Payer: MEDICARE

## 2021-05-05 VITALS
HEART RATE: 80 BPM | TEMPERATURE: 98 F | HEIGHT: 68 IN | OXYGEN SATURATION: 99 % | DIASTOLIC BLOOD PRESSURE: 75 MMHG | SYSTOLIC BLOOD PRESSURE: 157 MMHG | BODY MASS INDEX: 24.25 KG/M2 | RESPIRATION RATE: 18 BRPM | WEIGHT: 160 LBS

## 2021-05-05 VITALS — DIASTOLIC BLOOD PRESSURE: 63 MMHG | OXYGEN SATURATION: 100 % | SYSTOLIC BLOOD PRESSURE: 104 MMHG | TEMPERATURE: 93.9 F

## 2021-05-05 LAB
GLUCOSE BLD-MCNC: 164 MG/DL (ref 60–115)
GLUCOSE BLD-MCNC: 199 MG/DL (ref 60–115)
PERFORMED ON: ABNORMAL
PERFORMED ON: ABNORMAL

## 2021-05-05 PROCEDURE — 6360000002 HC RX W HCPCS: Performed by: NURSE ANESTHETIST, CERTIFIED REGISTERED

## 2021-05-05 PROCEDURE — 7100000011 HC PHASE II RECOVERY - ADDTL 15 MIN: Performed by: UROLOGY

## 2021-05-05 PROCEDURE — 7100000000 HC PACU RECOVERY - FIRST 15 MIN: Performed by: UROLOGY

## 2021-05-05 PROCEDURE — 2709999900 HC NON-CHARGEABLE SUPPLY: Performed by: UROLOGY

## 2021-05-05 PROCEDURE — 2580000003 HC RX 258: Performed by: NURSE ANESTHETIST, CERTIFIED REGISTERED

## 2021-05-05 PROCEDURE — C1758 CATHETER, URETERAL: HCPCS | Performed by: UROLOGY

## 2021-05-05 PROCEDURE — 6360000002 HC RX W HCPCS: Performed by: UROLOGY

## 2021-05-05 PROCEDURE — 50590 FRAGMENTING OF KIDNEY STONE: CPT | Performed by: UROLOGY

## 2021-05-05 PROCEDURE — 6370000000 HC RX 637 (ALT 250 FOR IP): Performed by: ANESTHESIOLOGY

## 2021-05-05 PROCEDURE — 7100000001 HC PACU RECOVERY - ADDTL 15 MIN: Performed by: UROLOGY

## 2021-05-05 PROCEDURE — 3700000001 HC ADD 15 MINUTES (ANESTHESIA): Performed by: UROLOGY

## 2021-05-05 PROCEDURE — 74018 RADEX ABDOMEN 1 VIEW: CPT

## 2021-05-05 PROCEDURE — 3600000004 HC SURGERY LEVEL 4 BASE: Performed by: UROLOGY

## 2021-05-05 PROCEDURE — 6360000002 HC RX W HCPCS: Performed by: ANESTHESIOLOGY

## 2021-05-05 PROCEDURE — 3700000000 HC ANESTHESIA ATTENDED CARE: Performed by: UROLOGY

## 2021-05-05 PROCEDURE — 3600000014 HC SURGERY LEVEL 4 ADDTL 15MIN: Performed by: UROLOGY

## 2021-05-05 PROCEDURE — C1769 GUIDE WIRE: HCPCS | Performed by: UROLOGY

## 2021-05-05 PROCEDURE — 7100000010 HC PHASE II RECOVERY - FIRST 15 MIN: Performed by: UROLOGY

## 2021-05-05 PROCEDURE — 2580000003 HC RX 258: Performed by: UROLOGY

## 2021-05-05 RX ORDER — FENTANYL CITRATE 50 UG/ML
INJECTION, SOLUTION INTRAMUSCULAR; INTRAVENOUS PRN
Status: DISCONTINUED | OUTPATIENT
Start: 2021-05-05 | End: 2021-05-05 | Stop reason: SDUPTHER

## 2021-05-05 RX ORDER — HYDROCODONE BITARTRATE AND ACETAMINOPHEN 5; 325 MG/1; MG/1
2 TABLET ORAL EVERY 6 HOURS PRN
Status: DISCONTINUED | OUTPATIENT
Start: 2021-05-05 | End: 2021-05-05 | Stop reason: HOSPADM

## 2021-05-05 RX ORDER — SODIUM CHLORIDE, SODIUM LACTATE, POTASSIUM CHLORIDE, CALCIUM CHLORIDE 600; 310; 30; 20 MG/100ML; MG/100ML; MG/100ML; MG/100ML
INJECTION, SOLUTION INTRAVENOUS CONTINUOUS PRN
Status: DISCONTINUED | OUTPATIENT
Start: 2021-05-05 | End: 2021-05-05 | Stop reason: SDUPTHER

## 2021-05-05 RX ORDER — ONDANSETRON 2 MG/ML
INJECTION INTRAMUSCULAR; INTRAVENOUS PRN
Status: DISCONTINUED | OUTPATIENT
Start: 2021-05-05 | End: 2021-05-05 | Stop reason: SDUPTHER

## 2021-05-05 RX ORDER — DEXAMETHASONE SODIUM PHOSPHATE 4 MG/ML
INJECTION, SOLUTION INTRA-ARTICULAR; INTRALESIONAL; INTRAMUSCULAR; INTRAVENOUS; SOFT TISSUE PRN
Status: DISCONTINUED | OUTPATIENT
Start: 2021-05-05 | End: 2021-05-05 | Stop reason: SDUPTHER

## 2021-05-05 RX ORDER — PROPOFOL 10 MG/ML
INJECTION, EMULSION INTRAVENOUS PRN
Status: DISCONTINUED | OUTPATIENT
Start: 2021-05-05 | End: 2021-05-05 | Stop reason: SDUPTHER

## 2021-05-05 RX ORDER — 0.9 % SODIUM CHLORIDE 0.9 %
500 INTRAVENOUS SOLUTION INTRAVENOUS
Status: DISCONTINUED | OUTPATIENT
Start: 2021-05-05 | End: 2021-05-05 | Stop reason: HOSPADM

## 2021-05-05 RX ORDER — DIPHENHYDRAMINE HYDROCHLORIDE 50 MG/ML
12.5 INJECTION INTRAMUSCULAR; INTRAVENOUS
Status: DISCONTINUED | OUTPATIENT
Start: 2021-05-05 | End: 2021-05-05 | Stop reason: HOSPADM

## 2021-05-05 RX ORDER — FENTANYL CITRATE 50 UG/ML
25 INJECTION, SOLUTION INTRAMUSCULAR; INTRAVENOUS EVERY 5 MIN PRN
Status: DISCONTINUED | OUTPATIENT
Start: 2021-05-05 | End: 2021-05-05 | Stop reason: HOSPADM

## 2021-05-05 RX ORDER — METOCLOPRAMIDE HYDROCHLORIDE 5 MG/ML
10 INJECTION INTRAMUSCULAR; INTRAVENOUS
Status: DISCONTINUED | OUTPATIENT
Start: 2021-05-05 | End: 2021-05-05 | Stop reason: HOSPADM

## 2021-05-05 RX ORDER — MIDAZOLAM HYDROCHLORIDE 2 MG/2ML
INJECTION, SOLUTION INTRAMUSCULAR; INTRAVENOUS PRN
Status: DISCONTINUED | OUTPATIENT
Start: 2021-05-05 | End: 2021-05-05 | Stop reason: SDUPTHER

## 2021-05-05 RX ORDER — ONDANSETRON 2 MG/ML
4 INJECTION INTRAMUSCULAR; INTRAVENOUS
Status: DISCONTINUED | OUTPATIENT
Start: 2021-05-05 | End: 2021-05-05 | Stop reason: HOSPADM

## 2021-05-05 RX ORDER — LABETALOL HYDROCHLORIDE 5 MG/ML
5 INJECTION, SOLUTION INTRAVENOUS EVERY 10 MIN PRN
Status: DISCONTINUED | OUTPATIENT
Start: 2021-05-05 | End: 2021-05-05 | Stop reason: HOSPADM

## 2021-05-05 RX ADMIN — PROPOFOL 200 MG: 10 INJECTION, EMULSION INTRAVENOUS at 11:59

## 2021-05-05 RX ADMIN — FENTANYL CITRATE 25 MCG: 50 INJECTION, SOLUTION INTRAMUSCULAR; INTRAVENOUS at 11:59

## 2021-05-05 RX ADMIN — FENTANYL CITRATE 25 MCG: 50 INJECTION, SOLUTION INTRAMUSCULAR; INTRAVENOUS at 13:01

## 2021-05-05 RX ADMIN — SODIUM CHLORIDE, POTASSIUM CHLORIDE, SODIUM LACTATE AND CALCIUM CHLORIDE: 600; 310; 30; 20 INJECTION, SOLUTION INTRAVENOUS at 11:55

## 2021-05-05 RX ADMIN — GENTAMICIN SULFATE 120 MG: 40 INJECTION, SOLUTION INTRAMUSCULAR; INTRAVENOUS at 11:55

## 2021-05-05 RX ADMIN — FENTANYL CITRATE 25 MCG: 50 INJECTION, SOLUTION INTRAMUSCULAR; INTRAVENOUS at 13:11

## 2021-05-05 RX ADMIN — MIDAZOLAM HYDROCHLORIDE 2 MG: 1 INJECTION, SOLUTION INTRAMUSCULAR; INTRAVENOUS at 11:55

## 2021-05-05 RX ADMIN — HYDROCODONE BITARTRATE AND ACETAMINOPHEN 2 TABLET: 5; 325 TABLET ORAL at 14:05

## 2021-05-05 RX ADMIN — ONDANSETRON 4 MG: 2 INJECTION INTRAMUSCULAR; INTRAVENOUS at 11:59

## 2021-05-05 RX ADMIN — DEXAMETHASONE SODIUM PHOSPHATE 4 MG: 4 INJECTION, SOLUTION INTRAMUSCULAR; INTRAVENOUS at 11:59

## 2021-05-05 ASSESSMENT — PULMONARY FUNCTION TESTS
PIF_VALUE: 10
PIF_VALUE: 0
PIF_VALUE: 13
PIF_VALUE: 10
PIF_VALUE: 12
PIF_VALUE: 10
PIF_VALUE: 14
PIF_VALUE: 10
PIF_VALUE: 1
PIF_VALUE: 10
PIF_VALUE: 26
PIF_VALUE: 10
PIF_VALUE: 1
PIF_VALUE: 10

## 2021-05-05 ASSESSMENT — PAIN SCALES - GENERAL
PAINLEVEL_OUTOF10: 5
PAINLEVEL_OUTOF10: 5
PAINLEVEL_OUTOF10: 3

## 2021-05-05 ASSESSMENT — PAIN DESCRIPTION - LOCATION: LOCATION: FLANK;PELVIS

## 2021-05-05 NOTE — ANESTHESIA PRE PROCEDURE
Inhale 2 puffs into the lungs every 6 hours as needed for Wheezing 8/13/18   Melani Mcdermott MD   rosuvastatin (CRESTOR) 40 MG tablet Take 1 tablet by mouth daily 6/8/17   Ronak Dugan MD   pantoprazole (PROTONIX) 40 MG tablet Take 1 tablet by mouth daily 5/12/17   Coleen Munoz PA-C   levothyroxine (SYNTHROID) 50 MCG tablet Take 1 tablet by mouth daily 10/11/16   Kita Wheat MD   Blood Glucose Monitoring Suppl (TRUE TRACK BLOOD GLUCOSE) CAROLE Check blood sugars twice daily or as directed. 8/6/15   Kita Osman MD   hydroxychloroquine (PLAQUENIL) 200 MG tablet Take 1 tablet by mouth 2 times daily 7/2/15   Historical Provider, MD       Current medications:    No current facility-administered medications for this visit. No current outpatient medications on file. Facility-Administered Medications Ordered in Other Visits   Medication Dose Route Frequency Provider Last Rate Last Admin    gentamicin (GARAMYCIN) 120 mg in dextrose 5 % 100 mL IVPB  120 mg Intravenous On Call to Aylin Yancey [id number], MD           Allergies:     Allergies   Allergen Reactions    Aspirin Swelling     Bloody noses    Cephalosporins     Imitrex [Sumatriptan] Hives    Lidocaine     Pcn [Penicillins] Swelling    Seasonal Itching     Itch, sneezing,runny nose, watery eyes    Toradol [Ketorolac Tromethamine] Hives     Shortness of breath    Ultram [Tramadol] Hives    Fentanyl Rash       Problem List:    Patient Active Problem List   Diagnosis Code    DM (diabetes mellitus) ([facility] [address] E11.9    Hyperlipemia E78.5    Hypothyroid E03.9    Vitamin D deficiency E55.9    SLE (systemic lupus erythematosus related syndrome) ([facility]) M32.9    Asthma J45.909    Panic disorder with agoraphobia F40.01    Hereditary essential tremor G25.0    Allergic rhinitis J30.9    Type 2 diabetes mellitus with diabetic mononeuropathy ([facility]) E11.41    Raynaud's phenomenon without gangrene I73.00    Essential hypertension I10    Type 2 diabetes mellitus without complication (HCC) M49.5    Disseminated lupus erythematosus ([facility] [address] M32.9    Foot fracture, left S92.902A    Acute non-recurrent frontal sinusitis J01.10    Gastritis without bleeding K29.70    Dysphagia R13.10    Gastric polyp K31.7    Chest pain R07.9    Renal stone N20.0       Past Medical History:        Diagnosis Date    Anxiety     Asthma     as child    Depression     Depression     dysthymia - following lupus dx age 23    Diabetes mellitus ([facility] [address]     Foot fracture, left     GERD (gastroesophageal reflux disease)     Hyperlipidemia     Hypothyroidism     Kidney stones     Lupus ([facility] [address]     dr barrera -     Migraine     PONV (postoperative nausea and vomiting)     Renal stone 4/25/2021    Sciatica     left sided       Past Surgical History:        Procedure Laterality Date    APPENDECTOMY      ARTHRODESIS Left 11/26/2019    ARTHRODESIS OF THE FIRST METATARSAL PHALANGEAL JOINT LEFT FOOT performed by Kimberly Allison DPM at [facility] N/A 4/26/2021    CYSTOSCOPY RIGHT DOUBLE J STENT PLACEMENT. performed by Amee Ayoub MD at [place] [address]      COLONOSCOPY      FOOT CLOSED REDUCTION Left 1/3/2017    LEFT FOOT PERCUTANEOUS PINNING Cally  performed by Gabe Lundberg MD at [address] [place] [address]      HYSTERECTOMY      [place] ESOPHAGOGASTRODUODENOSCOPY TRANSORAL DIAGNOSTIC N/A 3/8/2017    EGD ESOPHAGOGASTRODUODENOSCOPY WITH DILATION performed by Guilherme Moreno MD at [address] ESOPHAGOGASTRODUODENOSCOPY TRANSORAL DIAGNOSTIC N/A 11/7/2018    EGD ESOPHAGOGASTRODUODENOSCOPY WITH DILATION performed by Guilherme Moreno MD at [address] [place] [address]         Social History:    Social History     Tobacco Use    Smoking status: Never Smoker    Smokeless tobacco: Never Used   Substance Use Topics  Alcohol use: No     Alcohol/week: 0.0 standard drinks     Comment: denies                                Counseling given: Not Answered      Vital Signs (Current): There were no vitals filed for this visit. BP Readings from Last 3 Encounters:   05/05/21 (!) 147/85   05/03/21 110/84   04/26/21 129/84       NPO Status:                                                                                 BMI:   Wt Readings from Last 3 Encounters:   05/05/21 160 lb (72.6 kg)   05/03/21 160 lb (72.6 kg)   04/25/21 160 lb (72.6 kg)     There is no height or weight on file to calculate BMI.    CBC:   Lab Results   Component Value Date    WBC 10.3 04/26/2021    RBC 4.57 04/26/2021    HGB 13.3 04/26/2021    HCT 40.6 04/26/2021    MCV 88.9 04/26/2021    RDW 14.0 04/26/2021     04/26/2021       CMP:   Lab Results   Component Value Date     05/03/2021    K 4.0 05/03/2021    K 4.2 04/26/2021     05/03/2021    CO2 23 05/03/2021    BUN 13 05/03/2021    CREATININE 1.24 05/03/2021    GFRAA 54.6 05/03/2021    LABGLOM 45.1 05/03/2021    GLUCOSE 123 05/03/2021    GLUCOSE 228 04/08/2019    PROT 7.8 04/25/2021    CALCIUM 9.0 05/03/2021    BILITOT 0.3 04/25/2021    ALKPHOS 139 04/25/2021    AST 22 04/25/2021    ALT 23 04/25/2021       POC Tests:   No results for input(s): POCGLU, POCNA, POCK, POCCL, POCBUN, POCHEMO, POCHCT in the last 72 hours.     Coags:   Lab Results   Component Value Date    PROTIME 9.5 05/03/2018    INR 0.9 05/03/2018    APTT 25.6 11/04/2019       HCG (If Applicable): No results found for: PREGTESTUR, PREGSERUM, HCG, HCGQUANT     ABGs:   Lab Results   Component Value Date    PHART 7.466 06/28/2020    PO2ART 84 06/28/2020    ZMJ9HQF 34 06/28/2020    SDZ8OJC 24.6 06/28/2020    BEART 1 06/28/2020    W3LIXRPC 97 06/28/2020        Type & Screen (If Applicable):  No results found for: LABABO, LABRH    Drug/Infectious Status (If Applicable):  No results found for: HIV, HEPCAB    COVID-19 Screening (If Applicable):   Lab Results   Component Value Date    COVID19 Not Detected 04/25/2021           Anesthesia Evaluation  Patient summary reviewed and Nursing notes reviewed   history of anesthetic complications: PONV. Airway: Mallampati: II  TM distance: >3 FB   Neck ROM: full  Mouth opening: > = 3 FB Dental: normal exam   (+) upper dentures and lower dentures      Pulmonary:normal exam    (+) asthma:                            Cardiovascular:    (+) hypertension: no interval change,       ECG reviewed                        Neuro/Psych:   (+) neuromuscular disease:, psychiatric history:            GI/Hepatic/Renal:   (+) GERD:,           Endo/Other:    (+) DiabetesType II DM, using insulin, hypothyroidism::., .                 Abdominal:           Vascular: negative vascular ROS. Anesthesia Plan      general     ASA 3       Induction: intravenous. MIPS: Prophylactic antiemetics administered. Anesthetic plan and risks discussed with patient. Plan discussed with CRNA.     Attending anesthesiologist reviewed and agrees with Pre Eval content              Angely Lozano MD   5/5/2021 alert

## 2021-05-05 NOTE — ANESTHESIA POSTPROCEDURE EVALUATION
Department of Anesthesiology  Postprocedure Note    Patient: Marylee Nose  MRN: [id number]  YOB: 1965  Date of evaluation: 5/5/2021  Time:  12:55 PM     Procedure Summary     Date: 05/05/21 Room / Location: [facility] OR [address] [place] [address]    Anesthesia Start: 1398 Anesthesia Stop: 1152    Procedure: RIGHT ESWL (Right ) Diagnosis: (RIGHT URETERAL CALCULUS WITH STENT)    Surgeons: Janine Stearns MD Responsible Provider: Dania Delgado MD    Anesthesia Type: general ASA Status: 3          Anesthesia Type: general    Akshat Phase I:      Akshat Phase II:      Last vitals: Reviewed and per EMR flowsheets.        Anesthesia Post Evaluation    Patient location during evaluation: bedside  Patient participation: complete - patient participated  Level of consciousness: awake and awake and alert  Airway patency: patent  Nausea & Vomiting: no nausea and no vomiting  Complications: no  Cardiovascular status: blood pressure returned to baseline and hemodynamically stable  Respiratory status: acceptable  Hydration status: euvolemic

## 2021-05-05 NOTE — PROGRESS NOTES
Anesthesia Dr. Kiki Escobar informed of most recent covid swab having been done on 4/25/21. Patient symptom free + has received covid vaccine X 2 doses. No new orders received.

## 2021-05-05 NOTE — BRIEF OP NOTE
Brief Postoperative Note      Patient: Everlina Siemens  YOB: 1965  MRN: [id number]    Date of Procedure: 5/5/2021    Pre-Op Diagnosis: RIGHT URETERAL CALCULUS WITH STENT    Post-Op Diagnosis: Same       Procedure(s):  RIGHT ESWL    Surgeon(s):  Izaiah Guajardo MD    Assistant:  * No surgical staff found *    Anesthesia: General    Estimated Blood Loss (mL): Minimal    Complications: None    Specimens:   * No specimens in log *    Implants:  * No implants in log *      Drains:   Ureteral Catheter Right ureter 6 fr (Active)       Findings: 2500 shocks with good fragmentation of stone  Stent not removed will be removed next week after KUB    Electronically signed by Izaiah Guajardo MD on 5/5/2021 at 12:55 PM

## 2021-05-06 ENCOUNTER — TELEPHONE (OUTPATIENT)
Dept: UROLOGY | Age: 56
End: 2021-05-06

## 2021-05-06 DIAGNOSIS — N20.0 KIDNEY STONE: Primary | ICD-10-CM

## 2021-05-06 NOTE — OP NOTE
[address]                      [address], [address] [facility]                                OPERATIVE REPORT    PATIENT NAME: Yoav Draper                   :        1965  MED REC NO:   [id number]                            ROOM:  ACCOUNT NO:   [de-identified]                           ADMIT DATE: 2021  PROVIDER:     Joselin Taylor MD    DATE OF PROCEDURE:  2021    PREOPERATIVE DIAGNOSIS:  Right UPJ stone with indwelling double J stent. POSTOPERATIVE DIAGNOSIS:  Right UPJ stone with indwelling double J  stent. OPERATION PERFORMED:  Right shockwave lithotripsy. SURGEON:  Edison Barry. Josie Saini MD    ANESTHESIA:  General.    EBL: Not applicable    INDICATIONS:  The patient is a 73-year-old female with a 1.2 mm stone  involving the right ureter which was stented two weeks ago. The patient  now comes in for shockwave lithotripsy. The patient understands that I  may not remove this stent if I predict large stone burden causing  obstruction with the removal of stent. The patient understands risks  and benefits and wants to proceed. She also understands other risks   Bleeding, renal injury, perinephric hematoma. OPERATIVE PROCEDURE:  The patient received preoperative IV antibiotics  in case we would remove the stent. She was taken to the operating room,  placed on the lithotripsy treatment table in supine position. General  anesthetic was induced. Time-out was taken. Then, the treatment probe  was placed underneath the right flank. Stone was visualized under  fluoroscopy. Then, 2500 shocks with various kilovoltage were given to  the stone. A 3-minute break was taken after initial 300 shocks due to  proximity of the shockwave to the kidney. The patient tolerated the  procedure well. There were no complications. I spoke with the  patient's  and indicated to him that her stent will be removed in  the office after KUB.         SAUMYA SANTOS [facility], MD    D: 05/05/2021 12:57:59       T: 05/05/2021 21:40:04     [email or web address]  Job#: [id number]     Doc#: [id number]    CC:

## 2021-05-10 ENCOUNTER — OFFICE VISIT (OUTPATIENT)
Dept: UROLOGY | Age: 56
End: 2021-05-10
Payer: MEDICARE

## 2021-05-10 ENCOUNTER — HOSPITAL ENCOUNTER (OUTPATIENT)
Dept: GENERAL RADIOLOGY | Age: 56
Discharge: HOME | End: 2021-05-12
Payer: MEDICARE

## 2021-05-10 VITALS
HEART RATE: 100 BPM | SYSTOLIC BLOOD PRESSURE: 116 MMHG | WEIGHT: 160 LBS | DIASTOLIC BLOOD PRESSURE: 78 MMHG | HEIGHT: 68 IN | BODY MASS INDEX: 24.25 KG/M2

## 2021-05-10 DIAGNOSIS — N20.0 KIDNEY STONE: ICD-10-CM

## 2021-05-10 DIAGNOSIS — N20.0 KIDNEY STONE: Primary | ICD-10-CM

## 2021-05-10 PROCEDURE — 52310 CYSTOSCOPY AND TREATMENT: CPT | Performed by: UROLOGY

## 2021-05-10 PROCEDURE — 74018 RADEX ABDOMEN 1 VIEW: CPT

## 2021-05-10 RX ORDER — SULFAMETHOXAZOLE AND TRIMETHOPRIM 800; 160 MG/1; MG/1
1 TABLET ORAL 2 TIMES DAILY
Qty: 6 TABLET | Refills: 0 | Status: SHIPPED | OUTPATIENT
Start: 2021-05-10 | End: 2021-05-13

## 2021-05-10 RX ORDER — TAMSULOSIN HYDROCHLORIDE 0.4 MG/1
0.4 CAPSULE ORAL DAILY
Qty: 10 CAPSULE | Refills: 0 | Status: ON HOLD | OUTPATIENT
Start: 2021-05-10 | End: 2021-08-17

## 2021-05-10 NOTE — PROGRESS NOTES
Urology  Patient is status post shockwave lithotripsy indwelling right double-J stent  Patient has passed multiple calculi  She has small stones along the stent near the ureterovesical junction  Patient insistent on having the stent removed      Procedure note  Flexible cystoscopY/removal of double-J stent  Patient will receive antibiotics after removal of stent  Normal urethra stent visualized and removed  No other calculi noted in the bladder  Patient will receive 3 days of Bactrim/10 days of tamsulosin  She will follow-up in 4 weeks  Nisreen Green MD

## 2021-05-18 ENCOUNTER — OFFICE VISIT (OUTPATIENT)
Dept: ENDOCRINOLOGY | Age: 56
End: 2021-05-18
Payer: MEDICARE

## 2021-05-18 VITALS
DIASTOLIC BLOOD PRESSURE: 69 MMHG | HEIGHT: 68 IN | OXYGEN SATURATION: 97 % | BODY MASS INDEX: 27.28 KG/M2 | SYSTOLIC BLOOD PRESSURE: 107 MMHG | WEIGHT: 180 LBS | HEART RATE: 88 BPM

## 2021-05-18 DIAGNOSIS — E11.69 TYPE 2 DIABETES MELLITUS WITH OTHER SPECIFIED COMPLICATION, UNSPECIFIED WHETHER LONG TERM INSULIN USE (HCC): Primary | ICD-10-CM

## 2021-05-18 LAB
CHP ED QC CHECK: NORMAL
GLUCOSE BLD-MCNC: 237 MG/DL

## 2021-05-18 PROCEDURE — 3017F COLORECTAL CA SCREEN DOC REV: CPT | Performed by: INTERNAL MEDICINE

## 2021-05-18 PROCEDURE — 3052F HG A1C>EQUAL 8.0%<EQUAL 9.0%: CPT | Performed by: INTERNAL MEDICINE

## 2021-05-18 PROCEDURE — 2022F DILAT RTA XM EVC RTNOPTHY: CPT | Performed by: INTERNAL MEDICINE

## 2021-05-18 PROCEDURE — 82962 GLUCOSE BLOOD TEST: CPT | Performed by: INTERNAL MEDICINE

## 2021-05-18 PROCEDURE — 1111F DSCHRG MED/CURRENT MED MERGE: CPT | Performed by: INTERNAL MEDICINE

## 2021-05-18 PROCEDURE — G8427 DOCREV CUR MEDS BY ELIG CLIN: HCPCS | Performed by: INTERNAL MEDICINE

## 2021-05-18 PROCEDURE — 1036F TOBACCO NON-USER: CPT | Performed by: INTERNAL MEDICINE

## 2021-05-18 PROCEDURE — 99213 OFFICE O/P EST LOW 20 MIN: CPT | Performed by: INTERNAL MEDICINE

## 2021-05-18 PROCEDURE — G8419 CALC BMI OUT NRM PARAM NOF/U: HCPCS | Performed by: INTERNAL MEDICINE

## 2021-05-18 NOTE — PROGRESS NOTES
5/18/2021    Assessment:       Diagnosis Orders   1. Type 2 diabetes mellitus with other specified complication, unspecified whether long term insulin use (HCC)  POCT Glucose    Basic Metabolic Panel, Fasting    Hemoglobin A1C    Lipid, Fasting    HM DIABETES FOOT EXAM         PLAN:     Orders Placed This Encounter   Procedures    Basic Metabolic Panel, Fasting     Standing Status:   Future     Number of Occurrences:   1     Standing Expiration Date:   5/18/2022    Hemoglobin A1C     Standing Status:   Future     Number of Occurrences:   1     Standing Expiration Date:   5/18/2022    Lipid, Fasting     Standing Status:   Future     Number of Occurrences:   1     Standing Expiration Date:   5/18/2022    C-Peptide     Standing Status:   Future     Number of Occurrences:   1     Standing Expiration Date:   5/18/2022    POCT Glucose    HM DIABETES FOOT EXAM     Continue current dose of Lantus plus NovoLog A1c goal of 7 or lower lower      Orders Placed This Encounter   Procedures    POCT Glucose       Subjective:     Chief Complaint   Patient presents with    Diabetes     Vitals:    05/18/21 0938   BP: 107/69   Pulse: 88   SpO2: 97%   Weight: 180 lb (81.6 kg)   Height: 5' 8\" (1.727 m)     Wt Readings from Last 3 Encounters:   05/18/21 180 lb (81.6 kg)   05/10/21 160 lb (72.6 kg)   05/05/21 160 lb (72.6 kg)     BP Readings from Last 3 Encounters:   05/18/21 107/69   05/10/21 116/78   05/05/21 (!) 157/75     Follow-up on type 2 diabetes patient on high dose of insulin Lantus 760 units at bedtime NovoLog 25 units with each meals A1c has come down to 8.5 also seeing nephrologist for kidney stones labs to be ordered today    Diabetes  She presents for her follow-up diabetic visit. She has type 2 diabetes mellitus. Symptoms are improving. Diabetic complications include nephropathy. Current diabetic treatment includes insulin injections.  She is currently taking insulin pre-breakfast, pre-lunch, pre-dinner and at bedtime. Her overall blood glucose range is >200 mg/dl.  (Lab Results       Component                Value               Date                       LABA1C                   8.5 (H)             05/20/2021            )     Past Medical History:   Diagnosis Date    Anxiety     Asthma     as child    Depression     Depression     dysthymia - following lupus dx age 23    Diabetes mellitus ([facility] [address] [address]     Foot fracture, left     GERD (gastroesophageal reflux disease)     Hyperlipidemia     Hypothyroidism     Kidney stones     Lupus ([facility] [address] [address]     dr barrera -     Migraine     PONV (postoperative nausea and vomiting)     Renal stone 4/25/2021    Sciatica     left sided     Past Surgical History:   Procedure Laterality Date    APPENDECTOMY      ARTHRODESIS Left 11/26/2019    ARTHRODESIS OF THE FIRST METATARSAL PHALANGEAL JOINT LEFT FOOT performed by Russell Velasquez DPM at [address] [facility] N/A 4/26/2021    CYSTOSCOPY RIGHT DOUBLE J STENT PLACEMENT. performed by Pattie Wong MD at [address] REDUCTION Left 1/3/2017    LEFT FOOT PERCUTANEOUS PINNING Jerrilyn Opitz performed by Rupal Crooks MD at [address] [facility] [address] LITHOTRIPSY Right 5/5/2021    RIGHT ESWL performed by Pattie Wong MD at [address] [facility] ESOPHAGOGASTRODUODENOSCOPY TRANSORAL DIAGNOSTIC N/A 3/8/2017    EGD ESOPHAGOGASTRODUODENOSCOPY WITH DILATION performed by Sanjiv Juares MD at [address] ESOPHAGOGASTRODUODENOSCOPY TRANSORAL DIAGNOSTIC N/A 11/7/2018    EGD ESOPHAGOGASTRODUODENOSCOPY WITH DILATION performed by Sanjiv Juares MD at [address] [place] [address]       Social History     Socioeconomic History    Marital status: Legally      Spouse name: Not on file    Number of children: Not on file    Years of education: Not on file    Highest education level: Not on file   Occupational History    Not on file   Tobacco Use    Smoking status: Never Smoker    Smokeless tobacco: Never Used   Vaping Use    Vaping Use: Never used   Substance and Sexual Activity    Alcohol use: No     Alcohol/week: 0.0 standard drinks     Comment: denies    Drug use: No     Comment: denies    Sexual activity: Not on file   Other Topics Concern    Not on file   Social History Narrative    Not on file     Social Determinants of Health     Financial Resource Strain:     Difficulty of Paying Living Expenses:    Food Insecurity:     Worried About Running Out of Food in the Last Year:     [address] in the Last Year:    Transportation Needs:     Lack of Transportation (Medical):  Lack of Transportation (Non-Medical):    Physical Activity:     Days of Exercise per Week:     Minutes of Exercise per Session:    Stress:     Feeling of Stress :    Social Connections:     Frequency of Communication with Friends and Family:     Frequency of Social Gatherings with Friends and Family:     Attends Mosque Services:     Active Member of Clubs or Organizations:     Attends Club or Organization Meetings:     Marital Status:    Intimate Partner Violence:     Fear of Current or Ex-Partner:     Emotionally Abused:     Physically Abused:     Sexually Abused:      Family History   Adopted:  Yes     Allergies   Allergen Reactions    Aspirin Swelling     Bloody noses    Cephalosporins     Imitrex [Sumatriptan] Hives    Lidocaine     Pcn [Penicillins] Swelling    Seasonal Itching     Itch, sneezing,runny nose, watery eyes    Toradol [Ketorolac Tromethamine] Hives     Shortness of breath    Ultram [Tramadol] Hives    Fentanyl Rash       Current Outpatient Medications:     tamsulosin (FLOMAX) 0.4 MG capsule, Take 1 capsule by mouth daily, Disp: 10 capsule, Rfl: 0    Insulin Pen Needle (PEN NEEDLES) 33G X 4 MM MISC, 1 each by Does not apply route three times daily, Disp: 100 each, Rfl: 2    ondansetron (ZOFRAN-ODT) 4 MG disintegrating tablet, Take 1 tablet by mouth 3 times daily as needed for Nausea or Vomiting, Disp: 21 tablet, Rfl: 0    blood glucose monitor strips, Pt test 3x daily Dx E11.65, Disp: 100 strip, Rfl: 3    Drug [employer] Unilet Lancets 30G MISC, Check blood sugars twice daily or as directed., Disp: 200 each, Rfl: 3    insulin aspart (NOVOLOG FLEXPEN) 100 UNIT/ML injection pen, Inject 35 units tid with meals, Disp: 15 pen, Rfl: 3    Insulin Pen Needle (NOVOFINE) 32G X 6 MM MISC, qid, Disp: 300 each, Rfl: 3    blood glucose monitor kit and supplies, Please give 1 meter covered by insurance Dx E11.65, Disp: 1 kit, Rfl: 0    Lancets MISC, Pt test 3x daily Dx E11.65, Disp: 100 each, Rfl: 3    blood glucose test strips (ASCENSIA AUTODISC VI;ONE TOUCH ULTRA TEST VI) strip, Check blood sugars twice daily or as directed., Disp: 200 strip, Rfl: 3    blood glucose monitor kit and supplies, Test 3 times a day & as needed for symptoms of irregular blood glucose., Disp: 1 kit, Rfl: 0    Insulin Pen Needle (NOVOFINE) 32G X 6 MM MISC, BID, Disp: 100 each, Rfl: 3    estradiol (CLIMARA) 0.025 MG/24HR, Place 0.025 patches onto the skin every 7 days On mondays, Disp: , Rfl: 11    albuterol sulfate HFA (VENTOLIN HFA) 108 (90 Base) MCG/ACT inhaler, Inhale 2 puffs into the lungs every 6 hours as needed for Wheezing, Disp: 1 Inhaler, Rfl: 0    rosuvastatin (CRESTOR) 40 MG tablet, Take 1 tablet by mouth daily, Disp: 90 tablet, Rfl: 3    pantoprazole (PROTONIX) 40 MG tablet, Take 1 tablet by mouth daily, Disp: 90 tablet, Rfl: 3    levothyroxine (SYNTHROID) 50 MCG tablet, Take 1 tablet by mouth daily, Disp: 90 tablet, Rfl: 3    Blood Glucose Monitoring Suppl (TRUE TRACK BLOOD GLUCOSE) CAROLE, Check blood sugars twice daily or as directed., Disp: 100 Device, Rfl: 5    hydroxychloroquine (PLAQUENIL) 200 MG tablet, Take 1 tablet by mouth 2 times daily, Disp: , Rfl: 5  Lab Results   Component Value Date     05/03/2021    K 4.0 05/03/2021     05/03/2021    CO2 23 05/03/2021    BUN 13 05/03/2021    CREATININE 1.24 (H) 05/03/2021    GLUCOSE 237 05/18/2021    CALCIUM 9.0 05/03/2021    PROT 7.8 04/25/2021    LABALBU 4.4 04/25/2021    BILITOT 0.3 04/25/2021    ALKPHOS 139 (H) 04/25/2021    AST 22 04/25/2021    ALT 23 04/25/2021    LABGLOM 45.1 (L) 05/03/2021    GFRAA 54.6 (L) 05/03/2021    GLOB 3.4 04/25/2021     Lab Results   Component Value Date    WBC 10.3 04/26/2021    HGB 13.3 04/26/2021    HCT 40.6 04/26/2021    MCV 88.9 04/26/2021     04/26/2021     Lab Results   Component Value Date    LABA1C 8.7 (H) 05/03/2021    LABA1C 9.8 (H) 02/24/2021    LABA1C 9.9 (H) 11/20/2020     Lab Results   Component Value Date    HDL 73 (H) 08/02/2017    HDL 92 (H) 05/12/2017    HDL 87 (H) 08/29/2016    LDLCALC 56 08/02/2017    LDLCALC 32 05/12/2017    LDLCALC 122 08/29/2016    CHOL 182 08/02/2017    CHOL 189 05/12/2017    CHOL 279 (H) 08/29/2016    TRIG 264 (H) 08/02/2017    TRIG 323 (H) 05/12/2017    TRIG 348 (H) 08/29/2016       Lab Results   Component Value Date    TSH 2.500 05/12/2017    TSH 1.650 06/27/2016    TSH 1.220 03/21/2016    TSHREFLEX 0.819 11/04/2019       Review of Systems    Objective:   Physical Exam  Vitals reviewed. Constitutional:       Appearance: Normal appearance. She is normal weight. HENT:      Head: Normocephalic and atraumatic. Hair is normal.      Right Ear: External ear normal.      Left Ear: External ear normal.      Nose: Nose normal.   Eyes:      General: No scleral icterus. Right eye: No discharge. Left eye: No discharge. Extraocular Movements: Extraocular movements intact. Conjunctiva/sclera: Conjunctivae normal.   Neck:      Trachea: Trachea normal.   Cardiovascular:      Rate and Rhythm: Normal rate.    Pulmonary:      Effort: Pulmonary effort is normal.   Musculoskeletal: General: Normal range of motion. Cervical back: Normal range of motion and neck supple. Legs:    Neurological:      General: No focal deficit present. Mental Status: She is alert.    Psychiatric:         Mood and Affect: Mood normal.         Behavior: Behavior normal.

## 2021-05-20 ENCOUNTER — HOSPITAL ENCOUNTER (OUTPATIENT)
Dept: LAB | Age: 56
Discharge: HOME | End: 2021-05-20
Payer: MEDICARE

## 2021-05-20 DIAGNOSIS — E11.69 TYPE 2 DIABETES MELLITUS WITH OTHER SPECIFIED COMPLICATION, UNSPECIFIED WHETHER LONG TERM INSULIN USE (HCC): ICD-10-CM

## 2021-05-20 LAB
ANION GAP SERPL CALCULATED.3IONS-SCNC: 12 MEQ/L (ref 9–15)
BUN BLDV-MCNC: 10 MG/DL (ref 6–20)
CALCIUM SERPL-MCNC: 9.6 MG/DL (ref 8.5–9.9)
CHLORIDE BLD-SCNC: 102 MEQ/L (ref 95–107)
CHOLESTEROL, FASTING: 346 MG/DL (ref 0–199)
CO2: 22 MEQ/L (ref 20–31)
CREAT SERPL-MCNC: 0.99 MG/DL (ref 0.5–0.9)
GFR AFRICAN AMERICAN: >60
GFR NON-AFRICAN AMERICAN: 58.5
GLUCOSE FASTING: 222 MG/DL (ref 70–99)
HBA1C MFR BLD: 8.5 % (ref 4.8–5.9)
HDLC SERPL-MCNC: 42 MG/DL (ref 40–59)
LDL CHOLESTEROL CALCULATED: 236 MG/DL (ref 0–129)
POTASSIUM SERPL-SCNC: 4.4 MEQ/L (ref 3.4–4.9)
SODIUM BLD-SCNC: 136 MEQ/L (ref 135–144)
TRIGLYCERIDE, FASTING: 340 MG/DL (ref 0–150)

## 2021-05-20 PROCEDURE — 83036 HEMOGLOBIN GLYCOSYLATED A1C: CPT

## 2021-05-20 PROCEDURE — 84681 ASSAY OF C-PEPTIDE: CPT

## 2021-05-20 PROCEDURE — 36415 COLL VENOUS BLD VENIPUNCTURE: CPT

## 2021-05-20 PROCEDURE — 80048 BASIC METABOLIC PNL TOTAL CA: CPT

## 2021-05-20 PROCEDURE — 80061 LIPID PANEL: CPT

## 2021-05-22 LAB — C-PEPTIDE: 5.2 NG/ML (ref 1.1–4.4)

## 2021-05-29 ENCOUNTER — HOSPITAL ENCOUNTER (EMERGENCY)
Age: 56
Discharge: HOME | End: 2021-05-29
Payer: MEDICARE

## 2021-05-29 ENCOUNTER — APPOINTMENT (OUTPATIENT)
Dept: CT IMAGING | Age: 56
End: 2021-05-29
Payer: MEDICARE

## 2021-05-29 VITALS
OXYGEN SATURATION: 97 % | WEIGHT: 160 LBS | HEIGHT: 68 IN | DIASTOLIC BLOOD PRESSURE: 72 MMHG | BODY MASS INDEX: 24.25 KG/M2 | RESPIRATION RATE: 19 BRPM | HEART RATE: 100 BPM | SYSTOLIC BLOOD PRESSURE: 101 MMHG | TEMPERATURE: 97.9 F

## 2021-05-29 DIAGNOSIS — R10.9 RIGHT FLANK PAIN: Primary | ICD-10-CM

## 2021-05-29 LAB
ALBUMIN SERPL-MCNC: 4.5 G/DL (ref 3.5–4.6)
ALP BLD-CCNC: 150 U/L (ref 40–130)
ALT SERPL-CCNC: 13 U/L (ref 0–33)
AMPHETAMINE SCREEN, URINE: ABNORMAL
ANION GAP SERPL CALCULATED.3IONS-SCNC: 12 MEQ/L (ref 9–15)
AST SERPL-CCNC: 13 U/L (ref 0–35)
BACTERIA: NEGATIVE /HPF
BARBITURATE SCREEN URINE: ABNORMAL
BENZODIAZEPINE SCREEN, URINE: POSITIVE
BILIRUB SERPL-MCNC: 0.3 MG/DL (ref 0.2–0.7)
BILIRUBIN URINE: NEGATIVE
BLOOD, URINE: NEGATIVE
BUN BLDV-MCNC: 12 MG/DL (ref 6–20)
CALCIUM SERPL-MCNC: 9 MG/DL (ref 8.5–9.9)
CANNABINOID SCREEN URINE: ABNORMAL
CHLORIDE BLD-SCNC: 101 MEQ/L (ref 95–107)
CLARITY: CLEAR
CO2: 26 MEQ/L (ref 20–31)
COCAINE METABOLITE SCREEN URINE: ABNORMAL
COLOR: YELLOW
CREAT SERPL-MCNC: 1.35 MG/DL (ref 0.5–0.9)
EPITHELIAL CELLS, UA: ABNORMAL /HPF (ref 0–5)
GFR AFRICAN AMERICAN: 49.5
GFR NON-AFRICAN AMERICAN: 40.9
GLOBULIN: 3.1 G/DL (ref 2.3–3.5)
GLUCOSE BLD-MCNC: 333 MG/DL (ref 70–99)
GLUCOSE URINE: >=1000 MG/DL
HYALINE CASTS: ABNORMAL /HPF (ref 0–5)
KETONES, URINE: ABNORMAL MG/DL
LEUKOCYTE ESTERASE, URINE: NEGATIVE
Lab: ABNORMAL
METHADONE SCREEN, URINE: ABNORMAL
NITRITE, URINE: NEGATIVE
OPIATE SCREEN URINE: ABNORMAL
OXYCODONE URINE: ABNORMAL
PH UA: 5 (ref 5–9)
PHENCYCLIDINE SCREEN URINE: ABNORMAL
POTASSIUM SERPL-SCNC: 4.5 MEQ/L (ref 3.4–4.9)
PROPOXYPHENE SCREEN: ABNORMAL
PROTEIN UA: 30 MG/DL
RBC UA: ABNORMAL /HPF (ref 0–2)
REASON FOR REJECTION: NORMAL
REJECTED TEST: NORMAL
SODIUM BLD-SCNC: 139 MEQ/L (ref 135–144)
SPECIFIC GRAVITY UA: 1.03 (ref 1–1.03)
TOTAL PROTEIN: 7.6 G/DL (ref 6.3–8)
URINE REFLEX TO CULTURE: YES
UROBILINOGEN, URINE: 0.2 E.U./DL
WBC UA: ABNORMAL /HPF (ref 0–5)

## 2021-05-29 PROCEDURE — 87086 URINE CULTURE/COLONY COUNT: CPT

## 2021-05-29 PROCEDURE — 96374 THER/PROPH/DIAG INJ IV PUSH: CPT

## 2021-05-29 PROCEDURE — 6360000002 HC RX W HCPCS: Performed by: PHYSICIAN ASSISTANT

## 2021-05-29 PROCEDURE — 81001 URINALYSIS AUTO W/SCOPE: CPT

## 2021-05-29 PROCEDURE — 80053 COMPREHEN METABOLIC PANEL: CPT

## 2021-05-29 PROCEDURE — 87077 CULTURE AEROBIC IDENTIFY: CPT

## 2021-05-29 PROCEDURE — 87186 SC STD MICRODIL/AGAR DIL: CPT

## 2021-05-29 PROCEDURE — 99283 EMERGENCY DEPT VISIT LOW MDM: CPT

## 2021-05-29 PROCEDURE — 2580000003 HC RX 258: Performed by: PHYSICIAN ASSISTANT

## 2021-05-29 PROCEDURE — 36415 COLL VENOUS BLD VENIPUNCTURE: CPT

## 2021-05-29 PROCEDURE — 96375 TX/PRO/DX INJ NEW DRUG ADDON: CPT

## 2021-05-29 PROCEDURE — 74176 CT ABD & PELVIS W/O CONTRAST: CPT

## 2021-05-29 PROCEDURE — 80307 DRUG TEST PRSMV CHEM ANLYZR: CPT

## 2021-05-29 RX ORDER — ONDANSETRON 2 MG/ML
4 INJECTION INTRAMUSCULAR; INTRAVENOUS ONCE
Status: COMPLETED | OUTPATIENT
Start: 2021-05-29 | End: 2021-05-29

## 2021-05-29 RX ORDER — MORPHINE SULFATE 2 MG/ML
4 INJECTION, SOLUTION INTRAMUSCULAR; INTRAVENOUS ONCE
Status: COMPLETED | OUTPATIENT
Start: 2021-05-29 | End: 2021-05-29

## 2021-05-29 RX ORDER — CYCLOBENZAPRINE HCL 10 MG
10 TABLET ORAL 3 TIMES DAILY PRN
Qty: 21 TABLET | Refills: 0 | Status: SHIPPED | OUTPATIENT
Start: 2021-05-29 | End: 2021-06-05

## 2021-05-29 RX ORDER — 0.9 % SODIUM CHLORIDE 0.9 %
1000 INTRAVENOUS SOLUTION INTRAVENOUS ONCE
Status: COMPLETED | OUTPATIENT
Start: 2021-05-29 | End: 2021-05-29

## 2021-05-29 RX ADMIN — ONDANSETRON 4 MG: 2 INJECTION INTRAMUSCULAR; INTRAVENOUS at 16:18

## 2021-05-29 RX ADMIN — SODIUM CHLORIDE 1000 ML: 9 INJECTION, SOLUTION INTRAVENOUS at 16:18

## 2021-05-29 RX ADMIN — MORPHINE SULFATE 4 MG: 2 INJECTION, SOLUTION INTRAMUSCULAR; INTRAVENOUS at 16:18

## 2021-05-29 ASSESSMENT — ENCOUNTER SYMPTOMS
SORE THROAT: 0
COLOR CHANGE: 0
SHORTNESS OF BREATH: 0
RHINORRHEA: 0
EYE DISCHARGE: 0
ABDOMINAL PAIN: 0
BACK PAIN: 1
CONSTIPATION: 0
ABDOMINAL DISTENTION: 0

## 2021-05-29 ASSESSMENT — PAIN SCALES - GENERAL
PAINLEVEL_OUTOF10: 6
PAINLEVEL_OUTOF10: 9

## 2021-05-29 ASSESSMENT — PAIN DESCRIPTION - FREQUENCY: FREQUENCY: CONTINUOUS

## 2021-05-29 ASSESSMENT — PAIN DESCRIPTION - PAIN TYPE: TYPE: ACUTE PAIN

## 2021-05-29 ASSESSMENT — PAIN DESCRIPTION - DESCRIPTORS: DESCRIPTORS: ACHING

## 2021-05-29 ASSESSMENT — PAIN DESCRIPTION - LOCATION: LOCATION: FLANK

## 2021-05-29 ASSESSMENT — PAIN DESCRIPTION - ORIENTATION: ORIENTATION: RIGHT

## 2021-05-29 NOTE — ED PROVIDER NOTES
appearance. She is well-developed. She is not ill-appearing, toxic-appearing or diaphoretic. HENT:      Head: Normocephalic. Right Ear: Tympanic membrane normal.      Left Ear: Tympanic membrane normal.      Nose: No congestion. Mouth/Throat:      Mouth: Mucous membranes are moist.      Pharynx: No oropharyngeal exudate or posterior oropharyngeal erythema. Eyes:      Extraocular Movements: Extraocular movements intact. Conjunctiva/sclera: Conjunctivae normal.      Pupils: Pupils are equal, round, and reactive to light. Neck:      Vascular: No JVD. Trachea: No tracheal deviation. Cardiovascular:      Rate and Rhythm: Normal rate. Pulses: Normal pulses. Heart sounds: Normal heart sounds. No murmur heard. No friction rub. No gallop. Pulmonary:      Effort: Pulmonary effort is normal. No tachypnea, accessory muscle usage, respiratory distress or retractions. Breath sounds: No stridor. No wheezing, rhonchi or rales. Chest:      Chest wall: No tenderness. Abdominal:      General: Abdomen is flat. Bowel sounds are normal. There is no distension or abdominal bruit. Palpations: There is no shifting dullness, fluid wave, hepatomegaly, splenomegaly, mass or pulsatile mass. Tenderness: There is no abdominal tenderness. There is no right CVA tenderness, left CVA tenderness, guarding or rebound. Negative signs include Simons's sign, Rovsing's sign and McBurney's sign. Comments: Abdomen soft nondistended nontender no guarding mass rebound, patient complains of pain to the right flank area with slightest touch, there is no bruising, no ecchymosis, no edema, no erythema. Musculoskeletal:         General: No deformity. Normal range of motion. Cervical back: Normal range of motion and neck supple. No rigidity. Skin:     General: Skin is warm and dry. Capillary Refill: Capillary refill takes less than 2 seconds. Coloration: Skin is not jaundiced. Neurological:      General: No focal deficit present. Mental Status: She is alert and oriented to person, place, and time. Mental status is at baseline. Cranial Nerves: No cranial nerve deficit. Sensory: No sensory deficit. Motor: No weakness. Coordination: Coordination normal.   Psychiatric:         Mood and Affect: Mood normal.         DIAGNOSTIC RESULTS     EKG: All EKG's are interpreted by the Emergency Department Physician who either signs or Co-signsthis chart in the absence of a cardiologist.        RADIOLOGY:   Downey Good such as CT, Ultrasound and MRI are read by the radiologist. Plain radiographic images are visualized and preliminarily interpreted by the emergency physician with the below findings:        Interpretation per the Radiologist below, if available at the time ofthis note:    CT ABDOMEN PELVIS WO CONTRAST Additional Contrast? None   Final Result   There are no radiopaque stones. There is no hydronephrosis or hydroureter. There is minimal inflammation surrounding the right undescended ureter which may be secondary to infection, vesicoureteral reflux or a recently passed stone.                      ED BEDSIDE ULTRASOUND:   Performed by ED Physician - none    LABS:  Labs Reviewed   COMPREHENSIVE METABOLIC PANEL - Abnormal; Notable for the following components:       Result Value    Glucose 333 (*)     CREATININE 1.35 (*)     GFR Non- 40.9 (*)     GFR  49.5 (*)     Alkaline Phosphatase 150 (*)     All other components within normal limits   URINE RT REFLEX TO CULTURE - Abnormal; Notable for the following components:    Glucose, Ur >=1000 (*)     Ketones, Urine TRACE (*)     Protein, UA 30 (*)     All other components within normal limits   URINE DRUG SCREEN - Abnormal; Notable for the following components:    Benzodiazepine Screen, Urine POSITIVE (*)     All other components within normal limits   MICROSCOPIC URINALYSIS - Abnormal; Notable for the following components:    WBC, UA 20-50 (*)     All other components within normal limits   CULTURE, URINE   SPECIMEN REJECTION   CBC WITH AUTO DIFFERENTIAL       All other labs were within normal range or not returned as of this dictation. EMERGENCY DEPARTMENT COURSE and DIFFERENTIAL DIAGNOSIS/MDM:   Vitals:    Vitals:    05/29/21 1525 05/29/21 1730   BP: 100/70 101/72   Pulse: 108 100   Resp: 18 19   Temp: 97.9 °F (36.6 °C)    TempSrc: Temporal    SpO2: 98% 97%   Weight: 160 lb (72.6 kg)    Height: 5' 8\" (1.727 m)         MDM  Number of Diagnoses or Management Options  Right flank pain  Diagnosis management comments: That presented ED with complaint of right-sided flank pain which patient states started yesterday for her, she states this feels similar to previous kidney stone she had in the past.  Denies any nausea vomiting, no urinary complaints, no hematuria. CT scan of the abdomen pelvis were completed which shows no signs for acute obstructive uropathy at this time. Remaining labs are unimpressive. Patient does have symptoms which to increase with palpation, increased with movement, more consistent with that of a muscular type strain. She was given a prescription for Flexeril for pain control, and advised to contact her family physician in next 2 to 3 days for reevaluation. She was also advised if she has worsening or change symptoms to return to the ER. CRITICAL CARE TIME   Total Critical Care time was 0 minutes, excluding separately reportableprocedures. There was a high probability of clinicallysignificant/life threatening deterioration in the patient's condition which required my urgent intervention. CONSULTS:  None    PROCEDURES:  Unless otherwise noted below, none     Procedures    FINAL IMPRESSION      1.  Right flank pain          DISPOSITION/PLAN   DISPOSITION Decision To Discharge 05/29/2021 05:21:36 PM      PATIENT REFERRED TO:  Noelle Segura MD  [address] [place] [address],[address] [place] [address] Rd  [phone]    In 3 days        DISCHARGE MEDICATIONS:  New Prescriptions    CYCLOBENZAPRINE (FLEXERIL) 10 MG TABLET    Take 1 tablet by mouth 3 times daily as needed for Muscle spasms          (Please note that portions of this note were completed with a voice recognition program.  Efforts were made to edit the dictations but occasionally words are mis-transcribed.)    Nichole Leroy PA-C (electronically signed)  Attending Emergency Physician         Nichole Leroy PA-C  05/29/21 8427

## 2021-05-31 ENCOUNTER — HOSPITAL ENCOUNTER (EMERGENCY)
Age: 56
Discharge: HOME | End: 2021-05-31
Attending: EMERGENCY MEDICINE
Payer: MEDICARE

## 2021-05-31 VITALS
OXYGEN SATURATION: 97 % | BODY MASS INDEX: 24.25 KG/M2 | HEIGHT: 68 IN | HEART RATE: 87 BPM | DIASTOLIC BLOOD PRESSURE: 73 MMHG | SYSTOLIC BLOOD PRESSURE: 129 MMHG | RESPIRATION RATE: 18 BRPM | WEIGHT: 160 LBS | TEMPERATURE: 98.7 F

## 2021-05-31 DIAGNOSIS — R10.12 LEFT UPPER QUADRANT ABDOMINAL PAIN: Primary | ICD-10-CM

## 2021-05-31 PROCEDURE — 96372 THER/PROPH/DIAG INJ SC/IM: CPT

## 2021-05-31 PROCEDURE — 93005 ELECTROCARDIOGRAM TRACING: CPT

## 2021-05-31 PROCEDURE — 99283 EMERGENCY DEPT VISIT LOW MDM: CPT

## 2021-05-31 PROCEDURE — 6360000002 HC RX W HCPCS: Performed by: EMERGENCY MEDICINE

## 2021-05-31 RX ORDER — MORPHINE SULFATE 2 MG/ML
4 INJECTION, SOLUTION INTRAMUSCULAR; INTRAVENOUS ONCE
Status: COMPLETED | OUTPATIENT
Start: 2021-05-31 | End: 2021-05-31

## 2021-05-31 RX ADMIN — MORPHINE SULFATE 4 MG: 2 INJECTION, SOLUTION INTRAMUSCULAR; INTRAVENOUS at 02:12

## 2021-05-31 ASSESSMENT — ENCOUNTER SYMPTOMS: ABDOMINAL PAIN: 1

## 2021-05-31 ASSESSMENT — PAIN SCALES - GENERAL
PAINLEVEL_OUTOF10: 10
PAINLEVEL_OUTOF10: 10

## 2021-05-31 ASSESSMENT — PAIN DESCRIPTION - FREQUENCY: FREQUENCY: CONTINUOUS

## 2021-05-31 ASSESSMENT — PAIN DESCRIPTION - PAIN TYPE: TYPE: ACUTE PAIN

## 2021-05-31 ASSESSMENT — PAIN DESCRIPTION - LOCATION: LOCATION: ABDOMEN;BACK

## 2021-05-31 ASSESSMENT — PAIN DESCRIPTION - DESCRIPTORS: DESCRIPTORS: STABBING

## 2021-05-31 ASSESSMENT — PAIN DESCRIPTION - ORIENTATION: ORIENTATION: LEFT;UPPER

## 2021-05-31 NOTE — ED NOTES
Initial contact with patient. Resting quietly in bed. A&OX3. Skin pink, w/d. Resps unlabored on room air. No s/s of acute distress noted. No further needs at this time. Call light within reach.       Claretha Sicard, RN  05/31/21 8188

## 2021-05-31 NOTE — ED TRIAGE NOTES
Pt states that she is having upper left ABD pain that is radiating to her back and into her left shoulder.  Pt states that she has been up crying for the last 3 hours

## 2021-05-31 NOTE — ED NOTES
Discharge instructions given. Verbalizes understanding. A&OX3. Ambulates from ED with steady gait.  to pick pt up.       Meera Franklin RN  05/31/21 0117

## 2021-05-31 NOTE — ED PROVIDER NOTES
[address] [facility] ED  EMERGENCY DEPARTMENT ENCOUNTER      Pt Name: Manuel Watkins  MRN: [id number]  [place] 1965  Date of evaluation: 5/31/2021  Provider: Aline Lopez MD    [address] [place] [address]       Chief Complaint   Patient presents with    Abdominal Pain         HISTORY OF PRESENT ILLNESS   (Location/Symptom, Timing/Onset, Context/Setting, Quality, Duration, Modifying Factors, Severity)  Note limiting factors. 30-year-old female presenting with left-sided abdominal pain. Seen here earlier in the day for the same symptoms. She notes pain to be not much better, she was instructed to return if pain did not improve. No fevers, nausea or vomiting. She is having normal bowel movement. She was diagnosed with abdominal wall pain. Pain has been constant since discharge. Nursing Notes were reviewed. REVIEW OF SYSTEMS    (2-9 systems for level 4, 10 or more for level 5)     Review of Systems   Gastrointestinal: Positive for abdominal pain. All other systems reviewed and are negative. Except as noted above the remainder of the review of systems was reviewed and negative.        PAST MEDICAL HISTORY     Past Medical History:   Diagnosis Date    Anxiety     Asthma     as child    Depression     Depression     dysthymia - following lupus dx age 23    Diabetes mellitus ([facility] [facility]     Foot fracture, left     GERD (gastroesophageal reflux disease)     Hyperlipidemia     Hypothyroidism     Kidney stones     Lupus ([facility] [address]     dr barrera -     Migraine     PONV (postoperative nausea and vomiting)     Renal stone 4/25/2021    Sciatica     left sided         SURGICAL HISTORY       Past Surgical History:   Procedure Laterality Date    APPENDECTOMY      ARTHRODESIS Left 11/26/2019    ARTHRODESIS OF THE FIRST METATARSAL PHALANGEAL JOINT LEFT FOOT performed by Nanette Vizcarra DPM at [address] [facility] N/A 4/26/2021    CYSTOSCOPY RIGHT DOUBLE J STENT PLACEMENT. performed by Mohsen Camarena Keenan Wise MD at [facility]      COLONOSCOPY      FOOT CLOSED REDUCTION Left 1/3/2017    LEFT FOOT PERCUTANEOUS PINNING Erica Flatness FRACTURE performed by Eileen Hurtado MD at [address]      LITHOTRIPSY Right 5/5/2021    RIGHT ESWL performed by Mireille Yuan MD at [address] [facility] ESOPHAGOGASTRODUODENOSCOPY TRANSORAL DIAGNOSTIC N/A 3/8/2017    EGD ESOPHAGOGASTRODUODENOSCOPY WITH DILATION performed by Jeanine Lua MD at [address] ESOPHAGOGASTRODUODENOSCOPY TRANSORAL DIAGNOSTIC N/A 11/7/2018    EGD ESOPHAGOGASTRODUODENOSCOPY WITH DILATION performed by Jeanine Lua MD at [facility]       Discharge Medication List as of 5/31/2021  2:31 AM      CONTINUE these medications which have NOT CHANGED    Details   cyclobenzaprine (FLEXERIL) 10 MG tablet Take 1 tablet by mouth 3 times daily as needed for Muscle spasms, Disp-21 tablet, R-0Print      tamsulosin (FLOMAX) 0.4 MG capsule Take 1 capsule by mouth daily, Disp-10 capsule, R-0Normal      !! Insulin Pen Needle (PEN NEEDLES) 33G X 4 MM MISC 1 each by Does not apply route three times daily, Disp-100 each, R-2Normal      ondansetron (ZOFRAN-ODT) 4 MG disintegrating tablet Take 1 tablet by mouth 3 times daily as needed for Nausea or Vomiting, Disp-21 tablet, R-0Print      !! blood glucose monitor strips Pt test 3x daily Dx E11.65, Disp-100 strip,R-3, Normal      !! Drug [place] [employer] Lancets 30G MISC Disp-200 each,R-3, NormalCheck blood sugars twice daily or as directed. insulin aspart (NOVOLOG FLEXPEN) 100 UNIT/ML injection pen Inject 35 units tid with meals, Disp-15 pen,R-3Normal      !!  Insulin Pen Needle (NOVOFINE) 32G X 6 MM MISC Disp-300 each,R-3, Normalqid      !! blood glucose monitor kit and supplies Please give 1 meter covered by insurance Dx E11.65, Disp-1 kit, R-0, Normal      !! Lancets MISC Disp-100 each, R-3, NormalPt test 3x daily Dx E11.65      !! blood glucose test strips (ASCENSIA AUTODISC VI;ONE TOUCH ULTRA TEST VI) strip Disp-200 strip, R-3, NormalCheck blood sugars twice daily or as directed. !! blood glucose monitor kit and supplies Test 3 times a day & as needed for symptoms of irregular blood glucose., Disp-1 kit, R-0, Normal      !! Insulin Pen Needle (NOVOFINE) 32G X 6 MM MISC Disp-100 each, R-3, NormalBID      estradiol (CLIMARA) 0.025 MG/24HR Place 0.025 patches onto the skin every 7 days On mondays, R-11Historical Med      albuterol sulfate HFA (VENTOLIN HFA) 108 (90 Base) MCG/ACT inhaler Inhale 2 puffs into the lungs every 6 hours as needed for Wheezing, Disp-1 Inhaler, R-0Print      rosuvastatin (CRESTOR) 40 MG tablet Take 1 tablet by mouth daily, Disp-90 tablet, R-3Normal      pantoprazole (PROTONIX) 40 MG tablet Take 1 tablet by mouth daily, Disp-90 tablet, R-3Normal      levothyroxine (SYNTHROID) 50 MCG tablet Take 1 tablet by mouth daily, Disp-90 tablet, R-3      !! Blood Glucose Monitoring Suppl (TRUE TRACK BLOOD GLUCOSE) CAROLE Disp-100 Device, R-5, NormalCheck blood sugars twice daily or as directed. hydroxychloroquine (PLAQUENIL) 200 MG tablet Take 1 tablet by mouth 2 times daily, R-5       !! - Potential duplicate medications found. Please discuss with provider.           ALLERGIES     Aspirin, Cephalosporins, Imitrex [sumatriptan], Lidocaine, Pcn [penicillins], Seasonal, Toradol [ketorolac tromethamine], Ultram [tramadol], and Fentanyl    FAMILY HISTORY       Family History   Adopted: Yes          SOCIAL HISTORY       Social History     Socioeconomic History    Marital status: Legally      Spouse name: None    Number of children: None    Years of education: None    Highest education level: None   Occupational History    None   Tobacco Use    Smoking status: Never Smoker    Smokeless tobacco: Never Used   Vaping Use    Vaping Use: Never used   Substance and Sexual Activity    Alcohol use: No     Alcohol/week: 0.0 standard drinks     Comment: denies    Drug use: No     Comment: denies    Sexual activity: None   Other Topics Concern    None   Social History Narrative    None     Social Determinants of Health     Financial Resource Strain:     Difficulty of Paying Living Expenses:    Food Insecurity:     Worried About Running Out of Food in the Last Year:     Ran Out of Food in the Last Year:    Transportation Needs:     Lack of Transportation (Medical):  Lack of Transportation (Non-Medical):    Physical Activity:     Days of Exercise per Week:     Minutes of Exercise per Session:    Stress:     Feeling of Stress :    Social Connections:     Frequency of Communication with Friends and Family:     Frequency of Social Gatherings with Friends and Family:     Attends Shinto Services:     Active Member of Clubs or Organizations:     Attends Club or Organization Meetings:     Marital Status:    Intimate Partner Violence:     Fear of Current or Ex-Partner:     Emotionally Abused:     Physically Abused:     Sexually Abused:        SCREENINGS               PHYSICAL EXAM    (up to 7 for level 4, 8 or more for level 5)     ED Triage Vitals [05/31/21 0202]   BP Temp Temp Source Pulse Resp SpO2 Height Weight   128/85 98.7 °F (37.1 °C) Oral 86 20 96 % 5' 8\" (1.727 m) 160 lb (72.6 kg)       Physical Exam  Vitals and nursing note reviewed. Constitutional:       General: She is not in acute distress. Appearance: Normal appearance. She is well-developed. She is not ill-appearing. HENT:      Head: Normocephalic and atraumatic. Mouth/Throat:      Mouth: Mucous membranes are moist.      Pharynx: Oropharynx is clear. Eyes:      Extraocular Movements: Extraocular movements intact.       Conjunctiva/sclera: Conjunctivae normal.   Cardiovascular:      Rate and Rhythm: Normal rate and regular rhythm. Pulmonary:      Effort: Pulmonary effort is normal.      Breath sounds: Normal breath sounds. Abdominal:      General: Bowel sounds are normal.      Palpations: Abdomen is soft. Tenderness: There is no abdominal tenderness. Musculoskeletal:         General: No deformity. Normal range of motion. Cervical back: Normal range of motion and neck supple. Skin:     General: Skin is warm and dry. Capillary Refill: Capillary refill takes less than 2 seconds. Neurological:      General: No focal deficit present. Mental Status: She is alert and oriented to person, place, and time. Mental status is at baseline. Cranial Nerves: No cranial nerve deficit. Psychiatric:         Thought Content: Thought content normal.         DIAGNOSTIC RESULTS     EKG: All EKG's are interpreted by the Emergency Department Physician who either signs or Co-signs this chart in the absence of a cardiologist.    NSR, rate 86, normal intervals, no ST elevation/ depression    RADIOLOGY:   Non-plain film images such as CT, Ultrasound and MRI are read by the radiologist. Plain radiographic images are visualized and preliminarily interpreted by the emergency physician with the below findings:    Interpretation per the Radiologist below, if available at the time of this note:    No orders to display       LABS:  Labs Reviewed - No data to display    All other labs were within normal range or not returned as of this dictation. EMERGENCY DEPARTMENT COURSE and DIFFERENTIAL DIAGNOSIS/MDM:   Vitals:    Vitals:    05/31/21 0202 05/31/21 0218   BP: 128/85 129/73   Pulse: 86 87   Resp: 20 18   Temp: 98.7 °F (37.1 °C)    TempSrc: Oral    SpO2: 96% 97%   Weight: 160 lb (72.6 kg)    Height: 5' 8\" (1.727 m)        MDM  Number of Diagnoses or Management Options  Left upper quadrant abdominal pain  Diagnosis management comments: Presents with LUQ abd pain. Abd soft and nontender.   Reviewed CT findings

## 2021-06-01 LAB
ORGANISM: ABNORMAL
URINE CULTURE, ROUTINE: ABNORMAL

## 2021-06-02 LAB
EKG ATRIAL RATE: 86 BPM
EKG P AXIS: 40 DEGREES
EKG P-R INTERVAL: 138 MS
EKG Q-T INTERVAL: 356 MS
EKG QRS DURATION: 64 MS
EKG QTC CALCULATION (BAZETT): 426 MS
EKG R AXIS: 67 DEGREES
EKG T AXIS: 67 DEGREES
EKG VENTRICULAR RATE: 86 BPM

## 2021-06-05 ENCOUNTER — HOSPITAL ENCOUNTER (EMERGENCY)
Age: 56
Discharge: HOME | End: 2021-06-05
Attending: EMERGENCY MEDICINE
Payer: MEDICARE

## 2021-06-05 VITALS
HEART RATE: 89 BPM | SYSTOLIC BLOOD PRESSURE: 109 MMHG | BODY MASS INDEX: 24.25 KG/M2 | TEMPERATURE: 98.5 F | RESPIRATION RATE: 16 BRPM | HEIGHT: 68 IN | WEIGHT: 160 LBS | OXYGEN SATURATION: 96 % | DIASTOLIC BLOOD PRESSURE: 69 MMHG

## 2021-06-05 DIAGNOSIS — R10.32 LEFT LOWER QUADRANT ABDOMINAL PAIN: Primary | ICD-10-CM

## 2021-06-05 LAB
ALBUMIN SERPL-MCNC: 4.2 G/DL (ref 3.5–4.6)
ALP BLD-CCNC: 156 U/L (ref 40–130)
ALT SERPL-CCNC: 11 U/L (ref 0–33)
ANION GAP SERPL CALCULATED.3IONS-SCNC: 12 MEQ/L (ref 9–15)
AST SERPL-CCNC: 15 U/L (ref 0–35)
BASOPHILS ABSOLUTE: 0.1 K/UL (ref 0–0.2)
BASOPHILS RELATIVE PERCENT: 1 %
BILIRUB SERPL-MCNC: 0.4 MG/DL (ref 0.2–0.7)
BUN BLDV-MCNC: 12 MG/DL (ref 6–20)
CALCIUM SERPL-MCNC: 9.4 MG/DL (ref 8.5–9.9)
CHLORIDE BLD-SCNC: 101 MEQ/L (ref 95–107)
CO2: 29 MEQ/L (ref 20–31)
CREAT SERPL-MCNC: 1.33 MG/DL (ref 0.5–0.9)
EOSINOPHILS ABSOLUTE: 0.5 K/UL (ref 0–0.7)
EOSINOPHILS RELATIVE PERCENT: 3.5 %
GFR AFRICAN AMERICAN: 50.4
GFR NON-AFRICAN AMERICAN: 41.6
GLOBULIN: 3.1 G/DL (ref 2.3–3.5)
GLUCOSE BLD-MCNC: 208 MG/DL (ref 70–99)
HCT VFR BLD CALC: 43 % (ref 37–47)
HEMOGLOBIN: 14.7 G/DL (ref 12–16)
LYMPHOCYTES ABSOLUTE: 3 K/UL (ref 1–4.8)
LYMPHOCYTES RELATIVE PERCENT: 21.9 %
MCH RBC QN AUTO: 29.7 PG (ref 27–31.3)
MCHC RBC AUTO-ENTMCNC: 34.1 % (ref 33–37)
MCV RBC AUTO: 87.1 FL (ref 82–100)
MONOCYTES ABSOLUTE: 0.7 K/UL (ref 0.2–0.8)
MONOCYTES RELATIVE PERCENT: 5.2 %
NEUTROPHILS ABSOLUTE: 9.5 K/UL (ref 1.4–6.5)
NEUTROPHILS RELATIVE PERCENT: 68.4 %
PDW BLD-RTO: 13.7 % (ref 11.5–14.5)
PLATELET # BLD: 318 K/UL (ref 130–400)
POTASSIUM SERPL-SCNC: 3.9 MEQ/L (ref 3.4–4.9)
RBC # BLD: 4.94 M/UL (ref 4.2–5.4)
SODIUM BLD-SCNC: 142 MEQ/L (ref 135–144)
TOTAL PROTEIN: 7.3 G/DL (ref 6.3–8)
WBC # BLD: 13.8 K/UL (ref 4.8–10.8)

## 2021-06-05 PROCEDURE — 99283 EMERGENCY DEPT VISIT LOW MDM: CPT

## 2021-06-05 PROCEDURE — 6360000002 HC RX W HCPCS: Performed by: EMERGENCY MEDICINE

## 2021-06-05 PROCEDURE — 36415 COLL VENOUS BLD VENIPUNCTURE: CPT

## 2021-06-05 PROCEDURE — 85025 COMPLETE CBC W/AUTO DIFF WBC: CPT

## 2021-06-05 PROCEDURE — 96375 TX/PRO/DX INJ NEW DRUG ADDON: CPT

## 2021-06-05 PROCEDURE — 81003 URINALYSIS AUTO W/O SCOPE: CPT

## 2021-06-05 PROCEDURE — 80053 COMPREHEN METABOLIC PANEL: CPT

## 2021-06-05 PROCEDURE — 2580000003 HC RX 258: Performed by: EMERGENCY MEDICINE

## 2021-06-05 PROCEDURE — 96374 THER/PROPH/DIAG INJ IV PUSH: CPT

## 2021-06-05 RX ORDER — 0.9 % SODIUM CHLORIDE 0.9 %
1000 INTRAVENOUS SOLUTION INTRAVENOUS ONCE
Status: COMPLETED | OUTPATIENT
Start: 2021-06-05 | End: 2021-06-05

## 2021-06-05 RX ORDER — HYDROCODONE BITARTRATE AND ACETAMINOPHEN 5; 325 MG/1; MG/1
1 TABLET ORAL EVERY 6 HOURS PRN
Qty: 12 TABLET | Refills: 0 | Status: SHIPPED | OUTPATIENT
Start: 2021-06-05 | End: 2021-06-08

## 2021-06-05 RX ORDER — ONDANSETRON 2 MG/ML
4 INJECTION INTRAMUSCULAR; INTRAVENOUS ONCE
Status: COMPLETED | OUTPATIENT
Start: 2021-06-05 | End: 2021-06-05

## 2021-06-05 RX ADMIN — HYDROMORPHONE HYDROCHLORIDE 1 MG: 1 INJECTION, SOLUTION INTRAMUSCULAR; INTRAVENOUS; SUBCUTANEOUS at 04:36

## 2021-06-05 RX ADMIN — ONDANSETRON 4 MG: 2 INJECTION INTRAMUSCULAR; INTRAVENOUS at 04:36

## 2021-06-05 RX ADMIN — SODIUM CHLORIDE 1000 ML: 9 INJECTION, SOLUTION INTRAVENOUS at 04:36

## 2021-06-05 ASSESSMENT — ENCOUNTER SYMPTOMS
EYE DISCHARGE: 0
SORE THROAT: 0
SHORTNESS OF BREATH: 0
COUGH: 0
ABDOMINAL DISTENTION: 0
PHOTOPHOBIA: 0
ABDOMINAL PAIN: 1
CHEST TIGHTNESS: 0
VOMITING: 0
WHEEZING: 0

## 2021-06-05 ASSESSMENT — PAIN SCALES - GENERAL
PAINLEVEL_OUTOF10: 10
PAINLEVEL_OUTOF10: 10
PAINLEVEL_OUTOF10: 7

## 2021-06-05 ASSESSMENT — PAIN DESCRIPTION - ORIENTATION
ORIENTATION: LEFT
ORIENTATION: LEFT

## 2021-06-05 ASSESSMENT — PAIN DESCRIPTION - LOCATION
LOCATION: ABDOMEN
LOCATION: ABDOMEN

## 2021-06-05 ASSESSMENT — PAIN DESCRIPTION - PAIN TYPE: TYPE: ACUTE PAIN

## 2021-06-05 NOTE — ED TRIAGE NOTES
Arrived to the ER for c/o LLQ abdominal pain. States began having pain since Wednesday that has increased in intensity. Has a history of bilateral hernia repair 16 years ago and since this time every couple of years has to have lysis of adhesions. States pain feels similar to previous times when had to have lysis done.  Denies any urinary symptoms, fever/ chills, n/v.

## 2021-06-05 NOTE — ED PROVIDER NOTES
[address] [facility] ED  eMERGENCY dEPARTMENT eNCOUnter      Pt Name: Nita Suggs  MRN: [id number]  [place] 1965  Date of evaluation: 6/5/2021  Provider: Yanet Chen MD    CHIEF COMPLAINT       Chief Complaint   Patient presents with    Abdominal Pain         HISTORY OF PRESENT ILLNESS   (Location/Symptom, Timing/Onset,Context/Setting, Quality, Duration, Modifying Factors, Severity)  Note limiting factors. Nita Suggs is a 50 y.o. female who presents to the emergency department for evaluation of left lower quadrant abdominal pain. Patient had recurring abdominal pain left lower quadrant over the past week. She has been seen here in the recent past for this. She actually had a CT scan in the past couple weeks that was normal.  She has had prior hernia repair and reports having \"scar tissue\" from that that requires lysis of adhesions every few years. She says this feels similar to those episodes in the past.  She has had no nausea or vomiting. She denies related back pain. No urinary complaints. No fever. HPI    NursingNotes were reviewed. REVIEW OF SYSTEMS    (2-9 systems for level 4, 10 or more for level 5)     Review of Systems   Constitutional: Negative for chills and diaphoresis. HENT: Negative for congestion, ear pain, mouth sores and sore throat. Eyes: Negative for photophobia and discharge. Respiratory: Negative for cough, chest tightness, shortness of breath and wheezing. Cardiovascular: Negative for chest pain and palpitations. Gastrointestinal: Positive for abdominal pain. Negative for abdominal distention and vomiting. Endocrine: Negative for cold intolerance. Genitourinary: Negative for difficulty urinating. Musculoskeletal: Negative for arthralgias. Skin: Negative for pallor and rash. Allergic/Immunologic: Negative for immunocompromised state. Neurological: Negative for dizziness and syncope. Hematological: Negative for adenopathy. HENT:      Head: Normocephalic. Nose: Nose normal.   Eyes:      Conjunctiva/sclera: Conjunctivae normal.      Pupils: Pupils are equal, round, and reactive to light. Cardiovascular:      Rate and Rhythm: Normal rate and regular rhythm. Heart sounds: Normal heart sounds. Pulmonary:      Effort: Pulmonary effort is normal.      Breath sounds: Normal breath sounds. Abdominal:      General: Bowel sounds are normal.      Palpations: Abdomen is soft. Tenderness: There is abdominal tenderness in the left lower quadrant. There is no guarding. Hernia: No hernia is present. Musculoskeletal:         General: Normal range of motion. Cervical back: Normal range of motion and neck supple. Skin:     General: Skin is warm and dry. Neurological:      Mental Status: She is alert and oriented to person, place, and time.          DIAGNOSTIC RESULTS     EKG: All EKG's are interpreted by the Emergency Department Physician who either signs or Co-signsthis chart in the absence of a cardiologist.      RADIOLOGY:   Adron Meiers such as CT, Ultrasound and MRI are read by the radiologist. Gualberto Estes radiographic images are visualized and preliminarily interpreted by the emergency physician with the below findings:      Interpretation per the Radiologist below, if available at the time ofthis note:    No orders to display         ED BEDSIDE ULTRASOUND:   Performed by ED Physician - none    LABS:  Labs Reviewed   URINE RT REFLEX TO CULTURE - Abnormal; Notable for the following components:       Result Value    Leukocyte Esterase, Urine TRACE (*)     All other components within normal limits   COMPREHENSIVE METABOLIC PANEL - Abnormal; Notable for the following components:    Glucose 208 (*)     CREATININE 1.33 (*)     GFR Non- 41.6 (*)     GFR  50.4 (*)     Alkaline Phosphatase 156 (*)     All other components within normal limits   CBC WITH AUTO DIFFERENTIAL - Abnormal; Notable for the following components:    WBC 13.8 (*)     Neutrophils Absolute 9.5 (*)     All other components within normal limits   MICROSCOPIC URINALYSIS       All other labs were within normal range or not returned as of this dictation. EMERGENCY DEPARTMENT COURSE and DIFFERENTIAL DIAGNOSIS/MDM:   Vitals:    Vitals:    06/05/21 0252 06/05/21 0445 06/05/21 0518 06/05/21 0609   BP: 131/77 115/75 111/68 109/69   Pulse: 97 88 85 89   Resp: 16 18 16    Temp: 98.5 °F (36.9 °C)      TempSrc: Oral      SpO2: 95% 98% 98% 96%   Weight: 160 lb (72.6 kg)      Height: 5' 8\" (1.727 m)           MDM on recheck patient was sleeping. I checked her on a couple different occasions and she is comfortable. She has a benign nonsurgical abdomen. Her white blood count is slightly elevated at 13,000. She has had those elevations in the past.  At this point being afebrile with a normal heart rate I think discharged home for planned follow-up with her surgeon is appropriate. Her surgeon is in [place] [address] where she used to live and she has an appointment in 2 weeks. She would like a local referral and Dr. Nina Buchanan goes on call and referring her there to be seen in the next few days. Return to the emergency department for fever or worsening pain. CONSULTS:  None    PROCEDURES:  Unless otherwise noted below, none     Procedures    FINAL IMPRESSION      1.  Left lower quadrant abdominal pain          DISPOSITION/PLAN   DISPOSITION Decision To Discharge 06/05/2021 06:12:10 AM      PATIENT REFERRED TO:  Beka Alvarez MD  [address] [facility] [address]  [phone]    In 3 days        DISCHARGE MEDICATIONS:  New Prescriptions    No medications on file          (Please note that portions of this note were completed with a voice recognition program.  Efforts were made to edit the dictations but occasionally words are mis-transcribed.)    Sharon Kinney MD (electronically signed)  Attending Emergency Physician

## 2021-06-06 LAB
BILIRUBIN URINE: NEGATIVE
BLOOD, URINE: NEGATIVE
CLARITY: CLEAR
COLOR: YELLOW
GLUCOSE URINE: NEGATIVE MG/DL
KETONES, URINE: NEGATIVE MG/DL
LEUKOCYTE ESTERASE, URINE: ABNORMAL
NITRITE, URINE: NEGATIVE
PH UA: 6 (ref 5–9)
PROTEIN UA: NEGATIVE MG/DL
SPECIFIC GRAVITY UA: 1.01 (ref 1–1.03)
URINE REFLEX TO CULTURE: ABNORMAL
UROBILINOGEN, URINE: 0.2 E.U./DL
WBC UA: NORMAL /HPF (ref 0–5)

## 2021-06-10 ENCOUNTER — HOSPITAL ENCOUNTER (EMERGENCY)
Age: 56
Discharge: HOME | End: 2021-06-10
Attending: EMERGENCY MEDICINE
Payer: MEDICARE

## 2021-06-10 ENCOUNTER — APPOINTMENT (OUTPATIENT)
Dept: CT IMAGING | Age: 56
End: 2021-06-10
Payer: MEDICARE

## 2021-06-10 VITALS
BODY MASS INDEX: 24.25 KG/M2 | WEIGHT: 160 LBS | HEART RATE: 74 BPM | HEIGHT: 68 IN | DIASTOLIC BLOOD PRESSURE: 58 MMHG | TEMPERATURE: 98.7 F | OXYGEN SATURATION: 96 % | SYSTOLIC BLOOD PRESSURE: 93 MMHG | RESPIRATION RATE: 16 BRPM

## 2021-06-10 DIAGNOSIS — R10.9 ABDOMINAL PAIN, UNSPECIFIED ABDOMINAL LOCATION: Primary | ICD-10-CM

## 2021-06-10 LAB
ALBUMIN SERPL-MCNC: 4.1 G/DL (ref 3.5–4.6)
ALP BLD-CCNC: 133 U/L (ref 40–130)
ALT SERPL-CCNC: 10 U/L (ref 0–33)
ANION GAP SERPL CALCULATED.3IONS-SCNC: 10 MEQ/L (ref 9–15)
AST SERPL-CCNC: 13 U/L (ref 0–35)
BACTERIA: ABNORMAL /HPF
BASOPHILS ABSOLUTE: 0 K/UL (ref 0–0.2)
BASOPHILS RELATIVE PERCENT: 0.2 %
BILIRUB SERPL-MCNC: 0.4 MG/DL (ref 0.2–0.7)
BILIRUBIN URINE: NEGATIVE
BLOOD, URINE: NEGATIVE
BUN BLDV-MCNC: 11 MG/DL (ref 6–20)
CALCIUM SERPL-MCNC: 9.3 MG/DL (ref 8.5–9.9)
CHLORIDE BLD-SCNC: 101 MEQ/L (ref 95–107)
CLARITY: CLEAR
CO2: 27 MEQ/L (ref 20–31)
COLOR: YELLOW
CREAT SERPL-MCNC: 1.19 MG/DL (ref 0.5–0.9)
EOSINOPHILS ABSOLUTE: 0.3 K/UL (ref 0–0.7)
EOSINOPHILS RELATIVE PERCENT: 3.2 %
EPITHELIAL CELLS, UA: ABNORMAL /HPF (ref 0–5)
GFR AFRICAN AMERICAN: 57.3
GFR NON-AFRICAN AMERICAN: 47.3
GLOBULIN: 2.7 G/DL (ref 2.3–3.5)
GLUCOSE BLD-MCNC: 291 MG/DL (ref 70–99)
GLUCOSE URINE: >=1000 MG/DL
HCT VFR BLD CALC: 40.4 % (ref 37–47)
HEMOGLOBIN: 13.9 G/DL (ref 12–16)
HYALINE CASTS: ABNORMAL /HPF (ref 0–5)
KETONES, URINE: NEGATIVE MG/DL
LEUKOCYTE ESTERASE, URINE: NEGATIVE
LIPASE: 19 U/L (ref 12–95)
LYMPHOCYTES ABSOLUTE: 2 K/UL (ref 1–4.8)
LYMPHOCYTES RELATIVE PERCENT: 18.5 %
MAGNESIUM: 2 MG/DL (ref 1.7–2.4)
MCH RBC QN AUTO: 29.8 PG (ref 27–31.3)
MCHC RBC AUTO-ENTMCNC: 34.3 % (ref 33–37)
MCV RBC AUTO: 86.9 FL (ref 82–100)
MONOCYTES ABSOLUTE: 0.5 K/UL (ref 0.2–0.8)
MONOCYTES RELATIVE PERCENT: 5.1 %
NEUTROPHILS ABSOLUTE: 7.9 K/UL (ref 1.4–6.5)
NEUTROPHILS RELATIVE PERCENT: 73 %
NITRITE, URINE: NEGATIVE
PDW BLD-RTO: 13.9 % (ref 11.5–14.5)
PH UA: 5 (ref 5–9)
PLATELET # BLD: 279 K/UL (ref 130–400)
POTASSIUM SERPL-SCNC: 4.3 MEQ/L (ref 3.4–4.9)
PROTEIN UA: 30 MG/DL
RBC # BLD: 4.65 M/UL (ref 4.2–5.4)
RBC UA: ABNORMAL /HPF (ref 0–2)
SODIUM BLD-SCNC: 138 MEQ/L (ref 135–144)
SPECIFIC GRAVITY UA: 1.02 (ref 1–1.03)
TOTAL PROTEIN: 6.8 G/DL (ref 6.3–8)
UROBILINOGEN, URINE: 1 E.U./DL
WBC # BLD: 10.8 K/UL (ref 4.8–10.8)
WBC UA: ABNORMAL /HPF (ref 0–5)

## 2021-06-10 PROCEDURE — 85025 COMPLETE CBC W/AUTO DIFF WBC: CPT

## 2021-06-10 PROCEDURE — 2580000003 HC RX 258: Performed by: EMERGENCY MEDICINE

## 2021-06-10 PROCEDURE — 6360000004 HC RX CONTRAST MEDICATION: Performed by: EMERGENCY MEDICINE

## 2021-06-10 PROCEDURE — 96375 TX/PRO/DX INJ NEW DRUG ADDON: CPT

## 2021-06-10 PROCEDURE — 74177 CT ABD & PELVIS W/CONTRAST: CPT

## 2021-06-10 PROCEDURE — 36415 COLL VENOUS BLD VENIPUNCTURE: CPT

## 2021-06-10 PROCEDURE — 83690 ASSAY OF LIPASE: CPT

## 2021-06-10 PROCEDURE — 81001 URINALYSIS AUTO W/SCOPE: CPT

## 2021-06-10 PROCEDURE — 96374 THER/PROPH/DIAG INJ IV PUSH: CPT

## 2021-06-10 PROCEDURE — 83735 ASSAY OF MAGNESIUM: CPT

## 2021-06-10 PROCEDURE — 6360000002 HC RX W HCPCS: Performed by: EMERGENCY MEDICINE

## 2021-06-10 PROCEDURE — 99284 EMERGENCY DEPT VISIT MOD MDM: CPT

## 2021-06-10 PROCEDURE — 96376 TX/PRO/DX INJ SAME DRUG ADON: CPT

## 2021-06-10 PROCEDURE — 80053 COMPREHEN METABOLIC PANEL: CPT

## 2021-06-10 RX ORDER — ONDANSETRON 2 MG/ML
4 INJECTION INTRAMUSCULAR; INTRAVENOUS ONCE
Status: COMPLETED | OUTPATIENT
Start: 2021-06-10 | End: 2021-06-10

## 2021-06-10 RX ORDER — MORPHINE SULFATE 2 MG/ML
4 INJECTION, SOLUTION INTRAMUSCULAR; INTRAVENOUS
Status: COMPLETED | OUTPATIENT
Start: 2021-06-10 | End: 2021-06-10

## 2021-06-10 RX ORDER — 0.9 % SODIUM CHLORIDE 0.9 %
1000 INTRAVENOUS SOLUTION INTRAVENOUS ONCE
Status: COMPLETED | OUTPATIENT
Start: 2021-06-10 | End: 2021-06-10

## 2021-06-10 RX ORDER — OXYCODONE HYDROCHLORIDE AND ACETAMINOPHEN 5; 325 MG/1; MG/1
1 TABLET ORAL EVERY 6 HOURS PRN
Qty: 12 TABLET | Refills: 0 | Status: SHIPPED | OUTPATIENT
Start: 2021-06-10 | End: 2021-06-13

## 2021-06-10 RX ADMIN — ONDANSETRON 4 MG: 2 INJECTION INTRAMUSCULAR; INTRAVENOUS at 16:02

## 2021-06-10 RX ADMIN — IOPAMIDOL 100 ML: 755 INJECTION, SOLUTION INTRAVENOUS at 15:18

## 2021-06-10 RX ADMIN — MORPHINE SULFATE 4 MG: 2 INJECTION, SOLUTION INTRAMUSCULAR; INTRAVENOUS at 15:04

## 2021-06-10 RX ADMIN — ONDANSETRON 4 MG: 2 INJECTION INTRAMUSCULAR; INTRAVENOUS at 13:43

## 2021-06-10 RX ADMIN — SODIUM CHLORIDE 1000 ML: 9 INJECTION, SOLUTION INTRAVENOUS at 13:44

## 2021-06-10 RX ADMIN — MORPHINE SULFATE 4 MG: 2 INJECTION, SOLUTION INTRAMUSCULAR; INTRAVENOUS at 13:42

## 2021-06-10 RX ADMIN — HYDROMORPHONE HYDROCHLORIDE 1 MG: 1 INJECTION, SOLUTION INTRAMUSCULAR; INTRAVENOUS; SUBCUTANEOUS at 16:02

## 2021-06-10 ASSESSMENT — PAIN DESCRIPTION - ORIENTATION
ORIENTATION: LEFT;LOWER
ORIENTATION: LEFT

## 2021-06-10 ASSESSMENT — PAIN DESCRIPTION - FREQUENCY
FREQUENCY: CONTINUOUS

## 2021-06-10 ASSESSMENT — PAIN SCALES - GENERAL
PAINLEVEL_OUTOF10: 10
PAINLEVEL_OUTOF10: 6
PAINLEVEL_OUTOF10: 10

## 2021-06-10 ASSESSMENT — PAIN DESCRIPTION - PAIN TYPE
TYPE: ACUTE PAIN

## 2021-06-10 ASSESSMENT — PAIN DESCRIPTION - LOCATION
LOCATION: ABDOMEN

## 2021-06-10 ASSESSMENT — ENCOUNTER SYMPTOMS
NAUSEA: 0
COUGH: 0
VOMITING: 0
DIARRHEA: 0
SHORTNESS OF BREATH: 0
ABDOMINAL PAIN: 1
BACK PAIN: 0
SORE THROAT: 0

## 2021-06-10 ASSESSMENT — PAIN DESCRIPTION - DESCRIPTORS
DESCRIPTORS: SHARP

## 2021-06-10 NOTE — ED PROVIDER NOTES
[address] [facility] ED  eMERGENCYdEPARTMENT eNCOUnter      Pt Name: Romulo Ritter  MRN: [id number]  [place] 1965  Date of evaluation: 6/10/2021  Dee Hdz MD    CHIEF COMPLAINT           HPI  Suzanne Longoria is a 50 y.o. female per chart review has a h/o asthma, depression/anxiety, DM II, GERD, hypothyroidism presents to the ED with ab pain. Pt notes gradual onset, moderate, constant, sharp, LLQ ab pain x 3 days. Pt denies fever, n/v, cp, sob, dysuria, diarrhea. ROS  Review of Systems   Constitutional: Negative for activity change, chills and fever. HENT: Negative for ear pain and sore throat. Eyes: Negative for visual disturbance. Respiratory: Negative for cough and shortness of breath. Cardiovascular: Negative for chest pain, palpitations and leg swelling. Gastrointestinal: Positive for abdominal pain. Negative for diarrhea, nausea and vomiting. Genitourinary: Negative for dysuria. Musculoskeletal: Negative for back pain. Skin: Negative for rash. Neurological: Negative for dizziness and weakness. Except as noted above the remainder of the review of systems was reviewed and negative.        PAST MEDICAL HISTORY     Past Medical History:   Diagnosis Date    Anxiety     Asthma     as child    Depression     Depression     dysthymia - following lupus dx age 23    Diabetes mellitus ([address]     Foot fracture, left     GERD (gastroesophageal reflux disease)     Hyperlipidemia     Hypothyroidism     Kidney stones     Lupus ([facility]     dr barrera -     Migraine     PONV (postoperative nausea and vomiting)     Renal stone 4/25/2021    Sciatica     left sided         SURGICAL HISTORY       Past Surgical History:   Procedure Laterality Date    APPENDECTOMY      ARTHRODESIS Left 11/26/2019    ARTHRODESIS OF THE FIRST METATARSAL PHALANGEAL JOINT LEFT FOOT performed by Rebecca Layton DPM at [address] [facility] N/A 4/26/2021    CYSTOSCOPY RIGHT DOUBLE J STENT PLACEMENT. performed by Kemar Koo MD at [facility]      COLONOSCOPY      FOOT CLOSED REDUCTION Left 1/3/2017    LEFT FOOT PERCUTANEOUS PINNING Erik Baas FRACTURE performed by Eliecer Barcenas MD at [address]      LITHOTRIPSY Right 5/5/2021    RIGHT ESWL performed by Kemar Koo MD at [address] [facility] ESOPHAGOGASTRODUODENOSCOPY TRANSORAL DIAGNOSTIC N/A 3/8/2017    EGD ESOPHAGOGASTRODUODENOSCOPY WITH DILATION performed by Linh Saucedo MD at [address] ESOPHAGOGASTRODUODENOSCOPY TRANSORAL DIAGNOSTIC N/A 11/7/2018    EGD ESOPHAGOGASTRODUODENOSCOPY WITH DILATION performed by Linh Saucedo MD at [address] [facility]       Previous Medications    ALBUTEROL SULFATE HFA (VENTOLIN HFA) 108 (90 BASE) MCG/ACT INHALER    Inhale 2 puffs into the lungs every 6 hours as needed for Wheezing    BLOOD GLUCOSE MONITOR KIT AND SUPPLIES    Test 3 times a day & as needed for symptoms of irregular blood glucose. BLOOD GLUCOSE MONITOR KIT AND SUPPLIES    Please give 1 meter covered by insurance Dx E11.65    BLOOD GLUCOSE MONITOR STRIPS    Pt test 3x daily Dx E11.65    BLOOD GLUCOSE MONITORING SUPPL (TRUE TRACK BLOOD GLUCOSE) CAROLE    Check blood sugars twice daily or as directed. BLOOD GLUCOSE TEST STRIPS (ASCENSIA AUTODISC VI;ONE TOUCH ULTRA TEST VI) STRIP    Check blood sugars twice daily or as directed. [employer] UNILET LANCETS 30G MISC    Check blood sugars twice daily or as directed.     ESTRADIOL (CLIMARA) 0.025 MG/24HR    Place 0.025 patches onto the skin every 7 days On mondays    HYDROXYCHLOROQUINE (PLAQUENIL) 200 MG TABLET    Take 1 tablet by mouth 2 times daily    INSULIN ASPART (NOVOLOG FLEXPEN) 100 UNIT/ML INJECTION PEN    Inject 35 units tid with meals    INSULIN PEN NEEDLE Pt also had a hernia repair with mesh in her abdomen. Pt's surgeon is in [facility] [place], operated by [facility]. Pt reassessed and still having pain. Pt given IV dilaudid, IV zofran with moderate relief. Pt requesting a surgeon to immediately lyse the patient's adhesions. I had a long talk with the patient and her family about adhesions and SBO. Pt has an appointment with her surgeon in 2 weeks. Pt given ab pain warning signs and will f/u with her surgeon in 2 weeks. Pt and family understand plan. FINAL IMPRESSION      1.  Abdominal pain, unspecified abdominal location          DISPOSITION/PLAN   DISPOSITION Decision To Discharge 06/10/2021 04:31:00 PM        DISCHARGE MEDICATIONS:  [unfilled]         Yasmin Verma MD(electronically signed)  Attending Emergency Physician            Yasmin Verma MD  06/10/21 7564

## 2021-06-10 NOTE — ED NOTES
Pt states that every few years she has scar tissue in the LLQ. Pt states that she has an appt with her surgeon in a few weeks but that the pain has worsen and states that she is having a hard time tolerating the pain. Pt had BM yesterday. Pt denies any Chest pain. Pt state she has Nausea but no vomiting. Does not have nausea at this time. Pt is alert and oriented times 4. Pt friend at bedside.       Jesu Bergeron RN  06/10/21 Linda Sanchez [address]86 Abbey Thurman RN  06/10/21 8631

## 2021-06-10 NOTE — ED NOTES
Pt resting and encouraged to lower mask due to SPO2 88%. Pt Spo2 at this time 96% RA. Pt talking with friend at bedside at this time.  Pt stating that pain has improved and is a New SPENSER Presley  06/10/21 6318

## 2021-06-10 NOTE — ED NOTES
Patient returned from [address] [place] [address].  Miguel Ángel Palomino RN  06/10/21 9924

## 2021-06-10 NOTE — ED NOTES
Discharge education reviewed verbally and in writing. Instructed to follow up with PCP and come back to the ED with any new or worsening symptoms. No questions or concerns at this time. No s/s of distress noted at this time. Pt alert and oriented times 4 at discharge. Pt discharge with friend driving.       Eugene Halsted, RN  06/10/21 7294

## 2021-06-10 NOTE — ED NOTES
Pt states that the pain is relieved a small amount if she is curled on side. Pt states that pain is the same.       Jesu Bergeron RN  06/10/21 0039

## 2021-06-11 LAB
GFR AFRICAN AMERICAN: 47
GFR NON-AFRICAN AMERICAN: 39
PERFORMED ON: ABNORMAL
POC CREATININE: 1.4 MG/DL (ref 0.6–1.1)
POC SAMPLE TYPE: ABNORMAL

## 2021-07-13 ENCOUNTER — HOSPITAL ENCOUNTER (EMERGENCY)
Age: 56
Discharge: HOME | End: 2021-07-13
Attending: EMERGENCY MEDICINE
Payer: MEDICARE

## 2021-07-13 ENCOUNTER — APPOINTMENT (OUTPATIENT)
Dept: CT IMAGING | Age: 56
End: 2021-07-13
Payer: MEDICARE

## 2021-07-13 VITALS
RESPIRATION RATE: 18 BRPM | BODY MASS INDEX: 24.25 KG/M2 | HEIGHT: 68 IN | HEART RATE: 74 BPM | WEIGHT: 160 LBS | SYSTOLIC BLOOD PRESSURE: 130 MMHG | DIASTOLIC BLOOD PRESSURE: 77 MMHG | OXYGEN SATURATION: 98 % | TEMPERATURE: 97.6 F

## 2021-07-13 DIAGNOSIS — M54.50 LUMBAR PAIN: Primary | ICD-10-CM

## 2021-07-13 LAB
ALBUMIN SERPL-MCNC: 3.7 G/DL (ref 3.5–4.6)
ALP BLD-CCNC: 134 U/L (ref 40–130)
ALT SERPL-CCNC: 17 U/L (ref 0–33)
ANION GAP SERPL CALCULATED.3IONS-SCNC: 9 MEQ/L (ref 9–15)
AST SERPL-CCNC: 16 U/L (ref 0–35)
BASOPHILS ABSOLUTE: 0.2 K/UL (ref 0–0.2)
BASOPHILS RELATIVE PERCENT: 1.2 %
BILIRUB SERPL-MCNC: <0.2 MG/DL (ref 0.2–0.7)
BILIRUBIN URINE: NEGATIVE
BLOOD, URINE: NEGATIVE
BUN BLDV-MCNC: 11 MG/DL (ref 6–20)
CALCIUM SERPL-MCNC: 9.1 MG/DL (ref 8.5–9.9)
CHLORIDE BLD-SCNC: 101 MEQ/L (ref 95–107)
CLARITY: CLEAR
CO2: 27 MEQ/L (ref 20–31)
COLOR: YELLOW
CREAT SERPL-MCNC: 1.08 MG/DL (ref 0.5–0.9)
EOSINOPHILS ABSOLUTE: 0.3 K/UL (ref 0–0.7)
EOSINOPHILS RELATIVE PERCENT: 2.7 %
GFR AFRICAN AMERICAN: >60
GFR NON-AFRICAN AMERICAN: 52.9
GLOBULIN: 2.7 G/DL (ref 2.3–3.5)
GLUCOSE BLD-MCNC: 235 MG/DL (ref 70–99)
GLUCOSE URINE: 250 MG/DL
HCT VFR BLD CALC: 40.6 % (ref 37–47)
HEMOGLOBIN: 13.5 G/DL (ref 12–16)
KETONES, URINE: NEGATIVE MG/DL
LEUKOCYTE ESTERASE, URINE: NEGATIVE
LYMPHOCYTES ABSOLUTE: 2.6 K/UL (ref 1–4.8)
LYMPHOCYTES RELATIVE PERCENT: 20.9 %
MCH RBC QN AUTO: 28.9 PG (ref 27–31.3)
MCHC RBC AUTO-ENTMCNC: 33.2 % (ref 33–37)
MCV RBC AUTO: 86.9 FL (ref 82–100)
MONOCYTES ABSOLUTE: 0.7 K/UL (ref 0.2–0.8)
MONOCYTES RELATIVE PERCENT: 5.4 %
NEUTROPHILS ABSOLUTE: 8.7 K/UL (ref 1.4–6.5)
NEUTROPHILS RELATIVE PERCENT: 69.8 %
NITRITE, URINE: NEGATIVE
PDW BLD-RTO: 13.7 % (ref 11.5–14.5)
PH UA: 5.5 (ref 5–9)
PLATELET # BLD: 270 K/UL (ref 130–400)
PLATELET SLIDE REVIEW: ADEQUATE
POTASSIUM SERPL-SCNC: 4 MEQ/L (ref 3.4–4.9)
PROTEIN UA: NEGATIVE MG/DL
RBC # BLD: 4.67 M/UL (ref 4.2–5.4)
SODIUM BLD-SCNC: 137 MEQ/L (ref 135–144)
SPECIFIC GRAVITY UA: 1.02 (ref 1–1.03)
TOTAL PROTEIN: 6.4 G/DL (ref 6.3–8)
URINE REFLEX TO CULTURE: ABNORMAL
UROBILINOGEN, URINE: 0.2 E.U./DL
WBC # BLD: 12.5 K/UL (ref 4.8–10.8)

## 2021-07-13 PROCEDURE — 96375 TX/PRO/DX INJ NEW DRUG ADDON: CPT

## 2021-07-13 PROCEDURE — 6360000002 HC RX W HCPCS: Performed by: EMERGENCY MEDICINE

## 2021-07-13 PROCEDURE — 85025 COMPLETE CBC W/AUTO DIFF WBC: CPT

## 2021-07-13 PROCEDURE — 74176 CT ABD & PELVIS W/O CONTRAST: CPT

## 2021-07-13 PROCEDURE — 80053 COMPREHEN METABOLIC PANEL: CPT

## 2021-07-13 PROCEDURE — 99284 EMERGENCY DEPT VISIT MOD MDM: CPT

## 2021-07-13 PROCEDURE — 96374 THER/PROPH/DIAG INJ IV PUSH: CPT

## 2021-07-13 PROCEDURE — 96372 THER/PROPH/DIAG INJ SC/IM: CPT

## 2021-07-13 PROCEDURE — 36415 COLL VENOUS BLD VENIPUNCTURE: CPT

## 2021-07-13 PROCEDURE — 2580000003 HC RX 258: Performed by: EMERGENCY MEDICINE

## 2021-07-13 PROCEDURE — 81003 URINALYSIS AUTO W/O SCOPE: CPT

## 2021-07-13 RX ORDER — ONDANSETRON 2 MG/ML
4 INJECTION INTRAMUSCULAR; INTRAVENOUS ONCE
Status: COMPLETED | OUTPATIENT
Start: 2021-07-13 | End: 2021-07-13

## 2021-07-13 RX ORDER — MORPHINE SULFATE 2 MG/ML
4 INJECTION, SOLUTION INTRAMUSCULAR; INTRAVENOUS ONCE
Status: COMPLETED | OUTPATIENT
Start: 2021-07-13 | End: 2021-07-13

## 2021-07-13 RX ORDER — ORPHENADRINE CITRATE 100 MG/1
100 TABLET, EXTENDED RELEASE ORAL 2 TIMES DAILY
Qty: 20 TABLET | Refills: 0 | Status: SHIPPED | OUTPATIENT
Start: 2021-07-13 | End: 2021-07-23

## 2021-07-13 RX ORDER — 0.9 % SODIUM CHLORIDE 0.9 %
1000 INTRAVENOUS SOLUTION INTRAVENOUS ONCE
Status: COMPLETED | OUTPATIENT
Start: 2021-07-13 | End: 2021-07-13

## 2021-07-13 RX ORDER — HYDROCODONE BITARTRATE AND ACETAMINOPHEN 5; 325 MG/1; MG/1
1 TABLET ORAL EVERY 6 HOURS PRN
Qty: 10 TABLET | Refills: 0 | Status: SHIPPED | OUTPATIENT
Start: 2021-07-13 | End: 2021-07-16

## 2021-07-13 RX ORDER — ORPHENADRINE CITRATE 30 MG/ML
60 INJECTION INTRAMUSCULAR; INTRAVENOUS ONCE
Status: COMPLETED | OUTPATIENT
Start: 2021-07-13 | End: 2021-07-13

## 2021-07-13 RX ADMIN — ORPHENADRINE CITRATE 60 MG: 60 INJECTION INTRAMUSCULAR; INTRAVENOUS at 05:57

## 2021-07-13 RX ADMIN — SODIUM CHLORIDE 1000 ML: 9 INJECTION, SOLUTION INTRAVENOUS at 04:30

## 2021-07-13 RX ADMIN — MORPHINE SULFATE 4 MG: 2 INJECTION, SOLUTION INTRAMUSCULAR; INTRAVENOUS at 04:55

## 2021-07-13 RX ADMIN — ONDANSETRON 4 MG: 2 INJECTION INTRAMUSCULAR; INTRAVENOUS at 04:55

## 2021-07-13 ASSESSMENT — ENCOUNTER SYMPTOMS
ABDOMINAL PAIN: 1
SHORTNESS OF BREATH: 0
COUGH: 0
EYE PAIN: 0
SINUS PAIN: 0
CONSTIPATION: 0
RHINORRHEA: 0
EYE DISCHARGE: 0
DIARRHEA: 0
NAUSEA: 1
VOMITING: 0
WHEEZING: 0
ABDOMINAL DISTENTION: 0

## 2021-07-13 ASSESSMENT — PAIN DESCRIPTION - PAIN TYPE: TYPE: ACUTE PAIN

## 2021-07-13 ASSESSMENT — PAIN DESCRIPTION - ORIENTATION: ORIENTATION: LEFT

## 2021-07-13 ASSESSMENT — PAIN SCALES - GENERAL
PAINLEVEL_OUTOF10: 10
PAINLEVEL_OUTOF10: 9

## 2021-07-13 ASSESSMENT — PAIN DESCRIPTION - LOCATION: LOCATION: FLANK

## 2021-07-13 NOTE — ED PROVIDER NOTES
[facility] ED  EMERGENCY MEDICINE     Pt Name: Rocco Villa  MRN: [id number]  [place] 1965  Date of evaluation: 7/13/2021  PCP:    Rose Jacobs MD  Provider: Maru Mcgee, [address] [place] [address]       Chief Complaint   Patient presents with    Flank Pain       HISTORY OF PRESENT ILLNESS    HPI     61-year-old female with history of lupus, type 2 diabetes, kidney stones in the past presents to the emergency department with complaint of left flank pain that radiates into the front left lower quadrant. Patient denies any urinary symptoms including frequency, dysuria or hematuria. Denies fevers or chills. States she was nauseous with no vomiting. Has had lithotripsy in the past for kidney stones. States it feels similar. Denies diarrhea, melena, or hematochezia. Triage notes and Nursing notes were reviewed by myself. Any discrepancies are addressed above.     PAST MEDICAL HISTORY     Past Medical History:   Diagnosis Date    Anxiety     Asthma     as child    Depression     Depression     dysthymia - following lupus dx age 23    Diabetes mellitus ([facility] [address]     Foot fracture, left     GERD (gastroesophageal reflux disease)     Hyperlipidemia     Hypothyroidism     Kidney stones     Lupus ([facility] [address]     dr barrera -     Migraine     PONV (postoperative nausea and vomiting)     Renal stone 4/25/2021    Sciatica     left sided       SURGICAL HISTORY       Past Surgical History:   Procedure Laterality Date    APPENDECTOMY      ARTHRODESIS Left 11/26/2019    ARTHRODESIS OF THE FIRST METATARSAL PHALANGEAL JOINT LEFT FOOT performed by Poonam Fernandez DPM at [address] [facility] N/A 4/26/2021    CYSTOSCOPY RIGHT DOUBLE J STENT PLACEMENT. performed by Lynn Lopez MD at [facility] COLONOSCOPY      FOOT CLOSED REDUCTION Left 1/3/2017    LEFT FOOT PERCUTANEOUS PINNING [place] FRACTURE performed by Leonardo Fountain MD at [facility] FRACTURE SURGERY      HERNIA REPAIR      HYSTERECTOMY      LITHOTRIPSY Right 5/5/2021    RIGHT ESWL performed by Trace Michael MD at [address] [facility] ESOPHAGOGASTRODUODENOSCOPY TRANSORAL DIAGNOSTIC N/A 3/8/2017    EGD ESOPHAGOGASTRODUODENOSCOPY WITH DILATION performed by Trice Cali MD at [address] ESOPHAGOGASTRODUODENOSCOPY TRANSORAL DIAGNOSTIC N/A 11/7/2018    EGD ESOPHAGOGASTRODUODENOSCOPY WITH DILATION performed by rTice Cali MD at [address] [employer]       Previous Medications    ALBUTEROL SULFATE HFA (VENTOLIN HFA) 108 (90 BASE) MCG/ACT INHALER    Inhale 2 puffs into the lungs every 6 hours as needed for Wheezing    BLOOD GLUCOSE MONITOR KIT AND SUPPLIES    Test 3 times a day & as needed for symptoms of irregular blood glucose. BLOOD GLUCOSE MONITOR KIT AND SUPPLIES    Please give 1 meter covered by insurance Dx E11.65    BLOOD GLUCOSE MONITOR STRIPS    Pt test 3x daily Dx E11.65    BLOOD GLUCOSE MONITORING SUPPL (TRUE TRACK BLOOD GLUCOSE) CAROLE    Check blood sugars twice daily or as directed. BLOOD GLUCOSE TEST STRIPS (ASCENSIA AUTODISC VI;ONE TOUCH ULTRA TEST VI) STRIP    Check blood sugars twice daily or as directed. [employer] UNILET LANCETS 30G MISC    Check blood sugars twice daily or as directed.     ESTRADIOL (CLIMARA) 0.025 MG/24HR    Place 0.025 patches onto the skin every 7 days On mondays    HYDROXYCHLOROQUINE (PLAQUENIL) 200 MG TABLET    Take 1 tablet by mouth 2 times daily    INSULIN ASPART (NOVOLOG FLEXPEN) 100 UNIT/ML INJECTION PEN    Inject 35 units tid with meals    INSULIN PEN NEEDLE (NOVOFINE) 32G X 6 MM MISC    BID    INSULIN PEN NEEDLE (NOVOFINE) 32G X 6 MM MISC    qid    INSULIN PEN NEEDLE (PEN NEEDLES) 33G X 4 MM MISC    1 each by Does not apply route three times daily    LANCETS MISC    Pt test 3x daily Dx E11.65    LEVOTHYROXINE (SYNTHROID) 50 MCG TABLET    Take 1 tablet by mouth daily    ONDANSETRON (ZOFRAN-ODT) 4 MG DISINTEGRATING TABLET    Take 1 tablet by mouth 3 times daily as needed for Nausea or Vomiting    PANTOPRAZOLE (PROTONIX) 40 MG TABLET    Take 1 tablet by mouth daily    ROSUVASTATIN (CRESTOR) 40 MG TABLET    Take 1 tablet by mouth daily    TAMSULOSIN (FLOMAX) 0.4 MG CAPSULE    Take 1 capsule by mouth daily       ALLERGIES       Allergies   Allergen Reactions    Aspirin Swelling     Bloody noses    Cephalosporins     Imitrex [Sumatriptan] Hives    Pcn [Penicillins] Swelling    Seasonal Itching     Itch, sneezing,runny nose, watery eyes    Toradol [Ketorolac Tromethamine] Hives     Shortness of breath    Ultram [Tramadol] Hives       FAMILY HISTORY       Family History   Adopted: Yes        SOCIAL HISTORY       Social History     Socioeconomic History    Marital status: Legally      Spouse name: None    Number of children: None    Years of education: None    Highest education level: None   Occupational History    None   Tobacco Use    Smoking status: Never Smoker    Smokeless tobacco: Never Used   Vaping Use    Vaping Use: Never used   Substance and Sexual Activity    Alcohol use: No     Alcohol/week: 0.0 standard drinks     Comment: denies    Drug use: No     Comment: denies    Sexual activity: None   Other Topics Concern    None   Social History Narrative    None     Social Determinants of Health     Financial Resource Strain:     Difficulty of Paying Living Expenses:    Food Insecurity:     Worried About Running Out of Food in the Last Year:     Ran Out of Food in the Last Year:    Transportation Needs:     Lack of Transportation (Medical):      Lack of Transportation (Non-Medical):    Physical Activity:     Days of Exercise per Week:     Minutes of Exercise per Session:    Stress:     Feeling of Stress :    Social Connections:     Frequency of Communication with Friends and Family:     Frequency of Social Gatherings with Friends and Family:     Attends Judaism Services:     Active Member of Clubs or Organizations:     Attends Club or Organization Meetings:     Marital Status:    Intimate Partner Violence:     Fear of Current or Ex-Partner:     Emotionally Abused:     Physically Abused:     Sexually Abused:        REVIEW OF SYSTEMS     Review of Systems   Constitutional: Negative for activity change, chills, fatigue and fever. HENT: Negative for congestion, rhinorrhea and sinus pain. Eyes: Negative for pain and discharge. Respiratory: Negative for cough, shortness of breath and wheezing. Cardiovascular: Negative for chest pain and palpitations. Gastrointestinal: Positive for abdominal pain and nausea. Negative for abdominal distention, constipation, diarrhea and vomiting. Genitourinary: Positive for flank pain. Negative for difficulty urinating, dysuria and hematuria. Musculoskeletal: Negative for joint swelling and myalgias. Skin: Negative for rash. Neurological: Negative for dizziness, weakness, light-headedness, numbness and headaches. Except as noted above the remainder of the review of systems was reviewed and is negative. SCREENINGS                        PHYSICAL EXAM    (up to 7 for level 4, 8 or more for level 5)     ED Triage Vitals   BP Temp Temp Source Pulse Resp SpO2 Height Weight   07/13/21 0345 07/13/21 0340 07/13/21 0340 07/13/21 0340 07/13/21 0340 07/13/21 0340 07/13/21 0340 07/13/21 0340   127/79 97.6 °F (36.4 °C) Oral 67 16 97 % 5' 8\" (1.727 m) 160 lb (72.6 kg)       Physical Exam  Constitutional:       General: She is not in acute distress. Appearance: Normal appearance. She is normal weight. She is not ill-appearing. HENT:      Head: Normocephalic and atraumatic. Right Ear: External ear normal.      Left Ear: External ear normal.      Nose: Nose normal. No congestion or rhinorrhea.       Mouth/Throat:      Mouth: Mucous membranes are moist.      Pharynx: No oropharyngeal exudate or posterior oropharyngeal erythema. Eyes:      General:         Right eye: No discharge. Left eye: No discharge. Extraocular Movements: Extraocular movements intact. Pupils: Pupils are equal, round, and reactive to light. Cardiovascular:      Rate and Rhythm: Normal rate and regular rhythm. Pulses: Normal pulses. Pulmonary:      Effort: Pulmonary effort is normal.      Breath sounds: Normal breath sounds. Abdominal:      General: Abdomen is flat. Bowel sounds are normal. There is no distension. Tenderness: There is abdominal tenderness (Left lower quadrant). There is left CVA tenderness. There is no right CVA tenderness, guarding or rebound. Musculoskeletal:         General: Tenderness (Lower lumbar paraspinal tenderness left greater than right) present. No swelling. Normal range of motion. Cervical back: Normal range of motion and neck supple. Right lower leg: No edema. Left lower leg: No edema. Skin:     General: Skin is warm and dry. Capillary Refill: Capillary refill takes less than 2 seconds. Neurological:      General: No focal deficit present. Mental Status: She is alert. Psychiatric:         Mood and Affect: Mood normal.           DIAGNOSTIC RESULTS     EKG:(none if blank)  All EKGs are interpreted by the Emergency Department Physician who either signs or Co-signs this chart in the absence of a cardiologist.         RADIOLOGY: (none if blank)   I directly visualized the following images and reviewed the radiologist interpretations. Interpretation per the Radiologist below, if available at the time of this note:  CT ABDOMEN PELVIS WO CONTRAST Additional Contrast? None    (Results Pending)   Stat rad read shows no radiopaque renal or ureteral calculi. No acute findings. Postsurgical changes from prior bilateral hernia repairs.     LABS:  Labs Reviewed   CBC WITH AUTO DIFFERENTIAL - Abnormal; Notable for the following components:       Result Value    WBC 12.5 (*)     Neutrophils Absolute 8.7 (*)     All other components within normal limits   COMPREHENSIVE METABOLIC PANEL - Abnormal; Notable for the following components:    Glucose 235 (*)     CREATININE 1.08 (*)     GFR Non- 52.9 (*)     Alkaline Phosphatase 134 (*)     All other components within normal limits   URINE RT REFLEX TO CULTURE - Abnormal; Notable for the following components:    Glucose, Ur 250 (*)     All other components within normal limits       All other labs were within normal range or not returned as of this dictation. Please note, any cultures that may have been sent were not resulted at the time of this patient visit. EMERGENCY DEPARTMENT COURSE and Medical Decision Making:     Vitals:    Vitals:    07/13/21 0340 07/13/21 0345   BP:  127/79   Pulse: 67    Resp: 16    Temp: 97.6 °F (36.4 °C)    TempSrc: Oral    SpO2: 97%    Weight: 160 lb (72.6 kg)    Height: 5' 8\" (1.727 m)        PROCEDURES: (None if blank)  Procedures       MDM     Patient states she still having pain even after the IV morphine. Patient CT scan is negative for kidney stones. From my exam, most of her pain was in the lower lumbar spine. Denies any injury or trauma. Will treat with muscle relaxer. She already received morphine and states that her pain is only slightly better. No other acute findings on the CT scan. Labs look unremarkable. Vitals are stable. After reevaluating the patient, patient states that her pain is completely gone after the Norflex. We will send her home with oral dose of Norflex and Norco for only 3 days. Patient is agreeable to the plan of care will be discharged in stable condition.     Strict return precautions and follow up instructions were discussed with the patient with which the patient agrees    ED Medications administered this visit:    Medications   0.9 % sodium chloride bolus (1,000 mLs Intravenous New Bag 7/13/21 0430)   ondansetron (ZOFRAN) injection 4 mg (4 mg Intravenous Given 7/13/21 0455)   morphine (PF) injection 4 mg (4 mg Intravenous Given 7/13/21 0455)   orphenadrine (NORFLEX) injection 60 mg (60 mg Intramuscular Given 7/13/21 0557)         FINAL IMPRESSION      1. Lumbar pain          DISPOSITION/PLAN   DISPOSITION Decision To Discharge 07/13/2021 06:36:26 AM      PATIENT REFERRED TO:  No follow-up provider specified. DISCHARGE MEDICATIONS:  New Prescriptions    HYDROCODONE-ACETAMINOPHEN (NORCO) 5-325 MG PER TABLET    Take 1 tablet by mouth every 6 hours as needed for Pain for up to 3 days. Intended supply: 3 days.  Take lowest dose possible to manage pain    ORPHENADRINE (NORFLEX) 100 MG EXTENDED RELEASE TABLET    Take 1 tablet by mouth 2 times daily for 10 days              Bg Osullivan DO (electronically signed)  Attending Physician, Emergency Department          Bg Osullivan, [place]  07/13/21 5547

## 2021-07-13 NOTE — ED TRIAGE NOTES
Arrived to the ER for left flank pain. States began having left flank pain at approx 2000 last night. Reports history of kidney stone with last lithotripsy in 5/21 with Dr Dima Pimentel. Denies urgency, frequency, dysuria, or decrease in urination, fever or chills.

## 2021-07-13 NOTE — DISCHARGE INSTR - COC
Continuity of Care Form    Patient Name: Viky Castro   :  1965  MRN:  [id number]    Admit date:  2021  Discharge date:  ***    Code Status Order: Prior   Advance Directives:     Admitting Physician:  No admitting provider for patient encounter.   PCP: Naomy Barraza MD    Discharging Nurse: [facility] Unit/Room#:   Discharging Unit Phone Number: ***    Emergency Contact:   Extended Emergency Contact Information  Primary Emergency Contact: Dawson Gillis of [facility] Phone: [phone]  Work Phone: [phone]  Mobile Phone: [phone]  Relation: Child    Past Surgical History:  Past Surgical History:   Procedure Laterality Date    APPENDECTOMY      ARTHRODESIS Left 2019    ARTHRODESIS OF THE FIRST METATARSAL PHALANGEAL JOINT LEFT FOOT performed by Brittny Vanegas DPM at [address] [facility] N/A 2021    CYSTOSCOPY RIGHT DOUBLE J STENT PLACEMENT. performed by Mary Grace Dean MD at [address] [facility]      COLONOSCOPY      FOOT CLOSED REDUCTION Left 1/3/2017    LEFT FOOT PERCUTANEOUS PINNING [address] performed by Marry Moreno MD at [address] [facility] LITHOTRIPSY Right 2021    RIGHT ESWL performed by Mary Grace Dean MD at [address] [place] [address] ESOPHAGOGASTRODUODENOSCOPY TRANSORAL DIAGNOSTIC N/A 3/8/2017    EGD ESOPHAGOGASTRODUODENOSCOPY WITH DILATION performed by Valdemar Blount MD at . [place] [address] ESOPHAGOGASTRODUODENOSCOPY TRANSORAL DIAGNOSTIC N/A 2018    EGD ESOPHAGOGASTRODUODENOSCOPY WITH DILATION performed by Valdemar Blount MD at [address] [place] [address]         Immunization History:   Immunization History   Administered Date(s) Administered    Hepatitis B (Recombivax HB) 10/18/1999, 1999, 2000    Influenza Vaccine, unspecified formulation 08/29/2016    Influenza Virus Vaccine 10/21/2004, 09/27/2014, 09/26/2015    PPD Test 02/16/2005    Pneumococcal Conjugate 7-valent (Prevnar7) 09/20/2014    Pneumococcal Polysaccharide (Akhxzrcai01) 09/15/2014    Td, unspecified formulation 10/18/1999, 02/16/2005, 08/06/2015    Tdap (Boostrix, Adacel) 07/12/2017       Active Problems:  Patient Active Problem List   Diagnosis Code    DM (diabetes mellitus) ([facility] [address] E11.9    Hyperlipemia E78.5    Hypothyroid E03.9    Vitamin D deficiency E55.9    SLE (systemic lupus erythematosus related syndrome) ([facility]) M32.9    Asthma J45.909    Panic disorder with agoraphobia F40.01    Hereditary essential tremor G25.0    Allergic rhinitis J30.9    Type 2 diabetes mellitus with diabetic mononeuropathy ([facility]) E11.41    Raynaud's phenomenon without gangrene I73.00    Essential hypertension I10    Type 2 diabetes mellitus without complication ([facility]) G62.6    Disseminated lupus erythematosus ([facility]) M32.9    Foot fracture, left S92.902A    Acute non-recurrent frontal sinusitis J01.10    Gastritis without bleeding K29.70    Dysphagia R13.10    Gastric polyp K31.7    Chest pain R07.9    Renal stone N20.0    Right ureteral calculus N20.1       Isolation/Infection:   Isolation          No Isolation        Patient Infection Status     Infection Onset Added Last Indicated Last Indicated By Review Planned Expiration Resolved Resolved By    None active    Resolved    COVID-19 Rule Out 06/29/20 06/29/20 06/29/20 COVID-19 (Ordered)   06/29/20 Rule-Out Test Resulted    COVID-19 Rule Out 06/28/20 06/28/20 06/28/20 COVID-19 (Ordered)   06/28/20 Rule-Out Test Resulted          Nurse Assessment:  Last Vital Signs: /79   Pulse 67   Temp 97.6 °F (36.4 °C) (Oral)   Resp 16   Ht 5' 8\" (1.727 m)   Wt 160 lb (72.6 kg)   LMP  (LMP Unknown)   SpO2 97%   BMI 24.33 kg/m²     Last documented pain score (0-10 scale): Pain Level: 9  Last Weight:   Wt Readings from Last Assessment Score:  Readmission Risk              Risk of Unplanned Readmission:  0           Discharging to Facility/ Agency   · Name:   · Address:  · Phone:  · Fax:    Dialysis Facility (if applicable)   · Name:  · Address:  · Dialysis Schedule:  · Phone:  · Fax:    / signature: {Esignature:[id number]}    PHYSICIAN SECTION    Prognosis: {Prognosis:8536648133}    Condition at Discharge: [facility] Patient Condition:[id number]}    Rehab Potential (if transferring to Rehab): {Prognosis:4370681888}    Recommended Labs or Other Treatments After Discharge: ***    Physician Certification: I certify the above information and transfer of Yassine Olvera  is necessary for the continuing treatment of the diagnosis listed and that she requires {Admit to Appropriate Level of Care:11712} for {GREATER/LESS:044820418} 30 days.      Update Admission H&P: {CHP DME Changes in SVDYB:035457255}    PHYSICIAN SIGNATURE:  {Esignature:[id number]}

## 2021-07-30 ENCOUNTER — HOSPITAL ENCOUNTER (EMERGENCY)
Age: 56
Discharge: HOME | End: 2021-07-30
Attending: EMERGENCY MEDICINE
Payer: MEDICARE

## 2021-07-30 VITALS
OXYGEN SATURATION: 99 % | TEMPERATURE: 98 F | SYSTOLIC BLOOD PRESSURE: 115 MMHG | RESPIRATION RATE: 18 BRPM | DIASTOLIC BLOOD PRESSURE: 76 MMHG | WEIGHT: 160 LBS | HEART RATE: 68 BPM | BODY MASS INDEX: 24.25 KG/M2 | HEIGHT: 68 IN

## 2021-07-30 DIAGNOSIS — R10.13 ABDOMINAL PAIN, EPIGASTRIC: Primary | ICD-10-CM

## 2021-07-30 LAB
ALBUMIN SERPL-MCNC: 4.3 G/DL (ref 3.5–4.6)
ALP BLD-CCNC: 171 U/L (ref 40–130)
ALT SERPL-CCNC: 18 U/L (ref 0–33)
ANION GAP SERPL CALCULATED.3IONS-SCNC: 13 MEQ/L (ref 9–15)
AST SERPL-CCNC: 16 U/L (ref 0–35)
BACTERIA: NEGATIVE /HPF
BASOPHILS ABSOLUTE: 0 K/UL (ref 0–0.2)
BASOPHILS RELATIVE PERCENT: 0.2 %
BILIRUB SERPL-MCNC: <0.2 MG/DL (ref 0.2–0.7)
BILIRUBIN URINE: NEGATIVE
BLOOD, URINE: NEGATIVE
BUN BLDV-MCNC: 11 MG/DL (ref 6–20)
CALCIUM SERPL-MCNC: 9.1 MG/DL (ref 8.5–9.9)
CHLORIDE BLD-SCNC: 102 MEQ/L (ref 95–107)
CLARITY: CLEAR
CO2: 24 MEQ/L (ref 20–31)
COLOR: YELLOW
CREAT SERPL-MCNC: 1.03 MG/DL (ref 0.5–0.9)
EOSINOPHILS ABSOLUTE: 0.4 K/UL (ref 0–0.7)
EOSINOPHILS RELATIVE PERCENT: 2.7 %
EPITHELIAL CELLS, UA: ABNORMAL /HPF (ref 0–5)
GFR AFRICAN AMERICAN: >60
GFR NON-AFRICAN AMERICAN: 55.9
GLOBULIN: 2.9 G/DL (ref 2.3–3.5)
GLUCOSE BLD-MCNC: 144 MG/DL (ref 70–99)
GLUCOSE URINE: NEGATIVE MG/DL
HCT VFR BLD CALC: 44.7 % (ref 37–47)
HEMOGLOBIN: 14.9 G/DL (ref 12–16)
HYALINE CASTS: ABNORMAL /HPF (ref 0–5)
KETONES, URINE: NEGATIVE MG/DL
LEUKOCYTE ESTERASE, URINE: ABNORMAL
LIPASE: 33 U/L (ref 12–95)
LYMPHOCYTES ABSOLUTE: 2.9 K/UL (ref 1–4.8)
LYMPHOCYTES RELATIVE PERCENT: 22.7 %
MCH RBC QN AUTO: 28.7 PG (ref 27–31.3)
MCHC RBC AUTO-ENTMCNC: 33.3 % (ref 33–37)
MCV RBC AUTO: 86.4 FL (ref 82–100)
MONOCYTES ABSOLUTE: 0.8 K/UL (ref 0.2–0.8)
MONOCYTES RELATIVE PERCENT: 6.2 %
NEUTROPHILS ABSOLUTE: 8.8 K/UL (ref 1.4–6.5)
NEUTROPHILS RELATIVE PERCENT: 68.2 %
NITRITE, URINE: NEGATIVE
PDW BLD-RTO: 13.8 % (ref 11.5–14.5)
PH UA: 5.5 (ref 5–9)
PLATELET # BLD: 364 K/UL (ref 130–400)
POTASSIUM SERPL-SCNC: 4.1 MEQ/L (ref 3.4–4.9)
PROTEIN UA: NEGATIVE MG/DL
RBC # BLD: 5.17 M/UL (ref 4.2–5.4)
RBC UA: ABNORMAL /HPF (ref 0–5)
SODIUM BLD-SCNC: 139 MEQ/L (ref 135–144)
SPECIFIC GRAVITY UA: 1.01 (ref 1–1.03)
TOTAL PROTEIN: 7.2 G/DL (ref 6.3–8)
URINE REFLEX TO CULTURE: ABNORMAL
UROBILINOGEN, URINE: 0.2 E.U./DL
WBC # BLD: 12.9 K/UL (ref 4.8–10.8)
WBC UA: ABNORMAL /HPF (ref 0–5)

## 2021-07-30 PROCEDURE — 96374 THER/PROPH/DIAG INJ IV PUSH: CPT

## 2021-07-30 PROCEDURE — 80053 COMPREHEN METABOLIC PANEL: CPT

## 2021-07-30 PROCEDURE — 6370000000 HC RX 637 (ALT 250 FOR IP): Performed by: EMERGENCY MEDICINE

## 2021-07-30 PROCEDURE — 96375 TX/PRO/DX INJ NEW DRUG ADDON: CPT

## 2021-07-30 PROCEDURE — 6360000002 HC RX W HCPCS: Performed by: EMERGENCY MEDICINE

## 2021-07-30 PROCEDURE — 85025 COMPLETE CBC W/AUTO DIFF WBC: CPT

## 2021-07-30 PROCEDURE — 99283 EMERGENCY DEPT VISIT LOW MDM: CPT

## 2021-07-30 PROCEDURE — 83690 ASSAY OF LIPASE: CPT

## 2021-07-30 PROCEDURE — 36415 COLL VENOUS BLD VENIPUNCTURE: CPT

## 2021-07-30 PROCEDURE — 81001 URINALYSIS AUTO W/SCOPE: CPT

## 2021-07-30 RX ORDER — ONDANSETRON 2 MG/ML
4 INJECTION INTRAMUSCULAR; INTRAVENOUS ONCE
Status: COMPLETED | OUTPATIENT
Start: 2021-07-30 | End: 2021-07-30

## 2021-07-30 RX ORDER — FENTANYL CITRATE 50 UG/ML
50 INJECTION, SOLUTION INTRAMUSCULAR; INTRAVENOUS ONCE
Status: COMPLETED | OUTPATIENT
Start: 2021-07-30 | End: 2021-07-30

## 2021-07-30 RX ADMIN — ONDANSETRON 4 MG: 2 INJECTION INTRAMUSCULAR; INTRAVENOUS at 06:02

## 2021-07-30 RX ADMIN — LIDOCAINE HYDROCHLORIDE: 20 SOLUTION ORAL; TOPICAL at 05:44

## 2021-07-30 RX ADMIN — FENTANYL CITRATE 50 MCG: 50 INJECTION, SOLUTION INTRAMUSCULAR; INTRAVENOUS at 06:02

## 2021-07-30 ASSESSMENT — PAIN DESCRIPTION - PAIN TYPE: TYPE: ACUTE PAIN

## 2021-07-30 ASSESSMENT — PAIN DESCRIPTION - ORIENTATION: ORIENTATION: MID

## 2021-07-30 ASSESSMENT — ENCOUNTER SYMPTOMS: ABDOMINAL PAIN: 1

## 2021-07-30 ASSESSMENT — PAIN SCALES - GENERAL
PAINLEVEL_OUTOF10: 7
PAINLEVEL_OUTOF10: 9

## 2021-07-30 ASSESSMENT — PAIN DESCRIPTION - LOCATION: LOCATION: ABDOMEN

## 2021-07-30 NOTE — ED PROVIDER NOTES
[address] [facility] ED  EMERGENCY MEDICINE     Pt Name: Germain Chambers  MRN: [id number]  [place] 1965  Date of evaluation: 7/30/2021  PCP:    Cande Olmedo MD  Provider: Nino Pfeiffer, [address] [place] [address]       Chief Complaint   Patient presents with    Abdominal Pain       HISTORY OF PRESENT ILLNESS    HPI     26-year-old female presents to the emergency department abdominal pain. Patient has been here multiple times in the past month for the same pain. She states that it started about an hour ago. She usually takes Protonix for her stomach pain but states that she has not been taking it. She denies any chest pain shortness of breath nausea vomiting diarrhea. Denies fevers and chills. Triage notes and Nursing notes were reviewed by myself. Any discrepancies are addressed above.     PAST MEDICAL HISTORY     Past Medical History:   Diagnosis Date    Anxiety     Asthma     as child    Depression     Depression     dysthymia - following lupus dx age [address]    Diabetes mellitus ([facility]     Foot fracture, left     GERD (gastroesophageal reflux disease)     Hyperlipidemia     Hypothyroidism     Kidney stones     Lupus ([facility]     dr barrera -     Migraine     PONV (postoperative nausea and vomiting)     Renal stone 4/25/2021    Sciatica     left sided       SURGICAL HISTORY       Past Surgical History:   Procedure Laterality Date    APPENDECTOMY      ARTHRODESIS Left 11/26/2019    ARTHRODESIS OF THE FIRST METATARSAL PHALANGEAL JOINT LEFT FOOT performed by Octaviano Muro DPM at [address] [facility] N/A 4/26/2021    CYSTOSCOPY RIGHT DOUBLE J STENT PLACEMENT. performed by Donato Burrows MD at [place] [address]      COLONOSCOPY      FOOT CLOSED REDUCTION Left 1/3/2017    LEFT FOOT PERCUTANEOUS PINNING MOTA FRACTURE performed by Woodrow Connors MD at [address] [place] [address] [place] LITHOTRIPSY Right 5/5/2021    RIGHT ESWL performed by Mary Grace Dean MD at [address] ESOPHAGOGASTRODUODENOSCOPY TRANSORAL DIAGNOSTIC N/A 3/8/2017    EGD ESOPHAGOGASTRODUODENOSCOPY WITH DILATION performed by Valdemar Blount MD at [address] ESOPHAGOGASTRODUODENOSCOPY TRANSORAL DIAGNOSTIC N/A 11/7/2018    EGD ESOPHAGOGASTRODUODENOSCOPY WITH DILATION performed by Valdemar Blount MD at [facility]       Discharge Medication List as of 7/30/2021  5:55 AM      CONTINUE these medications which have NOT CHANGED    Details   tamsulosin (FLOMAX) 0.4 MG capsule Take 1 capsule by mouth daily, Disp-10 capsule, R-0Normal      !! Insulin Pen Needle (PEN NEEDLES) 33G X 4 MM MISC 1 each by Does not apply route three times daily, Disp-100 each, R-2Normal      ondansetron (ZOFRAN-ODT) 4 MG disintegrating tablet Take 1 tablet by mouth 3 times daily as needed for Nausea or Vomiting, Disp-21 tablet, R-0Print      !! blood glucose monitor strips Pt test 3x daily Dx E11.65, Disp-100 strip,R-3, Normal      !! Drug [place] [employer] Lancets 30G MISC Disp-200 each,R-3, NormalCheck blood sugars twice daily or as directed. insulin aspart (NOVOLOG FLEXPEN) 100 UNIT/ML injection pen Inject 35 units tid with meals, Disp-15 pen,R-3Normal      !! Insulin Pen Needle (NOVOFINE) 32G X 6 MM MISC Disp-300 each,R-3, Normalqid      !! blood glucose monitor kit and supplies Please give 1 meter covered by insurance Dx E11.65, Disp-1 kit, R-0, Normal      !! Lancets MISC Disp-100 each, R-3, NormalPt test 3x daily Dx E11.65      !! blood glucose test strips (ASCENSIA AUTODISC VI;ONE TOUCH ULTRA TEST VI) strip Disp-200 strip, R-3, NormalCheck blood sugars twice daily or as directed.       !! blood glucose monitor kit and supplies Test 3 times a day & as needed for symptoms of irregular blood glucose., Disp-1 kit, R-0, Normal      !! Insulin Pen Needle (NOVOFINE) 32G X 6 MM MISC Disp-100 each, R-3, NormalBID      estradiol (CLIMARA) 0.025 MG/24HR Place 0.025 patches onto the skin every 7 days On mondays, R-11Historical Med      albuterol sulfate HFA (VENTOLIN HFA) 108 (90 Base) MCG/ACT inhaler Inhale 2 puffs into the lungs every 6 hours as needed for Wheezing, Disp-1 Inhaler, R-0Print      rosuvastatin (CRESTOR) 40 MG tablet Take 1 tablet by mouth daily, Disp-90 tablet, R-3Normal      pantoprazole (PROTONIX) 40 MG tablet Take 1 tablet by mouth daily, Disp-90 tablet, R-3Normal      levothyroxine (SYNTHROID) 50 MCG tablet Take 1 tablet by mouth daily, Disp-90 tablet, R-3      !! Blood Glucose Monitoring Suppl (TRUE TRACK BLOOD GLUCOSE) CAROLE Disp-100 Device, R-5, NormalCheck blood sugars twice daily or as directed. hydroxychloroquine (PLAQUENIL) 200 MG tablet Take 1 tablet by mouth 2 times daily, R-5       !! - Potential duplicate medications found. Please discuss with provider.           ALLERGIES       Allergies   Allergen Reactions    Aspirin Swelling     Bloody noses    Cephalosporins     Imitrex [Sumatriptan] Hives    Pcn [Penicillins] Swelling    Seasonal Itching     Itch, sneezing,runny nose, watery eyes    Toradol [Ketorolac Tromethamine] Hives     Shortness of breath    Ultram [Tramadol] Hives       FAMILY HISTORY       Family History   Adopted: Yes        SOCIAL HISTORY       Social History     Socioeconomic History    Marital status: Legally      Spouse name: Not on file    Number of children: Not on file    Years of education: Not on file    Highest education level: Not on file   Occupational History    Not on file   Tobacco Use    Smoking status: Never Smoker    Smokeless tobacco: Never Used   Vaping Use    Vaping Use: Never used   Substance and Sexual Activity    Alcohol use: No     Alcohol/week: 0.0 standard drinks     Comment: denies    Drug use: No     Comment: denies    Sexual activity: Not on file Other Topics Concern    Not on file   Social History Narrative    Not on file     Social Determinants of Health     Financial Resource Strain:     Difficulty of Paying Living Expenses:    Food Insecurity:     Worried About Running Out of Food in the Last Year:     [address] in the Last Year:    Transportation Needs:     Lack of Transportation (Medical):  Lack of Transportation (Non-Medical):    Physical Activity:     Days of Exercise per Week:     Minutes of Exercise per Session:    Stress:     Feeling of Stress :    Social Connections:     Frequency of Communication with Friends and Family:     Frequency of Social Gatherings with Friends and Family:     Attends Yazdanism Services:     Active Member of Clubs or Organizations:     Attends Club or Organization Meetings:     Marital Status:    Intimate Partner Violence:     Fear of Current or Ex-Partner:     Emotionally Abused:     Physically Abused:     Sexually Abused:        REVIEW OF SYSTEMS     Review of Systems   Gastrointestinal: Positive for abdominal pain. Except as noted above the remainder of the review of systems was reviewed and is negative. SCREENINGS                        PHYSICAL EXAM    (up to 7 for level 4, 8 or more for level 5)     ED Triage Vitals [07/30/21 0408]   BP Temp Temp Source Pulse Resp SpO2 Height Weight   127/78 98 °F (36.7 °C) Oral 85 16 95 % 5' 8\" (1.727 m) 160 lb (72.6 kg)       Physical Exam  Constitutional:       General: She is not in acute distress. Appearance: Normal appearance. She is normal weight. HENT:      Right Ear: External ear normal.      Left Ear: External ear normal.      Nose: Nose normal. No congestion or rhinorrhea. Mouth/Throat:      Mouth: Mucous membranes are moist.      Pharynx: Oropharynx is clear. Eyes:      General:         Right eye: No discharge. Left eye: No discharge.       Conjunctiva/sclera: Conjunctivae normal.      Pupils: Pupils are equal, sent were not resulted at the time of this patient visit. EMERGENCY DEPARTMENT COURSE and Medical Decision Making:     Vitals:    Vitals:    07/30/21 0408 07/30/21 0430 07/30/21 0500 07/30/21 0530   BP: 127/78 118/69 120/80 115/76   Pulse: 85   68   Resp: 16   18   Temp: 98 °F (36.7 °C)      TempSrc: Oral      SpO2: 95%  96% 99%   Weight: 160 lb (72.6 kg)      Height: 5' 8\" (1.727 m)          PROCEDURES: (None if blank)  Procedures       MDM     After chart review, patient was seen in the emergency department 3 times. Her last CT scan was done on 7/13/2021 which was normal.  Patient's lab work today is unremarkable including urinalysis, CBC, CMP. Patient got a GI cocktail and reassessed afterwards. Strict return precautions and follow up instructions were discussed with the patient with which the patient agrees    ED Medications administered this visit:    Medications   aluminum & magnesium hydroxide-simethicone (MAALOX) 30 mL, lidocaine viscous hcl (XYLOCAINE) 5 mL (GI COCKTAIL) ( Oral Given 7/30/21 0544)   ondansetron (ZOFRAN) injection 4 mg (4 mg Intravenous Given 7/30/21 0602)   fentaNYL (SUBLIMAZE) injection 50 mcg (50 mcg Intravenous Given 7/30/21 0602)         FINAL IMPRESSION      1.  Abdominal pain, epigastric          DISPOSITION/PLAN   DISPOSITION Decision To Discharge 07/30/2021 05:54:55 AM      PATIENT REFERRED TO:  Yu Cruz MD  [address] [place] [id number]  Salvador Ferrari  [address] [place] [address] [phone]            DISCHARGE MEDICATIONS:  Discharge Medication List as of 7/30/2021  5:55 AM                 Jay Titus DO (electronically signed)  Attending Physician, Emergency Department          [facility]  07/30/21 2023

## 2021-08-04 ENCOUNTER — OFFICE VISIT (OUTPATIENT)
Dept: GASTROENTEROLOGY | Age: 56
End: 2021-08-04
Payer: MEDICARE

## 2021-08-04 VITALS
SYSTOLIC BLOOD PRESSURE: 120 MMHG | WEIGHT: 180 LBS | DIASTOLIC BLOOD PRESSURE: 74 MMHG | BODY MASS INDEX: 27.37 KG/M2 | OXYGEN SATURATION: 98 % | RESPIRATION RATE: 12 BRPM | HEART RATE: 82 BPM

## 2021-08-04 DIAGNOSIS — E11.69 TYPE 2 DIABETES MELLITUS WITH OTHER SPECIFIED COMPLICATION, UNSPECIFIED WHETHER LONG TERM INSULIN USE (HCC): ICD-10-CM

## 2021-08-04 DIAGNOSIS — R10.13 EPIGASTRIC PAIN: Primary | ICD-10-CM

## 2021-08-04 DIAGNOSIS — R13.19 ESOPHAGEAL DYSPHAGIA: ICD-10-CM

## 2021-08-04 PROCEDURE — 99204 OFFICE O/P NEW MOD 45 MIN: CPT | Performed by: NURSE PRACTITIONER

## 2021-08-04 PROCEDURE — G8419 CALC BMI OUT NRM PARAM NOF/U: HCPCS | Performed by: NURSE PRACTITIONER

## 2021-08-04 PROCEDURE — 3017F COLORECTAL CA SCREEN DOC REV: CPT | Performed by: NURSE PRACTITIONER

## 2021-08-04 PROCEDURE — 1036F TOBACCO NON-USER: CPT | Performed by: NURSE PRACTITIONER

## 2021-08-04 PROCEDURE — G8427 DOCREV CUR MEDS BY ELIG CLIN: HCPCS | Performed by: NURSE PRACTITIONER

## 2021-08-04 RX ORDER — DULOXETIN HYDROCHLORIDE 30 MG/1
CAPSULE, DELAYED RELEASE ORAL
COMMUNITY
Start: 2021-07-07 | End: 2022-06-09 | Stop reason: ALTCHOICE

## 2021-08-04 RX ORDER — PEN NEEDLE, DIABETIC 33 GX5/32"
1 NEEDLE, DISPOSABLE MISCELLANEOUS 3 TIMES DAILY
Qty: 100 EACH | Refills: 2 | Status: SHIPPED | OUTPATIENT
Start: 2021-08-04 | End: 2021-11-11 | Stop reason: SDUPTHER

## 2021-08-04 RX ORDER — ESOMEPRAZOLE MAGNESIUM 40 MG/1
40 CAPSULE, DELAYED RELEASE ORAL DAILY
Qty: 30 CAPSULE | Refills: 3 | Status: SHIPPED | OUTPATIENT
Start: 2021-08-04

## 2021-08-04 ASSESSMENT — ENCOUNTER SYMPTOMS
SHORTNESS OF BREATH: 0
WHEEZING: 0
NAUSEA: 0
DIARRHEA: 0
ABDOMINAL PAIN: 1
BLOOD IN STOOL: 0
CONSTIPATION: 0
ANAL BLEEDING: 0
TROUBLE SWALLOWING: 1
EYE REDNESS: 0
PHOTOPHOBIA: 0
RECTAL PAIN: 0
VOMITING: 0
CHEST TIGHTNESS: 0
VOICE CHANGE: 0
ABDOMINAL DISTENTION: 0
EYE PAIN: 0
COLOR CHANGE: 0

## 2021-08-04 NOTE — TELEPHONE ENCOUNTER
Patient requesting medication refill.  Please approve or deny this request.    Rx requested:  Requested Prescriptions     Pending Prescriptions Disp Refills    Insulin Pen Needle (PEN NEEDLES) 33G X 4 MM MISC 100 each 2     Si each by Does not apply route three times daily         Last Office Visit:   2021      Next Visit Date:  Future Appointments   Date Time Provider Kings Mcadams   2021 10:15 AM Jose Manuel Mccormack  [address]   2021  8:30 AM Dony Coates

## 2021-08-04 NOTE — PROGRESS NOTES
Subjective:      Patient ID: Caleb Ridley is a 50 y.o. female who presents today for:  Chief Complaint   Patient presents with    Follow-Up from Hospital    Abdominal Pain    Dysphagia       HPI   Patient came in as follow-up and further evaluation of abdominal pain and dysphagia, was previously seen and under the care of Dr. Wilmer Tucker. Reports intermittent history of abdominal pain and dysphagia with previous EGD notable for retained gastric contents in the context of poorly controlled diabetes and esophageal stricturing, which required dilation with #20 savary dilator. Currently has intermittent dysphagia to liquids and solids, no trouble initiating a swallow, has been on a PPI, has increased epigastric pain and heartburn. No nausea or vomiting, hematemesis, melena, or hematochezia.      Past Medical History:   Diagnosis Date    Anxiety     Asthma     as child    Depression     Depression     dysthymia - following lupus dx age 23    Diabetes mellitus ([address]     Foot fracture, left     GERD (gastroesophageal reflux disease)     Hyperlipidemia     Hypothyroidism     Kidney stones     Lupus ([address]      diab -     Migraine     PONV (postoperative nausea and vomiting)     Renal stone 4/25/2021    Sciatica     left sided     Past Surgical History:   Procedure Laterality Date    APPENDECTOMY      ARTHRODESIS Left 11/26/2019    ARTHRODESIS OF THE FIRST METATARSAL PHALANGEAL JOINT LEFT FOOT performed by Delta Almaguer DPM at [address] [facility] N/A 4/26/2021    CYSTOSCOPY RIGHT DOUBLE J STENT PLACEMENT. performed by Erin Wilson MD at [address] [place] [address] COLONOSCOPY      FOOT CLOSED REDUCTION Left 1/3/2017    LEFT FOOT PERCUTANEOUS PINNING Isa Lasso performed by Haylie Meadows MD at [address] [place] [address] [place] LITHOTRIPSY Right 5/5/2021    RIGHT ESWL performed by Erin Wilson MD at [facility]    MD ESOPHAGOGASTRODUODENOSCOPY TRANSORAL DIAGNOSTIC N/A 3/8/2017    EGD ESOPHAGOGASTRODUODENOSCOPY WITH DILATION performed by Trice Cail MD at [address] ESOPHAGOGASTRODUODENOSCOPY TRANSORAL DIAGNOSTIC N/A 11/7/2018    EGD ESOPHAGOGASTRODUODENOSCOPY WITH DILATION performed by Trice Cali MD at [facility] ENDOSCOPY       Social History     Socioeconomic History    Marital status: Legally      Spouse name: Not on file    Number of children: Not on file    Years of education: Not on file    Highest education level: Not on file   Occupational History    Not on file   Tobacco Use    Smoking status: Never Smoker    Smokeless tobacco: Never Used   Vaping Use    Vaping Use: Never used   Substance and Sexual Activity    Alcohol use: No     Alcohol/week: 0.0 standard drinks     Comment: denies    Drug use: No     Comment: denies    Sexual activity: Not on file   Other Topics Concern    Not on file   Social History Narrative    Not on file     Social Determinants of Health     Financial Resource Strain:     Difficulty of Paying Living Expenses:    Food Insecurity:     Worried About Running Out of Food in the Last Year:     Ran Out of Food in the Last Year:    Transportation Needs:     Lack of Transportation (Medical):      Lack of Transportation (Non-Medical):    Physical Activity:     Days of Exercise per Week:     Minutes of Exercise per Session:    Stress:     Feeling of Stress :    Social Connections:     Frequency of Communication with Friends and Family:     Frequency of Social Gatherings with Friends and Family:     Attends Taoist Services:     Active Member of Clubs or Organizations:     Attends Club or Organization Meetings:     Marital Status:    Intimate Partner Violence:     Fear of Current or Ex-Partner:     Emotionally Abused:     Physically Abused:     Sexually Abused:      Family History   Adopted: Yes     Allergies   Allergen Reactions    Aspirin Swelling     Bloody noses    Cephalosporins     Imitrex [Sumatriptan] Hives    Pcn [Penicillins] Swelling    Seasonal Itching     Itch, sneezing,runny nose, watery eyes    Toradol [Ketorolac Tromethamine] Hives     Shortness of breath    Ultram [Tramadol] Hives         Review of Systems   Constitutional: Negative for appetite change, chills, fever and unexpected weight change. HENT: Positive for trouble swallowing. Negative for nosebleeds, tinnitus and voice change. Eyes: Negative for photophobia, pain and redness. Respiratory: Negative for chest tightness, shortness of breath and wheezing. Cardiovascular: Negative for chest pain, palpitations and leg swelling. Gastrointestinal: Positive for abdominal pain. Negative for abdominal distention, anal bleeding, blood in stool, constipation, diarrhea, nausea, rectal pain and vomiting. Endocrine: Negative for polydipsia, polyphagia and polyuria. Genitourinary: Negative for difficulty urinating and hematuria. Skin: Negative for color change, pallor and rash. Neurological: Negative for dizziness, speech difficulty and headaches. Psychiatric/Behavioral: Negative for confusion and suicidal ideas. Objective:   /74 (Site: Left Upper Arm, Position: Sitting, Cuff Size: Small Adult)   Pulse 82   Resp 12   Wt 180 lb (81.6 kg)   LMP  (LMP Unknown)   SpO2 98%   BMI 27.37 kg/m²     Physical Exam  Vitals reviewed. Constitutional:       General: She is not in acute distress. Appearance: Normal appearance. She is well-developed and well-groomed. HENT:      Head: Normocephalic and atraumatic. Nose: Nose normal.   Eyes:      General: No scleral icterus. Extraocular Movements: Extraocular movements intact. Conjunctiva/sclera: Conjunctivae normal.      Pupils: Pupils are equal, round, and reactive to light.    Cardiovascular: Rate and Rhythm: Normal rate and regular rhythm. Pulses: Normal pulses. Heart sounds: Normal heart sounds. Pulmonary:      Effort: Pulmonary effort is normal. No respiratory distress. Breath sounds: Normal breath sounds. No wheezing or rales. Abdominal:      General: Abdomen is flat. Bowel sounds are normal. There is no distension. Palpations: Abdomen is soft. There is no hepatomegaly, splenomegaly or mass. Tenderness: There is no abdominal tenderness. There is no guarding or rebound. Musculoskeletal:         General: No tenderness or deformity. Normal range of motion. Cervical back: Neck supple. Right lower leg: No edema. Left lower leg: No edema. Skin:     General: Skin is warm and dry. Capillary Refill: Capillary refill takes less than 2 seconds. Coloration: Skin is not jaundiced. Findings: No erythema or rash. Neurological:      General: No focal deficit present. Mental Status: She is alert and oriented to person, place, and time. Psychiatric:         Mood and Affect: Mood normal.         Behavior: Behavior normal.         Laboratory, Pathology, Radiology reviewed in detail with relevantimportant investigations summarized below:  Lab Results   Component Value Date    WBC 12.9 07/30/2021    WBC 12.5 07/13/2021    WBC 10.8 06/10/2021    WBC 13.8 06/05/2021    WBC 10.3 04/26/2021    HGB 14.9 07/30/2021    HGB 13.5 07/13/2021    HGB 13.9 06/10/2021    HGB 14.7 06/05/2021    HGB 13.3 04/26/2021    HCT 44.7 07/30/2021    HCT 40.6 07/13/2021    HCT 40.4 06/10/2021    HCT 43.0 06/05/2021    HCT 40.6 04/26/2021    MCV 86.4 07/30/2021    MCV 86.9 07/13/2021    MCV 86.9 06/10/2021    MCV 87.1 06/05/2021    MCV 88.9 04/26/2021     07/30/2021     07/13/2021     06/10/2021     06/05/2021     04/26/2021    .   Lab Results   Component Value Date    ALT 18 07/30/2021    ALT 17 07/13/2021    ALT 10 06/10/2021    AST 16 07/30/2021    AST 16 07/13/2021    AST 13 06/10/2021    ALKPHOS 171 07/30/2021    ALKPHOS 134 07/13/2021    ALKPHOS 133 06/10/2021    BILITOT <0.2 07/30/2021    BILITOT <0.2 07/13/2021    BILITOT 0.4 06/10/2021       CT ABDOMEN PELVIS WO CONTRAST Additional Contrast? None    Result Date: 7/13/2021  EXAM:  CT ABDOMEN PELVIS WO CONTRAST History: Flank pain Technique: Multiple contiguous axial images were obtained of the abdomen and pelvis from an level of the lung bases through the ischial tuberosities without contrast. Multiplanar reformats were obtained. Comparison: CT abdomen pelvis Shweta 10, 2021 Findings: Lung bases are clear. Lack of intravenous contrast precludes optimal evaluation of the abdominal and pelvic viscera. Liver is mildly enlarged measuring approximately 19.5 cm in craniocaudal length. The spleen, stomach, pancreas, and adrenal glands are within normal limits. The gallbladder is surgically absent. The unenhanced kidneys are within normal limits. No urinary tract calculi or hydronephrosis. Urinary bladder is well distended. The uterus is surgically absent. Abdominal aorta is nonaneurysmal  . No retroperitoneal or abdominal/pelvic lymphadenopathy. No small bowel obstruction. No overt colonic mass or pericolonic inflammation. Appendix is within normal limits. No free fluid, loculated fluid collection, or pneumoperitoneum. Evidence of bilateral inguinal hernia repair. No acute osseous abnormality. No acute intra-abdominal process. Mild hepatomegaly. All CT scans at this facility use dose modulation, iterative reconstruction, and/or weight based dosing when appropriate to reduce radiation dose to as low as reasonably achievable.     No results found for: IRON, TIBC, FERRITIN  Lab Results   Component Value Date    INR 0.9 05/03/2018    INR 3.6 12/16/2016     No components found for: ACUTEHEPATITISSCREEN  No components found for: CELIACPANEL  No components found for: STOOLCULTURE, C.DIFF, STOOLOVAPARASITE, STOOLLEUCOCYTE        Assessment:       Diagnosis Orders   1. Epigastric pain  EGD    esomeprazole (NEXIUM) 40 MG delayed release capsule   2. Esophageal dysphagia                 No orders of the defined types were placed in this encounter. Orders Placed This Encounter   Medications    esomeprazole (NEXIUM) 40 MG delayed release capsule     Sig: Take 1 capsule by mouth daily     Dispense:  30 capsule     Refill:  3     EGD 2018 Dr Yesika Bellamy   Normal esophagus, dilation was performed with #20 savory dilator  Retained gastric contents  Erythematous gastric mucosa  Bx:  GASTRIC POLYP-   FEATURES COMPATIBLE WITH GASTRIC FUNDIC GLAND POLYP. IMMUNOHISTOCHEMICAL STAIN WITH APPROPRIATE CONTROL IS NEGATIVE FOR   HELICOBACTER ORGANISMS. Plan:  1. Epigastric pain, esophageal dysphagia  Intermittent history of epigastric pain and esophageal dysphagia for several years, previous EGD notable for retained gastric contents in the context of poorly controlled diabetes-did not have gastric emptying study and also noted normal esophagus, with dilation with #20 savary dilator. Currently has intermittent dysphagia to liquids and solids, no trouble initiating a swallow, has been on a PPI, has increased epigastric pain and heartburn. No nausea or vomiting, hematemesis, melena, or hematochezia. Likely a component of gastroparesis given her history of poorly controlled diabetes, last hemoglobin A1c was 8.2, in addition there is a possibility of esophagitis versus esophageal stricture.     -Recommend EGD to assess for any mucosal or luminal etiologies, indications and outcomes discussed at length, all of her questions were answered and she agrees to proceed at the next available appointment, patient was instructed to maintain a clear liquid diet for 24 hours prior to her EGD  = Advised on the correct dose and timing of the PPI, 20 to 30 min before breakfast, -Switch PPI to Nexium 40 mg once daily 30 minutes in the a.m. before meals  = Pathophysiology and etiology of reflux discussed at length  = Lifestyle modification recommended and discussed with the patient   --Avoid spicy and acidic based foods   --Limit coffee, tea, alcohol use   --Limit the amount of food during meal time   --Avoid bedtime snacks and eat meals 3 to 4 hrs before lying down   --Elevate the head of the bed    --Keep healthy weight    -Gastroparesis diet  Discussed at length with patient that dietary modification is considered first-line therapy in patients with mild gastroparesis, although in clinical practice it is associated with only a modest improvement in symptoms. Foods that are fatty, acidic, spicy, and roughage-based increase overall symptoms in individuals with gastroparesis, diet should be low in fat and in non-digestible (insoluble) fiber; in general, soluble fiber.   -Optimize glycemic control -- last HgbA1c 8.5  Diabetes mellitus is a common cause of delayed gastric emptying. In patients with diabetes, incretin-based therapies such as pramlintide (amylin analog) and GLP1 analogues or agonists should be avoided as they can delay gastric emptying  -Prokinetics -- will consider if patient fails dietary modifications  Pharmacologic therapy is necessary for patients who continue to have symptoms of gastroparesis despite dietary modification. Prokinetics increase the rate of gastric emptying and should be administered 10 to 15 minutes before meals with an additional dose before bedtime in patients with persistent symptoms. 2.  Associated medical conditions include but are not limited to diabetes, hyperlipidemia, hypothyroidism, lupus, asthma, hypertension, rainouts, kidney stones. Return in about 4 weeks (around 9/1/2021), or if symptoms worsen or fail to improve, for post procedure results, EGD.       CANDY Huerta - CNP

## 2021-08-05 ENCOUNTER — HOSPITAL ENCOUNTER (EMERGENCY)
Age: 56
Discharge: HOME | End: 2021-08-05
Payer: MEDICARE

## 2021-08-05 VITALS
BODY MASS INDEX: 24.25 KG/M2 | OXYGEN SATURATION: 97 % | SYSTOLIC BLOOD PRESSURE: 103 MMHG | HEIGHT: 68 IN | TEMPERATURE: 97 F | HEART RATE: 89 BPM | RESPIRATION RATE: 18 BRPM | WEIGHT: 160 LBS | DIASTOLIC BLOOD PRESSURE: 64 MMHG

## 2021-08-05 DIAGNOSIS — R10.9 CHRONIC ABDOMINAL PAIN: Primary | ICD-10-CM

## 2021-08-05 DIAGNOSIS — G89.29 CHRONIC ABDOMINAL PAIN: Primary | ICD-10-CM

## 2021-08-05 LAB
ALBUMIN SERPL-MCNC: 3.9 G/DL (ref 3.5–4.6)
ALP BLD-CCNC: 168 U/L (ref 40–130)
ALT SERPL-CCNC: 17 U/L (ref 0–33)
ANION GAP SERPL CALCULATED.3IONS-SCNC: 9 MEQ/L (ref 9–15)
AST SERPL-CCNC: 17 U/L (ref 0–35)
BASOPHILS ABSOLUTE: 0.1 K/UL (ref 0–0.2)
BASOPHILS RELATIVE PERCENT: 1.3 %
BILIRUB SERPL-MCNC: <0.2 MG/DL (ref 0.2–0.7)
BILIRUBIN URINE: NEGATIVE
BLOOD, URINE: NEGATIVE
BUN BLDV-MCNC: 12 MG/DL (ref 6–20)
CALCIUM SERPL-MCNC: 8.8 MG/DL (ref 8.5–9.9)
CHLORIDE BLD-SCNC: 100 MEQ/L (ref 95–107)
CLARITY: CLEAR
CO2: 28 MEQ/L (ref 20–31)
COLOR: YELLOW
CREAT SERPL-MCNC: 1.22 MG/DL (ref 0.5–0.9)
EOSINOPHILS ABSOLUTE: 0.3 K/UL (ref 0–0.7)
EOSINOPHILS RELATIVE PERCENT: 3.1 %
GFR AFRICAN AMERICAN: 55.6
GFR NON-AFRICAN AMERICAN: 46
GLOBULIN: 2.9 G/DL (ref 2.3–3.5)
GLUCOSE BLD-MCNC: 371 MG/DL (ref 70–99)
GLUCOSE URINE: 500 MG/DL
HCT VFR BLD CALC: 42.4 % (ref 37–47)
HEMOGLOBIN: 14.1 G/DL (ref 12–16)
KETONES, URINE: NEGATIVE MG/DL
LEUKOCYTE ESTERASE, URINE: NEGATIVE
LYMPHOCYTES ABSOLUTE: 1.8 K/UL (ref 1–4.8)
LYMPHOCYTES RELATIVE PERCENT: 18.6 %
MCH RBC QN AUTO: 28.9 PG (ref 27–31.3)
MCHC RBC AUTO-ENTMCNC: 33.3 % (ref 33–37)
MCV RBC AUTO: 86.9 FL (ref 82–100)
MONOCYTES ABSOLUTE: 0.7 K/UL (ref 0.2–0.8)
MONOCYTES RELATIVE PERCENT: 6.8 %
NEUTROPHILS ABSOLUTE: 6.8 K/UL (ref 1.4–6.5)
NEUTROPHILS RELATIVE PERCENT: 70.2 %
NITRITE, URINE: NEGATIVE
PDW BLD-RTO: 13.8 % (ref 11.5–14.5)
PH UA: 5.5 (ref 5–9)
PLATELET # BLD: 334 K/UL (ref 130–400)
POTASSIUM SERPL-SCNC: 4.7 MEQ/L (ref 3.4–4.9)
PROTEIN UA: NEGATIVE MG/DL
RBC # BLD: 4.88 M/UL (ref 4.2–5.4)
SODIUM BLD-SCNC: 137 MEQ/L (ref 135–144)
SPECIFIC GRAVITY UA: 1.01 (ref 1–1.03)
TOTAL PROTEIN: 6.8 G/DL (ref 6.3–8)
URINE REFLEX TO CULTURE: ABNORMAL
UROBILINOGEN, URINE: 0.2 E.U./DL
WBC # BLD: 9.7 K/UL (ref 4.8–10.8)

## 2021-08-05 PROCEDURE — 96372 THER/PROPH/DIAG INJ SC/IM: CPT

## 2021-08-05 PROCEDURE — 85025 COMPLETE CBC W/AUTO DIFF WBC: CPT

## 2021-08-05 PROCEDURE — 81003 URINALYSIS AUTO W/O SCOPE: CPT

## 2021-08-05 PROCEDURE — 36415 COLL VENOUS BLD VENIPUNCTURE: CPT

## 2021-08-05 PROCEDURE — 80053 COMPREHEN METABOLIC PANEL: CPT

## 2021-08-05 PROCEDURE — 6360000002 HC RX W HCPCS: Performed by: NURSE PRACTITIONER

## 2021-08-05 PROCEDURE — 99283 EMERGENCY DEPT VISIT LOW MDM: CPT

## 2021-08-05 RX ORDER — FENTANYL CITRATE 50 UG/ML
50 INJECTION, SOLUTION INTRAMUSCULAR; INTRAVENOUS ONCE
Status: COMPLETED | OUTPATIENT
Start: 2021-08-05 | End: 2021-08-05

## 2021-08-05 RX ADMIN — FENTANYL CITRATE 50 MCG: 0.05 INJECTION, SOLUTION INTRAMUSCULAR; INTRAVENOUS at 07:59

## 2021-08-05 ASSESSMENT — ENCOUNTER SYMPTOMS
NAUSEA: 0
BACK PAIN: 0
EYE DISCHARGE: 0
ABDOMINAL PAIN: 1
DIARRHEA: 0
VOMITING: 0
CONSTIPATION: 0
COUGH: 0
EYE PAIN: 0
TROUBLE SWALLOWING: 0
EYE REDNESS: 0
SORE THROAT: 0
WHEEZING: 0
SHORTNESS OF BREATH: 0
BLOOD IN STOOL: 0
RHINORRHEA: 0
COLOR CHANGE: 0

## 2021-08-05 ASSESSMENT — PAIN SCALES - GENERAL
PAINLEVEL_OUTOF10: 10
PAINLEVEL_OUTOF10: 10
PAINLEVEL_OUTOF10: 9

## 2021-08-05 ASSESSMENT — PAIN DESCRIPTION - LOCATION
LOCATION: ABDOMEN
LOCATION: ABDOMEN

## 2021-08-05 ASSESSMENT — PAIN DESCRIPTION - DESCRIPTORS: DESCRIPTORS: CRAMPING

## 2021-08-05 NOTE — ED TRIAGE NOTES
Pt states that she was seen here last week for the same thing and followed up with the gastro dr. Wanda Cota is to get scoped later this month and was told if she was still experiencing a lot of pain to come back to ER. Pt states that she has been on a liquid diet since Tuesday and she vomits if she tries to eat anything because she needs to get her \"esophagus stretched out\" pt rates pain in the mid upper abd at 10/10.

## 2021-08-05 NOTE — ED PROVIDER NOTES
[address] [facility] ED  EMERGENCY DEPARTMENT ENCOUNTER      Pt Name: Rocco Villa  MRN: [id number]  [place] 1965  Date of evaluation: 8/5/2021  Provider: CANDY Lucas CNP    CHIEF COMPLAINT       Chief Complaint   Patient presents with    Abdominal Pain     mid abd pain since last week          HISTORY OF PRESENT ILLNESS   (Location/Symptom, Timing/Onset,Context/Setting, Quality, Duration, Modifying Factors, Severity)  Note limiting factors. Rocco Villa is a 50 y.o. female who presents to the emergency department with a chart for past medical history of diabetes, hyperlipidemia, vitamin D deficiency, lupus, asthma, and hypertension for a chief complaint of diffuse 10 out of 10 sharp abdominal pain, constant since last week. Denies nausea vomiting. Denies diarrhea. Denies fevers or chills. Denies alleviating modifying factors. Nursing Notes were reviewed. REVIEW OF SYSTEMS    (2-9 systems for level 4, 10 or more for level 5)     Review of Systems   Constitutional: Negative for activity change, appetite change, fatigue and fever. HENT: Negative for congestion, ear pain, rhinorrhea, sore throat and trouble swallowing. Eyes: Negative for pain, discharge and redness. Respiratory: Negative for cough, shortness of breath and wheezing. Cardiovascular: Negative for chest pain and palpitations. Gastrointestinal: Positive for abdominal pain. Negative for blood in stool, constipation, diarrhea, nausea and vomiting. Endocrine: Negative for polydipsia and polyuria. Genitourinary: Negative for decreased urine volume, dysuria, flank pain and hematuria. Musculoskeletal: Negative for arthralgias, back pain and myalgias. Skin: Negative for color change, rash and wound. Neurological: Negative for dizziness, syncope, weakness, light-headedness and headaches. Psychiatric/Behavioral: Negative for behavioral problems. All other systems reviewed and are negative.       Except as noted above the remainder of the review of systems was reviewed and negative.        PAST MEDICAL HISTORY     Past Medical History:   Diagnosis Date    Anxiety     Asthma     as child    Depression     Depression     dysthymia - following lupus dx age 23    Diabetes mellitus ([facility]     Foot fracture, left     GERD (gastroesophageal reflux disease)     Hyperlipidemia     Hypothyroidism     Kidney stones     Lupus ([facility]     dr barrera -     Migraine     PONV (postoperative nausea and vomiting)     Renal stone 4/25/2021    Sciatica     left sided     Past Surgical History:   Procedure Laterality Date    APPENDECTOMY      ARTHRODESIS Left 11/26/2019    ARTHRODESIS OF THE FIRST METATARSAL PHALANGEAL JOINT LEFT FOOT performed by Sary Richey DPM at [facility] N/A 4/26/2021    CYSTOSCOPY RIGHT DOUBLE J STENT PLACEMENT. performed by Cecile Fong MD at [address] [facility] [address] REDUCTION Left 1/3/2017    LEFT FOOT PERCUTANEOUS PINNING Sariah Feast performed by Luiza Zayas MD at [address] [facility] [place] LITHOTRIPSY Right 5/5/2021    RIGHT ESWL performed by Cecile Fong MD at [address] [facility] ESOPHAGOGASTRODUODENOSCOPY TRANSORAL DIAGNOSTIC N/A 3/8/2017    EGD ESOPHAGOGASTRODUODENOSCOPY WITH DILATION performed by Tennille Shen MD at [address] ESOPHAGOGASTRODUODENOSCOPY TRANSORAL DIAGNOSTIC N/A 11/7/2018    EGD ESOPHAGOGASTRODUODENOSCOPY WITH DILATION performed by Tennille Shen MD at [address] [place] [address]       Social History     Socioeconomic History    Marital status: Legally      Spouse name: None    Number of children: None    Years of education: None    Highest education level: None   Occupational History    None   Tobacco Use    Smoking status: Never Smoker    Smokeless tobacco: Never Used   Vaping Use    Vaping Use: Never used   Substance and Sexual Activity    Alcohol use: No     Alcohol/week: 0.0 standard drinks     Comment: denies    Drug use: No     Comment: denies    Sexual activity: None   Other Topics Concern    None   Social History Narrative    None     Social Determinants of Health     Financial Resource Strain:     Difficulty of Paying Living Expenses:    Food Insecurity:     Worried About Running Out of Food in the Last Year:     Ran Out of Food in the Last Year:    Transportation Needs:     Lack of Transportation (Medical):  Lack of Transportation (Non-Medical):    Physical Activity:     Days of Exercise per Week:     Minutes of Exercise per Session:    Stress:     Feeling of Stress :    Social Connections:     Frequency of Communication with Friends and Family:     Frequency of Social Gatherings with Friends and Family:     Attends [employer] Services:     Active Member of Clubs or Organizations:     Attends Club or Organization Meetings:     Marital Status:    Intimate Partner Violence:     Fear of Current or Ex-Partner:     Emotionally Abused:     Physically Abused:     Sexually Abused:        SCREENINGS             PHYSICAL EXAM    (up to 7 for level 4, 8 or more for level 5)     ED Triage Vitals [08/05/21 0740]   BP Temp Temp Source Pulse Resp SpO2 Height Weight   (!) 152/108 97 °F (36.1 °C) Temporal 91 17 97 % 5' 8\" (1.727 m) 160 lb (72.6 kg)       Physical Exam  Vitals and nursing note reviewed. Constitutional:       General: She is not in acute distress. Appearance: She is well-developed. She is not diaphoretic. HENT:      Head: Normocephalic and atraumatic. Nose: Nose normal.   Eyes:      Conjunctiva/sclera: Conjunctivae normal.      Pupils: Pupils are equal, round, and reactive to light. Cardiovascular:      Rate and Rhythm: Normal rate and regular rhythm.       Heart sounds: Normal Pulse: 91   Resp: 17   Temp: 97 °F (36.1 °C)   TempSrc: Temporal   SpO2: 97%   Weight: 160 lb (72.6 kg)   Height: 5' 8\" (1.727 m)            MDM   Patient is a 53-year-old female presenting the ER with a chief complaint of diffuse abdominal pain. Hemodynamically stable nontoxic-appearing. She was evaluated by GI yesterday. The patient has been seen in the emergency department multiple times for this chronic abdominal pain. I am repeating basic blood work and given patient fentanyl for the pain. We will be discharging patient to follow-up with primary care and come back to the ER if she worsens. Instructed to go back to GI as well. Discharged in stable condition with stable vital signs. CRITICAL CARE TIME       CONSULTS:  None    PROCEDURES:  Unless otherwise noted below, none     Procedures    FINAL IMPRESSION      1.  Chronic abdominal pain          DISPOSITION/PLAN   DISPOSITION Decision To Discharge 08/05/2021 08:44:24 AM      PATIENT REFERRED TO:  Jair Bosch MD  [address] [place] [phone]    Schedule an appointment as soon as possible for a visit in 1 day      Salvador Hayes  [address] [place] [address] 69 [phone]    Schedule an appointment as soon as possible for a visit in 1 day        DISCHARGE MEDICATIONS:  New Prescriptions    No medications on file          (Please notethat portions of this note were completed with a voice recognition program.  Efforts were made to edit the dictations but occasionally words are mis-transcribed.)    CANDY Hoyos CNP (electronically signed)  Attending Emergency Physician          CANDY Aggarwal CNP  08/05/21 5971

## 2021-08-17 ENCOUNTER — ANESTHESIA EVENT (OUTPATIENT)
Dept: ENDOSCOPY | Age: 56
End: 2021-08-17
Payer: MEDICARE

## 2021-08-17 ENCOUNTER — ANESTHESIA (OUTPATIENT)
Dept: ENDOSCOPY | Age: 56
End: 2021-08-17
Payer: MEDICARE

## 2021-08-17 ENCOUNTER — ANCILLARY PROCEDURE (OUTPATIENT)
Dept: ENDOSCOPY | Age: 56
End: 2021-08-17
Attending: INTERNAL MEDICINE
Payer: MEDICARE

## 2021-08-17 ENCOUNTER — HOSPITAL ENCOUNTER (OUTPATIENT)
Age: 56
Setting detail: OUTPATIENT SURGERY
Discharge: HOME | End: 2021-08-17
Attending: INTERNAL MEDICINE | Admitting: INTERNAL MEDICINE
Payer: MEDICARE

## 2021-08-17 VITALS
OXYGEN SATURATION: 98 % | DIASTOLIC BLOOD PRESSURE: 62 MMHG | SYSTOLIC BLOOD PRESSURE: 127 MMHG | RESPIRATION RATE: 19 BRPM

## 2021-08-17 VITALS
TEMPERATURE: 98 F | DIASTOLIC BLOOD PRESSURE: 63 MMHG | HEART RATE: 87 BPM | OXYGEN SATURATION: 97 % | SYSTOLIC BLOOD PRESSURE: 110 MMHG | BODY MASS INDEX: 24.25 KG/M2 | WEIGHT: 160 LBS | RESPIRATION RATE: 18 BRPM | HEIGHT: 68 IN

## 2021-08-17 LAB
GLUCOSE BLD-MCNC: 190 MG/DL (ref 60–115)
PERFORMED ON: ABNORMAL

## 2021-08-17 PROCEDURE — 2580000003 HC RX 258: Performed by: INTERNAL MEDICINE

## 2021-08-17 PROCEDURE — 3609017100 HC EGD: Performed by: INTERNAL MEDICINE

## 2021-08-17 PROCEDURE — 2500000003 HC RX 250 WO HCPCS: Performed by: NURSE ANESTHETIST, CERTIFIED REGISTERED

## 2021-08-17 PROCEDURE — 6370000000 HC RX 637 (ALT 250 FOR IP): Performed by: INTERNAL MEDICINE

## 2021-08-17 PROCEDURE — 88305 TISSUE EXAM BY PATHOLOGIST: CPT

## 2021-08-17 PROCEDURE — 43239 EGD BIOPSY SINGLE/MULTIPLE: CPT | Performed by: INTERNAL MEDICINE

## 2021-08-17 PROCEDURE — 2709999900 HC NON-CHARGEABLE SUPPLY: Performed by: INTERNAL MEDICINE

## 2021-08-17 PROCEDURE — 7100000010 HC PHASE II RECOVERY - FIRST 15 MIN: Performed by: INTERNAL MEDICINE

## 2021-08-17 PROCEDURE — 88342 IMHCHEM/IMCYTCHM 1ST ANTB: CPT

## 2021-08-17 PROCEDURE — 3700000001 HC ADD 15 MINUTES (ANESTHESIA): Performed by: INTERNAL MEDICINE

## 2021-08-17 PROCEDURE — 3700000000 HC ANESTHESIA ATTENDED CARE: Performed by: INTERNAL MEDICINE

## 2021-08-17 PROCEDURE — 88313 SPECIAL STAINS GROUP 2: CPT

## 2021-08-17 PROCEDURE — 6360000002 HC RX W HCPCS: Performed by: NURSE ANESTHETIST, CERTIFIED REGISTERED

## 2021-08-17 RX ORDER — 0.9 % SODIUM CHLORIDE 0.9 %
500 INTRAVENOUS SOLUTION INTRAVENOUS ONCE
Status: COMPLETED | OUTPATIENT
Start: 2021-08-17 | End: 2021-08-17

## 2021-08-17 RX ORDER — PREGABALIN 75 MG/1
75 CAPSULE ORAL DAILY
COMMUNITY
End: 2022-06-09 | Stop reason: ALTCHOICE

## 2021-08-17 RX ORDER — MAGNESIUM HYDROXIDE 1200 MG/15ML
LIQUID ORAL PRN
Status: DISCONTINUED | OUTPATIENT
Start: 2021-08-17 | End: 2021-08-17 | Stop reason: ALTCHOICE

## 2021-08-17 RX ORDER — PROPOFOL 10 MG/ML
INJECTION, EMULSION INTRAVENOUS PRN
Status: DISCONTINUED | OUTPATIENT
Start: 2021-08-17 | End: 2021-08-17 | Stop reason: SDUPTHER

## 2021-08-17 RX ORDER — MIDAZOLAM HYDROCHLORIDE 1 MG/ML
INJECTION INTRAMUSCULAR; INTRAVENOUS PRN
Status: DISCONTINUED | OUTPATIENT
Start: 2021-08-17 | End: 2021-08-17 | Stop reason: SDUPTHER

## 2021-08-17 RX ORDER — LIDOCAINE HYDROCHLORIDE 20 MG/ML
INJECTION, SOLUTION INFILTRATION; PERINEURAL PRN
Status: DISCONTINUED | OUTPATIENT
Start: 2021-08-17 | End: 2021-08-17 | Stop reason: SDUPTHER

## 2021-08-17 RX ORDER — MIDAZOLAM HYDROCHLORIDE 2 MG/2ML
INJECTION, SOLUTION INTRAMUSCULAR; INTRAVENOUS
Status: DISPENSED
Start: 2021-08-17 | End: 2021-08-17

## 2021-08-17 RX ORDER — SODIUM CHLORIDE 9 MG/ML
INJECTION, SOLUTION INTRAVENOUS
Status: DISPENSED
Start: 2021-08-17 | End: 2021-08-17

## 2021-08-17 RX ADMIN — SODIUM CHLORIDE 500 ML: 9 INJECTION, SOLUTION INTRAVENOUS at 11:39

## 2021-08-17 RX ADMIN — MIDAZOLAM HYDROCHLORIDE 2 MG: 1 INJECTION INTRAMUSCULAR; INTRAVENOUS at 11:57

## 2021-08-17 RX ADMIN — LIDOCAINE HYDROCHLORIDE 30 MG: 20 INJECTION, SOLUTION INFILTRATION; PERINEURAL at 12:00

## 2021-08-17 RX ADMIN — PROPOFOL 150 MG: 10 INJECTION, EMULSION INTRAVENOUS at 12:00

## 2021-08-17 ASSESSMENT — PULMONARY FUNCTION TESTS
PIF_VALUE: 0
PIF_VALUE: 1
PIF_VALUE: 0

## 2021-08-17 ASSESSMENT — PAIN SCALES - GENERAL: PAINLEVEL_OUTOF10: 0

## 2021-08-17 NOTE — ANESTHESIA POSTPROCEDURE EVALUATION
Department of Anesthesiology  Postprocedure Note    Patient: Galina Montgomery  MRN: [id number]  YOB: 1965  Date of evaluation: 8/17/2021  Time:  12:15 PM     Procedure Summary     Date: 08/17/21 Room / Location: [address] [place] [address]    Anesthesia Start: 1157 Anesthesia Stop: 1215    Procedure: EGD WITH BIOPSY AND POLYPECTOMY (N/A ) Diagnosis: (Epigastric pain)    Surgeons: Rufino Parker MD Responsible Provider: CANDY Ham CRNA    Anesthesia Type: MAC ASA Status: 3          Anesthesia Type: MAC    Akshat Phase I: Akshat Score: 10    Akshat Phase II:      Last vitals: Reviewed and per EMR flowsheets.        Anesthesia Post Evaluation    Patient location during evaluation: bedside  Patient participation: complete - patient participated  Level of consciousness: awake and awake and alert  Pain score: 0  Airway patency: patent  Nausea & Vomiting: no nausea and no vomiting  Complications: no  Cardiovascular status: blood pressure returned to baseline and hemodynamically stable  Respiratory status: acceptable and face mask  Hydration status: euvolemic

## 2021-08-17 NOTE — ANESTHESIA PRE PROCEDURE
Department of Anesthesiology  Preprocedure Note       Name:  Caleb Ridley   Age:  50 y.o.  :  1965                                          MRN:  [id number]         Date:  2021      Surgeon: Jamie Cantu):  Shani Burnett MD    Procedure: Procedure(s):  EGD DIAGNOSTIC ONLY    Medications prior to admission:   Prior to Admission medications    Medication Sig Start Date End Date Taking? Authorizing Provider   DULoxetine (CYMBALTA) 30 MG extended release capsule TAKE 1 CAPSULE BY MOUTH EVERY DAY 21   Historical Provider, MD   esomeprazole (NEXIUM) 40 MG delayed release capsule Take 1 capsule by mouth daily 21   CANDY Cavanaugh - CNP   Insulin Pen Needle (PEN NEEDLES) 33G X 4 MM MISC 1 each by Does not apply route three times daily 21   Gerber Briceño MD   tamsulosin (FLOMAX) 0.4 MG capsule Take 1 capsule by mouth daily 5/10/21   Erin Wilson MD   ondansetron (ZOFRAN-ODT) 4 MG disintegrating tablet Take 1 tablet by mouth 3 times daily as needed for Nausea or Vomiting 21   Jocelin Kyle MD   blood glucose monitor strips Pt test 3x daily Dx E11.65 10/23/20   Gerber Briceño MD   Drug Amanda Osmani Lancets 30G MISC Check blood sugars twice daily or as directed. 10/22/20   Gerber Briceño MD   insulin aspart (NOVOLOG FLEXPEN) 100 UNIT/ML injection pen Inject 35 units tid with meals 10/12/20   Gerber Briceño MD   Insulin Pen Needle (NOVOFINE) 32G X 6 MM MISC qid 20   Gerber Briceño MD   blood glucose monitor kit and supplies Please give 1 meter covered by insurance Dx E11.65 20   Gerber Briceño MD   Lancets MISC Pt test 3x daily Dx E11.65 20   Gerber Briceño MD   blood glucose test strips (ASCENSIA AUTODISC VI;ONE TOUCH ULTRA TEST VI) strip Check blood sugars twice daily or as directed. 20   Gerber Briceño MD   blood glucose monitor kit and supplies Test 3 times a day & as needed for symptoms of irregular blood glucose.  20   Gerber Briceño MD   Insulin Pen Needle (NOVOFINE) 32G X 6 MM MISC BID 1/16/20   Aryan Holly MD   estradiol (CLIMARA) 0.025 MG/24HR Place 0.025 patches onto the skin every 7 days On mondays 9/23/19   Historical Provider, MD   albuterol sulfate HFA (VENTOLIN HFA) 108 (90 Base) MCG/ACT inhaler Inhale 2 puffs into the lungs every 6 hours as needed for Wheezing 8/13/18   Dorinda Grant MD   rosuvastatin (CRESTOR) 40 MG tablet Take 1 tablet by mouth daily 6/8/17   Ray Hurley MD   pantoprazole (PROTONIX) 40 MG tablet Take 1 tablet by mouth daily 5/12/17   Coleen Munoz PA-C   levothyroxine (SYNTHROID) 50 MCG tablet Take 1 tablet by mouth daily 10/11/16   Kita Wheat MD   Blood Glucose Monitoring Suppl (TRUE TRACK BLOOD GLUCOSE) CAROLE Check blood sugars twice daily or as directed. 8/6/15   Kita Grigsby MD   hydroxychloroquine (PLAQUENIL) 200 MG tablet Take 1 tablet by mouth 2 times daily 7/2/15   Historical Provider, MD       Current medications:    No current facility-administered medications for this encounter. Allergies:     Allergies   Allergen Reactions    Aspirin Swelling     Bloody noses    Cephalosporins     Imitrex [Sumatriptan] Hives    Pcn [Penicillins] Swelling    Seasonal Itching     Itch, sneezing,runny nose, watery eyes    Toradol [Ketorolac Tromethamine] Hives     Shortness of breath    Ultram [Tramadol] Hives       Problem List:    Patient Active Problem List   Diagnosis Code    DM (diabetes mellitus) ([facility] E11.9    Hyperlipemia E78.5    Hypothyroid E03.9    Vitamin D deficiency E55.9    SLE (systemic lupus erythematosus related syndrome) ([facility]) M32.9    Asthma J45.909    Panic disorder with agoraphobia F40.01    Hereditary essential tremor G25.0    Allergic rhinitis J30.9    Type 2 diabetes mellitus with diabetic mononeuropathy ([facility]) E11.41    Raynaud's phenomenon without gangrene I73.00    Essential hypertension I10    Type 2 diabetes mellitus without complication ([facility]) Y42.1    Disseminated lupus erythematosus ([facility] M32.9  Foot fracture, left S92.902A    Acute non-recurrent frontal sinusitis J01.10    Gastritis without bleeding K29.70    Dysphagia R13.10    Gastric polyp K31.7    Chest pain R07.9    Renal stone N20.0    Right ureteral calculus N20.1    Epigastric pain R10.13       Past Medical History:        Diagnosis Date    Anxiety     Asthma     as child    Depression     Depression     dysthymia - following lupus dx age 23    Diabetes mellitus [facility]     Foot fracture, left     GERD (gastroesophageal reflux disease)     Hyperlipidemia     Hypothyroidism     Kidney stones     Lupus ([facility]     dr barrera -     Migraine     PONV (postoperative nausea and vomiting)     Renal stone 4/25/2021    Sciatica     left sided       Past Surgical History:        Procedure Laterality Date    APPENDECTOMY      ARTHRODESIS Left 11/26/2019    ARTHRODESIS OF THE FIRST METATARSAL PHALANGEAL JOINT LEFT FOOT performed by Linda Dugan DPM at [address]. \[facility]"" N/A 4/26/2021    CYSTOSCOPY RIGHT DOUBLE J STENT PLACEMENT. performed by Katlin Solis MD at [place] [address]      COLONOSCOPY      FOOT CLOSED REDUCTION Left 1/3/2017    LEFT FOOT PERCUTANEOUS PINNING Sophia Raj performed by Kenrick Palomo MD at [address] [place] [address] [place] LITHOTRIPSY Right 5/5/2021    RIGHT ESWL performed by Katlin Solis MD at [address] [place] [address] ESOPHAGOGASTRODUODENOSCOPY TRANSORAL DIAGNOSTIC N/A 3/8/2017    EGD ESOPHAGOGASTRODUODENOSCOPY WITH DILATION performed by Amy Ramirez MD [place] [address] [place] [address] ESOPHAGOGASTRODUODENOSCOPY TRANSORAL DIAGNOSTIC N/A 11/7/2018    EGD ESOPHAGOGASTRODUODENOSCOPY WITH DILATION performed by Amy Ramirez MD at [address] [place] [address]         Social History:    Social History     Tobacco Use    Smoking status: Never Smoker    Smokeless tobacco: Never Used   Substance Use Topics    Alcohol use: No     Alcohol/week: 0.0 standard drinks     Comment: denies                                Counseling given: Not Answered      Vital Signs (Current): There were no vitals filed for this visit. BP Readings from Last 3 Encounters:   08/05/21 103/64   08/04/21 120/74   07/30/21 115/76       NPO Status:                                                                                 BMI:   Wt Readings from Last 3 Encounters:   08/05/21 160 lb (72.6 kg)   08/04/21 180 lb (81.6 kg)   07/30/21 160 lb (72.6 kg)     There is no height or weight on file to calculate BMI.    CBC:   Lab Results   Component Value Date    WBC 9.7 08/05/2021    RBC 4.88 08/05/2021    HGB 14.1 08/05/2021    HCT 42.4 08/05/2021    MCV 86.9 08/05/2021    RDW 13.8 08/05/2021     08/05/2021       CMP:   Lab Results   Component Value Date     08/05/2021    K 4.7 08/05/2021    K 4.2 04/26/2021     08/05/2021    CO2 28 08/05/2021    BUN 12 08/05/2021    CREATININE 1.22 08/05/2021    GFRAA 55.6 08/05/2021    LABGLOM 46.0 08/05/2021    GLUCOSE 371 08/05/2021    GLUCOSE 228 04/08/2019    PROT 6.8 08/05/2021    CALCIUM 8.8 08/05/2021    BILITOT <0.2 08/05/2021    ALKPHOS 168 08/05/2021    AST 17 08/05/2021    ALT 17 08/05/2021       POC Tests: No results for input(s): POCGLU, POCNA, POCK, POCCL, POCBUN, POCHEMO, POCHCT in the last 72 hours.     Coags:   Lab Results   Component Value Date    PROTIME 9.5 05/03/2018    INR 0.9 05/03/2018    APTT 25.6 11/04/2019       HCG (If Applicable): No results found for: PREGTESTUR, PREGSERUM, HCG, HCGQUANT     ABGs:   Lab Results   Component Value Date    PHART 7.466 06/28/2020    PO2ART 84 06/28/2020    OBJ5QDO 34 06/28/2020    LEI9GFX 24.6 06/28/2020    BEART 1 06/28/2020    C2CIKTVF 97 06/28/2020        Type & Screen (If Applicable):  No results found for: LABABO, [address]    Drug/Infectious Status (If Applicable):  No results found for: HIV, HEPCAB    COVID-19 Screening (If Applicable):   Lab Results   Component Value Date    COVID19 Not Detected 04/25/2021           Anesthesia Evaluation  Patient summary reviewed and Nursing notes reviewed   history of anesthetic complications: PONV. Airway: Mallampati: III  TM distance: >3 FB   Neck ROM: full  Mouth opening: < 3 FB Dental:          Pulmonary:normal exam    (+) asthma:                            Cardiovascular:    (+) hypertension:,                   Neuro/Psych:   (+) neuromuscular disease:, headaches:, psychiatric history:depression/anxiety             GI/Hepatic/Renal:   (+) GERD:,           Endo/Other:    (+) hypothyroidism: arthritis:., .                 Abdominal:             Vascular: Other Findings:             Anesthesia Plan      MAC     ASA 3       Induction: intravenous. Anesthetic plan and risks discussed with patient. Use of blood products discussed with patient whom. Plan discussed with CRNA.                   CANDY Rowe - CONOR   8/17/2021

## 2021-08-17 NOTE — H&P
Messi Unilet Lancets 30G MISC Check blood sugars twice daily or as directed. 10/22/20   Wilmar Spring MD   insulin aspart (NOVOLOG FLEXPEN) 100 UNIT/ML injection pen Inject 35 units tid with meals 10/12/20   Wilmar Spring MD   Insulin Pen Needle (NOVOFINE) 32G X 6 MM MISC qid 8/13/20   Wilmar Spring MD   blood glucose monitor kit and supplies Please give 1 meter covered by insurance Dx E11.65 1/20/20   Wilmar Spring MD   Lancets MISC Pt test 3x daily Dx E11.65 1/20/20   Wilmar Spring MD   blood glucose test strips (ASCENSIA AUTODISC VI;ONE TOUCH ULTRA TEST VI) strip Check blood sugars twice daily or as directed. 1/17/20   Wilmar Spring MD   blood glucose monitor kit and supplies Test 3 times a day & as needed for symptoms of irregular blood glucose. 1/16/20   Wilmar Spring MD   Insulin Pen Needle (NOVOFINE) 32G X 6 MM MISC BID 1/16/20   Wilmar Spring MD   estradiol (CLIMARA) 0.025 MG/24HR Place 0.025 patches onto the skin every 7 days On mondays 9/23/19   Historical Provider, MD   albuterol sulfate HFA (VENTOLIN HFA) 108 (90 Base) MCG/ACT inhaler Inhale 2 puffs into the lungs every 6 hours as needed for Wheezing 8/13/18   Lindajean Denver, MD   rosuvastatin (CRESTOR) 40 MG tablet Take 1 tablet by mouth daily 6/8/17   Chandler Primrose, MD   pantoprazole (PROTONIX) 40 MG tablet Take 1 tablet by mouth daily 5/12/17   Coleen Munoz PA-C   levothyroxine (SYNTHROID) 50 MCG tablet Take 1 tablet by mouth daily 10/11/16   Kita Wheat MD   Blood Glucose Monitoring Suppl (TRUE TRACK BLOOD GLUCOSE) CAROLE Check blood sugars twice daily or as directed. 8/6/15   Kita Stearns MD   hydroxychloroquine (PLAQUENIL) 200 MG tablet Take 1 tablet by mouth 2 times daily 7/2/15   Historical Provider, MD       Allergies:    Allergies   Allergen Reactions    Aspirin Swelling     Bloody noses    Cephalosporins     Imitrex [Sumatriptan] Hives    Pcn [Penicillins] Swelling    Seasonal Itching     Itch, sneezing,runny nose, watery eyes    Toradol [Ketorolac Tromethamine] Hives     Shortness of breath    Ultram [Tramadol] Hives        History of allergic reaction to anesthesia:  No    Past Medical History:  Past Medical History:   Diagnosis Date    Anxiety     Asthma     as child    Depression     Depression     dysthymia - following lupus dx age 23    Diabetes [facility] ([facility]     Foot fracture, left     GERD (gastroesophageal reflux disease)     Hyperlipidemia     Hypothyroidism     Kidney stones     Lupus ([facility]     dr barrera -     Migraine     PONV (postoperative nausea and vomiting)     Renal stone 4/25/2021    Sciatica     left sided       Past Surgical History:  Past Surgical History:   Procedure Laterality Date    APPENDECTOMY      ARTHRODESIS Left 11/26/2019    ARTHRODESIS OF THE FIRST METATARSAL PHALANGEAL JOINT LEFT FOOT performed by Sarina Iqbal DPM at [address] [facility] N/A 4/26/2021    CYSTOSCOPY RIGHT DOUBLE J STENT PLACEMENT. performed by Gela Yanez MD at [address] [facility] [address] REDUCTION Left 1/3/2017    LEFT FOOT PERCUTANEOUS PINNING Choco Maxcy performed by Carmelina Bess MD at [address] [facility] [place] LITHOTRIPSY Right 5/5/2021    RIGHT ESWL performed by Gela Yanez MD at [facility] ESOPHAGOGASTRODUODENOSCOPY TRANSORAL DIAGNOSTIC N/A 3/8/2017    EGD ESOPHAGOGASTRODUODENOSCOPY WITH DILATION [facility] ESOPHAGOGASTRODUODENOSCOPY TRANSORAL DIAGNOSTIC N/A 11/7/2018    EGD ESOPHAGOGASTRODUODENOSCOPY WITH DILATION performed by Chioma Brown MD at [address] [place] [address]         Social History:  Social History     Tobacco Use    Smoking status: Never Smoker    Smokeless tobacco: Never Used   Vaping Use    Vaping Use: Never used   Substance Use Topics  Alcohol use: No     Alcohol/week: 0.0 standard drinks     Comment: denies    Drug use: No     Comment: denies       Vital Signs: There were no vitals filed for this visit. Physical Exam:  Cardiac:  [x]WNL  []Comments:  Pulmonary:  [x]WNL   []Comments:   Neuro/Mental Status:  [x]WNL  []Comments:  Abdominal:  [x]WNL    []Comments:  Other:   []WNL  []Comments:    Informed Consent:  The risks and benefits of the procedure have been discussed with either the patient or if they cannot consent, their representative. Assessment:  Patient examined and appropriate for planned sedation and procedure. Plan:  Proceed with planned sedation and procedure as above.     Renny Smith MD  10:59 AM

## 2021-08-18 ENCOUNTER — OFFICE VISIT (OUTPATIENT)
Dept: ENDOCRINOLOGY | Age: 56
End: 2021-08-18
Payer: MEDICARE

## 2021-08-18 VITALS
WEIGHT: 177 LBS | DIASTOLIC BLOOD PRESSURE: 84 MMHG | BODY MASS INDEX: 26.83 KG/M2 | HEIGHT: 68 IN | HEART RATE: 79 BPM | OXYGEN SATURATION: 96 % | SYSTOLIC BLOOD PRESSURE: 125 MMHG

## 2021-08-18 DIAGNOSIS — E11.69 TYPE 2 DIABETES MELLITUS WITH OTHER SPECIFIED COMPLICATION, UNSPECIFIED WHETHER LONG TERM INSULIN USE (HCC): Primary | ICD-10-CM

## 2021-08-18 LAB
CHP ED QC CHECK: NORMAL
GLUCOSE BLD-MCNC: 259 MG/DL
HBA1C MFR BLD: 8.5 %

## 2021-08-18 PROCEDURE — 82962 GLUCOSE BLOOD TEST: CPT | Performed by: INTERNAL MEDICINE

## 2021-08-18 PROCEDURE — G8427 DOCREV CUR MEDS BY ELIG CLIN: HCPCS | Performed by: INTERNAL MEDICINE

## 2021-08-18 PROCEDURE — 3017F COLORECTAL CA SCREEN DOC REV: CPT | Performed by: INTERNAL MEDICINE

## 2021-08-18 PROCEDURE — G8419 CALC BMI OUT NRM PARAM NOF/U: HCPCS | Performed by: INTERNAL MEDICINE

## 2021-08-18 PROCEDURE — 83036 HEMOGLOBIN GLYCOSYLATED A1C: CPT | Performed by: INTERNAL MEDICINE

## 2021-08-18 PROCEDURE — 1036F TOBACCO NON-USER: CPT | Performed by: INTERNAL MEDICINE

## 2021-08-18 PROCEDURE — 99213 OFFICE O/P EST LOW 20 MIN: CPT | Performed by: INTERNAL MEDICINE

## 2021-08-18 PROCEDURE — 2022F DILAT RTA XM EVC RTNOPTHY: CPT | Performed by: INTERNAL MEDICINE

## 2021-08-18 PROCEDURE — 3052F HG A1C>EQUAL 8.0%<EQUAL 9.0%: CPT | Performed by: INTERNAL MEDICINE

## 2021-08-18 RX ORDER — INSULIN GLARGINE 100 [IU]/ML
INJECTION, SOLUTION SUBCUTANEOUS
Qty: 30 PEN | Refills: 3 | Status: SHIPPED | OUTPATIENT
Start: 2021-08-18 | End: 2021-12-08 | Stop reason: SDUPTHER

## 2021-08-18 RX ORDER — INSULIN ASPART 100 [IU]/ML
INJECTION, SOLUTION INTRAVENOUS; SUBCUTANEOUS
Qty: 15 PEN | Refills: 3 | Status: SHIPPED | OUTPATIENT
Start: 2021-08-18 | End: 2021-11-11 | Stop reason: SDUPTHER

## 2021-08-18 NOTE — PROGRESS NOTES
8/18/2021    Assessment:       Diagnosis Orders   1.  Type 2 diabetes mellitus with other specified complication, unspecified whether long term insulin use (HCC)  POCT Glucose    POCT glycosylated hemoglobin (Hb A1C)    Hemoglobin L6W    Basic Metabolic Panel         PLAN:     Orders Placed This Encounter   Procedures    Hemoglobin A1C     Standing Status:   Future     Standing Expiration Date:   8/18/2022    Basic Metabolic Panel     Standing Status:   Future     Standing Expiration Date:   8/18/2022    Microalbumin / Creatinine Urine Ratio     Standing Status:   Future     Standing Expiration Date:   8/18/2022    POCT Glucose    POCT glycosylated hemoglobin (Hb A1C)     Orders Placed This Encounter   Medications    insulin aspart (NOVOLOG FLEXPEN) 100 UNIT/ML injection pen     Sig: Inject 60 units tid with meals     Dispense:  15 pen     Refill:  3    insulin glargine (LANTUS SOLOSTAR) 100 UNIT/ML injection pen     Sig: 160  units at bedtime     Dispense:  30 pen     Refill:  3     Continue Lantus insulin 160 units at bedtime NovoLog increased to 60 units with each meals A1c goal of 7 or lower compliance with diet stressed      Orders Placed This Encounter   Procedures    POCT Glucose    POCT glycosylated hemoglobin (Hb A1C)       Subjective:     Chief Complaint   Patient presents with    Diabetes     Vitals:    08/18/21 1011   BP: 125/84   Pulse: 79   SpO2: 96%   Weight: 177 lb (80.3 kg)   Height: 5' 8\" (1.727 m)     Wt Readings from Last 3 Encounters:   08/18/21 177 lb (80.3 kg)   08/17/21 160 lb (72.6 kg)   08/05/21 160 lb (72.6 kg)     BP Readings from Last 3 Encounters:   08/18/21 125/84   08/17/21 110/63   08/17/21 127/62     Follow-up on type 2 diabetes patient on Lantus 60 units at bedtime and NovoLog 35units with agents patient also adjusting dose of NovoLog A1c is still high  8.5 blood sugars higher postprandial complications diabetes include neuropathy    Diabetes  She presents for her follow-up diabetic visit. She has type 2 diabetes mellitus. Her disease course has been fluctuating. There are no hypoglycemic associated symptoms. There are no hypoglycemic complications. Symptoms are stable. Diabetic complications include peripheral neuropathy. Current diabetic treatment includes insulin injections. She is currently taking insulin pre-breakfast, pre-lunch, pre-dinner and at bedtime. Her overall blood glucose range is >200 mg/dl.  (Lab Results       Component                Value               Date                       LABA1C                   8.5                 08/18/2021              )     Past Medical History:   Diagnosis Date    Anxiety     Asthma     as child    Depression     Depression     dysthymia - following lupus dx age 23    Diabetes mellitus ([address]     Foot fracture, left     GERD (gastroesophageal reflux disease)     Hyperlipidemia     Hypothyroidism     Kidney stones     Lupus ([address]     dr barrera -     Migraine     PONV (postoperative nausea and vomiting)     Renal stone 4/25/2021    Sciatica     left sided     Past Surgical History:   Procedure Laterality Date    APPENDECTOMY      ARTHRODESIS Left 11/26/2019    ARTHRODESIS OF THE FIRST METATARSAL PHALANGEAL JOINT LEFT FOOT performed by Ahsan Sanabria DPM at [address] [facility] N/A 4/26/2021    CYSTOSCOPY RIGHT DOUBLE J STENT PLACEMENT. performed by Aleah Lorenzana MD at [address] [facility] COLONOSCOPY      ENDOSCOPY, COLON, DIAGNOSTIC      FOOT CLOSED REDUCTION Left 1/3/2017    LEFT FOOT PERCUTANEOUS PINNING Francella Blight performed by Charleen Russell MD at [address] [facility] [place] LITHOTRIPSY Right 5/5/2021    RIGHT ESWL performed by Aleah Lorenzana MD at [facility] ESOPHAGOGASTRODUODENOSCOPY TRANSORAL DIAGNOSTIC N/A 3/8/2017    EGD ESOPHAGOGASTRODUODENOSCOPY WITH DILATION performed by Mare Clemente MD at [address] ESOPHAGOGASTRODUODENOSCOPY TRANSORAL DIAGNOSTIC N/A 11/7/2018    EGD ESOPHAGOGASTRODUODENOSCOPY WITH DILATION performed by Ada Scott MD at [facility] ENDOSCOPY      UPPER GASTROINTESTINAL ENDOSCOPY N/A 8/17/2021    EGD WITH BIOPSY AND POLYPECTOMY performed by Jaclyn Strickland MD at [facility] Marital status: Legally      Spouse name: Not on file    Number of children: Not on file    Years of education: Not on file    Highest education level: Not on file   Occupational History    Not on file   Tobacco Use    Smoking status: Never Smoker    Smokeless tobacco: Never Used   Vaping Use    Vaping Use: Never used   Substance and Sexual Activity    Alcohol use: No     Alcohol/week: 0.0 standard drinks     Comment: denies    Drug use: No     Comment: denies    Sexual activity: Not on file   Other Topics Concern    Not on file   Social History Narrative    Not on file     Social Determinants of Health     Financial Resource Strain:     Difficulty of Paying Living Expenses:    Food Insecurity:     Worried About Running Out of Food in the Last Year:     Ran Out of Food in the Last Year:    Transportation Needs:     Lack of Transportation (Medical):      Lack of Transportation (Non-Medical):    Physical Activity:     Days of Exercise per Week:     Minutes of Exercise per Session:    Stress:     Feeling of Stress :    Social Connections:     Frequency of Communication with Friends and Family:     Frequency of Social Gatherings with Friends and Family:     Attends Holiness Services:     Active Member of Clubs or Organizations:     Attends Club or Organization Meetings:     Marital Status:    Intimate Partner Violence:     Fear of Current or Ex-Partner:     Emotionally Abused:     Physically Abused:     Sexually Abused: Family History   Adopted: Yes     Allergies   Allergen Reactions    Aspirin Swelling     Bloody noses    Cephalosporins     Imitrex [Sumatriptan] Hives    Pcn [Penicillins] Swelling    Seasonal Itching     Itch, sneezing,runny nose, watery eyes    Toradol [Ketorolac Tromethamine] Hives     Shortness of breath    Ultram [Tramadol] Hives       Current Outpatient Medications:     pregabalin (LYRICA) 75 MG capsule, Take 75 mg by mouth daily. , Disp: , Rfl:     DULoxetine (CYMBALTA) 30 MG extended release capsule, TAKE 1 CAPSULE BY MOUTH EVERY DAY, Disp: , Rfl:     esomeprazole (NEXIUM) 40 MG delayed release capsule, Take 1 capsule by mouth daily, Disp: 30 capsule, Rfl: 3    Insulin Pen Needle (PEN NEEDLES) 33G X 4 MM MISC, 1 each by Does not apply route three times daily, Disp: 100 each, Rfl: 2    ondansetron (ZOFRAN-ODT) 4 MG disintegrating tablet, Take 1 tablet by mouth 3 times daily as needed for Nausea or Vomiting, Disp: 21 tablet, Rfl: 0    blood glucose monitor strips, Pt test 3x daily Dx E11.65, Disp: 100 strip, Rfl: 3    Drug [place] Lancets 30G MISC, Check blood sugars twice daily or as directed., Disp: 200 each, Rfl: 3    insulin aspart (NOVOLOG FLEXPEN) 100 UNIT/ML injection pen, Inject 35 units tid with meals, Disp: 15 pen, Rfl: 3    Insulin Pen Needle (NOVOFINE) 32G X 6 MM MISC, qid, Disp: 300 each, Rfl: 3    blood glucose monitor kit and supplies, Please give 1 meter covered by insurance Dx E11.65, Disp: 1 kit, Rfl: 0    Lancets MISC, Pt test 3x daily Dx E11.65, Disp: 100 each, Rfl: 3    blood glucose test strips (ASCENSIA AUTODISC VI;ONE TOUCH ULTRA TEST VI) strip, Check blood sugars twice daily or as directed., Disp: 200 strip, Rfl: 3    blood glucose monitor kit and supplies, Test 3 times a day & as needed for symptoms of irregular blood glucose., Disp: 1 kit, Rfl: 0    Insulin Pen Needle (NOVOFINE) 32G X 6 MM MISC, BID, Disp: 100 each, Rfl: 3    estradiol (CLIMARA) 0.025 MG/24HR, Place 0.025 patches onto the skin every 7 days On mondays, Disp: , Rfl: 11    albuterol sulfate HFA (VENTOLIN HFA) 108 (90 Base) MCG/ACT inhaler, Inhale 2 puffs into the lungs every 6 hours as needed for Wheezing, Disp: 1 Inhaler, Rfl: 0    rosuvastatin (CRESTOR) 40 MG tablet, Take 1 tablet by mouth daily, Disp: 90 tablet, Rfl: 3    levothyroxine (SYNTHROID) 50 MCG tablet, Take 1 tablet by mouth daily, Disp: 90 tablet, Rfl: 3    Blood Glucose Monitoring Suppl (TRUE TRACK BLOOD GLUCOSE) CAROLE, Check blood sugars twice daily or as directed., Disp: 100 Device, Rfl: 5    hydroxychloroquine (PLAQUENIL) 200 MG tablet, Take 1 tablet by mouth 2 times daily, Disp: , Rfl: 5  Lab Results   Component Value Date     08/05/2021    K 4.7 08/05/2021     08/05/2021    CO2 28 08/05/2021    BUN 12 08/05/2021    CREATININE 1.22 (H) 08/05/2021    GLUCOSE 259 08/18/2021    CALCIUM 8.8 08/05/2021    PROT 6.8 08/05/2021    LABALBU 3.9 08/05/2021    BILITOT <0.2 08/05/2021    ALKPHOS 168 (H) 08/05/2021    AST 17 08/05/2021    ALT 17 08/05/2021    LABGLOM 46.0 (L) 08/05/2021    GFRAA 55.6 (L) 08/05/2021    GLOB 2.9 08/05/2021     Lab Results   Component Value Date    WBC 9.7 08/05/2021    HGB 14.1 08/05/2021    HCT 42.4 08/05/2021    MCV 86.9 08/05/2021     08/05/2021     Lab Results   Component Value Date    LABA1C 8.5 08/18/2021    LABA1C 8.5 (H) 05/20/2021    LABA1C 8.7 (H) 05/03/2021     Lab Results   Component Value Date    CHOLFAST 346 (H) 05/20/2021    TRIGLYCFAST 340 (H) 05/20/2021    HDL 42 05/20/2021    HDL 73 (H) 08/02/2017    HDL 92 (H) 05/12/2017    LDLCALC 236 (H) 05/20/2021    LDLCALC 56 08/02/2017    LDLCALC 32 05/12/2017    CHOL 182 08/02/2017    CHOL 189 05/12/2017    CHOL 279 (H) 08/29/2016    TRIG 264 (H) 08/02/2017    TRIG 323 (H) 05/12/2017    TRIG 348 (H) 08/29/2016       Lab Results   Component Value Date    TSH 2.500 05/12/2017    TSH 1.650 06/27/2016    TSH 1.220 03/21/2016

## 2021-09-02 ENCOUNTER — HOSPITAL ENCOUNTER (EMERGENCY)
Age: 56
Discharge: HOME | End: 2021-09-02
Payer: MEDICARE

## 2021-09-02 ENCOUNTER — APPOINTMENT (OUTPATIENT)
Dept: CT IMAGING | Age: 56
End: 2021-09-02
Payer: MEDICARE

## 2021-09-02 VITALS
TEMPERATURE: 97.9 F | HEIGHT: 68 IN | RESPIRATION RATE: 16 BRPM | DIASTOLIC BLOOD PRESSURE: 63 MMHG | OXYGEN SATURATION: 97 % | BODY MASS INDEX: 24.25 KG/M2 | WEIGHT: 160 LBS | HEART RATE: 85 BPM | SYSTOLIC BLOOD PRESSURE: 104 MMHG

## 2021-09-02 DIAGNOSIS — R18.8 FREE FLUID IN PELVIS: Primary | ICD-10-CM

## 2021-09-02 LAB
ALBUMIN SERPL-MCNC: 4.2 G/DL (ref 3.5–4.6)
ALP BLD-CCNC: 159 U/L (ref 40–130)
ALT SERPL-CCNC: 18 U/L (ref 0–33)
ANION GAP SERPL CALCULATED.3IONS-SCNC: 8 MEQ/L (ref 9–15)
AST SERPL-CCNC: 14 U/L (ref 0–35)
BASOPHILS ABSOLUTE: 0.2 K/UL (ref 0–0.2)
BASOPHILS RELATIVE PERCENT: 1.9 %
BILIRUB SERPL-MCNC: 0.3 MG/DL (ref 0.2–0.7)
BILIRUBIN URINE: NEGATIVE
BLOOD, URINE: NEGATIVE
BUN BLDV-MCNC: 14 MG/DL (ref 6–20)
CALCIUM SERPL-MCNC: 9.5 MG/DL (ref 8.5–9.9)
CHLORIDE BLD-SCNC: 100 MEQ/L (ref 95–107)
CLARITY: CLEAR
CO2: 29 MEQ/L (ref 20–31)
COLOR: YELLOW
CREAT SERPL-MCNC: 1.1 MG/DL (ref 0.5–0.9)
EOSINOPHILS ABSOLUTE: 0.3 K/UL (ref 0–0.7)
EOSINOPHILS RELATIVE PERCENT: 3.5 %
GFR AFRICAN AMERICAN: >60
GFR NON-AFRICAN AMERICAN: 51.8
GLOBULIN: 3.3 G/DL (ref 2.3–3.5)
GLUCOSE BLD-MCNC: 234 MG/DL (ref 70–99)
GLUCOSE URINE: 100 MG/DL
HCT VFR BLD CALC: 42.5 % (ref 37–47)
HEMOGLOBIN: 14.7 G/DL (ref 12–16)
KETONES, URINE: NEGATIVE MG/DL
LEUKOCYTE ESTERASE, URINE: NEGATIVE
LYMPHOCYTES ABSOLUTE: 2 K/UL (ref 1–4.8)
LYMPHOCYTES RELATIVE PERCENT: 19.6 %
MCH RBC QN AUTO: 30.2 PG (ref 27–31.3)
MCHC RBC AUTO-ENTMCNC: 34.6 % (ref 33–37)
MCV RBC AUTO: 87.4 FL (ref 82–100)
MONOCYTES ABSOLUTE: 0.4 K/UL (ref 0.2–0.8)
MONOCYTES RELATIVE PERCENT: 4.2 %
NEUTROPHILS ABSOLUTE: 7.1 K/UL (ref 1.4–6.5)
NEUTROPHILS RELATIVE PERCENT: 70.8 %
NITRITE, URINE: NEGATIVE
PDW BLD-RTO: 14.3 % (ref 11.5–14.5)
PH UA: 5 (ref 5–9)
PLATELET # BLD: 196 K/UL (ref 130–400)
POTASSIUM SERPL-SCNC: 4.2 MEQ/L (ref 3.4–4.9)
PROTEIN UA: NEGATIVE MG/DL
RBC # BLD: 4.86 M/UL (ref 4.2–5.4)
SODIUM BLD-SCNC: 137 MEQ/L (ref 135–144)
SPECIFIC GRAVITY UA: 1.02 (ref 1–1.03)
TOTAL PROTEIN: 7.5 G/DL (ref 6.3–8)
URINE REFLEX TO CULTURE: ABNORMAL
UROBILINOGEN, URINE: 0.2 E.U./DL
WBC # BLD: 10 K/UL (ref 4.8–10.8)

## 2021-09-02 PROCEDURE — 80053 COMPREHEN METABOLIC PANEL: CPT

## 2021-09-02 PROCEDURE — 36415 COLL VENOUS BLD VENIPUNCTURE: CPT

## 2021-09-02 PROCEDURE — 96375 TX/PRO/DX INJ NEW DRUG ADDON: CPT

## 2021-09-02 PROCEDURE — 99283 EMERGENCY DEPT VISIT LOW MDM: CPT

## 2021-09-02 PROCEDURE — 81003 URINALYSIS AUTO W/O SCOPE: CPT

## 2021-09-02 PROCEDURE — 2580000003 HC RX 258: Performed by: NURSE PRACTITIONER

## 2021-09-02 PROCEDURE — 6360000002 HC RX W HCPCS: Performed by: NURSE PRACTITIONER

## 2021-09-02 PROCEDURE — 85025 COMPLETE CBC W/AUTO DIFF WBC: CPT

## 2021-09-02 PROCEDURE — 74176 CT ABD & PELVIS W/O CONTRAST: CPT

## 2021-09-02 PROCEDURE — 96374 THER/PROPH/DIAG INJ IV PUSH: CPT

## 2021-09-02 RX ORDER — MORPHINE SULFATE 2 MG/ML
4 INJECTION, SOLUTION INTRAMUSCULAR; INTRAVENOUS ONCE
Status: COMPLETED | OUTPATIENT
Start: 2021-09-02 | End: 2021-09-02

## 2021-09-02 RX ORDER — 0.9 % SODIUM CHLORIDE 0.9 %
1000 INTRAVENOUS SOLUTION INTRAVENOUS ONCE
Status: COMPLETED | OUTPATIENT
Start: 2021-09-02 | End: 2021-09-02

## 2021-09-02 RX ORDER — ONDANSETRON 2 MG/ML
4 INJECTION INTRAMUSCULAR; INTRAVENOUS ONCE
Status: COMPLETED | OUTPATIENT
Start: 2021-09-02 | End: 2021-09-02

## 2021-09-02 RX ADMIN — MORPHINE SULFATE 4 MG: 2 INJECTION, SOLUTION INTRAMUSCULAR; INTRAVENOUS at 11:29

## 2021-09-02 RX ADMIN — ONDANSETRON 4 MG: 2 INJECTION INTRAMUSCULAR; INTRAVENOUS at 11:29

## 2021-09-02 RX ADMIN — SODIUM CHLORIDE 1000 ML: 9 INJECTION, SOLUTION INTRAVENOUS at 11:29

## 2021-09-02 ASSESSMENT — ENCOUNTER SYMPTOMS
BACK PAIN: 0
RHINORRHEA: 0
COLOR CHANGE: 0
SORE THROAT: 0
CONSTIPATION: 0
EYE PAIN: 0
BLOOD IN STOOL: 0
WHEEZING: 0
VOMITING: 0
SHORTNESS OF BREATH: 0
EYE REDNESS: 0
EYE DISCHARGE: 0
ABDOMINAL PAIN: 0
NAUSEA: 0
COUGH: 0
DIARRHEA: 0
TROUBLE SWALLOWING: 0

## 2021-09-02 ASSESSMENT — PAIN DESCRIPTION - LOCATION: LOCATION: FLANK

## 2021-09-02 ASSESSMENT — PAIN DESCRIPTION - DESCRIPTORS: DESCRIPTORS: SQUEEZING

## 2021-09-02 ASSESSMENT — PAIN DESCRIPTION - ORIENTATION: ORIENTATION: LEFT;RIGHT

## 2021-09-02 ASSESSMENT — PAIN SCALES - GENERAL: PAINLEVEL_OUTOF10: 10

## 2021-09-02 ASSESSMENT — PAIN DESCRIPTION - PAIN TYPE: TYPE: CHRONIC PAIN;ACUTE PAIN

## 2021-09-02 NOTE — ED PROVIDER NOTES
[facility] ED  EMERGENCY DEPARTMENT ENCOUNTER      Pt Name: Martha Merino  MRN: [id number]  [place] 1965  Date of evaluation: 9/2/2021  Provider: CANDY Pastor CNP    CHIEF COMPLAINT       Chief Complaint   Patient presents with    Flank Pain         HISTORY OF PRESENT ILLNESS   (Location/Symptom, Timing/Onset,Context/Setting, Quality, Duration, Modifying Factors, Severity)  Note limiting factors. Martha Merino is a 50 y.o. female who presents to the emergency department with history reviewed past medical history of hyperlipidemia, hypothyroidism, diabetes, hypertension, systemic lupus erythematosus, migraines, and kidney stones for chief complaint of sudden onset 10 out of 10 constant sharp left-sided flank pain with urinary frequency for the past couple of days. She has no nausea or vomiting with it no fevers or chills. No alleviating modifying factors. Nursing Notes were reviewed. REVIEW OF SYSTEMS    (2-9 systems for level 4, 10 or more for level 5)     Review of Systems   Constitutional: Negative for activity change, appetite change, fatigue and fever. HENT: Negative for congestion, ear pain, rhinorrhea, sore throat and trouble swallowing. Eyes: Negative for pain, discharge and redness. Respiratory: Negative for cough, shortness of breath and wheezing. Cardiovascular: Negative for chest pain and palpitations. Gastrointestinal: Negative for abdominal pain, blood in stool, constipation, diarrhea, nausea and vomiting. Endocrine: Negative for polydipsia and polyuria. Genitourinary: Positive for flank pain and frequency. Negative for decreased urine volume, dysuria and hematuria. Musculoskeletal: Negative for arthralgias, back pain and myalgias. Skin: Negative for color change, rash and wound. Neurological: Negative for dizziness, syncope, weakness, light-headedness and headaches. Psychiatric/Behavioral: Negative for behavioral problems.    All other systems reviewed and are negative. Except as noted above the remainder of the review of systems was reviewed and negative.        PAST MEDICAL HISTORY     Past Medical History:   Diagnosis Date    Anxiety     Asthma     as child    Depression     Depression     dysthymia - following lupus dx age 23    Diabetes mellitus ([address]     Foot fracture, left     GERD (gastroesophageal reflux disease)     Hyperlipidemia     Hypothyroidism     Kidney stones     Lupus ([address]     dr barrera -     Migraine     PONV (postoperative nausea and vomiting)     Renal stone 4/25/2021    Sciatica     left sided     Past Surgical History:   Procedure Laterality Date    APPENDECTOMY      ARTHRODESIS Left 11/26/2019    ARTHRODESIS OF THE FIRST METATARSAL PHALANGEAL JOINT LEFT FOOT performed by Poonam Fernandez DPM at [facility] N/A 4/26/2021    CYSTOSCOPY RIGHT DOUBLE J STENT PLACEMENT. performed by Lynn Lopez MD at [address] [facility] COLONOSCOPY      ENDOSCOPY, COLON, DIAGNOSTIC      FOOT CLOSED REDUCTION Left 1/3/2017    LEFT FOOT PERCUTANEOUS PINNING Shah Baar performed by Leonardo Fountain MD at [address] [facility] [place] LITHOTRIPSY Right 5/5/2021    RIGHT ESWL performed by Lynn Lopez MD at [address] [facility] ESOPHAGOGASTRODUODENOSCOPY TRANSORAL DIAGNOSTIC N/A 3/8/2017    EGD ESOPHAGOGASTRODUODENOSCOPY WITH DILATION performed by Keara Soriano MD at [address] ESOPHAGOGASTRODUODENOSCOPY TRANSORAL DIAGNOSTIC N/A 11/7/2018    EGD ESOPHAGOGASTRODUODENOSCOPY WITH DILATION performed by Keara Soriano MD at [address] [facility] ENDOSCOPY N/A 8/17/2021    EGD WITH BIOPSY AND POLYPECTOMY performed by Víctor Laughlin MD at [address] [facility] History    Marital status: Legally      Spouse name: Not on file    Number of children: Not on file    Years of education: Not on file    Highest education level: Not on file   Occupational History    Not on file   Tobacco Use    Smoking status: Never Smoker    Smokeless tobacco: Never Used   Vaping Use    Vaping Use: Never used   Substance and Sexual Activity    Alcohol use: No     Alcohol/week: 0.0 standard drinks     Comment: denies    Drug use: No     Comment: denies    Sexual activity: Not on file   Other Topics Concern    Not on file   Social History Narrative    Not on file     Social Determinants of Health     Financial Resource Strain:     Difficulty of Paying Living Expenses:    Food Insecurity:     Worried About Running Out of Food in the Last Year:     [address] in the Last Year:    Transportation Needs:     Lack of Transportation (Medical):  Lack of Transportation (Non-Medical):    Physical Activity:     Days of Exercise per Week:     Minutes of Exercise per Session:    Stress:     Feeling of Stress :    Social Connections:     Frequency of Communication with Friends and Family:     Frequency of Social Gatherings with Friends and Family:     Attends Rastafari Services:     Active Member of Clubs or Organizations:     Attends Club or Organization Meetings:     Marital Status:    Intimate Partner Violence:     Fear of Current or Ex-Partner:     Emotionally Abused:     Physically Abused:     Sexually Abused:        SCREENINGS             PHYSICAL EXAM    (up to 7 for level 4, 8 or more for level 5)     ED Triage Vitals   BP Temp Temp Source Pulse Resp SpO2 Height Weight   09/02/21 1052 09/02/21 1048 09/02/21 1048 09/02/21 1048 09/02/21 1048 09/02/21 1048 09/02/21 1048 09/02/21 1048   104/63 97.9 °F (36.6 °C) Temporal 85 16 97 % 5' 8\" (1.727 m) 160 lb (72.6 kg)       Physical Exam  Vitals and nursing note reviewed.    Constitutional:       General: She is not in acute distress. Appearance: She is well-developed. She is not diaphoretic. HENT:      Head: Normocephalic and atraumatic. Nose: Nose normal.   Eyes:      Conjunctiva/sclera: Conjunctivae normal.      Pupils: Pupils are equal, round, and reactive to light. Cardiovascular:      Rate and Rhythm: Normal rate and regular rhythm. Heart sounds: Normal heart sounds. Pulmonary:      Effort: Pulmonary effort is normal. No respiratory distress. Breath sounds: Normal breath sounds. Abdominal:      General: Bowel sounds are normal.      Palpations: Abdomen is soft. Tenderness: There is no abdominal tenderness. Musculoskeletal:      Cervical back: Normal range of motion and neck supple. Skin:     General: Skin is warm and dry. Capillary Refill: Capillary refill takes less than 2 seconds. Findings: No rash. Neurological:      Mental Status: She is alert and oriented to person, place, and time. Cranial Nerves: No cranial nerve deficit. Psychiatric:         Behavior: Behavior normal.         RESULTS     EKG: All EKG's are interpreted by the Emergency Department Physician who either signs or Co-signsthis chart in the absence of a cardiologist.        RADIOLOGY:   Ferdie Coma such as CT, Ultrasound and MRI are read by the radiologist. Plain radiographic images are visualized and preliminarily interpreted by the emergency physician with the below findings:        Interpretation per the Radiologist below, if available at the time ofthis note:    CT ABDOMEN PELVIS WO CONTRAST Additional Contrast? None   Final Result      No acute findings in abdomen and pelvis. 7.2 cm subcutaneous low-attenuation fluid collection in the pelvis.  No evidence to suggest an abscess.            ==========                     ED BEDSIDE ULTRASOUND:   Performed by ED Physician - none    LABS:  Labs Reviewed   COMPREHENSIVE METABOLIC PANEL - Abnormal; Notable for the following components: Result Value    Anion Gap 8 (*)     Glucose 234 (*)     CREATININE 1.10 (*)     GFR Non- 51.8 (*)     Alkaline Phosphatase 159 (*)     All other components within normal limits   CBC WITH AUTO DIFFERENTIAL - Abnormal; Notable for the following components:    Neutrophils Absolute 7.1 (*)     All other components within normal limits   URINE RT REFLEX TO CULTURE - Abnormal; Notable for the following components:    Glucose, Ur 100 (*)     All other components within normal limits       All other labs were within normal range or not returned as of this dictation. EMERGENCY DEPARTMENT COURSE and DIFFERENTIAL DIAGNOSIS/MDM:   Vitals:    Vitals:    09/02/21 1048 09/02/21 1052   BP:  104/63   Pulse: 85    Resp: 16    Temp: 97.9 °F (36.6 °C)    TempSrc: Temporal    SpO2: 97%    Weight: 160 lb (72.6 kg)    Height: 5' 8\" (1.727 m)             MDM   Patient is a 51-year-old female presenting the ER with a chief complaint of left-sided flank pain. Hemodynamically stable nontoxic-appearing. CT abdomen without contrast obtained to rule out kidney stone. Lab work initiated and urinalysis. Patient will be reevaluated. Lab work unremarkable. CT negative. Showing some pelvis fluid which patient instructed to follow-up with OB/GYN in regards to that. Could be muscular. The patient instructed to come back to the ER if worse. Discharged in stable condition with stable vital signs. CRITICAL CARE TIME       CONSULTS:  None    PROCEDURES:  Unless otherwise noted below, none     Procedures    FINAL IMPRESSION      1.  Free fluid in pelvis          DISPOSITION/PLAN   DISPOSITION Decision To Discharge 09/02/2021 01:37:58 PM      PATIENT REFERRED TO:  Aroldo Garrison MD  [address]  [facility] [phone]    Schedule an appointment as soon as possible for a visit       Abel Campos, [address] [place] [address] Rd  [address] [address]  [address]    Schedule an appointment as soon as possible for a visit in 1 day        DISCHARGE MEDICATIONS:  New Prescriptions    No medications on file          (Please notethat portions of this note were completed with a voice recognition program.  Efforts were made to edit the dictations but occasionally words are mis-transcribed.)    CANDY Valladares CNP (electronically signed)  Attending Emergency Physician          CANDY Leavitt CNP  09/02/21 [id number]

## 2021-09-02 NOTE — ED TRIAGE NOTES
Pt in with flank pain that started on tues, c/o hesitation and frequency. Pt has hx of kidney stones. Pt is A&Ox4, skin intact, msps intact, afebrile, breaths are equal and unlabored.

## 2021-09-03 ENCOUNTER — OFFICE VISIT (OUTPATIENT)
Dept: FAMILY MEDICINE CLINIC | Age: 56
End: 2021-09-03
Payer: MEDICARE

## 2021-09-03 ENCOUNTER — TELEPHONE (OUTPATIENT)
Dept: INTERNAL MEDICINE | Age: 56
End: 2021-09-03

## 2021-09-03 VITALS
DIASTOLIC BLOOD PRESSURE: 72 MMHG | HEIGHT: 68 IN | HEART RATE: 80 BPM | WEIGHT: 181 LBS | OXYGEN SATURATION: 99 % | BODY MASS INDEX: 27.43 KG/M2 | SYSTOLIC BLOOD PRESSURE: 118 MMHG | TEMPERATURE: 97.4 F

## 2021-09-03 DIAGNOSIS — R10.9 FLANK PAIN, ACUTE: ICD-10-CM

## 2021-09-03 DIAGNOSIS — R10.31 ACUTE BILATERAL LOWER ABDOMINAL PAIN: Primary | ICD-10-CM

## 2021-09-03 DIAGNOSIS — R10.32 ACUTE BILATERAL LOWER ABDOMINAL PAIN: Primary | ICD-10-CM

## 2021-09-03 PROCEDURE — 3017F COLORECTAL CA SCREEN DOC REV: CPT | Performed by: NURSE PRACTITIONER

## 2021-09-03 PROCEDURE — G8419 CALC BMI OUT NRM PARAM NOF/U: HCPCS | Performed by: NURSE PRACTITIONER

## 2021-09-03 PROCEDURE — 1036F TOBACCO NON-USER: CPT | Performed by: NURSE PRACTITIONER

## 2021-09-03 PROCEDURE — G8427 DOCREV CUR MEDS BY ELIG CLIN: HCPCS | Performed by: NURSE PRACTITIONER

## 2021-09-03 PROCEDURE — 99203 OFFICE O/P NEW LOW 30 MIN: CPT | Performed by: NURSE PRACTITIONER

## 2021-09-03 RX ORDER — NARATRIPTAN 2.5 MG/1
TABLET ORAL
COMMUNITY
Start: 2021-08-09 | End: 2021-09-03 | Stop reason: ALTCHOICE

## 2021-09-03 RX ORDER — NITROFURANTOIN 25; 75 MG/1; MG/1
CAPSULE ORAL
COMMUNITY
Start: 2021-04-26 | End: 2021-09-03 | Stop reason: ALTCHOICE

## 2021-09-03 RX ORDER — CLONAZEPAM 0.5 MG/1
TABLET ORAL
COMMUNITY
Start: 2021-05-27 | End: 2021-09-03 | Stop reason: ALTCHOICE

## 2021-09-03 RX ORDER — OXYCODONE HYDROCHLORIDE AND ACETAMINOPHEN 5; 325 MG/1; MG/1
TABLET ORAL
COMMUNITY
Start: 2021-04-26 | End: 2021-09-03 | Stop reason: ALTCHOICE

## 2021-09-03 RX ORDER — TIZANIDINE 4 MG/1
TABLET ORAL
COMMUNITY
Start: 2021-08-24 | End: 2022-09-25 | Stop reason: ALTCHOICE

## 2021-09-03 RX ORDER — PROPRANOLOL HYDROCHLORIDE 120 MG/1
CAPSULE, EXTENDED RELEASE ORAL
COMMUNITY
Start: 2021-09-01 | End: 2021-09-03

## 2021-09-03 RX ORDER — DICYCLOMINE HYDROCHLORIDE 10 MG/1
CAPSULE ORAL
COMMUNITY
Start: 2021-06-04 | End: 2021-09-03 | Stop reason: ALTCHOICE

## 2021-09-03 RX ORDER — VENLAFAXINE HYDROCHLORIDE 75 MG/1
CAPSULE, EXTENDED RELEASE ORAL
COMMUNITY
Start: 2021-09-02 | End: 2021-09-03 | Stop reason: ALTCHOICE

## 2021-09-03 RX ORDER — QUETIAPINE FUMARATE 25 MG/1
50 TABLET, FILM COATED ORAL NIGHTLY
COMMUNITY
Start: 2021-05-27 | End: 2021-09-03 | Stop reason: ALTCHOICE

## 2021-09-03 RX ORDER — BUSPIRONE HYDROCHLORIDE 5 MG/1
5 TABLET ORAL 3 TIMES DAILY
COMMUNITY
Start: 2021-05-27 | End: 2021-09-03 | Stop reason: ALTCHOICE

## 2021-09-03 SDOH — ECONOMIC STABILITY: FOOD INSECURITY: WITHIN THE PAST 12 MONTHS, THE FOOD YOU BOUGHT JUST DIDN'T LAST AND YOU DIDN'T HAVE MONEY TO GET MORE.: NEVER TRUE

## 2021-09-03 SDOH — ECONOMIC STABILITY: FOOD INSECURITY: WITHIN THE PAST 12 MONTHS, YOU WORRIED THAT YOUR FOOD WOULD RUN OUT BEFORE YOU GOT MONEY TO BUY MORE.: NEVER TRUE

## 2021-09-03 ASSESSMENT — ENCOUNTER SYMPTOMS
NAUSEA: 1
SHORTNESS OF BREATH: 0
BOWEL INCONTINENCE: 0
VOMITING: 0
CONSTIPATION: 0
DIARRHEA: 0
ABDOMINAL PAIN: 1

## 2021-09-03 ASSESSMENT — SOCIAL DETERMINANTS OF HEALTH (SDOH): HOW HARD IS IT FOR YOU TO PAY FOR THE VERY BASICS LIKE FOOD, HOUSING, MEDICAL CARE, AND HEATING?: NOT HARD AT ALL

## 2021-09-03 NOTE — PROGRESS NOTES
[address] PRIMARY CARE  [address] [address]  Dept: [phone]  Dept Fax: [phone]  Loc: [phone]     Subjective     Suzanne Kim 50 y.o. female presents 9/3/21 with   Chief Complaint   Patient presents with    New Patient    Follow-Up from [facility], 9/2/21, fluid in pelvic area       Recent [facility] EMERGENCY [facility] AT [place] ED visit 9/2/21 with CT showing 7.2 cm subcutaneous fluid collection in the pelvis without evidence to suggest abscess and was recommended to f/u with GYN. Flank Pain  This is a new problem. The current episode started in the past 7 days. The problem is unchanged. Pain location: flank. The quality of the pain is described as stabbing and aching. Radiates to: radiates around to bilateral abdomen; left worse than right. The pain is at a severity of 10/10. The pain is the same all the time. The symptoms are aggravated by bending, sitting and lying down. Associated symptoms include abdominal pain. Pertinent negatives include no bladder incontinence, bowel incontinence, chest pain, dysuria, fever, headaches, numbness, paresthesias or pelvic pain. (Positive for urinary frequency) She has tried analgesics (had 1 dose morphine in the ED and 1 dose of zofran) for the symptoms. The treatment provided no relief. Pain is in the LLQ and wraps around back, can not sit back, has gotten a lot worse since yesterday, could not sleep, is unable to sit back     Pt is s/p hysterectomy d/t endometriosis about 30 years ago.     Recent adhesion removal surgery 8/16/21 with Dr. Darion Robison for left lower quadrant abdominal wall exploration with dense fibrotic scar tissue removal from previous bilateral hernia surgery    Has chronic kidney stones     Reviewed the following history:    Past Medical History:   Diagnosis Date    Anxiety     Asthma     as child    Depression     Depression     dysthymia - following lupus dx age 23    Diabetes mellitus ([facility] [address]     Foot fracture, left     GERD (gastroesophageal reflux disease)     Hyperlipidemia     Hypothyroidism     Kidney stones     Lupus ([facility] [address]     dr barrera -     Migraine     PONV (postoperative nausea and vomiting)     Renal stone 4/25/2021    Sciatica     left sided     Past Surgical History:   Procedure Laterality Date    APPENDECTOMY      ARTHRODESIS Left 11/26/2019    ARTHRODESIS OF THE FIRST METATARSAL PHALANGEAL JOINT LEFT FOOT performed by Yara Wilder DPM at [address] [facility] N/A 4/26/2021    CYSTOSCOPY RIGHT DOUBLE J STENT PLACEMENT. performed by Jeff Downs MD at [facility] [address]      COLONOSCOPY      ENDOSCOPY, COLON, DIAGNOSTIC      FOOT CLOSED REDUCTION Left 1/3/2017    LEFT FOOT PERCUTANEOUS PINNING MOTA FRACTURE performed by Smitha Aguila MD at [address]      LITHOTRIPSY Right 5/5/2021    RIGHT ESWL performed by Jeff Downs MD at [facility] ESOPHAGOGASTRODUODENOSCOPY TRANSORAL DIAGNOSTIC N/A 3/8/2017    EGD ESOPHAGOGASTRODUODENOSCOPY WITH DILATION [facility] ESOPHAGOGASTRODUODENOSCOPY TRANSORAL DIAGNOSTIC N/A 11/7/2018    EGD ESOPHAGOGASTRODUODENOSCOPY WITH DILATION performed by Jason Martinez MD at [address] [facility] ENDOSCOPY N/A 8/17/2021    EGD WITH BIOPSY AND POLYPECTOMY performed by Omar Brown MD at [facility]     Family History   Adopted:  Yes       Allergies   Allergen Reactions    Aspirin Swelling     Bloody noses    Cephalosporins     Imitrex [Sumatriptan] Hives    Pcn [Penicillins] Swelling    Seasonal Itching     Itch, sneezing,runny nose, watery eyes    Toradol [Ketorolac Tromethamine] Hives     Shortness of breath    Ultram tablet by mouth 2 times daily  5     No current facility-administered medications on file prior to visit. Review of Systems   Constitutional: Negative for chills and fever. Respiratory: Negative for shortness of breath. Cardiovascular: Negative for chest pain. Gastrointestinal: Positive for abdominal pain and nausea. Negative for bowel incontinence, constipation, diarrhea and vomiting. Genitourinary: Positive for flank pain. Negative for bladder incontinence, dysuria and pelvic pain. Neurological: Negative for numbness, headaches and paresthesias. Objective    Vitals:    09/03/21 0821   BP: 118/72   Pulse: 80   Temp: 97.4 °F (36.3 °C)   TempSrc: Infrared   SpO2: 99%   Weight: 181 lb (82.1 kg)   Height: 5' 8\" (1.727 m)       Physical Exam  Constitutional:       General: She is not in acute distress. Appearance: Normal appearance. She is not ill-appearing or toxic-appearing. HENT:      Head: Normocephalic and atraumatic. Right Ear: Hearing and external ear normal.      Left Ear: Hearing and external ear normal.   Eyes:      General: Lids are normal.      Conjunctiva/sclera: Conjunctivae normal.   Cardiovascular:      Rate and Rhythm: Normal rate and regular rhythm. Heart sounds: Normal heart sounds. Pulmonary:      Effort: Pulmonary effort is normal. No respiratory distress. Breath sounds: Normal breath sounds. No decreased breath sounds, wheezing, rhonchi or rales. Abdominal:      General: Abdomen is protuberant. Bowel sounds are normal.      Palpations: Abdomen is soft. Tenderness: There is abdominal tenderness in the left lower quadrant. There is guarding. Musculoskeletal:      Cervical back: Normal range of motion and neck supple. Skin:     General: Skin is warm and dry. Neurological:      General: No focal deficit present. Mental Status: She is alert and oriented to person, place, and time.    Psychiatric:         Attention and Perception: Attention normal.         Mood and Affect: Mood normal.         Speech: Speech normal.         Behavior: Behavior normal.         Thought Content: Thought content normal.         Cognition and Memory: Cognition normal.         Judgment: Judgment normal.       Assessment and Plan    Suzanne Kim 50 y.o. female presenting for abdominal pain    1. Acute bilateral lower abdominal pain  - Patient in severe pain, guarding abdomen, pain is worsening. Advised for patient to go back to ED. 2. Flank pain, acute        Return if symptoms worsen or fail to improve, for follow up. Side effects and adverse effects of any medication prescribed today, as well as treatment plan/rationale, follow-up care, and result expectations have been discussed with the patient. Expresses understanding and desires to proceed with treatment plan. Discussed signs and symptoms which require immediate follow-up in ED/call to 911. Understanding verbalized. I have reviewed and updated the electronic medical record.     Aleks Guerin, CANDY - CNP

## 2021-09-03 NOTE — TELEPHONE ENCOUNTER
Pt surgeon would like a call from you, [phone], regarding pt condition so she can be directly admitted.

## 2021-09-30 ENCOUNTER — TELEPHONE (OUTPATIENT)
Dept: GASTROENTEROLOGY | Age: 56
End: 2021-09-30

## 2021-10-11 ENCOUNTER — APPOINTMENT (OUTPATIENT)
Dept: GENERAL RADIOLOGY | Age: 56
End: 2021-10-11
Payer: MEDICARE

## 2021-10-11 ENCOUNTER — HOSPITAL ENCOUNTER (EMERGENCY)
Age: 56
Discharge: HOME | End: 2021-10-11
Payer: MEDICARE

## 2021-10-11 VITALS
OXYGEN SATURATION: 97 % | HEART RATE: 116 BPM | SYSTOLIC BLOOD PRESSURE: 112 MMHG | DIASTOLIC BLOOD PRESSURE: 77 MMHG | TEMPERATURE: 98.7 F | RESPIRATION RATE: 20 BRPM

## 2021-10-11 DIAGNOSIS — R07.9 CHEST PAIN, UNSPECIFIED TYPE: Primary | ICD-10-CM

## 2021-10-11 LAB
ALBUMIN SERPL-MCNC: 4.1 G/DL (ref 3.5–4.6)
ALP BLD-CCNC: 142 U/L (ref 40–130)
ALT SERPL-CCNC: 14 U/L (ref 0–33)
ANION GAP SERPL CALCULATED.3IONS-SCNC: 11 MEQ/L (ref 9–15)
APTT: 26.8 SEC (ref 24.4–36.8)
AST SERPL-CCNC: 13 U/L (ref 0–35)
BASOPHILS ABSOLUTE: 0.1 K/UL (ref 0–0.2)
BASOPHILS RELATIVE PERCENT: 0.9 %
BILIRUB SERPL-MCNC: <0.2 MG/DL (ref 0.2–0.7)
BUN BLDV-MCNC: 9 MG/DL (ref 6–20)
CALCIUM SERPL-MCNC: 9.6 MG/DL (ref 8.5–9.9)
CHLORIDE BLD-SCNC: 98 MEQ/L (ref 95–107)
CO2: 27 MEQ/L (ref 20–31)
CREAT SERPL-MCNC: 1.12 MG/DL (ref 0.5–0.9)
EOSINOPHILS ABSOLUTE: 0.1 K/UL (ref 0–0.7)
EOSINOPHILS RELATIVE PERCENT: 1.2 %
GFR AFRICAN AMERICAN: >60
GFR NON-AFRICAN AMERICAN: 50.7
GLOBULIN: 3.2 G/DL (ref 2.3–3.5)
GLUCOSE BLD-MCNC: 271 MG/DL (ref 70–99)
HCT VFR BLD CALC: 42.1 % (ref 37–47)
HEMOGLOBIN: 14.4 G/DL (ref 12–16)
INR BLD: 0.9
LYMPHOCYTES ABSOLUTE: 2 K/UL (ref 1–4.8)
LYMPHOCYTES RELATIVE PERCENT: 18.7 %
MCH RBC QN AUTO: 29.2 PG (ref 27–31.3)
MCHC RBC AUTO-ENTMCNC: 34.1 % (ref 33–37)
MCV RBC AUTO: 85.6 FL (ref 82–100)
MONOCYTES ABSOLUTE: 0.6 K/UL (ref 0.2–0.8)
MONOCYTES RELATIVE PERCENT: 5.6 %
NEUTROPHILS ABSOLUTE: 8 K/UL (ref 1.4–6.5)
NEUTROPHILS RELATIVE PERCENT: 73.6 %
PDW BLD-RTO: 14.4 % (ref 11.5–14.5)
PLATELET # BLD: 332 K/UL (ref 130–400)
POTASSIUM SERPL-SCNC: 3.7 MEQ/L (ref 3.4–4.9)
PROTHROMBIN TIME: 12.6 SEC (ref 12.3–14.9)
RBC # BLD: 4.91 M/UL (ref 4.2–5.4)
SODIUM BLD-SCNC: 136 MEQ/L (ref 135–144)
TOTAL CK: 50 U/L (ref 0–170)
TOTAL PROTEIN: 7.3 G/DL (ref 6.3–8)
TROPONIN: <0.01 NG/ML (ref 0–0.01)
TSH SERPL DL<=0.05 MIU/L-ACNC: 0.74 UIU/ML (ref 0.44–3.86)
WBC # BLD: 10.9 K/UL (ref 4.8–10.8)

## 2021-10-11 PROCEDURE — 36415 COLL VENOUS BLD VENIPUNCTURE: CPT

## 2021-10-11 PROCEDURE — 6370000000 HC RX 637 (ALT 250 FOR IP): Performed by: PHYSICIAN ASSISTANT

## 2021-10-11 PROCEDURE — 85730 THROMBOPLASTIN TIME PARTIAL: CPT

## 2021-10-11 PROCEDURE — 84484 ASSAY OF TROPONIN QUANT: CPT

## 2021-10-11 PROCEDURE — 99284 EMERGENCY DEPT VISIT MOD MDM: CPT

## 2021-10-11 PROCEDURE — 71045 X-RAY EXAM CHEST 1 VIEW: CPT

## 2021-10-11 PROCEDURE — 6360000002 HC RX W HCPCS: Performed by: PHYSICIAN ASSISTANT

## 2021-10-11 PROCEDURE — 84443 ASSAY THYROID STIM HORMONE: CPT

## 2021-10-11 PROCEDURE — 96375 TX/PRO/DX INJ NEW DRUG ADDON: CPT

## 2021-10-11 PROCEDURE — 93005 ELECTROCARDIOGRAM TRACING: CPT | Performed by: PHYSICIAN ASSISTANT

## 2021-10-11 PROCEDURE — 80053 COMPREHEN METABOLIC PANEL: CPT

## 2021-10-11 PROCEDURE — 96374 THER/PROPH/DIAG INJ IV PUSH: CPT

## 2021-10-11 PROCEDURE — 85610 PROTHROMBIN TIME: CPT

## 2021-10-11 PROCEDURE — 82550 ASSAY OF CK (CPK): CPT

## 2021-10-11 PROCEDURE — 85025 COMPLETE CBC W/AUTO DIFF WBC: CPT

## 2021-10-11 RX ORDER — ONDANSETRON 2 MG/ML
4 INJECTION INTRAMUSCULAR; INTRAVENOUS ONCE
Status: COMPLETED | OUTPATIENT
Start: 2021-10-11 | End: 2021-10-11

## 2021-10-11 RX ORDER — NITROGLYCERIN 0.4 MG/1
0.4 TABLET SUBLINGUAL EVERY 5 MIN PRN
Status: DISCONTINUED | OUTPATIENT
Start: 2021-10-11 | End: 2021-10-11 | Stop reason: HOSPADM

## 2021-10-11 RX ORDER — MORPHINE SULFATE 4 MG/ML
4 INJECTION, SOLUTION INTRAMUSCULAR; INTRAVENOUS ONCE
Status: COMPLETED | OUTPATIENT
Start: 2021-10-11 | End: 2021-10-11

## 2021-10-11 RX ORDER — HYDROCODONE BITARTRATE AND ACETAMINOPHEN 5; 325 MG/1; MG/1
1 TABLET ORAL EVERY 6 HOURS PRN
Qty: 8 TABLET | Refills: 0 | Status: SHIPPED | OUTPATIENT
Start: 2021-10-11 | End: 2021-10-13

## 2021-10-11 RX ADMIN — NITROGLYCERIN 0.4 MG: 0.4 TABLET, ORALLY DISINTEGRATING SUBLINGUAL at 15:34

## 2021-10-11 RX ADMIN — ONDANSETRON 4 MG: 2 INJECTION INTRAMUSCULAR; INTRAVENOUS at 16:45

## 2021-10-11 RX ADMIN — NITROGLYCERIN 0.4 MG: 0.4 TABLET, ORALLY DISINTEGRATING SUBLINGUAL at 15:49

## 2021-10-11 RX ADMIN — MORPHINE SULFATE 4 MG: 4 INJECTION INTRAVENOUS at 16:45

## 2021-10-11 RX ADMIN — NITROGLYCERIN 0.4 MG: 0.4 TABLET, ORALLY DISINTEGRATING SUBLINGUAL at 15:40

## 2021-10-11 ASSESSMENT — PAIN SCALES - GENERAL
PAINLEVEL_OUTOF10: 9
PAINLEVEL_OUTOF10: 9

## 2021-10-11 ASSESSMENT — ENCOUNTER SYMPTOMS
COLOR CHANGE: 0
ABDOMINAL PAIN: 0
SHORTNESS OF BREATH: 0
EYE DISCHARGE: 0
SORE THROAT: 0
ABDOMINAL DISTENTION: 0
NAUSEA: 0
DIARRHEA: 0
RHINORRHEA: 0
CONSTIPATION: 0
VOMITING: 0

## 2021-10-11 ASSESSMENT — PAIN DESCRIPTION - LOCATION: LOCATION: ABDOMEN;CHEST

## 2021-10-11 NOTE — ED TRIAGE NOTES
Pt arrived from home with c/o chest pain  Pt states it started as a dull ache last night and progressed to a sharp pain that woke her up  Pt states it woke her up from her sleep and she was also diaphoretic at the time  Pt is tachy at 127  RR are even and unlabored  No distress on arrival  EKG done and given to [address] [place] [address] PA who is [facility]

## 2021-10-11 NOTE — ED PROVIDER NOTES
[address] [facility] ED  eMERGENCY dEPARTMENT eNCOUnter      Pt Name: Musa Bacon  MRN: [id number]  [place] 1965  Date of evaluation: 10/11/2021  Provider: Smiley Aguirre PA-C    CHIEF COMPLAINT       Chief Complaint   Patient presents with    Chest Pain         HISTORY OF PRESENT ILLNESS   (Location/Symptom, Timing/Onset,Context/Setting, Quality, Duration, Modifying Factors, Severity)  Note limiting factors. Musa Bacon is a 49 y.o. female who presents to the emergency department with complaint of left-sided chest pain which patient states started yesterday for her, she states been intermittent nature it does come and go, she states it was gone but 9:00 last evening, and then reoccurred again 4 AM this morning, she is describes a sharp stabbing pain to the left side of her chest, there is pain with deep inspiration, or movement, there is cough which is dry nonproductive, no fevers, no shortness of breath. Patient denies any past cardiac history, she states similar event in the past approximately 10 years ago related to her lupus. She stating her current pain at this time is a 7 or 8 out of 10. Left chest wall with no radiation. Past medical history significant for anxiety, asthma, depression, diabetes, gastric reflux, hyperlipidemia, hypothyroidism, lupus, migraines, sciatica. HPI    NursingNotes were reviewed. REVIEW OF SYSTEMS    (2-9 systems for level 4, 10 or more for level 5)     Review of Systems   Constitutional: Negative for activity change and appetite change. HENT: Negative for congestion, ear discharge, ear pain, nosebleeds, rhinorrhea and sore throat. Eyes: Negative for discharge. Respiratory: Negative for shortness of breath. Cardiovascular: Positive for chest pain. Negative for palpitations and leg swelling. Gastrointestinal: Negative for abdominal distention, abdominal pain, constipation, diarrhea, nausea and vomiting.    Genitourinary: Negative for difficulty urinating and dysuria. Musculoskeletal: Negative for arthralgias. Skin: Negative for color change, pallor, rash and wound. Neurological: Negative for dizziness, tremors, syncope, weakness, numbness and headaches. Psychiatric/Behavioral: Negative for agitation and confusion. Except as noted above the remainder of the review of systems was reviewed and negative.        PAST MEDICAL HISTORY     Past Medical History:   Diagnosis Date    Anxiety     Asthma     as child    Depression     Depression     dysthymia - following lupus dx age 23    [facility] ([facility] [address]     Foot fracture, left     GERD (gastroesophageal reflux disease)     Hyperlipidemia     Hypothyroidism     Kidney stones     Lupus ([facility] [address]     dr barrera -     Migraine     PONV (postoperative nausea and vomiting)     Renal stone 4/25/2021    Sciatica     left sided         SURGICALHISTORY       Past Surgical History:   Procedure Laterality Date    APPENDECTOMY      ARTHRODESIS Left 11/26/2019    ARTHRODESIS OF THE FIRST METATARSAL PHALANGEAL JOINT LEFT FOOT performed by Melva Vidal DPM at [address] [facility] N/A 4/26/2021    CYSTOSCOPY RIGHT DOUBLE J STENT PLACEMENT. performed by Aristides Serrano MD at [address] [facility] COLONOSCOPY      ENDOSCOPY, COLON, DIAGNOSTIC      FOOT CLOSED REDUCTION Left 1/3/2017    LEFT FOOT PERCUTANEOUS PINNING Rachael Cecille performed by Donita Mcpherson MD at [address] [facility] [place] LITHOTRIPSY Right 5/5/2021    RIGHT ESWL performed by Aristides Serrano MD at [address] [facility] ESOPHAGOGASTRODUODENOSCOPY TRANSORAL DIAGNOSTIC N/A 3/8/2017    EGD ESOPHAGOGASTRODUODENOSCOPY WITH DILATION performed by Stephanie Hancock MD at . [address] ESOPHAGOGASTRODUODENOSCOPY TRANSORAL DIAGNOSTIC N/A 11/7/2018    EGD ESOPHAGOGASTRODUODENOSCOPY WITH DILATION performed by Stephanie Hancock MD at [facility] ENDOSCOPY      UPPER GASTROINTESTINAL ENDOSCOPY N/A 8/17/2021    EGD WITH BIOPSY AND POLYPECTOMY performed by Gallito Mathis MD at [address] [place] [address]       Previous Medications    ALBUTEROL SULFATE HFA (VENTOLIN HFA) 108 (90 BASE) MCG/ACT INHALER    Inhale 2 puffs into the lungs every 6 hours as needed for Wheezing    BLOOD GLUCOSE MONITOR KIT AND SUPPLIES    Test 3 times a day & as needed for symptoms of irregular blood glucose. BLOOD GLUCOSE MONITOR KIT AND SUPPLIES    Please give 1 meter covered by insurance Dx E11.65    BLOOD GLUCOSE MONITOR STRIPS    Pt test 3x daily Dx E11.65    BLOOD GLUCOSE MONITORING SUPPL (TRUE TRACK BLOOD GLUCOSE) CAROLE    Check blood sugars twice daily or as directed. BLOOD GLUCOSE TEST STRIPS (ASCENSIA AUTODISC VI;ONE TOUCH ULTRA TEST VI) STRIP    Check blood sugars twice daily or as directed. [employer] UNILET LANCETS 30G MISC    Check blood sugars twice daily or as directed. DULOXETINE (CYMBALTA) 30 MG EXTENDED RELEASE CAPSULE    TAKE 1 CAPSULE BY MOUTH EVERY DAY    ESOMEPRAZOLE (NEXIUM) 40 MG DELAYED RELEASE CAPSULE    Take 1 capsule by mouth daily    ESTRADIOL (CLIMARA) 0.025 MG/24HR    Place 0.025 patches onto the skin every 7 days On mondays    HYDROXYCHLOROQUINE (PLAQUENIL) 200 MG TABLET    Take 1 tablet by mouth 2 times daily    INSULIN ASPART (NOVOLOG FLEXPEN) 100 UNIT/ML INJECTION PEN    Inject 60 units tid with meals    INSULIN GLARGINE (LANTUS SOLOSTAR) 100 UNIT/ML INJECTION PEN    160  units at bedtime    INSULIN PEN NEEDLE (PEN NEEDLES) 33G X 4 MM MISC    1 each by Does not apply route three times daily    LANCETS MISC    Pt test 3x daily Dx E11.65    LEVOTHYROXINE (SYNTHROID) 50 MCG TABLET    Take 1 tablet by mouth daily    PREGABALIN (LYRICA) 75 MG CAPSULE    Take 75 mg by mouth daily.     ROSUVASTATIN (CRESTOR) 40 MG TABLET    Take 1 tablet by mouth daily    TIZANIDINE (ZANAFLEX) 4 MG TABLET    TAKE 1 TABLET 3 TIMES DAILY as needed. ALLERGIES     Aspirin, Cephalosporins, Imitrex [sumatriptan], Pcn [penicillins], Seasonal, Toradol [ketorolac tromethamine], and Ultram [tramadol]    FAMILY HISTORY       Family History   Adopted: Yes          SOCIAL HISTORY       Social History     Socioeconomic History    Marital status: Legally      Spouse name: Not on file    Number of children: Not on file    Years of education: Not on file    Highest education level: Not on file   Occupational History    Not on file   Tobacco Use    Smoking status: Never Smoker    Smokeless tobacco: Never Used   Vaping Use    Vaping Use: Never used   Substance and Sexual Activity    Alcohol use: No     Alcohol/week: 0.0 standard drinks     Comment: denies    Drug use: No     Comment: denies    Sexual activity: Not on file   Other Topics Concern    Not on file   Social History Narrative    Not on file     Social Determinants of Health     Financial Resource Strain: Low Risk     Difficulty of Paying Living Expenses: Not hard at all   Food Insecurity: No Food Insecurity    Worried About [employer] [employer] in the Last Year: Never true    [facility] in the Last Year: Never true   Transportation Needs:     Lack of Transportation (Medical):      Lack of Transportation (Non-Medical):    Physical Activity:     Days of Exercise per Week:     Minutes of Exercise per Session:    Stress:     Feeling of Stress :    Social Connections:     Frequency of Communication with Friends and Family:     Frequency of Social Gatherings with Friends and Family:     Attends Hindu Services:     Active Member of Clubs or Organizations:     Attends Club or Organization Meetings:     Marital Status:    Intimate Partner Violence:     Fear of Current or Ex-Partner:     Emotionally Abused:     Physically Abused:     Sexually Abused:        SCREENINGS @FLOW([id number])@      PHYSICAL EXAM    (up to 7 for level 4, 8 or more for level 5)     ED Triage Vitals [10/11/21 1455]   BP Temp Temp Source Pulse Resp SpO2 Height Weight   120/77 98.7 °F (37.1 °C) Oral 124 20 96 % -- --       Physical Exam  Vitals and nursing note reviewed. Constitutional:       General: She is not in acute distress. Appearance: Normal appearance. She is well-developed. She is not ill-appearing, toxic-appearing or diaphoretic. HENT:      Head: Normocephalic. Right Ear: Tympanic membrane normal.      Left Ear: Tympanic membrane normal.      Nose: No congestion. Mouth/Throat:      Mouth: Mucous membranes are moist.      Pharynx: No oropharyngeal exudate or posterior oropharyngeal erythema. Eyes:      Extraocular Movements: Extraocular movements intact. Conjunctiva/sclera: Conjunctivae normal.      Pupils: Pupils are equal, round, and reactive to light. Neck:      Vascular: No JVD. Trachea: No tracheal deviation. Cardiovascular:      Rate and Rhythm: Tachycardia present. Pulses: Normal pulses. Heart sounds: Normal heart sounds. No murmur heard. No friction rub. No gallop. Pulmonary:      Effort: Pulmonary effort is normal. No tachypnea, accessory muscle usage, respiratory distress or retractions. Breath sounds: Normal breath sounds. No stridor. No wheezing, rhonchi or rales. Chest:      Chest wall: No tenderness. Abdominal:      General: Abdomen is flat. Bowel sounds are normal. There is no distension or abdominal bruit. Palpations: There is no shifting dullness, fluid wave, hepatomegaly, splenomegaly, mass or pulsatile mass. Tenderness: There is no abdominal tenderness. There is no right CVA tenderness, left CVA tenderness, guarding or rebound. Negative signs include Simons's sign, Rovsing's sign and McBurney's sign.       Comments: Abdomen soft nondistended nontender no guarding mass rebound, no CVA tenderness   Musculoskeletal: General: No deformity. Cervical back: Normal range of motion and neck supple. No rigidity. Skin:     General: Skin is warm and dry. Capillary Refill: Capillary refill takes less than 2 seconds. Coloration: Skin is not jaundiced. Neurological:      General: No focal deficit present. Mental Status: She is alert and oriented to person, place, and time. Mental status is at baseline. Cranial Nerves: No cranial nerve deficit. Sensory: No sensory deficit. Motor: No weakness. Coordination: Coordination normal.   Psychiatric:         Mood and Affect: Mood normal.         DIAGNOSTIC RESULTS     EKG: All EKG's are interpreted by the Emergency Department Physician who either signs or Co-signsthis chart in the absence of a cardiologist.    EKG shows sinus tachycardia at 118 bpm there is T wave inversions in leads V1 V2 no ventricular ectopy QTC is 428 ms    RADIOLOGY:   Non-plain filmimages such as CT, Ultrasound and MRI are read by the radiologist. Plain radiographic images are visualized and preliminarily interpreted by the emergency physician with the below findings:        Interpretation per the Radiologist below, if available at the time ofthis note:    XR CHEST PORTABLE   Final Result   NO ACUTE CARDIOPULMONARY DISEASE.             ED BEDSIDE ULTRASOUND:   Performed by ED Physician - none    LABS:  Labs Reviewed   COMPREHENSIVE METABOLIC PANEL - Abnormal; Notable for the following components:       Result Value    Glucose 271 (*)     CREATININE 1.12 (*)     GFR Non- 50.7 (*)     Alkaline Phosphatase 142 (*)     All other components within normal limits   CBC WITH AUTO DIFFERENTIAL - Abnormal; Notable for the following components:    WBC 10.9 (*)     Neutrophils Absolute 8.0 (*)     All other components within normal limits   TROPONIN   CK   PROTIME-INR   APTT   TSH WITHOUT REFLEX   URINE DRUG SCREEN       All other labs were within normal range or not returned as of this dictation. EMERGENCY DEPARTMENT COURSE and DIFFERENTIAL DIAGNOSIS/MDM:   Vitals:    Vitals:    10/11/21 1455 10/11/21 1530   BP: 120/77 112/77   Pulse: 124 116   Resp: 20 20   Temp: 98.7 °F (37.1 °C)    TempSrc: Oral    SpO2: 96% 97%          MDM  Number of Diagnoses or Management Options  Chest pain, unspecified type  Diagnosis management comments: Patient presented ED with complaint of left-sided chest pain which he states started yesterday around 9 PM in the evening, she states it did seem to resolve and go away, then she had a recurrence again at 4 AM this morning. She states been intermittently persistent throughout the day, no cough, but some mild nausea with onset. Her pain seems to be reproducible by palpation, or deep inspiration or movement. She denies any acute injury. Chest x-ray showed no acute pulmonary process, cardiac enzymes are negative, EKG shows no acute abnormality. Patient was medicated initially with nitroglycerin without any significant relief, she was given 4 mg of morphine, she states her pain is now resolved. Patient's findings are most consistent with that of chest wall pain, she was given a prescription for pain medication for home, and advised to contact her family physician next 2 to 3 days, if she has any worsening symptoms, she was advised to return to the ER. CRITICAL CARE TIME   Total Critical Care time was  minutes, excluding separately reportableprocedures. There was a high probability of clinicallysignificant/life threatening deterioration in the patient's condition which required my urgent intervention. CONSULTS:  None    PROCEDURES:  Unless otherwise noted below, none     Procedures    FINAL IMPRESSION      1.  Chest pain, unspecified type          DISPOSITION/PLAN   DISPOSITION Decision To Discharge 10/11/2021 04:50:37 PM      PATIENT REFERRED TO:  Jose Rangel MD  [address]  [place] [address]  [phone]    In 3

## 2021-10-12 LAB
EKG ATRIAL RATE: 118 BPM
EKG P AXIS: 55 DEGREES
EKG P-R INTERVAL: 132 MS
EKG Q-T INTERVAL: 306 MS
EKG QRS DURATION: 70 MS
EKG QTC CALCULATION (BAZETT): 428 MS
EKG R AXIS: 92 DEGREES
EKG T AXIS: 45 DEGREES
EKG VENTRICULAR RATE: 118 BPM

## 2021-10-12 PROCEDURE — 93010 ELECTROCARDIOGRAM REPORT: CPT | Performed by: INTERNAL MEDICINE

## 2021-10-21 ENCOUNTER — APPOINTMENT (OUTPATIENT)
Dept: CT IMAGING | Age: 56
End: 2021-10-21
Payer: MEDICARE

## 2021-10-21 ENCOUNTER — HOSPITAL ENCOUNTER (EMERGENCY)
Age: 56
Discharge: HOME | End: 2021-10-21
Attending: STUDENT IN AN ORGANIZED HEALTH CARE EDUCATION/TRAINING PROGRAM
Payer: MEDICARE

## 2021-10-21 VITALS
DIASTOLIC BLOOD PRESSURE: 73 MMHG | WEIGHT: 160 LBS | SYSTOLIC BLOOD PRESSURE: 105 MMHG | RESPIRATION RATE: 20 BRPM | TEMPERATURE: 98.9 F | BODY MASS INDEX: 24.25 KG/M2 | HEIGHT: 68 IN | HEART RATE: 92 BPM | OXYGEN SATURATION: 95 %

## 2021-10-21 DIAGNOSIS — R10.9 ACUTE RIGHT FLANK PAIN: Primary | ICD-10-CM

## 2021-10-21 DIAGNOSIS — Z79.4 TYPE 2 DIABETES MELLITUS WITH HYPERGLYCEMIA, WITH LONG-TERM CURRENT USE OF INSULIN (HCC): ICD-10-CM

## 2021-10-21 DIAGNOSIS — E11.65 TYPE 2 DIABETES MELLITUS WITH HYPERGLYCEMIA, WITH LONG-TERM CURRENT USE OF INSULIN (HCC): ICD-10-CM

## 2021-10-21 DIAGNOSIS — E86.0 DEHYDRATION: ICD-10-CM

## 2021-10-21 LAB
ALBUMIN SERPL-MCNC: 4.2 G/DL (ref 3.5–4.6)
ALP BLD-CCNC: 144 U/L (ref 40–130)
ALT SERPL-CCNC: 13 U/L (ref 0–33)
AMPHETAMINE SCREEN, URINE: ABNORMAL
ANION GAP SERPL CALCULATED.3IONS-SCNC: 12 MEQ/L (ref 9–15)
AST SERPL-CCNC: 14 U/L (ref 0–35)
BARBITURATE SCREEN URINE: ABNORMAL
BASE EXCESS VENOUS: 3 (ref -3–3)
BASOPHILS ABSOLUTE: 0.1 K/UL (ref 0–0.2)
BASOPHILS RELATIVE PERCENT: 1 %
BENZODIAZEPINE SCREEN, URINE: POSITIVE
BILIRUB SERPL-MCNC: 0.4 MG/DL (ref 0.2–0.7)
BILIRUBIN URINE: NEGATIVE
BLOOD, URINE: NEGATIVE
BUN BLDV-MCNC: 9 MG/DL (ref 6–20)
C-REACTIVE PROTEIN: 8.6 MG/L (ref 0–5)
CALCIUM IONIZED: 1.16 MMOL/L (ref 1.12–1.32)
CALCIUM SERPL-MCNC: 9.6 MG/DL (ref 8.5–9.9)
CANNABINOID SCREEN URINE: ABNORMAL
CHLORIDE BLD-SCNC: 99 MEQ/L (ref 95–107)
CLARITY: CLEAR
CO2: 28 MEQ/L (ref 20–31)
COCAINE METABOLITE SCREEN URINE: ABNORMAL
COLOR: YELLOW
CREAT SERPL-MCNC: 1.21 MG/DL (ref 0.5–0.9)
EOSINOPHILS ABSOLUTE: 0.2 K/UL (ref 0–0.7)
EOSINOPHILS RELATIVE PERCENT: 1.6 %
GFR AFRICAN AMERICAN: 56.1
GFR AFRICAN AMERICAN: >60
GFR NON-AFRICAN AMERICAN: 46.4
GFR NON-AFRICAN AMERICAN: 52
GLOBULIN: 2.9 G/DL (ref 2.3–3.5)
GLUCOSE BLD-MCNC: 152 MG/DL (ref 70–99)
GLUCOSE BLD-MCNC: 153 MG/DL (ref 60–115)
GLUCOSE URINE: NEGATIVE MG/DL
HCO3 VENOUS: 28.2 MMOL/L (ref 23–29)
HCT VFR BLD CALC: 44.8 % (ref 37–47)
HEMOGLOBIN: 15.3 G/DL (ref 12–16)
HEMOGLOBIN: 16.1 GM/DL (ref 12–16)
KETONES, URINE: ABNORMAL MG/DL
LACTATE: 3.43 MMOL/L (ref 0.4–2)
LEUKOCYTE ESTERASE, URINE: NEGATIVE
LYMPHOCYTES ABSOLUTE: 2.5 K/UL (ref 1–4.8)
LYMPHOCYTES RELATIVE PERCENT: 21.9 %
Lab: ABNORMAL
MCH RBC QN AUTO: 29.5 PG (ref 27–31.3)
MCHC RBC AUTO-ENTMCNC: 34.1 % (ref 33–37)
MCV RBC AUTO: 86.6 FL (ref 82–100)
METHADONE SCREEN, URINE: ABNORMAL
MONOCYTES ABSOLUTE: 0.6 K/UL (ref 0.2–0.8)
MONOCYTES RELATIVE PERCENT: 5.2 %
NEUTROPHILS ABSOLUTE: 8.1 K/UL (ref 1.4–6.5)
NEUTROPHILS RELATIVE PERCENT: 70.3 %
NITRITE, URINE: NEGATIVE
O2 SAT, VEN: 60 %
OPIATE SCREEN URINE: ABNORMAL
OXYCODONE URINE: ABNORMAL
PCO2, VEN: 45.4 MM HG (ref 40–50)
PDW BLD-RTO: 14.7 % (ref 11.5–14.5)
PERFORMED ON: ABNORMAL
PH UA: 5 (ref 5–9)
PH VENOUS: 7.4 (ref 7.35–7.45)
PHENCYCLIDINE SCREEN URINE: ABNORMAL
PLATELET # BLD: 315 K/UL (ref 130–400)
PO2, VEN: 31 MM HG
POC CHLORIDE: 102 MEQ/L (ref 99–110)
POC CREATININE: 1.1 MG/DL (ref 0.6–1.1)
POC HEMATOCRIT: 47 % (ref 36–48)
POC POTASSIUM: 4 MEQ/L (ref 3.5–5.1)
POC SAMPLE TYPE: ABNORMAL
POC SODIUM: 141 MEQ/L (ref 136–145)
POTASSIUM SERPL-SCNC: 3.9 MEQ/L (ref 3.4–4.9)
PROCALCITONIN: 0.11 NG/ML (ref 0–0.15)
PROPOXYPHENE SCREEN: ABNORMAL
PROTEIN UA: ABNORMAL MG/DL
RBC # BLD: 5.17 M/UL (ref 4.2–5.4)
SODIUM BLD-SCNC: 139 MEQ/L (ref 135–144)
SPECIFIC GRAVITY UA: 1.02 (ref 1–1.03)
TCO2 CALC VENOUS: 30 MMOL/L
TOTAL CK: 61 U/L (ref 0–170)
TOTAL PROTEIN: 7.1 G/DL (ref 6.3–8)
URINE REFLEX TO CULTURE: ABNORMAL
UROBILINOGEN, URINE: 0.2 E.U./DL
WBC # BLD: 11.5 K/UL (ref 4.8–10.8)

## 2021-10-21 PROCEDURE — 84295 ASSAY OF SERUM SODIUM: CPT

## 2021-10-21 PROCEDURE — 80053 COMPREHEN METABOLIC PANEL: CPT

## 2021-10-21 PROCEDURE — 96374 THER/PROPH/DIAG INJ IV PUSH: CPT

## 2021-10-21 PROCEDURE — 6360000002 HC RX W HCPCS: Performed by: STUDENT IN AN ORGANIZED HEALTH CARE EDUCATION/TRAINING PROGRAM

## 2021-10-21 PROCEDURE — 81003 URINALYSIS AUTO W/O SCOPE: CPT

## 2021-10-21 PROCEDURE — 85014 HEMATOCRIT: CPT

## 2021-10-21 PROCEDURE — 85025 COMPLETE CBC W/AUTO DIFF WBC: CPT

## 2021-10-21 PROCEDURE — 36415 COLL VENOUS BLD VENIPUNCTURE: CPT

## 2021-10-21 PROCEDURE — 80307 DRUG TEST PRSMV CHEM ANLYZR: CPT

## 2021-10-21 PROCEDURE — 74150 CT ABDOMEN W/O CONTRAST: CPT

## 2021-10-21 PROCEDURE — 82803 BLOOD GASES ANY COMBINATION: CPT

## 2021-10-21 PROCEDURE — 96375 TX/PRO/DX INJ NEW DRUG ADDON: CPT

## 2021-10-21 PROCEDURE — 2580000003 HC RX 258: Performed by: STUDENT IN AN ORGANIZED HEALTH CARE EDUCATION/TRAINING PROGRAM

## 2021-10-21 PROCEDURE — 6370000000 HC RX 637 (ALT 250 FOR IP): Performed by: STUDENT IN AN ORGANIZED HEALTH CARE EDUCATION/TRAINING PROGRAM

## 2021-10-21 PROCEDURE — 84132 ASSAY OF SERUM POTASSIUM: CPT

## 2021-10-21 PROCEDURE — 36600 WITHDRAWAL OF ARTERIAL BLOOD: CPT

## 2021-10-21 PROCEDURE — 99285 EMERGENCY DEPT VISIT HI MDM: CPT

## 2021-10-21 PROCEDURE — 86140 C-REACTIVE PROTEIN: CPT

## 2021-10-21 PROCEDURE — 82550 ASSAY OF CK (CPK): CPT

## 2021-10-21 PROCEDURE — 83605 ASSAY OF LACTIC ACID: CPT

## 2021-10-21 PROCEDURE — 82330 ASSAY OF CALCIUM: CPT

## 2021-10-21 PROCEDURE — 82565 ASSAY OF CREATININE: CPT

## 2021-10-21 PROCEDURE — 84145 PROCALCITONIN (PCT): CPT

## 2021-10-21 PROCEDURE — 82435 ASSAY OF BLOOD CHLORIDE: CPT

## 2021-10-21 RX ORDER — TAMSULOSIN HYDROCHLORIDE 0.4 MG/1
0.4 CAPSULE ORAL ONCE
Status: COMPLETED | OUTPATIENT
Start: 2021-10-21 | End: 2021-10-21

## 2021-10-21 RX ORDER — OXYCODONE HYDROCHLORIDE AND ACETAMINOPHEN 5; 325 MG/1; MG/1
1 TABLET ORAL ONCE
Status: COMPLETED | OUTPATIENT
Start: 2021-10-21 | End: 2021-10-21

## 2021-10-21 RX ORDER — DICYCLOMINE HYDROCHLORIDE 10 MG/1
10 CAPSULE ORAL EVERY 6 HOURS PRN
Qty: 20 CAPSULE | Refills: 0 | Status: SHIPPED | OUTPATIENT
Start: 2021-10-21 | End: 2022-03-02

## 2021-10-21 RX ORDER — 0.9 % SODIUM CHLORIDE 0.9 %
1000 INTRAVENOUS SOLUTION INTRAVENOUS ONCE
Status: COMPLETED | OUTPATIENT
Start: 2021-10-21 | End: 2021-10-21

## 2021-10-21 RX ORDER — ONDANSETRON 4 MG/1
4 TABLET, ORALLY DISINTEGRATING ORAL EVERY 8 HOURS PRN
Qty: 7 TABLET | Refills: 0 | Status: SHIPPED | OUTPATIENT
Start: 2021-10-21 | End: 2022-03-26 | Stop reason: ALTCHOICE

## 2021-10-21 RX ORDER — FENTANYL CITRATE 50 UG/ML
100 INJECTION, SOLUTION INTRAMUSCULAR; INTRAVENOUS ONCE
Status: COMPLETED | OUTPATIENT
Start: 2021-10-21 | End: 2021-10-21

## 2021-10-21 RX ORDER — ONDANSETRON 2 MG/ML
4 INJECTION INTRAMUSCULAR; INTRAVENOUS ONCE
Status: COMPLETED | OUTPATIENT
Start: 2021-10-21 | End: 2021-10-21

## 2021-10-21 RX ADMIN — OXYCODONE AND ACETAMINOPHEN 1 TABLET: 5; 325 TABLET ORAL at 13:08

## 2021-10-21 RX ADMIN — ONDANSETRON 4 MG: 2 INJECTION INTRAMUSCULAR; INTRAVENOUS at 10:38

## 2021-10-21 RX ADMIN — FENTANYL CITRATE 100 MCG: 50 INJECTION, SOLUTION INTRAMUSCULAR; INTRAVENOUS at 10:41

## 2021-10-21 RX ADMIN — TAMSULOSIN HYDROCHLORIDE 0.4 MG: 0.4 CAPSULE ORAL at 10:40

## 2021-10-21 RX ADMIN — SODIUM CHLORIDE 1000 ML: 9 INJECTION, SOLUTION INTRAVENOUS at 12:51

## 2021-10-21 ASSESSMENT — PAIN DESCRIPTION - LOCATION
LOCATION: FLANK

## 2021-10-21 ASSESSMENT — PAIN DESCRIPTION - PAIN TYPE
TYPE: ACUTE PAIN

## 2021-10-21 ASSESSMENT — ENCOUNTER SYMPTOMS
TROUBLE SWALLOWING: 0
NAUSEA: 1
VOMITING: 0
BACK PAIN: 0
DIARRHEA: 0
SINUS PRESSURE: 0
COUGH: 0
ABDOMINAL PAIN: 1
CHEST TIGHTNESS: 0
SHORTNESS OF BREATH: 0

## 2021-10-21 ASSESSMENT — PAIN DESCRIPTION - PROGRESSION: CLINICAL_PROGRESSION: GRADUALLY IMPROVING

## 2021-10-21 ASSESSMENT — PAIN SCALES - GENERAL
PAINLEVEL_OUTOF10: 9
PAINLEVEL_OUTOF10: 7
PAINLEVEL_OUTOF10: 7
PAINLEVEL_OUTOF10: 9
PAINLEVEL_OUTOF10: 8

## 2021-10-21 ASSESSMENT — PAIN DESCRIPTION - DIRECTION: RADIATING_TOWARDS: ABDOMEN

## 2021-10-21 ASSESSMENT — PAIN DESCRIPTION - DESCRIPTORS: DESCRIPTORS: ACHING

## 2021-10-21 ASSESSMENT — PAIN DESCRIPTION - ORIENTATION
ORIENTATION: RIGHT
ORIENTATION: RIGHT
ORIENTATION: RIGHT;LEFT

## 2021-10-21 ASSESSMENT — PAIN DESCRIPTION - FREQUENCY: FREQUENCY: CONTINUOUS

## 2021-10-21 NOTE — ED PROVIDER NOTES
[address] [facility] ED  eMERGENCY dEPARTMENT eNCOUnter      Pt Name: Jerome Momin  MRN: [id number]  [place] 1965  Date of evaluation: 10/21/2021  Provider: Leah Watkins [address] [place] [address]       Chief Complaint   Patient presents with    Flank Pain     bilateral, worse on right         HISTORY OF PRESENT ILLNESS   (Location/Symptom, Timing/Onset,Context/Setting, Quality, Duration, Modifying Factors, Severity)  Note limiting factors. Jerome Momin is a 49 y.o. female who presents to the emergency department with c/o dull achy right flank and right mid abdominal pain. Started yesterday and has gradually worsened. Mild nausea no vomiting or diarrhea. Blood sugars per patient have been running high. Patient denies fever, or chills. Patient state feels same as prior kidney stone. Patient denies modifying factors making her feel better or worse. Last BM was yesterday and \"normal.\"    When asked if patient consumes 64 ounces of water per day the patient admits \"not even close. \"    The history is provided by the patient and a friend. NursingNotes were reviewed. REVIEW OF SYSTEMS    (2-9 systems for level 4, 10 or more for level 5)     Review of Systems   Constitutional: Negative for activity change, appetite change, chills, fever and unexpected weight change. HENT: Negative for drooling, ear pain, nosebleeds, sinus pressure and trouble swallowing. Respiratory: Negative for cough, chest tightness and shortness of breath. Cardiovascular: Negative for chest pain and leg swelling. Gastrointestinal: Positive for abdominal pain and nausea. Negative for diarrhea and vomiting. Endocrine: Negative for polydipsia and polyphagia. Genitourinary: Positive for flank pain. Negative for dysuria and frequency. Musculoskeletal: Negative for back pain and myalgias. Skin: Negative for pallor and rash. Neurological: Negative for syncope, weakness and headaches.    Hematological: Does not bruise/bleed easily. All other systems reviewed and are negative. Except as noted above the remainder of the review of systems was reviewed and negative.        PAST MEDICAL HISTORY     Past Medical History:   Diagnosis Date    Anxiety     Asthma     as child    Depression     Depression     dysthymia - following lupus dx age 23    Diabetes mellitus ([facility] [address]     Foot fracture, left     GERD (gastroesophageal reflux disease)     Hyperlipidemia     Hypothyroidism     Kidney stones     Lupus ([facility] [address]     dr barrera -     Migraine     PONV (postoperative nausea and vomiting)     Renal stone 4/25/2021    Sciatica     left sided         SURGICALHISTORY       Past Surgical History:   Procedure Laterality Date    APPENDECTOMY      ARTHRODESIS Left 11/26/2019    ARTHRODESIS OF THE FIRST METATARSAL PHALANGEAL JOINT LEFT FOOT performed by Katina Wolfe DPM at [address] [facility] N/A 4/26/2021    CYSTOSCOPY RIGHT DOUBLE J STENT PLACEMENT. performed by Bryosn Montana MD at [address] [facility] COLONOSCOPY      ENDOSCOPY, COLON, DIAGNOSTIC      FOOT CLOSED REDUCTION Left 1/3/2017    LEFT FOOT PERCUTANEOUS PINNING Grimaldo Southerly performed by Yvonne Horan MD at [address] [facility] [place] LITHOTRIPSY Right 5/5/2021    RIGHT ESWL performed by Bryson Montana MD at [address] [facility] ESOPHAGOGASTRODUODENOSCOPY TRANSORAL DIAGNOSTIC N/A 3/8/2017    EGD ESOPHAGOGASTRODUODENOSCOPY WITH DILATION performed by Eliu Ansari MD at [address] ESOPHAGOGASTRODUODENOSCOPY TRANSORAL DIAGNOSTIC N/A 11/7/2018    EGD ESOPHAGOGASTRODUODENOSCOPY WITH DILATION performed by Eliu Ansari MD at [address] [facility] ENDOSCOPY N/A 8/17/2021    EGD WITH BIOPSY AND POLYPECTOMY performed by John Price MD at [address] [place] [address]       Previous Medications    ALBUTEROL SULFATE HFA (VENTOLIN HFA) 108 (90 BASE) MCG/ACT INHALER    Inhale 2 puffs into the lungs every 6 hours as needed for Wheezing    BLOOD GLUCOSE MONITOR KIT AND SUPPLIES    Test 3 times a day & as needed for symptoms of irregular blood glucose. BLOOD GLUCOSE MONITOR KIT AND SUPPLIES    Please give 1 meter covered by insurance Dx E11.65    BLOOD GLUCOSE MONITOR STRIPS    Pt test 3x daily Dx E11.65    BLOOD GLUCOSE MONITORING SUPPL (TRUE TRACK BLOOD GLUCOSE) CAROLE    Check blood sugars twice daily or as directed. BLOOD GLUCOSE TEST STRIPS (ASCENSIA AUTODISC VI;ONE TOUCH ULTRA TEST VI) STRIP    Check blood sugars twice daily or as directed. [employer] UNILET LANCETS 30G MISC    Check blood sugars twice daily or as directed. DULOXETINE (CYMBALTA) 30 MG EXTENDED RELEASE CAPSULE    TAKE 1 CAPSULE BY MOUTH EVERY DAY    ESOMEPRAZOLE (NEXIUM) 40 MG DELAYED RELEASE CAPSULE    Take 1 capsule by mouth daily    ESTRADIOL (CLIMARA) 0.025 MG/24HR    Place 0.025 patches onto the skin every 7 days On mondays    HYDROXYCHLOROQUINE (PLAQUENIL) 200 MG TABLET    Take 1 tablet by mouth 2 times daily    INSULIN ASPART (NOVOLOG FLEXPEN) 100 UNIT/ML INJECTION PEN    Inject 60 units tid with meals    INSULIN GLARGINE (LANTUS SOLOSTAR) 100 UNIT/ML INJECTION PEN    160  units at bedtime    INSULIN PEN NEEDLE (PEN NEEDLES) 33G X 4 MM MISC    1 each by Does not apply route three times daily    LANCETS MISC    Pt test 3x daily Dx E11.65    LEVOTHYROXINE (SYNTHROID) 50 MCG TABLET    Take 1 tablet by mouth daily    PREGABALIN (LYRICA) 75 MG CAPSULE    Take 75 mg by mouth daily. ROSUVASTATIN (CRESTOR) 40 MG TABLET    Take 1 tablet by mouth daily    TIZANIDINE (ZANAFLEX) 4 MG TABLET    TAKE 1 TABLET 3 TIMES DAILY as needed.        ALLERGIES     Aspirin, Cephalosporins, Imitrex [sumatriptan], Pcn [penicillins], Seasonal, Toradol Foruforever tromethamine], and Ultram [tramadol]    FAMILY HISTORY       Family History   Adopted: Yes          SOCIAL HISTORY       Social History     Socioeconomic History    Marital status: Legally      Spouse name: None    Number of children: None    Years of education: None    Highest education level: None   Occupational History    None   Tobacco Use    Smoking status: Never Smoker    Smokeless tobacco: Never Used   Vaping Use    Vaping Use: Never used   Substance and Sexual Activity    Alcohol use: No     Alcohol/week: 0.0 standard drinks     Comment: denies    Drug use: No     Comment: denies    Sexual activity: None   Other Topics Concern    None   Social History Narrative    None     Social Determinants of Health     Financial Resource Strain: Low Risk     Difficulty of Paying Living Expenses: Not hard at all   Food Insecurity: No Food Insecurity    Worried About Running Out of Food in the Last Year: Never true    Carlitos of Food in the Last Year: Never true   Transportation Needs:     Lack of Transportation (Medical):      Lack of Transportation (Non-Medical):    Physical Activity:     Days of Exercise per Week:     Minutes of Exercise per Session:    Stress:     Feeling of Stress :    Social Connections:     Frequency of Communication with Friends and Family:     Frequency of Social Gatherings with Friends and Family:     Attends Hinduism Services:     Active Member of Clubs or Organizations:     Attends Club or Organization Meetings:     Marital Status:    Intimate Partner Violence:     Fear of Current or Ex-Partner:     Emotionally Abused:     Physically Abused:     Sexually Abused:        SCREENINGS      @FLOW([id number])@      PHYSICAL EXAM    (up to 7 for level 4, 8 or more for level 5)     ED Triage Vitals [10/21/21 0958]   BP Temp Temp src Pulse Resp SpO2 Height Weight   (!) 127/91 98.9 °F (37.2 °C) -- 135 20 97 % 5' 8\" (1.727 m) 160 lb (72.6 kg)       Physical Exam  Vitals and nursing note reviewed. Constitutional:       General: She is awake. She is in acute distress. Appearance: Normal appearance. She is well-developed and normal weight. She is not ill-appearing, toxic-appearing or diaphoretic. Comments: No photophobia. No phonophobia. HENT:      Head: Normocephalic and atraumatic. No Nino's sign. Right Ear: External ear normal.      Left Ear: External ear normal.      Nose: Nose normal. No congestion or rhinorrhea. Mouth/Throat:      Mouth: Mucous membranes are dry. Pharynx: Oropharynx is clear. No oropharyngeal exudate or posterior oropharyngeal erythema. Eyes:      General: No scleral icterus. Right eye: No foreign body or discharge. Left eye: No discharge. Extraocular Movements: Extraocular movements intact. Conjunctiva/sclera: Conjunctivae normal.      Left eye: No exudate. Pupils: Pupils are equal, round, and reactive to light. Neck:      Vascular: No JVD. Trachea: No tracheal deviation. Comments: No meningismus. Cardiovascular:      Rate and Rhythm: Normal rate and regular rhythm. Pulses: Normal pulses. Heart sounds: Normal heart sounds. Heart sounds not distant. No murmur heard. No friction rub. No gallop. Pulmonary:      Effort: Pulmonary effort is normal. No respiratory distress. Breath sounds: Normal breath sounds. No stridor. No wheezing, rhonchi or rales. Chest:      Chest wall: No tenderness. Abdominal:      General: Abdomen is flat. Bowel sounds are normal. There is no distension or abdominal bruit. There are no signs of injury. Palpations: Abdomen is soft. There is no shifting dullness, fluid wave, hepatomegaly, splenomegaly, mass or pulsatile mass. Tenderness: There is abdominal tenderness in the right upper quadrant and right lower quadrant. There is right CVA tenderness. There is no left CVA tenderness, guarding or rebound.  Negative signs include Simons's sign, Rovsing's sign and McBurney's sign. Hernia: No hernia is present. Musculoskeletal:         General: No swelling, tenderness, deformity or signs of injury. Normal range of motion. Cervical back: Normal range of motion and neck supple. No rigidity. Lymphadenopathy:      Head:      Right side of head: No submental adenopathy. Left side of head: No submental adenopathy. Skin:     General: Skin is warm and dry. Capillary Refill: Capillary refill takes less than 2 seconds. Coloration: Skin is not jaundiced or pale. Findings: No bruising, erythema, lesion or rash. Neurological:      General: No focal deficit present. Mental Status: She is alert and oriented to person, place, and time. Mental status is at baseline. Cranial Nerves: No cranial nerve deficit. Sensory: No sensory deficit. Motor: No weakness. Coordination: Coordination normal.      Deep Tendon Reflexes: Reflexes are normal and symmetric. Psychiatric:         Mood and Affect: Mood normal.         Behavior: Behavior normal. Behavior is cooperative. Thought Content: Thought content normal.         Judgment: Judgment normal.         DIAGNOSTIC RESULTS     EKG: All EKG's are interpreted by the Emergency Department Physician who either signs or Co-signsthis chart in the absence of a cardiologist.        RADIOLOGY:   Donivan Sleek such as CT, Ultrasound and MRI are read by the radiologist. Plain radiographic images are visualized and preliminarily interpreted by the emergency physician with the below findings:        Interpretation per the Radiologist below, if available at the time ofthis note:    [address]   Final Result   There are no acute intra-abdominal changes. There are no radiopaque renal stones. There is no hydronephrosis or hydroureter.                      ED BEDSIDE ULTRASOUND:   Performed by ED Physician - none    LABS:  Labs Reviewed she initially was complaining of bilateral flank pain being that she does not consume nearly enough fluids. The findings were discussed with the patient. The patient was invited to return  to the ER if worse symptoms. The patient verbalized understanding of the care and they have no further questions. CONSULTS:  None    PROCEDURES:  Unless otherwise noted below, none     Procedures    FINAL IMPRESSION      1. Acute right flank pain    2. Dehydration    3.  Type 2 diabetes mellitus with hyperglycemia, with long-term current use of insulin [facility])          DISPOSITION/PLAN   DISPOSITION Discharge - Pending Orders Complete 10/21/2021 12:49:24 PM      PATIENT REFERRED TO:  Dora Martinez MD  [address] [place]  [phone]    Schedule an appointment as soon as possible for a visit in 1 day      Rosy Nair MD  [address] [place] [address]  [phone]    Call today  To make follow up appointment for abdominal pain    Patito Dimas MD  [facility] [phone]    Schedule an appointment as soon as possible for a visit   For pain management needs; lupus, diabetes, need for more than 10 pain Rx in 1 year      DISCHARGE MEDICATIONS:  New Prescriptions    DICYCLOMINE (BENTYL) 10 MG CAPSULE    Take 1 capsule by mouth every 6 hours as needed (cramps)    ONDANSETRON (ZOFRAN ODT) 4 MG DISINTEGRATING TABLET    Take 1 tablet by mouth every 8 hours as needed for Nausea          (Please note that portions of this note were completed with a voice recognition program.  Efforts were made to edit the dictations but occasionally words are mis-transcribed.)    Shauna Worrell DO (electronically signed)  Attending Emergency Physician          Shauna Worrell DO  10/21/21 0343

## 2021-11-04 ENCOUNTER — HOSPITAL ENCOUNTER (OUTPATIENT)
Dept: LAB | Age: 56
Discharge: HOME | End: 2021-11-04
Payer: MEDICARE

## 2021-11-04 DIAGNOSIS — E11.69 TYPE 2 DIABETES MELLITUS WITH OTHER SPECIFIED COMPLICATION, UNSPECIFIED WHETHER LONG TERM INSULIN USE (HCC): ICD-10-CM

## 2021-11-04 LAB
ANION GAP SERPL CALCULATED.3IONS-SCNC: 11 MEQ/L (ref 9–15)
BUN BLDV-MCNC: 10 MG/DL (ref 6–20)
CALCIUM SERPL-MCNC: 9.5 MG/DL (ref 8.5–9.9)
CHLORIDE BLD-SCNC: 98 MEQ/L (ref 95–107)
CO2: 27 MEQ/L (ref 20–31)
CREAT SERPL-MCNC: 1.09 MG/DL (ref 0.5–0.9)
CREATININE URINE: 263.3 MG/DL
GFR AFRICAN AMERICAN: >60
GFR NON-AFRICAN AMERICAN: 52.3
GLUCOSE BLD-MCNC: 193 MG/DL (ref 70–99)
HBA1C MFR BLD: 8.3 % (ref 4.8–5.9)
MICROALBUMIN UR-MCNC: <1.2 MG/DL
MICROALBUMIN/CREAT UR-RTO: NORMAL MG/G (ref 0–30)
POTASSIUM SERPL-SCNC: 4.5 MEQ/L (ref 3.4–4.9)
SODIUM BLD-SCNC: 136 MEQ/L (ref 135–144)

## 2021-11-04 PROCEDURE — 83036 HEMOGLOBIN GLYCOSYLATED A1C: CPT

## 2021-11-04 PROCEDURE — 80048 BASIC METABOLIC PNL TOTAL CA: CPT

## 2021-11-04 PROCEDURE — 82043 UR ALBUMIN QUANTITATIVE: CPT

## 2021-11-04 PROCEDURE — 36415 COLL VENOUS BLD VENIPUNCTURE: CPT

## 2021-11-04 PROCEDURE — 82570 ASSAY OF URINE CREATININE: CPT

## 2021-11-11 DIAGNOSIS — E11.69 TYPE 2 DIABETES MELLITUS WITH OTHER SPECIFIED COMPLICATION, UNSPECIFIED WHETHER LONG TERM INSULIN USE (HCC): ICD-10-CM

## 2021-11-11 RX ORDER — PEN NEEDLE, DIABETIC 33 GX5/32"
1 NEEDLE, DISPOSABLE MISCELLANEOUS 3 TIMES DAILY
Qty: 100 EACH | Refills: 2 | Status: SHIPPED | OUTPATIENT
Start: 2021-11-11 | End: 2022-03-03 | Stop reason: SDUPTHER

## 2021-11-11 RX ORDER — INSULIN ASPART 100 [IU]/ML
INJECTION, SOLUTION INTRAVENOUS; SUBCUTANEOUS
Qty: 15 PEN | Refills: 3 | Status: SHIPPED | OUTPATIENT
Start: 2021-11-11 | End: 2022-03-09 | Stop reason: SDUPTHER

## 2021-11-11 NOTE — TELEPHONE ENCOUNTER
Patient requesting medication refill.  Please approve or deny this request.    Rx requested:  Requested Prescriptions     Pending Prescriptions Disp Refills    Insulin Pen Needle (PEN NEEDLES) 33G X 4 MM MISC 100 each 2     Si each by Does not apply route three times daily    insulin aspart (NOVOLOG FLEXPEN) 100 UNIT/ML injection pen 15 pen 3     Sig: Inject 60 units tid with meals         Last Office Visit:   2021      Next Visit Date:  Future Appointments   Date Time Provider Kings Mcadams   2021  1:15 PM Ananda Coleman  [address]

## 2021-11-15 NOTE — TELEPHONE ENCOUNTER
patient requesting medication refill.  Please approve or deny this request.    Rx requested:  Requested Prescriptions     Pending Prescriptions Disp Refills    Insulin Aspart, w/Niacinamide, 100 UNIT/ML SOPN 15 pen 3     Sig: Inject 60 units tid with meals         Last Office Visit:   Visit date not found      Next Visit Date:  Future Appointments   Date Time Provider Kings Mcadams   12/8/2021  1:15 PM KATHERINE Rose  [address]

## 2021-12-07 DIAGNOSIS — E11.69 TYPE 2 DIABETES MELLITUS WITH OTHER SPECIFIED COMPLICATION, UNSPECIFIED WHETHER LONG TERM INSULIN USE (HCC): Primary | ICD-10-CM

## 2021-12-08 ENCOUNTER — OFFICE VISIT (OUTPATIENT)
Dept: ENDOCRINOLOGY | Age: 56
End: 2021-12-08
Payer: MEDICARE

## 2021-12-08 VITALS
BODY MASS INDEX: 27.43 KG/M2 | WEIGHT: 181 LBS | HEIGHT: 68 IN | HEART RATE: 70 BPM | SYSTOLIC BLOOD PRESSURE: 124 MMHG | DIASTOLIC BLOOD PRESSURE: 64 MMHG | OXYGEN SATURATION: 98 %

## 2021-12-08 DIAGNOSIS — E11.69 TYPE 2 DIABETES MELLITUS WITH OTHER SPECIFIED COMPLICATION, UNSPECIFIED WHETHER LONG TERM INSULIN USE (HCC): Primary | ICD-10-CM

## 2021-12-08 LAB
CHP ED QC CHECK: NORMAL
GLUCOSE BLD-MCNC: 99 MG/DL

## 2021-12-08 PROCEDURE — G8484 FLU IMMUNIZE NO ADMIN: HCPCS | Performed by: INTERNAL MEDICINE

## 2021-12-08 PROCEDURE — G8419 CALC BMI OUT NRM PARAM NOF/U: HCPCS | Performed by: INTERNAL MEDICINE

## 2021-12-08 PROCEDURE — 2022F DILAT RTA XM EVC RTNOPTHY: CPT | Performed by: INTERNAL MEDICINE

## 2021-12-08 PROCEDURE — 82962 GLUCOSE BLOOD TEST: CPT | Performed by: INTERNAL MEDICINE

## 2021-12-08 PROCEDURE — 99213 OFFICE O/P EST LOW 20 MIN: CPT | Performed by: INTERNAL MEDICINE

## 2021-12-08 PROCEDURE — 1036F TOBACCO NON-USER: CPT | Performed by: INTERNAL MEDICINE

## 2021-12-08 PROCEDURE — 3017F COLORECTAL CA SCREEN DOC REV: CPT | Performed by: INTERNAL MEDICINE

## 2021-12-08 PROCEDURE — G8427 DOCREV CUR MEDS BY ELIG CLIN: HCPCS | Performed by: INTERNAL MEDICINE

## 2021-12-08 PROCEDURE — 3052F HG A1C>EQUAL 8.0%<EQUAL 9.0%: CPT | Performed by: INTERNAL MEDICINE

## 2021-12-08 RX ORDER — INSULIN GLARGINE 100 [IU]/ML
INJECTION, SOLUTION SUBCUTANEOUS
Qty: 30 PEN | Refills: 3 | Status: SHIPPED | OUTPATIENT
Start: 2021-12-08 | End: 2022-03-09 | Stop reason: SDUPTHER

## 2021-12-08 NOTE — PROGRESS NOTES
12/8/2021    Assessment:       Diagnosis Orders   1. Type 2 diabetes mellitus with other specified complication, unspecified whether long term insulin use ([facility] [address]  POCT Glucose         PLAN:     Orders Placed This Encounter   Procedures    Basic Metabolic Panel     Standing Status:   Future     Standing Expiration Date:   12/8/2022    Hemoglobin A1C     Standing Status:   Future     Standing Expiration Date:   12/8/2022    POCT Glucose     Continue current dose of Lantus 160 units at bedtime plus NovoLog 60 units with each meals A1c goal of 7 or lower  Orders Placed This Encounter   Medications    insulin glargine (LANTUS SOLOSTAR) 100 UNIT/ML injection pen     Sig: 160  units at bedtime     Dispense:  30 pen     Refill:  3         Orders Placed This Encounter   Procedures    POCT Glucose       Subjective:     Chief Complaint   Patient presents with    Diabetes     Type 2     Vitals:    12/08/21 1308   BP: 124/64   Pulse: 70   SpO2: 98%   Weight: 181 lb (82.1 kg)   Height: 5' 8\" (1.727 m)     Wt Readings from Last 3 Encounters:   12/08/21 181 lb (82.1 kg)   10/21/21 160 lb (72.6 kg)   09/03/21 181 lb (82.1 kg)     BP Readings from Last 3 Encounters:   12/08/21 124/64   10/21/21 105/73   10/11/21 112/77     Follow-up with type 2 diabetes patient on high-dose Lantus and NovoLog insulin pump reportedly compliant complications include chronic kidney disease    Diabetes  She presents for her follow-up diabetic visit. She has type 2 diabetes mellitus. Her disease course has been improving. Current diabetic treatment includes insulin injections. She is currently taking insulin pre-lunch, pre-dinner, at bedtime and pre-breakfast. Her overall blood glucose range is 180-200 mg/dl.  (Lab Results       Component                Value               Date                       LABA1C                   8.3 (H)             11/04/2021              )     Past Medical History:   Diagnosis Date    Anxiety     Asthma     as child    Depression     Depression     dysthymia - following lupus dx age 23    Diabetes mellitus ([facility] [address]     Foot fracture, left     GERD (gastroesophageal reflux disease)     Hyperlipidemia     Hypothyroidism     Kidney stones     Lupus ([facility] [address]     dr barrera -     Migraine     PONV (postoperative nausea and vomiting)     Renal stone 4/25/2021    Sciatica     left sided     Past Surgical History:   Procedure Laterality Date    APPENDECTOMY      ARTHRODESIS Left 11/26/2019    ARTHRODESIS OF THE FIRST METATARSAL PHALANGEAL JOINT LEFT FOOT performed by Klaudia Littlejohn DPM at [address] [facility] N/A 4/26/2021    CYSTOSCOPY RIGHT DOUBLE J STENT PLACEMENT. performed by Jose Arredondo MD at [facility] COLONOSCOPY      ENDOSCOPY, COLON, DIAGNOSTIC      FOOT CLOSED REDUCTION Left 1/3/2017    LEFT FOOT PERCUTANEOUS PINNING [place] performed by Sapphire Whalen MD at [address] [place] [address] [place] LITHOTRIPSY Right 5/5/2021    RIGHT ESWL performed by Jose Arredondo MD at [address] [facility] ESOPHAGOGASTRODUODENOSCOPY TRANSORAL DIAGNOSTIC N/A 3/8/2017    EGD ESOPHAGOGASTRODUODENOSCOPY WITH DILATION performed [facility] ESOPHAGOGASTRODUODENOSCOPY TRANSORAL DIAGNOSTIC N/A 11/7/2018    EGD ESOPHAGOGASTRODUODENOSCOPY WITH DILATION performed by Yovani Cleary MD at [address] [facility] ENDOSCOPY N/A 8/17/2021    EGD WITH BIOPSY AND POLYPECTOMY performed by Lo Ibanez MD at [facility] Marital status: Legally      Spouse name: Not on file    Number of children: Not on file    Years of education: Not on file    Highest education level: Not on file   Occupational History    Not on file   Tobacco Use  Smoking status: Never Smoker    Smokeless tobacco: Never Used   Vaping Use    Vaping Use: Never used   Substance and Sexual Activity    Alcohol use: No     Alcohol/week: 0.0 standard drinks     Comment: denies    Drug use: No     Comment: denies    Sexual activity: Not on file   Other Topics Concern    Not on file   Social History Narrative    Not on file     Social Determinants of Health     Financial Resource Strain: Low Risk     Difficulty of Paying Living Expenses: Not hard at all   Food Insecurity: No Food Insecurity    Worried About Running Out of Food in the Last Year: Never true    Carlitos of Food in the Last Year: Never true   Transportation Needs:     Lack of Transportation (Medical): Not on file    Lack of Transportation (Non-Medical): Not on file   Physical Activity:     Days of Exercise per Week: Not on file    Minutes of Exercise per Session: Not on file   Stress:     Feeling of Stress : Not on file   Social Connections:     Frequency of Communication with Friends and Family: Not on file    Frequency of Social Gatherings with Friends and Family: Not on file    Attends Yazidi Services: Not on file    Active Member of [address] [place] [address] or Organizations: Not on file    Attends Club or Organization Meetings: Not on file    Marital Status: Not on file   Intimate Partner Violence:     Fear of Current or Ex-Partner: Not on file    Emotionally Abused: Not on file    Physically Abused: Not on file    Sexually Abused: Not on file   Housing Stability:     Unable to Pay for Housing in the Last Year: Not on file    Number of [place] in the Last Year: Not on file    Unstable Housing in the Last Year: Not on file     Family History   Adopted:  Yes     Allergies   Allergen Reactions    Aspirin Swelling     Bloody noses    Cephalosporins     Imitrex [Sumatriptan] Hives    Pcn [Penicillins] Swelling    Seasonal Itching     Itch, sneezing,runny nose, watery eyes    Toradol [Ketorolac Tromethamine] Hives     Shortness of breath    Ultram [Tramadol] Hives       Current Outpatient Medications:     Insulin Aspart, w/Niacinamide, 100 UNIT/ML SOPN, Inject 60 units tid with meals, Disp: 15 pen, Rfl: 3    Insulin Pen Needle (PEN NEEDLES) 33G X 4 MM MISC, 1 each by Does not apply route three times daily, Disp: 100 each, Rfl: 2    insulin aspart (NOVOLOG FLEXPEN) 100 UNIT/ML injection pen, Inject 60 units tid with meals, Disp: 15 pen, Rfl: 3    dicyclomine (BENTYL) 10 MG capsule, Take 1 capsule by mouth every 6 hours as needed (cramps), Disp: 20 capsule, Rfl: 0    ondansetron (ZOFRAN ODT) 4 MG disintegrating tablet, Take 1 tablet by mouth every 8 hours as needed for Nausea, Disp: 7 tablet, Rfl: 0    tiZANidine (ZANAFLEX) 4 MG tablet, TAKE 1 TABLET 3 TIMES DAILY as needed. , Disp: , Rfl:     insulin glargine (LANTUS SOLOSTAR) 100 UNIT/ML injection pen, 160  units at bedtime, Disp: 30 pen, Rfl: 3    pregabalin (LYRICA) 75 MG capsule, Take 75 mg by mouth daily. , Disp: , Rfl:     DULoxetine (CYMBALTA) 30 MG extended release capsule, TAKE 1 CAPSULE BY MOUTH EVERY DAY, Disp: , Rfl:     esomeprazole (NEXIUM) 40 MG delayed release capsule, Take 1 capsule by mouth daily, Disp: 30 capsule, Rfl: 3    blood glucose monitor strips, Pt test 3x daily Dx E11.65, Disp: 100 strip, Rfl: 3    Drug Sallis Unilet Lancets 30G MISC, Check blood sugars twice daily or as directed., Disp: 200 each, Rfl: 3    blood glucose monitor kit and supplies, Please give 1 meter covered by insurance Dx E11.65, Disp: 1 kit, Rfl: 0    Lancets MISC, Pt test 3x daily Dx E11.65, Disp: 100 each, Rfl: 3    blood glucose test strips (ASCENSIA AUTODISC VI;ONE TOUCH ULTRA TEST VI) strip, Check blood sugars twice daily or as directed., Disp: 200 strip, Rfl: 3    blood glucose monitor kit and supplies, Test 3 times a day & as needed for symptoms of irregular blood glucose., Disp: 1 kit, Rfl: 0    estradiol (CLIMARA) 0.025 MG/24HR, Place 0.025 patches onto the skin every 7 days On mondays, Disp: , Rfl: 11    albuterol sulfate HFA (VENTOLIN HFA) 108 (90 Base) MCG/ACT inhaler, Inhale 2 puffs into the lungs every 6 hours as needed for Wheezing, Disp: 1 Inhaler, Rfl: 0    rosuvastatin (CRESTOR) 40 MG tablet, Take 1 tablet by mouth daily, Disp: 90 tablet, Rfl: 3    levothyroxine (SYNTHROID) 50 MCG tablet, Take 1 tablet by mouth daily, Disp: 90 tablet, Rfl: 3    Blood Glucose Monitoring Suppl (TRUE TRACK BLOOD GLUCOSE) CAROLE, Check blood sugars twice daily or as directed., Disp: 100 Device, Rfl: 5    hydroxychloroquine (PLAQUENIL) 200 MG tablet, Take 1 tablet by mouth 2 times daily, Disp: , Rfl: 5  Lab Results   Component Value Date     11/04/2021    K 4.5 11/04/2021    CL 98 11/04/2021    CO2 27 11/04/2021    BUN 10 11/04/2021    CREATININE 1.09 (H) 11/04/2021    GLUCOSE 99 12/08/2021    CALCIUM 9.5 11/04/2021    PROT 7.1 10/21/2021    LABALBU 4.2 10/21/2021    BILITOT 0.4 10/21/2021    ALKPHOS 144 (H) 10/21/2021    AST 14 10/21/2021    ALT 13 10/21/2021    LABGLOM 52.3 (L) 11/04/2021    GFRAA >60.0 11/04/2021    GLOB 2.9 10/21/2021     Lab Results   Component Value Date    WBC 11.5 (H) 10/21/2021    HGB 16.1 (H) 10/21/2021    HCT 44.8 10/21/2021    MCV 86.6 10/21/2021     10/21/2021     Lab Results   Component Value Date    LABA1C 8.3 (H) 11/04/2021    LABA1C 8.5 08/18/2021    LABA1C 8.5 (H) 05/20/2021     Lab Results   Component Value Date    CHOLFAST 346 (H) 05/20/2021    TRIGLYCFAST 340 (H) 05/20/2021    HDL 42 05/20/2021    HDL 73 (H) 08/02/2017    HDL 92 (H) 05/12/2017    LDLCALC 236 (H) 05/20/2021    LDLCALC 56 08/02/2017    LDLCALC 32 05/12/2017    CHOL 182 08/02/2017    CHOL 189 05/12/2017    CHOL 279 (H) 08/29/2016    TRIG 264 (H) 08/02/2017    TRIG 323 (H) 05/12/2017    TRIG 348 (H) 08/29/2016     No results found for: TESTM  Lab Results   Component Value Date    TSH 0.741 10/11/2021    TSH 2.500 05/12/2017    TSH 1.650 06/27/2016    TSHREFLEX 0.819 11/04/2019     No results found for: TPOABS    Review of Systems   Endocrine: Negative. Psychiatric/Behavioral: Positive for dysphoric mood. All other systems reviewed and are negative. Objective:   Physical Exam  Vitals reviewed. Constitutional:       Appearance: Normal appearance. She is normal weight. HENT:      Head: Normocephalic and atraumatic. Hair is normal.      Right Ear: External ear normal.      Left Ear: External ear normal.      Nose: Nose normal.   Eyes:      General: No scleral icterus. Right eye: No discharge. Left eye: No discharge. Extraocular Movements: Extraocular movements intact. Conjunctiva/sclera: Conjunctivae normal.   Neck:      Trachea: Trachea normal.   Cardiovascular:      Rate and Rhythm: Normal rate. Musculoskeletal:         General: Normal range of motion. Cervical back: Normal range of motion and neck supple. Neurological:      General: No focal deficit present. Mental Status: She is alert and oriented to person, place, and time.    Psychiatric:         Mood and Affect: Mood normal.         Behavior: Behavior normal.

## 2021-12-10 ENCOUNTER — HOSPITAL ENCOUNTER (OUTPATIENT)
Dept: LAB | Age: 56
Discharge: HOME | End: 2021-12-10
Payer: MEDICARE

## 2021-12-10 DIAGNOSIS — E11.69 TYPE 2 DIABETES MELLITUS WITH OTHER SPECIFIED COMPLICATION, UNSPECIFIED WHETHER LONG TERM INSULIN USE (HCC): ICD-10-CM

## 2021-12-10 LAB
ANION GAP SERPL CALCULATED.3IONS-SCNC: 12 MEQ/L (ref 9–15)
BUN BLDV-MCNC: 12 MG/DL (ref 6–20)
CALCIUM SERPL-MCNC: 9.2 MG/DL (ref 8.5–9.9)
CHLORIDE BLD-SCNC: 102 MEQ/L (ref 95–107)
CO2: 26 MEQ/L (ref 20–31)
CREAT SERPL-MCNC: 1.18 MG/DL (ref 0.5–0.9)
GFR AFRICAN AMERICAN: 57.7
GFR NON-AFRICAN AMERICAN: 47.7
GLUCOSE BLD-MCNC: 223 MG/DL (ref 70–99)
POTASSIUM SERPL-SCNC: 4.5 MEQ/L (ref 3.4–4.9)
SODIUM BLD-SCNC: 140 MEQ/L (ref 135–144)

## 2021-12-10 PROCEDURE — 80048 BASIC METABOLIC PNL TOTAL CA: CPT

## 2021-12-10 PROCEDURE — 86341 ISLET CELL ANTIBODY: CPT

## 2021-12-10 PROCEDURE — 84681 ASSAY OF C-PEPTIDE: CPT

## 2021-12-10 PROCEDURE — 36415 COLL VENOUS BLD VENIPUNCTURE: CPT

## 2021-12-12 LAB — C-PEPTIDE: 3.6 NG/ML (ref 1.1–4.4)

## 2021-12-14 LAB — ISLET CELL ANTIBODY: NORMAL

## 2022-01-01 ENCOUNTER — HOSPITAL ENCOUNTER (EMERGENCY)
Age: 57
Discharge: HOME | End: 2022-01-01
Attending: EMERGENCY MEDICINE
Payer: MEDICARE

## 2022-01-01 VITALS
WEIGHT: 160 LBS | RESPIRATION RATE: 20 BRPM | TEMPERATURE: 96.9 F | HEART RATE: 84 BPM | HEIGHT: 68 IN | DIASTOLIC BLOOD PRESSURE: 85 MMHG | SYSTOLIC BLOOD PRESSURE: 129 MMHG | OXYGEN SATURATION: 100 % | BODY MASS INDEX: 24.25 KG/M2

## 2022-01-01 DIAGNOSIS — M54.42 ACUTE LEFT-SIDED BACK PAIN WITH SCIATICA: Primary | ICD-10-CM

## 2022-01-01 PROCEDURE — 99284 EMERGENCY DEPT VISIT MOD MDM: CPT

## 2022-01-01 PROCEDURE — 6370000000 HC RX 637 (ALT 250 FOR IP): Performed by: EMERGENCY MEDICINE

## 2022-01-01 PROCEDURE — 96372 THER/PROPH/DIAG INJ SC/IM: CPT

## 2022-01-01 PROCEDURE — 6360000002 HC RX W HCPCS: Performed by: EMERGENCY MEDICINE

## 2022-01-01 RX ORDER — OXYCODONE HYDROCHLORIDE AND ACETAMINOPHEN 5; 325 MG/1; MG/1
TABLET ORAL
COMMUNITY
Start: 2021-10-29 | End: 2022-06-09 | Stop reason: ALTCHOICE

## 2022-01-01 RX ORDER — METHYLPREDNISOLONE 4 MG/1
TABLET ORAL
Qty: 1 KIT | Refills: 0 | Status: SHIPPED | OUTPATIENT
Start: 2022-01-01 | End: 2022-01-07

## 2022-01-01 RX ORDER — CYCLOBENZAPRINE HCL 10 MG
10 TABLET ORAL 3 TIMES DAILY PRN
Qty: 20 TABLET | Refills: 0 | Status: SHIPPED | OUTPATIENT
Start: 2022-01-01 | End: 2022-01-08

## 2022-01-01 RX ORDER — ORPHENADRINE CITRATE 30 MG/ML
60 INJECTION INTRAMUSCULAR; INTRAVENOUS ONCE
Status: COMPLETED | OUTPATIENT
Start: 2022-01-01 | End: 2022-01-01

## 2022-01-01 RX ORDER — HYDROCODONE BITARTRATE AND ACETAMINOPHEN 5; 325 MG/1; MG/1
1 TABLET ORAL EVERY 4 HOURS PRN
Qty: 12 TABLET | Refills: 0 | Status: SHIPPED | OUTPATIENT
Start: 2022-01-01 | End: 2022-01-02 | Stop reason: SDUPTHER

## 2022-01-01 RX ORDER — HYDROCODONE BITARTRATE AND ACETAMINOPHEN 5; 325 MG/1; MG/1
1 TABLET ORAL ONCE
Status: COMPLETED | OUTPATIENT
Start: 2022-01-01 | End: 2022-01-01

## 2022-01-01 RX ORDER — LIDOCAINE 50 MG/G
1 PATCH TOPICAL EVERY 24 HOURS
Qty: 30 PATCH | Refills: 0 | Status: SHIPPED | OUTPATIENT
Start: 2022-01-01 | End: 2022-01-31

## 2022-01-01 RX ORDER — LIDOCAINE 4 G/G
1 PATCH TOPICAL ONCE
Status: DISCONTINUED | OUTPATIENT
Start: 2022-01-01 | End: 2022-01-01 | Stop reason: HOSPADM

## 2022-01-01 RX ADMIN — HYDROCODONE BITARTRATE AND ACETAMINOPHEN 1 TABLET: 5; 325 TABLET ORAL at 20:59

## 2022-01-01 RX ADMIN — ORPHENADRINE CITRATE 60 MG: 30 INJECTION INTRAMUSCULAR; INTRAVENOUS at 20:59

## 2022-01-01 ASSESSMENT — PAIN SCALES - GENERAL
PAINLEVEL_OUTOF10: 10
PAINLEVEL_OUTOF10: 10

## 2022-01-01 ASSESSMENT — PAIN DESCRIPTION - ORIENTATION: ORIENTATION: LEFT

## 2022-01-01 ASSESSMENT — PAIN DESCRIPTION - LOCATION: LOCATION: BACK;BUTTOCKS;LEG

## 2022-01-02 ENCOUNTER — HOSPITAL ENCOUNTER (EMERGENCY)
Age: 57
Discharge: HOME | End: 2022-01-02
Attending: EMERGENCY MEDICINE
Payer: MEDICARE

## 2022-01-02 VITALS
OXYGEN SATURATION: 98 % | WEIGHT: 160 LBS | HEART RATE: 86 BPM | HEIGHT: 68 IN | RESPIRATION RATE: 20 BRPM | BODY MASS INDEX: 24.25 KG/M2 | SYSTOLIC BLOOD PRESSURE: 117 MMHG | TEMPERATURE: 98.4 F | DIASTOLIC BLOOD PRESSURE: 82 MMHG

## 2022-01-02 DIAGNOSIS — M54.31 SCIATICA OF RIGHT SIDE: Primary | ICD-10-CM

## 2022-01-02 DIAGNOSIS — M54.42 ACUTE LEFT-SIDED BACK PAIN WITH SCIATICA: ICD-10-CM

## 2022-01-02 PROCEDURE — 6360000002 HC RX W HCPCS: Performed by: EMERGENCY MEDICINE

## 2022-01-02 PROCEDURE — 99284 EMERGENCY DEPT VISIT MOD MDM: CPT

## 2022-01-02 PROCEDURE — 96372 THER/PROPH/DIAG INJ SC/IM: CPT

## 2022-01-02 RX ORDER — METHYLPREDNISOLONE SODIUM SUCCINATE 125 MG/2ML
125 INJECTION, POWDER, LYOPHILIZED, FOR SOLUTION INTRAMUSCULAR; INTRAVENOUS ONCE
Status: COMPLETED | OUTPATIENT
Start: 2022-01-02 | End: 2022-01-02

## 2022-01-02 RX ORDER — ORPHENADRINE CITRATE 30 MG/ML
60 INJECTION INTRAMUSCULAR; INTRAVENOUS ONCE
Status: COMPLETED | OUTPATIENT
Start: 2022-01-02 | End: 2022-01-02

## 2022-01-02 RX ORDER — HYDROCODONE BITARTRATE AND ACETAMINOPHEN 5; 325 MG/1; MG/1
1 TABLET ORAL EVERY 6 HOURS PRN
Qty: 12 TABLET | Refills: 0 | Status: SHIPPED | OUTPATIENT
Start: 2022-01-02 | End: 2022-01-05

## 2022-01-02 RX ADMIN — METHYLPREDNISOLONE SODIUM SUCCINATE 125 MG: 125 INJECTION, POWDER, FOR SOLUTION INTRAMUSCULAR; INTRAVENOUS at 13:19

## 2022-01-02 RX ADMIN — ORPHENADRINE CITRATE 60 MG: 30 INJECTION INTRAMUSCULAR; INTRAVENOUS at 13:19

## 2022-01-02 ASSESSMENT — PAIN DESCRIPTION - ORIENTATION: ORIENTATION: LEFT

## 2022-01-02 ASSESSMENT — ENCOUNTER SYMPTOMS
APNEA: 0
SHORTNESS OF BREATH: 0
ABDOMINAL PAIN: 0
SINUS PRESSURE: 0
COLOR CHANGE: 0
SORE THROAT: 0
PHOTOPHOBIA: 0
CONSTIPATION: 0
RHINORRHEA: 0
DIARRHEA: 0
WHEEZING: 0
COUGH: 0
EYE PAIN: 0
VOMITING: 0
BACK PAIN: 1
ABDOMINAL DISTENTION: 0
NAUSEA: 0

## 2022-01-02 ASSESSMENT — PAIN DESCRIPTION - FREQUENCY: FREQUENCY: CONTINUOUS

## 2022-01-02 ASSESSMENT — PAIN SCALES - GENERAL: PAINLEVEL_OUTOF10: 10

## 2022-01-02 ASSESSMENT — PAIN DESCRIPTION - PROGRESSION: CLINICAL_PROGRESSION: GRADUALLY WORSENING

## 2022-01-02 ASSESSMENT — PAIN DESCRIPTION - LOCATION: LOCATION: BUTTOCKS;LEG;FOOT

## 2022-01-02 ASSESSMENT — PAIN DESCRIPTION - ONSET: ONSET: PROGRESSIVE

## 2022-01-02 NOTE — ED PROVIDER NOTES
[facility] \[facility]\"" ED  eMERGENCY dEPARTMENT eNCOUnter      Pt Name: Lashawn Quezada  MRN: [id number]  [place] 1965  Date of evaluation: 1/2/2022  Provider: Saima Silverio MD    [address] [place] [address]       Chief Complaint   Patient presents with    Back Pain     Pt states in ER lasr night sciatic pain diagnosis worse today         HISTORY OF PRESENT ILLNESS   (Location/Symptom, Timing/Onset,Context/Setting, Quality, Duration, Modifying Factors, Severity)  Note limiting factors. Lashawn Qeuzada is a 49 y.o. female who presents to the emergency department with complaint of back pain with radiation to the right leg since yesterday. Patient was seen In the ED Yesterday and discharged with steroids and muscle relaxers. She claims that the steroid shot helped best.  Pain is 10 in a scale of 1-10 and worse with ambulation. Pain is sharp. No other systemic symptoms. HPI    Nursing Notes were reviewed. REVIEW OF SYSTEMS    (2-9 systems for level 4, 10 or more for level 5)     Review of Systems   Constitutional: Negative. Negative for activity change, appetite change, chills, fatigue and fever. HENT: Negative for congestion, ear discharge, ear pain, hearing loss, rhinorrhea, sinus pressure and sore throat. Eyes: Negative for photophobia, pain and visual disturbance. Respiratory: Negative for apnea, cough, shortness of breath and wheezing. Cardiovascular: Negative for chest pain, palpitations and leg swelling. Gastrointestinal: Negative for abdominal distention, abdominal pain, constipation, diarrhea, nausea and vomiting. Endocrine: Negative for cold intolerance, heat intolerance and polyuria. Genitourinary: Negative for dysuria, flank pain, frequency and urgency. Musculoskeletal: Positive for arthralgias and back pain. Negative for gait problem, myalgias and neck stiffness. Skin: Negative for color change, pallor and rash.    Allergic/Immunologic: Negative for food allergies and immunocompromised state. Neurological: Negative for dizziness, tremors, syncope, weakness, light-headedness and headaches. Psychiatric/Behavioral: Negative for agitation, confusion and hallucinations. All other systems reviewed and are negative. Except as noted above the remainder of the review of systems was reviewed and negative.        PAST MEDICAL HISTORY     Past Medical History:   Diagnosis Date    Anxiety     Asthma     as child    Depression     Depression     dysthymia - following lupus dx age 23    Diabetes mellitus ([facility] [address] [address]     Foot fracture, left     GERD (gastroesophageal reflux disease)     Hyperlipidemia     Hypothyroidism     Kidney stones     Lupus ([facility] [address]     dr barrera -     Migraine     PONV (postoperative nausea and vomiting)     Renal stone 4/25/2021    Sciatica     left sided         SURGICAL HISTORY       Past Surgical History:   Procedure Laterality Date    APPENDECTOMY      ARTHRODESIS Left 11/26/2019    ARTHRODESIS OF THE FIRST METATARSAL PHALANGEAL JOINT LEFT FOOT performed by Nitesh Espinoza DPM at [address] [facility] N/A 4/26/2021    CYSTOSCOPY RIGHT DOUBLE J STENT PLACEMENT. performed by Juni Yeboah MD at [address] [facility] COLONOSCOPY      ENDOSCOPY, COLON, DIAGNOSTIC      FOOT CLOSED REDUCTION Left 1/3/2017    LEFT FOOT PERCUTANEOUS PINNING Lyndal Kiss performed by Mayte Quiñones MD at [address] [facility] [place] LITHOTRIPSY Right 5/5/2021    RIGHT ESWL performed by Juni Yeboah MD at [address] [facility] ESOPHAGOGASTRODUODENOSCOPY TRANSORAL DIAGNOSTIC N/A 3/8/2017    EGD ESOPHAGOGASTRODUODENOSCOPY WITH DILATION performed by Negrita Shaw MD at [address] ESOPHAGOGASTRODUODENOSCOPY TRANSORAL DIAGNOSTIC N/A 11/7/2018    EGD ESOPHAGOGASTRODUODENOSCOPY WITH DILATION performed by Negrita Shaw MD at [facility]  TOOTH EXTRACTION      UPPER GASTROINTESTINAL ENDOSCOPY      UPPER GASTROINTESTINAL ENDOSCOPY N/A 8/17/2021    EGD WITH BIOPSY AND POLYPECTOMY performed by Rika Gibbons MD at [address] [place] [address]       Discharge Medication List as of 1/2/2022  2:03 PM      CONTINUE these medications which have NOT CHANGED    Details   oxyCODONE-acetaminophen (PERCOCET) 5-325 MG per tablet TAKE 1 TABLET Every 8 hours as needed for MODERATE TO SEVERE PAIN. PLEASE WATCH OUT FOR SETATION AND FATIGUE. Historical Med      cyclobenzaprine (FLEXERIL) 10 MG tablet Take 1 tablet by mouth 3 times daily as needed for Muscle spasms, Disp-20 tablet, R-0Print      lidocaine (LIDODERM) 5 % Place 1 patch onto the skin every 24 hours Place 1 patch onto the skin daily 12 hours on, 12 hours off., Disp-30 patch, R-0Print      methylPREDNISolone (MEDROL DOSEPACK) 4 MG tablet Take by mouth., Disp-1 kit, R-0Print      insulin glargine (LANTUS SOLOSTAR) 100 UNIT/ML injection pen 160  units at bedtime, Disp-30 pen, R-3Normal      Insulin Aspart, w/Niacinamide, 100 UNIT/ML SOPN Inject 60 units tid with meals, Disp-15 pen, R-3Normal      Insulin Pen Needle (PEN NEEDLES) 33G X 4 MM MISC 1 each by Does not apply route three times daily, Disp-100 each, R-2Normal      insulin aspart (NOVOLOG FLEXPEN) 100 UNIT/ML injection pen Inject 60 units tid with meals, Disp-15 pen, R-3Normal      dicyclomine (BENTYL) 10 MG capsule Take 1 capsule by mouth every 6 hours as needed (cramps), Disp-20 capsule, R-0Normal      ondansetron (ZOFRAN ODT) 4 MG disintegrating tablet Take 1 tablet by mouth every 8 hours as needed for Nausea, Disp-7 tablet, R-0Normal      tiZANidine (ZANAFLEX) 4 MG tablet TAKE 1 TABLET 3 TIMES DAILY as needed. Historical Med      pregabalin (LYRICA) 75 MG capsule Take 75 mg by mouth daily. Historical Med      DULoxetine (CYMBALTA) 30 MG extended release capsule TAKE 1 CAPSULE BY MOUTH EVERY DAYHistorical Med esomeprazole (NEXIUM) 40 MG delayed release capsule Take 1 capsule by mouth daily, Disp-30 capsule, R-3Normal      !! blood glucose monitor strips Pt test 3x daily Dx E11.65, Disp-100 strip,R-3, Normal      !! Drug [place]t Lancets 30G MISC Disp-200 each,R-3, NormalCheck blood sugars twice daily or as directed. !! blood glucose monitor kit and supplies Please give 1 meter covered by insurance Dx E11.65, Disp-1 kit, R-0, Normal      !! Lancets MISC Disp-100 each, R-3, NormalPt test 3x daily Dx E11.65      !! blood glucose test strips (ASCENSIA AUTODISC VI;ONE TOUCH ULTRA TEST VI) strip Disp-200 strip, R-3, NormalCheck blood sugars twice daily or as directed. !! blood glucose monitor kit and supplies Test 3 times a day & as needed for symptoms of irregular blood glucose., Disp-1 kit, R-0, Normal      estradiol (CLIMARA) 0.025 MG/24HR Place 0.025 patches onto the skin every 7 days On mondays, R-11Historical Med      albuterol sulfate HFA (VENTOLIN HFA) 108 (90 Base) MCG/ACT inhaler Inhale 2 puffs into the lungs every 6 hours as needed for Wheezing, Disp-1 Inhaler, R-0Print      rosuvastatin (CRESTOR) 40 MG tablet Take 1 tablet by mouth daily, Disp-90 tablet, R-3Normal      levothyroxine (SYNTHROID) 50 MCG tablet Take 1 tablet by mouth daily, Disp-90 tablet, R-3      !! Blood Glucose Monitoring Suppl (TRUE TRACK BLOOD GLUCOSE) CAROLE Disp-100 Device, R-5, NormalCheck blood sugars twice daily or as directed. hydroxychloroquine (PLAQUENIL) 200 MG tablet Take 1 tablet by mouth 2 times daily, R-5       !! - Potential duplicate medications found. Please discuss with provider.           ALLERGIES     Hm lidocaine patch [lidocaine], Aspirin, Cephalosporins, Imitrex [sumatriptan], Pcn [penicillins], Seasonal, Toradol [ketorolac tromethamine], and Ultram [tramadol]    FAMILY HISTORY       Family History   Adopted: Yes          SOCIAL HISTORY       Social History     Socioeconomic History    Marital status: Legally      Spouse name: None    Number of children: None    Years of education: None    Highest education level: None   Occupational History    None   Tobacco Use    Smoking status: Never Smoker    Smokeless tobacco: Never Used   Vaping Use    Vaping Use: Never used   Substance and Sexual Activity    Alcohol use: No     Alcohol/week: 0.0 standard drinks     Comment: denies    Drug use: No     Comment: denies    Sexual activity: None   Other Topics Concern    None   Social History Narrative    None     Social Determinants of Health     Financial Resource Strain: Low Risk     Difficulty of Paying Living Expenses: Not hard at all   Food Insecurity: No Food Insecurity    Worried About Running Out of Food in the Last Year: Never true    Carlitos of Food in the Last Year: Never true   Transportation Needs:     Lack of Transportation (Medical): Not on file    Lack of Transportation (Non-Medical):  Not on file   Physical Activity:     Days of Exercise per Week: Not on file    Minutes of Exercise per Session: Not on file   Stress:     Feeling of Stress : Not on file   Social Connections:     Frequency of Communication with Friends and Family: Not on file    Frequency of Social Gatherings with Friends and Family: Not on file    Attends Druze Services: Not on file    Active Member of [address] [place] [address] [employer] or Organizations: Not on file    Attends Club or Organization Meetings: Not on file    Marital Status: Not on file   Intimate Partner Violence:     Fear of Current or Ex-Partner: Not on file    Emotionally Abused: Not on file    Physically Abused: Not on file    Sexually Abused: Not on file   Housing Stability:     Unable to Pay for Housing in the Last Year: Not on file    Number of [place] in the Last Year: Not on file    Unstable Housing in the Last Year: Not on file       SCREENINGS    Aries Coma Scale  Eye Opening: Spontaneous  Best Verbal Response: Oriented  Best Motor Response: Obeys commands  Aries Coma Scale Score: 15        PHYSICAL EXAM    (up to 7 for level 4, 8 or more for level 5)     ED Triage Vitals [01/02/22 1311]   BP Temp Temp Source Pulse Resp SpO2 Height Weight   117/82 98.4 °F (36.9 °C) Oral 86 20 98 % 5' 8\" (1.727 m) 160 lb (72.6 kg)       Physical Exam  Vitals and nursing note reviewed. Constitutional:       General: She is in acute distress. Appearance: Normal appearance. She is well-developed and normal weight. She is not ill-appearing, toxic-appearing or diaphoretic. HENT:      Head: Normocephalic and atraumatic. Nose: Nose normal. No congestion or rhinorrhea. Mouth/Throat:      Mouth: Mucous membranes are moist.      Pharynx: Oropharynx is clear. No oropharyngeal exudate or posterior oropharyngeal erythema. Eyes:      General: No scleral icterus. Right eye: No discharge. Left eye: No discharge. Extraocular Movements: Extraocular movements intact. Conjunctiva/sclera: Conjunctivae normal.      Pupils: Pupils are equal, round, and reactive to light. Neck:      Thyroid: No thyromegaly. Vascular: No carotid bruit or JVD. Trachea: No tracheal deviation. Cardiovascular:      Rate and Rhythm: Normal rate and regular rhythm. Pulses: Normal pulses. Heart sounds: Normal heart sounds. No murmur heard. No friction rub. No gallop. Pulmonary:      Effort: Pulmonary effort is normal. No respiratory distress. Breath sounds: Normal breath sounds. No stridor. No wheezing, rhonchi or rales. Chest:      Chest wall: No tenderness. Abdominal:      General: Abdomen is flat. Bowel sounds are normal. There is no distension. Palpations: Abdomen is soft. There is no mass. Tenderness: There is no abdominal tenderness. There is no right CVA tenderness, left CVA tenderness, guarding or rebound. Hernia: No hernia is present.    Musculoskeletal:         General: No swelling, tenderness, deformity or signs of injury. Normal range of motion. Cervical back: Normal range of motion and neck supple. No rigidity or tenderness. Right lower leg: No edema. Left lower leg: No edema. Lymphadenopathy:      Cervical: No cervical adenopathy. Skin:     General: Skin is warm and dry. Capillary Refill: Capillary refill takes less than 2 seconds. Coloration: Skin is not jaundiced or pale. Findings: No bruising, erythema, lesion or rash. Neurological:      General: No focal deficit present. Mental Status: She is alert and oriented to person, place, and time. Mental status is at baseline. Cranial Nerves: No cranial nerve deficit. Sensory: No sensory deficit. Motor: No weakness or abnormal muscle tone. Coordination: Coordination normal.      Gait: Gait normal.      Deep Tendon Reflexes: Reflexes are normal and symmetric. Reflexes normal.   Psychiatric:         Mood and Affect: Mood normal.         Behavior: Behavior normal.         Thought Content: Thought content normal.         Judgment: Judgment normal.         DIAGNOSTIC RESULTS     EKG: All EKG's are interpreted by the Emergency Department Physician who either signs or Co-signs this chart in the absence of a cardiologist.        RADIOLOGY:   Non-plain film images such as CT, Ultrasound and MRI are read by the radiologist. Mary [place] Kill radiographicimages are visualized and preliminarily interpreted by the emergency physician with the below findings:        Interpretation per the Radiologist below, if available at the time of this note:    No orders to display         ED BEDSIDE ULTRASOUND:   Performed by ED Physician - none    LABS:  Labs Reviewed - No data to display    All other labs were within normal range or not returned as of this dictation.     EMERGENCY DEPARTMENT COURSE and DIFFERENTIALDIAGNOSIS/MDM:   Vitals:    Vitals:    01/02/22 1311   BP: 117/82   Pulse: 86   Resp: 20   Temp: 98.4 °F (36.9 °C)   TempSrc: Oral   SpO2: 98% Weight: 160 lb (72.6 kg)   Height: 5' 8\" (1.727 m)           MDM  Number of Diagnoses or Management Options  Risk of Complications, Morbidity, and/or Mortality  Presenting problems: moderate  Diagnostic procedures: low  Management options: moderate    Patient Progress  Patient progress: improved      CRITICAL CARE TIME   Total Critical Care time was  minutes, excluding separately reportable procedures. There was a high probability of clinically significant/life threatening deterioration in the patient's condition which required my urgentintervention. CONSULTS:  None    PROCEDURES:  Unless otherwise noted below, none     Procedures    FINAL IMPRESSION      1. Sciatica of right side    2.  Acute left-sided back pain with sciatica          DISPOSITION/PLAN   DISPOSITION Decision To Discharge 01/02/2022 01:56:48 PM      PATIENT REFERRED TO:  Zoila Smith MD  [address] [place] [address]  [phone]    In 3 days      Cherelle Dimas MD  [address] [facility] [phone]    In 2 days        DISCHARGE MEDICATIONS:  Discharge Medication List as of 1/2/2022  2:03 PM             (Please note that portions of this note were completed with a voice recognitionprogram.  Efforts were made to edit the dictations but occasionally words are mis-transcribed.)    Francisco Rogers MD (electronically signed)  Attending Emergency Physician          Francisco Rogers MD  01/02/22 [address] MD [place]  01/03/22 0111

## 2022-01-02 NOTE — ED PROVIDER NOTES
eMERGENCY dEPARTMENT eNCOUnter      [facility]    Chief Complaint   Patient presents with    Back Pain     radiating down left leg, pt states she took percocet at 2pm       HPI    [address] is a 49 y.o. female with hx of Anxiety, Asthma , GERD, Lupus , sciatica who presentsto ED from home   By private car  With complaint of Lower back pain with radiation to left thigh - burning  Onset a couple day  Intensity of symptoms moderate   She denies any injury, Bowel bladder incontinence   Patient is able to ambulate but c/o pain with Ambulation    She denies UTI symptoms     PAST MEDICAL HISTORY    Past Medical History:   Diagnosis Date    Anxiety     Asthma     as child    Depression     Depression     dysthymia - following lupus dx age 23    Diabetes mellitus ([facility] [address] [facility]     Foot fracture, left     GERD (gastroesophageal reflux disease)     Hyperlipidemia     Hypothyroidism     Kidney stones     Lupus ([facility] [address] [facility]     dr barrera -     Migraine     PONV (postoperative nausea and vomiting)     Renal stone 4/25/2021    Sciatica     left sided       SURGICAL HISTORY    Past Surgical History:   Procedure Laterality Date    APPENDECTOMY      ARTHRODESIS Left 11/26/2019    ARTHRODESIS OF THE FIRST METATARSAL PHALANGEAL JOINT LEFT FOOT performed by Tricia Hsu DPM at [address] [facility] N/A 4/26/2021    CYSTOSCOPY RIGHT DOUBLE J STENT PLACEMENT. performed by Jorge Welsh MD at [facility] COLONOSCOPY      ENDOSCOPY, COLON, DIAGNOSTIC      FOOT CLOSED REDUCTION Left 1/3/2017    LEFT FOOT PERCUTANEOUS PINNING Flip Deist performed by Doron Lawson MD at [address] [facility] LITHOTRIPSY Right 5/5/2021    RIGHT ESWL performed by Jorge Welsh MD at [facility] ESOPHAGOGASTRODUODENOSCOPY TRANSORAL DIAGNOSTIC N/A 3/8/2017    EGD ESOPHAGOGASTRODUODENOSCOPY WITH DILATION performed by Boy Mark MD at [address] ESOPHAGOGASTRODUODENOSCOPY TRANSORAL DIAGNOSTIC N/A 11/7/2018    EGD ESOPHAGOGASTRODUODENOSCOPY WITH DILATION performed by Boy Mark MD at [facility] ENDOSCOPY      UPPER GASTROINTESTINAL ENDOSCOPY N/A 8/17/2021    EGD WITH BIOPSY AND POLYPECTOMY performed by Chas Fine MD at [address] [place] [address]    Current Outpatient Rx   Medication Sig Dispense Refill    cyclobenzaprine (FLEXERIL) 10 MG tablet Take 1 tablet by mouth 3 times daily as needed for Muscle spasms 20 tablet 0    lidocaine (LIDODERM) 5 % Place 1 patch onto the skin every 24 hours Place 1 patch onto the skin daily 12 hours on, 12 hours off. 30 patch 0    methylPREDNISolone (MEDROL DOSEPACK) 4 MG tablet Take by mouth. 1 kit 0    HYDROcodone-acetaminophen (NORCO) 5-325 MG per tablet Take 1 tablet by mouth every 4 hours as needed for Pain for up to 3 days. Intended supply: 3 days. Take lowest dose possible to manage pain 12 tablet 0    Insulin Aspart, w/Niacinamide, 100 UNIT/ML SOPN Inject 60 units tid with meals 15 pen 3    tiZANidine (ZANAFLEX) 4 MG tablet TAKE 1 TABLET 3 TIMES DAILY as needed.  pregabalin (LYRICA) 75 MG capsule Take 75 mg by mouth daily.  esomeprazole (NEXIUM) 40 MG delayed release capsule Take 1 capsule by mouth daily 30 capsule 3    estradiol (CLIMARA) 0.025 MG/24HR Place 0.025 patches onto the skin every 7 days On mondays  11    rosuvastatin (CRESTOR) 40 MG tablet Take 1 tablet by mouth daily 90 tablet 3    levothyroxine (SYNTHROID) 50 MCG tablet Take 1 tablet by mouth daily 90 tablet 3    hydroxychloroquine (PLAQUENIL) 200 MG tablet Take 1 tablet by mouth 2 times daily  5    oxyCODONE-acetaminophen (PERCOCET) 5-325 MG per tablet TAKE 1 TABLET Every 8 hours as needed for MODERATE TO SEVERE PAIN. PLEASE WATCH OUT FOR SETATION AND FATIGUE.       insulin glargine (LANTUS SOLOSTAR) 100 UNIT/ML injection pen 160  units at bedtime 30 pen 3    Insulin Pen Needle (PEN NEEDLES) 33G X 4 MM MISC 1 each by Does not apply route three times daily 100 each 2    insulin aspart (NOVOLOG FLEXPEN) 100 UNIT/ML injection pen Inject 60 units tid with meals 15 pen 3    dicyclomine (BENTYL) 10 MG capsule Take 1 capsule by mouth every 6 hours as needed (cramps) 20 capsule 0    ondansetron (ZOFRAN ODT) 4 MG disintegrating tablet Take 1 tablet by mouth every 8 hours as needed for Nausea 7 tablet 0    DULoxetine (CYMBALTA) 30 MG extended release capsule TAKE 1 CAPSULE BY MOUTH EVERY DAY      blood glucose monitor strips Pt test 3x daily Dx E11.65 100 strip 3    Drug [employer] Banks Unilet Lancets 30G MISC Check blood sugars twice daily or as directed. 200 each 3    blood glucose monitor kit and supplies Please give 1 meter covered by insurance Dx E11.65 1 kit 0    Lancets MISC Pt test 3x daily Dx E11.65 100 each 3    blood glucose test strips (ASCENSIA AUTODISC VI;ONE TOUCH ULTRA TEST VI) strip Check blood sugars twice daily or as directed. 200 strip 3    blood glucose monitor kit and supplies Test 3 times a day & as needed for symptoms of irregular blood glucose. 1 kit 0    albuterol sulfate HFA (VENTOLIN HFA) 108 (90 Base) MCG/ACT inhaler Inhale 2 puffs into the lungs every 6 hours as needed for Wheezing 1 Inhaler 0    Blood Glucose Monitoring Suppl (TRUE TRACK BLOOD GLUCOSE) CAROLE Check blood sugars twice daily or as directed.  100 Device 5       ALLERGIES    Allergies   Allergen Reactions    Aspirin Swelling     Bloody noses    Cephalosporins     Imitrex [Sumatriptan] Hives    Pcn [Penicillins] Swelling    Seasonal Itching     Itch, sneezing,runny nose, watery eyes    Toradol [Ketorolac Tromethamine] Hives     Shortness of breath    Ultram [Tramadol] Hives       FAMILY HISTORY    Family History   Adopted: Yes       SOCIAL HISTORY    Social History     Socioeconomic History    Marital status: Legally      Spouse name: None    Number of children: None    Years of education: None    Highest education level: None   Occupational History    None   Tobacco Use    Smoking status: Never Smoker    Smokeless tobacco: Never Used   Vaping Use    Vaping Use: Never used   Substance and Sexual Activity    Alcohol use: No     Alcohol/week: 0.0 standard drinks     Comment: denies    Drug use: No     Comment: denies    Sexual activity: None   Other Topics Concern    None   Social History Narrative    None     Social Determinants of Health     Financial Resource Strain: Low Risk     Difficulty of Paying Living Expenses: Not hard at all   Food Insecurity: No Food Insecurity    Worried About Running Out of Food in the Last Year: Never true    Carlitos of Food in the Last Year: Never true   Transportation Needs:     Lack of Transportation (Medical): Not on file    Lack of Transportation (Non-Medical):  Not on file   Physical Activity:     Days of Exercise per Week: Not on file    Minutes of Exercise per Session: Not on file   Stress:     Feeling of Stress : Not on file   Social Connections:     Frequency of Communication with Friends and Family: Not on file    Frequency of Social Gatherings with Friends and Family: Not on file    Attends Yazdanism Services: Not on file    Active Member of [address] [place] [address] [employer] or Organizations: Not on file    Attends Club or Organization Meetings: Not on file    Marital Status: Not on file   Intimate Partner Violence:     Fear of Current or Ex-Partner: Not on file    Emotionally Abused: Not on file    Physically Abused: Not on file    Sexually Abused: Not on file   Housing Stability:     Unable to Pay for Housing in the Last Year: Not on file    Number of [place] in the Last Year: Not on file    Unstable Housing in the Last Year: Not on file       REVIEW OF SYSTEMS    Constitutional:  Denies fever, chills, weight loss or weakness Eyes:  Denies photophobia or discharge   HENT:  Denies sore throat or ear pain   Respiratory:  Denies cough or shortness of breath   Cardiovascular:  Denies chest pain, palpitations or swelling   GI:  Denies abdominal pain, nausea, vomiting, or diarrhea   Musculoskeletal:  C/o lower back pain   Skin:  Denies rash   Neurologic:  Denies headache, focal weakness or sensory changes   Endocrine:  Denies polyuria or polydypsia   Lymphatic:  Denies swollen glands   Psychiatric:  Denies depression, suicidal ideation or homicidal ideation   All systems negative except as marked. PHYSICAL EXAM    VITAL SIGNS: /85   Pulse 84   Temp 96.9 °F (36.1 °C) (Temporal)   Resp 20   Ht 5' 8\" (1.727 m)   Wt 160 lb (72.6 kg)   LMP  (LMP Unknown)   SpO2 100%   BMI 24.33 kg/m²    Constitutional:  Well developed, Well nourished, mild acute distress, Non-toxic appearance. HENT:  Normocephalic, Atraumatic, Bilateral external ears normal, Oropharynx moist, No oral exudates, Nose normal. Neck- Normal range of motion, No tenderness, Supple, No stridor. Eyes:  PERRL, EOMI, Conjunctiva normal, No discharge. Respiratory:  Normal breath sounds, No respiratory distress, No wheezing, No chest tenderness. Cardiovascular:  Normal heart rate, Normal rhythm, No murmurs, No rubs, No gallops. GI:  Bowel sounds normal, Soft, No tenderness, No masses, No pulsatile masses. :  No CVA tenderness. Musculoskeletal:  Intact distal pulses, No edema, No tenderness, No cyanosis, No clubbing. Good range of motion in all major joints. No tenderness to palpation or major deformities noted. Back- lower lumbar tenderness. SLR positive on left at 30, reflexes  in lower extremity normal , sensations intact   Integument:  Warm, Dry, No erythema, No rash. Lymphatic:  No lymphadenopathy noted. Neurologic:  Alert & oriented x 3, Normal motor function, Normal sensory function, No focal deficits noted.    Psychiatric:  Affect normal, Judgment normal, Mood normal.       REEVALUATION   Pain control adequate   Ambulating       Summation      Patient Course:     ED Medications administered this visit:    Medications   HYDROcodone-acetaminophen (NORCO) 5-325 MG per tablet 1 tablet (has no administration in time range)   orphenadrine (NORFLEX) injection 60 mg (has no administration in time range)   lidocaine 4 % external patch 1 patch (has no administration in time range)       New Prescriptions from this visit:    New Prescriptions    CYCLOBENZAPRINE (FLEXERIL) 10 MG TABLET    Take 1 tablet by mouth 3 times daily as needed for Muscle spasms    HYDROCODONE-ACETAMINOPHEN (NORCO) 5-325 MG PER TABLET    Take 1 tablet by mouth every 4 hours as needed for Pain for up to 3 days. Intended supply: 3 days. Take lowest dose possible to manage pain    LIDOCAINE (LIDODERM) 5 %    Place 1 patch onto the skin every 24 hours Place 1 patch onto the skin daily 12 hours on, 12 hours off. METHYLPREDNISOLONE (MEDROL DOSEPACK) 4 MG TABLET    Take by mouth. Follow-up:  Laura Lobato MD  [address] [place] [phone]    Call in 1 day          Final Impression:   1.  Acute left-sided back pain with sciatica               (Please note that portions of this note were completed with a voice recognition program.  Efforts were made to edit the dictations but occasionally words are mis-transcribed.)          Ave Andrews MD  01/01/22 0310

## 2022-02-21 ENCOUNTER — HOSPITAL ENCOUNTER (OUTPATIENT)
Dept: LAB | Age: 57
Discharge: HOME | End: 2022-02-21
Payer: MEDICARE

## 2022-02-21 DIAGNOSIS — E11.69 TYPE 2 DIABETES MELLITUS WITH OTHER SPECIFIED COMPLICATION, UNSPECIFIED WHETHER LONG TERM INSULIN USE (HCC): ICD-10-CM

## 2022-02-21 LAB
ANION GAP SERPL CALCULATED.3IONS-SCNC: 16 MEQ/L (ref 9–15)
BUN BLDV-MCNC: 17 MG/DL (ref 6–20)
CALCIUM SERPL-MCNC: 9.2 MG/DL (ref 8.5–9.9)
CHLORIDE BLD-SCNC: 101 MEQ/L (ref 95–107)
CO2: 21 MEQ/L (ref 20–31)
CREAT SERPL-MCNC: 1.09 MG/DL (ref 0.5–0.9)
GFR AFRICAN AMERICAN: >60
GFR NON-AFRICAN AMERICAN: 52.2
GLUCOSE BLD-MCNC: 247 MG/DL (ref 70–99)
HBA1C MFR BLD: 9.3 % (ref 4.8–5.9)
POTASSIUM SERPL-SCNC: 4.7 MEQ/L (ref 3.4–4.9)
SODIUM BLD-SCNC: 138 MEQ/L (ref 135–144)

## 2022-02-21 PROCEDURE — 83036 HEMOGLOBIN GLYCOSYLATED A1C: CPT

## 2022-02-21 PROCEDURE — 80048 BASIC METABOLIC PNL TOTAL CA: CPT

## 2022-02-21 PROCEDURE — 36415 COLL VENOUS BLD VENIPUNCTURE: CPT

## 2022-02-22 DIAGNOSIS — E11.41 TYPE 2 DIABETES MELLITUS WITH DIABETIC MONONEUROPATHY, WITHOUT LONG-TERM CURRENT USE OF INSULIN (HCC): Primary | ICD-10-CM

## 2022-03-02 ENCOUNTER — APPOINTMENT (OUTPATIENT)
Dept: CT IMAGING | Age: 57
End: 2022-03-02
Payer: MEDICARE

## 2022-03-02 ENCOUNTER — HOSPITAL ENCOUNTER (EMERGENCY)
Age: 57
Discharge: HOME | End: 2022-03-02
Payer: MEDICARE

## 2022-03-02 VITALS
HEART RATE: 94 BPM | RESPIRATION RATE: 16 BRPM | DIASTOLIC BLOOD PRESSURE: 88 MMHG | SYSTOLIC BLOOD PRESSURE: 130 MMHG | OXYGEN SATURATION: 97 % | TEMPERATURE: 99.3 F | HEIGHT: 68 IN | WEIGHT: 165 LBS | BODY MASS INDEX: 25.01 KG/M2

## 2022-03-02 DIAGNOSIS — R10.32 LLQ ABDOMINAL PAIN: Primary | ICD-10-CM

## 2022-03-02 DIAGNOSIS — R19.7 DIARRHEA, UNSPECIFIED TYPE: ICD-10-CM

## 2022-03-02 LAB
ALBUMIN SERPL-MCNC: 3.8 G/DL (ref 3.5–4.6)
ALP BLD-CCNC: 133 U/L (ref 40–130)
ALT SERPL-CCNC: 23 U/L (ref 0–33)
AMPHETAMINE SCREEN, URINE: ABNORMAL
ANION GAP SERPL CALCULATED.3IONS-SCNC: 11 MEQ/L (ref 9–15)
AST SERPL-CCNC: 22 U/L (ref 0–35)
BARBITURATE SCREEN URINE: ABNORMAL
BASOPHILS ABSOLUTE: 0 K/UL (ref 0–0.2)
BASOPHILS RELATIVE PERCENT: 0.3 %
BENZODIAZEPINE SCREEN, URINE: POSITIVE
BILIRUB SERPL-MCNC: <0.2 MG/DL (ref 0.2–0.7)
BILIRUBIN URINE: NEGATIVE
BLOOD, URINE: NEGATIVE
BUN BLDV-MCNC: 11 MG/DL (ref 6–20)
CALCIUM SERPL-MCNC: 9.4 MG/DL (ref 8.5–9.9)
CANNABINOID SCREEN URINE: ABNORMAL
CHLORIDE BLD-SCNC: 100 MEQ/L (ref 95–107)
CLARITY: CLEAR
CO2: 25 MEQ/L (ref 20–31)
COCAINE METABOLITE SCREEN URINE: ABNORMAL
COLOR: YELLOW
CREAT SERPL-MCNC: 1.08 MG/DL (ref 0.5–0.9)
EOSINOPHILS ABSOLUTE: 0.5 K/UL (ref 0–0.7)
EOSINOPHILS RELATIVE PERCENT: 3.5 %
GFR AFRICAN AMERICAN: >60
GFR NON-AFRICAN AMERICAN: 52.8
GLOBULIN: 3.2 G/DL (ref 2.3–3.5)
GLUCOSE BLD-MCNC: 145 MG/DL (ref 70–99)
GLUCOSE URINE: 500 MG/DL
HCT VFR BLD CALC: 41.3 % (ref 37–47)
HEMOGLOBIN: 13.8 G/DL (ref 12–16)
KETONES, URINE: NEGATIVE MG/DL
LEUKOCYTE ESTERASE, URINE: NEGATIVE
LYMPHOCYTES ABSOLUTE: 2.6 K/UL (ref 1–4.8)
LYMPHOCYTES RELATIVE PERCENT: 18.7 %
Lab: ABNORMAL
MCH RBC QN AUTO: 28.6 PG (ref 27–31.3)
MCHC RBC AUTO-ENTMCNC: 33.5 % (ref 33–37)
MCV RBC AUTO: 85.2 FL (ref 82–100)
METHADONE SCREEN, URINE: ABNORMAL
MONOCYTES ABSOLUTE: 0.9 K/UL (ref 0.2–0.8)
MONOCYTES RELATIVE PERCENT: 6.6 %
NEUTROPHILS ABSOLUTE: 9.7 K/UL (ref 1.4–6.5)
NEUTROPHILS RELATIVE PERCENT: 70.9 %
NITRITE, URINE: NEGATIVE
OPIATE SCREEN URINE: ABNORMAL
OXYCODONE URINE: POSITIVE
PDW BLD-RTO: 13.8 % (ref 11.5–14.5)
PH UA: 5 (ref 5–9)
PHENCYCLIDINE SCREEN URINE: ABNORMAL
PLATELET # BLD: 299 K/UL (ref 130–400)
POC CREATININE WHOLE BLOOD: 1.1
POTASSIUM SERPL-SCNC: 4.4 MEQ/L (ref 3.4–4.9)
PROPOXYPHENE SCREEN: ABNORMAL
PROTEIN UA: NEGATIVE MG/DL
RBC # BLD: 4.84 M/UL (ref 4.2–5.4)
SODIUM BLD-SCNC: 136 MEQ/L (ref 135–144)
SPECIFIC GRAVITY UA: 1.02 (ref 1–1.03)
TOTAL PROTEIN: 7 G/DL (ref 6.3–8)
URINE REFLEX TO CULTURE: ABNORMAL
UROBILINOGEN, URINE: 0.2 E.U./DL
WBC # BLD: 13.7 K/UL (ref 4.8–10.8)

## 2022-03-02 PROCEDURE — 96375 TX/PRO/DX INJ NEW DRUG ADDON: CPT

## 2022-03-02 PROCEDURE — 6360000002 HC RX W HCPCS: Performed by: PERSONAL EMERGENCY RESPONSE ATTENDANT

## 2022-03-02 PROCEDURE — 74177 CT ABD & PELVIS W/CONTRAST: CPT

## 2022-03-02 PROCEDURE — 96374 THER/PROPH/DIAG INJ IV PUSH: CPT

## 2022-03-02 PROCEDURE — 6360000004 HC RX CONTRAST MEDICATION: Performed by: PERSONAL EMERGENCY RESPONSE ATTENDANT

## 2022-03-02 PROCEDURE — 81003 URINALYSIS AUTO W/O SCOPE: CPT

## 2022-03-02 PROCEDURE — 85025 COMPLETE CBC W/AUTO DIFF WBC: CPT

## 2022-03-02 PROCEDURE — 6370000000 HC RX 637 (ALT 250 FOR IP): Performed by: PERSONAL EMERGENCY RESPONSE ATTENDANT

## 2022-03-02 PROCEDURE — 99284 EMERGENCY DEPT VISIT MOD MDM: CPT

## 2022-03-02 PROCEDURE — 80307 DRUG TEST PRSMV CHEM ANLYZR: CPT

## 2022-03-02 PROCEDURE — 36415 COLL VENOUS BLD VENIPUNCTURE: CPT

## 2022-03-02 PROCEDURE — 80053 COMPREHEN METABOLIC PANEL: CPT

## 2022-03-02 RX ORDER — DICYCLOMINE HYDROCHLORIDE 10 MG/1
10 CAPSULE ORAL
Qty: 14 CAPSULE | Refills: 0 | Status: SHIPPED | OUTPATIENT
Start: 2022-03-02 | End: 2022-03-26 | Stop reason: ALTCHOICE

## 2022-03-02 RX ORDER — DICYCLOMINE HYDROCHLORIDE 10 MG/1
10 CAPSULE ORAL ONCE
Status: COMPLETED | OUTPATIENT
Start: 2022-03-02 | End: 2022-03-02

## 2022-03-02 RX ORDER — ONDANSETRON 2 MG/ML
4 INJECTION INTRAMUSCULAR; INTRAVENOUS ONCE
Status: COMPLETED | OUTPATIENT
Start: 2022-03-02 | End: 2022-03-02

## 2022-03-02 RX ADMIN — IOPAMIDOL 100 ML: 612 INJECTION, SOLUTION INTRAVENOUS at 19:03

## 2022-03-02 RX ADMIN — HYDROMORPHONE HYDROCHLORIDE 0.5 MG: 1 INJECTION, SOLUTION INTRAMUSCULAR; INTRAVENOUS; SUBCUTANEOUS at 18:38

## 2022-03-02 RX ADMIN — DICYCLOMINE HYDROCHLORIDE 10 MG: 10 CAPSULE ORAL at 19:43

## 2022-03-02 RX ADMIN — ONDANSETRON 4 MG: 2 INJECTION INTRAMUSCULAR; INTRAVENOUS at 18:39

## 2022-03-02 ASSESSMENT — PAIN DESCRIPTION - LOCATION
LOCATION: BACK
LOCATION: FLANK
LOCATION: ABDOMEN

## 2022-03-02 ASSESSMENT — ENCOUNTER SYMPTOMS
DIARRHEA: 1
SHORTNESS OF BREATH: 0
RHINORRHEA: 0
SORE THROAT: 0
BLOOD IN STOOL: 0
NAUSEA: 0
COUGH: 0
COLOR CHANGE: 0
ABDOMINAL PAIN: 1
VOMITING: 0

## 2022-03-02 ASSESSMENT — PAIN DESCRIPTION - ORIENTATION
ORIENTATION: LEFT
ORIENTATION: LEFT;LOWER

## 2022-03-02 ASSESSMENT — PAIN DESCRIPTION - FREQUENCY
FREQUENCY: CONTINUOUS

## 2022-03-02 ASSESSMENT — PAIN SCALES - GENERAL
PAINLEVEL_OUTOF10: 10

## 2022-03-02 ASSESSMENT — PAIN DESCRIPTION - PAIN TYPE
TYPE: ACUTE PAIN

## 2022-03-02 ASSESSMENT — PAIN DESCRIPTION - DESCRIPTORS
DESCRIPTORS: ACHING
DESCRIPTORS: ACHING
DESCRIPTORS: STABBING;SHARP

## 2022-03-02 NOTE — ED TRIAGE NOTES
Pt a/o x 3 skin pink w/d resp non labored. Pt reports lt side pain x 4 days sharp twisting continuous. Denies nv but slt diarrhea. Pt has hx of kidney stones. denies blood in urine.

## 2022-03-02 NOTE — ED PROVIDER NOTES
[address] [facility] ED  eMERGENCY dEPARTMENT eNCOUnter      Pt Name: Zuleima Muñiz  MRN: [id number]  [place] 1965  Date of evaluation: 3/2/2022  Provider: AARON Fagan      HISTORY OF PRESENT ILLNESS    Suzanne Kim is a 49 y.o. female with PMHx of asthma, anxiety, depression, DM, GERD, hyperlipidemia, hypothyroidism, SLE, kidney stones presents to the emergency department with LLQ abd pain. Pt says for the past 3 days she has been having left lower back pain and LLQ abdominal pain described as stabbing, everything she eats causes diarrhea. She denies fevers, cough, CP, SOB, nausea, vomiting, urinary sx. Taking percocet at home with minimal relief. No injuries. HPI    Nursing Notes were reviewed. REVIEW OF SYSTEMS       Review of Systems   Constitutional: Negative for appetite change, chills and fever. HENT: Negative for congestion, rhinorrhea and sore throat. Respiratory: Negative for cough and shortness of breath. Cardiovascular: Negative for chest pain. Gastrointestinal: Positive for abdominal pain and diarrhea. Negative for blood in stool, nausea and vomiting. Genitourinary: Negative for difficulty urinating. Musculoskeletal: Negative for neck stiffness. Skin: Negative for color change and rash. Neurological: Negative for dizziness, syncope, weakness, light-headedness, numbness and headaches. All other systems reviewed and are negative.             PAST MEDICAL HISTORY     Past Medical History:   Diagnosis Date    Anxiety     Asthma     as child    Depression     Depression     dysthymia - following lupus dx age 23    Diabetes mellitus ([facility]     Foot fracture, left     GERD (gastroesophageal reflux disease)     Hyperlipidemia     Hypothyroidism     Kidney stones     Lupus ([facility]     dr barrera -     Migraine     PONV (postoperative nausea and vomiting)     Renal stone 4/25/2021    Sciatica     left sided         SURGICAL HISTORY       Past Surgical History:   Procedure Laterality Date    APPENDECTOMY      ARTHRODESIS Left 11/26/2019    ARTHRODESIS OF THE FIRST METATARSAL PHALANGEAL JOINT LEFT FOOT performed by Em Haji DPM at [address] [facility] N/A 4/26/2021    CYSTOSCOPY RIGHT DOUBLE J STENT PLACEMENT. performed by Ollie Lara MD at [facility]      COLONOSCOPY      ENDOSCOPY, COLON, DIAGNOSTIC      FOOT CLOSED REDUCTION Left 1/3/2017    LEFT FOOT PERCUTANEOUS PINNING MOTA FRACTURE performed by Maine Coffey MD at [address] [facility] LITHOTRIPSY Right 5/5/2021    RIGHT ESWL performed by Ollie Lara MD at [address] [facility] ESOPHAGOGASTRODUODENOSCOPY TRANSORAL DIAGNOSTIC N/A 3/8/2017    EGD ESOPHAGOGASTRODUODENOSCOPY WITH DILATION performed by Jose F Montes De Oca MD at [address] ESOPHAGOGASTRODUODENOSCOPY TRANSORAL DIAGNOSTIC N/A 11/7/2018    EGD ESOPHAGOGASTRODUODENOSCOPY WITH DILATION performed by Jose F Montes De Oca MD at [address] [facility] ENDOSCOPY N/A 8/17/2021    EGD WITH BIOPSY AND POLYPECTOMY performed by Asher Alston MD at [facility]       Previous Medications    ALBUTEROL SULFATE HFA (VENTOLIN HFA) 108 (90 BASE) MCG/ACT INHALER    Inhale 2 puffs into the lungs every 6 hours as needed for Wheezing    BLOOD GLUCOSE MONITOR KIT AND SUPPLIES    Test 3 times a day & as needed for symptoms of irregular blood glucose. BLOOD GLUCOSE MONITOR KIT AND SUPPLIES    Please give 1 meter covered by insurance Dx E11.65    BLOOD GLUCOSE MONITOR STRIPS    Pt test 3x daily Dx E11.65    BLOOD GLUCOSE MONITORING SUPPL (TRUE TRACK BLOOD GLUCOSE) CAROLE    Check blood sugars twice daily or as directed.     BLOOD GLUCOSE TEST STRIPS (ASCENSIA AUTODISC VI;ONE TOUCH ULTRA TEST VI) STRIP Check blood sugars twice daily or as directed. [employer] UN[employer]T LANCETS 30G MISC    Check blood sugars twice daily or as directed. DULOXETINE (CYMBALTA) 30 MG EXTENDED RELEASE CAPSULE    TAKE 1 CAPSULE BY MOUTH EVERY DAY    ESOMEPRAZOLE (NEXIUM) 40 MG DELAYED RELEASE CAPSULE    Take 1 capsule by mouth daily    ESTRADIOL (CLIMARA) 0.025 MG/24HR    Place 0.025 patches onto the skin every 7 days On mondays    HYDROXYCHLOROQUINE (PLAQUENIL) 200 MG TABLET    Take 1 tablet by mouth 2 times daily    INSULIN ASPART (NOVOLOG FLEXPEN) 100 UNIT/ML INJECTION PEN    Inject 60 units tid with meals    INSULIN ASPART, W/NIACINAMIDE, 100 UNIT/ML SOPN    Inject 60 units tid with meals    INSULIN GLARGINE (LANTUS SOLOSTAR) 100 UNIT/ML INJECTION PEN    160  units at bedtime    INSULIN PEN NEEDLE (PEN NEEDLES) 33G X 4 MM MISC    1 each by Does not apply route three times daily    LANCETS MISC    Pt test 3x daily Dx E11.65    LEVOTHYROXINE (SYNTHROID) 50 MCG TABLET    Take 1 tablet by mouth daily    ONDANSETRON (ZOFRAN ODT) 4 MG DISINTEGRATING TABLET    Take 1 tablet by mouth every 8 hours as needed for Nausea    OXYCODONE-ACETAMINOPHEN (PERCOCET) 5-325 MG PER TABLET    TAKE 1 TABLET Every 8 hours as needed for MODERATE TO SEVERE PAIN. PLEASE WATCH OUT FOR SETATION AND FATIGUE. PREGABALIN (LYRICA) 75 MG CAPSULE    Take 75 mg by mouth daily. ROSUVASTATIN (CRESTOR) 40 MG TABLET    Take 1 tablet by mouth daily    TIZANIDINE (ZANAFLEX) 4 MG TABLET    TAKE 1 TABLET 3 TIMES DAILY as needed.        ALLERGIES     Hm lidocaine patch [lidocaine], Aspirin, Cephalosporins, Imitrex [sumatriptan], Pcn [penicillins], Seasonal, Toradol [ketorolac tromethamine], and Ultram [tramadol]    FAMILY HISTORY       Family History   Adopted: Yes          SOCIAL HISTORY       Social History     Socioeconomic History    Marital status: Legally      Spouse name: None    Number of children: None    Years of education: None    Highest education level: None   Occupational History    None   Tobacco Use    Smoking status: Never Smoker    Smokeless tobacco: Never Used   Vaping Use    Vaping Use: Never used   Substance and Sexual Activity    Alcohol use: No     Alcohol/week: 0.0 standard drinks     Comment: denies    Drug use: No     Comment: denies    Sexual activity: None   Other Topics Concern    None   Social History Narrative    None     Social Determinants of Health     Financial Resource Strain: Low Risk     Difficulty of Paying Living Expenses: Not hard at all   Food Insecurity: No Food Insecurity    Worried About Running Out of Food in the Last Year: Never true    Carlitos of Food in the Last Year: Never true   Transportation Needs:     Lack of Transportation (Medical): Not on file    Lack of Transportation (Non-Medical):  Not on file   Physical Activity:     Days of Exercise per Week: Not on file    Minutes of Exercise per Session: Not on file   Stress:     Feeling of Stress : Not on file   Social Connections:     Frequency of Communication with Friends and Family: Not on file    Frequency of Social Gatherings with Friends and Family: Not on file    Attends Voodoo Services: Not on file    Active Member of [address] [place] [address] or Organizations: Not on file    Attends Club or Organization Meetings: Not on file    Marital Status: Not on file   Intimate Partner Violence:     Fear of Current or Ex-Partner: Not on file    Emotionally Abused: Not on file    Physically Abused: Not on file    Sexually Abused: Not on file   Housing Stability:     Unable to Pay for Housing in the Last Year: Not on file    Number of [place] in the Last Year: Not on file    Unstable Housing in the Last Year: Not on file         PHYSICAL EXAM         ED Triage Vitals [03/02/22 1751]   BP Temp Temp Source Pulse Resp SpO2 Height Weight   133/79 99.3 °F (37.4 °C) Oral 94 16 96 % 5' 8\" (1.727 m) 165 lb (74.8 kg)       Physical Exam  Constitutional: Appearance: She is well-developed. HENT:      Head: Normocephalic and atraumatic. Eyes:      Conjunctiva/sclera: Conjunctivae normal.      Pupils: Pupils are equal, round, and reactive to light. Neck:      Trachea: No tracheal deviation. Cardiovascular:      Heart sounds: Normal heart sounds. Pulmonary:      Effort: Pulmonary effort is normal. No respiratory distress. Breath sounds: Normal breath sounds. No stridor. Abdominal:      General: Bowel sounds are normal. There is no distension. Palpations: Abdomen is soft. There is no mass. Tenderness: There is abdominal tenderness. There is no guarding or rebound. Comments: Moderate LLQ abdominal TTP, abd soft and nondistended, no rebound or rigidity, no pulsatile mass or bruit, no ecchymosis. Moderate left lower back pain as well. No midline tenderness or signs of trauma. Musculoskeletal:         General: Normal range of motion. Cervical back: Normal range of motion and neck supple. Skin:     General: Skin is warm and dry. Capillary Refill: Capillary refill takes less than 2 seconds. Findings: No rash. Neurological:      Mental Status: She is alert and oriented to person, place, and time. Deep Tendon Reflexes: Reflexes are normal and symmetric. Psychiatric:         Behavior: Behavior normal.         Thought Content:  Thought content normal.         Judgment: Judgment normal.         DIAGNOSTIC RESULTS     EKG:All EKG's are interpreted by the Emergency Department Physician who either signs or Co-signs this chart in the absence of a cardiologist.        RADIOLOGY:   Non-plain film images such as CT, Ultrasound and MRI are read by theradiologist. Plain radiographic images are visualized and preliminarily interpreted by the emergency physician with the below findings:    Interpretation per theRadiologist below, if available at the time of this note:    CT ABDOMEN PELVIS W IV CONTRAST Additional Contrast? None Final Result      Hepatic steatosis. Cholecystectomy. Appendectomy. Hysterectomy. Remote bilateral inguinal hernia repair. Other findings discussed. All CT scans at this facility use dose modulation, iterative reconstruction, and/or weight based dosing when appropriate to reduce radiation dose to as low as reasonably achievable. LABS:  Labs Reviewed   URINALYSIS WITH REFLEX TO CULTURE - Abnormal; Notable for the following components:       Result Value    Glucose, Ur 500 (*)     All other components within normal limits   URINE DRUG SCREEN - Abnormal; Notable for the following components:    Benzodiazepine Screen, Urine POSITIVE (*)     Oxycodone Urine POSITIVE (*)     All other components within normal limits   COMPREHENSIVE METABOLIC PANEL - Abnormal; Notable for the following components:    Glucose 145 (*)     CREATININE 1.08 (*)     GFR Non- 52.8 (*)     Alkaline Phosphatase 133 (*)     All other components within normal limits   CBC WITH AUTO DIFFERENTIAL - Abnormal; Notable for the following components:    WBC 13.7 (*)     Neutrophils Absolute 9.7 (*)     Monocytes Absolute 0.9 (*)     All other components within normal limits   POCT CREATININE - URINE - Normal       All other labs were within normal range or not returned as of this dictation. EMERGENCY DEPARTMENT COURSE and DIFFERENTIAL DIAGNOSIS/MDM:   Vitals:    Vitals:    03/02/22 1751 03/02/22 1815 03/02/22 1830 03/02/22 1845   BP: 133/79 130/88     Pulse: 94      Resp: 16      Temp: 99.3 °F (37.4 °C)      TempSrc: Oral      SpO2: 96% 98% 97% 97%   Weight: 165 lb (74.8 kg)      Height: 5' 8\" (1.727 m)            MDM    Positive benzos and oxycodone (RX these), WBC 13.7. CT abdomen pelvis shows no acute process. Patient given Zofran and Dilaudid for pain, on reassessment she is sleeping in the cart comfortably. Easy to arouse however. Patient's pain may be due to viral illness. She appears nontoxic in no apparent distress. Placed on Bentyl and aware to continue her Percocet at home as needed. Standard anticipatory guidance given to patient upon discharge. Have given them a specific time frame in which to follow-up and who to follow-up with. I have also advised them that they should return to the emergency department if they get worse, or not getting better or develop any new or concerning symptoms. Patient demonstrates understanding. CRITICAL CARE TIME   Total Critical Caretime was 0 minutes, excluding separately reportable procedures. There was a high probability of clinically significant/life threatening deterioration in the patient's condition which required my urgent intervention. Procedures    FINAL IMPRESSION      1. LLQ abdominal pain    2. Diarrhea, unspecified type          DISPOSITION/PLAN   DISPOSITION Decision To Discharge 03/02/2022 07:35:12 PM      PATIENT REFERRED TO:  Cady Méndez MD  [address] [place] [address] [place] [phone]            DISCHARGE MEDICATIONS:  New Prescriptions    DICYCLOMINE (BENTYL) 10 MG CAPSULE    Take 1 capsule by mouth 4 times daily (before meals and nightly)          (Please notethat portions of this note were completed with a voice recognition program.  Efforts were made to edit the dictations but occasionally words are mis-transcribed. )    AARON Yang (electronically signed)  Emergency Physician Assistant         Hilario Dominguez  03/02/22 1943

## 2022-03-03 DIAGNOSIS — E11.69 TYPE 2 DIABETES MELLITUS WITH OTHER SPECIFIED COMPLICATION, UNSPECIFIED WHETHER LONG TERM INSULIN USE (HCC): ICD-10-CM

## 2022-03-03 LAB
GFR AFRICAN AMERICAN: >60
GFR NON-AFRICAN AMERICAN: 52
PERFORMED ON: ABNORMAL
POC CREATININE: 1.1 MG/DL (ref 0.6–1.2)
POC SAMPLE TYPE: ABNORMAL

## 2022-03-03 RX ORDER — PEN NEEDLE, DIABETIC 33 GX5/32"
1 NEEDLE, DISPOSABLE MISCELLANEOUS 3 TIMES DAILY
Qty: 100 EACH | Refills: 2 | Status: SHIPPED | OUTPATIENT
Start: 2022-03-03 | End: 2022-06-09 | Stop reason: ALTCHOICE

## 2022-03-09 ENCOUNTER — OFFICE VISIT (OUTPATIENT)
Dept: ENDOCRINOLOGY | Age: 57
End: 2022-03-09
Payer: MEDICARE

## 2022-03-09 VITALS
OXYGEN SATURATION: 95 % | BODY MASS INDEX: 25.16 KG/M2 | HEART RATE: 81 BPM | WEIGHT: 166 LBS | HEIGHT: 68 IN | DIASTOLIC BLOOD PRESSURE: 78 MMHG | SYSTOLIC BLOOD PRESSURE: 117 MMHG

## 2022-03-09 DIAGNOSIS — E11.69 TYPE 2 DIABETES MELLITUS WITH OTHER SPECIFIED COMPLICATION, UNSPECIFIED WHETHER LONG TERM INSULIN USE (HCC): Primary | ICD-10-CM

## 2022-03-09 LAB
CHP ED QC CHECK: NORMAL
GLUCOSE BLD-MCNC: 283 MG/DL

## 2022-03-09 PROCEDURE — 2022F DILAT RTA XM EVC RTNOPTHY: CPT | Performed by: INTERNAL MEDICINE

## 2022-03-09 PROCEDURE — G8484 FLU IMMUNIZE NO ADMIN: HCPCS | Performed by: INTERNAL MEDICINE

## 2022-03-09 PROCEDURE — 99213 OFFICE O/P EST LOW 20 MIN: CPT | Performed by: INTERNAL MEDICINE

## 2022-03-09 PROCEDURE — 1036F TOBACCO NON-USER: CPT | Performed by: INTERNAL MEDICINE

## 2022-03-09 PROCEDURE — 3046F HEMOGLOBIN A1C LEVEL >9.0%: CPT | Performed by: INTERNAL MEDICINE

## 2022-03-09 PROCEDURE — 82962 GLUCOSE BLOOD TEST: CPT | Performed by: INTERNAL MEDICINE

## 2022-03-09 PROCEDURE — 3017F COLORECTAL CA SCREEN DOC REV: CPT | Performed by: INTERNAL MEDICINE

## 2022-03-09 PROCEDURE — G8419 CALC BMI OUT NRM PARAM NOF/U: HCPCS | Performed by: INTERNAL MEDICINE

## 2022-03-09 PROCEDURE — G8427 DOCREV CUR MEDS BY ELIG CLIN: HCPCS | Performed by: INTERNAL MEDICINE

## 2022-03-09 RX ORDER — INSULIN ASPART 100 [IU]/ML
INJECTION, SOLUTION INTRAVENOUS; SUBCUTANEOUS
Qty: 15 PEN | Refills: 3 | Status: SHIPPED | OUTPATIENT
Start: 2022-03-09 | End: 2022-06-09 | Stop reason: ALTCHOICE

## 2022-03-09 RX ORDER — ROSUVASTATIN CALCIUM 20 MG/1
TABLET, COATED ORAL
COMMUNITY
Start: 2022-03-01

## 2022-03-09 RX ORDER — BUSPIRONE HYDROCHLORIDE 5 MG/1
TABLET ORAL
COMMUNITY
Start: 2022-03-07

## 2022-03-09 RX ORDER — QUETIAPINE FUMARATE 25 MG
TABLET ORAL
COMMUNITY
Start: 2022-03-07 | End: 2022-06-09 | Stop reason: ALTCHOICE

## 2022-03-09 RX ORDER — DULAGLUTIDE 0.75 MG/.5ML
0.75 INJECTION, SOLUTION SUBCUTANEOUS WEEKLY
Qty: 4 PEN | Refills: 2 | Status: SHIPPED | OUTPATIENT
Start: 2022-03-09 | End: 2022-06-09 | Stop reason: ALTCHOICE

## 2022-03-09 RX ORDER — CLONAZEPAM 0.5 MG/1
TABLET ORAL
COMMUNITY
Start: 2022-02-21

## 2022-03-09 RX ORDER — INSULIN GLARGINE 100 [IU]/ML
INJECTION, SOLUTION SUBCUTANEOUS
Qty: 30 PEN | Refills: 3 | Status: SHIPPED | OUTPATIENT
Start: 2022-03-09 | End: 2022-06-09 | Stop reason: ALTCHOICE

## 2022-03-09 NOTE — PROGRESS NOTES
Value               Date                       LABA1C                   9.3 (H)             02/21/2022            )     Past Medical History:   Diagnosis Date    Anxiety     Asthma     as child    Depression     Depression     dysthymia - following lupus dx age 23    Diabetes mellitus ([facility]     Foot fracture, left     GERD (gastroesophageal reflux disease)     Hyperlipidemia     Hypothyroidism     Kidney stones     Lupus ([facility]     dr barrera -     Migraine     PONV (postoperative nausea and vomiting)     Renal stone 4/25/2021    Sciatica     left sided     Past Surgical History:   Procedure Laterality Date    APPENDECTOMY      ARTHRODESIS Left 11/26/2019    ARTHRODESIS OF THE FIRST METATARSAL PHALANGEAL JOINT LEFT FOOT performed by Shannan Hannon DPM at [facility] N/A 4/26/2021    CYSTOSCOPY RIGHT DOUBLE J STENT PLACEMENT. performed by Victoriano Mahajan MD at [facility] COLONOSCOPY      ENDOSCOPY, COLON, DIAGNOSTIC      FOOT CLOSED REDUCTION Left 1/3/2017    LEFT FOOT PERCUTANEOUS PINNING Melvinia Sas performed by Rose Marie Bruno MD at [address] [facility] LITHOTRIPSY Right 5/5/2021    RIGHT ESWL performed by Victoriano Mahajan MD at [facility] ESOPHAGOGASTRODUODENOSCOPY TRANSORAL DIAGNOSTIC N/A 3/8/2017    EGD ESOPHAGOGASTRODUODENOSCOPY WITH DILATION performed by Mare Pedraza MD at [address] ESOPHAGOGASTRODUODENOSCOPY TRANSORAL DIAGNOSTIC N/A 11/7/2018    EGD ESOPHAGOGASTRODUODENOSCOPY WITH DILATION performed by Mare Pedraza MD at [address] [facility] ENDOSCOPY N/A 8/17/2021    EGD WITH BIOPSY AND POLYPECTOMY performed by Bryce Gamez MD at [facility] Marital status:  Hm Lidocaine Patch [Lidocaine] Rash     Patient states allergic    Aspirin Swelling     Bloody noses    Cephalosporins     Imitrex [Sumatriptan] Hives    Pcn [Penicillins] Swelling    Seasonal Itching     Itch, sneezing,runny nose, watery eyes    Toradol [Ketorolac Tromethamine] Hives     Shortness of breath    Ultram [Tramadol] Hives       Current Outpatient Medications:     clonazePAM (KLONOPIN) 0.5 MG tablet, TAKE 1 TABLET DAILY AS NEEDED, Disp: , Rfl:     busPIRone (BUSPAR) 5 MG tablet, , Disp: , Rfl:     SEROQUEL 25 MG tablet, , Disp: , Rfl:     rosuvastatin (CRESTOR) 20 MG tablet, TAKE 1 TABLET DAILY. , Disp: , Rfl:     Insulin Pen Needle (PEN NEEDLES) 33G X 4 MM MISC, 1 each by Does not apply route three times daily, Disp: 100 each, Rfl: 2    oxyCODONE-acetaminophen (PERCOCET) 5-325 MG per tablet, TAKE 1 TABLET Every 8 hours as needed for MODERATE TO SEVERE PAIN. PLEASE WATCH OUT FOR SETATION AND FATIGUE., Disp: , Rfl:     insulin glargine (LANTUS SOLOSTAR) 100 UNIT/ML injection pen, 160  units at bedtime, Disp: 30 pen, Rfl: 3    insulin aspart (NOVOLOG FLEXPEN) 100 UNIT/ML injection pen, Inject 60 units tid with meals, Disp: 15 pen, Rfl: 3    pregabalin (LYRICA) 75 MG capsule, Take 75 mg by mouth daily. , Disp: , Rfl:     blood glucose monitor kit and supplies, Please give 1 meter covered by insurance Dx E11.65, Disp: 1 kit, Rfl: 0    Lancets MISC, Pt test 3x daily Dx E11.65, Disp: 100 each, Rfl: 3    blood glucose test strips (ASCENSIA AUTODISC VI;ONE TOUCH ULTRA TEST VI) strip, Check blood sugars twice daily or as directed., Disp: 200 strip, Rfl: 3    estradiol (CLIMARA) 0.025 MG/24HR, Place 0.025 patches onto the skin every 7 days On mondays, Disp: , Rfl: 11    albuterol sulfate HFA (VENTOLIN HFA) 108 (90 Base) MCG/ACT inhaler, Inhale 2 puffs into the lungs every 6 hours as needed for Wheezing, Disp: 1 Inhaler, Rfl: 0    levothyroxine (SYNTHROID) 50 MCG tablet, Take 1 tablet by mouth daily, Disp: 90 tablet, Rfl: 3    hydroxychloroquine (PLAQUENIL) 200 MG tablet, Take 1 tablet by mouth 2 times daily, Disp: , Rfl: 5    dicyclomine (BENTYL) 10 MG capsule, Take 1 capsule by mouth 4 times daily (before meals and nightly) (Patient not taking: Reported on 3/9/2022), Disp: 14 capsule, Rfl: 0    ondansetron (ZOFRAN ODT) 4 MG disintegrating tablet, Take 1 tablet by mouth every 8 hours as needed for Nausea (Patient not taking: Reported on 3/9/2022), Disp: 7 tablet, Rfl: 0    tiZANidine (ZANAFLEX) 4 MG tablet, TAKE 1 TABLET 3 TIMES DAILY as needed. (Patient not taking: Reported on 3/9/2022), Disp: , Rfl:     DULoxetine (CYMBALTA) 30 MG extended release capsule, TAKE 1 CAPSULE BY MOUTH EVERY DAY (Patient not taking: Reported on 3/9/2022), Disp: , Rfl:     esomeprazole (NEXIUM) 40 MG delayed release capsule, Take 1 capsule by mouth daily (Patient not taking: Reported on 3/9/2022), Disp: 30 capsule, Rfl: 3    Drug [place] [employer] Lancets 30G MISC, Check blood sugars twice daily or as directed.  (Patient not taking: Reported on 3/9/2022), Disp: 200 each, Rfl: 3  Lab Results   Component Value Date     03/02/2022    K 4.4 03/02/2022     03/02/2022    CO2 25 03/02/2022    BUN 11 03/02/2022    CREATININE 1.1 03/02/2022    GLUCOSE 283 03/09/2022    CALCIUM 9.4 03/02/2022    PROT 7.0 03/02/2022    LABALBU 3.8 03/02/2022    BILITOT <0.2 03/02/2022    ALKPHOS 133 (H) 03/02/2022    AST 22 03/02/2022    ALT 23 03/02/2022    LABGLOM 52 (A) 03/02/2022    GFRAA >60 03/02/2022    GLOB 3.2 03/02/2022     Lab Results   Component Value Date    WBC 13.7 (H) 03/02/2022    HGB 13.8 03/02/2022    HCT 41.3 03/02/2022    MCV 85.2 03/02/2022     03/02/2022     Lab Results   Component Value Date    LABA1C 9.3 (H) 02/21/2022    LABA1C 8.3 (H) 11/04/2021    LABA1C 8.5 08/18/2021     Lab Results   Component Value Date    CHOLFAST 346 (H) 05/20/2021    TRIGLYCFAST 340 (H) 05/20/2021    HDL 42 05/20/2021    HDL 73 (H) 08/02/2017    HDL 92 (H) 05/12/2017    LDLCALC 236 (H) 05/20/2021    LDLCALC 56 08/02/2017    LDLCALC 32 05/12/2017    CHOL 182 08/02/2017    CHOL 189 05/12/2017    CHOL 279 (H) 08/29/2016    TRIG 264 (H) 08/02/2017    TRIG 323 (H) 05/12/2017    TRIG 348 (H) 08/29/2016     No results found for: TESTM  Lab Results   Component Value Date    TSH 0.741 10/11/2021    TSH 2.500 05/12/2017    TSH 1.650 06/27/2016    TSHREFLEX 0.819 11/04/2019     No results found for: TPOABS    Review of Systems   Eyes: Negative. Cardiovascular: Negative. Endocrine: Negative. All other systems reviewed and are negative. Objective:   Physical Exam  Vitals reviewed. Constitutional:       Appearance: Normal appearance. HENT:      Head: Normocephalic and atraumatic. Right Ear: External ear normal.      Left Ear: External ear normal.      Nose: Nose normal.   Eyes:      General: No scleral icterus. Right eye: No discharge. Left eye: No discharge. Extraocular Movements: Extraocular movements intact. Conjunctiva/sclera: Conjunctivae normal.   Cardiovascular:      Rate and Rhythm: Normal rate. Pulmonary:      Effort: Pulmonary effort is normal.   Musculoskeletal:         General: Normal range of motion. Cervical back: Normal range of motion and neck supple. Neurological:      General: No focal deficit present. Mental Status: She is alert and oriented to person, place, and time.    Psychiatric:         Mood and Affect: Mood normal.         Behavior: Behavior normal.

## 2022-03-20 ASSESSMENT — ENCOUNTER SYMPTOMS: EYES NEGATIVE: 1

## 2022-03-26 ENCOUNTER — HOSPITAL ENCOUNTER (EMERGENCY)
Age: 57
Discharge: HOME | End: 2022-03-26
Attending: EMERGENCY MEDICINE
Payer: MEDICARE

## 2022-03-26 ENCOUNTER — APPOINTMENT (OUTPATIENT)
Dept: CT IMAGING | Age: 57
End: 2022-03-26
Payer: MEDICARE

## 2022-03-26 VITALS
WEIGHT: 165 LBS | DIASTOLIC BLOOD PRESSURE: 78 MMHG | TEMPERATURE: 98.3 F | BODY MASS INDEX: 25.01 KG/M2 | HEIGHT: 68 IN | OXYGEN SATURATION: 98 % | SYSTOLIC BLOOD PRESSURE: 125 MMHG | RESPIRATION RATE: 17 BRPM | HEART RATE: 85 BPM

## 2022-03-26 DIAGNOSIS — R10.9 FLANK PAIN: Primary | ICD-10-CM

## 2022-03-26 LAB
ALBUMIN SERPL-MCNC: 3.8 G/DL (ref 3.5–4.6)
ALP BLD-CCNC: 118 U/L (ref 40–130)
ALT SERPL-CCNC: 18 U/L (ref 0–33)
ANION GAP SERPL CALCULATED.3IONS-SCNC: 9 MEQ/L (ref 9–15)
AST SERPL-CCNC: 26 U/L (ref 0–35)
BASOPHILS ABSOLUTE: 0.3 K/UL (ref 0–0.2)
BASOPHILS RELATIVE PERCENT: 3 %
BILIRUB SERPL-MCNC: <0.2 MG/DL (ref 0.2–0.7)
BILIRUBIN URINE: NEGATIVE
BLOOD, URINE: NEGATIVE
BUN BLDV-MCNC: 13 MG/DL (ref 6–20)
CALCIUM SERPL-MCNC: 9.3 MG/DL (ref 8.5–9.9)
CHLORIDE BLD-SCNC: 101 MEQ/L (ref 95–107)
CLARITY: CLEAR
CO2: 27 MEQ/L (ref 20–31)
COLOR: YELLOW
CREAT SERPL-MCNC: 1.05 MG/DL (ref 0.5–0.9)
EOSINOPHILS ABSOLUTE: 0.4 K/UL (ref 0–0.7)
EOSINOPHILS RELATIVE PERCENT: 3.7 %
GFR AFRICAN AMERICAN: >60
GFR NON-AFRICAN AMERICAN: 54.5
GLOBULIN: 3 G/DL (ref 2.3–3.5)
GLUCOSE BLD-MCNC: 243 MG/DL (ref 70–99)
GLUCOSE URINE: >=1000 MG/DL
HCT VFR BLD CALC: 40.6 % (ref 37–47)
HEMOGLOBIN: 13.3 G/DL (ref 12–16)
KETONES, URINE: ABNORMAL MG/DL
LACTIC ACID: 2 MMOL/L (ref 0.5–2.2)
LEUKOCYTE ESTERASE, URINE: NEGATIVE
LYMPHOCYTES ABSOLUTE: 2.5 K/UL (ref 1–4.8)
LYMPHOCYTES RELATIVE PERCENT: 22.1 %
MCH RBC QN AUTO: 28.6 PG (ref 27–31.3)
MCHC RBC AUTO-ENTMCNC: 32.9 % (ref 33–37)
MCV RBC AUTO: 87.1 FL (ref 82–100)
MONOCYTES ABSOLUTE: 0.4 K/UL (ref 0.2–0.8)
MONOCYTES RELATIVE PERCENT: 3.6 %
NEUTROPHILS ABSOLUTE: 7.5 K/UL (ref 1.4–6.5)
NEUTROPHILS RELATIVE PERCENT: 67.6 %
NITRITE, URINE: NEGATIVE
PDW BLD-RTO: 14.3 % (ref 11.5–14.5)
PH UA: 5 (ref 5–9)
PLATELET # BLD: 249 K/UL (ref 130–400)
POTASSIUM SERPL-SCNC: 4.2 MEQ/L (ref 3.4–4.9)
PROTEIN UA: NEGATIVE MG/DL
RBC # BLD: 4.66 M/UL (ref 4.2–5.4)
SODIUM BLD-SCNC: 137 MEQ/L (ref 135–144)
SPECIFIC GRAVITY UA: 1.02 (ref 1–1.03)
TOTAL PROTEIN: 6.8 G/DL (ref 6.3–8)
URINE REFLEX TO CULTURE: ABNORMAL
UROBILINOGEN, URINE: 0.2 E.U./DL
WBC # BLD: 11.1 K/UL (ref 4.8–10.8)

## 2022-03-26 PROCEDURE — 36415 COLL VENOUS BLD VENIPUNCTURE: CPT

## 2022-03-26 PROCEDURE — 80053 COMPREHEN METABOLIC PANEL: CPT

## 2022-03-26 PROCEDURE — 96374 THER/PROPH/DIAG INJ IV PUSH: CPT

## 2022-03-26 PROCEDURE — 96375 TX/PRO/DX INJ NEW DRUG ADDON: CPT

## 2022-03-26 PROCEDURE — 81003 URINALYSIS AUTO W/O SCOPE: CPT

## 2022-03-26 PROCEDURE — 99285 EMERGENCY DEPT VISIT HI MDM: CPT

## 2022-03-26 PROCEDURE — 83605 ASSAY OF LACTIC ACID: CPT

## 2022-03-26 PROCEDURE — 74176 CT ABD & PELVIS W/O CONTRAST: CPT

## 2022-03-26 PROCEDURE — 85025 COMPLETE CBC W/AUTO DIFF WBC: CPT

## 2022-03-26 PROCEDURE — 96372 THER/PROPH/DIAG INJ SC/IM: CPT

## 2022-03-26 PROCEDURE — 6360000002 HC RX W HCPCS: Performed by: EMERGENCY MEDICINE

## 2022-03-26 PROCEDURE — 96376 TX/PRO/DX INJ SAME DRUG ADON: CPT

## 2022-03-26 PROCEDURE — 2580000003 HC RX 258: Performed by: EMERGENCY MEDICINE

## 2022-03-26 RX ORDER — DICYCLOMINE HCL 20 MG
20 TABLET ORAL 4 TIMES DAILY
Qty: 12 TABLET | Refills: 0 | Status: SHIPPED | OUTPATIENT
Start: 2022-03-26 | End: 2022-06-09 | Stop reason: ALTCHOICE

## 2022-03-26 RX ORDER — ONDANSETRON 4 MG/1
4 TABLET, ORALLY DISINTEGRATING ORAL EVERY 8 HOURS PRN
Qty: 12 TABLET | Refills: 0 | Status: SHIPPED | OUTPATIENT
Start: 2022-03-26 | End: 2022-06-09 | Stop reason: ALTCHOICE

## 2022-03-26 RX ORDER — 0.9 % SODIUM CHLORIDE 0.9 %
500 INTRAVENOUS SOLUTION INTRAVENOUS ONCE
Status: COMPLETED | OUTPATIENT
Start: 2022-03-26 | End: 2022-03-26

## 2022-03-26 RX ORDER — DICYCLOMINE HYDROCHLORIDE 10 MG/ML
20 INJECTION INTRAMUSCULAR ONCE
Status: COMPLETED | OUTPATIENT
Start: 2022-03-26 | End: 2022-03-26

## 2022-03-26 RX ORDER — ONDANSETRON 2 MG/ML
4 INJECTION INTRAMUSCULAR; INTRAVENOUS ONCE
Status: COMPLETED | OUTPATIENT
Start: 2022-03-26 | End: 2022-03-26

## 2022-03-26 RX ADMIN — HYDROMORPHONE HYDROCHLORIDE 1 MG: 1 INJECTION, SOLUTION INTRAMUSCULAR; INTRAVENOUS; SUBCUTANEOUS at 17:28

## 2022-03-26 RX ADMIN — DICYCLOMINE HYDROCHLORIDE 20 MG: 20 INJECTION INTRAMUSCULAR at 19:07

## 2022-03-26 RX ADMIN — HYDROMORPHONE HYDROCHLORIDE 1 MG: 1 INJECTION, SOLUTION INTRAMUSCULAR; INTRAVENOUS; SUBCUTANEOUS at 16:48

## 2022-03-26 RX ADMIN — SODIUM CHLORIDE 500 ML: 9 INJECTION, SOLUTION INTRAVENOUS at 16:48

## 2022-03-26 RX ADMIN — ONDANSETRON 4 MG: 2 INJECTION INTRAMUSCULAR; INTRAVENOUS at 16:48

## 2022-03-26 ASSESSMENT — ENCOUNTER SYMPTOMS
VOMITING: 1
NAUSEA: 1
TROUBLE SWALLOWING: 0
SHORTNESS OF BREATH: 0
ABDOMINAL PAIN: 1
EYES NEGATIVE: 1
ALLERGIC/IMMUNOLOGIC NEGATIVE: 1
WHEEZING: 0
RHINORRHEA: 0
DIARRHEA: 0

## 2022-03-26 ASSESSMENT — PAIN DESCRIPTION - ONSET: ONSET: ON-GOING

## 2022-03-26 ASSESSMENT — PAIN DESCRIPTION - LOCATION: LOCATION: FLANK

## 2022-03-26 ASSESSMENT — PAIN DESCRIPTION - FREQUENCY: FREQUENCY: CONTINUOUS

## 2022-03-26 ASSESSMENT — PAIN DESCRIPTION - PAIN TYPE
TYPE: ACUTE PAIN
TYPE: ACUTE PAIN

## 2022-03-26 ASSESSMENT — PAIN DESCRIPTION - DESCRIPTORS: DESCRIPTORS: SHARP

## 2022-03-26 ASSESSMENT — PAIN DESCRIPTION - ORIENTATION: ORIENTATION: LEFT

## 2022-03-26 ASSESSMENT — PAIN SCALES - GENERAL
PAINLEVEL_OUTOF10: 7
PAINLEVEL_OUTOF10: 9
PAINLEVEL_OUTOF10: 10
PAINLEVEL_OUTOF10: 10

## 2022-03-26 ASSESSMENT — PAIN - FUNCTIONAL ASSESSMENT: PAIN_FUNCTIONAL_ASSESSMENT: 0-10

## 2022-03-26 NOTE — ED NOTES
Patient given discharge instructions and prescription and verbalized understanding. Vital signs stable. Resp even and unlabored. Skin warm, dry and intact. Patient is alert and oriented. IV removed. Patient doesn't have any questions at this time.       Kelly Thacker RN  03/26/22 1952

## 2022-03-26 NOTE — ED TRIAGE NOTES
Pt presents to ED c/o left flank pain, started last night. Pt states she has hx kidney stones and it feels similar.

## 2022-03-26 NOTE — ED NOTES
Patient resting in bed with call light in reach. Breathes are even and unlabored. Skin is warm and dry. Vital signs are stable. Patient remains on bedside monitor. Patient denies any needs at this time.       Calli Burden RN  03/26/22 1952

## 2022-03-26 NOTE — ED PROVIDER NOTES
[address] [facility] ED  eMERGENCY dEPARTMENT eNCOUnter      Pt Name: Lashawn Quezada  MRN: [id number]  [place] 1965  Date of evaluation: 3/26/2022  Provider: Roly Perez MD    [address] [place] [address]       Chief Complaint   Patient presents with    Flank Pain     left flank, hx kidney stones         HISTORY OF PRESENT ILLNESS   (Location/Symptom, Timing/Onset,Context/Setting, Quality, Duration, Modifying Factors, Severity)  Note limiting factors. Lashawn Quezada is a 49 y.o. female who presents to the emergency department complaint of left flank pain. Patient admits to seem to be on and off for the last day or so. Patient admits to seems to be largely aching in nature with sharp intermittent periods of stabbing sensation. Patient associated with the pain she been having significant episodes of nausea. Patient denies difficulty with stool, urine, or vaginal discharge. Patient denies jemal shortness of breath or chest pain. She admits pain feels very similar to previous kidney stone episodes. HPI    NursingNotes were reviewed. REVIEW OF SYSTEMS    (2-9 systems for level 4, 10 or more for level 5)     Review of Systems   Constitutional: Positive for activity change, appetite change and fatigue. Negative for chills and fever. HENT: Negative for congestion, ear pain, rhinorrhea and trouble swallowing. Eyes: Negative. Respiratory: Negative for shortness of breath and wheezing. Cardiovascular: Negative for chest pain and leg swelling. Gastrointestinal: Positive for abdominal pain, nausea and vomiting. Negative for diarrhea. Endocrine: Negative. Genitourinary: Positive for flank pain. Negative for dysuria, frequency and hematuria. Musculoskeletal: Negative for gait problem and neck pain. Skin: Negative. Allergic/Immunologic: Negative. Neurological: Negative for seizures, syncope and light-headedness. Hematological: Negative. Psychiatric/Behavioral: Negative.         Except as noted above the remainder of the review of systems was reviewed and negative.        PAST MEDICAL HISTORY     Past Medical History:   Diagnosis Date    Anxiety     Asthma     as child    Depression     Depression     dysthymia - following lupus dx age 23    Diabetes mellitus ([facility]     Foot fracture, left     GERD (gastroesophageal reflux disease)     Hyperlipidemia     Hypothyroidism     Kidney stones     Lupus ([facility]     dr barrera -     Migraine     PONV (postoperative nausea and vomiting)     Renal stone 4/25/2021    Sciatica     left sided         SURGICALHISTORY       Past Surgical History:   Procedure Laterality Date    APPENDECTOMY      ARTHRODESIS Left 11/26/2019    ARTHRODESIS OF THE FIRST METATARSAL PHALANGEAL JOINT LEFT FOOT performed by Albert Bloch, DPM at [facility] N/A 4/26/2021    CYSTOSCOPY RIGHT DOUBLE J STENT PLACEMENT. performed by Stone Vinson MD at [facility] COLONOSCOPY      ENDOSCOPY, COLON, DIAGNOSTIC      FOOT CLOSED REDUCTION Left 1/3/2017    LEFT FOOT PERCUTANEOUS PINNING Randi Blow performed by Angelica Rodriguez MD at [address] [facility] LITHOTRIPSY Right 5/5/2021    RIGHT ESWL performed by Stone Vinson MD at [facility] ESOPHAGOGASTRODUODENOSCOPY TRANSORAL DIAGNOSTIC N/A 3/8/2017    EGD ESOPHAGOGASTRODUODENOSCOPY WITH DILATION performed by Isaiah Chang MD at [address] ESOPHAGOGASTRODUODENOSCOPY TRANSORAL DIAGNOSTIC N/A 11/7/2018    EGD ESOPHAGOGASTRODUODENOSCOPY WITH DILATION performed by Isaiah Chang MD at [address] [facility] ENDOSCOPY N/A 8/17/2021    EGD WITH BIOPSY AND POLYPECTOMY performed by Luz Elena Marques MD at [facility]       Previous Medications ALBUTEROL SULFATE HFA (VENTOLIN HFA) 108 (90 BASE) MCG/ACT INHALER    Inhale 2 puffs into the lungs every 6 hours as needed for Wheezing    BLOOD GLUCOSE MONITOR KIT AND SUPPLIES    Please give 1 meter covered by insurance Dx E11.65    BLOOD GLUCOSE TEST STRIPS (ASCENSIA AUTODISC VI;ONE TOUCH ULTRA TEST VI) STRIP    Check blood sugars twice daily or as directed. BUSPIRONE (BUSPAR) 5 MG TABLET        CLONAZEPAM (KLONOPIN) 0.5 MG TABLET    TAKE 1 TABLET DAILY AS NEEDED    [employer] UNILET LANCETS 30G MISC    Check blood sugars twice daily or as directed. DULAGLUTIDE (TRULICITY) 5.19 XG/8.2XS SOPN    Inject 0.75 mg into the skin once a week    DULOXETINE (CYMBALTA) 30 MG EXTENDED RELEASE CAPSULE    TAKE 1 CAPSULE BY MOUTH EVERY DAY    ESOMEPRAZOLE (NEXIUM) 40 MG DELAYED RELEASE CAPSULE    Take 1 capsule by mouth daily    ESTRADIOL (CLIMARA) 0.025 MG/24HR    Place 0.025 patches onto the skin every 7 days On mondays    HYDROXYCHLOROQUINE (PLAQUENIL) 200 MG TABLET    Take 1 tablet by mouth 2 times daily    INSULIN ASPART (NOVOLOG FLEXPEN) 100 UNIT/ML INJECTION PEN    Inject 60 units tid with meals    INSULIN ASPART (NOVOLOG) 100 UNIT/ML INJECTION VIAL    Use via insulin pump max daily dose 100 units daily    INSULIN GLARGINE (LANTUS SOLOSTAR) 100 UNIT/ML INJECTION PEN    160  units at bedtime    INSULIN PEN NEEDLE (PEN NEEDLES) 33G X 4 MM MISC    1 each by Does not apply route three times daily    LANCETS MISC    Pt test 3x daily Dx E11.65    LEVOTHYROXINE (SYNTHROID) 50 MCG TABLET    Take 1 tablet by mouth daily    OXYCODONE-ACETAMINOPHEN (PERCOCET) 5-325 MG PER TABLET    TAKE 1 TABLET Every 8 hours as needed for MODERATE TO SEVERE PAIN. PLEASE WATCH OUT FOR SETATION AND FATIGUE. PREGABALIN (LYRICA) 75 MG CAPSULE    Take 75 mg by mouth daily. ROSUVASTATIN (CRESTOR) 20 MG TABLET    TAKE 1 TABLET DAILY. SEROQUEL 25 MG TABLET        TIZANIDINE (ZANAFLEX) 4 MG TABLET    TAKE 1 TABLET 3 TIMES DAILY as needed. Abused: Not on file   Housing Stability:     Unable to Pay for Housing in the Last Year: Not on file    Number of [place] in the Last Year: Not on file    Unstable Housing in the Last Year: Not on file       SCREENINGS    Aries Coma Scale  Eye Opening: Spontaneous  Best Verbal Response: Oriented  Best Motor Response: Obeys commands  Aries Coma Scale Score: 15        PHYSICAL EXAM    (up to 7 for level 4, 8 or more for level 5)     ED Triage Vitals [03/26/22 1616]   BP Temp Temp Source Pulse Resp SpO2 Height Weight   137/88 98.3 °F (36.8 °C) Oral 87 16 96 % 5' 8\" (1.727 m) 165 lb (74.8 kg)       Physical Exam  Vitals and nursing note reviewed. Constitutional:       General: She is not in acute distress. Appearance: Normal appearance. She is well-developed. She is not ill-appearing or diaphoretic. HENT:      Head: Normocephalic and atraumatic. Right Ear: External ear normal.      Left Ear: External ear normal.      Nose: Nose normal.      Mouth/Throat:      Pharynx: Oropharynx is clear. Eyes:      General:         Right eye: No discharge. Left eye: No discharge. Conjunctiva/sclera: Conjunctivae normal.      Pupils: Pupils are equal, round, and reactive to light. Neck:      Trachea: Trachea normal.   Cardiovascular:      Rate and Rhythm: Normal rate and regular rhythm. Pulses: Normal pulses. Heart sounds: Normal heart sounds. No friction rub. No gallop. Pulmonary:      Effort: Pulmonary effort is normal. No respiratory distress. Breath sounds: Normal breath sounds. No stridor. Abdominal:      General: Bowel sounds are normal. There are no signs of injury. Palpations: Abdomen is soft. Tenderness: There is abdominal tenderness in the left lower quadrant. There is left CVA tenderness. Hernia: No hernia is present. Musculoskeletal:         General: Normal range of motion.       Cervical back: Full passive range of motion without pain, normal range of motion and neck supple. No rigidity or tenderness. Skin:     General: Skin is warm and dry. Capillary Refill: Capillary refill takes less than 2 seconds. Neurological:      General: No focal deficit present. Mental Status: She is alert and oriented to person, place, and time. Cranial Nerves: No cranial nerve deficit. Sensory: No sensory deficit. Motor: No weakness. Coordination: Coordination normal.   Psychiatric:         Speech: Speech normal.         Behavior: Behavior normal.         Thought Content: Thought content normal.         Judgment: Judgment normal.         DIAGNOSTIC RESULTS     EKG: All EKG's are interpreted by the Emergency Department Physician who either signs or Co-signsthis chart in the absence of a cardiologist.    -    RADIOLOGY:   Reji Fothergill such as CT, Ultrasound and MRI are read by the radiologist. Chrissie Fisher radiographic images are visualized and preliminarily interpreted by the emergency physician with the below findings:    CT Imaging the abdomen pelvis is noted per radiologist.  Largely unremarkable other acute findings. Interpretation per the Radiologist below, if available at the time ofthis note:    CT ABDOMEN PELVIS WO CONTRAST Additional Contrast? None   Final Result      No acute findings. Cholecystectomy. Appendectomy. Hysterectomy. All CT scans at this facility use dose modulation, iterative reconstruction, and/or weight based dosing when appropriate to reduce radiation dose to as low as reasonably achievable.                      ED BEDSIDE ULTRASOUND:   Performed by ED Physician - none    LABS:  Labs Reviewed   COMPREHENSIVE METABOLIC PANEL - Abnormal; Notable for the following components:       Result Value    Glucose 243 (*)     CREATININE 1.05 (*)     GFR Non- 54.5 (*)     All other components within normal limits   CBC WITH AUTO DIFFERENTIAL - Abnormal; Notable for the following components:    WBC 11.1 (*)     MCHC 32.9 (*)     Neutrophils Absolute 7.5 (*)     Basophils Absolute 0.3 (*)     All other components within normal limits   URINALYSIS WITH REFLEX TO CULTURE - Abnormal; Notable for the following components:    Glucose, Ur >=1000 (*)     Ketones, Urine TRACE (*)     All other components within normal limits   LACTIC ACID       All other labs were within normal range or not returned as of this dictation. EMERGENCY DEPARTMENT COURSE and DIFFERENTIAL DIAGNOSIS/MDM:   Vitals:    Vitals:    03/26/22 1616 03/26/22 1815 03/26/22 1900   BP: 137/88 116/75 120/79   Pulse: 87 88 87   Resp: 16 16 16   Temp: 98.3 °F (36.8 °C)     TempSrc: Oral     SpO2: 96% 95% 97%   Weight: 165 lb (74.8 kg)     Height: 5' 8\" (1.727 m)         Patient made stable emergency department, however with ongoing left flank pain. Etiology not yet really understood. Additional medication for any Bentyl provided. We will continue to observe in the emergency department for the next hour. Dr. Amada Kocher will follow with patient. Feels improved on reeval.  Patient will be discharged home in good condition. Patient has been hemodynamically stable throughout ED course and is appropriate for outpatient follow up. Patient should follow up with PCP in 2-3 days or return to ED immediately for any new or worsening symptoms. Patient is well appearing on discharge and agreeable with plan of care. MDM    CRITICAL CARE TIME   Total Critical Care time was 0 minutes, excluding separately reportableprocedures. There was a high probability of clinicallysignificant/life threatening deterioration in the patient's condition which required my urgent intervention. CONSULTS:  None    PROCEDURES:  Unless otherwise noted below, none     Procedures    FINAL IMPRESSION      1.  Flank pain        DISPOSITION/PLAN   DISPOSITION Decision To Discharge 03/26/2022 07:24:09 PM      PATIENT REFERRED TO:  No follow-up provider specified.     DISCHARGE MEDICATIONS:  New Prescriptions    DICYCLOMINE (BENTYL) 20 MG TABLET    Take 1 tablet by mouth 4 times daily    ONDANSETRON (ZOFRAN ODT) 4 MG DISINTEGRATING TABLET    Take 1 tablet by mouth every 8 hours as needed for Nausea or Vomiting          (Please note that portions of this note were completed with a voice recognitionprogram.  Efforts were made to edit the dictations but occasionally words are mis-transcribed.)    Jing Chaudhary MD (electronically signed)  Attending Emergency Physician          Jing Chaudhary MD  03/26/22 1851

## 2022-03-27 ENCOUNTER — HOSPITAL ENCOUNTER (EMERGENCY)
Age: 57
Discharge: LEFT BEFORE DISCHARGE | End: 2022-03-28
Attending: EMERGENCY MEDICINE
Payer: MEDICARE

## 2022-03-27 VITALS
OXYGEN SATURATION: 96 % | SYSTOLIC BLOOD PRESSURE: 143 MMHG | HEIGHT: 68 IN | RESPIRATION RATE: 20 BRPM | DIASTOLIC BLOOD PRESSURE: 85 MMHG | WEIGHT: 165 LBS | HEART RATE: 96 BPM | BODY MASS INDEX: 25.01 KG/M2 | TEMPERATURE: 97.7 F

## 2022-03-27 DIAGNOSIS — R10.84 GENERALIZED ABDOMINAL PAIN: Primary | ICD-10-CM

## 2022-03-27 PROCEDURE — 99284 EMERGENCY DEPT VISIT MOD MDM: CPT

## 2022-03-27 ASSESSMENT — PAIN DESCRIPTION - FREQUENCY: FREQUENCY: CONTINUOUS

## 2022-03-27 ASSESSMENT — PAIN - FUNCTIONAL ASSESSMENT: PAIN_FUNCTIONAL_ASSESSMENT: 0-10

## 2022-03-27 ASSESSMENT — PAIN DESCRIPTION - ORIENTATION: ORIENTATION: LEFT;LOWER

## 2022-03-27 ASSESSMENT — PAIN DESCRIPTION - PAIN TYPE: TYPE: ACUTE PAIN

## 2022-03-27 ASSESSMENT — PAIN DESCRIPTION - LOCATION: LOCATION: ABDOMEN

## 2022-03-27 ASSESSMENT — PAIN DESCRIPTION - DESCRIPTORS: DESCRIPTORS: SHARP

## 2022-03-27 ASSESSMENT — PAIN SCALES - GENERAL: PAINLEVEL_OUTOF10: 10

## 2022-03-28 ASSESSMENT — ENCOUNTER SYMPTOMS
ABDOMINAL PAIN: 1
NAUSEA: 1

## 2022-03-28 NOTE — ED NOTES
Pt refused everything Dr. Ramsey Laughlin offered. Pt states she just wants to go home.  Pt being discharge per dr. Remy Mercado, RN  03/28/22 2350

## 2022-03-28 NOTE — ED PROVIDER NOTES
[address] ED  EMERGENCY DEPARTMENT ENCOUNTER      Pt Name: Lashawn Quezada  MRN: [id number]  Homar 1965  Date of evaluation: 3/27/2022  Provider: Roly Perez MD    CHIEF COMPLAINT       Chief Complaint   Patient presents with    Abdominal Pain     pt c/o left lower abd pain since yesterday         HISTORY OF PRESENT ILLNESS   (Location/Symptom, Timing/Onset, Context/Setting, Quality, Duration, Modifying Factors, Severity)  Note limiting factors. 82-year-old female presenting with left lower abdominal pain. She was here yesterday for similar issues. States she has taken her home medications with no improvement. Symptoms are constant. Denies any urinary symptoms. Pain feels similar to her kidney stones in the past.  Some nausea but no vomiting. No changes in stool. Nursing Notes were reviewed. REVIEW OF SYSTEMS    (2-9 systems for level 4, 10 or more for level 5)     Review of Systems   Gastrointestinal: Positive for abdominal pain and nausea. All other systems reviewed and are negative. Except as noted above the remainder of the review of systems was reviewed and negative. PAST MEDICAL HISTORY     Past Medical History:   Diagnosis Date    Anxiety     Asthma     as child    Depression     Depression     dysthymia - following lupus dx age 23    Diabetes mellitus ([address]     Foot fracture, left     GERD (gastroesophageal reflux disease)     Hyperlipidemia     Hypothyroidism     Kidney stones     Lupus ([facility] [address]     dr barrera -     Migraine     PONV (postoperative nausea and vomiting)     Renal stone 4/25/2021    Sciatica     left sided         SURGICAL HISTORY       Past Surgical History:   Procedure Laterality Date    APPENDECTOMY      ARTHRODESIS Left 11/26/2019    ARTHRODESIS OF THE FIRST METATARSAL PHALANGEAL JOINT LEFT FOOT performed by Tricia Hsu DPM at [address] [facility] N/A 4/26/2021    CYSTOSCOPY RIGHT DOUBLE J STENT PLACEMENT. performed by Delia Smith MD at [address] [facility] [address]      ENDOSCOPY, COLON, DIAGNOSTIC      FOOT CLOSED REDUCTION Left 1/3/2017    LEFT FOOT PERCUTANEOUS PINNING Carletha Chimes FRACTURE performed by Bennie Khan MD at [address]      LITHOTRIPSY Right 5/5/2021    RIGHT ESWL performed by Delia Smith MD at [address] [facility] ESOPHAGOGASTRODUODENOSCOPY TRANSORAL DIAGNOSTIC N/A 3/8/2017    EGD ESOPHAGOGASTRODUODENOSCOPY WITH DILATION performed by Chyna Luna MD at [address] ESOPHAGOGASTRODUODENOSCOPY TRANSORAL DIAGNOSTIC N/A 11/7/2018    EGD ESOPHAGOGASTRODUODENOSCOPY WITH DILATION performed by Chyna Luna MD at [address] [facility] ENDOSCOPY N/A 8/17/2021    EGD WITH BIOPSY AND POLYPECTOMY performed by Abbe Ronquillo MD at [address] [facility]       Current Discharge Medication List      CONTINUE these medications which have NOT CHANGED    Details   ondansetron (ZOFRAN ODT) 4 MG disintegrating tablet Take 1 tablet by mouth every 8 hours as needed for Nausea or Vomiting  Qty: 12 tablet, Refills: 0      dicyclomine (BENTYL) 20 MG tablet Take 1 tablet by mouth 4 times daily  Qty: 12 tablet, Refills: 0      insulin aspart (NOVOLOG) 100 UNIT/ML injection vial Use via insulin pump max daily dose 100 units daily  Qty: 30 mL, Refills: 3      clonazePAM (KLONOPIN) 0.5 MG tablet TAKE 1 TABLET DAILY AS NEEDED      busPIRone (BUSPAR) 5 MG tablet       SEROQUEL 25 MG tablet       rosuvastatin (CRESTOR) 20 MG tablet TAKE 1 TABLET DAILY.       insulin glargine (LANTUS SOLOSTAR) 100 UNIT/ML injection pen 160  units at bedtime  Qty: 30 pen, Refills: 3      insulin aspart (NOVOLOG FLEXPEN) 100 UNIT/ML injection pen Inject 60 units tid with meals  Qty: 15 pen, Refills: 3      Dulaglutide (TRULICITY) 8.17 EB/1.3BY SOPN Inject 0.75 mg into the skin once a week  Qty: 4 pen, Refills: 2      Insulin Pen Needle (PEN NEEDLES) 33G X 4 MM MISC 1 each by Does not apply route three times daily  Qty: 100 each, Refills: 2    Associated Diagnoses: Type 2 diabetes mellitus with other specified complication, unspecified whether long term insulin use (HCC)      oxyCODONE-acetaminophen (PERCOCET) 5-325 MG per tablet TAKE 1 TABLET Every 8 hours as needed for MODERATE TO SEVERE PAIN. PLEASE WATCH OUT FOR SETATION AND FATIGUE.      tiZANidine (ZANAFLEX) 4 MG tablet TAKE 1 TABLET 3 TIMES DAILY as needed. pregabalin (LYRICA) 75 MG capsule Take 75 mg by mouth daily. DULoxetine (CYMBALTA) 30 MG extended release capsule TAKE 1 CAPSULE BY MOUTH EVERY DAY      esomeprazole (NEXIUM) 40 MG delayed release capsule Take 1 capsule by mouth daily  Qty: 30 capsule, Refills: 3    Associated Diagnoses: Epigastric pain      !! Drug Hawkins Unilet Lancets 30G MISC Check blood sugars twice daily or as directed. Qty: 200 each, Refills: 3    Comments: Dx: 250.00 NIDDM  Associated Diagnoses: Type 2 diabetes mellitus with other specified complication, with long-term current use of insulin ([facility])      blood glucose monitor kit and supplies Please give 1 meter covered by insurance Dx E11.65  Qty: 1 kit, Refills: 0    Comments: Brand per patient preference. May round up to next available package size. !! Lancets MISC Pt test 3x daily Dx E11.65  Qty: 100 each, Refills: 3      blood glucose test strips (ASCENSIA AUTODISC VI;ONE TOUCH ULTRA TEST VI) strip Check blood sugars twice daily or as directed.   Qty: 200 strip, Refills: 3    Comments: Dx: 250.00 NIDDM      estradiol (CLIMARA) 0.025 MG/24HR Place 0.025 patches onto the skin every 7 days On mondays  Refills: 11      albuterol sulfate HFA (VENTOLIN HFA) 108 (90 Base) MCG/ACT inhaler Inhale 2 puffs into the lungs every 6 hours as needed for Wheezing  Qty: 1 Inhaler, Refills: 0      levothyroxine (SYNTHROID) 50 MCG tablet Take 1 tablet by mouth daily  Qty: 90 tablet, Refills: 3    Associated Diagnoses: Hypothyroidism, unspecified type      hydroxychloroquine (PLAQUENIL) 200 MG tablet Take 1 tablet by mouth 2 times daily  Refills: 5       !! - Potential duplicate medications found. Please discuss with provider. ALLERGIES     Hm lidocaine patch [lidocaine], Aspirin, Cephalosporins, Imitrex [sumatriptan], Pcn [penicillins], Seasonal, Toradol [ketorolac tromethamine], and Ultram [tramadol]    FAMILY HISTORY       Family History   Adopted: Yes          SOCIAL HISTORY       Social History     Socioeconomic History    Marital status: Legally      Spouse name: Not on file    Number of children: Not on file    Years of education: Not on file    Highest education level: Not on file   Occupational History    Not on file   Tobacco Use    Smoking status: Never Smoker    Smokeless tobacco: Never Used   Vaping Use    Vaping Use: Never used   Substance and Sexual Activity    Alcohol use: No     Alcohol/week: 0.0 standard drinks     Comment: denies    Drug use: No     Comment: denies    Sexual activity: Not on file   Other Topics Concern    Not on file   Social History Narrative    Not on file     Social Determinants of Health     Financial Resource Strain: Low Risk     Difficulty of Paying Living Expenses: Not hard at all   Food Insecurity: No Food Insecurity    Worried About Running Out of Food in the Last Year: Never true    Carlitos of Food in the Last Year: Never true   Transportation Needs:     Lack of Transportation (Medical): Not on file    Lack of Transportation (Non-Medical):  Not on file   Physical Activity:     Days of Exercise per Week: Not on file    Minutes of Exercise per Session: Not on file   Stress:     Feeling of Stress : Not on file   Social Connections:     Frequency of Communication with Friends and Family: Not on file    Frequency of Social Gatherings with Friends and Family: Not on file    Attends Jewish Services: Not on file    Active Member of Clubs or Organizations: Not on file    Attends Club or Organization Meetings: Not on file    Marital Status: Not on file   Intimate Partner Violence:     Fear of Current or Ex-Partner: Not on file    Emotionally Abused: Not on file    Physically Abused: Not on file    Sexually Abused: Not on file   Housing Stability:     Unable to Pay for Housing in the Last Year: Not on file    Number of [place] in the Last Year: Not on file    Unstable Housing in the Last Year: Not on file       SCREENINGS    Aries Coma Scale  Eye Opening: Spontaneous  Best Verbal Response: Oriented  Best Motor Response: Obeys commands  Aries Coma Scale Score: 15          PHYSICAL EXAM    (up to 7 for level 4, 8 or more for level 5)     ED Triage Vitals [03/27/22 2330]   BP Temp Temp src Pulse Resp SpO2 Height Weight   (!) 143/85 97.7 °F (36.5 °C) -- 96 20 96 % 5' 8\" (1.727 m) 165 lb (74.8 kg)       Physical Exam  Vitals and nursing note reviewed. Constitutional:       General: She is not in acute distress. Appearance: Normal appearance. She is well-developed. She is obese. She is not ill-appearing. HENT:      Head: Normocephalic and atraumatic. Mouth/Throat:      Mouth: Mucous membranes are moist.      Pharynx: Oropharynx is clear. Eyes:      Extraocular Movements: Extraocular movements intact. Conjunctiva/sclera: Conjunctivae normal.   Cardiovascular:      Rate and Rhythm: Normal rate and regular rhythm. Pulmonary:      Effort: Pulmonary effort is normal.      Breath sounds: Normal breath sounds. Abdominal:      General: Bowel sounds are normal.      Palpations: Abdomen is soft. Tenderness: There is no abdominal tenderness. There is no guarding or rebound. Musculoskeletal:         General: No deformity. Normal range of motion.       Cervical back: Normal range of motion and neck supple. Skin:     General: Skin is warm and dry. Capillary Refill: Capillary refill takes less than 2 seconds. Neurological:      General: No focal deficit present. Mental Status: She is alert and oriented to person, place, and time. Mental status is at baseline. Cranial Nerves: No cranial nerve deficit. Psychiatric:         Thought Content: Thought content normal.         DIAGNOSTIC RESULTS     EKG: All EKG's are interpreted by the Emergency Department Physician who either signs or Co-signs this chart in the absence of a cardiologist.    RADIOLOGY:   Non-plain film images such as CT, Ultrasound and MRI are read by the radiologist. Plain radiographic images are visualized and preliminarily interpreted by the emergency physician with the below findings:    Interpretation per the Radiologist below, if available at the time of this note:    No orders to display       LABS:  Labs Reviewed - No data to display    All other labs were within normal range or not returned as of this dictation. EMERGENCY DEPARTMENT COURSE and DIFFERENTIAL DIAGNOSIS/MDM:   Vitals:    Vitals:    03/27/22 2330   BP: (!) 143/85   Pulse: 96   Resp: 20   Temp: 97.7 °F (36.5 °C)   SpO2: 96%   Weight: 165 lb (74.8 kg)   Height: 5' 8\" (1.727 m)       MDM  Number of Diagnoses or Management Options  Generalized abdominal pain  Diagnosis management comments: Presents with abdominal pain. Reviewed records from yesterday and patient had complete work-up at that time. I did offer her multiple different non narcotic therapies for symptom relief which she declines. Patient is not in the room when registration and nurse go to speak with her. Eloped prior to signing any paperwork. Procedures    CRITICAL CARE TIME   Total Critical Care time was 0 minutes, excluding separately reportable procedures.   There was a high probability of clinically significant/life threatening deterioration in the patient's condition which required my urgent intervention. FINAL IMPRESSION      1.  Generalized abdominal pain          DISPOSITION/PLAN   DISPOSITION Eloped - Left Before Treatment Complete 03/28/2022 12:14:04 AM      (Please note that portions of this note were completed with a voice recognition program.  Efforts were made to edit the dictations but occasionally words are mis-transcribed.)    Connie Julien MD (electronically signed)  Attending Emergency Physician        Connie Julien MD  03/28/22 [id number]

## 2022-03-28 NOTE — ED TRIAGE NOTES
Pt states she was seen in ER yesterday for left lower abd pain. Pt was told if the pain got worse to come back. Pt states that the pain is much worse tonight. Pt is a/o x 4 anxious and tearful. Skin p/w/d resp even and non-labored.

## 2022-04-06 ENCOUNTER — TELEPHONE (OUTPATIENT)
Dept: ENDOCRINOLOGY | Age: 57
End: 2022-04-06

## 2022-04-06 DIAGNOSIS — E11.65 UNCONTROLLED TYPE 2 DIABETES MELLITUS WITH HYPERGLYCEMIA (HCC): Primary | ICD-10-CM

## 2022-04-06 NOTE — TELEPHONE ENCOUNTER
Previous prescription missing diagnosis code. Please approve    Rx requested:  Requested Prescriptions     Pending Prescriptions Disp Refills    insulin aspart (NOVOLOG) 100 UNIT/ML injection vial 30 mL 3     Sig: Use via insulin pump max daily dose 100 units daily.   E11.65         Last Office Visit:   3/9/2022      Next Visit Date:  Future Appointments   Date Time Provider Kings Mcadams   6/9/2022  9:30 AM Ortiz Perry MD [facility]

## 2022-04-06 NOTE — TELEPHONE ENCOUNTER
Patient wants to know, now that she is going on to be using the omnipod, does she still need to take the the trulicity? Please advise. Thanks!

## 2022-04-07 NOTE — TELEPHONE ENCOUNTER
Spoke to patient about the pharmacy said her insurance will not pay for insulin vials .  Patient is going to call her insurance and omni pod and call us back were to order he insulin too

## 2022-04-08 ENCOUNTER — TELEPHONE (OUTPATIENT)
Dept: ENDOCRINOLOGY | Age: 57
End: 2022-04-08

## 2022-04-08 NOTE — TELEPHONE ENCOUNTER
Pt is going on the pump this weekend and her insurance will only cover fiasp or Novolin R.   If one of these is ok please send into pharmacy

## 2022-04-25 RX ORDER — INSULIN PUMP CONTROLLER
EACH MISCELLANEOUS
Qty: 15 EACH | Refills: 3 | Status: SHIPPED | OUTPATIENT
Start: 2022-04-25 | End: 2022-09-08 | Stop reason: SDUPTHER

## 2022-04-30 ENCOUNTER — HOSPITAL ENCOUNTER (EMERGENCY)
Age: 57
Discharge: HOME | End: 2022-04-30
Attending: EMERGENCY MEDICINE
Payer: MEDICARE

## 2022-04-30 VITALS
BODY MASS INDEX: 24.25 KG/M2 | RESPIRATION RATE: 20 BRPM | HEART RATE: 72 BPM | DIASTOLIC BLOOD PRESSURE: 75 MMHG | HEIGHT: 68 IN | TEMPERATURE: 97.6 F | SYSTOLIC BLOOD PRESSURE: 109 MMHG | WEIGHT: 160 LBS | OXYGEN SATURATION: 98 %

## 2022-04-30 DIAGNOSIS — M54.30 SCIATICA, UNSPECIFIED LATERALITY: ICD-10-CM

## 2022-04-30 DIAGNOSIS — F41.1 ANXIETY STATE: Primary | ICD-10-CM

## 2022-04-30 PROCEDURE — 6370000000 HC RX 637 (ALT 250 FOR IP): Performed by: EMERGENCY MEDICINE

## 2022-04-30 PROCEDURE — 99284 EMERGENCY DEPT VISIT MOD MDM: CPT

## 2022-04-30 PROCEDURE — 96372 THER/PROPH/DIAG INJ SC/IM: CPT

## 2022-04-30 PROCEDURE — 6360000002 HC RX W HCPCS: Performed by: EMERGENCY MEDICINE

## 2022-04-30 RX ORDER — HYDROCODONE BITARTRATE AND ACETAMINOPHEN 5; 325 MG/1; MG/1
1 TABLET ORAL EVERY 6 HOURS PRN
Qty: 10 TABLET | Refills: 0 | Status: SHIPPED | OUTPATIENT
Start: 2022-04-30 | End: 2022-05-01 | Stop reason: SDUPTHER

## 2022-04-30 RX ORDER — PREDNISONE 20 MG/1
60 TABLET ORAL ONCE
Status: COMPLETED | OUTPATIENT
Start: 2022-04-30 | End: 2022-04-30

## 2022-04-30 RX ORDER — PREDNISONE 20 MG/1
60 TABLET ORAL DAILY
Qty: 15 TABLET | Refills: 0 | Status: SHIPPED | OUTPATIENT
Start: 2022-04-30 | End: 2022-05-05

## 2022-04-30 RX ORDER — ONDANSETRON 4 MG/1
4 TABLET, ORALLY DISINTEGRATING ORAL ONCE
Status: COMPLETED | OUTPATIENT
Start: 2022-04-30 | End: 2022-04-30

## 2022-04-30 RX ADMIN — PREDNISONE 60 MG: 20 TABLET ORAL at 14:19

## 2022-04-30 RX ADMIN — ONDANSETRON 4 MG: 4 TABLET, ORALLY DISINTEGRATING ORAL at 14:20

## 2022-04-30 RX ADMIN — HYDROMORPHONE HYDROCHLORIDE 1 MG: 1 INJECTION, SOLUTION INTRAMUSCULAR; INTRAVENOUS; SUBCUTANEOUS at 14:19

## 2022-04-30 ASSESSMENT — ENCOUNTER SYMPTOMS
BOWEL INCONTINENCE: 0
VOMITING: 0
SHORTNESS OF BREATH: 0
BACK PAIN: 1
EYE REDNESS: 0
ABDOMINAL SWELLING: 0
EYE DISCHARGE: 0
NAUSEA: 0
ABDOMINAL PAIN: 0
SORE THROAT: 0
COUGH: 0

## 2022-04-30 ASSESSMENT — PAIN DESCRIPTION - FREQUENCY: FREQUENCY: CONTINUOUS

## 2022-04-30 ASSESSMENT — PAIN DESCRIPTION - PAIN TYPE: TYPE: CHRONIC PAIN;ACUTE PAIN

## 2022-04-30 ASSESSMENT — PAIN DESCRIPTION - ORIENTATION: ORIENTATION: LEFT

## 2022-04-30 ASSESSMENT — PAIN DESCRIPTION - ONSET: ONSET: PROGRESSIVE

## 2022-04-30 ASSESSMENT — PAIN DESCRIPTION - LOCATION: LOCATION: BACK

## 2022-04-30 ASSESSMENT — PAIN DESCRIPTION - DESCRIPTORS: DESCRIPTORS: BURNING;SHOOTING

## 2022-04-30 ASSESSMENT — PAIN - FUNCTIONAL ASSESSMENT: PAIN_FUNCTIONAL_ASSESSMENT: 0-10

## 2022-04-30 ASSESSMENT — PAIN SCALES - GENERAL: PAINLEVEL_OUTOF10: 10

## 2022-04-30 NOTE — ED PROVIDER NOTES
[facility] ED  EMERGENCY DEPARTMENT ENCOUNTER      Pt Name: Yassine Olvera  MRN: [id number]  [place] 1965  Date of evaluation: 4/30/2022  Provider: Angela Rojas DO        HISTORY OF PRESENT ILLNESS    Suzanne Kim is a 49 y.o. female per chart review has ah/o anxiety, asthma, depression, diabetes, foot fracture, GERD. She presents with back pain. The history is provided by the patient. Back Pain  Location:  Lumbar spine  Quality:  Aching  Radiates to:  L thigh  Pain severity:  Severe  Pain is:  Same all the time  Onset quality:  Sudden  Timing:  Constant  Progression:  Worsening  Chronicity:  Chronic  Context: twisting    Relieved by:  Nothing  Worsened by:  Twisting and movement  Ineffective treatments:  None tried  Associated symptoms: no abdominal pain, no abdominal swelling, no bladder incontinence, no bowel incontinence, no chest pain, no dysuria, no fever, no numbness, no paresthesias, no perianal numbness, no tingling, no weakness and no weight loss    Risk factors: obesity    Risk factors: no hx of cancer, no hx of osteoporosis, no lack of exercise, no menopause, not pregnant, no recent surgery, no steroid use and no vascular disease             REVIEW OF SYSTEMS       Review of Systems   Constitutional: Negative for chills, fever and weight loss. HENT: Negative for ear pain and sore throat. Eyes: Negative for discharge and redness. Respiratory: Negative for cough and shortness of breath. Cardiovascular: Negative for chest pain and palpitations. Gastrointestinal: Negative for abdominal pain, bowel incontinence, nausea and vomiting. Genitourinary: Negative for bladder incontinence, difficulty urinating and dysuria. Musculoskeletal: Positive for back pain. Negative for neck pain. Skin: Negative for rash and wound. Neurological: Negative for dizziness, tingling, syncope, weakness, numbness and paresthesias. Psychiatric/Behavioral: Negative for confusion.  The patient is not nervous/anxious. All other systems reviewed and are negative. Except as noted above the remainder of the review of systems was reviewed and negative.        PAST MEDICAL HISTORY     Past Medical History:   Diagnosis Date    Anxiety     Asthma     as child    Depression     Depression     dysthymia - following lupus dx age 23    [facility] mellitus ([facility] [address]     Foot fracture, left     GERD (gastroesophageal reflux disease)     Hyperlipidemia     Hypothyroidism     Kidney stones     Lupus ([facility] [address]     dr barrera -     Migraine     PONV (postoperative nausea and vomiting)     Renal stone 4/25/2021    Sciatica     left sided         SURGICAL HISTORY       Past Surgical History:   Procedure Laterality Date    APPENDECTOMY      ARTHRODESIS Left 11/26/2019    ARTHRODESIS OF THE FIRST METATARSAL PHALANGEAL JOINT LEFT FOOT performed by Srikanth Moctezuma DPM at [address] [facility] N/A 4/26/2021    CYSTOSCOPY RIGHT DOUBLE J STENT PLACEMENT. performed by Shaila Hightower MD at [address] [facility] COLONOSCOPY      ENDOSCOPY, COLON, DIAGNOSTIC      FOOT CLOSED REDUCTION Left 1/3/2017    LEFT FOOT PERCUTANEOUS PINNING Delories Hollering performed by Misty Perez MD at [address] [facility] [place] LITHOTRIPSY Right 5/5/2021    RIGHT ESWL performed by Shaila Hightower MD at [facility] ESOPHAGOGASTRODUODENOSCOPY TRANSORAL DIAGNOSTIC N/A 3/8/2017    EGD ESOPHAGOGASTRODUODENOSCOPY WITH DILATION performed by Raiza Steen MD at [address] ESOPHAGOGASTRODUODENOSCOPY TRANSORAL DIAGNOSTIC N/A 11/7/2018    EGD ESOPHAGOGASTRODUODENOSCOPY WITH DILATION performed by Raiza Steen MD at [address] [facility] ENDOSCOPY N/A 8/17/2021    EGD WITH BIOPSY AND POLYPECTOMY performed by Hilton Andre Becerra MD at [address] [place] [address]       Discharge Medication List as of 4/30/2022  2:16 PM      CONTINUE these medications which have NOT CHANGED    Details   Insulin Disposable Pump (OMNIPOD DASH PODS, GEN 4,) MISC Change every 48 hrs, Disp-15 each, R-3Normal      Insulin Aspart, w/Niacinamide, (FIASP) 100 UNIT/ML SOLN Use via pump max dose 150 units/day, Disp-50 mL, R-3Normal      insulin aspart (NOVOLOG) 100 UNIT/ML injection vial Use via insulin pump max daily dose 100 units daily. E11.65, Disp-30 mL, R-3Normal      ondansetron (ZOFRAN ODT) 4 MG disintegrating tablet Take 1 tablet by mouth every 8 hours as needed for Nausea or Vomiting, Disp-12 tablet, R-0Print      dicyclomine (BENTYL) 20 MG tablet Take 1 tablet by mouth 4 times daily, Disp-12 tablet, R-0Print      clonazePAM (KLONOPIN) 0.5 MG tablet TAKE 1 TABLET DAILY AS NEEDEDHistorical Med      busPIRone (BUSPAR) 5 MG tablet Historical Med      SEROQUEL 25 MG tablet DAWHistorical Med      rosuvastatin (CRESTOR) 20 MG tablet TAKE 1 TABLET DAILY. Historical Med      insulin glargine (LANTUS SOLOSTAR) 100 UNIT/ML injection pen 160  units at bedtime, Disp-30 pen, R-3Normal      insulin aspart (NOVOLOG FLEXPEN) 100 UNIT/ML injection pen Inject 60 units tid with meals, Disp-15 pen, R-3Normal      Dulaglutide (TRULICITY) 7.51 PI/1.3EU SOPN Inject 0.75 mg into the skin once a week, Disp-4 pen, R-2Normal      Insulin Pen Needle (PEN NEEDLES) 33G X 4 MM MISC 1 each by Does not apply route three times daily, Disp-100 each, R-2Normal      oxyCODONE-acetaminophen (PERCOCET) 5-325 MG per tablet TAKE 1 TABLET Every 8 hours as needed for MODERATE TO SEVERE PAIN. PLEASE WATCH OUT FOR SETATION AND FATIGUE. Historical Med      tiZANidine (ZANAFLEX) 4 MG tablet TAKE 1 TABLET 3 TIMES DAILY as needed. Historical Med      pregabalin (LYRICA) 75 MG capsule Take 75 mg by mouth daily. Historical Med      DULoxetine (CYMBALTA) 30 MG extended release capsule TAKE 1 CAPSULE BY MOUTH EVERY DAYHistorical Med      esomeprazole (NEXIUM) 40 MG delayed release capsule Take 1 capsule by mouth daily, Disp-30 capsule, R-3Normal      !! Drug [place] Un[place]t Lancets 30G MISC Disp-200 each,R-3, NormalCheck blood sugars twice daily or as directed. blood glucose monitor kit and supplies Please give 1 meter covered by insurance Dx E11.65, Disp-1 kit, R-0, Normal      !! Lancets MISC Disp-100 each, R-3, NormalPt test 3x daily Dx E11.65      blood glucose test strips (ASCENSIA AUTODISC VI;ONE TOUCH ULTRA TEST VI) strip Disp-200 strip, R-3, NormalCheck blood sugars twice daily or as directed. estradiol (CLIMARA) 0.025 MG/24HR Place 0.025 patches onto the skin every 7 days On mondays, R-11Historical Med      albuterol sulfate HFA (VENTOLIN HFA) 108 (90 Base) MCG/ACT inhaler Inhale 2 puffs into the lungs every 6 hours as needed for Wheezing, Disp-1 Inhaler, R-0Print      levothyroxine (SYNTHROID) 50 MCG tablet Take 1 tablet by mouth daily, Disp-90 tablet, R-3      hydroxychloroquine (PLAQUENIL) 200 MG tablet Take 1 tablet by mouth 2 times daily, R-5       !! - Potential duplicate medications found. Please discuss with provider.           ALLERGIES     Hm lidocaine patch [lidocaine], Aspirin, Cephalosporins, Imitrex [sumatriptan], Pcn [penicillins], Seasonal, Toradol [ketorolac tromethamine], and Ultram [tramadol]    FAMILY HISTORY       Family History   Adopted: Yes          SOCIAL HISTORY       Social History     Socioeconomic History    Marital status: Legally      Spouse name: None    Number of children: None    Years of education: None    Highest education level: None   Occupational History    None   Tobacco Use    Smoking status: Never Smoker    Smokeless tobacco: Never Used   Vaping Use    Vaping Use: Never used   Substance and Sexual Activity    Alcohol use: No     Alcohol/week: 0.0 standard drinks     Comment: denies    Drug use: No     Comment: denies    Sexual activity: None   Other Topics Concern    None   Social History Narrative    None     Social Determinants of Health     Financial Resource Strain: Low Risk     Difficulty of Paying Living Expenses: Not hard at all   Food Insecurity: No Food Insecurity    Worried About Running Out of Food in the Last Year: Never true    [place] of Food in the Last Year: Never true   Transportation Needs:     Lack of Transportation (Medical): Not on file    Lack of Transportation (Non-Medical): Not on file   Physical Activity:     Days of Exercise per Week: Not on file    Minutes of Exercise per Session: Not on file   Stress:     Feeling of Stress : Not on file   Social Connections:     Frequency of Communication with Friends and Family: Not on file    Frequency of Social Gatherings with Friends and Family: Not on file    Attends Buddhism Services: Not on file    Active Member of [address] [place] [address] [employer] or Organizations: Not on file    Attends Club or Organization Meetings: Not on file    Marital Status: Not on file   Intimate Partner Violence:     Fear of Current or Ex-Partner: Not on file    Emotionally Abused: Not on file    Physically Abused: Not on file    Sexually Abused: Not on file   Housing Stability:     Unable to Pay for Housing in the Last Year: Not on file    Number of [place] in the Last Year: Not on file    Unstable Housing in the Last Year: Not on file         PHYSICAL EXAM       ED Triage Vitals   BP Temp Temp Source Pulse Resp SpO2 Height Weight   04/30/22 1322 04/30/22 1318 04/30/22 1318 04/30/22 1318 04/30/22 1318 04/30/22 1318 04/30/22 1318 04/30/22 1318   109/75 97.6 °F (36.4 °C) Oral 72 20 98 % 5' 8\" (1.727 m) 160 lb (72.6 kg)       Physical Exam  Vitals and nursing note reviewed. Constitutional:       Appearance: Normal appearance. HENT:      Head: Normocephalic and atraumatic.       Right Ear: Tympanic membrane normal.      Left Ear: Tympanic membrane normal.      Nose: Nose normal. Mouth/Throat:      Mouth: Mucous membranes are moist.      Pharynx: Oropharynx is clear. Eyes:      General: Lids are normal.      Extraocular Movements: Extraocular movements intact. Conjunctiva/sclera: Conjunctivae normal.      Pupils: Pupils are equal, round, and reactive to light. Cardiovascular:      Rate and Rhythm: Normal rate and regular rhythm. Pulses: Normal pulses. Heart sounds: Normal heart sounds. Pulmonary:      Effort: Pulmonary effort is normal.      Breath sounds: Normal breath sounds. Abdominal:      General: Abdomen is flat. Bowel sounds are normal.      Palpations: Abdomen is soft. Musculoskeletal:         General: Tenderness present. Normal range of motion. Cervical back: Full passive range of motion without pain, normal range of motion and neck supple. Lumbar back: Spasms and tenderness present. Back:    Skin:     General: Skin is warm. Capillary Refill: Capillary refill takes less than 2 seconds. Neurological:      General: No focal deficit present. Mental Status: She is alert and oriented to person, place, and time. Deep Tendon Reflexes: Reflexes are normal and symmetric. Psychiatric:         Attention and Perception: Attention and perception normal.         Mood and Affect: Mood normal.         Behavior: Behavior normal. Behavior is cooperative. LABS:  Labs Reviewed - No data to display      MDM:   Vitals:    Vitals:    04/30/22 1318 04/30/22 1322   BP:  109/75   Pulse: 72    Resp: 20    Temp: 97.6 °F (36.4 °C)    TempSrc: Oral    SpO2: 98%    Weight: 160 lb (72.6 kg)    Height: 5' 8\" (1.727 m)        MDM  Number of Diagnoses or Management Options  Anxiety state  Sciatica, unspecified laterality  Diagnosis management comments: Patient presents with pain in her left buttocks radiating to her left leg. She states she is having a flare up of her sciatica back pain. I ordered dialudid 1mg IM and zofran 4mg ODT.   She feels much better. She will be discharged home. She will follow up in 2 days with her primary care doctor. No orders to display             Angela Rojas DO     The lab results, radiology and test results were reviewed with the patient and family. The patient will follow up in 2 days with their primary care doctor. If their symptoms change or get worse they will return to the ER. CRITICAL CARE TIME   Total CriticalCare time was 0 minutes, excluding separately reportable procedures. There was a high probability of clinically significant/life threatening deterioration in the patient's condition which required my urgent intervention. PROCEDURES:  Unlessotherwise noted below, none     Procedures      FINAL IMPRESSION      1. Anxiety state    2.  Sciatica, unspecified laterality          DISPOSITION/PLAN   DISPOSITION Decision To Discharge 04/30/2022 02:06:44 PM          Angela Rojas DO (electronically signed)  Attending Emergency Physician          Britton Hernandez DO  05/04/22 [id number]

## 2022-04-30 NOTE — ED TRIAGE NOTES
Pt presents to ED with pain to lower back radiating down left leg x 1 day. Pt has a hx of sciatica pain and states this feels the same.

## 2022-04-30 NOTE — ED NOTES
Explained discharge instructions and prescriptions to patient. Went over discharge diagnosis and pertinent educational material with patient. Patient stated understanding of discharge diagnosis, instructions, and prescriptions. Patient denies any questions at this time, all concerns addressed. No signs or symptoms of pain noted at this time. A/0 x3, ambulatory, resps even and unlabored on room air. Follow up instructions and reasons to return to ER reviewed. Patient denies needs at time of discharge.         Vimal Hinkle RN  04/30/22 5227

## 2022-05-01 ENCOUNTER — HOSPITAL ENCOUNTER (EMERGENCY)
Age: 57
Discharge: HOME | End: 2022-05-01
Attending: EMERGENCY MEDICINE
Payer: MEDICARE

## 2022-05-01 VITALS
SYSTOLIC BLOOD PRESSURE: 129 MMHG | DIASTOLIC BLOOD PRESSURE: 74 MMHG | BODY MASS INDEX: 24.25 KG/M2 | RESPIRATION RATE: 18 BRPM | WEIGHT: 160 LBS | HEART RATE: 86 BPM | TEMPERATURE: 98.2 F | HEIGHT: 68 IN | OXYGEN SATURATION: 99 %

## 2022-05-01 DIAGNOSIS — M54.42 ACUTE LEFT-SIDED BACK PAIN WITH SCIATICA: Primary | ICD-10-CM

## 2022-05-01 PROCEDURE — 99284 EMERGENCY DEPT VISIT MOD MDM: CPT

## 2022-05-01 PROCEDURE — 6360000002 HC RX W HCPCS: Performed by: EMERGENCY MEDICINE

## 2022-05-01 PROCEDURE — 96372 THER/PROPH/DIAG INJ SC/IM: CPT

## 2022-05-01 PROCEDURE — 6370000000 HC RX 637 (ALT 250 FOR IP): Performed by: EMERGENCY MEDICINE

## 2022-05-01 RX ORDER — ORPHENADRINE CITRATE 30 MG/ML
60 INJECTION INTRAMUSCULAR; INTRAVENOUS ONCE
Status: COMPLETED | OUTPATIENT
Start: 2022-05-01 | End: 2022-05-01

## 2022-05-01 RX ORDER — HYDROCODONE BITARTRATE AND ACETAMINOPHEN 5; 325 MG/1; MG/1
1 TABLET ORAL EVERY 4 HOURS PRN
Qty: 12 TABLET | Refills: 0 | Status: SHIPPED | OUTPATIENT
Start: 2022-05-01 | End: 2022-05-04

## 2022-05-01 RX ORDER — HYDROCODONE BITARTRATE AND ACETAMINOPHEN 5; 325 MG/1; MG/1
1 TABLET ORAL ONCE
Status: COMPLETED | OUTPATIENT
Start: 2022-05-01 | End: 2022-05-01

## 2022-05-01 RX ORDER — KETOROLAC TROMETHAMINE 30 MG/ML
30 INJECTION, SOLUTION INTRAMUSCULAR; INTRAVENOUS ONCE
Status: DISCONTINUED | OUTPATIENT
Start: 2022-05-01 | End: 2022-05-01

## 2022-05-01 RX ORDER — METHOCARBAMOL 500 MG/1
500 TABLET, FILM COATED ORAL 4 TIMES DAILY PRN
Qty: 20 TABLET | Refills: 0 | Status: SHIPPED | OUTPATIENT
Start: 2022-05-01 | End: 2022-05-06

## 2022-05-01 RX ADMIN — ORPHENADRINE CITRATE 60 MG: 30 INJECTION INTRAMUSCULAR; INTRAVENOUS at 13:43

## 2022-05-01 RX ADMIN — HYDROCODONE BITARTRATE AND ACETAMINOPHEN 1 TABLET: 5; 325 TABLET ORAL at 13:43

## 2022-05-01 ASSESSMENT — PAIN SCALES - GENERAL
PAINLEVEL_OUTOF10: 4
PAINLEVEL_OUTOF10: 10

## 2022-05-01 ASSESSMENT — PAIN DESCRIPTION - ONSET
ONSET: ON-GOING
ONSET: ON-GOING

## 2022-05-01 ASSESSMENT — PAIN - FUNCTIONAL ASSESSMENT
PAIN_FUNCTIONAL_ASSESSMENT: 0-10
PAIN_FUNCTIONAL_ASSESSMENT: 0-10

## 2022-05-01 ASSESSMENT — PAIN DESCRIPTION - FREQUENCY
FREQUENCY: CONTINUOUS
FREQUENCY: CONTINUOUS

## 2022-05-01 ASSESSMENT — PAIN DESCRIPTION - ORIENTATION: ORIENTATION: LOWER

## 2022-05-01 ASSESSMENT — PAIN DESCRIPTION - PAIN TYPE
TYPE: CHRONIC PAIN
TYPE: ACUTE PAIN

## 2022-05-01 ASSESSMENT — PAIN DESCRIPTION - LOCATION
LOCATION: BACK
LOCATION: BACK

## 2022-05-01 ASSESSMENT — PAIN DESCRIPTION - DESCRIPTORS
DESCRIPTORS: SHARP;SHOOTING
DESCRIPTORS: BURNING

## 2022-05-05 NOTE — ED PROVIDER NOTES
eMERGENCY dEPARTMENT eNCOUnter      [facility]    Chief Complaint   Patient presents with    Back Pain     started Friday was seen here yesterday for the same problem stating steroids are not helping        HPI    Jorge Alberto Powell is a 49 y.o. female with hx of Chronic Back pain , s/p EGD, Hysterectomy,  who presentsto ED from home   By private car   With complaint of Low Back Pain with radiation to R posterior thigh   Onset few days  Intensity of symptoms moderate to severe  Patient was seen yesterday and started on steroids but that is not helping   She denies injury , bowel bladder incontinence , weakness or numbness in the lower extremity     PAST MEDICAL HISTORY    Past Medical History:   Diagnosis Date    Anxiety     Asthma     as child    Depression     Depression     dysthymia - following lupus dx age 23    [facility] ([facility] [address] [facility]     Foot fracture, left     GERD (gastroesophageal reflux disease)     Hyperlipidemia     Hypothyroidism     Kidney stones     Lupus ([facility] [address]     dr barrera -     Migraine     PONV (postoperative nausea and vomiting)     Renal stone 4/25/2021    Sciatica     left sided       SURGICAL HISTORY    Past Surgical History:   Procedure Laterality Date    APPENDECTOMY      ARTHRODESIS Left 11/26/2019    ARTHRODESIS OF THE FIRST METATARSAL PHALANGEAL JOINT LEFT FOOT performed by Danna June DPM at [facility] N/A 4/26/2021    CYSTOSCOPY RIGHT DOUBLE J STENT PLACEMENT. performed by Renard Lambert MD at [facility] COLONOSCOPY      ENDOSCOPY, COLON, DIAGNOSTIC      FOOT CLOSED REDUCTION Left 1/3/2017    LEFT FOOT PERCUTANEOUS PINNING Kimberly Cumins performed by Mert Mathew MD at [address] [place] [address] [place] LITHOTRIPSY Right 5/5/2021    RIGHT ESWL performed by Renard Lambert MD at [address] [place] ESOPHAGOGASTRODUODENOSCOPY TRANSORAL DIAGNOSTIC N/A 3/8/2017    EGD ESOPHAGOGASTRODUODENOSCOPY WITH DILATION performed by Socorro Nur MD at [address] ESOPHAGOGASTRODUODENOSCOPY TRANSORAL DIAGNOSTIC N/A 11/7/2018    EGD ESOPHAGOGASTRODUODENOSCOPY WITH DILATION performed by Socorro Nur MD at [facility] ENDOSCOPY N/A 8/17/2021    EGD WITH BIOPSY AND POLYPECTOMY performed by Xuan Pete MD at [address]    Current Outpatient Rx   Medication Sig Dispense Refill    methocarbamol (ROBAXIN) 500 MG tablet Take 1 tablet by mouth 4 times daily as needed (Muscle spasms) 20 tablet 0    predniSONE (DELTASONE) 20 MG tablet Take 3 tablets by mouth daily for 5 days 15 tablet 0    Insulin Disposable Pump (OMNIPOD DASH PODS, GEN 4,) MISC Change every 48 hrs 15 each 3    Insulin Aspart, w/Niacinamide, (FIASP) 100 UNIT/ML SOLN Use via pump max dose 150 units/day 50 mL 3    insulin aspart (NOVOLOG) 100 UNIT/ML injection vial Use via insulin pump max daily dose 100 units daily. E11.65 30 mL 3    ondansetron (ZOFRAN ODT) 4 MG disintegrating tablet Take 1 tablet by mouth every 8 hours as needed for Nausea or Vomiting 12 tablet 0    dicyclomine (BENTYL) 20 MG tablet Take 1 tablet by mouth 4 times daily (Patient not taking: Reported on 4/30/2022) 12 tablet 0    clonazePAM (KLONOPIN) 0.5 MG tablet TAKE 1 TABLET DAILY AS NEEDED      busPIRone (BUSPAR) 5 MG tablet  (Patient not taking: Reported on 4/30/2022)      SEROQUEL 25 MG tablet  (Patient not taking: Reported on 4/30/2022)      rosuvastatin (CRESTOR) 20 MG tablet TAKE 1 TABLET DAILY.       insulin glargine (LANTUS SOLOSTAR) 100 UNIT/ML injection pen 160  units at bedtime 30 pen 3    insulin aspart (NOVOLOG FLEXPEN) 100 UNIT/ML injection pen Inject 60 units tid with meals 15 pen 3    Dulaglutide (TRULICITY) 5.48 LE/5.6HU SOPN Inject 0.75 mg into the skin once a week (Patient not taking: Reported on 4/30/2022) 4 pen 2    Insulin Pen Needle (PEN NEEDLES) 33G X 4 MM MISC 1 each by Does not apply route three times daily 100 each 2    oxyCODONE-acetaminophen (PERCOCET) 5-325 MG per tablet TAKE 1 TABLET Every 8 hours as needed for MODERATE TO SEVERE PAIN. PLEASE WATCH OUT FOR SETATION AND FATIGUE.  tiZANidine (ZANAFLEX) 4 MG tablet TAKE 1 TABLET 3 TIMES DAILY as needed. (Patient not taking: Reported on 3/9/2022)      pregabalin (LYRICA) 75 MG capsule Take 75 mg by mouth daily. (Patient not taking: Reported on 4/30/2022)      DULoxetine (CYMBALTA) 30 MG extended release capsule TAKE 1 CAPSULE BY MOUTH EVERY DAY (Patient not taking: Reported on 3/9/2022)      esomeprazole (NEXIUM) 40 MG delayed release capsule Take 1 capsule by mouth daily (Patient not taking: Reported on 3/9/2022) 30 capsule 3    Drug [place] Unilet Lancets 30G MISC Check blood sugars twice daily or as directed. (Patient not taking: Reported on 3/9/2022) 200 each 3    blood glucose monitor kit and supplies Please give 1 meter covered by insurance Dx E11.65 1 kit 0    Lancets MISC Pt test 3x daily Dx E11.65 100 each 3    blood glucose test strips (ASCENSIA AUTODISC VI;ONE TOUCH ULTRA TEST VI) strip Check blood sugars twice daily or as directed.  200 strip 3    estradiol (CLIMARA) 0.025 MG/24HR Place 0.025 patches onto the skin every 7 days On mondays 11    albuterol sulfate HFA (VENTOLIN HFA) 108 (90 Base) MCG/ACT inhaler Inhale 2 puffs into the lungs every 6 hours as needed for Wheezing 1 Inhaler 0    levothyroxine (SYNTHROID) 50 MCG tablet Take 1 tablet by mouth daily 90 tablet 3    hydroxychloroquine (PLAQUENIL) 200 MG tablet Take 1 tablet by mouth 2 times daily  5       ALLERGIES    Allergies   Allergen Reactions    Hm Lidocaine Patch [Lidocaine] Rash     Patient states allergic    Aspirin Swelling     Bloody noses    Cephalosporins     Imitrex [Sumatriptan] Hives    Pcn [Penicillins] Swelling    Seasonal Itching     Itch, sneezing,runny nose, watery eyes    Toradol [Ketorolac Tromethamine] Hives     Shortness of breath    Ultram [Tramadol] Hives       FAMILY HISTORY    Family History   Adopted: Yes       SOCIAL HISTORY    Social History     Socioeconomic History    Marital status: Legally      Spouse name: None    Number of children: None    Years of education: None    Highest education level: None   Occupational History    None   Tobacco Use    Smoking status: Never Smoker    Smokeless tobacco: Never Used   Vaping Use    Vaping Use: Never used   Substance and Sexual Activity    Alcohol use: No     Alcohol/week: 0.0 standard drinks     Comment: denies    Drug use: No     Comment: denies    Sexual activity: None   Other Topics Concern    None   Social History Narrative    None     Social Determinants of Health     Financial Resource Strain: Low Risk     Difficulty of Paying Living Expenses: Not hard at all   Food Insecurity: No Food Insecurity    Worried About Running Out of Food in the Last Year: Never true    Carlitos of Food in the Last Year: Never true   Transportation Needs:     Lack of Transportation (Medical): Not on file    Lack of Transportation (Non-Medical):  Not on file   Physical Activity:     Days of Exercise per Week: Not on file    Minutes of Exercise per Session: Not on file   Stress:     Feeling of Stress : Not on file   Social Connections:     Frequency of Communication with Friends and Family: Not on file    Frequency of Social Gatherings with Friends and Family: Not on file    Attends Congregation Services: Not on file    Active Member of Clubs or Organizations: Not on file    Attends Club or Organization Meetings: Not on file    Marital Status: Not on file   Intimate Partner Violence:     Fear of Current or Ex-Partner: Not on file    Emotionally Abused: Not on file    Physically Abused: Not on file    Sexually Abused: Not on file   Housing Stability:     Unable to Pay for Housing in the Last Year: Not on file    Number of Places Lived in the Last Year: Not on file    Unstable Housing in the Last Year: Not on file       REVIEW OF SYSTEMS    Constitutional:  Denies fever, chills, weight loss or weakness   Eyes:  Denies photophobia or discharge   HENT:  Denies sore throat or ear pain   Respiratory:  Denies cough or shortness of breath   Cardiovascular:  Denies chest pain, palpitations or swelling   GI:  Denies abdominal pain, nausea, vomiting, or diarrhea   Musculoskeletal:  C/o low back pain   Skin:  Denies rash   Neurologic:  Denies headache, focal weakness or sensory changes   Endocrine:  Denies polyuria or polydypsia   Lymphatic:  Denies swollen glands   Psychiatric:  Denies depression, suicidal ideation or homicidal ideation   All systems negative except as marked. PHYSICAL EXAM    VITAL SIGNS: /74   Pulse 86   Temp 98.2 °F (36.8 °C) (Oral)   Resp 18   Ht 5' 8\" (1.727 m)   Wt 160 lb (72.6 kg)   LMP  (LMP Unknown)   SpO2 99%   BMI 24.33 kg/m²    Constitutional:  Well developed, Well nourished, No acute distress, Non-toxic appearance. HENT:  Normocephalic, Atraumatic, Bilateral external ears normal, Oropharynx moist, No oral exudates, Nose normal. Neck- Normal range of motion, No tenderness, Supple, No stridor. Eyes:  PERRL, EOMI, Conjunctiva normal, No discharge. Respiratory:  Normal breath sounds, No respiratory distress, No wheezing, No chest tenderness. Cardiovascular:  Normal heart rate, Normal rhythm, No murmurs, No rubs, No gallops. GI:  Bowel sounds normal, Soft, No tenderness, No masses, No pulsatile masses. :No CVA tenderness. Musculoskeletal:  Intact distal pulses, No edema, No tenderness, No cyanosis, No clubbing. Good range of motion in all major joints. No tenderness to palpation or major deformities noted.  . Back-lower lumbar tenderness, paravertebral spasm present, SLR positive on the R side at 30 degrees, sensation, power and reflexes in the lower extremity normal.  Integument:  Warm, Dry, No erythema, No rash. Lymphatic:  No lymphadenopathy noted. Neurologic:  Alert & oriented x 3, Normal motor function, Normal sensory function, No focal deficits noted. Psychiatric:  Affect normal, Judgment normal, Mood normal.     REEVALUATION   Reviewed old charts   Pain control adequate , Ambulating         Summation      Patient Course:     ED Medications administered this visit:    Medications   orphenadrine (NORFLEX) injection 60 mg (60 mg IntraMUSCular Given 5/1/22 1343)   HYDROcodone-acetaminophen (NORCO) 5-325 MG per tablet 1 tablet (1 tablet Oral Given 5/1/22 1343)       New Prescriptions from this visit:    Discharge Medication List as of 5/1/2022  3:00 PM      START taking these medications    Details   methocarbamol (ROBAXIN) 500 MG tablet Take 1 tablet by mouth 4 times daily as needed (Muscle spasms), Disp-20 tablet, R-0Print      HYDROcodone-acetaminophen (NORCO) 5-325 MG per tablet Take 1 tablet by mouth every 4 hours as needed for Pain for up to 3 days. Intended supply: 3 days. Take lowest dose possible to manage pain, Disp-12 tablet, R-0Print             Follow-up:  Naye Momin MD  [address] [facility] [address] [place] [address]  [phone]    Call in 1 day          Final Impression:   1.  Acute left-sided back pain with sciatica               (Please note that portions of this note were completed with a voice recognition program.  Efforts were made to edit the dictations but occasionally words are mis-transcribed.)            Petra Solis MD  05/05/22 0827

## 2022-05-23 ENCOUNTER — HOSPITAL ENCOUNTER (EMERGENCY)
Age: 57
Discharge: HOME | End: 2022-05-23
Attending: EMERGENCY MEDICINE | Admitting: EMERGENCY MEDICINE
Payer: MEDICARE

## 2022-05-23 VITALS
TEMPERATURE: 97.3 F | BODY MASS INDEX: 24.25 KG/M2 | HEIGHT: 68 IN | HEART RATE: 84 BPM | WEIGHT: 160 LBS | RESPIRATION RATE: 16 BRPM | OXYGEN SATURATION: 97 % | DIASTOLIC BLOOD PRESSURE: 65 MMHG | SYSTOLIC BLOOD PRESSURE: 125 MMHG

## 2022-05-23 DIAGNOSIS — M54.42 ACUTE LEFT-SIDED BACK PAIN WITH SCIATICA: Primary | ICD-10-CM

## 2022-05-23 PROCEDURE — 96372 THER/PROPH/DIAG INJ SC/IM: CPT

## 2022-05-23 PROCEDURE — 99284 EMERGENCY DEPT VISIT MOD MDM: CPT

## 2022-05-23 PROCEDURE — 6360000002 HC RX W HCPCS: Performed by: EMERGENCY MEDICINE

## 2022-05-23 RX ORDER — ORPHENADRINE CITRATE 30 MG/ML
60 INJECTION INTRAMUSCULAR; INTRAVENOUS ONCE
Status: COMPLETED | OUTPATIENT
Start: 2022-05-23 | End: 2022-05-23

## 2022-05-23 RX ORDER — PANTOPRAZOLE SODIUM 40 MG/1
40 GRANULE, DELAYED RELEASE ORAL
COMMUNITY
End: 2022-06-09 | Stop reason: ALTCHOICE

## 2022-05-23 RX ORDER — METHOCARBAMOL 500 MG/1
500 TABLET, FILM COATED ORAL 4 TIMES DAILY PRN
Qty: 20 TABLET | Refills: 0 | Status: SHIPPED | OUTPATIENT
Start: 2022-05-23 | End: 2022-05-28

## 2022-05-23 RX ORDER — MORPHINE SULFATE 4 MG/ML
4 INJECTION, SOLUTION INTRAMUSCULAR; INTRAVENOUS ONCE
Status: COMPLETED | OUTPATIENT
Start: 2022-05-23 | End: 2022-05-23

## 2022-05-23 RX ADMIN — ORPHENADRINE CITRATE 60 MG: 30 INJECTION INTRAMUSCULAR; INTRAVENOUS at 19:19

## 2022-05-23 RX ADMIN — MORPHINE SULFATE 4 MG: 4 INJECTION, SOLUTION INTRAMUSCULAR; INTRAVENOUS at 19:19

## 2022-05-23 ASSESSMENT — PAIN DESCRIPTION - ONSET: ONSET: ON-GOING

## 2022-05-23 ASSESSMENT — PAIN DESCRIPTION - FREQUENCY: FREQUENCY: CONTINUOUS

## 2022-05-23 ASSESSMENT — PAIN DESCRIPTION - ORIENTATION: ORIENTATION: LEFT

## 2022-05-23 ASSESSMENT — PAIN DESCRIPTION - PAIN TYPE: TYPE: CHRONIC PAIN

## 2022-05-23 ASSESSMENT — PAIN DESCRIPTION - DESCRIPTORS: DESCRIPTORS: BURNING

## 2022-05-23 ASSESSMENT — PAIN SCALES - GENERAL: PAINLEVEL_OUTOF10: 10

## 2022-05-23 ASSESSMENT — PAIN - FUNCTIONAL ASSESSMENT: PAIN_FUNCTIONAL_ASSESSMENT: 0-10

## 2022-05-29 NOTE — ED PROVIDER NOTES
eMERGENCY dEPARTMENT eNCOUnter      [address]    Chief Complaint   Patient presents with    Back Pain     sciatic nerve pain down left leg. pt has a history of this. STEPHANIE Chambers is a 49 y.o. female with hx of chronic back pain , Anxiety, Depression ,GERD, Kidney stones, Lupus , Migraine who presentsto ED from home   By private car   With complaint of low back pain moderate in intensity with radiation to left thigh   Onset few days   She denies any injury . Patient denies weakness or numbness in lower extremity , denies bowel bladder incontinence. She took her pain med's with minimal relief.      PAST MEDICAL HISTORY    Past Medical History:   Diagnosis Date    Anxiety     Asthma     as child    Depression     Depression     dysthymia - following lupus dx age 23    Diabetes mellitus ([facility] [address]     Foot fracture, left     GERD (gastroesophageal reflux disease)     Hyperlipidemia     Hypothyroidism     Kidney stones     Lupus ([facility] [address]     dr barrera -     Migraine     PONV (postoperative nausea and vomiting)     Renal stone 4/25/2021    Sciatica     left sided       SURGICAL HISTORY    Past Surgical History:   Procedure Laterality Date    APPENDECTOMY      ARTHRODESIS Left 11/26/2019    ARTHRODESIS OF THE FIRST METATARSAL PHALANGEAL JOINT LEFT FOOT performed by Octaviano Muro DPM at [facility] N/A 4/26/2021    CYSTOSCOPY RIGHT DOUBLE J STENT PLACEMENT. performed by Donato Burrows MD at [facility] COLONOSCOPY      ENDOSCOPY, COLON, DIAGNOSTIC      FOOT CLOSED REDUCTION Left 1/3/2017    LEFT FOOT PERCUTANEOUS PINNING Wilda Jamil performed by Woodrow Connors MD at [facility] [place] LITHOTRIPSY Right 5/5/2021    RIGHT ESWL performed by Donato Burrows MD at [facility] ESOPHAGOGASTRODUODENOSCOPY TRANSORAL DIAGNOSTIC N/A 3/8/2017    EGD ESOPHAGOGASTRODUODENOSCOPY WITH DILATION performed by Shaina Villanueva MD at [address] ESOPHAGOGASTRODUODENOSCOPY TRANSORAL DIAGNOSTIC N/A 11/7/2018    EGD ESOPHAGOGASTRODUODENOSCOPY WITH DILATION performed by Shaina Villanueva MD at [address] [facility] GASTROINTESTINAL ENDOSCOPY N/A 8/17/2021    EGD WITH BIOPSY AND POLYPECTOMY performed by Hung Hui MD at [address] [place] [address]    Current Outpatient Rx   Medication Sig Dispense Refill    pantoprazole sodium (PROTONIX) 40 MG PACK packet Take 40 mg by mouth every morning (before breakfast)      Insulin Disposable Pump (OMNIPOD DASH PODS, GEN 4,) MISC Change every 48 hrs 15 each 3    Insulin Aspart, w/Niacinamide, (FIASP) 100 UNIT/ML SOLN Use via pump max dose 150 units/day 50 mL 3    insulin aspart (NOVOLOG) 100 UNIT/ML injection vial Use via insulin pump max daily dose 100 units daily. E11.65 30 mL 3    ondansetron (ZOFRAN ODT) 4 MG disintegrating tablet Take 1 tablet by mouth every 8 hours as needed for Nausea or Vomiting (Patient not taking: Reported on 5/23/2022) 12 tablet 0    dicyclomine (BENTYL) 20 MG tablet Take 1 tablet by mouth 4 times daily (Patient not taking: Reported on 4/30/2022) 12 tablet 0    clonazePAM (KLONOPIN) 0.5 MG tablet TAKE 1 TABLET DAILY AS NEEDED      busPIRone (BUSPAR) 5 MG tablet  (Patient not taking: Reported on 4/30/2022)      SEROQUEL 25 MG tablet  (Patient not taking: Reported on 4/30/2022)      rosuvastatin (CRESTOR) 20 MG tablet TAKE 1 TABLET DAILY.       insulin glargine (LANTUS SOLOSTAR) 100 UNIT/ML injection pen 160  units at bedtime 30 pen 3    insulin aspart (NOVOLOG FLEXPEN) 100 UNIT/ML injection pen Inject 60 units tid with meals 15 pen 3    Dulaglutide (TRULICITY) 8.46 IN/2.0TZ SOPN Inject 0.75 mg into the skin once a week (Patient not taking: Reported on 4/30/2022) 4 pen 2    Insulin Pen Needle (PEN NEEDLES) 33G X 4 MM MISC 1 each by Does not apply route three times daily 100 each 2    oxyCODONE-acetaminophen (PERCOCET) 5-325 MG per tablet TAKE 1 TABLET Every 8 hours as needed for MODERATE TO SEVERE PAIN. PLEASE WATCH OUT FOR SETATION AND FATIGUE.  tiZANidine (ZANAFLEX) 4 MG tablet TAKE 1 TABLET 3 TIMES DAILY as needed. (Patient not taking: Reported on 3/9/2022)      pregabalin (LYRICA) 75 MG capsule Take 75 mg by mouth daily. (Patient not taking: Reported on 4/30/2022)      DULoxetine (CYMBALTA) 30 MG extended release capsule TAKE 1 CAPSULE BY MOUTH EVERY DAY (Patient not taking: Reported on 3/9/2022)      esomeprazole (NEXIUM) 40 MG delayed release capsule Take 1 capsule by mouth daily (Patient not taking: Reported on 3/9/2022) 30 capsule 3    Drug [place] Unilet Lancets 30G MISC Check blood sugars twice daily or as directed. (Patient not taking: Reported on 3/9/2022) 200 each 3    blood glucose monitor kit and supplies Please give 1 meter covered by insurance Dx E11.65 1 kit 0    Lancets MISC Pt test 3x daily Dx E11.65 100 each 3    blood glucose test strips (ASCENSIA AUTODISC VI;ONE TOUCH ULTRA TEST VI) strip Check blood sugars twice daily or as directed.  (Patient not taking: Reported on 5/23/2022) 200 strip 3    estradiol (CLIMARA) 0.025 MG/24HR Place 0.025 patches onto the skin every 7 days On mondays 11    albuterol sulfate HFA (VENTOLIN HFA) 108 (90 Base) MCG/ACT inhaler Inhale 2 puffs into the lungs every 6 hours as needed for Wheezing 1 Inhaler 0    levothyroxine (SYNTHROID) 50 MCG tablet Take 1 tablet by mouth daily 90 tablet 3    hydroxychloroquine (PLAQUENIL) 200 MG tablet Take 1 tablet by mouth 2 times daily  5       ALLERGIES    Allergies   Allergen Reactions    Hm Lidocaine Patch [Lidocaine] Rash     Patient states allergic    Aspirin Swelling     Bloody noses    Cephalosporins     Imitrex [Sumatriptan] Hives    Pcn [Penicillins] Swelling    Seasonal Itching     Itch, sneezing,runny nose, watery eyes    Toradol [Ketorolac Tromethamine] Hives     Shortness of breath    Ultram [Tramadol] Hives       FAMILY HISTORY    Family History   Adopted: Yes       SOCIAL HISTORY    Social History     Socioeconomic History    Marital status: Legally      Spouse name: None    Number of children: None    Years of education: None    Highest education level: None   Occupational History    None   Tobacco Use    Smoking status: Never Smoker    Smokeless tobacco: Never Used   Vaping Use    Vaping Use: Never used   Substance and Sexual Activity    Alcohol use: No     Alcohol/week: 0.0 standard drinks     Comment: denies    Drug use: No     Comment: denies    Sexual activity: None   Other Topics Concern    None   Social History Narrative    None     Social Determinants of Health     Financial Resource Strain: Low Risk     Difficulty of Paying Living Expenses: Not hard at all   Food Insecurity: No Food Insecurity    Worried About Running Out of Food in the Last Year: Never true    Carlitos of Food in the Last Year: Never true   Transportation Needs:     Lack of Transportation (Medical): Not on file    Lack of Transportation (Non-Medical):  Not on file   Physical Activity:     Days of Exercise per Week: Not on file    Minutes of Exercise per Session: Not on file   Stress:     Feeling of Stress : Not on file   Social Connections:     Frequency of Communication with Friends and Family: Not on file    Frequency of Social Gatherings with Friends and Family: Not on file    Attends Latter day Services: Not on file    Active Member of Clubs or Organizations: Not on file    Attends Club or Organization Meetings: Not on file    Marital Status: Not on file   Intimate Partner Violence:     Fear of Current or Ex-Partner: Not on file    Emotionally Abused: Not on file    Physically Abused: Not on file    Sexually Abused: Not on file   Housing Stability:  Unable to Pay for Housing in the Last Year: Not on file    Number of Places Lived in the Last Year: Not on file    Unstable Housing in the Last Year: Not on file       REVIEW OF SYSTEMS    Constitutional:  Denies fever, chills, weight loss or weakness   Eyes:  Denies photophobia or discharge   HENT:  Denies sore throat or ear pain   Respiratory:  Denies cough or shortness of breath   Cardiovascular:  Denies chest pain, palpitations or swelling   GI:  Denies abdominal pain, nausea, vomiting, or diarrhea   Musculoskeletal:  C/o low back pain   Skin:  Denies rash   Neurologic:  Denies headache, focal weakness or sensory changes   Endocrine:  Denies polyuria or polydypsia   Lymphatic:  Denies swollen glands   Psychiatric:  Denies depression, suicidal ideation or homicidal ideation   All systems negative except as marked. PHYSICAL EXAM    VITAL SIGNS: /65   Pulse 84   Temp 97.3 °F (36.3 °C) (Temporal)   Resp 16   Ht 5' 8\" (1.727 m)   Wt 160 lb (72.6 kg)   LMP  (LMP Unknown)   SpO2 97%   BMI 24.33 kg/m²    Constitutional:  Well developed, Well nourished, No acute distress, Non-toxic appearance. HENT:  Normocephalic, Atraumatic, Bilateral external ears normal, Oropharynx moist, No oral exudates, Nose normal. Neck- Normal range of motion, No tenderness, Supple, No stridor. Eyes:  PERRL, EOMI, Conjunctiva normal, No discharge. Respiratory:  Normal breath sounds, No respiratory distress, No wheezing, No chest tenderness. Cardiovascular:  Normal heart rate, Normal rhythm, No murmurs, No rubs, No gallops. GI:  Bowel sounds normal, Soft, No tenderness, No masses, No pulsatile masses. :  No CVA tenderness. Musculoskeletal:  Intact distal pulses, No edema, No tenderness, No cyanosis, No clubbing. Good range of motion in all major joints. No tenderness to palpation or major deformities noted.   Back-lower lumbar tenderness, paravertebral spasm present, SLR positive on the left side at 30 degrees, sensation, power and reflexes in the lower extremity normal.  Integument:  Warm, Dry, No erythema, No rash. Lymphatic:  No lymphadenopathy noted. Neurologic:  Alert & oriented x 3, Normal motor function, Normal sensory function, No focal deficits noted. Psychiatric:  Affect normal, Judgment normal, Mood normal.         REEVALUATION   Pain control adequate   Patient was given a referral to pain management           Summation      Patient Course:     ED Medications administered this visit:    Medications   orphenadrine (NORFLEX) injection 60 mg (60 mg IntraMUSCular Given 5/23/22 1919)   morphine sulfate (PF) injection 4 mg (4 mg IntraMUSCular Given 5/23/22 1919)       New Prescriptions from this visit:    Discharge Medication List as of 5/23/2022  7:25 PM      START taking these medications    Details   methocarbamol (ROBAXIN) 500 MG tablet Take 1 tablet by mouth 4 times daily as needed (Muscle spasms), Disp-20 tablet, R-0Print             Follow-up:  John Bass MD  [address]  [phone]    Call in 1 day      Alessio Lock MD  [place] [address] [phone]    Call in 1 day          Final Impression:   1.  Acute left-sided back pain with sciatica               (Please note that portions of this note were completed with a voice recognition program.  Efforts were made to edit the dictations but occasionally words are mis-transcribed.)            Crystal Maddox MD  05/29/22 5136

## 2022-05-31 ENCOUNTER — HOSPITAL ENCOUNTER (OUTPATIENT)
Dept: LAB | Age: 57
Discharge: HOME | End: 2022-05-31
Payer: MEDICARE

## 2022-05-31 DIAGNOSIS — E11.69 TYPE 2 DIABETES MELLITUS WITH OTHER SPECIFIED COMPLICATION, UNSPECIFIED WHETHER LONG TERM INSULIN USE (HCC): ICD-10-CM

## 2022-05-31 LAB
ANION GAP SERPL CALCULATED.3IONS-SCNC: 16 MEQ/L (ref 9–15)
BUN BLDV-MCNC: 13 MG/DL (ref 6–20)
CALCIUM SERPL-MCNC: 8.9 MG/DL (ref 8.5–9.9)
CHLORIDE BLD-SCNC: 101 MEQ/L (ref 95–107)
CO2: 21 MEQ/L (ref 20–31)
CREAT SERPL-MCNC: 1.17 MG/DL (ref 0.5–0.9)
GFR AFRICAN AMERICAN: 58.2
GFR NON-AFRICAN AMERICAN: 48.1
GLUCOSE BLD-MCNC: 137 MG/DL (ref 70–99)
HBA1C MFR BLD: 7.9 % (ref 4.8–5.9)
POTASSIUM SERPL-SCNC: 3.7 MEQ/L (ref 3.4–4.9)
SODIUM BLD-SCNC: 138 MEQ/L (ref 135–144)

## 2022-05-31 PROCEDURE — 36415 COLL VENOUS BLD VENIPUNCTURE: CPT

## 2022-05-31 PROCEDURE — 83036 HEMOGLOBIN GLYCOSYLATED A1C: CPT

## 2022-05-31 PROCEDURE — 80048 BASIC METABOLIC PNL TOTAL CA: CPT

## 2022-06-09 ENCOUNTER — OFFICE VISIT (OUTPATIENT)
Dept: ENDOCRINOLOGY | Age: 57
End: 2022-06-09
Payer: MEDICARE

## 2022-06-09 VITALS
SYSTOLIC BLOOD PRESSURE: 98 MMHG | WEIGHT: 178 LBS | DIASTOLIC BLOOD PRESSURE: 62 MMHG | HEIGHT: 68 IN | BODY MASS INDEX: 26.98 KG/M2 | OXYGEN SATURATION: 96 % | HEART RATE: 81 BPM

## 2022-06-09 DIAGNOSIS — R61 HYPERHIDROSIS: ICD-10-CM

## 2022-06-09 DIAGNOSIS — Z96.41 INSULIN PUMP IN PLACE: ICD-10-CM

## 2022-06-09 DIAGNOSIS — L03.115 CELLULITIS OF RIGHT LOWER EXTREMITY: ICD-10-CM

## 2022-06-09 DIAGNOSIS — E11.65 UNCONTROLLED TYPE 2 DIABETES MELLITUS WITH HYPERGLYCEMIA (HCC): Primary | ICD-10-CM

## 2022-06-09 LAB
CHP ED QC CHECK: NORMAL
GLUCOSE BLD-MCNC: 260 MG/DL

## 2022-06-09 PROCEDURE — 99214 OFFICE O/P EST MOD 30 MIN: CPT | Performed by: INTERNAL MEDICINE

## 2022-06-09 PROCEDURE — 3017F COLORECTAL CA SCREEN DOC REV: CPT | Performed by: INTERNAL MEDICINE

## 2022-06-09 PROCEDURE — G8427 DOCREV CUR MEDS BY ELIG CLIN: HCPCS | Performed by: INTERNAL MEDICINE

## 2022-06-09 PROCEDURE — G8419 CALC BMI OUT NRM PARAM NOF/U: HCPCS | Performed by: INTERNAL MEDICINE

## 2022-06-09 PROCEDURE — 82962 GLUCOSE BLOOD TEST: CPT | Performed by: INTERNAL MEDICINE

## 2022-06-09 PROCEDURE — 3051F HG A1C>EQUAL 7.0%<8.0%: CPT | Performed by: INTERNAL MEDICINE

## 2022-06-09 PROCEDURE — 1036F TOBACCO NON-USER: CPT | Performed by: INTERNAL MEDICINE

## 2022-06-09 PROCEDURE — 2022F DILAT RTA XM EVC RTNOPTHY: CPT | Performed by: INTERNAL MEDICINE

## 2022-06-09 RX ORDER — PROPRANOLOL HYDROCHLORIDE 120 MG/1
CAPSULE, EXTENDED RELEASE ORAL
COMMUNITY
Start: 2022-06-01

## 2022-06-09 RX ORDER — SULFAMETHOXAZOLE AND TRIMETHOPRIM 800; 160 MG/1; MG/1
1 TABLET ORAL 2 TIMES DAILY
Qty: 20 TABLET | Refills: 1 | Status: SHIPPED | OUTPATIENT
Start: 2022-06-09 | End: 2022-06-19

## 2022-06-09 RX ORDER — GLYCOPYRROLATE 1 MG/1
1 TABLET ORAL 2 TIMES DAILY
Qty: 60 TABLET | Refills: 3 | Status: SHIPPED | OUTPATIENT
Start: 2022-06-09 | End: 2022-09-08 | Stop reason: SDUPTHER

## 2022-06-09 ASSESSMENT — ENCOUNTER SYMPTOMS: EYES NEGATIVE: 1

## 2022-06-09 NOTE — PROGRESS NOTES
6/9/2022    Assessment:       Diagnosis Orders   1. Uncontrolled type 2 diabetes mellitus with hyperglycemia (HCC)  Hemoglobin Z3H    Basic Metabolic Panel    POCT Glucose   2. Hyperhidrosis     3. Cellulitis of right lower extremity     4.  Insulin pump in place           PLAN:     Orders Placed This Encounter   Medications    glycopyrrolate (ROBINUL) 1 MG tablet     Sig: Take 1 tablet by mouth 2 times daily     Dispense:  60 tablet     Refill:  3    sulfamethoxazole-trimethoprim (BACTRIM DS;SEPTRA DS) 800-160 MG per tablet     Sig: Take 1 tablet by mouth 2 times daily for 10 days     Dispense:  20 tablet     Refill:  1     Start patient on Robinul side effect discussed  Also start on Bactrim  Advised patient not soak foot in water  Continue insulin pump as per current setting to bring PDM  A1c goal of 7 or lower  More than 50% of 30-minute spent patient education,  Orders Placed This Encounter   Procedures    Hemoglobin A1C     Standing Status:   Future     Standing Expiration Date:   6/9/2023    Basic Metabolic Panel     Standing Status:   Future     Standing Expiration Date:   6/9/2023    POCT Glucose         Orders Placed This Encounter   Procedures    POCT Glucose       Subjective:     Chief Complaint   Patient presents with    Diabetes     Vitals:    06/09/22 0931   BP: 98/62   Site: Left Upper Arm   Position: Sitting   Cuff Size: Medium Adult   Pulse: 81   SpO2: 96%   Weight: 178 lb (80.7 kg)   Height: 5' 8\" (1.727 m)     Wt Readings from Last 3 Encounters:   06/09/22 178 lb (80.7 kg)   05/23/22 160 lb (72.6 kg)   05/01/22 160 lb (72.6 kg)     BP Readings from Last 3 Encounters:   06/09/22 98/62   05/23/22 125/65   05/01/22 129/74     Follow-up on type 2 diabetes recently started on Omni pod insulin pump using Fiasp A1c has improved to 7.9 labs reviewed did not bring her PDM pump settings were reviewed from orders patient is manually putting the blood sugars overall blood sugars close to 180 blood glucose was high today 260  Labs also reflect increased creatinine denies taking NSAIDs  In addition to above complaints of excessive sweating related to hypoglycemia  Seen podiatrist recently had toenails clipped right fourth toe there is some drainage    Diabetes  She presents for her follow-up diabetic visit. She has type 2 diabetes mellitus. Pertinent negatives for diabetes include no polydipsia and no polyuria. Symptoms are improving. Diabetic complications include nephropathy. Current diabetic treatment includes insulin pump. Her overall blood glucose range is 180-200 mg/dl.  (Lab Results       Component                Value               Date                       LABA1C                   7.9 (H)             05/31/2022              Reviewed pump settings basal rate 2 units/h carb ratio was 8 correction factor was 15  Insulin duration was 4 hours)     Past Medical History:   Diagnosis Date    Anxiety     Asthma     as child    Depression     Depression     dysthymia - following lupus dx age 23    Diabetes mellitus ([address]     Foot fracture, left     GERD (gastroesophageal reflux disease)     Hyperlipidemia     Hypothyroidism     Kidney stones     [facility] [address] [address]     dr barrera -     Migraine     PONV (postoperative nausea and vomiting)     Renal stone 4/25/2021    Sciatica     left sided     Past Surgical History:   Procedure Laterality Date    APPENDECTOMY      ARTHRODESIS Left 11/26/2019    ARTHRODESIS OF THE FIRST METATARSAL PHALANGEAL JOINT LEFT FOOT performed by Cristina Bangura DPM at [address] [facility] N/A 4/26/2021    CYSTOSCOPY RIGHT DOUBLE J STENT PLACEMENT. performed by Jennifer Maddox MD at [place] [address]      COLONOSCOPY      ENDOSCOPY, COLON, DIAGNOSTIC      FOOT CLOSED REDUCTION Left 1/3/2017    LEFT FOOT PERCUTANEOUS PINNING MOTA FRACTURE performed by Ailyn Lloyd MD at [facility] HYSTERECTOMY (CERVIX STATUS UNKNOWN)      LITHOTRIPSY Right 5/5/2021    RIGHT ESWL performed by Mary Grace Dean MD at [address] [facility] ESOPHAGOGASTRODUODENOSCOPY TRANSORAL DIAGNOSTIC N/A 3/8/2017    EGD ESOPHAGOGASTRODUODENOSCOPY WITH DILATION performed by Valdemar Blount MD at [address] ESOPHAGOGASTRODUODENOSCOPY TRANSORAL DIAGNOSTIC N/A 11/7/2018    EGD ESOPHAGOGASTRODUODENOSCOPY WITH DILATION performed by Valdemar Blount MD at [facility] ENDOSCOPY      UPPER GASTROINTESTINAL ENDOSCOPY N/A 8/17/2021    EGD WITH BIOPSY AND POLYPECTOMY performed by Vasile Mistry MD at [address] History     Socioeconomic History    Marital status: Legally      Spouse name: Not on file    Number of children: Not on file    Years of education: Not on file    Highest education level: Not on file   Occupational History    Not on file   Tobacco Use    Smoking status: Never Smoker    Smokeless tobacco: Never Used   Vaping Use    Vaping Use: Never used   Substance and Sexual Activity    Alcohol use: No     Alcohol/week: 0.0 standard drinks     Comment: denies    Drug use: No     Comment: denies    Sexual activity: Not on file   Other Topics Concern    Not on file   Social History Narrative    Not on file     Social Determinants of Health     Financial Resource Strain: Low Risk     Difficulty of Paying Living Expenses: Not hard at all   Food Insecurity: No Food Insecurity    Worried About Running Out of Food in the Last Year: Never true    Carlitos of Food in the Last Year: Never true   Transportation Needs:     Lack of Transportation (Medical): Not on file    Lack of Transportation (Non-Medical):  Not on file   Physical Activity:     Days of Exercise per Week: Not on file    Minutes of Exercise per Session: Not on file   Stress:     Feeling of Stress : Not on file   Social Connections:  Frequency of Communication with Friends and Family: Not on file    Frequency of Social Gatherings with Friends and Family: Not on file    Attends Baptism Services: Not on file    Active Member of Clubs or Organizations: Not on file    Attends Club or Organization Meetings: Not on file    Marital Status: Not on file   Intimate Partner Violence:     Fear of Current or Ex-Partner: Not on file    Emotionally Abused: Not on file    Physically Abused: Not on file    Sexually Abused: Not on file   Housing Stability:     Unable to Pay for Housing in the Last Year: Not on file    Number of [place] in the Last Year: Not on file    Unstable Housing in the Last Year: Not on file     Family History   Adopted: Yes     Allergies   Allergen Reactions    Hm Lidocaine Patch [Lidocaine] Rash     Patient states allergic    Aspirin Swelling     Bloody noses    Cephalosporins     Imitrex [Sumatriptan] Hives    Pcn [Penicillins] Swelling    Seasonal Itching     Itch, sneezing,runny nose, watery eyes    Toradol [Ketorolac Tromethamine] Hives     Shortness of breath    Ultram [Tramadol] Hives       Current Outpatient Medications:     propranolol (INDERAL LA) 120 MG extended release capsule, TAKE 2 CAPSULES BY MOUTH EVERY DAY, Disp: , Rfl:     Insulin Disposable Pump (OMNIPOD DASH PODS, GEN 4,) MISC, Change every 48 hrs, Disp: 15 each, Rfl: 3    Insulin Aspart, w/Niacinamide, (FIASP) 100 UNIT/ML SOLN, Use via pump max dose 150 units/day, Disp: 50 mL, Rfl: 3    clonazePAM (KLONOPIN) 0.5 MG tablet, TAKE 1 TABLET DAILY AS NEEDED, Disp: , Rfl:     busPIRone (BUSPAR) 5 MG tablet, , Disp: , Rfl:     rosuvastatin (CRESTOR) 20 MG tablet, TAKE 1 TABLET DAILY. , Disp: , Rfl:     tiZANidine (ZANAFLEX) 4 MG tablet, TAKE 1 TABLET 3 TIMES DAILY as needed. , Disp: , Rfl:     esomeprazole (NEXIUM) 40 MG delayed release capsule, Take 1 capsule by mouth daily, Disp: 30 capsule, Rfl: 3    Drug [employer] Lancets 30G MISC, Check blood sugars twice daily or as directed., Disp: 200 each, Rfl: 3    blood glucose monitor kit and supplies, Please give 1 meter covered by insurance Dx E11.65, Disp: 1 kit, Rfl: 0    Lancets MISC, Pt test 3x daily Dx E11.65, Disp: 100 each, Rfl: 3    blood glucose test strips (ASCENSIA AUTODISC VI;ONE TOUCH ULTRA TEST VI) strip, Check blood sugars twice daily or as directed., Disp: 200 strip, Rfl: 3    estradiol (CLIMARA) 0.025 MG/24HR, Place 0.025 patches onto the skin every 7 days On mondays, Disp: , Rfl: 11    albuterol sulfate HFA (VENTOLIN HFA) 108 (90 Base) MCG/ACT inhaler, Inhale 2 puffs into the lungs every 6 hours as needed for Wheezing, Disp: 1 Inhaler, Rfl: 0    levothyroxine (SYNTHROID) 50 MCG tablet, Take 1 tablet by mouth daily, Disp: 90 tablet, Rfl: 3    hydroxychloroquine (PLAQUENIL) 200 MG tablet, Take 1 tablet by mouth 2 times daily, Disp: , Rfl: 5  Lab Results   Component Value Date     05/31/2022    K 3.7 05/31/2022     05/31/2022    CO2 21 05/31/2022    BUN 13 05/31/2022    CREATININE 1.17 (H) 05/31/2022    GLUCOSE 260 06/09/2022    CALCIUM 8.9 05/31/2022    PROT 6.8 03/26/2022    LABALBU 3.8 03/26/2022    BILITOT <0.2 03/26/2022    ALKPHOS 118 03/26/2022    AST 26 03/26/2022    ALT 18 03/26/2022    LABGLOM 48.1 (L) 05/31/2022    GFRAA 58.2 (L) 05/31/2022    GLOB 3.0 03/26/2022     Lab Results   Component Value Date    WBC 11.1 (H) 03/26/2022    HGB 13.3 03/26/2022    HCT 40.6 03/26/2022    MCV 87.1 03/26/2022     03/26/2022     Lab Results   Component Value Date    LABA1C 7.9 (H) 05/31/2022    LABA1C 9.3 (H) 02/21/2022    LABA1C 8.3 (H) 11/04/2021     Lab Results   Component Value Date    CHOLFAST 346 (H) 05/20/2021    TRIGLYCFAST 340 (H) 05/20/2021    HDL 42 05/20/2021    HDL 73 (H) 08/02/2017    HDL 92 (H) 05/12/2017    LDLCALC 236 (H) 05/20/2021    LDLCALC 56 08/02/2017    LDLCALC 32 05/12/2017    CHOL 182 08/02/2017    CHOL 189 05/12/2017    CHOL 279 (H) 08/29/2016    TRIG 264 (H) 08/02/2017    TRIG 323 (H) 05/12/2017    TRIG 348 (H) 08/29/2016     No results found for: TESTM  Lab Results   Component Value Date    TSH 0.741 10/11/2021    TSH 2.500 05/12/2017    TSH 1.650 06/27/2016    TSHREFLEX 0.819 11/04/2019     No results found for: TPOABS    Review of Systems   Constitutional:        Excessive sweating mostly head and neck   Eyes: Negative. Cardiovascular: Negative. Endocrine: Negative for polydipsia and polyuria. Musculoskeletal: Positive for arthralgias. Neurological: Negative. All other systems reviewed and are negative. Objective:   Physical Exam  Vitals reviewed. Constitutional:       General: She is not in acute distress. Appearance: Normal appearance. She is obese. HENT:      Head: Normocephalic and atraumatic. Right Ear: External ear normal.      Left Ear: External ear normal.      Nose: Nose normal.   Eyes:      General: No scleral icterus. Right eye: No discharge. Left eye: No discharge. Extraocular Movements: Extraocular movements intact. Conjunctiva/sclera: Conjunctivae normal.   Cardiovascular:      Rate and Rhythm: Normal rate. Pulmonary:      Effort: Pulmonary effort is normal.   Musculoskeletal:         General: Normal range of motion. Cervical back: Normal range of motion and neck supple. Feet:    Neurological:      General: No focal deficit present. Mental Status: She is alert and oriented to person, place, and time.    Psychiatric:         Mood and Affect: Mood normal.         Behavior: Behavior normal.

## 2022-06-13 ENCOUNTER — TELEPHONE (OUTPATIENT)
Dept: ENDOCRINOLOGY | Age: 57
End: 2022-06-13

## 2022-06-13 RX ORDER — FLUCONAZOLE 150 MG/1
150 TABLET ORAL DAILY
Qty: 3 TABLET | Refills: 0 | Status: SHIPPED | OUTPATIENT
Start: 2022-06-13 | End: 2022-06-16

## 2022-06-13 NOTE — TELEPHONE ENCOUNTER
You had sent in an rx for an antibiotic last week. Pt sometimes has problems with yeast infections when she takes these.   She wants to know if you can send in a Rx for diflucan as a preventative

## 2022-07-21 ENCOUNTER — APPOINTMENT (OUTPATIENT)
Dept: CT IMAGING | Age: 57
End: 2022-07-21
Payer: MEDICARE

## 2022-07-21 ENCOUNTER — HOSPITAL ENCOUNTER (EMERGENCY)
Age: 57
Discharge: HOME | End: 2022-07-21
Attending: EMERGENCY MEDICINE
Payer: MEDICARE

## 2022-07-21 VITALS
HEART RATE: 67 BPM | SYSTOLIC BLOOD PRESSURE: 113 MMHG | TEMPERATURE: 98.6 F | HEIGHT: 68 IN | OXYGEN SATURATION: 95 % | BODY MASS INDEX: 25.01 KG/M2 | DIASTOLIC BLOOD PRESSURE: 69 MMHG | WEIGHT: 165 LBS | RESPIRATION RATE: 19 BRPM

## 2022-07-21 DIAGNOSIS — R10.9 FLANK PAIN: Primary | ICD-10-CM

## 2022-07-21 DIAGNOSIS — M32.9 LUPUS (HCC): ICD-10-CM

## 2022-07-21 DIAGNOSIS — N30.00 ACUTE CYSTITIS WITHOUT HEMATURIA: ICD-10-CM

## 2022-07-21 LAB
ALBUMIN SERPL-MCNC: 4 G/DL (ref 3.5–4.6)
ALP BLD-CCNC: 117 U/L (ref 40–130)
ALT SERPL-CCNC: 24 U/L (ref 0–33)
ANION GAP SERPL CALCULATED.3IONS-SCNC: 10 MEQ/L (ref 9–15)
AST SERPL-CCNC: 16 U/L (ref 0–35)
BACTERIA: NEGATIVE /HPF
BASOPHILS ABSOLUTE: 0.1 K/UL (ref 0–0.2)
BASOPHILS RELATIVE PERCENT: 0.8 %
BILIRUB SERPL-MCNC: 0.3 MG/DL (ref 0.2–0.7)
BILIRUBIN URINE: NEGATIVE
BLOOD, URINE: NEGATIVE
BUN BLDV-MCNC: 14 MG/DL (ref 6–20)
CALCIUM SERPL-MCNC: 9.1 MG/DL (ref 8.5–9.9)
CHLORIDE BLD-SCNC: 104 MEQ/L (ref 95–107)
CHP ED QC CHECK: NORMAL
CLARITY: CLEAR
CO2: 27 MEQ/L (ref 20–31)
COLOR: YELLOW
CREAT SERPL-MCNC: 1.17 MG/DL (ref 0.5–0.9)
EOSINOPHILS ABSOLUTE: 0.1 K/UL (ref 0–0.7)
EOSINOPHILS RELATIVE PERCENT: 0.7 %
EPITHELIAL CELLS, UA: ABNORMAL /HPF (ref 0–5)
GFR AFRICAN AMERICAN: 58.1
GFR NON-AFRICAN AMERICAN: 48.1
GLOBULIN: 3.1 G/DL (ref 2.3–3.5)
GLUCOSE BLD-MCNC: 43 MG/DL (ref 70–99)
GLUCOSE BLD-MCNC: 61 MG/DL
GLUCOSE BLD-MCNC: 61 MG/DL (ref 70–99)
GLUCOSE URINE: NEGATIVE MG/DL
HCT VFR BLD CALC: 44.9 % (ref 37–47)
HEMOGLOBIN: 15.1 G/DL (ref 12–16)
HYALINE CASTS: ABNORMAL /HPF (ref 0–5)
KETONES, URINE: NEGATIVE MG/DL
LEUKOCYTE ESTERASE, URINE: ABNORMAL
LYMPHOCYTES ABSOLUTE: 3.1 K/UL (ref 1–4.8)
LYMPHOCYTES RELATIVE PERCENT: 19.2 %
MCH RBC QN AUTO: 29.2 PG (ref 27–31.3)
MCHC RBC AUTO-ENTMCNC: 33.7 % (ref 33–37)
MCV RBC AUTO: 86.8 FL (ref 82–100)
MONOCYTES ABSOLUTE: 1.1 K/UL (ref 0.2–0.8)
MONOCYTES RELATIVE PERCENT: 6.8 %
NEUTROPHILS ABSOLUTE: 11.6 K/UL (ref 1.4–6.5)
NEUTROPHILS RELATIVE PERCENT: 72.5 %
NITRITE, URINE: NEGATIVE
PDW BLD-RTO: 14.7 % (ref 11.5–14.5)
PERFORMED ON: ABNORMAL
PH UA: 6 (ref 5–9)
PLATELET # BLD: 349 K/UL (ref 130–400)
POTASSIUM SERPL-SCNC: 4.2 MEQ/L (ref 3.4–4.9)
PROTEIN UA: NEGATIVE MG/DL
RBC # BLD: 5.18 M/UL (ref 4.2–5.4)
RBC UA: ABNORMAL /HPF (ref 0–5)
SODIUM BLD-SCNC: 141 MEQ/L (ref 135–144)
SPECIFIC GRAVITY UA: 1.02 (ref 1–1.03)
TOTAL PROTEIN: 7.1 G/DL (ref 6.3–8)
UROBILINOGEN, URINE: 0.2 E.U./DL
WBC # BLD: 16 K/UL (ref 4.8–10.8)
WBC UA: ABNORMAL /HPF (ref 0–5)

## 2022-07-21 PROCEDURE — 81001 URINALYSIS AUTO W/SCOPE: CPT

## 2022-07-21 PROCEDURE — 96376 TX/PRO/DX INJ SAME DRUG ADON: CPT

## 2022-07-21 PROCEDURE — 85025 COMPLETE CBC W/AUTO DIFF WBC: CPT

## 2022-07-21 PROCEDURE — 2580000003 HC RX 258: Performed by: EMERGENCY MEDICINE

## 2022-07-21 PROCEDURE — 6360000002 HC RX W HCPCS: Performed by: EMERGENCY MEDICINE

## 2022-07-21 PROCEDURE — 80053 COMPREHEN METABOLIC PANEL: CPT

## 2022-07-21 PROCEDURE — 96374 THER/PROPH/DIAG INJ IV PUSH: CPT

## 2022-07-21 PROCEDURE — 99284 EMERGENCY DEPT VISIT MOD MDM: CPT

## 2022-07-21 PROCEDURE — 36415 COLL VENOUS BLD VENIPUNCTURE: CPT

## 2022-07-21 PROCEDURE — 96375 TX/PRO/DX INJ NEW DRUG ADDON: CPT

## 2022-07-21 PROCEDURE — 74176 CT ABD & PELVIS W/O CONTRAST: CPT

## 2022-07-21 RX ORDER — PREDNISONE 20 MG/1
40 TABLET ORAL DAILY
Qty: 20 TABLET | Refills: 0 | Status: SHIPPED | OUTPATIENT
Start: 2022-07-21 | End: 2022-07-31

## 2022-07-21 RX ORDER — ONDANSETRON 2 MG/ML
4 INJECTION INTRAMUSCULAR; INTRAVENOUS ONCE
Status: COMPLETED | OUTPATIENT
Start: 2022-07-21 | End: 2022-07-21

## 2022-07-21 RX ORDER — CIPROFLOXACIN 500 MG/1
500 TABLET, FILM COATED ORAL 2 TIMES DAILY
Qty: 6 TABLET | Refills: 0 | Status: SHIPPED | OUTPATIENT
Start: 2022-07-21 | End: 2022-07-24

## 2022-07-21 RX ORDER — FLUCONAZOLE 150 MG/1
150 TABLET ORAL ONCE
Qty: 1 TABLET | Refills: 0 | Status: SHIPPED | OUTPATIENT
Start: 2022-07-21 | End: 2022-07-21

## 2022-07-21 RX ORDER — 0.9 % SODIUM CHLORIDE 0.9 %
1000 INTRAVENOUS SOLUTION INTRAVENOUS ONCE
Status: COMPLETED | OUTPATIENT
Start: 2022-07-21 | End: 2022-07-21

## 2022-07-21 RX ORDER — OXYCODONE HYDROCHLORIDE AND ACETAMINOPHEN 5; 325 MG/1; MG/1
1 TABLET ORAL EVERY 6 HOURS PRN
Qty: 12 TABLET | Refills: 0 | Status: SHIPPED | OUTPATIENT
Start: 2022-07-21 | End: 2022-07-24

## 2022-07-21 RX ORDER — MORPHINE SULFATE 4 MG/ML
4 INJECTION, SOLUTION INTRAMUSCULAR; INTRAVENOUS
Status: DISCONTINUED | OUTPATIENT
Start: 2022-07-21 | End: 2022-07-21 | Stop reason: HOSPADM

## 2022-07-21 RX ADMIN — ONDANSETRON 4 MG: 2 INJECTION INTRAMUSCULAR; INTRAVENOUS at 15:27

## 2022-07-21 RX ADMIN — SODIUM CHLORIDE 1000 ML: 9 INJECTION, SOLUTION INTRAVENOUS at 14:31

## 2022-07-21 RX ADMIN — HYDROMORPHONE HYDROCHLORIDE 1 MG: 1 INJECTION, SOLUTION INTRAMUSCULAR; INTRAVENOUS; SUBCUTANEOUS at 15:27

## 2022-07-21 RX ADMIN — ONDANSETRON 4 MG: 2 INJECTION INTRAMUSCULAR; INTRAVENOUS at 14:32

## 2022-07-21 RX ADMIN — MORPHINE SULFATE 4 MG: 4 INJECTION, SOLUTION INTRAMUSCULAR; INTRAVENOUS at 14:32

## 2022-07-21 ASSESSMENT — PAIN DESCRIPTION - FREQUENCY
FREQUENCY: INTERMITTENT
FREQUENCY: INTERMITTENT

## 2022-07-21 ASSESSMENT — ENCOUNTER SYMPTOMS
VOMITING: 0
ABDOMINAL PAIN: 0
BACK PAIN: 0
SHORTNESS OF BREATH: 0
COUGH: 0
NAUSEA: 0
DIARRHEA: 0
SORE THROAT: 0

## 2022-07-21 ASSESSMENT — PAIN SCALES - GENERAL
PAINLEVEL_OUTOF10: 10
PAINLEVEL_OUTOF10: 7
PAINLEVEL_OUTOF10: 10
PAINLEVEL_OUTOF10: 10

## 2022-07-21 ASSESSMENT — PAIN DESCRIPTION - DESCRIPTORS
DESCRIPTORS: SHARP
DESCRIPTORS: SHARP

## 2022-07-21 ASSESSMENT — PAIN DESCRIPTION - ORIENTATION
ORIENTATION: LEFT
ORIENTATION: LEFT

## 2022-07-21 ASSESSMENT — PAIN - FUNCTIONAL ASSESSMENT
PAIN_FUNCTIONAL_ASSESSMENT: 0-10
PAIN_FUNCTIONAL_ASSESSMENT: 0-10

## 2022-07-21 ASSESSMENT — PAIN DESCRIPTION - LOCATION
LOCATION: FLANK
LOCATION: FLANK

## 2022-07-21 ASSESSMENT — PAIN DESCRIPTION - PAIN TYPE
TYPE: ACUTE PAIN
TYPE: ACUTE PAIN

## 2022-07-21 ASSESSMENT — PAIN DESCRIPTION - ONSET
ONSET: ON-GOING
ONSET: ON-GOING

## 2022-07-21 NOTE — CARE COORDINATION
Pt was given a list of [facility] pcp's for follow up care. She declined my offer to make her an appt stating that she would do so.

## 2022-07-21 NOTE — ED TRIAGE NOTES
Pt states began having flank pain on Tuesday on left side  Pt states that has \"a little nauseous no vomiting\"  Pt is alert and oriented times 4  Pt denies any chest pain  Pt denies any SOB  Pt states has a history of kidney stones

## 2022-07-21 NOTE — ED NOTES
Glucose 43 mg/dL on CMP. Accucheck 61 mg/dL. Pt provided with apple juice x2. Dr. Sho Hilton advised.      Fabiola Roque RN  07/21/22 0244

## 2022-07-21 NOTE — ED PROVIDER NOTES
[facility] ED  eMERGENCYdEPARTMENT eNCOUnter      Pt Name: Musa Bacon  MRN: [id number]  Armsjaxgfmendez 1965  Date of evaluation: 7/21/2022  Ericka Williamson MD    CHIEF COMPLAINT           HPI  Suzanne Gomez is a 49 y.o. female per chart review has a h/o depression/anxiety, GERD, HTN, Hpl, asthma, lupus presents to the ED with L flank pain. Pt notes gradual onset, moderate, constant, sharp, L flank pain since yesterday evening. Pt denies fever, cp, sob, ab pain, dysuria, diarrhea. ROS  Review of Systems   Constitutional:  Negative for activity change, chills and fever. HENT:  Negative for ear pain and sore throat. Eyes:  Negative for visual disturbance. Respiratory:  Negative for cough and shortness of breath. Cardiovascular:  Negative for chest pain, palpitations and leg swelling. Gastrointestinal:  Negative for abdominal pain, diarrhea, nausea and vomiting. Genitourinary:  Positive for flank pain. Negative for dysuria. Musculoskeletal:  Negative for back pain. Skin:  Negative for rash. Neurological:  Negative for dizziness and weakness. Except as noted above the remainder of the review of systems was reviewed and negative.        PAST MEDICAL HISTORY     Past Medical History:   Diagnosis Date    Anxiety     Asthma     as child    Depression     Depression     dysthymia - following lupus dx age 23    Diabetes mellitus ([facility] [address]     Foot fracture, left     GERD (gastroesophageal reflux disease)     Hyperlipidemia     Hypothyroidism     Kidney stones     Lupus ([facility] [address]     dr Roland James -     Migraine     PONV (postoperative nausea and vomiting)     Renal stone 4/25/2021    Sciatica     left sided         SURGICAL HISTORY       Past Surgical History:   Procedure Laterality Date    APPENDECTOMY      ARTHRODESIS Left 11/26/2019    ARTHRODESIS OF THE FIRST METATARSAL PHALANGEAL JOINT LEFT FOOT performed by Raphael Cameron DPM at [facility] N/A 4/26/2021 CYSTOSCOPY RIGHT DOUBLE J STENT PLACEMENT. performed by Allan Carl MD at [address], COLON, DIAGNOSTIC      FOOT CLOSED REDUCTION Left 1/3/2017    LEFT FOOT PERCUTANEOUS PINNING MOTA FRACTURE performed by Kala Kim MD at [address] ([address] [facility])      LITHOTRIPSY Right 5/5/2021    RIGHT ESWL performed by Allan Carl MD at [address] [facility] ESOPHAGOGASTRODUODENOSCOPY TRANSORAL DIAGNOSTIC N/A 3/8/2017    EGD ESOPHAGOGASTRODUODENOSCOPY WITH DILATION performed by Jasmeet Thurston MD at [address] [facility] ESOPHAGOGASTRODUODENOSCOPY TRANSORAL DIAGNOSTIC N/A 11/7/2018    EGD ESOPHAGOGASTRODUODENOSCOPY WITH DILATION performed by Jasmeet Thurston MD at [address] 8/17/2021    EGD WITH BIOPSY AND POLYPECTOMY performed by Florinda Cushing, MD at [address] [facility] [address]       Previous Medications    ALBUTEROL SULFATE HFA (VENTOLIN HFA) 108 (90 BASE) MCG/ACT INHALER    Inhale 2 puffs into the lungs every 6 hours as needed for Wheezing    BLOOD GLUCOSE MONITOR KIT AND SUPPLIES    Please give 1 meter covered by insurance Dx E11.65    BLOOD GLUCOSE TEST STRIPS (ASCENSIA AUTODISC VI;ONE TOUCH ULTRA TEST VI) STRIP    Check blood sugars twice daily or as directed. BUSPIRONE (BUSPAR) 5 MG TABLET        CLONAZEPAM (KLONOPIN) 0.5 MG TABLET    TAKE 1 TABLET DAILY AS NEEDED    [employer] LANCETS 30G MISC    Check blood sugars twice daily or as directed.     ESOMEPRAZOLE (NEXIUM) 40 MG DELAYED RELEASE CAPSULE    Take 1 capsule by mouth daily    ESTRADIOL (CLIMARA) 0.025 MG/24HR    Place 0.025 patches onto the skin every 7 days On mondays    GLYCOPYRROLATE (ROBINUL) 1 MG TABLET    Take 1 tablet by mouth 2 times daily HYDROXYCHLOROQUINE (PLAQUENIL) 200 MG TABLET    Take 1 tablet by mouth 2 times daily    INSULIN ASPART, W/NIACINAMIDE, (FIASP) 100 UNIT/ML SOLN    Use via pump max dose 150 units/day    INSULIN DISPOSABLE PUMP (OMNIPOD DASH PODS, GEN 4,) MISC    Change every 48 hrs    LANCETS MISC    Pt test 3x daily Dx E11.65    LEVOTHYROXINE (SYNTHROID) 50 MCG TABLET    Take 1 tablet by mouth daily    PROPRANOLOL (INDERAL LA) 120 MG EXTENDED RELEASE CAPSULE    TAKE 2 CAPSULES BY MOUTH EVERY DAY    ROSUVASTATIN (CRESTOR) 20 MG TABLET    TAKE 1 TABLET DAILY. TIZANIDINE (ZANAFLEX) 4 MG TABLET    TAKE 1 TABLET 3 TIMES DAILY as needed. ALLERGIES     Hm lidocaine patch [lidocaine], Aspirin, Cephalosporins, Imitrex [sumatriptan], Pcn [penicillins], Seasonal, Toradol [ketorolac tromethamine], and Ultram [tramadol]    FAMILY HISTORY       Family History   Adopted: Yes          SOCIAL HISTORY       Social History     Socioeconomic History    Marital status: Legally      Spouse name: None    Number of children: None    Years of education: None    Highest education level: None   Tobacco Use    Smoking status: Never    Smokeless tobacco: Never   Vaping Use    Vaping Use: Never used   Substance and Sexual Activity    Alcohol use: No     Alcohol/week: 0.0 standard drinks     Comment: denies    Drug use: No     Comment: denies     Social Determinants of Health     Financial Resource Strain: Low Risk     Difficulty of Paying Living Expenses: Not hard at all   Food Insecurity: No Food Insecurity    [facility]: Never true    Ran Out of Food in the Last Year: Never true         PHYSICAL EXAM       ED Triage Vitals [07/21/22 1404]   BP Temp Temp Source Heart Rate Resp SpO2 Height Weight   131/85 98.6 °F (37 °C) Oral 87 16 96 % 5' 8\" (1.727 m) 165 lb (74.8 kg)       Physical Exam  Vitals and nursing note reviewed. Constitutional:       Appearance: She is well-developed.    HENT:      Head: Normocephalic. Right Ear: External ear normal.      Left Ear: External ear normal.   Eyes:      Conjunctiva/sclera: Conjunctivae normal.      Pupils: Pupils are equal, round, and reactive to light. Cardiovascular:      Rate and Rhythm: Normal rate and regular rhythm. Heart sounds: Normal heart sounds. Pulmonary:      Effort: Pulmonary effort is normal.      Breath sounds: Normal breath sounds. Abdominal:      General: Bowel sounds are normal. There is no distension. Palpations: Abdomen is soft. Tenderness: no abdominal tenderness There is left CVA tenderness. Musculoskeletal:         General: Normal range of motion. Cervical back: Normal range of motion and neck supple. Skin:     General: Skin is warm and dry. Neurological:      Mental Status: She is alert and oriented to person, place, and time. Psychiatric:         Mood and Affect: Mood normal.         MDM  50 yo female presents to the ED with L flank pain. Pt is afebrile, hemodynamically stable. Pt given 1 L NS, IV morphine, IV zofran in the ED. Labs remarkable for WBC 16, glucose 43. Pt given PO apple juice in the ED. Pt's repeat BS 60s. UA shows UTI. CT AP negative. Pt reassessed and still having severe pain. Pt given IV dilaudid, IV zofran with complete relief. Pt reassessed and feels much better. Pt tolerating PO apple juice. Suspect pt's pain is due to SI joint due to possible OA vs lupus. Pt given prescription for prednisone, percocet, cipro, diflucan. Pt given flank pain warning signs and will f/u with pcp. Pt understands plan. FINAL IMPRESSION      1. Flank pain    2. Lupus ([address]    3.  Acute cystitis without hematuria          DISPOSITION/PLAN   DISPOSITION Decision To Discharge 07/21/2022 04:06:06 PM        DISCHARGE MEDICATIONS:  [unfilled]         Nya Denney MD(electronically signed)  Attending Emergency Physician            Nya Denney MD  07/21/22 3849

## 2022-07-25 ENCOUNTER — HOSPITAL ENCOUNTER (EMERGENCY)
Age: 57
Discharge: HOME | End: 2022-07-25
Payer: MEDICAID

## 2022-07-25 VITALS
RESPIRATION RATE: 18 BRPM | SYSTOLIC BLOOD PRESSURE: 122 MMHG | BODY MASS INDEX: 25.01 KG/M2 | HEIGHT: 68 IN | DIASTOLIC BLOOD PRESSURE: 65 MMHG | HEART RATE: 76 BPM | OXYGEN SATURATION: 96 % | TEMPERATURE: 98.8 F | WEIGHT: 165 LBS

## 2022-07-25 DIAGNOSIS — N18.9 CHRONIC KIDNEY DISEASE, UNSPECIFIED CKD STAGE: ICD-10-CM

## 2022-07-25 DIAGNOSIS — R10.9 LEFT FLANK PAIN: ICD-10-CM

## 2022-07-25 DIAGNOSIS — R10.32 LEFT LOWER QUADRANT ABDOMINAL PAIN: Primary | ICD-10-CM

## 2022-07-25 LAB
ALBUMIN SERPL-MCNC: 4 G/DL (ref 3.5–4.6)
ALP BLD-CCNC: 110 U/L (ref 40–130)
ALT SERPL-CCNC: 25 U/L (ref 0–33)
ANION GAP SERPL CALCULATED.3IONS-SCNC: 13 MEQ/L (ref 9–15)
ANISOCYTOSIS: ABNORMAL
AST SERPL-CCNC: 22 U/L (ref 0–35)
BACTERIA: NEGATIVE /HPF
BANDED NEUTROPHILS RELATIVE PERCENT: 3 % (ref 5–11)
BASOPHILS ABSOLUTE: 0 K/UL (ref 0–0.2)
BASOPHILS RELATIVE PERCENT: 0.5 %
BILIRUB SERPL-MCNC: 0.4 MG/DL (ref 0.2–0.7)
BILIRUBIN URINE: NEGATIVE
BLOOD, URINE: NEGATIVE
BUN BLDV-MCNC: 17 MG/DL (ref 6–20)
CALCIUM SERPL-MCNC: 9.8 MG/DL (ref 8.5–9.9)
CHLORIDE BLD-SCNC: 102 MEQ/L (ref 95–107)
CLARITY: CLEAR
CO2: 24 MEQ/L (ref 20–31)
COLOR: YELLOW
CREAT SERPL-MCNC: 1.3 MG/DL (ref 0.5–0.9)
EOSINOPHILS ABSOLUTE: 0 K/UL (ref 0–0.7)
EOSINOPHILS RELATIVE PERCENT: 0.7 %
EPITHELIAL CELLS, UA: ABNORMAL /HPF (ref 0–5)
GFR AFRICAN AMERICAN: 51.5
GFR NON-AFRICAN AMERICAN: 42.6
GLOBULIN: 3.3 G/DL (ref 2.3–3.5)
GLUCOSE BLD-MCNC: 102 MG/DL (ref 70–99)
GLUCOSE URINE: NEGATIVE MG/DL
HCT VFR BLD CALC: 46.5 % (ref 37–47)
HEMOGLOBIN: 15.4 G/DL (ref 12–16)
HYALINE CASTS: ABNORMAL /HPF (ref 0–5)
KETONES, URINE: NEGATIVE MG/DL
LEUKOCYTE ESTERASE, URINE: ABNORMAL
LYMPHOCYTES ABSOLUTE: 3 K/UL (ref 1–4.8)
LYMPHOCYTES RELATIVE PERCENT: 17 %
MCH RBC QN AUTO: 29 PG (ref 27–31.3)
MCHC RBC AUTO-ENTMCNC: 33.1 % (ref 33–37)
MCV RBC AUTO: 87.7 FL (ref 82–100)
MICROCYTES: ABNORMAL
MONOCYTES ABSOLUTE: 1 K/UL (ref 0.2–0.8)
MONOCYTES RELATIVE PERCENT: 5.5 %
NEUTROPHILS ABSOLUTE: 13.5 K/UL (ref 1.4–6.5)
NEUTROPHILS RELATIVE PERCENT: 74 %
NITRITE, URINE: NEGATIVE
PDW BLD-RTO: 14.9 % (ref 11.5–14.5)
PH UA: 5 (ref 5–9)
PLATELET # BLD: 323 K/UL (ref 130–400)
PLATELET SLIDE REVIEW: ADEQUATE
POTASSIUM SERPL-SCNC: 4.9 MEQ/L (ref 3.4–4.9)
PROTEIN UA: NEGATIVE MG/DL
RBC # BLD: 5.3 M/UL (ref 4.2–5.4)
RBC UA: ABNORMAL /HPF (ref 0–5)
SMUDGE CELLS: 1.8
SODIUM BLD-SCNC: 139 MEQ/L (ref 135–144)
SPECIFIC GRAVITY UA: 1 (ref 1–1.03)
TOTAL PROTEIN: 7.3 G/DL (ref 6.3–8)
URINE REFLEX TO CULTURE: ABNORMAL
UROBILINOGEN, URINE: 0.2 E.U./DL
WBC # BLD: 17.5 K/UL (ref 4.8–10.8)
WBC UA: ABNORMAL /HPF (ref 0–5)

## 2022-07-25 PROCEDURE — 36415 COLL VENOUS BLD VENIPUNCTURE: CPT

## 2022-07-25 PROCEDURE — 80053 COMPREHEN METABOLIC PANEL: CPT

## 2022-07-25 PROCEDURE — 6370000000 HC RX 637 (ALT 250 FOR IP): Performed by: PHYSICIAN ASSISTANT

## 2022-07-25 PROCEDURE — 81001 URINALYSIS AUTO W/SCOPE: CPT

## 2022-07-25 PROCEDURE — 85025 COMPLETE CBC W/AUTO DIFF WBC: CPT

## 2022-07-25 PROCEDURE — 99283 EMERGENCY DEPT VISIT LOW MDM: CPT

## 2022-07-25 RX ORDER — DICYCLOMINE HYDROCHLORIDE 10 MG/1
10 CAPSULE ORAL ONCE
Status: COMPLETED | OUTPATIENT
Start: 2022-07-25 | End: 2022-07-25

## 2022-07-25 RX ORDER — DICYCLOMINE HYDROCHLORIDE 10 MG/1
10 CAPSULE ORAL EVERY 6 HOURS PRN
Qty: 20 CAPSULE | Refills: 0 | Status: SHIPPED | OUTPATIENT
Start: 2022-07-25

## 2022-07-25 RX ORDER — OXYCODONE HYDROCHLORIDE AND ACETAMINOPHEN 5; 325 MG/1; MG/1
1 TABLET ORAL ONCE
Status: COMPLETED | OUTPATIENT
Start: 2022-07-25 | End: 2022-07-25

## 2022-07-25 RX ADMIN — OXYCODONE AND ACETAMINOPHEN 1 TABLET: 5; 325 TABLET ORAL at 20:54

## 2022-07-25 RX ADMIN — DICYCLOMINE HYDROCHLORIDE 10 MG: 10 CAPSULE ORAL at 22:19

## 2022-07-25 RX ADMIN — OXYCODONE AND ACETAMINOPHEN 1 TABLET: 5; 325 TABLET ORAL at 22:19

## 2022-07-25 ASSESSMENT — PAIN - FUNCTIONAL ASSESSMENT
PAIN_FUNCTIONAL_ASSESSMENT: 0-10
PAIN_FUNCTIONAL_ASSESSMENT: 0-10

## 2022-07-25 ASSESSMENT — LIFESTYLE VARIABLES: HOW OFTEN DO YOU HAVE A DRINK CONTAINING ALCOHOL: NEVER

## 2022-07-25 ASSESSMENT — PAIN DESCRIPTION - LOCATION
LOCATION: FLANK

## 2022-07-25 ASSESSMENT — PAIN DESCRIPTION - ORIENTATION: ORIENTATION: LEFT

## 2022-07-25 ASSESSMENT — PAIN DESCRIPTION - DESCRIPTORS: DESCRIPTORS: SHARP

## 2022-07-25 ASSESSMENT — PAIN SCALES - GENERAL
PAINLEVEL_OUTOF10: 10
PAINLEVEL_OUTOF10: 10
PAINLEVEL_OUTOF10: 9

## 2022-07-25 ASSESSMENT — ENCOUNTER SYMPTOMS
ABDOMINAL DISTENTION: 0
ANAL BLEEDING: 0
ABDOMINAL PAIN: 1
APNEA: 0
BACK PAIN: 1
EYE DISCHARGE: 0
VOICE CHANGE: 0
COUGH: 0

## 2022-07-25 ASSESSMENT — PAIN DESCRIPTION - PAIN TYPE: TYPE: ACUTE PAIN

## 2022-07-26 NOTE — DISCHARGE INSTRUCTIONS
Clear liquid diet for 24 hours this includes water, Jell-O, popsicles, clear broth, Gatorade. Follow-up primary care, urology and nephrology tomorrow. Return to if any symptoms worsen or new symptoms develop.

## 2022-07-26 NOTE — ED PROVIDER NOTES
[address] [facility] ED  eMERGENCY dEPARTMENT eNCOUnter      Pt Name: Zee Weller  MRN: [id number]  [place] 1965  Date of evaluation: 7/25/2022  Provider: Blessing Gleason Dr       Chief Complaint   Patient presents with    Flank Pain     Flank pain with nausea, second visit         HISTORY OF PRESENT ILLNESS   (Location/Symptom, Timing/Onset,Context/Setting, Quality, Duration, Modifying Factors, Severity)  Note limiting factors. Zee Weller is a 49 y.o. female who presents to the emergency department patient has pain that started last week on Tuesday she was seen for flank pain Thursday including CT lab work was diagnosed with UTI states she has been feeling better. Has not follow-up primary care physician. Does have history of kidney stones. Symptoms mild to moderate severity worse with touch or motion    HPI    NursingNotes were reviewed. REVIEW OF SYSTEMS    (2-9 systems for level 4, 10 or more for level 5)     Review of Systems   Constitutional:  Negative for activity change, appetite change, fever and unexpected weight change. HENT:  Negative for ear discharge, nosebleeds and voice change. Eyes:  Negative for discharge. Respiratory:  Negative for apnea and cough. Cardiovascular:  Negative for chest pain. Gastrointestinal:  Positive for abdominal pain. Negative for abdominal distention and anal bleeding. Genitourinary:  Positive for flank pain. Musculoskeletal:  Positive for back pain. Negative for joint swelling, neck pain and neck stiffness. Skin:  Negative for pallor. Neurological:  Negative for seizures and facial asymmetry. Psychiatric/Behavioral:  Negative for behavioral problems, self-injury and sleep disturbance. All other systems reviewed and are negative. Except as noted above the remainder of the review of systems was reviewed and negative.        PAST MEDICAL HISTORY     Past Medical History:   Diagnosis Date    Anxiety SUPPLIES    Please give 1 meter covered by insurance Dx E11.65    BLOOD GLUCOSE TEST STRIPS (ASCENSIA AUTODISC VI;ONE TOUCH ULTRA TEST VI) STRIP    Check blood sugars twice daily or as directed. BUSPIRONE (BUSPAR) 5 MG TABLET        CLONAZEPAM (KLONOPIN) 0.5 MG TABLET    TAKE 1 TABLET DAILY AS NEEDED    [facility] UNILET LANCETS 30G MISC    Check blood sugars twice daily or as directed. ESOMEPRAZOLE (NEXIUM) 40 MG DELAYED RELEASE CAPSULE    Take 1 capsule by mouth daily    ESTRADIOL (CLIMARA) 0.025 MG/24HR    Place 0.025 patches onto the skin every 7 days On mondays    GLYCOPYRROLATE (ROBINUL) 1 MG TABLET    Take 1 tablet by mouth 2 times daily    HYDROXYCHLOROQUINE (PLAQUENIL) 200 MG TABLET    Take 1 tablet by mouth 2 times daily    INSULIN ASPART, W/NIACINAMIDE, (FIASP) 100 UNIT/ML SOLN    Use via pump max dose 150 units/day    INSULIN DISPOSABLE PUMP (OMNIPOD DASH PODS, GEN 4,) MISC    Change every 48 hrs    LANCETS MISC    Pt test 3x daily Dx E11.65    LEVOTHYROXINE (SYNTHROID) 50 MCG TABLET    Take 1 tablet by mouth daily    PREDNISONE (DELTASONE) 20 MG TABLET    Take 2 tablets by mouth in the morning for 10 days. PROPRANOLOL (INDERAL LA) 120 MG EXTENDED RELEASE CAPSULE    TAKE 2 CAPSULES BY MOUTH EVERY DAY    ROSUVASTATIN (CRESTOR) 20 MG TABLET    TAKE 1 TABLET DAILY. TIZANIDINE (ZANAFLEX) 4 MG TABLET    TAKE 1 TABLET 3 TIMES DAILY as needed.             Hm lidocaine patch [lidocaine], Aspirin, Cephalosporins, Imitrex [sumatriptan], Pcn [penicillins], Seasonal, Toradol [ketorolac tromethamine], and Ultram [tramadol]    FAMILY HISTORY       Family History   Adopted: Yes          SOCIAL HISTORY       Social History     Socioeconomic History    Marital status: Legally      Spouse name: None    Number of children: None    Years of education: None    Highest education level: None   Tobacco Use    Smoking status: Never    Smokeless tobacco: Never   Vaping Use    Vaping Use: Never used   Substance and Sexual Activity    Alcohol use: No     Alcohol/week: 0.0 standard drinks     Comment: denies    Drug use: No     Comment: denies     Social Determinants of Health     Financial Resource Strain: Low Risk     Difficulty of Paying Living Expenses: Not hard at all   Food Insecurity: No Food Insecurity    Worried About [address] in the Last Year: Never true    Ran Out of Food in the Last Year: Never true       SCREENINGS   Aries Coma Scale  Eye Opening: Spontaneous  Best Verbal Response: Oriented  Best Motor Response: Obeys commands  Standard Coma Scale Score: 15  Ebola Virus Disease (EVD) Screening   Temp: 98.8 °F (37.1 °C)  CIWA Assessment  BP: 117/73  Heart Rate: 81    PHYSICAL EXAM    (up to 7 for level 4, 8 or more for level 5)     ED Triage Vitals [07/25/22 2007]   BP Temp Temp Source Heart Rate Resp SpO2 Height Weight   134/73 98.8 °F (37.1 °C) Oral 86 20 96 % 5' 8\" (1.727 m) 165 lb (74.8 kg)       Physical Exam  Vitals and nursing note reviewed. Constitutional:       General: She is not in acute distress. Appearance: She is well-developed. HENT:      Head: Normocephalic and atraumatic. Right Ear: External ear normal.      Left Ear: External ear normal.      Nose: Nose normal.      Mouth/Throat:      Mouth: Mucous membranes are moist.   Eyes:      General:         Right eye: No discharge. Left eye: No discharge. Pupils: Pupils are equal, round, and reactive to light. Cardiovascular:      Rate and Rhythm: Normal rate and regular rhythm. Heart sounds: Normal heart sounds. Pulmonary:      Effort: Pulmonary effort is normal. No respiratory distress. Breath sounds: Normal breath sounds. No stridor. Abdominal:      General: Bowel sounds are normal. There is no distension. Palpations: Abdomen is soft. Tenderness: There is abdominal tenderness. Musculoskeletal:         General: Tenderness present. Normal range of motion.       Cervical back: Normal 165 lb (74.8 kg)     Height: 5' 8\" (1.727 m)              MDM  Number of Diagnoses or Management Options  Diagnosis management comments: She had a recent CT discussed with Dr. Reyes Pian he does not require another CT at this time. We note that her white cell count remains elevated from prior visit. Patient does see Dr. Elaine Hernandez has not followed up with primary care nor her urologist or her nephrologist.  She does have a positive OARRS history medications are available. Patient discharged home follow-up with primary care tomorrow. Amount and/or Complexity of Data Reviewed  Clinical lab tests: reviewed and ordered          CONSULTS:  None    PROCEDURES:  Unless otherwise noted below, none     Procedures    FINAL IMPRESSION      1. Left lower quadrant abdominal pain    2. Left flank pain    3.  Chronic kidney disease, unspecified CKD stage          DISPOSITION/PLAN   DISPOSITION Decision To Discharge 07/25/2022 10:23:45 PM      PATIENT REFERRED TO:  Rivera Tamez MD  [address] [place]  [phone]    Call in 1 day      [facility]) ED  [address] [facility]  [phone]  Go to   If symptoms worsen    Your nephrologist    Call in 1 day      DISCHARGE MEDICATIONS:  New Prescriptions    DICYCLOMINE (BENTYL) 10 MG CAPSULE    Take 1 capsule by mouth every 6 hours as needed (cramps)          (Please note that portions of this note were completed with a voice recognition program.  Efforts were made to edit the dictations but occasionally words are mis-transcribed.)    Greg Clemente PA-C (electronically signed)  Attending Emergency Physician        Greg Clemente PA-C  07/25/22 5930

## 2022-07-26 NOTE — ED TRIAGE NOTES
Patient presents with complaints of left flank pain that radiates to her left side for several days. Patient states she was seen here Thursday for the same and diagnosed with a kidney infection and was given Cipro. She states she has completed the Cipro and does not feel any better.  No distress noted on arrival.

## 2022-07-27 LAB
GFR AFRICAN AMERICAN: 51
GFR NON-AFRICAN AMERICAN: 43
PERFORMED ON: ABNORMAL
POC CREATININE: 1.3 MG/DL (ref 0.6–1.2)
POC SAMPLE TYPE: ABNORMAL

## 2022-08-18 ENCOUNTER — APPOINTMENT (OUTPATIENT)
Dept: ULTRASOUND IMAGING | Age: 57
End: 2022-08-18
Payer: MEDICARE

## 2022-08-18 ENCOUNTER — HOSPITAL ENCOUNTER (EMERGENCY)
Age: 57
Discharge: HOME | End: 2022-08-18
Attending: EMERGENCY MEDICINE | Admitting: EMERGENCY MEDICINE
Payer: MEDICARE

## 2022-08-18 VITALS
TEMPERATURE: 97.8 F | RESPIRATION RATE: 22 BRPM | HEIGHT: 68 IN | HEART RATE: 84 BPM | WEIGHT: 165 LBS | SYSTOLIC BLOOD PRESSURE: 125 MMHG | OXYGEN SATURATION: 97 % | BODY MASS INDEX: 25.01 KG/M2 | DIASTOLIC BLOOD PRESSURE: 78 MMHG

## 2022-08-18 DIAGNOSIS — R10.9 LEFT FLANK PAIN: Primary | ICD-10-CM

## 2022-08-18 LAB
BACTERIA: ABNORMAL /HPF
BILIRUBIN URINE: NEGATIVE
BLOOD, URINE: NEGATIVE
CLARITY: CLEAR
COLOR: YELLOW
EPITHELIAL CELLS, UA: ABNORMAL /HPF
GLUCOSE URINE: NEGATIVE MG/DL
KETONES, URINE: NEGATIVE MG/DL
LEUKOCYTE ESTERASE, URINE: NORMAL
NITRITE, URINE: NEGATIVE
PH UA: 5.5 (ref 5–9)
PROTEIN UA: NEGATIVE MG/DL
RBC UA: ABNORMAL /HPF (ref 0–2)
SPECIFIC GRAVITY UA: 1.01 (ref 1–1.03)
URINE REFLEX TO CULTURE: NORMAL
UROBILINOGEN, URINE: 0.2 E.U./DL
WBC UA: ABNORMAL /HPF (ref 0–5)

## 2022-08-18 PROCEDURE — 99284 EMERGENCY DEPT VISIT MOD MDM: CPT

## 2022-08-18 PROCEDURE — 96372 THER/PROPH/DIAG INJ SC/IM: CPT

## 2022-08-18 PROCEDURE — 6360000002 HC RX W HCPCS: Performed by: EMERGENCY MEDICINE

## 2022-08-18 PROCEDURE — 81001 URINALYSIS AUTO W/SCOPE: CPT

## 2022-08-18 PROCEDURE — 76775 US EXAM ABDO BACK WALL LIM: CPT

## 2022-08-18 RX ORDER — CYCLOBENZAPRINE HCL 10 MG
10 TABLET ORAL 3 TIMES DAILY PRN
Qty: 21 TABLET | Refills: 0 | Status: SHIPPED | OUTPATIENT
Start: 2022-08-18 | End: 2022-08-28

## 2022-08-18 RX ORDER — ORPHENADRINE CITRATE 30 MG/ML
60 INJECTION INTRAMUSCULAR; INTRAVENOUS ONCE
Status: COMPLETED | OUTPATIENT
Start: 2022-08-18 | End: 2022-08-18

## 2022-08-18 RX ORDER — CIPROFLOXACIN 500 MG/1
500 TABLET, FILM COATED ORAL 2 TIMES DAILY
Qty: 14 TABLET | Refills: 0 | Status: SHIPPED | OUTPATIENT
Start: 2022-08-18 | End: 2022-08-28

## 2022-08-18 RX ORDER — ONDANSETRON 4 MG/1
4 TABLET, ORALLY DISINTEGRATING ORAL EVERY 4 HOURS PRN
Qty: 10 TABLET | Refills: 1 | Status: SHIPPED | OUTPATIENT
Start: 2022-08-18

## 2022-08-18 RX ADMIN — ORPHENADRINE CITRATE 60 MG: 30 INJECTION INTRAMUSCULAR; INTRAVENOUS at 13:51

## 2022-08-18 ASSESSMENT — PAIN SCALES - GENERAL: PAINLEVEL_OUTOF10: 10

## 2022-08-18 NOTE — FLOWSHEET NOTE
Pt to ER with flank pain, patient took percocet at home and had no relief. Patient states hx of kidney stones.  Electronically signed by Malathi Guerrier RN on 8/18/2022 at 3:10 PM

## 2022-08-18 NOTE — ED PROVIDER NOTES
swelling or tenderness. Patient neurovascularly intact distally. LYMPH: no peripheral lympadenopathy noted  SKIN:  Warm & dry, no rash  NEUROLOGIC:  Alert and oriented. Speech clear    Medical decision making/ED course;  Multiple previous visits were reviewed in which patient had flank pain. Patient with multiple previous CAT scans and I discussed with her that I was concerned repeating a CAT scan after she just had 1 approximately 3 weeks ago. We will proceed with ultrasound. Urinalysis was obtained which showed trace of leukocyte esterase but no nitrites. 0-2 white blood cells per high-power field. Ultrasound obtained and interpreted by radiologist as follows;  Narrative   EXAMINATION:  RETROPERITONEAL ULTRASOUND. CLINICAL HISTORY:  LEFT FLANK PAIN. COMPARISONS:  NONE AVAILABLE       TECHNIQUE:  Transabdominal planar scans. FINDINGS:  The right kidney measures 10.3 cm from pole to pole and the left kidney 10.5 cm from pole to pole. Cortical thickness on the right maximally is 1.4 cm. Cortical thickness maximally on the right is 1 cm. There is no hydronephrosis. No evidence    of a mass or cyst. No definite evidence of a calculus. Impression   NORMAL RENAL ULTRASOUND. Urinalysis did not show signs of infection other than a small amount of leukocyte Estrace. Given that she has had urinary frequency and some burning will treat with antibiotics. Patient was given Norflex while in the emergency department. With nurse present I discussed all findings with patient. I did not see any mention of diverticular disease and previous CAT scans. Patient has a history of sciatica that she has no radicular symptoms or sciatic pain at this time. No evidence of kidney stones and a negative CT recently for kidney stones. Discussed the possibility of an early bladder or kidney infection.   We will treat with antibiotics however I discussed with the patient I did not feel comfortable

## 2022-08-29 ENCOUNTER — HOSPITAL ENCOUNTER (OUTPATIENT)
Dept: LAB | Age: 57
Discharge: HOME | End: 2022-08-29
Payer: MEDICARE

## 2022-08-29 DIAGNOSIS — E11.65 UNCONTROLLED TYPE 2 DIABETES MELLITUS WITH HYPERGLYCEMIA (HCC): ICD-10-CM

## 2022-08-29 LAB
ANION GAP SERPL CALCULATED.3IONS-SCNC: 9 MEQ/L (ref 9–15)
BUN BLDV-MCNC: 12 MG/DL (ref 6–20)
CALCIUM SERPL-MCNC: 8.9 MG/DL (ref 8.5–9.9)
CHLORIDE BLD-SCNC: 104 MEQ/L (ref 95–107)
CO2: 28 MEQ/L (ref 20–31)
CREAT SERPL-MCNC: 1.16 MG/DL (ref 0.5–0.9)
GFR AFRICAN AMERICAN: 58.7
GFR NON-AFRICAN AMERICAN: 48.5
GLUCOSE BLD-MCNC: 124 MG/DL (ref 70–99)
HBA1C MFR BLD: 6.7 % (ref 4.8–5.9)
POTASSIUM SERPL-SCNC: 4 MEQ/L (ref 3.4–4.9)
SODIUM BLD-SCNC: 141 MEQ/L (ref 135–144)

## 2022-08-29 PROCEDURE — 36415 COLL VENOUS BLD VENIPUNCTURE: CPT

## 2022-08-29 PROCEDURE — 83036 HEMOGLOBIN GLYCOSYLATED A1C: CPT

## 2022-08-29 PROCEDURE — 80048 BASIC METABOLIC PNL TOTAL CA: CPT

## 2022-09-06 RX ORDER — INSULIN ASPART INJECTION 100 [IU]/ML
INJECTION, SOLUTION SUBCUTANEOUS
Qty: 50 ML | Refills: 3 | Status: SHIPPED | OUTPATIENT
Start: 2022-09-06

## 2022-09-08 ENCOUNTER — TELEMEDICINE (OUTPATIENT)
Dept: ENDOCRINOLOGY | Age: 57
End: 2022-09-08
Payer: MEDICARE

## 2022-09-08 DIAGNOSIS — E11.65 UNCONTROLLED TYPE 2 DIABETES MELLITUS WITH HYPERGLYCEMIA (HCC): Primary | ICD-10-CM

## 2022-09-08 PROCEDURE — 99442 PR PHYS/QHP TELEPHONE EVALUATION 11-20 MIN: CPT | Performed by: INTERNAL MEDICINE

## 2022-09-08 RX ORDER — GLYCOPYRROLATE 1 MG/1
1 TABLET ORAL 2 TIMES DAILY
Qty: 60 TABLET | Refills: 3 | Status: SHIPPED | OUTPATIENT
Start: 2022-09-08

## 2022-09-08 RX ORDER — INSULIN PUMP CONTROLLER
EACH MISCELLANEOUS
Qty: 15 EACH | Refills: 3 | Status: SHIPPED | OUTPATIENT
Start: 2022-09-08

## 2022-09-08 NOTE — PROGRESS NOTES
2022    TELEHEALTH EVALUATION -- Audio/Visual (During DWPAT-57 public health emergency)    Due to COVID 19 outbreak, patient's office visit was converted to a virtual visit. Patient was contacted and agreed to proceed with a virtual visit via Telephone Visit  The risks and benefits of converting to a virtual visit were discussed in light of the current infectious disease epidemic. Patient also understood that insurance coverage and co-pays are up to their individual insurance plans. HPI: Telephone visit patient was at home I was at my office follow-up on type 2 diabetes complications include chronic kidney disease patient is using insulin pump A1c was 6.7 overall blood sugars close to 160    Patient using OmniPod insulin pump    Hyperhidrosis on Robinul requesting refills    Suzanne Kim (:  1965) has requested an audio/video evaluation for the following concern(s):     Latest Reference Range & Units 22 09:46   Sodium 135 - 144 mEq/L 141   Potassium 3.4 - 4.9 mEq/L 4.0   Chloride 95 - 107 mEq/L 104   CO2 20 - 31 mEq/L 28   BUN,BUNPL 6 - 20 mg/dL 12   Creatinine 0.50 - 0.90 mg/dL 1.16 (H)   Anion Gap 9 - 15 mEq/L 9   GFR Non- >60  48.5 (L)   GFR  >60  58.7 (L)   Glucose, Random 70 - 99 mg/dL 124 (H)   CALCIUM, SERUM, 509423 8.5 - 9.9 mg/dL 8.9   Hemoglobin A1C 4.8 - 5.9 % 6.7 (H)   (H): Data is abnormally high  (L): Data is abnormally low    Review of Systems   Constitutional:  Positive for fatigue. Eyes: Negative. Endocrine: Negative. All other systems reviewed and are negative. Prior to Visit Medications    Medication Sig Taking?  Authorizing Provider   Insulin Aspart, w/Niacinamide, (FIASP) 100 UNIT/ML SOLN Use via pump max dose 150 units/day  Mateus Costello MD   ondansetron (ZOFRAN ODT) 4 MG disintegrating tablet Take 1 tablet by mouth every 4 hours as needed for Nausea or Vomiting  Peder Kailee, DO   dicyclomine (BENTYL) 10 MG capsule Take 1 capsule by mouth every 6 hours as needed (cramps)  Patient not taking: Reported on 8/18/2022  Garo Elam PA-C   propranolol (INDERAL LA) 120 MG extended release capsule TAKE 2 CAPSULES BY MOUTH EVERY DAY  Historical Provider, MD   glycopyrrolate (ROBINUL) 1 MG tablet Take 1 tablet by mouth 2 times daily  Patient not taking: Reported on 8/18/2022  Yvrose Santana MD   Insulin Disposable Pump (OMNIPOD DASH PODS, GEN 4,) MISC Change every 48 hrs  Yvrose Santana MD   Insulin Aspart, w/Niacinamide, (FIASP) 100 UNIT/ML SOLN Use via pump max dose 150 units/day  Yvrose Santana MD   clonazePAM (KLONOPIN) 0.5 MG tablet TAKE 1 TABLET DAILY AS NEEDED  Historical Provider, MD   busPIRone (BUSPAR) 5 MG tablet   Historical Provider, MD   rosuvastatin (CRESTOR) 20 MG tablet TAKE 1 TABLET DAILY. Historical Provider, MD   tiZANidine (ZANAFLEX) 4 MG tablet TAKE 1 TABLET 3 TIMES DAILY as needed. Historical Provider, MD   esomeprazole (NEXIUM) 40 MG delayed release capsule Take 1 capsule by mouth daily  Patient not taking: Reported on 8/18/2022  Christine Bennett, APRN - CNP   [facility] Lancets 30G MISC Check blood sugars twice daily or as directed. Yvrose Santana MD   blood glucose monitor kit and supplies Please give 1 meter covered by insurance Dx E11.65  Yvrose Santana MD   Lancets MISC Pt test 3x daily Dx E11.65  Yvrose Santana MD   blood glucose test strips (ASCENSIA AUTODISC VI;ONE TOUCH ULTRA TEST VI) strip Check blood sugars twice daily or as directed.   Yvrose Santana MD   estradiol (CLIMARA) 0.025 MG/24HR Place 0.025 patches onto the skin every 7 days On mondays  Historical Provider, MD   albuterol sulfate HFA (VENTOLIN HFA) 108 (90 Base) MCG/ACT inhaler Inhale 2 puffs into the lungs every 6 hours as needed for Wheezing  Patient not taking: Reported on 8/18/2022  Rafaela Meléndez MD   levothyroxine (SYNTHROID) 50 MCG tablet Take 1 tablet by mouth daily  Kita Wheat MD   hydroxychloroquine (PLAQUENIL) 200 MG tablet Take 1 tablet by mouth 2 times daily  Historical Provider, MD       Social History     Tobacco Use    Smoking status: Never    Smokeless tobacco: Never   Vaping Use    Vaping Use: Never used   Substance Use Topics    Alcohol use: No     Alcohol/week: 0.0 standard drinks     Comment: denies    Drug use: No     Comment: denies            PHYSICAL EXAMINATION:  [ INSTRUCTIONS:  \"[x]\" Indicates a positive item  \"[]\" Indicates a negative item  -- DELETE ALL ITEMS NOT EXAMINED]  [] Alert  [] Oriented to person/place/time    [] No apparent distress  [] Toxic appearing    [] Face flushed appearing [] Sclera clear  [] Lips are cyanotic      [] Breathing appears normal  [] Appears tachypneic      [] Rash on visible skin    [] Cranial Nerves II-XII grossly intact    [] Motor grossly intact in visible upper extremities    [] Motor grossly intact in visible lower extremities    [] Normal Mood  [] Anxious appearing    [] Depressed appearing  [] Confused appearing      [] Poor short term memory  [] Poor long term memory    [] OTHER:      Due to this being a TeleHealth encounter, evaluation of the following organ systems is limited: Vitals/Constitutional/EENT/Resp/CV/GI//MS/Neuro/Skin/Heme-Lymph-Imm. ASSESSMENT/PLAN:     Diagnosis Orders   1.  Uncontrolled type 2 diabetes mellitus with hyperglycemia (HCC)  Basic Metabolic Panel    Hemoglobin A1C        Orders Placed This Encounter   Procedures    Basic Metabolic Panel     Standing Status:   Future     Standing Expiration Date:   9/8/2023    Hemoglobin A1C     Standing Status:   Future     Standing Expiration Date:   9/8/2023     Orders Placed This Encounter   Medications    Insulin Disposable Pump (OMNIPOD DASH PODS, GEN 4,) MISC     Sig: Change every 48 hrs     Dispense:  15 each     Refill:  3    glycopyrrolate (ROBINUL) 1 MG tablet     Sig: Take 1 tablet by mouth 2 times daily     Dispense:  60 tablet     Refill:  3       Continue current dose of insulin as per current pump setting follow-up in 3 months total time spent was 15 minutes    No follow-ups on file. An  electronic signature was used to authenticate this note. --Tammy Brown MD on 9/8/2022 at 4:44 PM        Pursuant to the emergency declaration under the [address] [place] [address], [address] [place] [address] and the [employer] Act, this Virtual  Visit was conducted, with patient's consent, to reduce the patient's risk of exposure to COVID-19 and provide continuity of care for an established patient. Services were provided through a video synchronous discussion virtually to substitute for in-person clinic visit.

## 2022-09-11 ASSESSMENT — ENCOUNTER SYMPTOMS: EYES NEGATIVE: 1

## 2022-09-25 ENCOUNTER — HOSPITAL ENCOUNTER (EMERGENCY)
Age: 57
Discharge: HOME | End: 2022-09-25
Payer: MEDICARE

## 2022-09-25 ENCOUNTER — APPOINTMENT (OUTPATIENT)
Dept: ULTRASOUND IMAGING | Age: 57
End: 2022-09-25
Payer: MEDICARE

## 2022-09-25 VITALS
TEMPERATURE: 98.3 F | WEIGHT: 160 LBS | SYSTOLIC BLOOD PRESSURE: 124 MMHG | HEART RATE: 82 BPM | DIASTOLIC BLOOD PRESSURE: 77 MMHG | BODY MASS INDEX: 27.31 KG/M2 | OXYGEN SATURATION: 95 % | RESPIRATION RATE: 18 BRPM | HEIGHT: 64 IN

## 2022-09-25 DIAGNOSIS — I82.4Z1 ACUTE DEEP VEIN THROMBOSIS (DVT) OF DISTAL VEIN OF RIGHT LOWER EXTREMITY (HCC): Primary | ICD-10-CM

## 2022-09-25 LAB
ALBUMIN SERPL-MCNC: 3.9 G/DL (ref 3.5–4.6)
ALP BLD-CCNC: 118 U/L (ref 40–130)
ALT SERPL-CCNC: 17 U/L (ref 0–33)
ANION GAP SERPL CALCULATED.3IONS-SCNC: 16 MEQ/L (ref 9–15)
ANISOCYTOSIS: ABNORMAL
APTT: 25.1 SEC (ref 24.4–36.8)
AST SERPL-CCNC: 17 U/L (ref 0–35)
BANDED NEUTROPHILS RELATIVE PERCENT: 1 % (ref 5–11)
BASOPHILS ABSOLUTE: 0.2 K/UL (ref 0–0.2)
BASOPHILS RELATIVE PERCENT: 1 %
BILIRUB SERPL-MCNC: 0.4 MG/DL (ref 0.2–0.7)
BUN BLDV-MCNC: 9 MG/DL (ref 6–20)
CALCIUM SERPL-MCNC: 9.2 MG/DL (ref 8.5–9.9)
CHLORIDE BLD-SCNC: 103 MEQ/L (ref 95–107)
CO2: 18 MEQ/L (ref 20–31)
CREAT SERPL-MCNC: 1.09 MG/DL (ref 0.5–0.9)
EOSINOPHILS ABSOLUTE: 0.2 K/UL (ref 0–0.7)
EOSINOPHILS RELATIVE PERCENT: 1 %
GFR AFRICAN AMERICAN: >60
GFR NON-AFRICAN AMERICAN: 52.1
GLOBULIN: 3.4 G/DL (ref 2.3–3.5)
GLUCOSE BLD-MCNC: 117 MG/DL (ref 70–99)
HCT VFR BLD CALC: 39.2 % (ref 37–47)
HEMOGLOBIN: 13.6 G/DL (ref 12–16)
INR BLD: 1
LYMPHOCYTES ABSOLUTE: 1.4 K/UL (ref 1–4.8)
LYMPHOCYTES RELATIVE PERCENT: 9 %
MCH RBC QN AUTO: 29.9 PG (ref 27–31.3)
MCHC RBC AUTO-ENTMCNC: 34.8 % (ref 33–37)
MCV RBC AUTO: 86.1 FL (ref 82–100)
METAMYELOCYTES RELATIVE PERCENT: 1 %
MICROCYTES: ABNORMAL
MONOCYTES ABSOLUTE: 0.2 K/UL (ref 0.2–0.8)
MONOCYTES RELATIVE PERCENT: 1 %
MYELOCYTE PERCENT: 1 %
NEUTROPHILS ABSOLUTE: 13.6 K/UL (ref 1.4–6.5)
NEUTROPHILS RELATIVE PERCENT: 85 %
NUCLEATED RED BLOOD CELLS: 1 /100 WBC
PDW BLD-RTO: 14.2 % (ref 11.5–14.5)
PLATELET # BLD: 283 K/UL (ref 130–400)
PLATELET SLIDE REVIEW: NORMAL
POIKILOCYTES: ABNORMAL
POTASSIUM SERPL-SCNC: 4.7 MEQ/L (ref 3.4–4.9)
PROTHROMBIN TIME: 13.5 SEC (ref 12.3–14.9)
RBC # BLD: 4.55 M/UL (ref 4.2–5.4)
SODIUM BLD-SCNC: 137 MEQ/L (ref 135–144)
TEAR DROP CELLS: ABNORMAL
TOTAL PROTEIN: 7.3 G/DL (ref 6.3–8)
WBC # BLD: 15.5 K/UL (ref 4.8–10.8)

## 2022-09-25 PROCEDURE — 93971 EXTREMITY STUDY: CPT

## 2022-09-25 PROCEDURE — 85025 COMPLETE CBC W/AUTO DIFF WBC: CPT

## 2022-09-25 PROCEDURE — 85610 PROTHROMBIN TIME: CPT

## 2022-09-25 PROCEDURE — 6360000002 HC RX W HCPCS: Performed by: PHYSICIAN ASSISTANT

## 2022-09-25 PROCEDURE — 85730 THROMBOPLASTIN TIME PARTIAL: CPT

## 2022-09-25 PROCEDURE — 80053 COMPREHEN METABOLIC PANEL: CPT

## 2022-09-25 PROCEDURE — 99284 EMERGENCY DEPT VISIT MOD MDM: CPT

## 2022-09-25 PROCEDURE — 96374 THER/PROPH/DIAG INJ IV PUSH: CPT

## 2022-09-25 PROCEDURE — 6370000000 HC RX 637 (ALT 250 FOR IP): Performed by: PHYSICIAN ASSISTANT

## 2022-09-25 PROCEDURE — 36415 COLL VENOUS BLD VENIPUNCTURE: CPT

## 2022-09-25 RX ORDER — ACETAMINOPHEN 500 MG
500-1000 TABLET ORAL EVERY 6 HOURS PRN
Qty: 50 TABLET | Refills: 0 | Status: SHIPPED | OUTPATIENT
Start: 2022-09-25

## 2022-09-25 RX ORDER — ACETAMINOPHEN 500 MG
1000 TABLET ORAL ONCE
Status: COMPLETED | OUTPATIENT
Start: 2022-09-25 | End: 2022-09-25

## 2022-09-25 RX ORDER — ORPHENADRINE CITRATE 30 MG/ML
30 INJECTION INTRAMUSCULAR; INTRAVENOUS ONCE
Status: COMPLETED | OUTPATIENT
Start: 2022-09-25 | End: 2022-09-25

## 2022-09-25 RX ORDER — OXYCODONE HYDROCHLORIDE 5 MG/1
5 TABLET ORAL ONCE
Status: COMPLETED | OUTPATIENT
Start: 2022-09-25 | End: 2022-09-25

## 2022-09-25 RX ORDER — TIZANIDINE 4 MG/1
4 TABLET ORAL EVERY 6 HOURS PRN
Qty: 21 TABLET | Refills: 0 | Status: SHIPPED | OUTPATIENT
Start: 2022-09-25

## 2022-09-25 RX ADMIN — OXYCODONE 5 MG: 5 TABLET ORAL at 16:21

## 2022-09-25 RX ADMIN — APIXABAN 10 MG: 5 TABLET, FILM COATED ORAL at 17:54

## 2022-09-25 RX ADMIN — ORPHENADRINE CITRATE 30 MG: 30 INJECTION INTRAMUSCULAR; INTRAVENOUS at 19:16

## 2022-09-25 RX ADMIN — ACETAMINOPHEN 1000 MG: 500 TABLET ORAL at 13:46

## 2022-09-25 ASSESSMENT — PAIN - FUNCTIONAL ASSESSMENT: PAIN_FUNCTIONAL_ASSESSMENT: 0-10

## 2022-09-25 ASSESSMENT — PAIN DESCRIPTION - ORIENTATION: ORIENTATION: RIGHT

## 2022-09-25 ASSESSMENT — PAIN DESCRIPTION - LOCATION: LOCATION: LEG

## 2022-09-25 ASSESSMENT — PAIN SCALES - GENERAL
PAINLEVEL_OUTOF10: 10
PAINLEVEL_OUTOF10: 2
PAINLEVEL_OUTOF10: 9
PAINLEVEL_OUTOF10: 10

## 2022-09-25 ASSESSMENT — PAIN DESCRIPTION - FREQUENCY: FREQUENCY: CONTINUOUS

## 2022-09-25 ASSESSMENT — PAIN DESCRIPTION - DESCRIPTORS: DESCRIPTORS: ACHING

## 2022-09-25 ASSESSMENT — PAIN DESCRIPTION - PAIN TYPE: TYPE: ACUTE PAIN

## 2022-09-25 NOTE — ED PROVIDER NOTES
[address] [facility] ED  eMERGENCY dEPARTMENTeNCOUnter      Pt Name: Narda Fuller  MRN: [id number]  Armsjaxgfmendez 1965  Date ofevaluation: 9/25/2022  Provider: Maximino Mortensen PA-C    CHIEF COMPLAINT       Chief Complaint   Patient presents with    Leg Pain     Right times two days          HISTORY OF PRESENT ILLNESS   (Location/Symptom, Timing/Onset,Context/Setting, Quality, Duration, Modifying Factors, Severity)  Note limiting factors. This is a 56 y.o. female with PMHx of HTN, gastritis, DM2, hypothyroidism, asthma, hyperlipidemia, and SLE presenting to the ED for concerns of right leg pain and swelling x 2 days. The patient states that she thought it was a pulled muscle, until she felt a \"knot\" on her right lateral lower leg and she noticed mild swelling. She denies any recent travel, surgeries, trauma, chest pain, shortness of breath or fevers/chills. She rates the pain in her leg as a 10/10, constant, and aching, aggravating factors include palpation, movement and weight bearing, she hasn't taken anything for the pain. NursingNotes were reviewed. REVIEW OF SYSTEMS    (2-9 systems for level 4, 10 or more for level 5)     Review of Systems   Constitutional:  Negative for chills and fever. Respiratory:  Negative for cough and shortness of breath. Cardiovascular:  Positive for leg swelling. Negative for chest pain. Gastrointestinal:  Negative for abdominal pain. Genitourinary: Negative. Musculoskeletal:  Positive for myalgias. Negative for neck pain and neck stiffness. Skin:  Negative for color change and wound. Allergic/Immunologic: Positive for immunocompromised state. Neurological:  Negative for weakness, numbness and headaches. Hematological:  Does not bruise/bleed easily. All other systems reviewed and are negative. Except as noted above the remainder of the review of systems was reviewed and negative.        PAST MEDICAL HISTORY     Past Medical History: Diagnosis Date    Anxiety     Asthma     as child    Chronic kidney disease     Depression     Depression     dysthymia - following lupus dx age 23    Diabetes mellitus ([facility] [address]     Foot fracture, left     GERD (gastroesophageal reflux disease)     Hyperlipidemia     Hypothyroidism     Kidney stones     Lupus ([facility] [address]     dr Ashley  -     Migraine     PONV (postoperative nausea and vomiting)     Renal stone 04/25/2021    Sciatica     left sided         SURGICALHISTORY       Past Surgical History:   Procedure Laterality Date    APPENDECTOMY      ARTHRODESIS Left 11/26/2019    ARTHRODESIS OF THE FIRST METATARSAL PHALANGEAL JOINT LEFT FOOT performed by Mariano Dallas DPM at [facility] 91 N/A 4/26/2021    CYSTOSCOPY RIGHT DOUBLE J STENT PLACEMENT. performed by Allyson Donahue MD at [facility], COLON, DIAGNOSTIC      FOOT CLOSED REDUCTION Left 1/3/2017    LEFT FOOT PERCUTANEOUS PINNING Pansy Puller performed by Yuli Patterson MD at [address] ([address] [place] [address])      LITHOTRIPSY Right 5/5/2021    RIGHT ESWL performed by Allyson Donahue MD at [address] [place] [address] ESOPHAGOGASTRODUODENOSCOPY TRANSORAL DIAGNOSTIC N/A 3/8/2017    EGD ESOPHAGOGASTRODUODENOSCOPY WITH DILATION performed by Lexi Cortes MD at [address] TRANSORAL DIAGNOSTIC N/A 11/7/2018    EGD ESOPHAGOGASTRODUODENOSCOPY WITH DILATION performed by Lexi Cortes MD at [facility] 110 N/A 8/17/2021    EGD WITH BIOPSY AND POLYPECTOMY performed by Henrik Jaramillo MD at [address] [place] [address]       Discharge Medication List as of 9/25/2022  8:24 PM        CONTINUE these medications which have NOT CHANGED    Details   Insulin Disposable Pump (OMNIPOD DASH PODS, GEN 4,) MISC Change every 48 hrs, Disp-15 each, R-3Normal      glycopyrrolate (ROBINUL) 1 MG tablet Take 1 tablet by mouth 2 times daily, Disp-60 tablet, R-3Normal      Insulin Aspart, w/Niacinamide, (FIASP) 100 UNIT/ML SOLN Use via pump max dose 150 units/day, Disp-50 mL, R-3Normal      ondansetron (ZOFRAN ODT) 4 MG disintegrating tablet Take 1 tablet by mouth every 4 hours as needed for Nausea or Vomiting, Disp-10 tablet, R-1Normal      dicyclomine (BENTYL) 10 MG capsule Take 1 capsule by mouth every 6 hours as needed (cramps), Disp-20 capsule, R-0Print      propranolol (INDERAL LA) 120 MG extended release capsule TAKE 2 CAPSULES BY MOUTH EVERY DAYHistorical Med      Insulin Aspart, w/Niacinamide, (FIASP) 100 UNIT/ML SOLN Use via pump max dose 150 units/day, Disp-50 mL, R-3Normal      clonazePAM (KLONOPIN) 0.5 MG tablet TAKE 1 TABLET DAILY AS NEEDEDHistorical Med      busPIRone (BUSPAR) 5 MG tablet Historical Med      rosuvastatin (CRESTOR) 20 MG tablet TAKE 1 TABLET DAILY. Historical Med      esomeprazole (NEXIUM) 40 MG delayed release capsule Take 1 capsule by mouth daily, Disp-30 capsule, R-3Normal      !! Drug [employer] [employer] Lancets 30G MISC Disp-200 each,R-3, NormalCheck blood sugars twice daily or as directed. blood glucose monitor kit and supplies Please give 1 meter covered by insurance Dx E11.65, Disp-1 kit, R-0, Normal      !! Lancets MISC Disp-100 each, R-3, NormalPt test 3x daily Dx E11.65      blood glucose test strips (ASCENSIA AUTODISC VI;ONE TOUCH ULTRA TEST VI) strip Disp-200 strip, R-3, NormalCheck blood sugars twice daily or as directed.       estradiol (CLIMARA) 0.025 MG/24HR Place 0.025 patches onto the skin every 7 days On mondays, R-11Historical Med      albuterol sulfate HFA (VENTOLIN HFA) 108 (90 Base) MCG/ACT inhaler Inhale 2 puffs into the lungs every 6 hours as needed for Wheezing, Disp-1 Inhaler, R-0Print      levothyroxine (SYNTHROID) 50 MCG tablet Take 1 tablet by mouth daily, Disp-90 tablet, R-3      hydroxychloroquine (PLAQUENIL) 200 MG tablet Take 1 tablet by mouth 2 times daily, R-5       !! - Potential duplicate medications found. Please discuss with provider. Hm lidocaine patch [lidocaine], Aspirin, Cephalosporins, Imitrex [sumatriptan], Pcn [penicillins], Seasonal, Toradol [ketorolac tromethamine], and Ultram [tramadol]    FAMILY HISTORY       Family History   Adopted: Yes          SOCIAL HISTORY       Social History     Socioeconomic History    Marital status: Legally      Spouse name: None    Number of children: None    Years of education: None    Highest education level: None   Tobacco Use    Smoking status: Never    Smokeless tobacco: Never   Vaping Use    Vaping Use: Never used   Substance and Sexual Activity    Alcohol use: No     Alcohol/week: 0.0 standard drinks     Comment: denies    Drug use: No     Comment: denies       SCREENINGS    Aries Coma Scale  Eye Opening: Spontaneous  Best Verbal Response: Oriented  Best Motor Response: Obeys commands  Aries Coma Scale Score: 15        PHYSICAL EXAM    (up to 7 for level 4, 8 or more for level 5)     ED Triage Vitals [09/25/22 1315]   BP Temp Temp Source Heart Rate Resp SpO2 Height Weight   (!) 153/84 98.3 °F (36.8 °C) Temporal 87 18 96 % 5' 4\" (1.626 m) 160 lb (72.6 kg)       Physical Exam  Vitals and nursing note reviewed. Constitutional:       General: She is not in acute distress. Appearance: Normal appearance. She is normal weight. She is not ill-appearing, toxic-appearing or diaphoretic. HENT:      Head: Normocephalic and atraumatic. Nose: Nose normal. No congestion or rhinorrhea. Mouth/Throat:      Mouth: Mucous membranes are moist.      Pharynx: Oropharynx is clear. No oropharyngeal exudate or posterior oropharyngeal erythema. Eyes:      Extraocular Movements: Extraocular movements intact.       Conjunctiva/sclera: Conjunctivae normal.   Cardiovascular: Rate and Rhythm: Normal rate and regular rhythm. Heart sounds: Normal heart sounds. No murmur heard. No friction rub. No gallop. Pulmonary:      Effort: Pulmonary effort is normal.      Breath sounds: Normal breath sounds. Abdominal:      General: Abdomen is flat. Bowel sounds are normal.      Palpations: Abdomen is soft. Tenderness: There is no abdominal tenderness. Musculoskeletal:         General: Tenderness present. Normal range of motion. Cervical back: Normal range of motion and neck supple. Right lower leg: Tenderness present. No lacerations or bony tenderness. Edema (trace) present. Legs:    Skin:     General: Skin is warm and dry. Capillary Refill: Capillary refill takes less than 2 seconds. Neurological:      General: No focal deficit present. Mental Status: She is alert and oriented to person, place, and time. Psychiatric:         Mood and Affect: Mood normal.         Behavior: Behavior normal.       RESULTS     RADIOLOGY:   Non-plain filmimages such as CT, Ultrasound and MRI are read by the radiologist.         Interpretation per the Radiologist below, if available at the time ofthis note:    US DUP LOWER EXTREMITY RIGHT GÓMEZ   Final Result   DVT proximal right peroneal vein. Possible superficial vein thrombosis at palpable abnormality mid calf.              LABS:  Labs Reviewed   CBC WITH AUTO DIFFERENTIAL - Abnormal; Notable for the following components:       Result Value    WBC 15.5 (*)     Neutrophils Absolute 13.6 (*)     Bands Relative 1 (*)     All other components within normal limits   COMPREHENSIVE METABOLIC PANEL - Abnormal; Notable for the following components:    CO2 18 (*)     Anion Gap 16 (*)     Glucose 117 (*)     Creatinine 1.09 (*)     GFR Non- 52.1 (*)     All other components within normal limits   APTT   PROTIME-INR       All other labs were within normal range or not returned as of this R-0Normal      tiZANidine (ZANAFLEX) 4 MG tablet Take 1 tablet by mouth every 6 hours as needed (pain), Disp-21 tablet, R-0Normal                (Please note that portions of this note were completed with a voice recognition program.  Efforts were made to edit the dictations but occasionally words are mis-transcribed.)    Daphne Torres PA-C (electronically signed)  Attending Emergency Physician       Daphne Torres PA-C  09/26/22 4971

## 2022-09-25 NOTE — ED NOTES
Pt taken to [address] at this time.       Prasanna Tillman RN  09/25/22 5930

## 2022-09-25 NOTE — ED NOTES
This RN contacted lab again for status of CMP. Lab states running now.       Rah Ambriz RN  09/25/22 0739

## 2022-09-25 NOTE — ED NOTES
Spoke to lab again about CMP, was told it is being re-ran due to a bizarre result     Eliecer Bradshaw, RN  09/25/22 6187

## 2022-09-25 NOTE — ED NOTES
This nurse called lab to ask about CMP. Was told it is running currently.  AARON Dillard updated     [facility]  09/25/22 6285

## 2022-09-25 NOTE — ED NOTES
Pt has strong bilat. Pedal pulses, skin to bilat. Lower extremities pink, warm, and dry, pt able to move lower extremities. No obvious s/s of deformities noted.       Prasanna Tillman RN  09/25/22 6406

## 2022-09-26 ASSESSMENT — ENCOUNTER SYMPTOMS
COUGH: 0
SHORTNESS OF BREATH: 0
COLOR CHANGE: 0
ABDOMINAL PAIN: 0

## 2022-11-06 ENCOUNTER — HOSPITAL ENCOUNTER (EMERGENCY)
Age: 57
Discharge: HOME | End: 2022-11-06
Payer: MEDICARE

## 2022-11-06 ENCOUNTER — APPOINTMENT (OUTPATIENT)
Dept: ULTRASOUND IMAGING | Age: 57
End: 2022-11-06
Payer: MEDICARE

## 2022-11-06 VITALS
BODY MASS INDEX: 24.55 KG/M2 | HEART RATE: 95 BPM | DIASTOLIC BLOOD PRESSURE: 75 MMHG | TEMPERATURE: 97.3 F | OXYGEN SATURATION: 98 % | HEIGHT: 68 IN | WEIGHT: 162 LBS | SYSTOLIC BLOOD PRESSURE: 124 MMHG | RESPIRATION RATE: 16 BRPM

## 2022-11-06 DIAGNOSIS — M79.661 RIGHT CALF PAIN: Primary | ICD-10-CM

## 2022-11-06 PROCEDURE — 99283 EMERGENCY DEPT VISIT LOW MDM: CPT

## 2022-11-06 PROCEDURE — 6370000000 HC RX 637 (ALT 250 FOR IP)

## 2022-11-06 PROCEDURE — 93971 EXTREMITY STUDY: CPT

## 2022-11-06 RX ORDER — OXYCODONE HYDROCHLORIDE AND ACETAMINOPHEN 5; 325 MG/1; MG/1
1 TABLET ORAL ONCE
Status: COMPLETED | OUTPATIENT
Start: 2022-11-06 | End: 2022-11-06

## 2022-11-06 RX ADMIN — OXYCODONE AND ACETAMINOPHEN 1 TABLET: 5; 325 TABLET ORAL at 15:25

## 2022-11-06 ASSESSMENT — ENCOUNTER SYMPTOMS
COUGH: 0
BACK PAIN: 0
SORE THROAT: 0
SHORTNESS OF BREATH: 0
VOMITING: 0
DIARRHEA: 0
ABDOMINAL PAIN: 0
NAUSEA: 0

## 2022-11-06 ASSESSMENT — PAIN SCALES - GENERAL: PAINLEVEL_OUTOF10: 8

## 2022-11-06 ASSESSMENT — PAIN DESCRIPTION - LOCATION: LOCATION: LEG

## 2022-11-06 ASSESSMENT — PAIN - FUNCTIONAL ASSESSMENT: PAIN_FUNCTIONAL_ASSESSMENT: 0-10

## 2022-11-06 ASSESSMENT — PAIN DESCRIPTION - ORIENTATION: ORIENTATION: RIGHT

## 2022-11-06 NOTE — ED PROVIDER NOTES
lupus dx age 23    Diabetes mellitus ([facility] [address]     Foot fracture, left     GERD (gastroesophageal reflux disease)     Hyperlipidemia     Hypothyroidism     Kidney stones     Lupus ([facility] [address]     dr [facility]    Migraine     PONV (postoperative nausea and vomiting)     Renal stone 04/25/2021    Sciatica     left sided         SURGICAL HISTORY       Past Surgical History:   Procedure Laterality Date    APPENDECTOMY      ARTHRODESIS Left 11/26/2019    ARTHRODESIS OF THE FIRST METATARSAL PHALANGEAL JOINT LEFT FOOT performed by Peggy Gr DPM at [facility] 91 N/A 4/26/2021    CYSTOSCOPY RIGHT DOUBLE J STENT PLACEMENT. performed by Rubi Sherwood MD at [facility], COLON, DIAGNOSTIC      FOOT CLOSED REDUCTION Left 1/3/2017    LEFT FOOT PERCUTANEOUS PINNING MOTA FRACTURE performed by Santosh Yost MD at [address] [facility]"" ([address] [place] [address])      LITHOTRIPSY Right 5/5/2021    RIGHT ESWL performed by Rubi Sherwood MD at [address] [place] [address] ESOPHAGOGASTRODUODENOSCOPY TRANSORAL DIAGNOSTIC N/A 3/8/2017    EGD ESOPHAGOGASTRODUODENOSCOPY WITH DILATION performed by Angelique Rush MD at [address] [place] [address] ESOPHAGOGASTRODUODENOSCOPY TRANSORAL DIAGNOSTIC N/A 11/7/2018    EGD ESOPHAGOGASTRODUODENOSCOPY WITH DILATION performed by Angelique Rush MD at [facility] 110 N/A 8/17/2021    EGD WITH BIOPSY AND POLYPECTOMY performed by Amaris Pavon MD at [address] [place] [address]       Previous Medications    ACETAMINOPHEN (TYLENOL) 500 MG TABLET    Take 1-2 tablets by mouth every 6 hours as needed for Pain    ALBUTEROL SULFATE HFA (VENTOLIN HFA) 108 (90 BASE) MCG/ACT INHALER    Inhale 2 puffs into the lungs every 6 hours as needed for Wheezing APIXABAN STARTER PACK (ELIQUIS DVT/PE STARTER PACK) 5 MG TBPK TABLET    Take 1 tablet by mouth See Admin Instructions    BLOOD GLUCOSE MONITOR KIT AND SUPPLIES    Please give 1 meter covered by insurance Dx E11.65    BLOOD GLUCOSE TEST STRIPS (ASCENSIA AUTODISC VI;ONE TOUCH ULTRA TEST VI) STRIP    Check blood sugars twice daily or as directed. BUSPIRONE (BUSPAR) 5 MG TABLET        CLONAZEPAM (KLONOPIN) 0.5 MG TABLET    TAKE 1 TABLET DAILY AS NEEDED    DICYCLOMINE (BENTYL) 10 MG CAPSULE    Take 1 capsule by mouth every 6 hours as needed (cramps)    [employer] UNILET LANCETS 30G MISC    Check blood sugars twice daily or as directed. ESOMEPRAZOLE (NEXIUM) 40 MG DELAYED RELEASE CAPSULE    Take 1 capsule by mouth daily    ESTRADIOL (CLIMARA) 0.025 MG/24HR    Place 0.025 patches onto the skin every 7 days On mondays    GLYCOPYRROLATE (ROBINUL) 1 MG TABLET    Take 1 tablet by mouth 2 times daily    HYDROXYCHLOROQUINE (PLAQUENIL) 200 MG TABLET    Take 1 tablet by mouth 2 times daily    INSULIN ASPART, W/NIACINAMIDE, (FIASP) 100 UNIT/ML SOLN    Use via pump max dose 150 units/day    INSULIN ASPART, W/NIACINAMIDE, (FIASP) 100 UNIT/ML SOLN    Use via pump max dose 150 units/day    INSULIN DISPOSABLE PUMP (OMNIPOD DASH PODS, GEN 4,) MISC    Change every 48 hrs    LANCETS MISC    Pt test 3x daily Dx E11.65    LEVOTHYROXINE (SYNTHROID) 50 MCG TABLET    Take 1 tablet by mouth daily    ONDANSETRON (ZOFRAN ODT) 4 MG DISINTEGRATING TABLET    Take 1 tablet by mouth every 4 hours as needed for Nausea or Vomiting    PROPRANOLOL (INDERAL LA) 120 MG EXTENDED RELEASE CAPSULE    TAKE 2 CAPSULES BY MOUTH EVERY DAY    ROSUVASTATIN (CRESTOR) 20 MG TABLET    TAKE 1 TABLET DAILY.     TIZANIDINE (ZANAFLEX) 4 MG TABLET    Take 1 tablet by mouth every 6 hours as needed (pain)       ALLERGIES     Hm lidocaine patch [lidocaine], Aspirin, Cephalosporins, Imitrex [sumatriptan], Pcn [penicillins], Seasonal, Toradol [ketorolac tromethamine], and Ultram [tramadol]    HISTORY       Family History   Adopted: Yes          SOCIAL HISTORY       Social History     Socioeconomic History    Marital status: Legally      Spouse name: None    Number of children: None    Years of education: None    Highest education level: None   Tobacco Use    Smoking status: Never    Smokeless tobacco: Never   Vaping Use    Vaping Use: Never used   Substance and Sexual Activity    Alcohol use: No     Alcohol/week: 0.0 standard drinks     Comment: denies    Drug use: No     Comment: denies       SCREENINGS    Harvey Coma Scale  Eye Opening: Spontaneous  Best Verbal Response: Oriented  Best Motor Response: Obeys commands  Aries Coma Scale Score: 15      PHYSICAL EXAM    (up to 7 forlevel 4, 8 or more for level 5)     ED Triage Vitals [11/06/22 1459]   BP Temp Temp Source Heart Rate Resp SpO2 Height Weight   124/75 97.3 °F (36.3 °C) Temporal 95 16 98 % 5' 8\" (1.727 m) 162 lb (73.5 kg)       Physical Exam  Vitals and nursing note reviewed. Constitutional:       General: She is not in acute distress. Appearance: She is well-developed. She is not ill-appearing. HENT:      Head: Normocephalic and atraumatic. Right Ear: External ear normal.      Left Ear: External ear normal.      Nose: Nose normal.      Mouth/Throat:      Mouth: Mucous membranes are moist.   Eyes:      Conjunctiva/sclera: Conjunctivae normal.      Pupils: Pupils are equal, round, and reactive to light. Cardiovascular:      Rate and Rhythm: Normal rate and regular rhythm. Pulses: Normal pulses. Dorsalis pedis pulses are 2+ on the right side and 2+ on the left side. Heart sounds: Normal heart sounds. No murmur heard. No friction rub. Pulmonary:      Effort: Pulmonary effort is normal.      Breath sounds: Normal breath sounds. No wheezing or rhonchi. Abdominal:      General: Bowel sounds are normal. There is no distension. Palpations: Abdomen is soft.       Tenderness: There is no abdominal tenderness. Musculoskeletal:         General: Normal range of motion. Cervical back: Normal range of motion and neck supple. Right lower leg: Tenderness present. Left lower leg: No edema. Legs:       Comments: Tenderness to right distal calf   Skin:     General: Skin is warm and dry. Capillary Refill: Capillary refill takes less than 2 seconds. Neurological:      General: No focal deficit present. Mental Status: She is alert and oriented to person, place, and time. Mental status is at baseline. Psychiatric:         Mood and Affect: Mood normal.         Behavior: Behavior normal.         Thought Content: Thought content normal.         Judgment: Judgment normal.         DIAGNOSTIC RESULTS     RADIOLOGY:   Non-plain film images such as CT, Ultrasound and MRI are read by the radiologist. Plain radiographic images are visualized and preliminarilyinterpreted by CANDY Suero CNP with the below findings:        Interpretation per the Radiologist below, if available at the time of this note:    US DUP LOWER EXTREMITY RIGHT GÓMEZ   Final Result   1. The prior lower extremity duplex evaluation from September 25, 2022 is   not available for direct comparison. Per sonographer, nonocclusive thrombus   was present in the right peroneal vein. 2.  On current exam, there is persistent nonocclusive thrombus in the right   peroneal vein which per sonographer is not changed. There is color flow   identified in this area on color Doppler evaluation. 3.  No additional evidence of venous thrombus in the right lower extremity. LABS:  Labs Reviewed - No data to display    All other labs were within normal range or not returnedas of this dictation.     EMERGENCYDEPARTMENT COURSE and DIFFERENTIAL DIAGNOSIS/MDM:   Vitals:    Vitals:    11/06/22 1459   BP: 124/75   Pulse: 95   Resp: 16   Temp: 97.3 °F (36.3 °C)   TempSrc: Temporal   SpO2: 98%   Weight: 162 lb (73.5 kg)   Height: 5' 8\" (1.727 m)       REASSESSMENT            MDM  SR 56year old female presents to ED with right calf pain. Patient afebrile and hemodynamically stable. Medicated with Percocet. US RLE shows persistent nonocclusive thrombus in the right peroneal vein which per sonographer is not changed. No additional evidence of venous thrombus in the right lower extremity. Pain improved. Suspect musculoskeletal pain. Discussed Dx, Tx, continuation of current Rx Eliquis, follow up, reasons to return to ED for further treatment both patient and spouse states understanding and denies any questions. PROCEDURES:    Procedures      FINAL IMPRESSION      1.  Right calf pain Stable         DISPOSITION/PLAN   DISPOSITION        PATIENT REFERRED TO:  Kameron Wolfe MD  [place] [phone]    In 1 day      [facility]  [address] [place] [address]  [phone]    If symptoms worsen    DISCHARGE MEDICATIONS:  New Prescriptions    No medications on file       (Please note that portions of this note were completed with a voice recognition program.  Efforts were made to edit the dictations but occasionally words are mis-transcribed.)    Salomon Check, CANDY - TARA        Salomon Check, CANDY - TARA  11/06/22 8855

## 2022-11-06 NOTE — ED TRIAGE NOTES
Pt presents with right lower calf pain and swelling. Pt denies any injury or long travel. Pt has h/o DVT and is currently taking anticoagulants. Denies CP or SOB.

## 2022-11-08 RX ORDER — INSULIN ASPART INJECTION 100 [IU]/ML
INJECTION, SOLUTION SUBCUTANEOUS
Qty: 50 ML | Refills: 3 | Status: SHIPPED | OUTPATIENT
Start: 2022-11-08

## 2022-11-14 ENCOUNTER — HOSPITAL ENCOUNTER (OUTPATIENT)
Dept: LAB | Age: 57
Discharge: HOME | End: 2022-11-14
Payer: MEDICARE

## 2022-11-14 DIAGNOSIS — E11.65 UNCONTROLLED TYPE 2 DIABETES MELLITUS WITH HYPERGLYCEMIA (HCC): ICD-10-CM

## 2022-11-14 LAB
ANION GAP SERPL CALCULATED.3IONS-SCNC: 16 MEQ/L (ref 9–15)
BUN BLDV-MCNC: 11 MG/DL (ref 6–20)
CALCIUM SERPL-MCNC: 8.6 MG/DL (ref 8.5–9.9)
CHLORIDE BLD-SCNC: 102 MEQ/L (ref 95–107)
CO2: 25 MEQ/L (ref 20–31)
CREAT SERPL-MCNC: 1.15 MG/DL (ref 0.5–0.9)
GFR SERPL CREATININE-BSD FRML MDRD: 56.2 ML/MIN/{1.73_M2}
GLUCOSE BLD-MCNC: 110 MG/DL (ref 70–99)
HBA1C MFR BLD: 6.7 % (ref 4.8–5.9)
POTASSIUM SERPL-SCNC: 3.3 MEQ/L (ref 3.4–4.9)
SODIUM BLD-SCNC: 143 MEQ/L (ref 135–144)

## 2022-11-14 PROCEDURE — 36415 COLL VENOUS BLD VENIPUNCTURE: CPT

## 2022-11-14 PROCEDURE — 83036 HEMOGLOBIN GLYCOSYLATED A1C: CPT

## 2022-11-14 PROCEDURE — 80048 BASIC METABOLIC PNL TOTAL CA: CPT

## 2022-12-08 ENCOUNTER — TELEMEDICINE (OUTPATIENT)
Dept: ENDOCRINOLOGY | Age: 57
End: 2022-12-08
Payer: MEDICARE

## 2022-12-08 ENCOUNTER — TELEPHONE (OUTPATIENT)
Dept: UROLOGY | Age: 57
End: 2022-12-08

## 2022-12-08 DIAGNOSIS — E11.65 UNCONTROLLED TYPE 2 DIABETES MELLITUS WITH HYPERGLYCEMIA (HCC): Primary | ICD-10-CM

## 2022-12-08 PROCEDURE — 99442 PR PHYS/QHP TELEPHONE EVALUATION 11-20 MIN: CPT | Performed by: INTERNAL MEDICINE

## 2022-12-08 RX ORDER — INSULIN PUMP CONTROLLER
EACH MISCELLANEOUS
Qty: 15 EACH | Refills: 3 | Status: SHIPPED | OUTPATIENT
Start: 2022-12-08

## 2022-12-08 NOTE — PROGRESS NOTES
2022    TELEHEALTH EVALUATION -- Audio/Visual (During QCIPE-85 public health emergency)    Due to COVID 19 outbreak, patient's office visit was converted to a virtual visit. Patient was contacted and agreed to proceed with a virtual visit via Telephone Visit  The risks and benefits of converting to a virtual visit were discussed in light of the current infectious disease epidemic. Patient also understood that insurance coverage and co-pays are up to their individual insurance plans. HPI: Follow-up on type 2 diabetes patient currently on OmniPod insulin pump labs were reviewed A1c was 6.7 stable potassium slightly low at 3.3 patient currently not on diuretic overall blood sugars close to 1 40  range patient was also hypothyroidism seeing primary care for it    Suzanne Kim (:  1965) has requested an audio/video evaluation for the following concern(s):    Lab Results   Component Value Date     2022    K 3.3 (L) 2022     2022    CO2 25 2022    BUN 11 2022    CREATININE 1.15 (H) 2022    GLUCOSE 110 (H) 2022    CALCIUM 8.6 2022    PROT 7.3 2022    LABALBU 3.9 2022    BILITOT 0.4 2022    ALKPHOS 118 2022    AST 17 2022    ALT 17 2022    LABGLOM 56.2 (L) 2022    GFRAA >60.0 2022    GLOB 3.4 2022       Hemoglobin A1C   Date Value Ref Range Status   2022 6.7 (H) 4.8 - 5.9 % Final         Review of Systems   Cardiovascular: Negative. Endocrine: Negative. All other systems reviewed and are negative. Prior to Visit Medications    Medication Sig Taking?  Authorizing Provider   Insulin Aspart, w/Niacinamide, (FIASP) 100 UNIT/ML SOLN Use via pump max dose 150 units/day  Mateus Costello MD   apixaban starter pack (ELIQUIS DVT/PE STARTER PACK) 5 MG TBPK tablet Take 1 tablet by mouth See Admin Instructions  Lamine Nicolas PA-C   acetaminophen (TYLENOL) 500 MG tablet Take 1-2 tablets by mouth every 6 hours as needed for Pain  Shala Mims PA-C   tiZANidine (ZANAFLEX) 4 MG tablet Take 1 tablet by mouth every 6 hours as needed (pain)  Shala Mims PA-C   Insulin Disposable Pump (OMNIPOD DASH PODS, GEN 4,) MISC Change every 48 hrs  Mateus Costello MD   glycopyrrolate (ROBINUL) 1 MG tablet Take 1 tablet by mouth 2 times daily  Mateus Costello MD   Insulin Aspart, w/Niacinamide, (FIASP) 100 UNIT/ML SOLN Use via pump max dose 150 units/day  Mateus Costello MD   ondansetron (ZOFRAN ODT) 4 MG disintegrating tablet Take 1 tablet by mouth every 4 hours as needed for Nausea or Vomiting  Hermes Oliver DO   dicyclomine (BENTYL) 10 MG capsule Take 1 capsule by mouth every 6 hours as needed (cramps)  Patient not taking: Reported on 8/18/2022  Kathy Crystal PA-C   propranolol (INDERAL LA) 120 MG extended release capsule TAKE 2 CAPSULES BY MOUTH EVERY DAY  Historical Provider, MD   Insulin Aspart, w/Niacinamide, (FIASP) 100 UNIT/ML SOLN Use via pump max dose 150 units/day  Rj Jefferson MD   clonazePAM (KLONOPIN) 0.5 MG tablet TAKE 1 TABLET DAILY AS NEEDED  Historical Provider, MD   busPIRone (BUSPAR) 5 MG tablet   Historical Provider, MD   rosuvastatin (CRESTOR) 20 MG tablet TAKE 1 TABLET DAILY. Historical Provider, MD   esomeprazole (NEXIUM) 40 MG delayed release capsule Take 1 capsule by mouth daily  Patient not taking: Reported on 8/18/2022  Vielka Colón, APRN - CNP   Drug Russella Mater Lancets 30G MISC Check blood sugars twice daily or as directed. Rj Jefferson MD   blood glucose monitor kit and supplies Please give 1 meter covered by insurance Dx E11.65  Rj Jefferson MD   Lancets MISC Pt test 3x daily Dx E11.65  Rj Jefferson MD   blood glucose test strips (ASCENSIA AUTODISC VI;ONE TOUCH ULTRA TEST VI) strip Check blood sugars twice daily or as directed.   Rj Jefferson MD   estradiol (CLIMARA) 0.025 MG/24HR Place 0.025 patches onto the skin every 7 days On mondays  Historical Provider, MD albuterol sulfate HFA (VENTOLIN HFA) 108 (90 Base) MCG/ACT inhaler Inhale 2 puffs into the lungs every 6 hours as needed for Wheezing  Patient not taking: Reported on 8/18/2022  Pauline Alonzo MD   levothyroxine (SYNTHROID) 50 MCG tablet Take 1 tablet by mouth daily  Kita Regan MD   hydroxychloroquine (PLAQUENIL) 200 MG tablet Take 1 tablet by mouth 2 times daily  Historical Provider, MD       Social History     Tobacco Use    Smoking status: Never    Smokeless tobacco: Never   Vaping Use    Vaping Use: Never used   Substance Use Topics    Alcohol use: No     Alcohol/week: 0.0 standard drinks     Comment: denies    Drug use: No     Comment: denies            PHYSICAL EXAMINATION:  [ INSTRUCTIONS:  \"[x]\" Indicates a positive item  \"[]\" Indicates a negative item  -- DELETE ALL ITEMS NOT EXAMINED]  [] Alert  [] Oriented to person/place/time    [] No apparent distress  [] Toxic appearing    [] Face flushed appearing [] Sclera clear  [] Lips are cyanotic      [] Breathing appears normal  [] Appears tachypneic      [] Rash on visible skin    [] Cranial Nerves II-XII grossly intact    [] Motor grossly intact in visible upper extremities    [] Motor grossly intact in visible lower extremities    [] Normal Mood  [] Anxious appearing    [] Depressed appearing  [] Confused appearing      [] Poor short term memory  [] Poor long term memory    [] OTHER:      Due to this being a TeleHealth encounter, evaluation of the following organ systems is limited: Vitals/Constitutional/EENT/Resp/CV/GI//MS/Neuro/Skin/Heme-Lymph-Imm. ASSESSMENT/PLAN:     Diagnosis Orders   1.  Uncontrolled type 2 diabetes mellitus with hyperglycemia (HCC)  Basic Metabolic Panel    Hemoglobin A1C    Microalbumin / Creatinine Urine Ratio        Orders Placed This Encounter   Procedures    Basic Metabolic Panel     Standing Status:   Future     Standing Expiration Date:   12/8/2023    Hemoglobin A1C     Standing Status:   Future     Standing Expiration Date:   12/8/2023    Microalbumin / Creatinine Urine Ratio     Standing Status:   Future     Standing Expiration Date:   12/8/2023     Orders Placed This Encounter   Medications    Insulin Disposable Pump (OMNIPOD DASH PODS, GEN 4,) MISC     Sig: Change every 48 hrs     Dispense:  15 each     Refill:  3     Continue patient on OmniPod insulin pump as per current pump setting follow-up in 3 months repeat labs prior to next visit increase potassium in the diet  Total time spent was 15 minutes    No follow-ups on file. An  electronic signature was used to authenticate this note. --Link MD Jono on 12/8/2022 at 4:29 PM        Pursuant to the emergency declaration under the [facility] waiver authority and the [employer] Act, this Virtual  Visit was conducted, with patient's consent, to reduce the patient's risk of exposure to COVID-19 and provide continuity of care for an established patient. Services were provided through a video synchronous discussion virtually to substitute for in-person clinic visit.

## 2022-12-08 NOTE — TELEPHONE ENCOUNTER
Pt last seen 5-10-21 post lithotripsy   Pt is having some left kidney area pain that radiates to the abdomen and urgency. She would like to know if she can have a CT done to see if it is another kidney stone.  Patient was advised that you will be out of the office

## 2022-12-09 ENCOUNTER — HOSPITAL ENCOUNTER (EMERGENCY)
Age: 57
Discharge: HOME | End: 2022-12-10
Attending: EMERGENCY MEDICINE
Payer: MEDICARE

## 2022-12-09 ENCOUNTER — APPOINTMENT (OUTPATIENT)
Dept: CT IMAGING | Age: 57
End: 2022-12-09
Payer: MEDICARE

## 2022-12-09 VITALS
TEMPERATURE: 98.4 F | DIASTOLIC BLOOD PRESSURE: 77 MMHG | RESPIRATION RATE: 18 BRPM | SYSTOLIC BLOOD PRESSURE: 108 MMHG | OXYGEN SATURATION: 97 % | HEART RATE: 86 BPM

## 2022-12-09 DIAGNOSIS — R10.9 FLANK PAIN: Primary | ICD-10-CM

## 2022-12-09 LAB
BACTERIA: NEGATIVE /HPF
BILIRUBIN URINE: NEGATIVE
BLOOD, URINE: NEGATIVE
CLARITY: CLEAR
COLOR: YELLOW
EPITHELIAL CELLS, UA: NORMAL /HPF
GLUCOSE URINE: NEGATIVE MG/DL
KETONES, URINE: NEGATIVE MG/DL
LEUKOCYTE ESTERASE, URINE: NORMAL
NITRITE, URINE: NEGATIVE
PH UA: 5 (ref 5–9)
PROTEIN UA: NEGATIVE MG/DL
RBC UA: NORMAL /HPF (ref 0–2)
SPECIFIC GRAVITY UA: <=1.005 (ref 1–1.03)
URINE REFLEX TO CULTURE: NORMAL
UROBILINOGEN, URINE: 0.2 E.U./DL
WBC UA: NORMAL /HPF (ref 0–5)

## 2022-12-09 PROCEDURE — 99284 EMERGENCY DEPT VISIT MOD MDM: CPT

## 2022-12-09 PROCEDURE — 74176 CT ABD & PELVIS W/O CONTRAST: CPT

## 2022-12-09 PROCEDURE — 6370000000 HC RX 637 (ALT 250 FOR IP): Performed by: EMERGENCY MEDICINE

## 2022-12-09 PROCEDURE — 81001 URINALYSIS AUTO W/SCOPE: CPT

## 2022-12-09 RX ORDER — HYDROCODONE BITARTRATE AND ACETAMINOPHEN 5; 325 MG/1; MG/1
1 TABLET ORAL ONCE
Status: COMPLETED | OUTPATIENT
Start: 2022-12-09 | End: 2022-12-09

## 2022-12-09 RX ADMIN — HYDROCODONE BITARTRATE AND ACETAMINOPHEN 1 TABLET: 5; 325 TABLET ORAL at 22:14

## 2022-12-09 ASSESSMENT — PAIN DESCRIPTION - DESCRIPTORS: DESCRIPTORS: SHARP;SHOOTING

## 2022-12-09 ASSESSMENT — PAIN DESCRIPTION - ORIENTATION: ORIENTATION: LEFT

## 2022-12-09 ASSESSMENT — PAIN SCALES - GENERAL
PAINLEVEL_OUTOF10: 7
PAINLEVEL_OUTOF10: 8

## 2022-12-09 ASSESSMENT — PAIN DESCRIPTION - LOCATION: LOCATION: FLANK

## 2022-12-09 ASSESSMENT — PAIN - FUNCTIONAL ASSESSMENT: PAIN_FUNCTIONAL_ASSESSMENT: 0-10

## 2022-12-10 NOTE — ED PROVIDER NOTES
eMERGENCY dEPARTMENT eNCOUnter      Ziyad iGang    Chief Complaint   Patient presents with    Flank Pain     left       HPI    Suzanne Kim is a 56 y.o. female with hx of Anxiety , Asthma , CKD, Depression , Hyperlipidemia , Kidney stones, Lupus , s/p Appendectomy, S/p Cholecystectomy who presents to ED from home   By private car   With complaint of Left flank pain   Onset few days   Intensity of symptoms mild   She denies abdominal pain , Nausea, vomiting , diarrhea.    She denies UTI symptoms, fever, chills   Patient sees Dr Chas Ayers - urologist .       [address]    Past Medical History:   Diagnosis Date    Anxiety     Asthma     as child    Chronic kidney disease     Depression     Depression     dysthymia - following lupus dx age 23    Diabetes mellitus ([facility] [address]     Foot fracture, left     GERD (gastroesophageal reflux disease)     Hyperlipidemia     Hypothyroidism     Kidney stones     Lupus ([facility] [address]     dr Juan Reed -     Migraine     PONV (postoperative nausea and vomiting)     Renal stone 04/25/2021    Sciatica     left sided       SURGICAL HISTORY    Past Surgical History:   Procedure Laterality Date    APPENDECTOMY      ARTHRODESIS Left 11/26/2019    ARTHRODESIS OF THE FIRST METATARSAL PHALANGEAL JOINT LEFT FOOT performed by Tyler Smith DPM at [place] [address] N/A 4/26/2021    CYSTOSCOPY RIGHT DOUBLE J STENT PLACEMENT. performed by Dustin Chapin MD at [address] [facility] [place], DIAGNOSTIC      FOOT CLOSED REDUCTION Left 1/3/2017    LEFT FOOT PERCUTANEOUS PINNING Charmel Joseph performed by Nichole Bergeron MD at [address] ([address] [place] [address])      LITHOTRIPSY Right 5/5/2021    RIGHT ESWL performed by Dustin Chapin MD at [address] [place] [address] ESOPHAGOGASTRODUODENOSCOPY TRANSORAL DIAGNOSTIC N/A 3/8/2017    EGD ESOPHAGOGASTRODUODENOSCOPY WITH DILATION performed by Sofy Ha MD at [address] TRANSORAL DIAGNOSTIC N/A 11/7/2018    EGD ESOPHAGOGASTRODUODENOSCOPY WITH DILATION performed by Sofy Ha MD at [place] [address] ENDOSCOPY N/A 8/17/2021    EGD WITH BIOPSY AND POLYPECTOMY performed by Nik Raymond MD at [address] [place] [address]    Current Outpatient Rx   Medication Sig Dispense Refill    Insulin Disposable Pump (OMNIPOD DASH PODS, GEN 4,) MISC Change every 48 hrs 15 each 3    Insulin Aspart, w/Niacinamide, (FIASP) 100 UNIT/ML SOLN Use via pump max dose 150 units/day 50 mL 3    apixaban starter pack (ELIQUIS DVT/PE STARTER PACK) 5 MG TBPK tablet Take 1 tablet by mouth See Admin Instructions 74 tablet 0    acetaminophen (TYLENOL) 500 MG tablet Take 1-2 tablets by mouth every 6 hours as needed for Pain 50 tablet 0    tiZANidine (ZANAFLEX) 4 MG tablet Take 1 tablet by mouth every 6 hours as needed (pain) 21 tablet 0    glycopyrrolate (ROBINUL) 1 MG tablet Take 1 tablet by mouth 2 times daily 60 tablet 3    Insulin Aspart, w/Niacinamide, (FIASP) 100 UNIT/ML SOLN Use via pump max dose 150 units/day 50 mL 3    ondansetron (ZOFRAN ODT) 4 MG disintegrating tablet Take 1 tablet by mouth every 4 hours as needed for Nausea or Vomiting 10 tablet 1    dicyclomine (BENTYL) 10 MG capsule Take 1 capsule by mouth every 6 hours as needed (cramps) (Patient not taking: Reported on 8/18/2022) 20 capsule 0    propranolol (INDERAL LA) 120 MG extended release capsule TAKE 2 CAPSULES BY MOUTH EVERY DAY      Insulin Aspart, w/Niacinamide, (FIASP) 100 UNIT/ML SOLN Use via pump max dose 150 units/day 50 mL 3    clonazePAM (KLONOPIN) 0.5 MG tablet TAKE 1 TABLET DAILY AS NEEDED      busPIRone (BUSPAR) 5 MG tablet  (Patient not taking: Reported on 8/18/2022)      rosuvastatin (CRESTOR) 20 MG tablet TAKE 1 TABLET DAILY.      esomeprazole (NEXIUM) 40 MG delayed release capsule Take 1 capsule by mouth daily (Patient not taking: Reported on 8/18/2022) 30 capsule 3    Drug [employer] Lancets 30G MISC Check blood sugars twice daily or as directed. 200 each 3    blood glucose monitor kit and supplies Please give 1 meter covered by insurance Dx E11.65 1 kit 0    Lancets MISC Pt test 3x daily Dx E11.65 100 each 3    blood glucose test strips (ASCENSIA AUTODISC VI;ONE TOUCH ULTRA TEST VI) strip Check blood sugars twice daily or as directed.  200 strip 3    estradiol (CLIMARA) 0.025 MG/24HR Place 0.025 patches onto the skin every 7 days On mondays 11    albuterol sulfate HFA (VENTOLIN HFA) 108 (90 Base) MCG/ACT inhaler Inhale 2 puffs into the lungs every 6 hours as needed for Wheezing (Patient not taking: Reported on 8/18/2022) 1 Inhaler 0    levothyroxine (SYNTHROID) 50 MCG tablet Take 1 tablet by mouth daily 90 tablet 3    hydroxychloroquine (PLAQUENIL) 200 MG tablet Take 1 tablet by mouth 2 times daily  5       ALLERGIES    Allergies   Allergen Reactions    Hm Lidocaine Patch [Lidocaine] Rash     Patient states allergic    Aspirin Swelling     Bloody noses    Cephalosporins     Imitrex [Sumatriptan] Hives    Pcn [Penicillins] Swelling    Seasonal Itching     Itch, sneezing,runny nose, watery eyes    Toradol [Ketorolac Tromethamine] Hives     Shortness of breath    Ultram [Tramadol] Hives       FAMILY HISTORY    Family History   Adopted: Yes       SOCIAL HISTORY    Social History     Socioeconomic History    Marital status: Legally    Tobacco Use    Smoking status: Never    Smokeless tobacco: Never   Vaping Use    Vaping Use: Never used   Substance and Sexual Activity    Alcohol use: No     Alcohol/week: 0.0 standard drinks     Comment: denies    Drug use: No     Comment: denies       REVIEW OF SYSTEMS    Constitutional:  Denies fever, chills, weight loss or weakness   Eyes:  Denies photophobia or discharge   HENT:  Denies sore throat or ear pain   Respiratory:  Denies cough or shortness of breath   Cardiovascular:  Denies chest pain, palpitations or swelling   GI:  c/o Left flank pain , Denies abdominal pain, nausea, vomiting, or diarrhea   Musculoskeletal:  Denies back pain   Skin:  Denies rash   Neurologic:  Denies headache, focal weakness or sensory changes   Endocrine:  Denies polyuria or polydypsia   Lymphatic:  Denies swollen glands   Psychiatric:  Denies depression, suicidal ideation or homicidal ideation   All systems negative except as marked. PHYSICAL EXAM    VITAL SIGNS: /77   Pulse 86   Temp 98.4 °F (36.9 °C) (Oral)   Resp 18   LMP  (LMP Unknown)   SpO2 97%    Constitutional:  Well developed, Well nourished, No acute distress, Non-toxic appearance. HENT:  Normocephalic, Atraumatic, Bilateral external ears normal, Oropharynx moist, No oral exudates, Nose normal. Neck- Normal range of motion, No tenderness, Supple, No stridor. Eyes:  PERRL, EOMI, Conjunctiva normal, No discharge. Respiratory:  Normal breath sounds, No respiratory distress, No wheezing, No chest tenderness. Cardiovascular:  Normal heart rate, Normal rhythm, No murmurs, No rubs, No gallops. GI:  Bowel sounds normal, Soft, No tenderness, No masses, No pulsatile masses. : No CVA tenderness. Musculoskeletal:  Intact distal pulses, No edema, No tenderness, No cyanosis, No clubbing. Good range of motion in all major joints. No tenderness to palpation or major deformities noted. Back- No tenderness. Integument:  Warm, Dry, No erythema, No rash. Lymphatic:  No lymphadenopathy noted. Neurologic:  Alert & oriented x 3, Normal motor function, Normal sensory function, No focal deficits noted. Psychiatric:  Affect normal, Judgment normal, Mood normal.       RADIOLOGY    CT ABDOMEN PELVIS WO CONTRAST Additional Contrast? None    (Results Pending)       REEVALUATION   Patient was updated the results of labs and Radiology.   Pain control adeqaute. Labs  Labs Reviewed   URINALYSIS WITH REFLEX TO CULTURE   MICROSCOPIC URINALYSIS             Summation      Patient Course:     ED Medications administered this visit:    Medications   HYDROcodone-acetaminophen (NORCO) 5-325 MG per tablet 1 tablet (1 tablet Oral Given 12/9/22 2214)       New Prescriptions from this visit:    New Prescriptions    No medications on file       Follow-up:  [address]  [address] [facility] [phone]    Call in 1 day      Richard Kumar MD  [address] [address] [phone]    Call in 1 day        Final Impression:   1.  Flank pain               (Please note that portions of this note were completed with a voice recognition program.  Efforts were made to edit the dictations but occasionally words are mis-transcribed.)           Shima Vazquez MD  12/10/22 0004

## 2022-12-10 NOTE — ED TRIAGE NOTES
Pt. Presents with left flank pain that started on Tuesday. She has hx of kidney stones, last one was in May. She attempted to Call Dr. Arias Plants her urologist but he is out of town until Monday. She denies N/V, fevers, hematuria but does endorse urinary frequency with retention.

## 2023-01-29 ENCOUNTER — HOSPITAL ENCOUNTER (EMERGENCY)
Age: 58
Discharge: HOME | End: 2023-01-29
Payer: MEDICARE

## 2023-01-29 ENCOUNTER — APPOINTMENT (OUTPATIENT)
Dept: GENERAL RADIOLOGY | Age: 58
End: 2023-01-29
Payer: MEDICARE

## 2023-01-29 VITALS
DIASTOLIC BLOOD PRESSURE: 79 MMHG | WEIGHT: 162 LBS | TEMPERATURE: 97.2 F | RESPIRATION RATE: 18 BRPM | OXYGEN SATURATION: 99 % | BODY MASS INDEX: 24.55 KG/M2 | HEIGHT: 68 IN | HEART RATE: 81 BPM | SYSTOLIC BLOOD PRESSURE: 131 MMHG

## 2023-01-29 DIAGNOSIS — J01.40 ACUTE NON-RECURRENT PANSINUSITIS: Primary | ICD-10-CM

## 2023-01-29 LAB
INFLUENZA A BY PCR: NEGATIVE
INFLUENZA B BY PCR: NEGATIVE
SARS-COV-2, NAAT: NOT DETECTED

## 2023-01-29 PROCEDURE — 87502 INFLUENZA DNA AMP PROBE: CPT

## 2023-01-29 PROCEDURE — 71045 X-RAY EXAM CHEST 1 VIEW: CPT

## 2023-01-29 PROCEDURE — 87635 SARS-COV-2 COVID-19 AMP PRB: CPT

## 2023-01-29 PROCEDURE — 6370000000 HC RX 637 (ALT 250 FOR IP)

## 2023-01-29 PROCEDURE — 99284 EMERGENCY DEPT VISIT MOD MDM: CPT

## 2023-01-29 RX ORDER — DOXYCYCLINE HYCLATE 100 MG
100 TABLET ORAL 2 TIMES DAILY
Qty: 20 TABLET | Refills: 0 | Status: SHIPPED | OUTPATIENT
Start: 2023-01-29 | End: 2023-02-08

## 2023-01-29 RX ORDER — FLUTICASONE PROPIONATE 50 MCG
2 SPRAY, SUSPENSION (ML) NASAL DAILY
Qty: 16 G | Refills: 0 | Status: SHIPPED | OUTPATIENT
Start: 2023-01-29

## 2023-01-29 RX ORDER — DOXYCYCLINE HYCLATE 100 MG/1
100 CAPSULE ORAL ONCE
Status: COMPLETED | OUTPATIENT
Start: 2023-01-29 | End: 2023-01-29

## 2023-01-29 RX ADMIN — DOXYCYCLINE HYCLATE 100 MG: 100 CAPSULE ORAL at 19:08

## 2023-01-29 ASSESSMENT — ENCOUNTER SYMPTOMS
SORE THROAT: 0
NAUSEA: 0
SINUS PRESSURE: 1
TROUBLE SWALLOWING: 0
DIARRHEA: 0
SINUS PAIN: 1
SHORTNESS OF BREATH: 0
RHINORRHEA: 1
ABDOMINAL PAIN: 0
ALLERGIC/IMMUNOLOGIC NEGATIVE: 1
EYE DISCHARGE: 0
VOMITING: 0
CONSTIPATION: 0
EYE PAIN: 0
COUGH: 1
EYE ITCHING: 0

## 2023-01-29 ASSESSMENT — PAIN DESCRIPTION - FREQUENCY: FREQUENCY: INTERMITTENT

## 2023-01-29 ASSESSMENT — PAIN DESCRIPTION - DESCRIPTORS: DESCRIPTORS: HEAVINESS

## 2023-01-29 ASSESSMENT — PAIN SCALES - GENERAL: PAINLEVEL_OUTOF10: 5

## 2023-01-29 ASSESSMENT — PAIN DESCRIPTION - PAIN TYPE: TYPE: ACUTE PAIN

## 2023-01-29 ASSESSMENT — PAIN DESCRIPTION - LOCATION: LOCATION: CHEST

## 2023-01-29 NOTE — ED PROVIDER NOTES
[facility] ED  eMERGENCY dEPARTMENT eNCOUnter      Pt Name: Lee Villa  MRN: [id number]  [place] 1965  Date of evaluation: 1/29/2023  Provider: VALARIE Orozco        HISTORY OF PRESENT ILLNESS    Suzanne Kim is a 56 y.o. female per chart review has a PMHx of lupus, hypothyroidism, GERD, asthma, type II DM, depression, anxiety, hyperlipidemia, migraine, sciatica, CKD presents to ED for evaluation of generalized illness. Patient reports nonproductive cough, congestion, runny nose, sinus pain/pressure. Reports that symptoms have been present for 1 week. States that she was recently on ABX for strep; reports that symptoms have resolved. Denies ill contacts, exposures. Reports that she has been utilizing OTC Motrin, Tylenol, Mucinex and reports minimal relief of symptoms. Patient denies chest pain, shortness of breath, N/V/D, fever, chills. REVIEW OF SYSTEMS       Review of Systems   Constitutional:  Negative for activity change, appetite change, chills, fatigue and fever. HENT:  Positive for congestion, postnasal drip, rhinorrhea, sinus pressure and sinus pain. Negative for drooling, ear discharge, ear pain, sore throat and trouble swallowing. Eyes:  Negative for pain, discharge and itching. Respiratory:  Positive for cough. Negative for shortness of breath. Cardiovascular:  Negative for chest pain, palpitations and leg swelling. Gastrointestinal:  Negative for abdominal pain, constipation, diarrhea, nausea and vomiting. Endocrine: Negative for polydipsia, polyphagia and polyuria. Genitourinary:  Negative for difficulty urinating and dysuria. Musculoskeletal:  Negative for arthralgias and myalgias. Skin:  Negative for rash and wound. Allergic/Immunologic: Negative. Neurological:  Positive for headaches. Negative for light-headedness. Hematological:  Negative for adenopathy. Does not bruise/bleed easily. Psychiatric/Behavioral: Negative.        Except as noted above the remainder of the review of systems was reviewed and negative.        PAST MEDICAL HISTORY     Past Medical History:   Diagnosis Date    Anxiety     Asthma     as child    Chronic kidney disease     Depression     Depression     dysthymia - following lupus dx age 23    Diabetes mellitus ([facility] [address]     Foot fracture, left     GERD (gastroesophageal reflux disease)     Hyperlipidemia     Hypothyroidism     Kidney stones     Lupus ([facility] [address]     dr Quiana Olson -     Migraine     PONV (postoperative nausea and vomiting)     Renal stone 04/25/2021    Sciatica     left sided         SURGICAL HISTORY       Past Surgical History:   Procedure Laterality Date    APPENDECTOMY      ARTHRODESIS Left 11/26/2019    ARTHRODESIS OF THE FIRST METATARSAL PHALANGEAL JOINT LEFT FOOT performed by Jessica Steiner DPM at [facility] 91 N/A 4/26/2021    CYSTOSCOPY RIGHT DOUBLE J STENT PLACEMENT. performed by Becky Brumfield MD at [address] [place] [address], [place], DIAGNOSTIC      FOOT CLOSED REDUCTION Left 1/3/2017    LEFT FOOT PERCUTANEOUS PINNING [facility] performed by Chasity Henry MD at [address] [place] [address] ([address] [place] [address])      LITHOTRIPSY Right 5/5/2021    RIGHT ESWL performed by Becky Brumfield MD at [facility] ESOPHAGOGASTRODUODENOSCOPY TRANSORAL DIAGNOSTIC N/A 3/8/2017    EGD ESOPHAGOGASTRODUODENOSCOPY WITH DILATION performed by Amalia Kingsley MD at [facility] ESOPHAGOGASTRODUODENOSCOPY TRANSORAL DIAGNOSTIC N/A 11/7/2018    EGD ESOPHAGOGASTRODUODENOSCOPY WITH DILATION performed by Amalia Kingsley MD at [facility] 110 N/A 8/17/2021    EGD WITH BIOPSY AND POLYPECTOMY performed by Claude Mile, MD at [address] [place] [address] Medications    ACETAMINOPHEN (TYLENOL) 500 MG TABLET    Take 1-2 tablets by mouth every 6 hours as needed for Pain    ALBUTEROL SULFATE HFA (VENTOLIN HFA) 108 (90 BASE) MCG/ACT INHALER    Inhale 2 puffs into the lungs every 6 hours as needed for Wheezing    APIXABAN STARTER PACK (ELIQUIS DVT/PE STARTER PACK) 5 MG TBPK TABLET    Take 1 tablet by mouth See Admin Instructions    BLOOD GLUCOSE MONITOR KIT AND SUPPLIES    Please give 1 meter covered by insurance Dx E11.65    BLOOD GLUCOSE TEST STRIPS (ASCENSIA AUTODISC VI;ONE TOUCH ULTRA TEST VI) STRIP    Check blood sugars twice daily or as directed. BUSPIRONE (BUSPAR) 5 MG TABLET        CLONAZEPAM (KLONOPIN) 0.5 MG TABLET    TAKE 1 TABLET DAILY AS NEEDED    DICYCLOMINE (BENTYL) 10 MG CAPSULE    Take 1 capsule by mouth every 6 hours as needed (cramps)    DRUG MART UNILET LANCETS 30G MISC    Check blood sugars twice daily or as directed.     ESOMEPRAZOLE (NEXIUM) 40 MG DELAYED RELEASE CAPSULE    Take 1 capsule by mouth daily    ESTRADIOL (CLIMARA) 0.025 MG/24HR    Place 0.025 patches onto the skin every 7 days On mondays    GLYCOPYRROLATE (ROBINUL) 1 MG TABLET    Take 1 tablet by mouth 2 times daily    HYDROXYCHLOROQUINE (PLAQUENIL) 200 MG TABLET    Take 1 tablet by mouth 2 times daily    INSULIN ASPART, W/NIACINAMIDE, (FIASP) 100 UNIT/ML SOLN    Use via pump max dose 150 units/day    INSULIN ASPART, W/NIACINAMIDE, (FIASP) 100 UNIT/ML SOLN    Use via pump max dose 150 units/day    INSULIN ASPART, W/NIACINAMIDE, (FIASP) 100 UNIT/ML SOLN    Use via pump max dose 150 units/day    INSULIN DISPOSABLE PUMP (OMNIPOD DASH PODS, GEN 4,) MISC    Change every 48 hrs    LANCETS MISC    Pt test 3x daily Dx E11.65    LEVOTHYROXINE (SYNTHROID) 50 MCG TABLET    Take 1 tablet by mouth daily    ONDANSETRON (ZOFRAN ODT) 4 MG DISINTEGRATING TABLET    Take 1 tablet by mouth every 4 hours as needed for Nausea or Vomiting    PROPRANOLOL (INDERAL LA) 120 MG EXTENDED RELEASE CAPSULE    TAKE 2 CAPSULES BY MOUTH EVERY DAY    ROSUVASTATIN (CRESTOR) 20 MG TABLET    TAKE 1 TABLET DAILY. TIZANIDINE (ZANAFLEX) 4 MG TABLET    Take 1 tablet by mouth every 6 hours as needed (pain)       ALLERGIES     Hm lidocaine patch [lidocaine], Aspirin, Cephalosporins, Imitrex [sumatriptan], Pcn [penicillins], Seasonal, Toradol [ketorolac tromethamine], and Ultram [tramadol]    FAMILY HISTORY       Family History   Adopted: Yes          SOCIAL HISTORY       Social History     Socioeconomic History    Marital status: Legally      Spouse name: None    Number of children: None    Years of education: None    Highest education level: None   Tobacco Use    Smoking status: Never    Smokeless tobacco: Never   Vaping Use    Vaping Use: Never used   Substance and Sexual Activity    Alcohol use: No     Alcohol/week: 0.0 standard drinks     Comment: denies    Drug use: No     Comment: denies         PHYSICAL EXAM        ED Triage Vitals [01/29/23 1729]   BP Temp Temp Source Heart Rate Resp SpO2 Height Weight   131/79 97.2 °F (36.2 °C) Temporal 81 18 99 % 5' 8\" (1.727 m) 162 lb (73.5 kg)       Physical Exam  Vitals and nursing note reviewed. Constitutional:       General: She is not in acute distress. Appearance: She is well-developed and normal weight. She is not ill-appearing or toxic-appearing. HENT:      Head: Normocephalic and atraumatic. Nose: Mucosal edema, congestion and rhinorrhea present. Right Sinus: Maxillary sinus tenderness and frontal sinus tenderness present. Left Sinus: Maxillary sinus tenderness and frontal sinus tenderness present. Comments: Thick PND     Mouth/Throat:      Mouth: Mucous membranes are moist.      Pharynx: Oropharynx is clear. Eyes:      Extraocular Movements: Extraocular movements intact. Pupils: Pupils are equal, round, and reactive to light. Neck:      Comments: No nuchal rigidity  Cardiovascular:      Rate and Rhythm: Normal rate and regular rhythm. Pulmonary:      Effort: Pulmonary effort is normal.      Breath sounds: Normal breath sounds. Comments: Lung sounds CTA throughout all lung fields. No accessory muscle usage. Pt speaking in full sentences. Abdominal:      General: Bowel sounds are normal.      Palpations: Abdomen is soft. Musculoskeletal:         General: Normal range of motion. Cervical back: Normal range of motion and neck supple. Skin:     General: Skin is warm and dry. Capillary Refill: Capillary refill takes less than 2 seconds. Neurological:      General: No focal deficit present. Mental Status: She is alert and oriented to person, place, and time. Psychiatric:         Mood and Affect: Mood normal.         LABS:  Labs Reviewed   RAPID INFLUENZA A/B ANTIGENS   COVID-19, RAPID         MDM:   Vitals:    Vitals:    01/29/23 1729   BP: 131/79   Pulse: 81   Resp: 18   Temp: 97.2 °F (36.2 °C)   TempSrc: Temporal   SpO2: 99%   Weight: 162 lb (73.5 kg)   Height: 5' 8\" (1.727 m)       56-year-old female presents to ED for evaluation of generalized illness. Patient is afebrile, hemodynamically stable, nontoxic. Influenza A/B negative. COVID-19 negative. Chest x-ray per radiologist interpretation demonstrates no acute process. Patient is clinically with acute pansinusitis. Patient given p.o. doxycycline while in ED. Patient is tolerating p.o. intake while in ED. Patient given prescription for doxycycline, Flonase. Patient educated on supportive care. Patient given standard anticipatory guidance, return to ED warning signs, strict follow-up guidelines with PCP. Patient verbalized understanding of education, instruction. Patient is agreeable to plan. Patient discharged home in stable condition. CRITICAL CARE TIME   Total CriticalCare time was 0 minutes, excluding separately reportable procedures.   There was a high probability of clinically significant/life threatening deterioration in the patient's condition which required my urgent intervention. PROCEDURES:  Unlessotherwise noted below, none      Procedures      FINAL IMPRESSION      1.  Acute non-recurrent pansinusitis          DISPOSITION/PLAN   DISPOSITION Discharge - Pending Orders Complete 01/29/2023 06:32:51 PM          VALARIE Johns (electronically signed)  Attending Emergency Physician       VALARIE Johns  01/29/23 [id number]

## 2023-01-29 NOTE — DISCHARGE INSTRUCTIONS
Continue taking OTC Mucinex as needed, in addition to Tylenol and Motrin for symptom relief. Return to ED if any new, or worsening symptoms.

## 2023-02-03 ENCOUNTER — TELEPHONE (OUTPATIENT)
Dept: GASTROENTEROLOGY | Age: 58
End: 2023-02-03

## 2023-02-06 ENCOUNTER — OFFICE VISIT (OUTPATIENT)
Dept: GASTROENTEROLOGY | Age: 58
End: 2023-02-06
Payer: MEDICAID

## 2023-02-06 ENCOUNTER — PREP FOR PROCEDURE (OUTPATIENT)
Dept: GASTROENTEROLOGY | Age: 58
End: 2023-02-06

## 2023-02-06 VITALS
DIASTOLIC BLOOD PRESSURE: 80 MMHG | OXYGEN SATURATION: 98 % | HEART RATE: 118 BPM | SYSTOLIC BLOOD PRESSURE: 116 MMHG | BODY MASS INDEX: 24.48 KG/M2 | WEIGHT: 161 LBS

## 2023-02-06 DIAGNOSIS — R13.19 ESOPHAGEAL DYSPHAGIA: Primary | ICD-10-CM

## 2023-02-06 PROCEDURE — 1036F TOBACCO NON-USER: CPT | Performed by: NURSE PRACTITIONER

## 2023-02-06 PROCEDURE — G8484 FLU IMMUNIZE NO ADMIN: HCPCS | Performed by: NURSE PRACTITIONER

## 2023-02-06 PROCEDURE — 3017F COLORECTAL CA SCREEN DOC REV: CPT | Performed by: NURSE PRACTITIONER

## 2023-02-06 PROCEDURE — 3074F SYST BP LT 130 MM HG: CPT | Performed by: NURSE PRACTITIONER

## 2023-02-06 PROCEDURE — G8427 DOCREV CUR MEDS BY ELIG CLIN: HCPCS | Performed by: NURSE PRACTITIONER

## 2023-02-06 PROCEDURE — 99214 OFFICE O/P EST MOD 30 MIN: CPT | Performed by: NURSE PRACTITIONER

## 2023-02-06 PROCEDURE — G8420 CALC BMI NORM PARAMETERS: HCPCS | Performed by: NURSE PRACTITIONER

## 2023-02-06 PROCEDURE — 3079F DIAST BP 80-89 MM HG: CPT | Performed by: NURSE PRACTITIONER

## 2023-02-06 RX ORDER — QUETIAPINE FUMARATE 25 MG/1
TABLET, FILM COATED ORAL
COMMUNITY
Start: 2023-01-26

## 2023-02-06 RX ORDER — PREGABALIN 75 MG/1
CAPSULE ORAL
COMMUNITY
Start: 2016-04-28

## 2023-02-06 RX ORDER — PANTOPRAZOLE SODIUM 40 MG/1
TABLET, DELAYED RELEASE ORAL
COMMUNITY
Start: 2022-11-30

## 2023-02-06 ASSESSMENT — ENCOUNTER SYMPTOMS
ABDOMINAL PAIN: 0
VOICE CHANGE: 0
NAUSEA: 0
RECTAL PAIN: 0
DIARRHEA: 0
VOMITING: 1
SHORTNESS OF BREATH: 0
EYE REDNESS: 0
ABDOMINAL DISTENTION: 0
WHEEZING: 0
COLOR CHANGE: 0
CONSTIPATION: 0
TROUBLE SWALLOWING: 1
CHEST TIGHTNESS: 0
ANAL BLEEDING: 0
BLOOD IN STOOL: 0
EYE PAIN: 0
PHOTOPHOBIA: 0

## 2023-02-06 NOTE — PROGRESS NOTES
Subjective:      Patient ID: Warren Urbano is a 56 y.o. female who presents today for:  Chief Complaint   Patient presents with    Dysphagia       HPI  Patient came in as a follow-up and further management of esophageal dysphagia, reports 2-month history of dysphagia, feels as if food gets stuck, no trouble initiating a swallow, occasional vomiting, no history of food impaction, no history of EOE, unintentional weight loss, or esophagitis. Noted history of diabetes previously poorly controlled now with most recent hemoglobin A1c of 6.4. As a recall patient has had 2 prior EGDs requiring esophageal dilation most recently August 2021. Has been on a PPI once daily. Background  Patient came in as follow-up and further evaluation of abdominal pain and dysphagia, was previously seen and under the care of Dr. Lakhwinder Bailey. Reports intermittent history of abdominal pain and dysphagia with previous EGD notable for retained gastric contents in the context of poorly controlled diabetes and esophageal stricturing, which required dilation with #20 savary dilator. Currently has intermittent dysphagia to liquids and solids, no trouble initiating a swallow, has been on a PPI, has increased epigastric pain and heartburn. No nausea or vomiting, hematemesis, melena, or hematochezia.       Past Medical History:   Diagnosis Date    Anxiety     Asthma     as child    Chronic kidney disease     Depression     Depression     dysthymia - following lupus dx age 23    Diabetes mellitus ([address]     Foot fracture, left     GERD (gastroesophageal reflux disease)     Hyperlipidemia     Hypothyroidism     Kidney stones     Lupus ([address]     dr Keerthi Sue -     Migraine     PONV (postoperative nausea and vomiting)     Renal stone 04/25/2021    Sciatica     left sided     Past Surgical History:   Procedure Laterality Date    APPENDECTOMY      ARTHRODESIS Left 11/26/2019    ARTHRODESIS OF THE FIRST METATARSAL PHALANGEAL JOINT LEFT FOOT performed by Estiven Barnett DPM at [facility] 91 N/A 4/26/2021    CYSTOSCOPY RIGHT DOUBLE J STENT PLACEMENT. performed by Rekha Cross MD at [address], [place], DIAGNOSTIC      FOOT CLOSED REDUCTION Left 1/3/2017    LEFT FOOT PERCUTANEOUS PINNING [place] [facility] FRACTURE performed by Natasha Pope MD at [address] ([facility])      LITHOTRIPSY Right 5/5/2021    RIGHT ESWL performed [facility] Rekha Cross MD at [address]  [facility] ESOPHAGOGASTRODUODENOSCOPY TRANSORAL DIAGNOSTIC N/A 3/8/2017    EGD ESOPHAGOGASTRODUODENOSCOPY WITH DILATION performed by Nitish Kaur MD at [address] ESOPHAGOGASTRODUODENOSCOPY TRANSORAL DIAGNOSTIC N/A 11/7/2018    EGD ESOPHAGOGASTRODUODENOSCOPY WITH DILATION performed by Nitish Kaur MD at [place] [address] ENDOSCOPY N/A 8/17/2021    EGD WITH BIOPSY AND POLYPECTOMY performed by Bob Lawson MD at [address] [place] [address] History     Socioeconomic History    Marital status: Legally      Spouse name: Not on file    Number of children: Not on file    Years of education: Not on file    Highest education level: Not on file   Occupational History    Not on file   Tobacco Use    Smoking status: Never    Smokeless tobacco: Never   Vaping Use    Vaping Use: Never used   Substance and Sexual Activity    Alcohol use: No     Alcohol/week: 0.0 standard drinks     Comment: denies    Drug use: No     Comment: denies    Sexual activity: Not on file   Other Topics Concern    Not on file   Social History Narrative    Not on file     Social Determinants of Health     Financial Resource Strain: Not on file   Food Insecurity: Not on file   Transportation Needs: Not on file   Physical Activity: Not on file   Stress: Not on file Social Connections: Not on file   Intimate Partner Violence: Not on file   Housing Stability: Not on file     Family History   Adopted: Yes     Allergies   Allergen Reactions    Hm Lidocaine Patch [Lidocaine] Rash     Patient states allergic    Aspirin Swelling     Bloody noses    Cephalosporins     Imitrex [Sumatriptan] Hives    Pcn [Penicillins] Swelling    Seasonal Itching     Itch, sneezing,runny nose, watery eyes    Toradol [Ketorolac Tromethamine] Hives     Shortness of breath    Ultram [Tramadol] Hives         Review of Systems   Constitutional:  Negative for appetite change, chills, fever and unexpected weight change. HENT:  Positive for trouble swallowing. Negative for nosebleeds, tinnitus and voice change. Eyes:  Negative for photophobia, pain and redness. Respiratory:  Negative for chest tightness, shortness of breath and wheezing. Cardiovascular:  Negative for chest pain, palpitations and leg swelling. Gastrointestinal:  Positive for vomiting. Negative for abdominal distention, abdominal pain, anal bleeding, blood in stool, constipation, diarrhea, nausea and rectal pain. Endocrine: Negative for polydipsia, polyphagia and polyuria. Genitourinary:  Negative for difficulty urinating and hematuria. Skin:  Negative for color change, pallor and rash. Neurological:  Negative for dizziness, speech difficulty and headaches. Psychiatric/Behavioral:  Negative for confusion and suicidal ideas. Objective:   /80 (Site: Right Upper Arm, Position: Sitting, Cuff Size: Small Adult)   Pulse (!) 118   Wt 161 lb (73 kg)   LMP  (LMP Unknown)   SpO2 98%   BMI 24.48 kg/m²     Physical Exam  Vitals reviewed. Constitutional:       General: She is not in acute distress. Appearance: Normal appearance. She is well-developed and well-groomed. HENT:      Head: Normocephalic and atraumatic. Nose: Nose normal.   Eyes:      General: No scleral icterus.      Extraocular Movements: Extraocular movements intact. Conjunctiva/sclera: Conjunctivae normal.      Pupils: Pupils are equal, round, and reactive to light. Cardiovascular:      Rate and Rhythm: Normal rate and regular rhythm. Pulses: Normal pulses. Heart sounds: Normal heart sounds. Pulmonary:      Effort: Pulmonary effort is normal. No respiratory distress. Breath sounds: Normal breath sounds. No wheezing or rales. Abdominal:      General: Abdomen is flat. Bowel sounds are normal. There is no distension. Palpations: Abdomen is soft. There is no hepatomegaly, splenomegaly or mass. Tenderness: There is no abdominal tenderness. There is no guarding or rebound. Musculoskeletal:         General: No tenderness or deformity. Normal range of motion. Cervical back: Neck supple. Right lower leg: No edema. Left lower leg: No edema. Skin:     General: Skin is warm and dry. Capillary Refill: Capillary refill takes less than 2 seconds. Coloration: Skin is not jaundiced. Findings: No erythema or rash. Neurological:      General: No focal deficit present. Mental Status: She is alert and oriented to person, place, and time.    Psychiatric:         Mood and Affect: Mood normal.         Behavior: Behavior normal.       Laboratory, Pathology, Radiology reviewed in detail with relevantimportant investigations summarized below:  Lab Results   Component Value Date/Time    WBC 15.5 09/25/2022 04:45 PM    WBC 17.5 07/25/2022 08:30 PM    WBC 16.0 07/21/2022 02:30 PM    WBC 11.1 03/26/2022 04:45 PM    WBC 13.7 03/02/2022 06:15 PM    HGB 13.6 09/25/2022 04:45 PM    HGB 15.4 07/25/2022 08:30 PM    HGB 15.1 07/21/2022 02:30 PM    HGB 13.3 03/26/2022 04:45 PM    HGB 13.8 03/02/2022 06:15 PM    HCT 39.2 09/25/2022 04:45 PM    HCT 46.5 07/25/2022 08:30 PM    HCT 44.9 07/21/2022 02:30 PM    HCT 40.6 03/26/2022 04:45 PM    HCT 41.3 03/02/2022 06:15 PM    MCV 86.1 09/25/2022 04:45 PM    MCV 87.7 07/25/2022 08:30 PM    MCV 86.8 07/21/2022 02:30 PM    MCV 87.1 03/26/2022 04:45 PM    MCV 85.2 03/02/2022 06:15 PM     09/25/2022 04:45 PM     07/25/2022 08:30 PM     07/21/2022 02:30 PM     03/26/2022 04:45 PM     03/02/2022 06:15 PM    .  Lab Results   Component Value Date/Time    ALT 17 09/25/2022 04:45 PM    ALT 25 07/25/2022 08:30 PM    ALT 24 07/21/2022 02:30 PM    AST 17 09/25/2022 04:45 PM    AST 22 07/25/2022 08:30 PM    AST 16 07/21/2022 02:30 PM    ALKPHOS 118 09/25/2022 04:45 PM    ALKPHOS 110 07/25/2022 08:30 PM    ALKPHOS 117 07/21/2022 02:30 PM    BILITOT 0.4 09/25/2022 04:45 PM    BILITOT 0.4 07/25/2022 08:30 PM    BILITOT 0.3 07/21/2022 02:30 PM       XR CHEST PORTABLE    Result Date: 1/29/2023  EXAMINATION: ONE XRAY VIEW OF THE CHEST 1/29/2023 6:20 pm COMPARISON: 07/18/2020 HISTORY: ORDERING SYSTEM PROVIDED HISTORY: cough TECHNOLOGIST PROVIDED HISTORY: Reason for exam:->cough Is the patient pregnant?->No What reading provider will be dictating this exam?->CRC FINDINGS: The lungs are without acute focal process. There is no effusion or pneumothorax. The cardiomediastinal silhouette is without acute process. The osseous structures are without acute process. No acute process.      No results found for: IRON, TIBC, FERRITIN  Lab Results   Component Value Date/Time    INR 1.0 09/25/2022 04:45 PM    INR 0.9 10/11/2021 03:15 PM    INR 0.9 05/03/2018 04:10 PM    INR 3.6 12/16/2016 07:43 PM     No components found for: ACUTEHEPATITISSCREEN  No components found for: CELIACPANEL  No components found for: STOOLCULTURE, C.DIFF, STOOLOVAPARASITE, STOOLLEUCOCYTE    Endoscopic hx:  EGD 8/17/21 Dr Trish Toney   No web or stricture identified to explain the reported  dysphagia, biopsies obtained to assess for eosinophilic  esophagitis  Small verrucous-like polyp noted distal esophagus  removed with biopsy forceps  Benign fundic gland polyps  Bx:  A GASTRIC BIOPSIES:    FOCAL CHRONIC GASTRITIS. NEGATIVE FOR HELICOBACTER PYLORI ORGANISMS. SEE COMMENT.     B GASTRIC POLYP:                FUNDIC GLAND POLYP. NEGATIVE FOR HELICOBACTER PYLORI. SEE COMMENT.     C ESOPHAGEAL BIOPSIES:                SUPERFICIAL SQUAMOUS MUCOSA WITH CHANGES OF MILD ACUTE   REFLUX ESOPHAGITIS. NO GLANDULAR MUCOSA PRESENT FOR EVALUATION. NEGATIVE FOR EOSINOPHILIC ESOPHAGITIS. NEGATIVE FOR INTESTINAL METAPLASIA/SOUZA'S ESOPHAGUS. NEGATIVE FOR DYSPLASIA. NEGATIVE FOR FUNGAL ORGANISMS. SEE COMMENT. D ESOPHAGEAL POLYP:                SQUAMOUS PAPILLOMA. NEGATIVE FOR INTESTINAL METAPLASIA/SOUZA'S ESOPHAGUS. NO GLANDULAR MUCOSA PRESENT FOR EVALUATION. NEGATIVE FOR EOSINOPHILIC ESOPHAGITIS. NEGATIVE FOR DYSPLASIA. NEGATIVE FOR FUNGAL ORGANISMS. Assessment:       Diagnosis Orders   1. Esophageal dysphagia  EGD            Plan:      Esophageal dysphagia  2-month history of dysphagia, feels as if food gets stuck, no trouble initiating a swallow, occasional vomiting, no history of food impaction, no history of EOE, unintentional weight loss, or esophagitis. Noted history of diabetes previously poorly controlled now with most recent hemoglobin A1c of 6.4. As a recall patient has had 2 prior EGDs requiring esophageal dilation most recently August 2021. Has been on a PPI once daily.   -continue PPI   -We will proceed with repeat upper endoscopy to assess for any esophageal stricture, or esophagitis given her history of diabetes, risks benefits indications and outcomes discussed at length she is agreeable to proceed.     Associated medical conditions include but are not limited to diabetes,hyperlipidemia, hypothyroidism, lupus, asthma, hypertension, rainouts, kidney stones      Return in about 4 weeks (around 3/6/2023), or if symptoms worsen or fail to improve, for post procedure results.       Aliya Lozano, APRN - CNP

## 2023-02-15 ENCOUNTER — ANESTHESIA EVENT (OUTPATIENT)
Dept: ENDOSCOPY | Age: 58
End: 2023-02-15
Payer: MEDICARE

## 2023-02-15 ENCOUNTER — HOSPITAL ENCOUNTER (OUTPATIENT)
Age: 58
Setting detail: OUTPATIENT SURGERY
Discharge: HOME | End: 2023-02-15
Attending: INTERNAL MEDICINE | Admitting: INTERNAL MEDICINE
Payer: MEDICARE

## 2023-02-15 ENCOUNTER — ANESTHESIA (OUTPATIENT)
Dept: ENDOSCOPY | Age: 58
End: 2023-02-15
Payer: MEDICARE

## 2023-02-15 VITALS
BODY MASS INDEX: 24.71 KG/M2 | HEIGHT: 68 IN | TEMPERATURE: 98.6 F | OXYGEN SATURATION: 94 % | DIASTOLIC BLOOD PRESSURE: 65 MMHG | SYSTOLIC BLOOD PRESSURE: 116 MMHG | RESPIRATION RATE: 16 BRPM | WEIGHT: 163 LBS | HEART RATE: 88 BPM

## 2023-02-15 DIAGNOSIS — R13.19 ESOPHAGEAL DYSPHAGIA: ICD-10-CM

## 2023-02-15 LAB
GLUCOSE BLD-MCNC: 167 MG/DL (ref 70–99)
PERFORMED ON: ABNORMAL

## 2023-02-15 PROCEDURE — 7100000011 HC PHASE II RECOVERY - ADDTL 15 MIN: Performed by: INTERNAL MEDICINE

## 2023-02-15 PROCEDURE — 88305 TISSUE EXAM BY PATHOLOGIST: CPT

## 2023-02-15 PROCEDURE — 6370000000 HC RX 637 (ALT 250 FOR IP): Performed by: INTERNAL MEDICINE

## 2023-02-15 PROCEDURE — 3609017100 HC EGD: Performed by: INTERNAL MEDICINE

## 2023-02-15 PROCEDURE — 2580000003 HC RX 258

## 2023-02-15 PROCEDURE — 6360000002 HC RX W HCPCS: Performed by: NURSE ANESTHETIST, CERTIFIED REGISTERED

## 2023-02-15 PROCEDURE — 2580000003 HC RX 258: Performed by: INTERNAL MEDICINE

## 2023-02-15 PROCEDURE — 2709999900 HC NON-CHARGEABLE SUPPLY: Performed by: INTERNAL MEDICINE

## 2023-02-15 PROCEDURE — 7100000010 HC PHASE II RECOVERY - FIRST 15 MIN: Performed by: INTERNAL MEDICINE

## 2023-02-15 PROCEDURE — 3700000000 HC ANESTHESIA ATTENDED CARE: Performed by: INTERNAL MEDICINE

## 2023-02-15 RX ORDER — SODIUM CHLORIDE 9 MG/ML
INJECTION, SOLUTION INTRAVENOUS
Status: COMPLETED
Start: 2023-02-15 | End: 2023-02-15

## 2023-02-15 RX ORDER — SIMETHICONE 20 MG/.3ML
EMULSION ORAL PRN
Status: DISCONTINUED | OUTPATIENT
Start: 2023-02-15 | End: 2023-02-15 | Stop reason: ALTCHOICE

## 2023-02-15 RX ORDER — MAGNESIUM HYDROXIDE 1200 MG/15ML
LIQUID ORAL PRN
Status: DISCONTINUED | OUTPATIENT
Start: 2023-02-15 | End: 2023-02-15 | Stop reason: ALTCHOICE

## 2023-02-15 RX ORDER — SODIUM CHLORIDE 9 MG/ML
INJECTION, SOLUTION INTRAVENOUS PRN
Status: CANCELLED | OUTPATIENT
Start: 2023-02-15

## 2023-02-15 RX ORDER — SODIUM CHLORIDE 0.9 % (FLUSH) 0.9 %
5-40 SYRINGE (ML) INJECTION PRN
Status: CANCELLED | OUTPATIENT
Start: 2023-02-15

## 2023-02-15 RX ORDER — SODIUM CHLORIDE 9 MG/ML
INJECTION, SOLUTION INTRAVENOUS CONTINUOUS
Status: DISCONTINUED | OUTPATIENT
Start: 2023-02-15 | End: 2023-02-15 | Stop reason: HOSPADM

## 2023-02-15 RX ORDER — PROPOFOL 10 MG/ML
INJECTION, EMULSION INTRAVENOUS PRN
Status: DISCONTINUED | OUTPATIENT
Start: 2023-02-15 | End: 2023-02-15 | Stop reason: SDUPTHER

## 2023-02-15 RX ORDER — SODIUM CHLORIDE 0.9 % (FLUSH) 0.9 %
5-40 SYRINGE (ML) INJECTION EVERY 12 HOURS SCHEDULED
Status: CANCELLED | OUTPATIENT
Start: 2023-02-15

## 2023-02-15 RX ADMIN — SODIUM CHLORIDE: 9 INJECTION, SOLUTION INTRAVENOUS at 10:42

## 2023-02-15 RX ADMIN — PROPOFOL 250 MG: 10 INJECTION, EMULSION INTRAVENOUS at 11:07

## 2023-02-15 ASSESSMENT — PAIN - FUNCTIONAL ASSESSMENT: PAIN_FUNCTIONAL_ASSESSMENT: 0-10

## 2023-02-15 NOTE — H&P
Patient Name: Lady Milner  : 1965  MRN: [id number]  DATE: 02/15/23      ENDOSCOPY  History and Physical    Procedure:    [] Diagnostic Colonoscopy       [] Screening Colonoscopy  [x] EGD      [] ERCP      [] EUS       [] Other    [x] Previous office notes/History and Physical reviewed from the patients chart. Please see EMR for further details of HPI. I have examined the patient's status immediately prior to the procedure and:      Indications/HPI:    []Abdominal Pain   []Cancer- GI/Lung  []Fhx of colon CA/polyps  []History of Polyps   []Silverios   []Melena  []Abnormal Imaging   [x]Dysphagia    []Persistent Pneumonia  []Anemia   []Food Impaction  []History of Polyps  []GI Bleed   []Pulmonary nodule/Mass  []Change in bowel habits  []Heartburn/Reflux  []Rectal Bleed (BRBPR)  []Chest Pain - Non Cardiac  []Heme (+) Stool  []Ulcers  []Constipation   []Hemoptysis   []Varices  []Diarrhea   []Hypoxemia  []Nausea/Vomiting   []Screening   []Crohns/Colitis  []Other:    Anesthesia:   [x] MAC [] Moderate Sedation   [] General   [] None     ROS: 12 pt Review of Symptoms was negative unless mentioned above    Medications:   Prior to Admission medications    Medication Sig Start Date End Date Taking? Authorizing Provider   pantoprazole (PROTONIX) 40 MG tablet Please take 1 (ONE) tablet by mouth at bedtime. CALL MD WHEN ALMOST OUT OF MEDICATION 22   Historical Provider, MD   QUEtiapine (SEROQUEL) 25 MG tablet take 1 to 2 tablets by mouth at bedtime if needed  Patient not taking: Reported on 2/15/2023 1/26/23   Historical Provider, MD   pregabalin (LYRICA) 75 MG capsule Take by mouth.   Patient not taking: Reported on 2/15/2023 4/28/16   Historical Provider, MD   fluticasone (FLONASE) 50 MCG/ACT nasal spray 2 sprays by Each Nostril route daily 23   Leidy Phoenix NP-C   Insulin Disposable Pump (OMNIPOD DASH PODS, GEN 4,) MISC Change every 48 hrs  Patient not taking: No sig reported 22   Juan F Zayas MD Insulin Aspart, w/Niacinamide, (FIASP) 100 UNIT/ML SOLN Use via pump max dose 150 units/day  Patient not taking: No sig reported 11/8/22   Batool Sue MD   apixaban starter pack (ELIQUIS DVT/PE STARTER PACK) 5 MG TBPK tablet Take 1 tablet by mouth See Admin Instructions 9/25/22   Jesi Bai PA-C   acetaminophen (TYLENOL) 500 MG tablet Take 1-2 tablets by mouth every 6 hours as needed for Pain  Patient not taking: No sig reported 9/25/22   Jesi Bai PA-C   tiZANidine (ZANAFLEX) 4 MG tablet Take 1 tablet by mouth every 6 hours as needed (pain)  Patient not taking: Reported on 2/15/2023 9/25/22   Jesi Bai PA-C   glycopyrrolate (ROBINUL) 1 MG tablet Take 1 tablet by mouth 2 times daily  Patient not taking: Reported on 2/15/2023 9/8/22   Batool Sue MD   Insulin Aspart, w/Niacinamide, (FIASP) 100 UNIT/ML SOLN Use via pump max dose 150 units/day  Patient not taking: No sig reported 9/6/22   Batool Sue MD   ondansetron (ZOFRAN ODT) 4 MG disintegrating tablet Take 1 tablet by mouth every 4 hours as needed for Nausea or Vomiting  Patient not taking: No sig reported 8/18/22   Saint Pitcher, DO   propranolol (INDERAL LA) 120 MG extended release capsule TAKE 2 CAPSULES BY MOUTH EVERY DAY 6/1/22   Historical Provider, MD   Insulin Aspart w/Niacinamide, (FIASP) 100 UNIT/ML SOLN Use via pump max dose 150 units/day  Patient not taking: No sig reported 4/8/22   Batool Sue MD   rosuvastatin (CRESTOR) 20 MG tablet TAKE 1 TABLET DAILY. 3/1/22   Historical Provider, MD   esomeprazole (NEXIUM) 40 MG delayed release capsule Take 1 capsule by mouth daily  Patient not taking: No sig reported 8/4/21   CANDY Abbott - CNP   Drug Chris Husbands Lancets 30G MISC Check blood sugars twice daily or as directed.   Patient not taking: No sig reported 10/22/20   Batool Sue MD   blood glucose monitor kit and supplies Please give 1 meter covered by insurance Dx E11.65  Patient not taking: No sig reported 1/20/20   Ananda Coleman MD   Lancets MISC Pt test 3x daily Dx E11.65 1/20/20   Ananda Coleman MD   blood glucose test strips (ASCENSIA AUTODISC VI;ONE TOUCH ULTRA TEST VI) strip Check blood sugars twice daily or as directed. 1/17/20   Ananda Coleman MD   estradiol (CLIMARA) 0.025 MG/24HR Place 0.025 patches onto the skin every 7 days On mondays 9/23/19   Historical Provider, MD   albuterol sulfate HFA (VENTOLIN HFA) 108 (90 Base) MCG/ACT inhaler Inhale 2 puffs into the lungs every 6 hours as needed for Wheezing  Patient not taking: No sig reported 8/13/18   Yuli Herrmann MD   levothyroxine (SYNTHROID) 50 MCG tablet Take 1 tablet by mouth daily 10/11/16   Kita Wheat MD   hydroxychloroquine (PLAQUENIL) 200 MG tablet Take 1 tablet by mouth 2 times daily 7/2/15   Historical Provider, MD       Allergies:    Allergies   Allergen Reactions    Hm Lidocaine Patch [Lidocaine] Rash     Patient states allergic    Aspirin Swelling     Bloody noses    Cephalosporins     Imitrex [Sumatriptan] Hives    Pcn [Penicillins] Swelling    Seasonal Itching     Itch, sneezing,runny nose, watery eyes    Toradol [Ketorolac Tromethamine] Hives     Shortness of breath    Ultram [Tramadol] Hives        History of allergic reaction to anesthesia:  No    Past Medical History:  Past Medical History:   Diagnosis Date    Anxiety     Asthma     as child    Chronic kidney disease     Depression     Depression     dysthymia - following lupus dx age 23    Diabetes mellitus ([facility] [address]     Foot fracture, left     GERD (gastroesophageal reflux disease)     Hyperlipidemia     Hypothyroidism     Kidney stones     Lupus ([facility] [address]     dr Kathe Greenwood [facility]    Migraine     PONV (postoperative nausea and vomiting)     Renal stone 04/25/2021    Sciatica     left sided       Past Surgical History:  Past Surgical History:   Procedure Laterality Date    APPENDECTOMY      ARTHRODESIS Left 11/26/2019    ARTHRODESIS OF THE FIRST METATARSAL PHALANGEAL JOINT LEFT FOOT performed by Maldonado Ledezma Ml Ballard at [facility] N/A 4/26/2021    CYSTOSCOPY RIGHT DOUBLE J STENT PLACEMENT. performed by Mikhail Molina MD at [address], COLON, DIAGNOSTIC      FOOT CLOSED REDUCTION Left 1/3/2017    LEFT FOOT PERCUTANEOUS PINNING Hannah Smithy performed by Liv Iqbal MD at [address] ([address] [facility])      LITHOTRIPSY Right 5/5/2021    RIGHT ESWL performed by Mikhail Molina MD [facility] [address]  [facility] [facility] ESOPHAGOGASTRODUODENOSCOPY TRANSORAL DIAGNOSTIC N/A 3/8/2017    EGD ESOPHAGOGASTRODUODENOSCOPY WITH DILATION performed by Kalin Negro MD at [address] TRANSORAL DIAGNOSTIC N/A 11/7/2018    EGD ESOPHAGOGASTRODUODENOSCOPY WITH DILATION performed by Kalin Negro MD at [address] 8/17/2021    EGD WITH BIOPSY AND POLYPECTOMY performed by Jadiel Connor MD at [address] [facility] History:  Social History     Tobacco Use    Smoking status: Never    Smokeless tobacco: Never   Vaping Use    Vaping Use: Never used   Substance Use Topics    Alcohol use: No     Alcohol/week: 0.0 standard drinks     Comment: denies    Drug use: No     Comment: denies       Vital Signs:   Vitals:    02/15/23 1041   BP: (!) 149/70   Pulse: (!) 106   Resp: 16   Temp: 98.6 °F (37 °C)   SpO2: 95%        Physical Exam:  Cardiac:  [x]WNL  []Comments:  Pulmonary:  [x]WNL   []Comments:   Neuro/Mental Status:  [x]WNL  []Comments:  Abdominal:  [x]WNL    []Comments:  Other:   []WNL  []Comments:    Informed Consent:  The risks and benefits of the procedure have been discussed with either the patient or if they cannot consent, their representative.     Assessment:  Patient examined and appropriate for planned sedation and procedure.     Plan:  Proceed with planned sedation and procedure as above.    Hilton Marie MD  10:44 AM

## 2023-02-15 NOTE — ANESTHESIA PRE PROCEDURE
Department of Anesthesiology  Preprocedure Note       Name:  Chantal Krishnamurthy   Age:  56 y.o.  :  1965                                          MRN:  [id number]         Date:  2/15/2023      Surgeon: Curry Mccall):  Desean Pickard MD    Procedure: Procedure(s):  EGD DIAGNOSTIC ONLY    Medications prior to admission:   Prior to Admission medications    Medication Sig Start Date End Date Taking? Authorizing Provider   pantoprazole (PROTONIX) 40 MG tablet Please take 1 (ONE) tablet by mouth at bedtime.  CALL MD WHEN ALMOST OUT OF MEDICATION 22   Historical Provider, MD   QUEtiapine (SEROQUEL) 25 MG tablet take 1 to 2 tablets by mouth at bedtime if needed 23   Historical Provider, MD   pregabalin (LYRICA) 75 MG capsule Take by mouth. 16   Historical Provider, MD   fluticasone (FLONASE) 50 MCG/ACT nasal spray 2 sprays by Each Nostril route daily 23   Leidy Phoenix NP-C   Insulin Disposable Pump (OMNIPOD DASH PODS, GEN 4,) MISC Change every 48 hrs  Patient not taking: Reported on 2023   Sherry Mora MD   Insulin Aspart, w/Niacinamide, (FIASP) 100 UNIT/ML SOLN Use via pump max dose 150 units/day  Patient not taking: Reported on 2023   Sherry Mora MD   apixaban starter pack (ELIQUIS DVT/PE STARTER PACK) 5 MG TBPK tablet Take 1 tablet by mouth See Admin Instructions 22   Kimberly Walters PA-C   acetaminophen (TYLENOL) 500 MG tablet Take 1-2 tablets by mouth every 6 hours as needed for Pain  Patient not taking: Reported on 2023   Kimberly Walters PA-C   tiZANidine (ZANAFLEX) 4 MG tablet Take 1 tablet by mouth every 6 hours as needed (pain) 22   Kimberly Walters PA-C   glycopyrrolate (ROBINUL) 1 MG tablet Take 1 tablet by mouth 2 times daily 22   Sherry Mora MD   Insulin Aspart, w/Niacinamide, (FIASP) 100 UNIT/ML SOLN Use via pump max dose 150 units/day  Patient not taking: Reported on 2023   Sherry Mora MD   ondansetron (ZOFRAN ODT) 4 MG disintegrating tablet Take 1 tablet by mouth every 4 hours as needed for Nausea or Vomiting  Patient not taking: Reported on 2/6/2023 8/18/22   Vershauna Primrose, DO   propranolol (INDERAL LA) 120 MG extended release capsule TAKE 2 CAPSULES BY MOUTH EVERY DAY 6/1/22   Historical Provider, MD   Insulin Aspart, w/Niacinamide, (FIASP) 100 UNIT/ML SOLN Use via pump max dose 150 units/day  Patient not taking: Reported on 2/6/2023 4/8/22   Linn Pastor MD   rosuvastatin (CRESTOR) 20 MG tablet TAKE 1 TABLET DAILY. 3/1/22   Historical Provider, MD   esomeprazole (NEXIUM) 40 MG delayed release capsule Take 1 capsule by mouth daily  Patient not taking: No sig reported 8/4/21   CANDY Porter - CNP   Drug Hattie Renetta Lancets 30G MISC Check blood sugars twice daily or as directed. Patient not taking: Reported on 2/6/2023 10/22/20   Linn Pastor MD   blood glucose monitor kit and supplies Please give 1 meter covered by insurance Dx E11.65  Patient not taking: Reported on 2/6/2023 1/20/20   Linn Pastor MD   Lancets MISC Pt test 3x daily Dx E11.65 1/20/20   Linn Pastor MD   blood glucose test strips (ASCENSIA AUTODISC VI;ONE TOUCH ULTRA TEST VI) strip Check blood sugars twice daily or as directed. 1/17/20   Linn Pastor MD   estradiol (CLIMARA) 0.025 MG/24HR Place 0.025 patches onto the skin every 7 days On mondays 9/23/19   Historical Provider, MD   albuterol sulfate HFA (VENTOLIN HFA) 108 (90 Base) MCG/ACT inhaler Inhale 2 puffs into the lungs every 6 hours as needed for Wheezing  Patient not taking: No sig reported 8/13/18   Reagan Bloom MD   levothyroxine (SYNTHROID) 50 MCG tablet Take 1 tablet by mouth daily 10/11/16   Kita Wheat MD   hydroxychloroquine (PLAQUENIL) 200 MG tablet Take 1 tablet by mouth 2 times daily 7/2/15   Historical Provider, MD       Current medications:    No current facility-administered medications for this encounter.      Current Outpatient Medications   Medication Sig Dispense Refill    pantoprazole (PROTONIX) 40 MG tablet Please take 1 (ONE) tablet by mouth at bedtime. CALL MD WHEN ALMOST OUT OF MEDICATION      QUEtiapine (SEROQUEL) 25 MG tablet take 1 to 2 tablets by mouth at bedtime if needed      pregabalin (LYRICA) 75 MG capsule Take by mouth.  fluticasone (FLONASE) 50 MCG/ACT nasal spray 2 sprays by Each Nostril route daily 16 g 0    Insulin Disposable Pump (OMNIPOD DASH PODS, GEN 4,) MISC Change every 48 hrs (Patient not taking: Reported on 2/6/2023) 15 each 3    Insulin Aspart, w/Niacinamide, (FIASP) 100 UNIT/ML SOLN Use via pump max dose 150 units/day (Patient not taking: Reported on 2/6/2023) 50 mL 3    apixaban starter pack (ELIQUIS DVT/PE STARTER PACK) 5 MG TBPK tablet Take 1 tablet by mouth See Admin Instructions 74 tablet 0    acetaminophen (TYLENOL) 500 MG tablet Take 1-2 tablets by mouth every 6 hours as needed for Pain (Patient not taking: Reported on 2/6/2023) 50 tablet 0    tiZANidine (ZANAFLEX) 4 MG tablet Take 1 tablet by mouth every 6 hours as needed (pain) 21 tablet 0    glycopyrrolate (ROBINUL) 1 MG tablet Take 1 tablet by mouth 2 times daily 60 tablet 3    Insulin Aspart, w/Niacinamide, (FIASP) 100 UNIT/ML SOLN Use via pump max dose 150 units/day (Patient not taking: Reported on 2/6/2023) 50 mL 3    ondansetron (ZOFRAN ODT) 4 MG disintegrating tablet Take 1 tablet by mouth every 4 hours as needed for Nausea or Vomiting (Patient not taking: Reported on 2/6/2023) 10 tablet 1    propranolol (INDERAL LA) 120 MG extended release capsule TAKE 2 CAPSULES BY MOUTH EVERY DAY      Insulin Aspart, w/Niacinamide, (FIASP) 100 UNIT/ML SOLN Use via pump max dose 150 units/day (Patient not taking: Reported on 2/6/2023) 50 mL 3    rosuvastatin (CRESTOR) 20 MG tablet TAKE 1 TABLET DAILY.       esomeprazole (NEXIUM) 40 MG delayed release capsule Take 1 capsule by mouth daily (Patient not taking: No sig reported) 30 capsule 3    Drug [place] Lancets 30G MISC Check blood sugars twice daily or as directed. (Patient not taking: Reported on 2/6/2023) 200 each 3    blood glucose monitor kit and supplies Please give 1 meter covered by insurance Dx E11.65 (Patient not taking: Reported on 2/6/2023) 1 kit 0    Lancets MISC Pt test 3x daily Dx E11.65 100 each 3    blood glucose test strips (ASCENSIA AUTODISC VI;ONE TOUCH ULTRA TEST VI) strip Check blood sugars twice daily or as directed. 200 strip 3    estradiol (CLIMARA) 0.025 MG/24HR Place 0.025 patches onto the skin every 7 days On mondays 11    albuterol sulfate HFA (VENTOLIN HFA) 108 (90 Base) MCG/ACT inhaler Inhale 2 puffs into the lungs every 6 hours as needed for Wheezing (Patient not taking: No sig reported) 1 Inhaler 0    levothyroxine (SYNTHROID) 50 MCG tablet Take 1 tablet by mouth daily 90 tablet 3    hydroxychloroquine (PLAQUENIL) 200 MG tablet Take 1 tablet by mouth 2 times daily  5       Allergies:     Allergies   Allergen Reactions    Hm Lidocaine Patch [Lidocaine] Rash     Patient states allergic    Aspirin Swelling     Bloody noses    Cephalosporins     Imitrex [Sumatriptan] Hives    Pcn [Penicillins] Swelling    Seasonal Itching     Itch, sneezing,runny nose, watery eyes    Toradol [Ketorolac Tromethamine] Hives     Shortness of breath    Ultram [Tramadol] Hives       Problem List:    Patient Active Problem List   Diagnosis Code    DM (diabetes mellitus) ([facility] [address] E11.9    Hyperlipemia E78.5    Hypothyroid E03.9    Vitamin D deficiency E55.9    SLE (systemic lupus erythematosus related syndrome) ([facility]) M32.9    Asthma J45.909    Panic disorder with agoraphobia F40.01    Hereditary essential tremor G25.0    Allergic rhinitis J30.9    Type 2 diabetes mellitus with diabetic mononeuropathy ([facility]) E11.41    Raynaud's phenomenon without gangrene I73.00    Essential hypertension I10    Type 2 diabetes mellitus without complication ([facility]) N65.5    [facility] M32.9    Foot fracture, left S92.902V    Acute non-recurrent frontal sinusitis J01.10    Gastritis without bleeding K29.70    Dysphagia R13.10    Gastric polyp K31.7    Chest pain R07.9    Renal stone N20.0    Right ureteral calculus N20.1    Epigastric pain R10.13    Esophageal polyp K22.81       Past Medical History:        Diagnosis Date    Anxiety     Asthma     as child    Chronic kidney disease     Depression     Depression     dysthymia - following lupus dx age 23    Diabetes mellitus ([facility] [address]     Foot fracture, left     GERD (gastroesophageal reflux disease)     Hyperlipidemia     Hypothyroidism     Kidney stones     Lupus ([facility] [address]     dr barrera -     Migraine     PONV (postoperative nausea and vomiting)     Renal stone 04/25/2021    Sciatica     left sided       Past Surgical History:        Procedure Laterality Date    APPENDECTOMY      ARTHRODESIS Left 11/26/2019    ARTHRODESIS OF THE FIRST METATARSAL PHALANGEAL JOINT LEFT FOOT performed by Tr Oviedo DPM at [address] [facility] N/A 4/26/2021    CYSTOSCOPY RIGHT DOUBLE J STENT PLACEMENT. performed by Silva Díaz MD at [facility]      COLONOSCOPY      ENDOSCOPY, COLON, DIAGNOSTIC      FOOT CLOSED REDUCTION Left 1/3/2017    LEFT FOOT PERCUTANEOUS PINNING Demaris Cookey FRACTURE performed by Liss Damon MD at [address] (CERVIX STATUS UNKNOWN)      LITHOTRIPSY Right 5/5/2021    RIGHT ESWL performed by Silva Díaz MD at [address] [facility] ESOPHAGOGASTRODUODENOSCOPY TRANSORAL DIAGNOSTIC N/A 3/8/2017    EGD ESOPHAGOGASTRODUODENOSCOPY WITH DILATION [facility] ESOPHAGOGASTRODUODENOSCOPY TRANSORAL DIAGNOSTIC N/A 11/7/2018    EGD ESOPHAGOGASTRODUODENOSCOPY WITH DILATION performed by Sim Ocampo MD at [place] [address] GASTROINTESTINAL ENDOSCOPY      UPPER GASTROINTESTINAL ENDOSCOPY N/A 8/17/2021    EGD WITH BIOPSY AND POLYPECTOMY performed by Waylan Severs, MD at [facility]       Social History:    Social History     Tobacco Use    Smoking status: Never    Smokeless tobacco: Never   Substance Use Topics    Alcohol use: No     Alcohol/week: 0.0 standard drinks     Comment: denies                                Counseling given: Not Answered      Vital Signs (Current): There were no vitals filed for this visit. BP Readings from Last 3 Encounters:   02/06/23 116/80   01/29/23 131/79   12/09/22 108/77       NPO Status:                                                                                 BMI:   Wt Readings from Last 3 Encounters:   02/06/23 161 lb (73 kg)   01/29/23 162 lb (73.5 kg)   11/06/22 162 lb (73.5 kg)     There is no height or weight on file to calculate BMI.    CBC:   Lab Results   Component Value Date/Time    WBC 15.5 09/25/2022 04:45 PM    RBC 4.55 09/25/2022 04:45 PM    HGB 13.6 09/25/2022 04:45 PM    HCT 39.2 09/25/2022 04:45 PM    MCV 86.1 09/25/2022 04:45 PM    RDW 14.2 09/25/2022 04:45 PM     09/25/2022 04:45 PM       CMP:   Lab Results   Component Value Date/Time     11/14/2022 07:50 AM    K 3.3 11/14/2022 07:50 AM    K 4.2 04/26/2021 06:22 AM     11/14/2022 07:50 AM    CO2 25 11/14/2022 07:50 AM    BUN 11 11/14/2022 07:50 AM    CREATININE 1.15 11/14/2022 07:50 AM    GFRAA >60.0 09/25/2022 04:45 PM    LABGLOM 56.2 11/14/2022 07:50 AM    GLUCOSE 110 11/14/2022 07:50 AM    GLUCOSE 228 04/08/2019 09:49 AM    PROT 7.3 09/25/2022 04:45 PM    CALCIUM 8.6 11/14/2022 07:50 AM    BILITOT 0.4 09/25/2022 04:45 PM    ALKPHOS 118 09/25/2022 04:45 PM    AST 17 09/25/2022 04:45 PM    ALT 17 09/25/2022 04:45 PM       POC Tests: No results for input(s): POCGLU, POCNA, POCK, POCCL, POCBUN, POCHEMO, POCHCT in the last 72 hours.     Coags:   Lab Results Component Value Date/Time    PROTIME 13.5 09/25/2022 04:45 PM    INR 1.0 09/25/2022 04:45 PM    APTT 25.1 09/25/2022 04:45 PM       HCG (If Applicable): No results found for: PREGTESTUR, PREGSERUM, HCG, HCGQUANT     ABGs:   Lab Results   Component Value Date/Time    PHART 7.466 06/28/2020 03:20 AM    PO2ART 84 06/28/2020 03:20 AM    ZBI3VGY 34 06/28/2020 03:20 AM    CVG5KYJ 24.6 06/28/2020 03:20 AM    BEART 1 06/28/2020 03:20 AM    T9VAUFUW 97 06/28/2020 03:20 AM        Type & Screen (If Applicable):  No results found for: LABABO, LABRH    Drug/Infectious Status (If Applicable):  No results found for: HIV, HEPCAB    COVID-19 Screening (If Applicable):   Lab Results   Component Value Date/Time    COVID19 Not Detected 01/29/2023 06:16 PM           Anesthesia Evaluation  Patient summary reviewed and Nursing notes reviewed   history of anesthetic complications: PONV. Airway: Mallampati: II  TM distance: >3 FB   Neck ROM: full  Mouth opening: > = 3 FB   Dental:          Pulmonary:normal exam  breath sounds clear to auscultation  (+) asthma:                            Cardiovascular:    (+) hypertension:, hyperlipidemia        Rhythm: regular  Rate: normal                    Neuro/Psych:   (+) neuromuscular disease:, headaches:, psychiatric history:            GI/Hepatic/Renal:   (+) GERD:, renal disease: CRI,           Endo/Other:    (+) Diabetes, hypothyroidism: arthritis:., .                 Abdominal:             Vascular: negative vascular ROS. Other Findings:           Anesthesia Plan      MAC     ASA 3       Induction: intravenous. Anesthetic plan and risks discussed with patient. Plan discussed with attending.                     CANDY Mcmahon CRNA   2/15/2023

## 2023-02-15 NOTE — ANESTHESIA POSTPROCEDURE EVALUATION
Department of Anesthesiology  Postprocedure Note    Patient: Melody Steiner  MRN: [id number]  YOB: 1965  Date of evaluation: 2/15/2023      Procedure Summary     Date: 02/15/23 Room / Location: [address] [place] [address]    Anesthesia Start: 1104 Anesthesia Stop:     Procedure: EGD w/biopsy Diagnosis:       Esophageal dysphagia      (Esophageal dysphagia [R13.19])    Surgeons: Javan Hall MD Responsible Provider: CANDY Vargas CRNA    Anesthesia Type: MAC ASA Status: 3          Anesthesia Type: No value filed.     Akshat Phase I: Akshat Score: 10    Akshat Phase II:        Anesthesia Post Evaluation    Patient location during evaluation: bedside  Patient participation: complete - patient participated  Level of consciousness: awake and awake and alert  Pain score: 0  Airway patency: patent  Nausea & Vomiting: no nausea and no vomiting  Complications: no  Cardiovascular status: blood pressure returned to baseline and hemodynamically stable  Respiratory status: acceptable and spontaneous ventilation  Hydration status: euvolemic

## 2023-02-21 ENCOUNTER — HOSPITAL ENCOUNTER (OUTPATIENT)
Dept: LAB | Age: 58
Discharge: HOME | End: 2023-02-21
Payer: MEDICARE

## 2023-02-21 DIAGNOSIS — E11.65 UNCONTROLLED TYPE 2 DIABETES MELLITUS WITH HYPERGLYCEMIA (HCC): ICD-10-CM

## 2023-02-21 PROBLEM — R80.9 MICROALBUMINURIA: Status: ACTIVE | Noted: 2023-02-21

## 2023-02-21 PROBLEM — E78.1 HYPERTRIGLYCERIDEMIA: Status: ACTIVE | Noted: 2023-02-21

## 2023-02-21 PROBLEM — M70.62 TROCHANTERIC BURSITIS OF LEFT HIP: Status: ACTIVE | Noted: 2023-02-21

## 2023-02-21 PROBLEM — E66.3 OVERWEIGHT WITH BODY MASS INDEX (BMI) OF 29 TO 29.9 IN ADULT: Status: ACTIVE | Noted: 2023-02-21

## 2023-02-21 PROBLEM — K31.84 GASTROPARESIS: Status: ACTIVE | Noted: 2023-02-21

## 2023-02-21 PROBLEM — D72.829 LEUKOCYTOSIS: Status: ACTIVE | Noted: 2023-02-21

## 2023-02-21 PROBLEM — E66.811 CLASS 1 OBESITY DUE TO EXCESS CALORIES WITH SERIOUS COMORBIDITY AND BODY MASS INDEX (BMI) OF 30.0 TO 30.9 IN ADULT: Status: ACTIVE | Noted: 2023-02-21

## 2023-02-21 PROBLEM — D75.1 POLYCYTHEMIA: Status: ACTIVE | Noted: 2023-02-21

## 2023-02-21 PROBLEM — N18.32 TYPE 2 DIABETES MELLITUS WITH STAGE 3B CHRONIC KIDNEY DISEASE, WITHOUT LONG-TERM CURRENT USE OF INSULIN (MULTI): Status: ACTIVE | Noted: 2023-02-21

## 2023-02-21 PROBLEM — I82.431 ACUTE DEEP VEIN THROMBOSIS (DVT) OF POPLITEAL VEIN OF RIGHT LOWER EXTREMITY (MULTI): Status: ACTIVE | Noted: 2023-02-21

## 2023-02-21 PROBLEM — E66.09 CLASS 1 OBESITY DUE TO EXCESS CALORIES WITH SERIOUS COMORBIDITY AND BODY MASS INDEX (BMI) OF 30.0 TO 30.9 IN ADULT: Status: ACTIVE | Noted: 2023-02-21

## 2023-02-21 PROBLEM — K76.0 HEPATIC STEATOSIS: Status: ACTIVE | Noted: 2023-02-21

## 2023-02-21 PROBLEM — Z96.41 INSULIN PUMP IN PLACE: Status: ACTIVE | Noted: 2023-02-21

## 2023-02-21 PROBLEM — E11.22 TYPE 2 DIABETES MELLITUS WITH STAGE 3B CHRONIC KIDNEY DISEASE, WITHOUT LONG-TERM CURRENT USE OF INSULIN (MULTI): Status: ACTIVE | Noted: 2023-02-21

## 2023-02-21 PROBLEM — G89.4 CHRONIC PAIN SYNDROME: Status: ACTIVE | Noted: 2023-02-21

## 2023-02-21 PROBLEM — J45.41 MODERATE PERSISTENT ASTHMA WITH ACUTE EXACERBATION (HHS-HCC): Status: ACTIVE | Noted: 2023-02-21

## 2023-02-21 PROBLEM — K58.9 IBS (IRRITABLE BOWEL SYNDROME): Status: ACTIVE | Noted: 2023-02-21

## 2023-02-21 PROBLEM — M1A.09X0 CHRONIC GOUT OF MULTIPLE SITES: Status: ACTIVE | Noted: 2023-02-21

## 2023-02-21 PROBLEM — M32.9 SYSTEMIC LUPUS ERYTHEMATOSUS (MULTI): Status: ACTIVE | Noted: 2023-02-21

## 2023-02-21 PROBLEM — M47.816 SPONDYLOSIS OF LUMBAR REGION WITHOUT MYELOPATHY OR RADICULOPATHY: Status: ACTIVE | Noted: 2023-02-21

## 2023-02-21 PROBLEM — E53.8 VITAMIN B12 DEFICIENCY: Status: ACTIVE | Noted: 2023-02-21

## 2023-02-21 PROBLEM — N20.0 NEPHROLITHIASIS: Status: ACTIVE | Noted: 2023-02-21

## 2023-02-21 PROBLEM — G25.0 ESSENTIAL TREMOR: Status: ACTIVE | Noted: 2023-02-21

## 2023-02-21 PROBLEM — K21.9 GASTROESOPHAGEAL REFLUX DISEASE: Status: ACTIVE | Noted: 2023-02-21

## 2023-02-21 PROBLEM — E78.2 MIXED HYPERLIPIDEMIA: Status: ACTIVE | Noted: 2023-02-21

## 2023-02-21 PROBLEM — I10 ESSENTIAL HYPERTENSION: Status: ACTIVE | Noted: 2023-02-21

## 2023-02-21 PROBLEM — M85.80 OSTEOPENIA: Status: ACTIVE | Noted: 2023-02-21

## 2023-02-21 PROBLEM — N95.1 HOT FLASH, MENOPAUSAL: Status: ACTIVE | Noted: 2023-02-21

## 2023-02-21 PROBLEM — N18.32 STAGE 3B CHRONIC KIDNEY DISEASE (MULTI): Status: ACTIVE | Noted: 2023-02-21

## 2023-02-21 PROBLEM — F32.1 EPISODE OF MODERATE MAJOR DEPRESSION (MULTI): Status: ACTIVE | Noted: 2023-02-21

## 2023-02-21 PROBLEM — G43.009 MIGRAINE WITHOUT AURA AND WITHOUT STATUS MIGRAINOSUS, NOT INTRACTABLE: Status: ACTIVE | Noted: 2023-02-21

## 2023-02-21 PROBLEM — L65.0 TELOGEN HAIR LOSS: Status: ACTIVE | Noted: 2023-02-21

## 2023-02-21 PROBLEM — R74.8 ELEVATED ALKALINE PHOSPHATASE LEVEL: Status: ACTIVE | Noted: 2023-02-21

## 2023-02-21 PROBLEM — E55.9 VITAMIN D DEFICIENCY: Status: ACTIVE | Noted: 2023-02-21

## 2023-02-21 PROBLEM — J30.1 SEASONAL ALLERGIC RHINITIS DUE TO POLLEN: Status: ACTIVE | Noted: 2023-02-21

## 2023-02-21 PROBLEM — M79.7 FIBROMYALGIA: Status: ACTIVE | Noted: 2023-02-21

## 2023-02-21 PROBLEM — E03.4 HYPOTHYROIDISM DUE TO ACQUIRED ATROPHY OF THYROID: Status: ACTIVE | Noted: 2023-02-21

## 2023-02-21 PROBLEM — R53.81 DEBILITY: Status: ACTIVE | Noted: 2023-02-21

## 2023-02-21 LAB
ANION GAP SERPL CALCULATED.3IONS-SCNC: 11 MEQ/L (ref 9–15)
BUN BLDV-MCNC: 7 MG/DL (ref 6–20)
CALCIUM SERPL-MCNC: 9.2 MG/DL (ref 8.5–9.9)
CHLORIDE BLD-SCNC: 104 MEQ/L (ref 95–107)
CO2: 26 MEQ/L (ref 20–31)
CREAT SERPL-MCNC: 1.05 MG/DL (ref 0.5–0.9)
CREATININE URINE: 154.5 MG/DL
GFR SERPL CREATININE-BSD FRML MDRD: >60 ML/MIN/{1.73_M2}
GLUCOSE BLD-MCNC: 130 MG/DL (ref 70–99)
HBA1C MFR BLD: 7.2 % (ref 4.8–5.9)
MICROALBUMIN UR-MCNC: <1.2 MG/DL
MICROALBUMIN/CREAT UR-RTO: NORMAL MG/G (ref 0–30)
POTASSIUM SERPL-SCNC: 3.9 MEQ/L (ref 3.4–4.9)
SODIUM BLD-SCNC: 141 MEQ/L (ref 135–144)

## 2023-02-21 PROCEDURE — 80048 BASIC METABOLIC PNL TOTAL CA: CPT

## 2023-02-21 PROCEDURE — 83036 HEMOGLOBIN GLYCOSYLATED A1C: CPT

## 2023-02-21 PROCEDURE — 36415 COLL VENOUS BLD VENIPUNCTURE: CPT

## 2023-02-21 PROCEDURE — 82043 UR ALBUMIN QUANTITATIVE: CPT

## 2023-02-21 PROCEDURE — 82570 ASSAY OF URINE CREATININE: CPT

## 2023-02-21 RX ORDER — NALOXONE HYDROCHLORIDE 4 MG/.1ML
SPRAY NASAL
COMMUNITY
Start: 2022-02-23 | End: 2024-01-05 | Stop reason: SDUPTHER

## 2023-02-21 RX ORDER — PREGABALIN 75 MG/1
CAPSULE ORAL
COMMUNITY
Start: 2022-04-29 | End: 2024-05-14 | Stop reason: WASHOUT

## 2023-02-21 RX ORDER — ESTRADIOL 0.03 MG/D
1 PATCH TRANSDERMAL
COMMUNITY
Start: 2018-10-01 | End: 2023-04-17 | Stop reason: SDUPTHER

## 2023-02-21 RX ORDER — GLYCOPYRROLATE 1 MG/1
1 TABLET ORAL 2 TIMES DAILY
COMMUNITY
Start: 2022-06-09 | End: 2024-05-14 | Stop reason: WASHOUT

## 2023-02-21 RX ORDER — BLOOD-GLUCOSE TRANSMITTER
EACH MISCELLANEOUS
COMMUNITY
End: 2024-05-14 | Stop reason: WASHOUT

## 2023-02-21 RX ORDER — BECLOMETHASONE DIPROPIONATE HFA 80 UG/1
AEROSOL, METERED RESPIRATORY (INHALATION)
COMMUNITY
Start: 2019-08-05 | End: 2024-05-14 | Stop reason: WASHOUT

## 2023-02-21 RX ORDER — METOPROLOL SUCCINATE 25 MG/1
0.5 TABLET, EXTENDED RELEASE ORAL EVERY EVENING
COMMUNITY
Start: 2022-06-27 | End: 2024-05-14 | Stop reason: WASHOUT

## 2023-02-21 RX ORDER — PANTOPRAZOLE SODIUM 40 MG/1
1 TABLET, DELAYED RELEASE ORAL NIGHTLY
COMMUNITY
Start: 2022-06-27 | End: 2023-06-07 | Stop reason: SDUPTHER

## 2023-02-21 RX ORDER — BLOOD-GLUCOSE SENSOR
EACH MISCELLANEOUS
COMMUNITY
End: 2024-01-05 | Stop reason: SDUPTHER

## 2023-02-21 RX ORDER — CYANOCOBALAMIN 1000 UG/ML
1000 INJECTION, SOLUTION INTRAMUSCULAR; SUBCUTANEOUS
COMMUNITY
Start: 2021-11-08 | End: 2023-09-08 | Stop reason: SDUPTHER

## 2023-02-21 RX ORDER — PEN NEEDLE, DIABETIC 30 GX3/16"
NEEDLE, DISPOSABLE MISCELLANEOUS
COMMUNITY

## 2023-02-21 RX ORDER — ROSUVASTATIN CALCIUM 20 MG/1
1 TABLET, COATED ORAL DAILY
COMMUNITY
Start: 2014-02-04 | End: 2023-08-17 | Stop reason: SDUPTHER

## 2023-02-21 RX ORDER — FAMOTIDINE 20 MG/1
1 TABLET, FILM COATED ORAL 2 TIMES DAILY
COMMUNITY
Start: 2022-10-10 | End: 2023-11-20 | Stop reason: SDUPTHER

## 2023-02-21 RX ORDER — HYDROXYCHLOROQUINE SULFATE 200 MG/1
1 TABLET, FILM COATED ORAL 2 TIMES DAILY
COMMUNITY
Start: 2022-08-12 | End: 2024-05-31 | Stop reason: SDUPTHER

## 2023-02-21 RX ORDER — TIZANIDINE 4 MG/1
1 TABLET ORAL 3 TIMES DAILY
COMMUNITY
Start: 2014-02-04 | End: 2024-04-25 | Stop reason: SDUPTHER

## 2023-02-21 RX ORDER — NEEDLES, FILTER 19GX1 1/2"
NEEDLE, DISPOSABLE MISCELLANEOUS
COMMUNITY
Start: 2021-11-08 | End: 2024-01-29 | Stop reason: SDUPTHER

## 2023-02-21 RX ORDER — OXYCODONE AND ACETAMINOPHEN 5; 325 MG/1; MG/1
1 TABLET ORAL EVERY 8 HOURS PRN
COMMUNITY
Start: 2021-10-29 | End: 2023-03-09 | Stop reason: SDUPTHER

## 2023-02-21 RX ORDER — NITROGLYCERIN 0.4 MG/1
1 TABLET SUBLINGUAL EVERY 5 MIN PRN
COMMUNITY
Start: 2022-06-27 | End: 2024-05-14 | Stop reason: WASHOUT

## 2023-02-21 RX ORDER — ALBUTEROL SULFATE 90 UG/1
2 AEROSOL, METERED RESPIRATORY (INHALATION) EVERY 4 HOURS PRN
COMMUNITY
Start: 2019-03-26 | End: 2024-05-14 | Stop reason: WASHOUT

## 2023-02-21 RX ORDER — DICLOFENAC SODIUM 10 MG/G
GEL TOPICAL
COMMUNITY
End: 2024-05-14 | Stop reason: WASHOUT

## 2023-02-21 RX ORDER — ASPIRIN 325 MG
1 TABLET, DELAYED RELEASE (ENTERIC COATED) ORAL
COMMUNITY
Start: 2022-10-10 | End: 2023-09-08 | Stop reason: SDUPTHER

## 2023-02-21 RX ORDER — FENOFIBRATE 50 MG/1
50 CAPSULE ORAL EVERY EVENING
COMMUNITY
End: 2024-05-31 | Stop reason: WASHOUT

## 2023-03-09 ENCOUNTER — TELEPHONE (OUTPATIENT)
Dept: PRIMARY CARE | Facility: CLINIC | Age: 58
End: 2023-03-09
Payer: COMMERCIAL

## 2023-03-09 DIAGNOSIS — G89.4 CHRONIC PAIN SYNDROME: Primary | ICD-10-CM

## 2023-03-10 ENCOUNTER — TELEPHONE (OUTPATIENT)
Dept: GASTROENTEROLOGY | Age: 58
End: 2023-03-10

## 2023-03-10 RX ORDER — ONDANSETRON 4 MG/1
4 TABLET, FILM COATED ORAL EVERY 8 HOURS PRN
Qty: 30 TABLET | Refills: 0 | Status: SHIPPED | OUTPATIENT
Start: 2023-03-10

## 2023-03-14 DIAGNOSIS — G89.4 CHRONIC PAIN SYNDROME: ICD-10-CM

## 2023-03-14 RX ORDER — OXYCODONE AND ACETAMINOPHEN 5; 325 MG/1; MG/1
1 TABLET ORAL EVERY 8 HOURS PRN
Qty: 60 TABLET | Refills: 0 | Status: SHIPPED | OUTPATIENT
Start: 2023-03-14 | End: 2023-04-13 | Stop reason: SDUPTHER

## 2023-03-14 RX ORDER — OXYCODONE AND ACETAMINOPHEN 5; 325 MG/1; MG/1
1 TABLET ORAL EVERY 8 HOURS PRN
Qty: 60 TABLET | Refills: 0 | Status: SHIPPED | OUTPATIENT
Start: 2023-03-14 | End: 2023-03-14 | Stop reason: SDUPTHER

## 2023-03-14 RX ORDER — OXYCODONE AND ACETAMINOPHEN 5; 325 MG/1; MG/1
1 TABLET ORAL EVERY 8 HOURS PRN
Qty: 60 TABLET | Refills: 0 | Status: CANCELLED | OUTPATIENT
Start: 2023-03-14 | End: 2023-04-13

## 2023-03-16 ENCOUNTER — APPOINTMENT (OUTPATIENT)
Dept: GENERAL RADIOLOGY | Age: 58
End: 2023-03-16
Payer: MEDICARE

## 2023-03-16 ENCOUNTER — TELEPHONE (OUTPATIENT)
Dept: GASTROENTEROLOGY | Age: 58
End: 2023-03-16

## 2023-03-16 ENCOUNTER — HOSPITAL ENCOUNTER (EMERGENCY)
Age: 58
Discharge: HOME | End: 2023-03-16
Attending: EMERGENCY MEDICINE
Payer: MEDICARE

## 2023-03-16 ENCOUNTER — TELEMEDICINE (OUTPATIENT)
Dept: ENDOCRINOLOGY | Age: 58
End: 2023-03-16

## 2023-03-16 VITALS
WEIGHT: 160 LBS | HEART RATE: 92 BPM | HEIGHT: 68 IN | SYSTOLIC BLOOD PRESSURE: 124 MMHG | OXYGEN SATURATION: 94 % | TEMPERATURE: 97.4 F | BODY MASS INDEX: 24.25 KG/M2 | DIASTOLIC BLOOD PRESSURE: 81 MMHG | RESPIRATION RATE: 18 BRPM

## 2023-03-16 DIAGNOSIS — R11.2 NAUSEA AND VOMITING, UNSPECIFIED VOMITING TYPE: ICD-10-CM

## 2023-03-16 DIAGNOSIS — R10.9 ABDOMINAL PAIN, UNSPECIFIED ABDOMINAL LOCATION: Primary | ICD-10-CM

## 2023-03-16 DIAGNOSIS — E11.65 UNCONTROLLED TYPE 2 DIABETES MELLITUS WITH HYPERGLYCEMIA (HCC): Primary | ICD-10-CM

## 2023-03-16 DIAGNOSIS — R61 HYPERHIDROSIS: ICD-10-CM

## 2023-03-16 LAB
ALBUMIN SERPL-MCNC: 4.6 G/DL (ref 3.5–4.6)
ALP SERPL-CCNC: 139 U/L (ref 40–130)
ALT SERPL-CCNC: 9 U/L (ref 0–33)
ANION GAP SERPL CALCULATED.3IONS-SCNC: 14 MEQ/L (ref 9–15)
AST SERPL-CCNC: 17 U/L (ref 0–35)
BASOPHILS # BLD: 0.1 K/UL (ref 0–0.1)
BASOPHILS NFR BLD: 0.6 % (ref 0.1–1.2)
BILIRUB SERPL-MCNC: 0.7 MG/DL (ref 0.2–0.7)
BUN SERPL-MCNC: 6 MG/DL (ref 6–20)
CALCIUM SERPL-MCNC: 10.2 MG/DL (ref 8.5–9.9)
CHLORIDE SERPL-SCNC: 101 MEQ/L (ref 95–107)
CO2 SERPL-SCNC: 26 MEQ/L (ref 20–31)
CREAT SERPL-MCNC: 1.17 MG/DL (ref 0.5–0.9)
EOSINOPHIL # BLD: 0.4 K/UL (ref 0–0.4)
EOSINOPHIL NFR BLD: 2.8 % (ref 0.7–5.8)
ERYTHROCYTE [DISTWIDTH] IN BLOOD BY AUTOMATED COUNT: 13.2 % (ref 11.7–14.4)
GLOBULIN SER CALC-MCNC: 3.9 G/DL (ref 2.3–3.5)
GLUCOSE SERPL-MCNC: 169 MG/DL (ref 70–99)
HCT VFR BLD AUTO: 44.7 % (ref 37–47)
HGB BLD-MCNC: 14.7 G/DL (ref 11.2–15.7)
IMM GRANULOCYTES # BLD: 0.1 K/UL
IMM GRANULOCYTES NFR BLD: 0.5 %
LIPASE SERPL-CCNC: 18 U/L (ref 12–95)
LYMPHOCYTES # BLD: 2.5 K/UL (ref 1.2–3.7)
LYMPHOCYTES NFR BLD: 18 %
MCH RBC QN AUTO: 28.2 PG (ref 25.6–32.2)
MCHC RBC AUTO-ENTMCNC: 32.9 % (ref 32.2–35.5)
MCV RBC AUTO: 85.6 FL (ref 79.4–94.8)
MONOCYTES # BLD: 1 K/UL (ref 0.2–0.9)
MONOCYTES NFR BLD: 6.9 % (ref 4.7–12.5)
NEUTROPHILS # BLD: 9.9 K/UL (ref 1.6–6.1)
NEUTS SEG NFR BLD: 71.2 % (ref 34–71.1)
PLATELET # BLD AUTO: 350 K/UL (ref 182–369)
POTASSIUM SERPL-SCNC: 4.2 MEQ/L (ref 3.4–4.9)
PROT SERPL-MCNC: 8.5 G/DL (ref 6.3–8)
RBC # BLD AUTO: 5.22 M/UL (ref 3.93–5.22)
SODIUM SERPL-SCNC: 141 MEQ/L (ref 135–144)
WBC # BLD AUTO: 14 K/UL (ref 4–10)

## 2023-03-16 PROCEDURE — 80053 COMPREHEN METABOLIC PANEL: CPT

## 2023-03-16 PROCEDURE — 83690 ASSAY OF LIPASE: CPT

## 2023-03-16 PROCEDURE — 36415 COLL VENOUS BLD VENIPUNCTURE: CPT

## 2023-03-16 PROCEDURE — 85025 COMPLETE CBC W/AUTO DIFF WBC: CPT

## 2023-03-16 PROCEDURE — A4216 STERILE WATER/SALINE, 10 ML: HCPCS | Performed by: EMERGENCY MEDICINE

## 2023-03-16 PROCEDURE — 99284 EMERGENCY DEPT VISIT MOD MDM: CPT

## 2023-03-16 PROCEDURE — 96361 HYDRATE IV INFUSION ADD-ON: CPT

## 2023-03-16 PROCEDURE — 6360000002 HC RX W HCPCS: Performed by: EMERGENCY MEDICINE

## 2023-03-16 PROCEDURE — 96375 TX/PRO/DX INJ NEW DRUG ADDON: CPT

## 2023-03-16 PROCEDURE — 74018 RADEX ABDOMEN 1 VIEW: CPT

## 2023-03-16 PROCEDURE — 2580000003 HC RX 258: Performed by: EMERGENCY MEDICINE

## 2023-03-16 PROCEDURE — 96374 THER/PROPH/DIAG INJ IV PUSH: CPT

## 2023-03-16 PROCEDURE — 2500000003 HC RX 250 WO HCPCS: Performed by: EMERGENCY MEDICINE

## 2023-03-16 RX ORDER — METOCLOPRAMIDE HYDROCHLORIDE 5 MG/ML
10 INJECTION INTRAMUSCULAR; INTRAVENOUS ONCE
Status: COMPLETED | OUTPATIENT
Start: 2023-03-16 | End: 2023-03-16

## 2023-03-16 RX ORDER — GLYCOPYRROLATE 1 MG/1
1 TABLET ORAL 2 TIMES DAILY
Qty: 60 TABLET | Refills: 3 | Status: SHIPPED | OUTPATIENT
Start: 2023-03-16

## 2023-03-16 RX ORDER — INSULIN ASPART INJECTION 100 [IU]/ML
INJECTION, SOLUTION SUBCUTANEOUS
Qty: 50 ML | Refills: 3 | Status: SHIPPED | OUTPATIENT
Start: 2023-03-16

## 2023-03-16 RX ORDER — PYRAZINAMIDE 500 MG/1
1 TABLET ORAL EVERY 4 HOURS PRN
Qty: 18 TABLET | Refills: 0 | Status: SHIPPED | OUTPATIENT
Start: 2023-03-16 | End: 2023-03-19

## 2023-03-16 RX ORDER — DIPHENHYDRAMINE HYDROCHLORIDE 50 MG/ML
25 INJECTION INTRAMUSCULAR; INTRAVENOUS ONCE
Status: COMPLETED | OUTPATIENT
Start: 2023-03-16 | End: 2023-03-16

## 2023-03-16 RX ORDER — METOCLOPRAMIDE 10 MG/1
10 TABLET ORAL 2 TIMES DAILY PRN
Qty: 60 TABLET | Refills: 0 | Status: SHIPPED | OUTPATIENT
Start: 2023-03-16

## 2023-03-16 RX ORDER — 0.9 % SODIUM CHLORIDE 0.9 %
1000 INTRAVENOUS SOLUTION INTRAVENOUS ONCE
Status: COMPLETED | OUTPATIENT
Start: 2023-03-16 | End: 2023-03-16

## 2023-03-16 RX ORDER — INSULIN PUMP CONTROLLER
EACH MISCELLANEOUS
Qty: 15 EACH | Refills: 3 | Status: SHIPPED | OUTPATIENT
Start: 2023-03-16

## 2023-03-16 RX ORDER — MORPHINE SULFATE 4 MG/ML
4 INJECTION, SOLUTION INTRAMUSCULAR; INTRAVENOUS ONCE
Status: COMPLETED | OUTPATIENT
Start: 2023-03-16 | End: 2023-03-16

## 2023-03-16 RX ADMIN — FAMOTIDINE 20 MG: 10 INJECTION INTRAVENOUS at 11:15

## 2023-03-16 RX ADMIN — DIPHENHYDRAMINE HYDROCHLORIDE 25 MG: 50 INJECTION, SOLUTION INTRAMUSCULAR; INTRAVENOUS at 11:14

## 2023-03-16 RX ADMIN — METOCLOPRAMIDE 10 MG: 5 INJECTION, SOLUTION INTRAMUSCULAR; INTRAVENOUS at 11:15

## 2023-03-16 RX ADMIN — SODIUM CHLORIDE 1000 ML: 9 INJECTION, SOLUTION INTRAVENOUS at 11:14

## 2023-03-16 RX ADMIN — MORPHINE SULFATE 4 MG: 4 INJECTION, SOLUTION INTRAMUSCULAR; INTRAVENOUS at 11:15

## 2023-03-16 ASSESSMENT — ENCOUNTER SYMPTOMS
VOMITING: 1
BACK PAIN: 0
NAUSEA: 1
DIARRHEA: 0
COUGH: 0
SHORTNESS OF BREATH: 0
ABDOMINAL PAIN: 1
SORE THROAT: 0

## 2023-03-16 ASSESSMENT — PAIN DESCRIPTION - LOCATION: LOCATION: ABDOMEN

## 2023-03-16 ASSESSMENT — PAIN SCALES - GENERAL: PAINLEVEL_OUTOF10: 9

## 2023-03-16 ASSESSMENT — PAIN DESCRIPTION - DESCRIPTORS: DESCRIPTORS: SHARP

## 2023-03-16 NOTE — TELEPHONE ENCOUNTER
Patient is calling because she has been vomiting since Monday. Was prescribed Zofran, but it doesn't help much. Pt is concerned because her appointment is not until the end of March.  Please call

## 2023-03-16 NOTE — PROGRESS NOTES
3/16/2023    TELEHEALTH EVALUATION -- Audio/Visual (During HXKSK-31 public health emergency)    Due to Ayesha 19 outbreak, patient's office visit was converted to a virtual visit. Patient was contacted and agreed to proceed with a virtual visit via Telephone Visit  The risks and benefits of converting to a virtual visit were discussed in light of the current infectious disease epidemic. Patient also understood that insurance coverage and co-pays are up to their individual insurance plans. HPI: Telephone visit patient was at home I was in the office follow-up on type II currently using OmniPod via pump A1c was 7.2 history of mild chronic kidney disease his creatinine was 1 .17    Patient also planning to use Dexcom continuous glucose monitoring to eventually treated with OmniPod insulin pump requesting refills    Average blood sugars close to 150/160    History of hyperhidrosis requesting prescription for ropinirole    Suzanne Kim (:  1965) has requested an audio/video evaluation for the following concern(s):    Lab Results   Component Value Date     2023    K 4.2 2023     2023    CO2 26 2023    BUN 6 2023    CREATININE 1.17 (H) 2023    GLUCOSE 169 (H) 2023    CALCIUM 10.2 (H) 2023    PROT 8.5 (H) 2023    LABALBU 4.6 2023    BILITOT 0.7 2023    ALKPHOS 139 (H) 2023    AST 17 2023    ALT 9 2023    LABGLOM 54.9 (L) 2023    GFRAA >60.0 2022    GLOB 3.9 (H) 2023       Hemoglobin A1C   Date Value Ref Range Status   2023 7.2 (H) 4.8 - 5.9 % Final         Review of Systems   Cardiovascular: Negative. Endocrine: Negative. All other systems reviewed and are negative. Prior to Visit Medications    Medication Sig Taking?  Authorizing Provider   metoclopramide (REGLAN) 10 MG tablet Take 1 tablet by mouth 2 times daily as needed (Nausea/vomiting)  Yoav Seay MD   acetaminophen-codeine (TYLENOL/CODEINE #3) 300-30 MG per tablet Take 1 tablet by mouth every 4 hours as needed for Pain for up to 3 days. Intended supply: 3 days. Take lowest dose possible to manage pain Max Daily Amount: 6 tablets  Antonio Frederick MD   ondansetron (ZOFRAN) 4 MG tablet Take 1 tablet by mouth every 8 hours as needed for Nausea or Vomiting  Murtaza Dewitt MD   pantoprazole (PROTONIX) 40 MG tablet Please take 1 (ONE) tablet by mouth at bedtime. CALL MD WHEN ALMOST OUT OF MEDICATION  Historical Provider, MD   QUEtiapine (SEROQUEL) 25 MG tablet take 1 to 2 tablets by mouth at bedtime if needed  Patient not taking: Reported on 2/15/2023  Historical Provider, MD   pregabalin (LYRICA) 75 MG capsule Take by mouth.   Patient not taking: Reported on 2/15/2023  Historical Provider, MD   fluticasone (FLONASE) 50 MCG/ACT nasal spray 2 sprays by Each Nostril route daily  VALARIE Martinez   Insulin Disposable Pump (OMNIPOD DASH PODS, GEN 4,) MISC Change every 48 hrs  Patient not taking: No sig reported  Garnett Boxer, MD   Insulin Aspart, w/Niacinamide, (FIASP) 100 UNIT/ML SOLN Use via pump max dose 150 units/day  Patient not taking: No sig reported  Garnett Boxer, MD   apixaban starter pack (ELIQUIS DVT/PE STARTER PACK) 5 MG TBPK tablet Take 1 tablet by mouth See Admin Instructions  Makenzie Schmid PA-C   acetaminophen (TYLENOL) 500 MG tablet Take 1-2 tablets by mouth every 6 hours as needed for Pain  Patient not taking: No sig reported  Makenzie Schmid PA-C   tiZANidine (ZANAFLEX) 4 MG tablet Take 1 tablet by mouth every 6 hours as needed (pain)  Patient not taking: Reported on 2/15/2023  Makenzie Schmid PA-C   glycopyrrolate (ROBINUL) 1 MG tablet Take 1 tablet by mouth 2 times daily  Patient not taking: Reported on 2/15/2023  Garnett Boxer, MD   Insulin Aspart, w/Niacinamide, (FIASP) 100 UNIT/ML SOLN Use via pump max dose 150 units/day  Patient not taking: No sig reported  Garnett Boxer, MD   propranolol (INDERAL LA) 120 MG extended release capsule TAKE 2 CAPSULES BY MOUTH EVERY DAY  Historical Provider, MD   Insulin Aspart, w/Niacinamide, (FIASP) 100 UNIT/ML SOLN Use via pump max dose 150 units/day  Patient not taking: No sig reported  Stephy Oden MD   rosuvastatin (CRESTOR) 20 MG tablet TAKE 1 TABLET DAILY. Historical Provider, MD   esomeprazole (NEXIUM) 40 MG delayed release capsule Take 1 capsule by mouth daily  Patient not taking: No sig reported  Mikaela Anne APRN - CNP   Drug [place] Unilet Lancets 30G MISC Check blood sugars twice daily or as directed. Patient not taking: No sig reported  Stephy Oden MD   blood glucose monitor kit and supplies Please give 1 meter covered by insurance Dx E11.65  Patient not taking: No sig reported  Stephy Oden MD   Lancets MISC Pt test 3x daily Dx E11.65  Stephy Oden MD   blood glucose test strips (ASCENSIA AUTODISC VI;ONE TOUCH ULTRA TEST VI) strip Check blood sugars twice daily or as directed.   Stephy Oden MD   estradiol (CLIMARA) 0.025 MG/24HR Place 0.025 patches onto the skin every 7 days On mondays  Historical Provider, MD   albuterol sulfate HFA (VENTOLIN HFA) 108 (90 Base) MCG/ACT inhaler Inhale 2 puffs into the lungs every 6 hours as needed for Wheezing  Patient not taking: No sig reported  Nicolette Mora MD   levothyroxine (SYNTHROID) 50 MCG tablet Take 1 tablet by mouth daily  Maryuri Corrales MD   hydroxychloroquine (PLAQUENIL) 200 MG tablet Take 1 tablet by mouth 2 times daily  Historical Provider, MD       Social History     Tobacco Use    Smoking status: Never    Smokeless tobacco: Never   Vaping Use    Vaping Use: Never used   Substance Use Topics    Alcohol use: No     Alcohol/week: 0.0 standard drinks     Comment: denies    Drug use: No     Comment: denies            PHYSICAL EXAMINATION:  [ INSTRUCTIONS:  \"[x]\" Indicates a positive item  \"[]\" Indicates a negative item  -- DELETE ALL ITEMS NOT EXAMINED]  [] Alert  [] Oriented to person/place/time    [] No apparent distress  [] Toxic appearing    [] Face flushed appearing [] Sclera clear  [] Lips are cyanotic      [] Breathing appears normal  [] Appears tachypneic      [] Rash on visible skin    [] Cranial Nerves II-XII grossly intact    [] Motor grossly intact in visible upper extremities    [] Motor grossly intact in visible lower extremities    [] Normal Mood  [] Anxious appearing    [] Depressed appearing  [] Confused appearing      [] Poor short term memory  [] Poor long term memory    [] OTHER:      Due to this being a TeleHealth encounter, evaluation of the following organ systems is limited: Vitals/Constitutional/EENT/Resp/CV/GI//MS/Neuro/Skin/Heme-Lymph-Imm.    ASSESSMENT/PLAN:     Diagnosis Orders   1. Uncontrolled type 2 diabetes mellitus with hyperglycemia (HCC)  Basic Metabolic Panel    Hemoglobin A1C      2. Hyperhidrosis            Orders Placed This Encounter   Procedures    Basic Metabolic Panel     Standing Status:   Future     Standing Expiration Date:   3/16/2024    Hemoglobin A1C     Standing Status:   Future     Standing Expiration Date:   3/16/2024     Orders Placed This Encounter   Medications    Insulin Disposable Pump (OMNIPOD DASH PODS, GEN 4,) MISC     Sig: Change every 48 hrs     Dispense:  15 each     Refill:  3    Insulin Aspart, w/Niacinamide, (FIASP) 100 UNIT/ML SOLN     Sig: Use via pump max dose 150 units/day     Dispense:  50 mL     Refill:  3    glycopyrrolate (ROBINUL) 1 MG tablet     Sig: Take 1 tablet by mouth 2 times daily     Dispense:  60 tablet     Refill:  3     A1c goal of 7 lower discussed results   total time spent 15 minutes    An  electronic signature was used to authenticate this note.    --Mateus Costello MD on 3/16/2023 at 4:44 PM        Pursuant to the emergency declaration under the [place] Act and the National Emergencies Act, 1135 waiver authority and the Coronavirus Preparedness and Response Supplemental Appropriations Act, this Virtual  Visit was conducted, with patient's consent, to  reduce the patient's risk of exposure to COVID-19 and provide continuity of care for an established patient. Services were provided through a video synchronous discussion virtually to substitute for in-person clinic visit.

## 2023-03-16 NOTE — ED PROVIDER NOTES
2000 \[facility]"" ED  eMERGENCYdEPARTMENT eNCOUnter      Pt Name: Charleen Lopez  MRN: [id number]  [place] 1965  Date of evaluation: 3/16/2023  Zoe Zhu MD    CHIEF COMPLAINT           HPI  Suzanne Medina is a 56 y.o. female per chart review has a h/o   DM II, HTn, hpl, kidney stones, lupus presents to the ED with ab pain, n/v.  Pt notes gradual onset, moderate, constant, aching, epigastric ab pain x 4 days. +N/v. Pt is unable to eat solid food. Pt states it is her gastroparesis acting up. Pt denies fever, cp, sob, dysuria, diarrhea. ROS  Review of Systems   Constitutional:  Negative for activity change, chills and fever. HENT:  Negative for ear pain and sore throat. Eyes:  Negative for visual disturbance. Respiratory:  Negative for cough and shortness of breath. Cardiovascular:  Negative for chest pain, palpitations and leg swelling. Gastrointestinal:  Positive for abdominal pain, nausea and vomiting. Negative for diarrhea. Genitourinary:  Negative for dysuria. Musculoskeletal:  Negative for back pain. Skin:  Negative for rash. Neurological:  Negative for dizziness and weakness. Except as noted above the remainder of the review of systems was reviewed and negative.        PAST MEDICAL HISTORY     Past Medical History:   Diagnosis Date    Anxiety     Asthma     as child    Chronic kidney disease     Depression     Depression     dysthymia - following lupus dx age 23    Diabetes mellitus ([address]     Foot fracture, left     GERD (gastroesophageal reflux disease)     Hyperlipidemia     Hypothyroidism     Kidney stones     Lupus ([address]     dr Jaime Pinon -     Migraine     PONV (postoperative nausea and vomiting)     Renal stone 04/25/2021    Sciatica     left sided         SURGICAL HISTORY       Past Surgical History:   Procedure Laterality Date    APPENDECTOMY      ARTHRODESIS Left 11/26/2019    ARTHRODESIS OF THE FIRST METATARSAL PHALANGEAL JOINT LEFT FOOT performed by Carmelo Flores DPM at [facility] N/A 4/26/2021    CYSTOSCOPY RIGHT DOUBLE J STENT PLACEMENT. performed by Stanley Schreiber MD at [address], COLON, DIAGNOSTIC      FOOT CLOSED REDUCTION Left 1/3/2017    LEFT FOOT PERCUTANEOUS PINNING MOTA FRACTURE performed by Cecilia Abraham MD at [address] ([address] [facility])      LITHOTRIPSY Right 5/5/2021    RIGHT ESWL performed by Stanley Schreiber MD at [address] [facility] ESOPHAGOGASTRODUODENOSCOPY TRANSORAL DIAGNOSTIC N/A 3/8/2017    EGD ESOPHAGOGASTRODUODENOSCOPY WITH DILATION performed by Piotr Miller MD at [address] TRANSORAL DIAGNOSTIC N/A 11/7/2018    EGD ESOPHAGOGASTRODUODENOSCOPY WITH DILATION performed by Piotr Miller MD at [facility] N/A 8/17/2021    EGD WITH BIOPSY AND POLYPECTOMY performed by Dot Lopez MD at [address] [facility] 2/15/2023    EGD w/biopsy performed by Dot Lopez MD at [address] [facility]       Previous Medications    ACETAMINOPHEN (TYLENOL) 500 MG TABLET    Take 1-2 tablets by mouth every 6 hours as needed for Pain    ALBUTEROL SULFATE HFA (VENTOLIN HFA) 108 (90 BASE) MCG/ACT INHALER    Inhale 2 puffs into the lungs every 6 hours as needed for Wheezing    APIXABAN STARTER PACK (ELIQUIS DVT/PE STARTER PACK) 5 MG TBPK TABLET    Take 1 tablet by mouth See Admin Instructions    BLOOD GLUCOSE MONITOR KIT AND SUPPLIES    Please give 1 meter covered by insurance Dx E11.65    BLOOD GLUCOSE TEST STRIPS (ASCENSIA AUTODISC VI;ONE TOUCH ULTRA TEST VI) STRIP    Check blood sugars twice daily or as directed.     [facility] UNILET LANCETS 30G MISC    Check blood sugars twice daily or as directed. ESOMEPRAZOLE (NEXIUM) 40 MG DELAYED RELEASE CAPSULE    Take 1 capsule by mouth daily    ESTRADIOL (CLIMARA) 0.025 MG/24HR    Place 0.025 patches onto the skin every 7 days On mondays    FLUTICASONE (FLONASE) 50 MCG/ACT NASAL SPRAY    2 sprays by Each Nostril route daily    GLYCOPYRROLATE (ROBINUL) 1 MG TABLET    Take 1 tablet by mouth 2 times daily    HYDROXYCHLOROQUINE (PLAQUENIL) 200 MG TABLET    Take 1 tablet by mouth 2 times daily    INSULIN ASPART, W/NIACINAMIDE, (FIASP) 100 UNIT/ML SOLN    Use via pump max dose 150 units/day    INSULIN ASPART, W/NIACINAMIDE, (FIASP) 100 UNIT/ML SOLN    Use via pump max dose 150 units/day    INSULIN ASPART, W/NIACINAMIDE, (FIASP) 100 UNIT/ML SOLN    Use via pump max dose 150 units/day    INSULIN DISPOSABLE PUMP (OMNIPOD DASH PODS, GEN 4,) MISC    Change every 48 hrs    LANCETS MISC    Pt test 3x daily Dx E11.65    LEVOTHYROXINE (SYNTHROID) 50 MCG TABLET    Take 1 tablet by mouth daily    ONDANSETRON (ZOFRAN) 4 MG TABLET    Take 1 tablet by mouth every 8 hours as needed for Nausea or Vomiting    PANTOPRAZOLE (PROTONIX) 40 MG TABLET    Please take 1 (ONE) tablet by mouth at bedtime. CALL MD WHEN ALMOST OUT OF MEDICATION    PREGABALIN (LYRICA) 75 MG CAPSULE    Take by mouth. PROPRANOLOL (INDERAL LA) 120 MG EXTENDED RELEASE CAPSULE    TAKE 2 CAPSULES BY MOUTH EVERY DAY    QUETIAPINE (SEROQUEL) 25 MG TABLET    take 1 to 2 tablets by mouth at bedtime if needed    ROSUVASTATIN (CRESTOR) 20 MG TABLET    TAKE 1 TABLET DAILY.     TIZANIDINE (ZANAFLEX) 4 MG TABLET    Take 1 tablet by mouth every 6 hours as needed (pain)       ALLERGIES     Hm lidocaine patch [lidocaine], Aspirin, Cephalosporins, Imitrex [sumatriptan], Pcn [penicillins], Seasonal, Toradol [ketorolac tromethamine], and Ultram [tramadol]    FAMILY HISTORY       Family History   Adopted: Yes   Problem Relation Age of Onset    Colon Cancer Neg Hx           SOCIAL HISTORY       Social History Socioeconomic History    Marital status: Legally      Spouse name: None    Number of children: None    Years of education: None    Highest education level: None   Tobacco Use    Smoking status: Never    Smokeless tobacco: Never   Vaping Use    Vaping Use: Never used   Substance and Sexual Activity    Alcohol use: No     Alcohol/week: 0.0 standard drinks     Comment: denies    Drug use: No     Comment: denies         PHYSICAL EXAM       ED Triage Vitals   BP Temp Temp src Pulse Resp SpO2 Height Weight   -- -- -- -- -- -- -- --       Physical Exam  Vitals and nursing note reviewed. Constitutional:       Appearance: She is well-developed. HENT:      Head: Normocephalic. Right Ear: External ear normal.      Left Ear: External ear normal.   Eyes:      Conjunctiva/sclera: Conjunctivae normal.      Pupils: Pupils are equal, round, and reactive to light. Cardiovascular:      Rate and Rhythm: Normal rate and regular rhythm. Heart sounds: Normal heart sounds. Pulmonary:      Effort: Pulmonary effort is normal.      Breath sounds: Normal breath sounds. Abdominal:      General: Bowel sounds are normal. There is no distension. Palpations: Abdomen is soft. Tenderness: There is abdominal tenderness in the epigastric area. There is no guarding or rebound. Musculoskeletal:         General: Normal range of motion. Cervical back: Normal range of motion and neck supple. Skin:     General: Skin is warm and dry. Neurological:      Mental Status: She is alert and oriented to person, place, and time. MDM  57 yo female presents to the ED with ab pain, n/v.  Pt is afebrile, hemodynamically stable. Pt given 1 l NS, IV morphine, IV reglan, IV benadryl, IV pepcid in the ED with moderate relief. Labs remarkable for WBC 14, Cr 1.17. KUB negative. Pt with chronically elevated WBC. Pt able to tolerate PO ginger ale. Suspect pt's ab pain, n/v is due to gastroparesis.   Pt educated about gastroparesis, ab pain, n/v.  Pt given prescription for tylenol 3, reglan. Pt notes her Hb A1c was recently at 6 and recently down to 6. FINAL IMPRESSION      1. Abdominal pain, unspecified abdominal location    2.  Nausea and vomiting, unspecified vomiting type          DISPOSITION/PLAN   DISPOSITION Decision To Discharge 03/16/2023 12:03:06 PM        DISCHARGE MEDICATIONS:  [unfilled]         Diane Jara MD(electronically signed)  Attending Emergency Physician            Diane Jara MD  03/16/23 Marbin Hollis MD  03/16/23 [id number]

## 2023-03-16 NOTE — TELEPHONE ENCOUNTER
The patient is calling back, she said she has not been able to take her insulin in the last couple days, since she is vomiting after eating.  Please advise

## 2023-03-30 ENCOUNTER — OFFICE VISIT (OUTPATIENT)
Dept: PRIMARY CARE | Facility: CLINIC | Age: 58
End: 2023-03-30
Payer: MEDICARE

## 2023-03-30 VITALS
DIASTOLIC BLOOD PRESSURE: 55 MMHG | HEART RATE: 70 BPM | OXYGEN SATURATION: 96 % | SYSTOLIC BLOOD PRESSURE: 87 MMHG | RESPIRATION RATE: 20 BRPM | BODY MASS INDEX: 28.34 KG/M2 | WEIGHT: 187 LBS | HEIGHT: 68 IN

## 2023-03-30 DIAGNOSIS — E11.22 TYPE 2 DIABETES MELLITUS WITH STAGE 3B CHRONIC KIDNEY DISEASE, WITHOUT LONG-TERM CURRENT USE OF INSULIN (MULTI): ICD-10-CM

## 2023-03-30 DIAGNOSIS — N18.32 TYPE 2 DIABETES MELLITUS WITH STAGE 3B CHRONIC KIDNEY DISEASE, WITHOUT LONG-TERM CURRENT USE OF INSULIN (MULTI): ICD-10-CM

## 2023-03-30 DIAGNOSIS — Z91.89 AT RISK FOR POLYPHARMACY: ICD-10-CM

## 2023-03-30 DIAGNOSIS — E78.2 MIXED HYPERLIPIDEMIA: ICD-10-CM

## 2023-03-30 DIAGNOSIS — Z12.11 ENCOUNTER FOR SCREENING FOR MALIGNANT NEOPLASM OF COLON: ICD-10-CM

## 2023-03-30 DIAGNOSIS — G89.4 CHRONIC PAIN SYNDROME: Primary | ICD-10-CM

## 2023-03-30 DIAGNOSIS — M32.9 SYSTEMIC LUPUS ERYTHEMATOSUS, UNSPECIFIED SLE TYPE, UNSPECIFIED ORGAN INVOLVEMENT STATUS (MULTI): ICD-10-CM

## 2023-03-30 LAB
AMPHETAMINE (PRESENCE) IN URINE BY SCREEN METHOD: ABNORMAL
BARBITURATES PRESENCE IN URINE BY SCREEN METHOD: ABNORMAL
BENZODIAZEPINE (PRESENCE) IN URINE BY SCREEN METHOD: ABNORMAL
CANNABINOIDS IN URINE BY SCREEN METHOD: ABNORMAL
COCAINE (PRESENCE) IN URINE BY SCREEN METHOD: ABNORMAL
DRUG SCREEN COMMENT URINE: ABNORMAL
FENTANYL URINE: ABNORMAL
METHADONE (PRESENCE) IN URINE BY SCREEN METHOD: ABNORMAL
OPIATES (PRESENCE) IN URINE BY SCREEN METHOD: ABNORMAL
OXYCODONE (PRESENCE) IN URINE BY SCREEN METHOD: ABNORMAL
PHENCYCLIDINE (PRESENCE) IN URINE BY SCREEN METHOD: ABNORMAL

## 2023-03-30 PROCEDURE — 80365 DRUG SCREENING OXYCODONE: CPT

## 2023-03-30 PROCEDURE — 1036F TOBACCO NON-USER: CPT | Performed by: INTERNAL MEDICINE

## 2023-03-30 PROCEDURE — 99213 OFFICE O/P EST LOW 20 MIN: CPT | Performed by: INTERNAL MEDICINE

## 2023-03-30 PROCEDURE — 3074F SYST BP LT 130 MM HG: CPT | Performed by: INTERNAL MEDICINE

## 2023-03-30 PROCEDURE — 80361 OPIATES 1 OR MORE: CPT

## 2023-03-30 PROCEDURE — 3078F DIAST BP <80 MM HG: CPT | Performed by: INTERNAL MEDICINE

## 2023-03-30 PROCEDURE — 80307 DRUG TEST PRSMV CHEM ANLYZR: CPT

## 2023-03-30 NOTE — PATIENT INSTRUCTIONS
Thank you very much for coming.  I am very happy to see you.    I am glad to hear that you are doing well with SEMAGLUTIDE.  Indeed, it will help you lose weight, but not dramatically, perhaps 10 to 15 pounds, depending on your response, as well as your efforts.    Please continue staying physically active.  I am glad to hear that you have more energy.    Please continue to eat sensibly.  I know that you already are not eating that much.  Just make sure that you are concentrating on keeping up with your proteins.  [place] diet book, DASH diet for ideas.    Today, we formally renewed your CONTROLLED substance agreement for safe use of generic Percocet.  Thank you for signing the paperwork and submitting a urine sample.  I will call you if there are any further concerns.    Please come back in 2 months.  At that point, we will repeat nonfasting blood examination, which you can do at any  facility before coming back soon after.    Until then, please continue to be careful.  Please continue to take care of yourself and your family, and please continue to pray for our recovery from this pandemic.    You will benefit from a routine reevaluation for colon cancer.  You can do the COLOGUARD test.  You will receive a box in the mail with instructions.  Submit a stool sample, and use the preservative to keep the stool intact for the study.  Again, I will call you with results and possible changes.    Do not forget to do fasting laboratory examinations before seeing me in 2 months.  I hope you have a blessed Lent and a happy Easter!            0  Return in 2 months.  20 minutes please.  Repeat NONFASTING laboratory examination at facility of her choice, then see me for update of CSA, possibly follow-up with endocrinology, hematology.  Review preventive strategies.  Update CSA.  Reassess kidney and liver function.            0

## 2023-03-30 NOTE — PROGRESS NOTES
Subjective   Patient ID: Eunice Schroeder is a 57 y.o. female who presents for Follow-up (Patient is here follow up.).    HPI   OARRS:  Carlos Gómez MD on 3/30/2023 10:40 AM  I have personally reviewed the OARRS report for Eunice Schroeder. I have considered the risks of abuse, dependence, addiction and diversion    Is the patient prescribed a combination of a benzodiazepine and opioid?  Yes, I feel it is clincially indicated to continue the medication and have discussed with the patient risks/benefits/alternatives.    Last Urine Drug Screen / ordered today: No  Recent Results (from the past 14384 hour(s))   OPIATE/OPIOID/BENZO PRESCRIPTION COMPLIANCE    Collection Time: 12/15/21  5:21 PM   Result Value Ref Range    DRUG SCREEN COMMENT URINE SEE BELOW     Creatine, Urine 153.8 mg/dL    Amphetamine Screen, Urine PRESUMPTIVE NEGATIVE NEGATIVE    Barbiturate Screen, Urine PRESUMPTIVE NEGATIVE NEGATIVE    Cannabinoid Screen, Urine PRESUMPTIVE NEGATIVE NEGATIVE    Cocaine Screen, Urine PRESUMPTIVE NEGATIVE NEGATIVE    PCP Screen, Urine PRESUMPTIVE NEGATIVE NEGATIVE    7-Aminoclonazepam 221 (A) Cutoff <25 ng/mL    Alpha-Hydroxyalprazolam <25 Cutoff <25 ng/mL    Alpha-Hydroxymidazolam <25 Cutoff <25 ng/mL    Alprazolam <25 Cutoff <25 ng/mL    Chlordiazepoxide <25 Cutoff <25 ng/mL    Clonazepam <25 Cutoff <25 ng/mL    Diazepam <25 Cutoff <25 ng/mL    Lorazepam <25 Cutoff <25 ng/mL    Midazolam <25 Cutoff <25 ng/mL    Nordiazepam <25 Cutoff <25 ng/mL    Oxazepam <25 Cutoff <25 ng/mL    Temazepam <25 Cutoff <25 ng/mL    Zolpidem <25 Cutoff <25 ng/mL    Zolpidem Metabolite (ZCA) <25 Cutoff <25 ng/mL    6-Acetylmorphine <25 Cutoff <25 ng/mL    Codeine <50 Cutoff <50 ng/mL    Hydrocodone <25 Cutoff <25 ng/mL    Hydromorphone <25 Cutoff <25 ng/mL    Morphine Urine <50 Cutoff <50 ng/mL    Norhydrocodone <25 Cutoff <25 ng/mL    Noroxycodone 310 (A) Cutoff <25 ng/mL    Oxycodone 142 (A) Cutoff <25 ng/mL    Oxymorphone 165  (A) Cutoff <25 ng/mL    Tramadol <50 Cutoff <50 ng/mL    O-Desmethyltramadol <50 Cutoff <50 ng/mL    Fentanyl <2.5 Cutoff<2.5 ng/mL    Norfentanyl <2.5 Cutoff<2.5 ng/mL    METHADONE CONFIRMATION,URINE <25 Cutoff <25 ng/mL    EDDP <25 Cutoff <25 ng/mL     Results are as expected.     Controlled Substance Agreement:  Date of the Last Agreement: March 30, 2023  Reviewed Controlled Substance Agreement including but not limited to the benefits, risks, and alternatives to treatment with a Controlled Substance medication(s).    Opioids:  What is the patient's goal of therapy?  Safe and reasonable pain control.  Avoid polypharmacy.  Is this being achieved with current treatment?  Yes.    I have calculated the patient's Morphine Dose Equivalent (MED):   I have considered referral to Pain Management and/or a specialist, and do not feel it is necessary at this time.    I feel that it is clinically indicated to continue this current medication regimen after consideration of alternative therapies, and other non-opioid treatment.    Opioid Risk Screening:  No data recorded    Pain Assessment:  No data recorded    The patient is here to help me reevaluate her compliance, tolerance, and efficacy of pain management.  Fortunately, she no longer needs to be on NSAIDS as a pain, and does not run the risk at the moment for POLYPHARMACY.  Likewise, states that she has not had any problems with dizziness, lightheadedness, confusion, falls.  Not worried about memory.  Has not run out of medication earlier than expected.  Remains optimistic, and continues to want to stay healthy and independent and relatively productive.  Does not wish harm to self or others.    Also, tolerating SEMAGLUTIDE, with good control of appetite, no abdominal distress, no constipation.  Also, states that although her weight loss is only minimal, she has noted that her clothes are fitting better.  ENDOCRINE with no polyuria, polydipsia, polyphagia.  No blurred  "vision.  No skin, hair, nail changes.  No dramatic weight loss or weight gain.      Otherwise, states that she is doing well.  No whitney substernal chest pain, no orthopnea, no paroxysmal nocturnal dyspnea.  No headache, blurred vision, diplopia.  No dysphagia.  Careful about exposure.  No coughing, no sputum production.  No dysuria, flank, suprapubic pain.  CONSTITUTIONALLY, no fever, no chills.  No night sweats.  No lingering anorexia or nausea.  No apparent lymphadenopathy.  No apparent weight loss.         Review of Systems  Review of systems as in history of present illness, and otherwise, reviewed separately as well, and was unremarkable/negative/noncontributory.          Objective   BP 87/55 (BP Location: Left arm, Patient Position: Sitting, BP Cuff Size: Adult)   Pulse 70   Resp 20   Ht 1.727 m (5' 8\")   Wt 84.8 kg (187 lb)   SpO2 96%   BMI 28.43 kg/m²     Physical Exam  In very good spirits.  Not in distress or diaphoresis.  Alert, oriented x3.  Amiable.  Not unkempt.  Receptive.  Cheerful.  Appropriate.  Eager to stay healthy and independent.  Does not wish harm to self or others.    HEAD pink palpebral conjunctivae, anicteric sclerae.  NECK supple, no apparent jugular venous distention.  CARDIOVASCULAR not in distress or diaphoresis.  No bipedal edema.  LUNGS not in distress or diaphoresis.  Not using accessory muscles.  ABDOMEN soft, nontender.  BACK no costovertebral angle tenderness.  EXTREMITIES no clubbing, no cyanosis.  NEURO no facial asymmetry.  No apparent cranial nerve deficits.  Romberg negative.  Ambulating without need of assistance.  No apparent focal weakness.  No tremors.  PSYCH receptive, appropriate, and eager to maintain and improve quality of life.       Assessment/Plan   Problem List Items Addressed This Visit          Nervous    Chronic pain syndrome - Primary    Relevant Orders    Comprehensive Metabolic Panel    Drug Screen, Urine With Reflex to Confirmation    Follow Up In " Primary Care       Endocrine/Metabolic    Type 2 diabetes mellitus with stage 3b chronic kidney disease, without long-term current use of insulin (CMS/HCC)    Relevant Orders    Comprehensive Metabolic Panel    Follow Up In Primary Care       Other    Mixed hyperlipidemia    Relevant Orders    Comprehensive Metabolic Panel    Follow Up In Primary Care    Systemic lupus erythematosus (CMS/HCC)    Relevant Orders    Comprehensive Metabolic Panel    Drug Screen, Urine With Reflex to Confirmation    Follow Up In Primary Care     Other Visit Diagnoses       At risk for polypharmacy        Relevant Orders    Comprehensive Metabolic Panel    Drug Screen, Urine With Reflex to Confirmation    Follow Up In Primary Care    Encounter for screening for malignant neoplasm of colon        Relevant Orders    Cologuard® colon cancer screening    Follow Up In Primary Care             Thank you very much for coming.  I am very happy to see you.    I am glad to hear that you are doing well with SEMAGLUTIDE.  Indeed, it will help you lose weight, but not dramatically, perhaps 10 to 15 pounds, depending on your response, as well as your efforts.    Please continue staying physically active.  I am glad to hear that you have more energy.    Please continue to eat sensibly.  I know that you already are not eating that much.  Just make sure that you are concentrating on keeping up with your proteins.  [place] diet book, DASH diet for ideas.    Today, we formally renewed your CONTROLLED substance agreement for safe use of generic Percocet.  Thank you for signing the paperwork and submitting a urine sample.  I will call you if there are any further concerns.    Please come back in 2 months.  At that point, we will repeat nonfasting blood examination, which you can do at any  facility before coming back soon after.    Until then, please continue to be careful.  Please continue to take care of yourself and your family, and please continue  to pray for our recovery from this pandemic.    You will benefit from a routine reevaluation for colon cancer.  You can do the COLOGUARD test.  You will receive a box in the mail with instructions.  Submit a stool sample, and use the preservative to keep the stool intact for the study.  Again, I will call you with results and possible changes.    Do not forget to do fasting laboratory examinations before seeing me in 2 months.  I hope you have a blessed Lent and a happy Easter!            0  Return in 2 months.  20 minutes please.  Repeat NONFASTING laboratory examination at facility of her choice, then see me for update of CSA, possibly follow-up with endocrinology, hematology.  Review preventive strategies.  Update CSA.  Reassess kidney and liver function.            0

## 2023-03-31 DIAGNOSIS — F41.8 ANXIETY WITH DEPRESSION: Primary | ICD-10-CM

## 2023-03-31 RX ORDER — VENLAFAXINE 75 MG/1
75 TABLET ORAL
COMMUNITY
Start: 2021-02-16 | End: 2023-03-31 | Stop reason: SDUPTHER

## 2023-04-03 DIAGNOSIS — F41.8 ANXIETY WITH DEPRESSION: ICD-10-CM

## 2023-04-03 RX ORDER — VENLAFAXINE 75 MG/1
75 TABLET ORAL DAILY
Qty: 90 TABLET | Refills: 0 | Status: SHIPPED
Start: 2023-04-03 | End: 2023-04-03 | Stop reason: SDUPTHER

## 2023-04-03 RX ORDER — VENLAFAXINE 75 MG/1
75 TABLET ORAL DAILY
Qty: 90 TABLET | Refills: 0 | Status: SHIPPED | OUTPATIENT
Start: 2023-04-03 | End: 2023-08-02 | Stop reason: SDUPTHER

## 2023-04-04 LAB
6-ACETYLMORPHINE: <25 NG/ML
CODEINE: <50 NG/ML
HYDROCODONE: <25 NG/ML
HYDROMORPHONE: <25 NG/ML
MORPHINE URINE: <50 NG/ML
NORHYDROCODONE: <25 NG/ML
NOROXYCODONE: >1000 NG/ML
OXYCODONE: 889 NG/ML
OXYMORPHONE: 1921 NG/ML

## 2023-04-07 DIAGNOSIS — G89.4 CHRONIC PAIN SYNDROME: ICD-10-CM

## 2023-04-07 RX ORDER — OXYCODONE AND ACETAMINOPHEN 5; 325 MG/1; MG/1
1 TABLET ORAL EVERY 8 HOURS PRN
Qty: 60 TABLET | Refills: 0 | Status: CANCELLED | OUTPATIENT
Start: 2023-04-07 | End: 2023-05-07

## 2023-04-13 ENCOUNTER — HOSPITAL ENCOUNTER (EMERGENCY)
Age: 58
Discharge: HOME | End: 2023-04-13
Attending: EMERGENCY MEDICINE
Payer: MEDICARE

## 2023-04-13 VITALS
TEMPERATURE: 99.4 F | WEIGHT: 160 LBS | HEIGHT: 68 IN | RESPIRATION RATE: 21 BRPM | OXYGEN SATURATION: 97 % | HEART RATE: 106 BPM | BODY MASS INDEX: 24.25 KG/M2 | DIASTOLIC BLOOD PRESSURE: 85 MMHG | SYSTOLIC BLOOD PRESSURE: 127 MMHG

## 2023-04-13 DIAGNOSIS — G89.4 CHRONIC PAIN SYNDROME: ICD-10-CM

## 2023-04-13 DIAGNOSIS — M32.9 SYSTEMIC LUPUS ERYTHEMATOSUS, UNSPECIFIED SLE TYPE, UNSPECIFIED ORGAN INVOLVEMENT STATUS (MULTI): ICD-10-CM

## 2023-04-13 DIAGNOSIS — E11.22 TYPE 2 DIABETES MELLITUS WITH STAGE 3B CHRONIC KIDNEY DISEASE, WITHOUT LONG-TERM CURRENT USE OF INSULIN (MULTI): Primary | ICD-10-CM

## 2023-04-13 DIAGNOSIS — N18.32 TYPE 2 DIABETES MELLITUS WITH STAGE 3B CHRONIC KIDNEY DISEASE, WITHOUT LONG-TERM CURRENT USE OF INSULIN (MULTI): Primary | ICD-10-CM

## 2023-04-13 DIAGNOSIS — R11.2 NAUSEA AND VOMITING, UNSPECIFIED VOMITING TYPE: Primary | ICD-10-CM

## 2023-04-13 LAB
AMORPH SED URNS QL MICRO: ABNORMAL
BACTERIA URNS QL MICRO: ABNORMAL /HPF
BILIRUB UR QL STRIP: ABNORMAL
CHP ED QC CHECK: YES
CLARITY UR: ABNORMAL
COLOR UR: YELLOW
CRYSTALS URNS MICRO: ABNORMAL /HPF
EPI CELLS #/AREA URNS HPF: ABNORMAL /HPF
GLUCOSE BLD-MCNC: 200 MG/DL
GLUCOSE BLD-MCNC: 200 MG/DL (ref 70–99)
GLUCOSE UR STRIP-MCNC: 100 MG/DL
HGB UR QL STRIP: NEGATIVE
KETONES UR STRIP-MCNC: NEGATIVE MG/DL
LEUKOCYTE ESTERASE UR QL STRIP: ABNORMAL
NITRITE UR QL STRIP: NEGATIVE
PERFORMED ON: ABNORMAL
PH UR STRIP: 5 [PH] (ref 5–9)
PROT UR STRIP-MCNC: 30 MG/DL
RBC #/AREA URNS HPF: ABNORMAL /HPF (ref 0–2)
SP GR UR STRIP: >=1.03 (ref 1–1.03)
URINE REFLEX TO CULTURE: ABNORMAL
UROBILINOGEN UR STRIP-ACNC: 0.2 E.U./DL
WBC #/AREA URNS HPF: ABNORMAL /HPF (ref 0–5)

## 2023-04-13 PROCEDURE — 6360000002 HC RX W HCPCS: Performed by: EMERGENCY MEDICINE

## 2023-04-13 PROCEDURE — 96372 THER/PROPH/DIAG INJ SC/IM: CPT

## 2023-04-13 PROCEDURE — 99284 EMERGENCY DEPT VISIT MOD MDM: CPT

## 2023-04-13 PROCEDURE — 81001 URINALYSIS AUTO W/SCOPE: CPT

## 2023-04-13 RX ORDER — METOCLOPRAMIDE HYDROCHLORIDE 5 MG/ML
10 INJECTION INTRAMUSCULAR; INTRAVENOUS ONCE
Status: COMPLETED | OUTPATIENT
Start: 2023-04-13 | End: 2023-04-13

## 2023-04-13 RX ORDER — ONDANSETRON 4 MG/1
4 TABLET, ORALLY DISINTEGRATING ORAL 3 TIMES DAILY PRN
Qty: 12 TABLET | Refills: 0 | Status: SHIPPED | OUTPATIENT
Start: 2023-04-13

## 2023-04-13 RX ADMIN — METOCLOPRAMIDE 10 MG: 5 INJECTION, SOLUTION INTRAMUSCULAR; INTRAVENOUS at 09:16

## 2023-04-13 ASSESSMENT — ENCOUNTER SYMPTOMS
VOMITING: 1
NAUSEA: 1

## 2023-04-13 ASSESSMENT — PAIN DESCRIPTION - PAIN TYPE: TYPE: ACUTE PAIN

## 2023-04-13 ASSESSMENT — LIFESTYLE VARIABLES
HOW OFTEN DO YOU HAVE A DRINK CONTAINING ALCOHOL: NEVER
HOW MANY STANDARD DRINKS CONTAINING ALCOHOL DO YOU HAVE ON A TYPICAL DAY: PATIENT DOES NOT DRINK

## 2023-04-13 ASSESSMENT — PAIN DESCRIPTION - DESCRIPTORS: DESCRIPTORS: SHARP;DISCOMFORT

## 2023-04-13 ASSESSMENT — PAIN - FUNCTIONAL ASSESSMENT: PAIN_FUNCTIONAL_ASSESSMENT: 0-10

## 2023-04-13 ASSESSMENT — PAIN DESCRIPTION - LOCATION: LOCATION: ABDOMEN

## 2023-04-13 ASSESSMENT — PAIN SCALES - GENERAL: PAINLEVEL_OUTOF10: 8

## 2023-04-13 NOTE — ED TRIAGE NOTES
Patient in with vomiting and abd pain that started last night. States she has been dealing with gastroparesis.

## 2023-04-13 NOTE — ED PROVIDER NOTES
Height: 5' 8\" (1.727 m)       MDM  Number of Diagnoses or Management Options  Nausea and vomiting, unspecified vomiting type  Diagnosis management comments: Tolerating fluids prior to discharge. Patient will be discharged home in good condition. Patient has been hemodynamically stable throughout ED course and is appropriate for outpatient follow up. Patient should follow up with PCP in 2-3 days or return to ED immediately for any new or worsening symptoms. Patient is well appearing on discharge and agreeable with plan of care. Procedures    CRITICAL CARE TIME   Total Critical Care time was 0 minutes, excluding separately reportable procedures. There was a high probability of clinically significant/life threatening deterioration in the patient's condition which required my urgent intervention. FINAL IMPRESSION      1.  Nausea and vomiting, unspecified vomiting type          DISPOSITION/PLAN   DISPOSITION Decision To Discharge 04/13/2023 09:12:25 AM      (Please note that portions of this note were completed with a voice recognition program.  Efforts were made to edit the dictations but occasionally words are mis-transcribed.)    Mikey Smith MD (electronically signed)  Attending Emergency Physician        Mikey Smith MD  04/13/23 [id number]

## 2023-04-15 RX ORDER — PREDNISONE 10 MG/1
TABLET ORAL
Qty: 20 TABLET | Refills: 8 | Status: SHIPPED | OUTPATIENT
Start: 2023-04-15 | End: 2024-01-05 | Stop reason: ALTCHOICE

## 2023-04-15 RX ORDER — OXYCODONE AND ACETAMINOPHEN 5; 325 MG/1; MG/1
1 TABLET ORAL EVERY 8 HOURS PRN
Qty: 60 TABLET | Refills: 0 | Status: SHIPPED | OUTPATIENT
Start: 2023-04-15 | End: 2023-05-11 | Stop reason: SDUPTHER

## 2023-04-15 NOTE — TELEPHONE ENCOUNTER
Patient also called with SLE FLAREUP with polyarthralgia.  Requesting po steroids.    Told her I can put her on PREDNISONE, BUT SHE NEEDS TO CALL RHEUMATOLOGY ASAP TODAY.  States she usually does not hear right away from them and was wondering if I can help until then.    Low-dose prednisone taper, history of DM noted.

## 2023-04-17 DIAGNOSIS — N95.1 HOT FLASH, MENOPAUSAL: Primary | ICD-10-CM

## 2023-04-17 LAB
ALANINE AMINOTRANSFERASE (SGPT) (U/L) IN SER/PLAS: 9 U/L (ref 7–45)
ALBUMIN (G/DL) IN SER/PLAS: 4.2 G/DL (ref 3.4–5)
ALKALINE PHOSPHATASE (U/L) IN SER/PLAS: 101 U/L (ref 33–110)
ANION GAP IN SER/PLAS: 15 MMOL/L (ref 10–20)
ANTI-DNA (DS): 4 IU/ML
ASPARTATE AMINOTRANSFERASE (SGOT) (U/L) IN SER/PLAS: 12 U/L (ref 9–39)
BASOPHILS (10*3/UL) IN BLOOD BY AUTOMATED COUNT: 0.1 X10E9/L (ref 0–0.1)
BASOPHILS/100 LEUKOCYTES IN BLOOD BY AUTOMATED COUNT: 0.8 % (ref 0–2)
BILIRUBIN TOTAL (MG/DL) IN SER/PLAS: 0.5 MG/DL (ref 0–1.2)
C REACTIVE PROTEIN (MG/L) IN SER/PLAS: 0.95 MG/DL
CALCIUM (MG/DL) IN SER/PLAS: 9.1 MG/DL (ref 8.6–10.3)
CARBON DIOXIDE, TOTAL (MMOL/L) IN SER/PLAS: 26 MMOL/L (ref 21–32)
CHLORIDE (MMOL/L) IN SER/PLAS: 103 MMOL/L (ref 98–107)
COMPLEMENT C3 (MG/DL) IN SER/PLAS: 195 MG/DL (ref 87–200)
COMPLEMENT C4 (MG/DL) IN SER/PLAS: 39 MG/DL (ref 10–50)
CREATININE (MG/DL) IN SER/PLAS: 1.06 MG/DL (ref 0.5–1.05)
EOSINOPHILS (10*3/UL) IN BLOOD BY AUTOMATED COUNT: 0.58 X10E9/L (ref 0–0.7)
EOSINOPHILS/100 LEUKOCYTES IN BLOOD BY AUTOMATED COUNT: 4.8 % (ref 0–6)
ERYTHROCYTE DISTRIBUTION WIDTH (RATIO) BY AUTOMATED COUNT: 13.2 % (ref 11.5–14.5)
ERYTHROCYTE MEAN CORPUSCULAR HEMOGLOBIN CONCENTRATION (G/DL) BY AUTOMATED: 32.5 G/DL (ref 32–36)
ERYTHROCYTE MEAN CORPUSCULAR VOLUME (FL) BY AUTOMATED COUNT: 88 FL (ref 80–100)
ERYTHROCYTES (10*6/UL) IN BLOOD BY AUTOMATED COUNT: 5.14 X10E12/L (ref 4–5.2)
GFR FEMALE: 62 ML/MIN/1.73M2
GLUCOSE (MG/DL) IN SER/PLAS: 173 MG/DL (ref 74–99)
HEMATOCRIT (%) IN BLOOD BY AUTOMATED COUNT: 45.2 % (ref 36–46)
HEMOGLOBIN (G/DL) IN BLOOD: 14.7 G/DL (ref 12–16)
IMMATURE GRANULOCYTES/100 LEUKOCYTES IN BLOOD BY AUTOMATED COUNT: 0.4 % (ref 0–0.9)
LEUKOCYTES (10*3/UL) IN BLOOD BY AUTOMATED COUNT: 12 X10E9/L (ref 4.4–11.3)
LYMPHOCYTES (10*3/UL) IN BLOOD BY AUTOMATED COUNT: 2.88 X10E9/L (ref 1.2–4.8)
LYMPHOCYTES/100 LEUKOCYTES IN BLOOD BY AUTOMATED COUNT: 24 % (ref 13–44)
MONOCYTES (10*3/UL) IN BLOOD BY AUTOMATED COUNT: 0.75 X10E9/L (ref 0.1–1)
MONOCYTES/100 LEUKOCYTES IN BLOOD BY AUTOMATED COUNT: 6.3 % (ref 2–10)
NEUTROPHILS (10*3/UL) IN BLOOD BY AUTOMATED COUNT: 7.64 X10E9/L (ref 1.2–7.7)
NEUTROPHILS/100 LEUKOCYTES IN BLOOD BY AUTOMATED COUNT: 63.7 % (ref 40–80)
PLATELETS (10*3/UL) IN BLOOD AUTOMATED COUNT: 360 X10E9/L (ref 150–450)
POTASSIUM (MMOL/L) IN SER/PLAS: 3.7 MMOL/L (ref 3.5–5.3)
PROTEIN TOTAL: 7.1 G/DL (ref 6.4–8.2)
SEDIMENTATION RATE, ERYTHROCYTE: 48 MM/H (ref 0–30)
SODIUM (MMOL/L) IN SER/PLAS: 140 MMOL/L (ref 136–145)
UREA NITROGEN (MG/DL) IN SER/PLAS: 9 MG/DL (ref 6–23)

## 2023-04-19 RX ORDER — ESTRADIOL 0.03 MG/D
1 PATCH TRANSDERMAL
Qty: 12 PATCH | Refills: 0 | Status: SHIPPED | OUTPATIENT
Start: 2023-04-19 | End: 2023-07-05 | Stop reason: SDUPTHER

## 2023-04-27 ENCOUNTER — TELEPHONE (OUTPATIENT)
Dept: GASTROENTEROLOGY | Age: 58
End: 2023-04-27

## 2023-04-27 NOTE — TELEPHONE ENCOUNTER
Patient missed procedure follow up on 4/24/23 due to being out of town for death in the family.  Patient is requesting a call with the results of EGD, please advise

## 2023-05-03 ENCOUNTER — TELEPHONE (OUTPATIENT)
Dept: GASTROENTEROLOGY | Age: 58
End: 2023-05-03

## 2023-05-03 NOTE — TELEPHONE ENCOUNTER
Pt moving out of town. Will not be at her appointment Monday, wants to be called with results. Pt says its OK to leave a voicemail if she does not answer.

## 2023-05-05 DIAGNOSIS — N18.32 TYPE 2 DIABETES MELLITUS WITH STAGE 3B CHRONIC KIDNEY DISEASE, WITHOUT LONG-TERM CURRENT USE OF INSULIN (MULTI): ICD-10-CM

## 2023-05-05 DIAGNOSIS — E11.22 TYPE 2 DIABETES MELLITUS WITH STAGE 3B CHRONIC KIDNEY DISEASE, WITHOUT LONG-TERM CURRENT USE OF INSULIN (MULTI): ICD-10-CM

## 2023-05-05 RX ORDER — SEMAGLUTIDE 0.68 MG/ML
0.5 INJECTION, SOLUTION SUBCUTANEOUS
Qty: 3 ML | Refills: 1 | Status: SHIPPED | OUTPATIENT
Start: 2023-05-05 | End: 2023-05-11 | Stop reason: SDUPTHER

## 2023-05-05 NOTE — TELEPHONE ENCOUNTER
Called patient, discussed EGD results, including esophageal dysmotility and gastroparesis recommended that when she moves she should establish care with a new gastroenterologist for further testing possibly including esophageal dysmotility testing and gastric emptying study  Priti Webb

## 2023-05-11 ENCOUNTER — OFFICE VISIT (OUTPATIENT)
Dept: PRIMARY CARE | Facility: CLINIC | Age: 58
End: 2023-05-11
Payer: MEDICARE

## 2023-05-11 VITALS
HEIGHT: 68 IN | WEIGHT: 175 LBS | OXYGEN SATURATION: 98 % | RESPIRATION RATE: 20 BRPM | HEART RATE: 87 BPM | DIASTOLIC BLOOD PRESSURE: 72 MMHG | SYSTOLIC BLOOD PRESSURE: 112 MMHG | BODY MASS INDEX: 26.52 KG/M2

## 2023-05-11 DIAGNOSIS — G89.4 CHRONIC PAIN SYNDROME: ICD-10-CM

## 2023-05-11 DIAGNOSIS — F41.1 GENERALIZED ANXIETY DISORDER WITH PANIC ATTACKS: ICD-10-CM

## 2023-05-11 DIAGNOSIS — E78.2 MIXED HYPERLIPIDEMIA: ICD-10-CM

## 2023-05-11 DIAGNOSIS — E83.52 HYPERCALCEMIA: ICD-10-CM

## 2023-05-11 DIAGNOSIS — E11.22 TYPE 2 DIABETES MELLITUS WITH STAGE 3B CHRONIC KIDNEY DISEASE, WITHOUT LONG-TERM CURRENT USE OF INSULIN (MULTI): ICD-10-CM

## 2023-05-11 DIAGNOSIS — E55.9 VITAMIN D DEFICIENCY: ICD-10-CM

## 2023-05-11 DIAGNOSIS — R53.81 DEBILITY: ICD-10-CM

## 2023-05-11 DIAGNOSIS — F41.0 GENERALIZED ANXIETY DISORDER WITH PANIC ATTACKS: ICD-10-CM

## 2023-05-11 DIAGNOSIS — N18.32 TYPE 2 DIABETES MELLITUS WITH STAGE 3B CHRONIC KIDNEY DISEASE, WITHOUT LONG-TERM CURRENT USE OF INSULIN (MULTI): ICD-10-CM

## 2023-05-11 DIAGNOSIS — R11.0 NAUSEA IN ADULT: ICD-10-CM

## 2023-05-11 DIAGNOSIS — I10 ESSENTIAL HYPERTENSION: ICD-10-CM

## 2023-05-11 DIAGNOSIS — M32.9 SYSTEMIC LUPUS ERYTHEMATOSUS, UNSPECIFIED SLE TYPE, UNSPECIFIED ORGAN INVOLVEMENT STATUS (MULTI): ICD-10-CM

## 2023-05-11 DIAGNOSIS — N18.31 STAGE 3A CHRONIC KIDNEY DISEASE (MULTI): ICD-10-CM

## 2023-05-11 DIAGNOSIS — E03.4 HYPOTHYROIDISM DUE TO ACQUIRED ATROPHY OF THYROID: ICD-10-CM

## 2023-05-11 DIAGNOSIS — Z79.899 POLYPHARMACY: Primary | ICD-10-CM

## 2023-05-11 PROCEDURE — 3074F SYST BP LT 130 MM HG: CPT | Performed by: INTERNAL MEDICINE

## 2023-05-11 PROCEDURE — 99214 OFFICE O/P EST MOD 30 MIN: CPT | Performed by: INTERNAL MEDICINE

## 2023-05-11 PROCEDURE — 1036F TOBACCO NON-USER: CPT | Performed by: INTERNAL MEDICINE

## 2023-05-11 PROCEDURE — 3078F DIAST BP <80 MM HG: CPT | Performed by: INTERNAL MEDICINE

## 2023-05-11 RX ORDER — NARATRIPTAN 2.5 MG/1
TABLET ORAL
COMMUNITY
Start: 2023-04-27 | End: 2024-05-31 | Stop reason: WASHOUT

## 2023-05-11 RX ORDER — SEMAGLUTIDE 0.68 MG/ML
0.5 INJECTION, SOLUTION SUBCUTANEOUS
Qty: 3 ML | Refills: 1 | Status: SHIPPED | OUTPATIENT
Start: 2023-05-11 | End: 2023-07-18 | Stop reason: SDUPTHER

## 2023-05-11 RX ORDER — ACETAMINOPHEN 500 MG
TABLET ORAL
COMMUNITY
Start: 2022-09-25 | End: 2024-05-14 | Stop reason: WASHOUT

## 2023-05-11 RX ORDER — FAMOTIDINE 20 MG/1
1 TABLET, FILM COATED ORAL
COMMUNITY
Start: 2022-10-10 | End: 2024-05-14 | Stop reason: WASHOUT

## 2023-05-11 RX ORDER — ONDANSETRON 4 MG/1
4 TABLET, ORALLY DISINTEGRATING ORAL
COMMUNITY
Start: 2023-04-13 | End: 2023-07-13 | Stop reason: SDUPTHER

## 2023-05-11 RX ORDER — FLUTICASONE PROPIONATE 50 MCG
2 SPRAY, SUSPENSION (ML) NASAL
COMMUNITY
Start: 2023-01-29 | End: 2024-05-14 | Stop reason: WASHOUT

## 2023-05-11 RX ORDER — INSULIN ASPART INJECTION 100 [IU]/ML
INJECTION, SOLUTION SUBCUTANEOUS
COMMUNITY
Start: 2022-04-08

## 2023-05-11 RX ORDER — EPINEPHRINE 0.3 MG/.3ML
0.3 INJECTION SUBCUTANEOUS
COMMUNITY
Start: 2019-03-12 | End: 2024-05-14 | Stop reason: WASHOUT

## 2023-05-11 RX ORDER — INSULIN PUMP CONTROLLER
EACH MISCELLANEOUS
COMMUNITY
Start: 2023-04-21

## 2023-05-11 RX ORDER — CLONAZEPAM 0.5 MG/1
0.5 TABLET ORAL DAILY PRN
COMMUNITY

## 2023-05-11 RX ORDER — LEVOTHYROXINE SODIUM 50 UG/1
1 TABLET ORAL DAILY
COMMUNITY
Start: 2016-10-11 | End: 2024-01-11 | Stop reason: SDUPTHER

## 2023-05-11 RX ORDER — OXYCODONE AND ACETAMINOPHEN 5; 325 MG/1; MG/1
1 TABLET ORAL EVERY 8 HOURS PRN
Qty: 60 TABLET | Refills: 0 | Status: SHIPPED | OUTPATIENT
Start: 2023-05-11 | End: 2023-06-07 | Stop reason: SDUPTHER

## 2023-05-11 RX ORDER — NALOXONE HYDROCHLORIDE 4 MG/.1ML
SPRAY NASAL
COMMUNITY
Start: 2022-02-23 | End: 2024-05-14 | Stop reason: WASHOUT

## 2023-05-11 NOTE — PROGRESS NOTES
Subjective   Patient ID: Eunice Schroeder is a 57 y.o. female who presents for Follow-up (Patient is here for routine visit.).    HPI   The patient is here for reevaluation of debility, with precautions being observed regarding necessary practice of POLYPHARMACY.  In the interim, the patient has been careful, following closely with PSYCHIATRY, and taking medications exactly as recommended.  No confusion or delirium.  Not worried about memory.  Continues to want to stay healthy, independent, and productive.  Does not wish harm to self or others.  Again, not suicidal or homicidal.    Furthermore, being followed closely by RHEUMATOLOGY as well, with nonnarcotic regimens being maximized in the hopes of controlling what appears to be SLE flareup recently.  The patient has remained physically active, and continues to want to maintain and hopefully improve quality of life.    OARRS:  Carlos Gómez MD on 5/11/2023 10:25 AM  I have personally reviewed the OARRS report for Eunice Schroeder. I have considered the risks of abuse, dependence, addiction and diversion    Is the patient prescribed a combination of a benzodiazepine and opioid?  Yes, I feel it is clincially indicated to continue the medication and have discussed with the patient risks/benefits/alternatives.    Last Urine Drug Screen / ordered today: No  Recent Results (from the past 38377 hour(s))   Opiate Confirmation, Urine    Collection Time: 03/30/23 10:56 AM   Result Value Ref Range    6-Acetylmorphine <25 Cutoff <25 ng/mL    Codeine <50 Cutoff <50 ng/mL    Hydrocodone <25 Cutoff <25 ng/mL    Hydromorphone <25 Cutoff <25 ng/mL    Morphine Urine <50 Cutoff <50 ng/mL    Norhydrocodone <25 Cutoff <25 ng/mL    Noroxycodone >1000 (A) Cutoff <25 ng/mL    Oxycodone 889 (A) Cutoff <25 ng/mL    Oxymorphone 1921 (A) Cutoff <25 ng/mL   Drug Screen, Urine With Reflex to Confirmation    Collection Time: 03/30/23 10:56 AM   Result Value Ref Range    DRUG SCREEN  COMMENT URINE SEE BELOW     Amphetamine Screen, Urine PRESUMPTIVE NEGATIVE NEGATIVE    Barbiturate Screen, Urine PRESUMPTIVE NEGATIVE NEGATIVE    BENZODIAZEPINE (PRESENCE) IN URINE BY SCREEN METHOD PRESUMPTIVE NEGATIVE NEGATIVE    Cannabinoid Screen, Urine PRESUMPTIVE NEGATIVE NEGATIVE    Cocaine Screen, Urine PRESUMPTIVE NEGATIVE NEGATIVE    Fentanyl, Ur PRESUMPTIVE NEGATIVE NEGATIVE    Methadone Screen, Urine PRESUMPTIVE NEGATIVE NEGATIVE    Opiate Screen, Urine PRESUMPTIVE NEGATIVE NEGATIVE    Oxycodone Screen, Ur PRESUMPTIVE POSITIVE (A) NEGATIVE    PCP Screen, Urine PRESUMPTIVE NEGATIVE NEGATIVE   OPIATE/OPIOID/BENZO PRESCRIPTION COMPLIANCE    Collection Time: 12/15/21  5:21 PM   Result Value Ref Range    DRUG SCREEN COMMENT URINE SEE BELOW     Creatine, Urine 153.8 mg/dL    Amphetamine Screen, Urine PRESUMPTIVE NEGATIVE NEGATIVE    Barbiturate Screen, Urine PRESUMPTIVE NEGATIVE NEGATIVE    Cannabinoid Screen, Urine PRESUMPTIVE NEGATIVE NEGATIVE    Cocaine Screen, Urine PRESUMPTIVE NEGATIVE NEGATIVE    PCP Screen, Urine PRESUMPTIVE NEGATIVE NEGATIVE    7-Aminoclonazepam 221 (A) Cutoff <25 ng/mL    Alpha-Hydroxyalprazolam <25 Cutoff <25 ng/mL    Alpha-Hydroxymidazolam <25 Cutoff <25 ng/mL    Alprazolam <25 Cutoff <25 ng/mL    Chlordiazepoxide <25 Cutoff <25 ng/mL    Clonazepam <25 Cutoff <25 ng/mL    Diazepam <25 Cutoff <25 ng/mL    Lorazepam <25 Cutoff <25 ng/mL    Midazolam <25 Cutoff <25 ng/mL    Nordiazepam <25 Cutoff <25 ng/mL    Oxazepam <25 Cutoff <25 ng/mL    Temazepam <25 Cutoff <25 ng/mL    Zolpidem <25 Cutoff <25 ng/mL    Zolpidem Metabolite (ZCA) <25 Cutoff <25 ng/mL    6-Acetylmorphine <25 Cutoff <25 ng/mL    Codeine <50 Cutoff <50 ng/mL    Hydrocodone <25 Cutoff <25 ng/mL    Hydromorphone <25 Cutoff <25 ng/mL    Morphine Urine <50 Cutoff <50 ng/mL    Norhydrocodone <25 Cutoff <25 ng/mL    Noroxycodone 310 (A) Cutoff <25 ng/mL    Oxycodone 142 (A) Cutoff <25 ng/mL    Oxymorphone 165 (A) Cutoff <25  ng/mL    Tramadol <50 Cutoff <50 ng/mL    O-Desmethyltramadol <50 Cutoff <50 ng/mL    Fentanyl <2.5 Cutoff<2.5 ng/mL    Norfentanyl <2.5 Cutoff<2.5 ng/mL    METHADONE CONFIRMATION,URINE <25 Cutoff <25 ng/mL    EDDP <25 Cutoff <25 ng/mL     Results are as expected.     Controlled Substance Agreement:  Date of the Last Agreement: March 30, 2023  Reviewed Controlled Substance Agreement including but not limited to the benefits, risks, and alternatives to treatment with a Controlled Substance medication(s).    Opioids:  What is the patient's goal of therapy?  Reasonable pain control while trying to avoid complications of polypharmacy.  Is this being achieved with current treatment?  Yes.    I have calculated the patient's Morphine Dose Equivalent (MED):   I have considered referral to Pain Management and/or a specialist, and do not feel it is necessary at this time.    I feel that it is clinically indicated to continue this current medication regimen after consideration of alternative therapies, and other non-opioid treatment.    Opioid Risk Screening:  No data recorded    Pain Assessment:  No data recorded      In the meantime, the patient is also being followed by ENDOCRINOLOGY, and being managed with continuous glucose monitoring, as well as continuous insulin infusion by pump.  States that she has been compliant with medications, tolerating regimens.  Likewise, she has been eating sensibly, and has stayed physically active as much as she can.  ENDOCRINE with no polyuria, polydipsia, polyphagia.  No blurred vision.  No skin, hair, nail changes.  No dramatic weight loss or weight gain.    She is also on OZEMPIC/SEMAGLUTIDE 0.25 mg.  She states that when she increases her dosage to 0.5, she does get a little bloated for a few hours afterwards.  Otherwise, appetite does return soon after, easily controlled.  No vomiting.  No abdominal distress.  No diarrhea, no constipation.  No apparent blood in stool.  Modest weight  "loss, which is welcomed by the patient!  No dysuria, flank, suprapubic pain.  No discharge, no pruritus.  No rash.  No malodor.  No hesitancy, no feeling of incomplete voiding.  No skin changes, rashes, pruritus, jaundice.  No easy bruisability.      Again, physically active, compliant with medications, tolerating regimens.  No whitney substernal chest pain.  No orthopnea, no paroxysmal nocturnal dyspnea.  Likewise, no irritability, lethargy, skin changes, persistence of GI symptoms, and none of which might indicate uremia.    Careful about exposure.  No coughing, no sputum production.  CONSTITUTIONALLY, no fever, no chills.  No night sweats.  No lingering anorexia or nausea.  No apparent lymphadenopathy.  No apparent weight loss.            Review of Systems  Review of systems as in history of present illness, and otherwise, reviewed separately as well, and was unremarkable/negative/noncontributory.          Objective   /72 (BP Location: Left arm, Patient Position: Sitting, BP Cuff Size: Adult)   Pulse 87   Resp 20   Ht 1.727 m (5' 8\")   Wt 79.4 kg (175 lb)   SpO2 98%   BMI 26.61 kg/m²     Physical Exam  In otherwise good spirits.  Not in distress or diaphoresis.  Alert, oriented x3.  Amiable.  Not unkempt.  Receptive.  Cheerful.  Appropriate.  Eager to stay healthy, independent, and productive.  Does not wish harm to self or others.    HEAD pink palpebral conjunctivae, anicteric sclerae.  NECK supple, no apparent jugular venous distention.  CARDIOVASCULAR not in distress or diaphoresis.  No bipedal edema.  LUNGS not in distress or diaphoresis.  Not using accessory muscles.  ABDOMEN soft, nontender.  BACK no costovertebral angle tenderness.  EXTREMITIES no clubbing, no cyanosis.  NEURO no facial asymmetry.  No apparent cranial nerve deficits.  Romberg negative.  Ambulating without need of assistance.  No apparent focal weakness.  No tremors.  PSYCH receptive, appropriate, and eager to maintain and improve " quality of life.         Assessment/Plan     Problem List Items Addressed This Visit       Vitamin D deficiency    Type 2 diabetes mellitus with stage 3b chronic kidney disease, without long-term current use of insulin (CMS/[facility])    Relevant Medications    semaglutide (Ozempic) 0.25 mg or 0.5 mg (2 mg/3 mL) pen injector    Systemic lupus erythematosus (CMS/HCC)    Stage 3a chronic kidney disease    Polypharmacy - Primary    Mixed hyperlipidemia    Hypothyroidism due to acquired atrophy of thyroid    Hypercalcemia    Generalized anxiety disorder with panic attacks    Essential hypertension    Debility    Chronic pain syndrome    Relevant Medications    oxyCODONE-acetaminophen (Percocet) 5-325 mg tablet     Other Visit Diagnoses       Nausea in adult                   Thank you very much for coming.  I am very happy to see you.    I am sorry to hear about your LUPUS FLAREUPS.  I am glad that you are following closely with RHEUMATOLOGY, Dr. Suazo.  I will do my part by making sure that you are safely able to take your generic PERCOCET to control pain reasonably.  Remember, the pain will never be 1 or general, but we will try to minimize it to 4 or under.  Thank you for being careful.  Thank you for securing your medication, so that only you have access to your regimens.  Thank you for using only when absolutely necessary.  Remember, these kinds of medications can be habit-forming, and can increase your risk of falling and forgetfulness, especially if used with CLONAZEPAM/BENZODIAZEPINE.    NEVER TAKE CLONAZEPAM AND GENERIC PERCOCET TOGETHER.  KEEP THESE MEDICATIONS AT LEAST 6 HOURS APART.    I will authorize an early release of your generic Percocet, especially since you are leaving for out of state.  Again, just please be very careful.  The goal is reasonable pain control, but it will never be complete absence of pain.  Thank you for your your patients, and thank you for taking care of yourself.    Your latest  laboratory results are stable.  If your kidneys show any signs of strain, or if your calcium is persistently elevated, we will ask for help from NEPHROLOGY, especially with your history of lupus.  Until then, please drink lots of fluids throughout the day, and please continue to avoid salt.  Very good for not only keeping your kidneys healthy, but also very good for blood pressure control.    Please continue seeing your PSYCHIATRIST.  As always, please be very careful with your medications, especially your BENZODIAZEPINE/CLONAZEPAM.  No driving for 6 hours after taking this medication.  Never drive if you are drowsy.  Never take together with generic PERCOCET.  Keep these medications at least 6 hours apart.  Likewise, when you take your PERCOCET, no driving for 6 hours.  Again, never drive if you are drowsy.  Do not take any of these medications with alcohol.    Please come back in 2 months.  You will benefit from a Medicare Wellness evaluation for the year, as well as some repeat NONFASTING blood examinations which can be done on the same day.    Until then, please continue taking care of yourself and your family, and please continue to pray for our recovery from this pandemic.    Please do not forget to take your VITAMIN D, which is very helpful in keeping your bones healthy, especially when you are using STEROIDS.    Please continue following with your ENDOCRINOLOGIST, Dr. Sears.  Your latest hemoglobin A1c is good at 7.2.  Likewise, your cholesterol panel is acceptable.  Your thyroid function also came back good.    I do understand that you have been tolerating 0.25 Ozempic, but have had some episodes of bloating.  See if you are stable enough to try 0.5 mg.  If not, you can continue using 0.25 mg.  Again, side effects will be bloating, loss of appetite, nausea.  You may have an episode of vomiting, but if persistent, please let me know.    Again, thank you very much for coming.  I hope you have a happy Mother's  Day!  See you in 2 months.            0  Return in 2 months.  40 minutes please.  Reassess debility, reassess polypharmacy, review precautions, update CSA.  Coordinate with psychiatry, rheumatology, endocrinology, possibly nephrology.  Reassess mood, energy, function, preventive strategies, cardiovascular risk.  Repeat NONFASTING blood examination, possible urine examination, during this visit.  CBC, CMP, TSH, uric acid, hepatitis C screening.            0

## 2023-05-31 ENCOUNTER — APPOINTMENT (OUTPATIENT)
Dept: PRIMARY CARE | Facility: CLINIC | Age: 58
End: 2023-05-31
Payer: COMMERCIAL

## 2023-06-05 ENCOUNTER — APPOINTMENT (OUTPATIENT)
Dept: GENERAL RADIOLOGY | Age: 58
End: 2023-06-05
Payer: MEDICARE

## 2023-06-05 ENCOUNTER — HOSPITAL ENCOUNTER (EMERGENCY)
Age: 58
Discharge: HOME | End: 2023-06-05
Payer: MEDICARE

## 2023-06-05 VITALS
HEIGHT: 68 IN | BODY MASS INDEX: 22.73 KG/M2 | OXYGEN SATURATION: 98 % | TEMPERATURE: 98.6 F | SYSTOLIC BLOOD PRESSURE: 108 MMHG | WEIGHT: 150 LBS | RESPIRATION RATE: 16 BRPM | DIASTOLIC BLOOD PRESSURE: 62 MMHG | HEART RATE: 85 BPM

## 2023-06-05 DIAGNOSIS — L97.519 ULCER OF TOE OF RIGHT FOOT, UNSPECIFIED ULCER STAGE (HCC): ICD-10-CM

## 2023-06-05 DIAGNOSIS — L97.529 ULCER OF TOE OF LEFT FOOT, UNSPECIFIED ULCER STAGE (HCC): Primary | ICD-10-CM

## 2023-06-05 LAB
ALBUMIN SERPL-MCNC: 4.3 G/DL (ref 3.5–4.6)
ALP SERPL-CCNC: 126 U/L (ref 40–130)
ALT SERPL-CCNC: 6 U/L (ref 0–33)
ANION GAP SERPL CALCULATED.3IONS-SCNC: 12 MEQ/L (ref 9–15)
AST SERPL-CCNC: 14 U/L (ref 0–35)
BASOPHILS # BLD: 0 K/UL (ref 0–0.2)
BASOPHILS NFR BLD: 0.3 %
BILIRUB SERPL-MCNC: 0.4 MG/DL (ref 0.2–0.7)
BUN SERPL-MCNC: 7 MG/DL (ref 6–20)
CALCIUM SERPL-MCNC: 9.4 MG/DL (ref 8.5–9.9)
CHLORIDE SERPL-SCNC: 105 MEQ/L (ref 95–107)
CO2 SERPL-SCNC: 25 MEQ/L (ref 20–31)
CREAT SERPL-MCNC: 1.01 MG/DL (ref 0.5–0.9)
CRP SERPL HS-MCNC: 6.6 MG/L (ref 0–5)
EOSINOPHIL # BLD: 0.6 K/UL (ref 0–0.7)
EOSINOPHIL NFR BLD: 4.3 %
ERYTHROCYTE [DISTWIDTH] IN BLOOD BY AUTOMATED COUNT: 14 % (ref 11.5–14.5)
ERYTHROCYTE [SEDIMENTATION RATE] IN BLOOD BY WESTERGREN METHOD: 41 MM (ref 0–30)
GLOBULIN SER CALC-MCNC: 3.6 G/DL (ref 2.3–3.5)
GLUCOSE SERPL-MCNC: 73 MG/DL (ref 70–99)
HCT VFR BLD AUTO: 45 % (ref 37–47)
HGB BLD-MCNC: 15 G/DL (ref 12–16)
LACTATE BLDV-SCNC: 1.4 MMOL/L (ref 0.5–2.2)
LYMPHOCYTES # BLD: 2.3 K/UL (ref 1–4.8)
LYMPHOCYTES NFR BLD: 17.7 %
MCH RBC QN AUTO: 27.8 PG (ref 27–31.3)
MCHC RBC AUTO-ENTMCNC: 33.3 % (ref 33–37)
MCV RBC AUTO: 83.7 FL (ref 79.4–94.8)
MONOCYTES # BLD: 0.8 K/UL (ref 0.2–0.8)
MONOCYTES NFR BLD: 6.1 %
NEUTROPHILS # BLD: 9.4 K/UL (ref 1.4–6.5)
NEUTS SEG NFR BLD: 71.6 %
PLATELET # BLD AUTO: 348 K/UL (ref 130–400)
POTASSIUM SERPL-SCNC: 4.2 MEQ/L (ref 3.4–4.9)
PROCALCITONIN SERPL IA-MCNC: 0.08 NG/ML (ref 0–0.15)
PROT SERPL-MCNC: 7.9 G/DL (ref 6.3–8)
RBC # BLD AUTO: 5.39 M/UL (ref 4.2–5.4)
SODIUM SERPL-SCNC: 142 MEQ/L (ref 135–144)
WBC # BLD AUTO: 13.2 K/UL (ref 4.8–10.8)

## 2023-06-05 PROCEDURE — 87040 BLOOD CULTURE FOR BACTERIA: CPT

## 2023-06-05 PROCEDURE — 85652 RBC SED RATE AUTOMATED: CPT

## 2023-06-05 PROCEDURE — 84145 PROCALCITONIN (PCT): CPT

## 2023-06-05 PROCEDURE — 36415 COLL VENOUS BLD VENIPUNCTURE: CPT

## 2023-06-05 PROCEDURE — 73630 X-RAY EXAM OF FOOT: CPT

## 2023-06-05 PROCEDURE — 80053 COMPREHEN METABOLIC PANEL: CPT

## 2023-06-05 PROCEDURE — 83605 ASSAY OF LACTIC ACID: CPT

## 2023-06-05 PROCEDURE — 6370000000 HC RX 637 (ALT 250 FOR IP): Performed by: PHYSICIAN ASSISTANT

## 2023-06-05 PROCEDURE — 99284 EMERGENCY DEPT VISIT MOD MDM: CPT

## 2023-06-05 PROCEDURE — 86140 C-REACTIVE PROTEIN: CPT

## 2023-06-05 PROCEDURE — 85025 COMPLETE CBC W/AUTO DIFF WBC: CPT

## 2023-06-05 RX ORDER — OXYCODONE HYDROCHLORIDE AND ACETAMINOPHEN 5; 325 MG/1; MG/1
1 TABLET ORAL ONCE
Status: COMPLETED | OUTPATIENT
Start: 2023-06-05 | End: 2023-06-05

## 2023-06-05 RX ORDER — CLINDAMYCIN HYDROCHLORIDE 300 MG/1
300 CAPSULE ORAL 4 TIMES DAILY
Qty: 40 CAPSULE | Refills: 0 | Status: SHIPPED | OUTPATIENT
Start: 2023-06-05 | End: 2023-06-15

## 2023-06-05 RX ORDER — CLINDAMYCIN HYDROCHLORIDE 150 MG/1
300 CAPSULE ORAL ONCE
Status: COMPLETED | OUTPATIENT
Start: 2023-06-05 | End: 2023-06-05

## 2023-06-05 RX ADMIN — CLINDAMYCIN HYDROCHLORIDE 300 MG: 150 CAPSULE ORAL at 20:17

## 2023-06-05 RX ADMIN — OXYCODONE AND ACETAMINOPHEN 1 TABLET: 5; 325 TABLET ORAL at 18:09

## 2023-06-05 RX ADMIN — OXYCODONE AND ACETAMINOPHEN 1 TABLET: 5; 325 TABLET ORAL at 20:17

## 2023-06-05 ASSESSMENT — PAIN DESCRIPTION - LOCATION
LOCATION: TOE (COMMENT WHICH ONE)

## 2023-06-05 ASSESSMENT — PAIN DESCRIPTION - ORIENTATION
ORIENTATION: RIGHT;LEFT
ORIENTATION: RIGHT;LEFT
ORIENTATION: LEFT;RIGHT
ORIENTATION: RIGHT;LEFT
ORIENTATION: RIGHT;LEFT

## 2023-06-05 ASSESSMENT — PAIN - FUNCTIONAL ASSESSMENT
PAIN_FUNCTIONAL_ASSESSMENT: 0-10

## 2023-06-05 ASSESSMENT — PAIN DESCRIPTION - ONSET: ONSET: ON-GOING

## 2023-06-05 ASSESSMENT — PAIN SCALES - GENERAL
PAINLEVEL_OUTOF10: 8
PAINLEVEL_OUTOF10: 10
PAINLEVEL_OUTOF10: 10
PAINLEVEL_OUTOF10: 8

## 2023-06-05 ASSESSMENT — PAIN DESCRIPTION - FREQUENCY: FREQUENCY: INTERMITTENT

## 2023-06-05 ASSESSMENT — PAIN DESCRIPTION - PAIN TYPE: TYPE: ACUTE PAIN

## 2023-06-05 NOTE — ED PROVIDER NOTES
[facility])  Diagnosis management comments: Pt gives permission to image and sned to pod. Resident. He reviewed films and will abd lab and xray. Post testing will add clindalycin and follow up with pmd and podiatry. Amount and/or Complexity of Data Reviewed  Clinical lab tests: reviewed and ordered  Tests in the radiology section of CPT®: reviewed and ordered        CRITICAL CARE TIME       CONSULTS:  None    PROCEDURES:  Unless otherwise noted below, none     Procedures    FINAL IMPRESSION      1. Ulcer of toe of left foot, unspecified ulcer stage ([facility] [address]    2.  Ulcer of toe of right foot, unspecified ulcer stage [facility])          DISPOSITION/PLAN   DISPOSITION Decision To Discharge 06/05/2023 08:02:33 PM      PATIENT REFERRED TO:  Tiffanie Dong MD  [facility]  [address] [facility] [phone]    Call in 1 day      [address]  [address] [facility]  [address] [facility] [address] [phone]    Call in 1 day      [facility]) ED  [address] [address]  [phone]  Go to   If symptoms worsen      DISCHARGE MEDICATIONS:  Discharge Medication List as of 6/5/2023  8:10 PM        START taking these medications    Details   clindamycin (CLEOCIN) 300 MG capsule Take 1 capsule by mouth 4 times daily for 10 days, Disp-40 capsule, R-0Normal                (Please note that portions of this note were completed with a voice recognition program.  Efforts were made to edit the dictations but occasionally words are mis-transcribed.)    Zeke Valencia PA-C (electronically signed)  Attending Emergency Physician        Zeke Valencia PA-C  06/05/23 2052

## 2023-06-05 NOTE — ED TRIAGE NOTES
Toe nail removal 2nd toe on left, 4th on rt, pt states severe pain. Pt is Type 2 DM  Pt is A&OX4, afebrile, breaths are equal and unlabored.

## 2023-06-05 NOTE — ED NOTES
Returned. Denies change in pain yet.   at 0 [facility], RN  06/05/23 0006

## 2023-06-06 NOTE — ED NOTES
D/C instructions given. Aware the rx was electronically sent to [facility] on [address], as well as time the next dose is due. Verbalized understanding. Denies any further complaints. Amb out with steady gait in no acute distress with , who is driving her home. Aware she is not to drive.       Ange Rodas RN  06/05/23 2024

## 2023-06-07 ENCOUNTER — TELEPHONE (OUTPATIENT)
Dept: PRIMARY CARE | Facility: CLINIC | Age: 58
End: 2023-06-07
Payer: COMMERCIAL

## 2023-06-07 DIAGNOSIS — K31.84 GASTROPARESIS: ICD-10-CM

## 2023-06-07 DIAGNOSIS — G89.4 CHRONIC PAIN SYNDROME: ICD-10-CM

## 2023-06-07 DIAGNOSIS — K21.9 GASTROESOPHAGEAL REFLUX DISEASE, UNSPECIFIED WHETHER ESOPHAGITIS PRESENT: Primary | ICD-10-CM

## 2023-06-07 RX ORDER — OXYCODONE AND ACETAMINOPHEN 5; 325 MG/1; MG/1
1 TABLET ORAL EVERY 8 HOURS PRN
Qty: 60 TABLET | Refills: 0 | Status: SHIPPED | OUTPATIENT
Start: 2023-06-07 | End: 2023-06-07 | Stop reason: SDUPTHER

## 2023-06-07 RX ORDER — PANTOPRAZOLE SODIUM 40 MG/1
40 TABLET, DELAYED RELEASE ORAL NIGHTLY
Qty: 90 TABLET | Refills: 3 | Status: SHIPPED | OUTPATIENT
Start: 2023-06-07 | End: 2023-11-09 | Stop reason: SDUPTHER

## 2023-06-07 RX ORDER — OXYCODONE AND ACETAMINOPHEN 5; 325 MG/1; MG/1
1 TABLET ORAL EVERY 8 HOURS PRN
Qty: 60 TABLET | Refills: 0 | Status: SHIPPED | OUTPATIENT
Start: 2023-06-07 | End: 2023-07-05 | Stop reason: SDUPTHER

## 2023-06-11 LAB
BACTERIA BLD CULT ORG #2: NORMAL
BACTERIA BLD CULT: NORMAL

## 2023-06-13 ENCOUNTER — TELEPHONE (OUTPATIENT)
Dept: PRIMARY CARE | Facility: CLINIC | Age: 58
End: 2023-06-13
Payer: COMMERCIAL

## 2023-06-13 DIAGNOSIS — B37.31 CANDIDAL VAGINITIS: Primary | ICD-10-CM

## 2023-06-13 NOTE — TELEPHONE ENCOUNTER
Pt states she was placed on antibiotics but she does need something for yeast infection.   Asking diflucan to be sent to pharmacy.

## 2023-06-14 RX ORDER — FLUCONAZOLE 150 MG/1
TABLET ORAL
Qty: 1 TABLET | Refills: 0 | Status: SHIPPED | OUTPATIENT
Start: 2023-06-14 | End: 2023-09-27 | Stop reason: SDUPTHER

## 2023-06-19 ENCOUNTER — OFFICE VISIT (OUTPATIENT)
Dept: ENDOCRINOLOGY | Age: 58
End: 2023-06-19
Payer: MEDICARE

## 2023-06-19 VITALS
DIASTOLIC BLOOD PRESSURE: 66 MMHG | HEART RATE: 100 BPM | OXYGEN SATURATION: 92 % | WEIGHT: 176 LBS | SYSTOLIC BLOOD PRESSURE: 90 MMHG | HEIGHT: 68 IN | BODY MASS INDEX: 26.67 KG/M2

## 2023-06-19 DIAGNOSIS — R61 HYPERHIDROSIS: ICD-10-CM

## 2023-06-19 DIAGNOSIS — Z96.41 INSULIN PUMP IN PLACE: ICD-10-CM

## 2023-06-19 DIAGNOSIS — E11.65 UNCONTROLLED TYPE 2 DIABETES MELLITUS WITH HYPERGLYCEMIA (HCC): Primary | ICD-10-CM

## 2023-06-19 LAB
CHP ED QC CHECK: NORMAL
GLUCOSE BLD-MCNC: 225 MG/DL

## 2023-06-19 PROCEDURE — G8427 DOCREV CUR MEDS BY ELIG CLIN: HCPCS | Performed by: INTERNAL MEDICINE

## 2023-06-19 PROCEDURE — 3074F SYST BP LT 130 MM HG: CPT | Performed by: INTERNAL MEDICINE

## 2023-06-19 PROCEDURE — 3017F COLORECTAL CA SCREEN DOC REV: CPT | Performed by: INTERNAL MEDICINE

## 2023-06-19 PROCEDURE — 2022F DILAT RTA XM EVC RTNOPTHY: CPT | Performed by: INTERNAL MEDICINE

## 2023-06-19 PROCEDURE — 82962 GLUCOSE BLOOD TEST: CPT | Performed by: INTERNAL MEDICINE

## 2023-06-19 PROCEDURE — G8419 CALC BMI OUT NRM PARAM NOF/U: HCPCS | Performed by: INTERNAL MEDICINE

## 2023-06-19 PROCEDURE — 3078F DIAST BP <80 MM HG: CPT | Performed by: INTERNAL MEDICINE

## 2023-06-19 PROCEDURE — 99213 OFFICE O/P EST LOW 20 MIN: CPT | Performed by: INTERNAL MEDICINE

## 2023-06-19 PROCEDURE — 1036F TOBACCO NON-USER: CPT | Performed by: INTERNAL MEDICINE

## 2023-06-19 PROCEDURE — 3044F HG A1C LEVEL LT 7.0%: CPT | Performed by: INTERNAL MEDICINE

## 2023-06-19 RX ORDER — INSULIN ASPART INJECTION 100 [IU]/ML
INJECTION, SOLUTION SUBCUTANEOUS
Qty: 50 ML | Refills: 3 | Status: SHIPPED | OUTPATIENT
Start: 2023-06-19

## 2023-06-19 RX ORDER — GLYCOPYRROLATE 1 MG/1
1 TABLET ORAL 2 TIMES DAILY
Qty: 60 TABLET | Refills: 3 | Status: SHIPPED | OUTPATIENT
Start: 2023-06-19

## 2023-06-19 RX ORDER — INSULIN PUMP CONTROLLER
EACH MISCELLANEOUS
Qty: 15 EACH | Refills: 3 | Status: SHIPPED | OUTPATIENT
Start: 2023-06-19 | End: 2023-06-23 | Stop reason: SDUPTHER

## 2023-06-19 NOTE — PROGRESS NOTES
6/19/2023    Assessment:       Diagnosis Orders   1. Uncontrolled type 2 diabetes mellitus with hyperglycemia (HCC)  POCT Glucose    Hemoglobin R3Y    Basic Metabolic Panel    Insulin Aspart, w/Niacinamide, (FIASP) 100 UNIT/ML SOLN    Insulin Disposable Pump (OMNIPOD DASH PODS, GEN 4,) MISC      2. Hyperhidrosis  glycopyrrolate (ROBINUL) 1 MG tablet      3. Insulin pump in place              PLAN:     Orders Placed This Encounter   Procedures    Hemoglobin A1C     Standing Status:   Future     Standing Expiration Date:   2/27/0077    Basic Metabolic Panel     Standing Status:   Future     Standing Expiration Date:   6/19/2024    POCT Glucose     Orders Placed This Encounter   Medications    Insulin Aspart, w/Niacinamide, (FIASP) 100 UNIT/ML SOLN     Sig: Use via pump max dose 150 units/day     Dispense:  50 mL     Refill:  3    Insulin Disposable Pump (OMNIPOD DASH PODS, GEN 4,) MISC     Sig: Change every 48 hrs     Dispense:  15 each     Refill:  3    glycopyrrolate (ROBINUL) 1 MG tablet     Sig: Take 1 tablet by mouth 2 times daily     Dispense:  60 tablet     Refill:  3     Continue patient on current insulin regimen continue glycopyrrolate A1c goal of 7 or lower      Orders Placed This Encounter   Procedures    POCT Glucose     No orders of the defined types were placed in this encounter. No follow-ups on file.   Subjective:     Chief Complaint   Patient presents with    Diabetes     Vitals:    06/19/23 0842   BP: 90/66   Pulse: 100   SpO2: 92%   Weight: 176 lb (79.8 kg)   Height: 5' 8\" (1.727 m)     Wt Readings from Last 3 Encounters:   06/19/23 176 lb (79.8 kg)   06/05/23 150 lb (68 kg)   04/13/23 160 lb (72.6 kg)     BP Readings from Last 3 Encounters:   06/19/23 90/66   06/05/23 108/62   04/13/23 127/85     Follow-up on type 2 diabetes patient is currently on insulin pump pump was downloaded average blood sugars close to 130 no information on OmniPod  Plan to get continuous glucose monitoring Dexcom A1c

## 2023-06-23 DIAGNOSIS — E11.65 UNCONTROLLED TYPE 2 DIABETES MELLITUS WITH HYPERGLYCEMIA (HCC): ICD-10-CM

## 2023-06-23 RX ORDER — INSULIN PUMP CONTROLLER
EACH MISCELLANEOUS
Qty: 15 EACH | Refills: 3 | Status: SHIPPED | OUTPATIENT
Start: 2023-06-23

## 2023-06-23 NOTE — TELEPHONE ENCOUNTER
Patient requesting medication refill.  Please approve or deny this request.    Rx requested:  Requested Prescriptions     Pending Prescriptions Disp Refills    Insulin Disposable Pump (OMNIPOD DASH PODS, GEN 4,) MISC 15 each 3     Sig: Change every 48 hrs         Last Office Visit:   6/19/2023      Next Visit Date:  Future Appointments   Date Time Provider [place] Mcadams   7/13/2023 11:15 AM Nicolas Boogie MD [facility]   9/19/2023  9:30 AM Grayson Lezama MD [facility]

## 2023-07-05 DIAGNOSIS — G89.4 CHRONIC PAIN SYNDROME: ICD-10-CM

## 2023-07-05 DIAGNOSIS — N95.1 HOT FLASH, MENOPAUSAL: ICD-10-CM

## 2023-07-05 RX ORDER — OXYCODONE AND ACETAMINOPHEN 5; 325 MG/1; MG/1
1 TABLET ORAL EVERY 8 HOURS PRN
Qty: 60 TABLET | Refills: 0 | Status: SHIPPED | OUTPATIENT
Start: 2023-07-05 | End: 2023-07-06 | Stop reason: SDUPTHER

## 2023-07-05 RX ORDER — ESTRADIOL 0.03 MG/D
1 PATCH TRANSDERMAL
Qty: 12 PATCH | Refills: 0 | Status: SHIPPED | OUTPATIENT
Start: 2023-07-05 | End: 2023-09-18 | Stop reason: SDUPTHER

## 2023-07-06 ENCOUNTER — TELEPHONE (OUTPATIENT)
Dept: PRIMARY CARE | Facility: CLINIC | Age: 58
End: 2023-07-06
Payer: COMMERCIAL

## 2023-07-06 DIAGNOSIS — G89.4 CHRONIC PAIN SYNDROME: ICD-10-CM

## 2023-07-06 RX ORDER — OXYCODONE AND ACETAMINOPHEN 5; 325 MG/1; MG/1
1 TABLET ORAL EVERY 8 HOURS PRN
Qty: 60 TABLET | Refills: 0 | Status: SHIPPED | OUTPATIENT
Start: 2023-07-06 | End: 2023-08-06 | Stop reason: SDUPTHER

## 2023-07-07 DIAGNOSIS — G89.4 CHRONIC PAIN SYNDROME: ICD-10-CM

## 2023-07-09 RX ORDER — OXYCODONE AND ACETAMINOPHEN 5; 325 MG/1; MG/1
1 TABLET ORAL EVERY 8 HOURS PRN
Qty: 60 TABLET | Refills: 0 | OUTPATIENT
Start: 2023-07-09 | End: 2023-08-08

## 2023-07-13 ENCOUNTER — TELEPHONE (OUTPATIENT)
Dept: PRIMARY CARE | Facility: CLINIC | Age: 58
End: 2023-07-13
Payer: COMMERCIAL

## 2023-07-13 DIAGNOSIS — R11.0 NAUSEA IN ADULT: Primary | ICD-10-CM

## 2023-07-13 RX ORDER — ONDANSETRON 4 MG/1
TABLET, ORALLY DISINTEGRATING ORAL
Qty: 45 TABLET | Refills: 0 | Status: SHIPPED | OUTPATIENT
Start: 2023-07-13 | End: 2024-01-05 | Stop reason: ALTCHOICE

## 2023-07-13 NOTE — TELEPHONE ENCOUNTER
Pt is calling says, even tho she has been on ozempic since march sometimes she still get nauseas and she injects herself.  Pt is asking if there is something you can prescribe to get rid of the nausea as needed.

## 2023-07-18 ENCOUNTER — OFFICE VISIT (OUTPATIENT)
Dept: PRIMARY CARE | Facility: CLINIC | Age: 58
End: 2023-07-18
Payer: MEDICARE

## 2023-07-18 VITALS
WEIGHT: 179.3 LBS | BODY MASS INDEX: 27.17 KG/M2 | HEART RATE: 80 BPM | RESPIRATION RATE: 20 BRPM | HEIGHT: 68 IN | SYSTOLIC BLOOD PRESSURE: 136 MMHG | OXYGEN SATURATION: 97 % | DIASTOLIC BLOOD PRESSURE: 78 MMHG

## 2023-07-18 DIAGNOSIS — E78.2 MIXED HYPERLIPIDEMIA: ICD-10-CM

## 2023-07-18 DIAGNOSIS — D72.829 LEUKOCYTOSIS, UNSPECIFIED TYPE: ICD-10-CM

## 2023-07-18 DIAGNOSIS — E53.8 VITAMIN B12 DEFICIENCY: ICD-10-CM

## 2023-07-18 DIAGNOSIS — E11.22 TYPE 2 DIABETES MELLITUS WITH STAGE 3B CHRONIC KIDNEY DISEASE, WITHOUT LONG-TERM CURRENT USE OF INSULIN (MULTI): Primary | ICD-10-CM

## 2023-07-18 DIAGNOSIS — E03.4 HYPOTHYROIDISM DUE TO ACQUIRED ATROPHY OF THYROID: ICD-10-CM

## 2023-07-18 DIAGNOSIS — N18.32 TYPE 2 DIABETES MELLITUS WITH STAGE 3B CHRONIC KIDNEY DISEASE, WITHOUT LONG-TERM CURRENT USE OF INSULIN (MULTI): Primary | ICD-10-CM

## 2023-07-18 DIAGNOSIS — R11.0 NAUSEA IN ADULT: ICD-10-CM

## 2023-07-18 DIAGNOSIS — Z12.31 SCREENING MAMMOGRAM FOR BREAST CANCER: ICD-10-CM

## 2023-07-18 DIAGNOSIS — E55.9 VITAMIN D DEFICIENCY: ICD-10-CM

## 2023-07-18 DIAGNOSIS — Z11.59 ENCOUNTER FOR HEPATITIS C SCREENING TEST FOR LOW RISK PATIENT: ICD-10-CM

## 2023-07-18 DIAGNOSIS — G89.4 CHRONIC PAIN SYNDROME: ICD-10-CM

## 2023-07-18 DIAGNOSIS — Z79.899 POLYPHARMACY: ICD-10-CM

## 2023-07-18 DIAGNOSIS — I10 ESSENTIAL HYPERTENSION: ICD-10-CM

## 2023-07-18 LAB — THYROTROPIN (MIU/L) IN SER/PLAS BY DETECTION LIMIT <= 0.05 MIU/L: 3.5 MIU/L (ref 0.44–3.98)

## 2023-07-18 PROCEDURE — 3078F DIAST BP <80 MM HG: CPT | Performed by: INTERNAL MEDICINE

## 2023-07-18 PROCEDURE — 84443 ASSAY THYROID STIM HORMONE: CPT

## 2023-07-18 PROCEDURE — 3075F SYST BP GE 130 - 139MM HG: CPT | Performed by: INTERNAL MEDICINE

## 2023-07-18 PROCEDURE — 1036F TOBACCO NON-USER: CPT | Performed by: INTERNAL MEDICINE

## 2023-07-18 PROCEDURE — 99213 OFFICE O/P EST LOW 20 MIN: CPT | Performed by: INTERNAL MEDICINE

## 2023-07-18 RX ORDER — SEMAGLUTIDE 0.68 MG/ML
0.5 INJECTION, SOLUTION SUBCUTANEOUS
Qty: 3 ML | Refills: 1 | Status: SHIPPED | OUTPATIENT
Start: 2023-07-18 | End: 2023-10-31

## 2023-07-18 NOTE — PROGRESS NOTES
Patient is here for 2 month follow upSubjective   Patient ID: Eunice Schroeder is a 57 y.o. female who presents for Follow-up.    HPI   OARRS:  Carlos Gómez MD on 7/18/2023  8:11 AM  I have personally reviewed the OARRS report for Eunice Schroeder. I have considered the risks of abuse, dependence, addiction and diversion    Is the patient prescribed a combination of a benzodiazepine and opioid?  Yes, I feel it is clincially indicated to continue the medication and have discussed with the patient risks/benefits/alternatives.    Last Urine Drug Screen / ordered today: No  Recent Results (from the past 55265 hour(s))   Opiate Confirmation, Urine    Collection Time: 03/30/23 10:56 AM   Result Value Ref Range    6-Acetylmorphine <25 Cutoff <25 ng/mL    Codeine <50 Cutoff <50 ng/mL    Hydrocodone <25 Cutoff <25 ng/mL    Hydromorphone <25 Cutoff <25 ng/mL    Morphine Urine <50 Cutoff <50 ng/mL    Norhydrocodone <25 Cutoff <25 ng/mL    Noroxycodone >1000 (A) Cutoff <25 ng/mL    Oxycodone 889 (A) Cutoff <25 ng/mL    Oxymorphone 1921 (A) Cutoff <25 ng/mL   Drug Screen, Urine With Reflex to Confirmation    Collection Time: 03/30/23 10:56 AM   Result Value Ref Range    DRUG SCREEN COMMENT URINE SEE BELOW     Amphetamine Screen, Urine PRESUMPTIVE NEGATIVE NEGATIVE    Barbiturate Screen, Urine PRESUMPTIVE NEGATIVE NEGATIVE    BENZODIAZEPINE (PRESENCE) IN URINE BY SCREEN METHOD PRESUMPTIVE NEGATIVE NEGATIVE    Cannabinoid Screen, Urine PRESUMPTIVE NEGATIVE NEGATIVE    Cocaine Screen, Urine PRESUMPTIVE NEGATIVE NEGATIVE    Fentanyl, Ur PRESUMPTIVE NEGATIVE NEGATIVE    Methadone Screen, Urine PRESUMPTIVE NEGATIVE NEGATIVE    Opiate Screen, Urine PRESUMPTIVE NEGATIVE NEGATIVE    Oxycodone Screen, Ur PRESUMPTIVE POSITIVE (A) NEGATIVE    PCP Screen, Urine PRESUMPTIVE NEGATIVE NEGATIVE   OPIATE/OPIOID/BENZO PRESCRIPTION COMPLIANCE    Collection Time: 12/15/21  5:21 PM   Result Value Ref Range    DRUG SCREEN COMMENT URINE SEE  BELOW     Creatine, Urine 153.8 mg/dL    Amphetamine Screen, Urine PRESUMPTIVE NEGATIVE NEGATIVE    Barbiturate Screen, Urine PRESUMPTIVE NEGATIVE NEGATIVE    Cannabinoid Screen, Urine PRESUMPTIVE NEGATIVE NEGATIVE    Cocaine Screen, Urine PRESUMPTIVE NEGATIVE NEGATIVE    PCP Screen, Urine PRESUMPTIVE NEGATIVE NEGATIVE    7-Aminoclonazepam 221 (A) Cutoff <25 ng/mL    Alpha-Hydroxyalprazolam <25 Cutoff <25 ng/mL    Alpha-Hydroxymidazolam <25 Cutoff <25 ng/mL    Alprazolam <25 Cutoff <25 ng/mL    Chlordiazepoxide <25 Cutoff <25 ng/mL    Clonazepam <25 Cutoff <25 ng/mL    Diazepam <25 Cutoff <25 ng/mL    Lorazepam <25 Cutoff <25 ng/mL    Midazolam <25 Cutoff <25 ng/mL    Nordiazepam <25 Cutoff <25 ng/mL    Oxazepam <25 Cutoff <25 ng/mL    Temazepam <25 Cutoff <25 ng/mL    Zolpidem <25 Cutoff <25 ng/mL    Zolpidem Metabolite (ZCA) <25 Cutoff <25 ng/mL    6-Acetylmorphine <25 Cutoff <25 ng/mL    Codeine <50 Cutoff <50 ng/mL    Hydrocodone <25 Cutoff <25 ng/mL    Hydromorphone <25 Cutoff <25 ng/mL    Morphine Urine <50 Cutoff <50 ng/mL    Norhydrocodone <25 Cutoff <25 ng/mL    Noroxycodone 310 (A) Cutoff <25 ng/mL    Oxycodone 142 (A) Cutoff <25 ng/mL    Oxymorphone 165 (A) Cutoff <25 ng/mL    Tramadol <50 Cutoff <50 ng/mL    O-Desmethyltramadol <50 Cutoff <50 ng/mL    Fentanyl <2.5 Cutoff<2.5 ng/mL    Norfentanyl <2.5 Cutoff<2.5 ng/mL    METHADONE CONFIRMATION,URINE <25 Cutoff <25 ng/mL    EDDP <25 Cutoff <25 ng/mL     Results are as expected.     Controlled Substance Agreement:  Date of the Last Agreement: March 30, 2023  Reviewed Controlled Substance Agreement including but not limited to the benefits, risks, and alternatives to treatment with a Controlled Substance medication(s).    Benzodiazepines:  What is the patient's goal of therapy?  Safe and reasonable control of pain, comanagement with PSYCHIATRY.  Watch out for challenges of POLYPHARMACY.  Precautions reviewed.  Is this being achieved with current treatment?   "Yes.    KIMBERLEY-7:  No data recorded    Activities of Daily Living:   Is your overall impression that this patient is benefiting (symptom reduction outweighs side effects) from benzodiazepine therapy? Yes     1. Physical Functioning: Better  2. Family Relationship: Better  3. Social Relationship: Better  4. Mood: Better  5. Sleep Patterns: Better  6. Overall Function: Better    The patient continues to manage with chronic pain, anxiety.  Continues to want to improve quality of life, and does not wish harm to self or others.  Compliant with medications, tolerating regimens.    Has not run out of medication earlier than expected.  Secures her medications, both benzodiazepine and opiate.  No confusion or delirium.  No recent falls.  Not worried about memory.  No headache, blurred vision, diplopia.  No dysphagia.    Some nausea from time to time, but otherwise, no vomiting, tolerating OZEMPIC.  No dysuria, flank, suprapubic pain.  ENDOCRINE with no polyuria, polydipsia, polyphagia.  No blurred vision.  No skin, hair, nail changes.  No dramatic weight loss or weight gain.      No whitney chest pain.  No orthopnea, no paroxysmal nocturnal dyspnea.  No particular coughing, no sputum production.  No dysuria, flank, suprapubic pain.  No skin changes.  States that she is doing relatively well.  CONSTITUTIONALLY, no fever, no chills.  No night sweats.  No lingering anorexia or nausea.  No apparent lymphadenopathy.  No apparent weight loss.        Review of Systems  Review of systems as in history of present illness, and otherwise, reviewed separately as well, and was unremarkable/negative/noncontributory.          Objective   /78 (BP Location: Left arm, Patient Position: Sitting, BP Cuff Size: Adult)   Pulse 80   Resp 20   Ht 1.727 m (5' 8\")   Wt 81.3 kg (179 lb 4.8 oz)   SpO2 97%   BMI 27.26 kg/m²     Physical Exam  In good spirits.  Not in distress or diaphoresis.  Alert, oriented x3.  Amiable.  Not unkempt.  " Receptive.  Cheerful.  Appropriate.  Moderately built.  Eager to improve quality of life and stay healthy and relatively independent and productive.  Does not wish harm to self or others.  Not unkempt.    HEAD pink palpebral conjunctivae, anicteric sclerae.  NECK supple, no apparent jugular venous distention.  CARDIOVASCULAR not in distress or diaphoresis.  No bipedal edema.  LUNGS not in distress or diaphoresis.  Not using accessory muscles.  ABDOMEN soft, nontender.  BACK no costovertebral angle tenderness.  EXTREMITIES no clubbing, no cyanosis.  NEURO no facial asymmetry.  No apparent cranial nerve deficits.  Romberg negative.  Ambulating without need of assistance.  No apparent focal weakness.  No tremors.  PSYCH receptive, appropriate, and eager to maintain and improve quality of life.      Laboratory examinations, including endocrinology work-up, noted.        Assessment/Plan   Problem List Items Addressed This Visit       Vitamin D deficiency    Relevant Orders    Vitamin D 25-Hydroxy,Total    Vitamin B12 deficiency    Relevant Orders    CBC and Auto Differential    Vitamin B12    Folate    Type 2 diabetes mellitus with stage 3b chronic kidney disease, without long-term current use of insulin (CMS/[facility]) - Primary    Relevant Medications    semaglutide (Ozempic) 0.25 mg or 0.5 mg (2 mg/3 mL) pen injector    Other Relevant Orders    Urinalysis with Reflex Microscopic and Culture    Albumin , Urine Random    Comprehensive Metabolic Panel    Creatine Kinase    Uric Acid    Polypharmacy    Mixed hyperlipidemia    Relevant Orders    Comprehensive Metabolic Panel    Lipid Panel    Leukocytosis    Relevant Orders    CBC and Auto Differential    Urinalysis with Reflex Microscopic and Culture    Hypothyroidism due to acquired atrophy of thyroid    Relevant Orders    TSH with reflex to Free T4 if abnormal    Essential hypertension    Relevant Orders    Urinalysis with Reflex Microscopic and Culture    Comprehensive  Metabolic Panel    Creatine Kinase    Magnesium    Chronic pain syndrome    Relevant Orders    Creatine Kinase    Uric Acid     Other Visit Diagnoses       Screening mammogram for breast cancer        Relevant Orders    BI mammo bilateral screening tomosynthesis    Nausea in adult        Relevant Orders    Amylase    Lipase    Encounter for hepatitis C screening test for low risk patient        Relevant Orders    Hepatitis C antibody             Thank you very much for coming.  I am very happy to see you.    I see that you are a little tired.  I do understand that you sometimes stay up late, and you do understand that this is too hard on your system.  Please try your best not to stay up late, especially since you wake up early every morning anyway.    We updated your CONTROLLED substance agreement for safe use of your medications.  Never take your PAIN medication and BENZODIAZEPINE together.  Please give them apart at least 6 hours.  Never take any of these medications with alcohol.  No driving please if you are drowsy.  Avoid driving after taking any of these medications for 6 hours.    Please secure your medication, so that only you have access to them.  Please always be careful, because these medications, each of them can cause increased risk of falling and forgetfulness.  Just be very careful.    TODAY, let us check your THYROID blood examination.  I will send word regarding results and possible changes.  We will do that here.    Please come back in 2 months, SEPTEMBER, your birth month.  You will be due for FASTING laboratory examinations which you can have done at [facility].    You can schedule your MAMMOGRAM, but make sure that it is after October 8 to make sure that it is after 1 year since your last mammogram.  Please check with the scheduling person.    Please continue to drink lots of fluids, and please continue to avoid salt.    Please continue to try to stay physically active with walking, but of  course, do not overdo it!  If you get tired and are still tired the next day, that means you did too much.    Please avoid sun exposure.    Again, thank you very much for coming.  Please be careful with your medications.  Please continue to take care of yourself and your family, and please continue to pray for our recovery from this pandemic.  See you in 2 months.            0  Return in 2 months.  40 minutes please.  FASTING laboratory examinations via [facility], then come in for yearly COMPLETE physical examination.  Reassess CSA, update risk of polypharmacy.  Coordinate with psychiatry, rheumatology, endocrinology, consider nephrology once more.  Review preventive strategies, cardiovascular risk.  Start to prepare for winter.            0

## 2023-07-18 NOTE — PATIENT INSTRUCTIONS
Thank you very much for coming.  I am very happy to see you.    I see that you are a little tired.  I do understand that you sometimes stay up late, and you do understand that this is too hard on your system.  Please try your best not to stay up late, especially since you wake up early every morning anyway.    We updated your CONTROLLED substance agreement for safe use of your medications.  Never take your PAIN medication and BENZODIAZEPINE together.  Please give them apart at least 6 hours.  Never take any of these medications with alcohol.  No driving please if you are drowsy.  Avoid driving after taking any of these medications for 6 hours.    Please secure your medication, so that only you have access to them.  Please always be careful, because these medications, each of them can cause increased risk of falling and forgetfulness.  Just be very careful.    TODAY, let us check your THYROID blood examination.  I will send word regarding results and possible changes.  We will do that here.    Please come back in 2 months, SEPTEMBER, your birth month.  You will be due for FASTING laboratory examinations which you can have done at [facility].    You can schedule your MAMMOGRAM, but make sure that it is after October 8 to make sure that it is after 1 year since your last mammogram.  Please check with the scheduling person.    Please continue to drink lots of fluids, and please continue to avoid salt.    Please continue to try to stay physically active with walking, but of course, do not overdo it!  If you get tired and are still tired the next day, that means you did too much.    Please avoid sun exposure.    Again, thank you very much for coming.  Please be careful with your medications.  Please continue to take care of yourself and your family, and please continue to pray for our recovery from this pandemic.  See you in 2 months.            0  Return in 2 months.  40 minutes please.  FASTING laboratory  examinations via [facility], then come in for yearly COMPLETE physical examination.  Reassess CSA, update risk of polypharmacy.  Coordinate with psychiatry, rheumatology, endocrinology, consider nephrology once more.  Review preventive strategies, cardiovascular risk.  Start to prepare for winter.            0

## 2023-07-21 ENCOUNTER — TELEPHONE (OUTPATIENT)
Dept: PRIMARY CARE | Facility: CLINIC | Age: 58
End: 2023-07-21
Payer: COMMERCIAL

## 2023-07-24 ENCOUNTER — TELEPHONE (OUTPATIENT)
Dept: PRIMARY CARE | Facility: CLINIC | Age: 58
End: 2023-07-24
Payer: COMMERCIAL

## 2023-07-24 NOTE — ED PROVIDER NOTES
eMERGENCY dEPARTMENT eNCOUnter      CHIEF COMPLAINT    Chief Complaint   Patient presents with    Migraine       HPI    Yandel Crouch is a 50 y.o. female with history of bipolar, lupus, migraine headache who presentsto ED from home  By private car  With complaint of headache  Onset this morning  Intensity of symptoms moderate, gradual in onset  Location of symptoms diffuse associate with phonophobia and photophobia  Patient has got a history of migraine headaches and she has headache since morning. Patient complains of nausea but denies any vomiting. Patient denies any fever or chills. Patient states that she has good lupus which gives her migraine headaches. She denies any neck pain or any other neurologic symptoms.     PAST MEDICAL HISTORY    Past Medical History:   Diagnosis Date    Anxiety     Asthma     as child    Depression     Depression     dysthymia - following lupus dx age 23    Diabetes mellitus ([facility]     Foot fracture, left     GERD (gastroesophageal reflux disease)     Hyperlipidemia     Hypothyroidism     Kidney stones     Lupus ([facility]     dr barrera -     Migraine     PONV (postoperative nausea and vomiting)     Sciatica     left sided       SURGICAL HISTORY    Past Surgical History:   Procedure Laterality Date    APPENDECTOMY      ARTHRODESIS Left 11/26/2019    ARTHRODESIS OF THE FIRST METATARSAL PHALANGEAL JOINT LEFT FOOT performed by Roney Denson DPM at [facility]      COLONOSCOPY      FOOT CLOSED REDUCTION Left 1/3/2017    LEFT FOOT PERCUTANEOUS PINNING [place] FRACTURE performed by Salma Mathias MD at [address]      OK ESOPHAGOGASTRODUODENOSCOPY TRANSORAL DIAGNOSTIC N/A 3/8/2017    EGD ESOPHAGOGASTRODUODENOSCOPY WITH DILATION performed by Alex Garrison MD at [address] ESOPHAGOGASTRODUODENOSCOPY TRANSORAL DIAGNOSTIC N/A 11/7/2018    EGD hours as needed for Wheezing 1 Inhaler 0    rosuvastatin (CRESTOR) 40 MG tablet Take 1 tablet by mouth daily 90 tablet 3    pantoprazole (PROTONIX) 40 MG tablet Take 1 tablet by mouth daily 90 tablet 3    levothyroxine (SYNTHROID) 50 MCG tablet Take 1 tablet by mouth daily 90 tablet 3    Blood Glucose Monitoring Suppl (TRUE TRACK BLOOD GLUCOSE) CAROLE Check blood sugars twice daily or as directed.  100 Device 5    hydroxychloroquine (PLAQUENIL) 200 MG tablet Take 1 tablet by mouth 2 times daily  5       ALLERGIES    Allergies   Allergen Reactions    Aspirin Swelling     Bloody noses    Cephalosporins     Imitrex [Sumatriptan] Hives    Lidocaine     Pcn [Penicillins] Swelling    Seasonal Itching     Itch, sneezing,runny nose, watery eyes    Toradol [Ketorolac Tromethamine] Hives     Shortness of breath    Ultram [Tramadol] Hives    Fentanyl Rash       FAMILY HISTORY    Family History   Adopted: Yes       SOCIAL HISTORY    Social History     Socioeconomic History    Marital status: Legally      Spouse name: None    Number of children: None    Years of education: None    Highest education level: None   Occupational History    None   Social Needs    Financial resource strain: None    Food insecurity     Worry: None     Inability: None    Transportation needs     Medical: None     Non-medical: None   Tobacco Use    Smoking status: Never Smoker    Smokeless tobacco: Never Used   Substance and Sexual Activity    Alcohol use: No     Alcohol/week: 0.0 standard drinks     Comment: denies    Drug use: No     Comment: denies    Sexual activity: None   Lifestyle    Physical activity     Days per week: None     Minutes per session: None    Stress: None   Relationships    Social connections     Talks on phone: None     Gets together: None     Attends Worship service: None     Active member of club or organization: None     Attends meetings of clubs or organizations: None     Relationship status: None    Intimate partner violence     Fear of current or ex partner: None     Emotionally abused: None     Physically abused: None     Forced sexual activity: None   Other Topics Concern    None   Social History Narrative    None       REVIEW OF SYSTEMS    Constitutional:  Denies fever, chills, weight loss or weakness   Eyes:  Denies photophobia or discharge   HENT:  Denies sore throat or ear pain   Respiratory:  Denies cough or shortness of breath   Cardiovascular:  Denies chest pain, palpitations or swelling   GI:  Denies abdominal pain,  vomiting, or diarrhea but complains of nausea  Musculoskeletal:  Denies back pain   Skin:  Denies rash   Neurologic: Complains of headache, but denies focal weakness or sensory changes   Endocrine:  Denies polyuria or polydypsia   Lymphatic:  Denies swollen glands   Psychiatric:  Denies depression, suicidal ideation or homicidal ideation   All systems negative except as marked. PHYSICAL EXAM    VITAL SIGNS: /78   Pulse 77   Temp 98.3 °F (36.8 °C)   Resp 16   Ht 5' 8\" (1.727 m)   Wt 160 lb (72.6 kg)   LMP  (LMP Unknown)   SpO2 98%   BMI 24.33 kg/m²    Constitutional:  Well developed, Well nourished, No acute distress, Non-toxic appearance. HENT:  Normocephalic, Atraumatic, Bilateral external ears normal, Oropharynx moist, No oral exudates, Nose normal. Neck- Normal range of motion, No tenderness, Supple, No stridor. No meningeal signs  Eyes:  PERRL, EOMI, Conjunctiva normal, No discharge. Respiratory:  Normal breath sounds, No respiratory distress, No wheezing, No chest tenderness. Cardiovascular:  Normal heart rate, Normal rhythm, No murmurs, No rubs, No gallops. GI:  Bowel sounds normal, Soft, No tenderness, No masses, No pulsatile masses. : No CVA tenderness. Musculoskeletal:  Intact distal pulses, No edema, No tenderness, No cyanosis, No clubbing. Good range of motion in all major joints.  No tenderness to palpation or major deformities noted. Back- No tenderness. Integument:  Warm, Dry, No erythema, No rash. Lymphatic:  No lymphadenopathy noted. Neurologic: NIH = 0, alert & oriented x 3, Normal motor function, Normal sensory function, No focal deficits noted. Psychiatric:  Affect normal, Judgment normal, Mood normal.     REEVALUATION   Headache improved. Tolerating p.o. Labs  Labs Reviewed - No data to display          Summation      Patient Course:     ED Medications administered this visit:    Medications   metoclopramide (REGLAN) injection 10 mg (has no administration in time range)   diphenhydrAMINE (BENADRYL) injection 50 mg (has no administration in time range)   HYDROcodone-acetaminophen (NORCO) 5-325 MG per tablet 1 tablet (has no administration in time range)       New Prescriptions from this visit:    New Prescriptions    No medications on file       Follow-up:  Ramón Solares MD  [address] [address]  [phone]    Call in 1 day          Final Impression:   1. Migraine without status migrainosus, not intractable, unspecified migraine type    2.  [facility] ([facility]               (Please note that portions of this note were completed with a voice recognition program.  Efforts were made to edit the dictations but occasionally words are mis-transcribed.)            Prema Blanton MD  04/15/21 1956 Hemigard Intro: Due to skin fragility and wound tension, it was decided to use HEMIGARD adhesive retention suture devices to permit a linear closure. The skin was cleaned and dried for a 6cm distance away from the wound. Excessive hair, if present, was removed to allow for adhesion.

## 2023-08-01 ENCOUNTER — HOSPITAL ENCOUNTER (EMERGENCY)
Age: 58
Discharge: HOME | End: 2023-08-01
Attending: EMERGENCY MEDICINE
Payer: MEDICARE

## 2023-08-01 ENCOUNTER — APPOINTMENT (OUTPATIENT)
Dept: ULTRASOUND IMAGING | Age: 58
End: 2023-08-01
Payer: MEDICARE

## 2023-08-01 VITALS
SYSTOLIC BLOOD PRESSURE: 143 MMHG | RESPIRATION RATE: 20 BRPM | TEMPERATURE: 97.9 F | HEIGHT: 68 IN | DIASTOLIC BLOOD PRESSURE: 83 MMHG | BODY MASS INDEX: 24.25 KG/M2 | HEART RATE: 78 BPM | OXYGEN SATURATION: 99 % | WEIGHT: 160 LBS

## 2023-08-01 DIAGNOSIS — M32.9 LUPUS (HCC): ICD-10-CM

## 2023-08-01 DIAGNOSIS — M79.601 RIGHT ARM PAIN: ICD-10-CM

## 2023-08-01 DIAGNOSIS — M32.9 SLE (SYSTEMIC LUPUS ERYTHEMATOSUS RELATED SYNDROME) (HCC): Primary | ICD-10-CM

## 2023-08-01 LAB
ALBUMIN SERPL-MCNC: 4.4 G/DL (ref 3.5–4.6)
ALP SERPL-CCNC: 113 U/L (ref 40–130)
ALT SERPL-CCNC: 7 U/L (ref 0–33)
ANION GAP SERPL CALCULATED.3IONS-SCNC: 10 MEQ/L (ref 9–15)
AST SERPL-CCNC: 11 U/L (ref 0–35)
BASOPHILS # BLD: 0.1 K/UL (ref 0–0.2)
BASOPHILS NFR BLD: 1.1 %
BILIRUB SERPL-MCNC: 0.4 MG/DL (ref 0.2–0.7)
BUN SERPL-MCNC: 10 MG/DL (ref 6–20)
CALCIUM SERPL-MCNC: 9.7 MG/DL (ref 8.5–9.9)
CHLORIDE SERPL-SCNC: 104 MEQ/L (ref 95–107)
CO2 SERPL-SCNC: 28 MEQ/L (ref 20–31)
CREAT SERPL-MCNC: 1.13 MG/DL (ref 0.5–0.9)
EOSINOPHIL # BLD: 0.7 K/UL (ref 0–0.7)
EOSINOPHIL NFR BLD: 5 %
ERYTHROCYTE [DISTWIDTH] IN BLOOD BY AUTOMATED COUNT: 13.9 % (ref 11.5–14.5)
GLOBULIN SER CALC-MCNC: 3.6 G/DL (ref 2.3–3.5)
GLUCOSE SERPL-MCNC: 85 MG/DL (ref 70–99)
HCT VFR BLD AUTO: 42.8 % (ref 37–47)
HGB BLD-MCNC: 14.3 G/DL (ref 12–16)
LYMPHOCYTES # BLD: 2.3 K/UL (ref 1–4.8)
LYMPHOCYTES NFR BLD: 17.2 %
MCH RBC QN AUTO: 28.3 PG (ref 27–31.3)
MCHC RBC AUTO-ENTMCNC: 33.4 % (ref 33–37)
MCV RBC AUTO: 84.8 FL (ref 79.4–94.8)
MONOCYTES # BLD: 0.8 K/UL (ref 0.2–0.8)
MONOCYTES NFR BLD: 5.7 %
NEUTROPHILS # BLD: 9.4 K/UL (ref 1.4–6.5)
NEUTS SEG NFR BLD: 71 %
PLATELET # BLD AUTO: 303 K/UL (ref 130–400)
POTASSIUM SERPL-SCNC: 4.4 MEQ/L (ref 3.4–4.9)
PROT SERPL-MCNC: 8 G/DL (ref 6.3–8)
RBC # BLD AUTO: 5.05 M/UL (ref 4.2–5.4)
SODIUM SERPL-SCNC: 142 MEQ/L (ref 135–144)
WBC # BLD AUTO: 13.2 K/UL (ref 4.8–10.8)

## 2023-08-01 PROCEDURE — 85025 COMPLETE CBC W/AUTO DIFF WBC: CPT

## 2023-08-01 PROCEDURE — 80053 COMPREHEN METABOLIC PANEL: CPT

## 2023-08-01 PROCEDURE — 6360000002 HC RX W HCPCS: Performed by: EMERGENCY MEDICINE

## 2023-08-01 PROCEDURE — 36415 COLL VENOUS BLD VENIPUNCTURE: CPT

## 2023-08-01 PROCEDURE — 96374 THER/PROPH/DIAG INJ IV PUSH: CPT

## 2023-08-01 PROCEDURE — 96375 TX/PRO/DX INJ NEW DRUG ADDON: CPT

## 2023-08-01 PROCEDURE — 93971 EXTREMITY STUDY: CPT

## 2023-08-01 PROCEDURE — 99284 EMERGENCY DEPT VISIT MOD MDM: CPT

## 2023-08-01 RX ORDER — ONDANSETRON 2 MG/ML
4 INJECTION INTRAMUSCULAR; INTRAVENOUS ONCE
Status: COMPLETED | OUTPATIENT
Start: 2023-08-01 | End: 2023-08-01

## 2023-08-01 RX ORDER — MORPHINE SULFATE 4 MG/ML
4 INJECTION, SOLUTION INTRAMUSCULAR; INTRAVENOUS ONCE
Status: COMPLETED | OUTPATIENT
Start: 2023-08-01 | End: 2023-08-01

## 2023-08-01 RX ORDER — PREDNISONE 10 MG/1
TABLET ORAL
Qty: 30 TABLET | Refills: 0 | Status: SHIPPED | OUTPATIENT
Start: 2023-08-01 | End: 2023-08-11

## 2023-08-01 RX ADMIN — ONDANSETRON 4 MG: 2 INJECTION INTRAMUSCULAR; INTRAVENOUS at 11:23

## 2023-08-01 RX ADMIN — MORPHINE SULFATE 4 MG: 4 INJECTION, SOLUTION INTRAMUSCULAR; INTRAVENOUS at 11:26

## 2023-08-01 RX ADMIN — METHYLPREDNISOLONE SODIUM SUCCINATE 125 MG: 125 INJECTION, POWDER, FOR SOLUTION INTRAMUSCULAR; INTRAVENOUS at 11:29

## 2023-08-01 ASSESSMENT — PAIN DESCRIPTION - LOCATION
LOCATION: ARM
LOCATION: ARM;SHOULDER

## 2023-08-01 ASSESSMENT — ENCOUNTER SYMPTOMS
ABDOMINAL PAIN: 0
CHEST TIGHTNESS: 0
COUGH: 0
PHOTOPHOBIA: 0
VOMITING: 0
EYE DISCHARGE: 0
WHEEZING: 0
SORE THROAT: 0
ABDOMINAL DISTENTION: 0
SHORTNESS OF BREATH: 0

## 2023-08-01 ASSESSMENT — PAIN - FUNCTIONAL ASSESSMENT: PAIN_FUNCTIONAL_ASSESSMENT: 0-10

## 2023-08-01 ASSESSMENT — PAIN DESCRIPTION - FREQUENCY: FREQUENCY: CONTINUOUS

## 2023-08-01 ASSESSMENT — PAIN DESCRIPTION - DESCRIPTORS
DESCRIPTORS: SHARP;NUMBNESS
DESCRIPTORS: STABBING;SHARP

## 2023-08-01 ASSESSMENT — PAIN SCALES - GENERAL
PAINLEVEL_OUTOF10: 9
PAINLEVEL_OUTOF10: 8

## 2023-08-01 ASSESSMENT — PAIN DESCRIPTION - ORIENTATION
ORIENTATION: RIGHT
ORIENTATION: RIGHT

## 2023-08-01 ASSESSMENT — PAIN DESCRIPTION - PAIN TYPE: TYPE: ACUTE PAIN

## 2023-08-01 NOTE — ED PROVIDER NOTES
[facility] ED  eMERGENCY dEPARTMENT eNCOUnter      Pt Name: Barbara Chen  MRN: [id number]  [address] [facility] 1965  Date of evaluation: 8/1/2023  Provider: Rosario Camacho MD    CHIEF COMPLAINT       Chief Complaint   Patient presents with    Arm Pain     Right     Swelling     Right hand          HISTORY OF PRESENT ILLNESS   (Location/Symptom, Timing/Onset,Context/Setting, Quality, Duration, Modifying Factors, Severity)  Note limiting factors. Barbara Chen is a 56 y.o. female who presents to the emergency department with right arm pain and swelling. Patient has history of lupus, systemic lupus. Noticed right arm discomfort that goes to her right side of her neck and is positional.  She is noted right arm swelling into her right hand. This is similar to lupus flares that she has had in the past.  No associated chest pain or shortness of breath or fever or chills. No arm trauma. She does have a history of left leg DVT in the past after trauma. Not currently on any blood thinners. HPI    NursingNotes were reviewed. REVIEW OF SYSTEMS    (2-9 systems for level 4, 10 or more for level 5)     Review of Systems   Constitutional:  Negative for chills and diaphoresis. HENT:  Negative for congestion, ear pain, mouth sores and sore throat. Eyes:  Negative for photophobia and discharge. Respiratory:  Negative for cough, chest tightness, shortness of breath and wheezing. Cardiovascular:  Negative for chest pain and palpitations. Gastrointestinal:  Negative for abdominal distention, abdominal pain and vomiting. Endocrine: Negative for cold intolerance. Genitourinary:  Negative for difficulty urinating. Musculoskeletal:  Positive for myalgias and neck pain. Negative for arthralgias and joint swelling. Skin:  Negative for pallor and rash. Allergic/Immunologic: Negative for immunocompromised state. Neurological:  Negative for dizziness and syncope.    Hematological:  Negative for

## 2023-08-02 DIAGNOSIS — F41.8 ANXIETY WITH DEPRESSION: ICD-10-CM

## 2023-08-06 DIAGNOSIS — G89.4 CHRONIC PAIN SYNDROME: ICD-10-CM

## 2023-08-06 RX ORDER — OXYCODONE AND ACETAMINOPHEN 5; 325 MG/1; MG/1
1 TABLET ORAL EVERY 8 HOURS PRN
Qty: 60 TABLET | Refills: 0 | Status: SHIPPED | OUTPATIENT
Start: 2023-08-06 | End: 2023-08-30 | Stop reason: SDUPTHER

## 2023-08-06 RX ORDER — VENLAFAXINE 75 MG/1
75 TABLET ORAL DAILY
Qty: 90 TABLET | Refills: 0 | Status: SHIPPED | OUTPATIENT
Start: 2023-08-06 | End: 2023-08-10 | Stop reason: SDUPTHER

## 2023-08-10 DIAGNOSIS — F41.8 ANXIETY WITH DEPRESSION: ICD-10-CM

## 2023-08-10 DIAGNOSIS — G89.4 CHRONIC PAIN SYNDROME: ICD-10-CM

## 2023-08-10 RX ORDER — OXYCODONE AND ACETAMINOPHEN 5; 325 MG/1; MG/1
1 TABLET ORAL EVERY 8 HOURS PRN
Qty: 60 TABLET | Refills: 0 | Status: CANCELLED | OUTPATIENT
Start: 2023-08-10 | End: 2023-09-09

## 2023-08-16 ENCOUNTER — APPOINTMENT (OUTPATIENT)
Dept: CT IMAGING | Age: 58
End: 2023-08-16
Payer: MEDICARE

## 2023-08-16 ENCOUNTER — HOSPITAL ENCOUNTER (EMERGENCY)
Age: 58
Discharge: HOME | End: 2023-08-16
Payer: MEDICARE

## 2023-08-16 VITALS
DIASTOLIC BLOOD PRESSURE: 89 MMHG | HEIGHT: 66 IN | SYSTOLIC BLOOD PRESSURE: 138 MMHG | WEIGHT: 160 LBS | HEART RATE: 82 BPM | RESPIRATION RATE: 20 BRPM | TEMPERATURE: 98.9 F | BODY MASS INDEX: 25.71 KG/M2 | OXYGEN SATURATION: 98 %

## 2023-08-16 DIAGNOSIS — N39.0 URINARY TRACT INFECTION IN FEMALE: ICD-10-CM

## 2023-08-16 DIAGNOSIS — R10.9 FLANK PAIN: ICD-10-CM

## 2023-08-16 DIAGNOSIS — N20.0 KIDNEY STONE: Primary | ICD-10-CM

## 2023-08-16 LAB
ALBUMIN SERPL-MCNC: 4 G/DL (ref 3.5–4.6)
ALP SERPL-CCNC: 109 U/L (ref 40–130)
ALT SERPL-CCNC: 10 U/L (ref 0–33)
ANION GAP SERPL CALCULATED.3IONS-SCNC: 12 MEQ/L (ref 9–15)
AST SERPL-CCNC: 12 U/L (ref 0–35)
BACTERIA URNS QL MICRO: ABNORMAL /HPF
BASOPHILS # BLD: 0.1 K/UL (ref 0–0.1)
BASOPHILS NFR BLD: 0.6 % (ref 0.1–1.2)
BILIRUB SERPL-MCNC: 0.3 MG/DL (ref 0.2–0.7)
BILIRUB UR QL STRIP: NEGATIVE
BUN SERPL-MCNC: 12 MG/DL (ref 6–20)
CALCIUM SERPL-MCNC: 9.6 MG/DL (ref 8.5–9.9)
CHLORIDE SERPL-SCNC: 104 MEQ/L (ref 95–107)
CLARITY UR: NORMAL
CO2 SERPL-SCNC: 25 MEQ/L (ref 20–31)
COLOR UR: YELLOW
CREAT SERPL-MCNC: 1.1 MG/DL (ref 0.5–0.9)
EOSINOPHIL # BLD: 0.5 K/UL (ref 0–0.4)
EOSINOPHIL NFR BLD: 3.3 % (ref 0.7–5.8)
EPI CELLS #/AREA URNS HPF: ABNORMAL /HPF
ERYTHROCYTE [DISTWIDTH] IN BLOOD BY AUTOMATED COUNT: 13.4 % (ref 11.7–14.4)
GLOBULIN SER CALC-MCNC: 3.2 G/DL (ref 2.3–3.5)
GLUCOSE SERPL-MCNC: 136 MG/DL (ref 70–99)
GLUCOSE UR STRIP-MCNC: NEGATIVE MG/DL
HCT VFR BLD AUTO: 44.9 % (ref 37–47)
HGB BLD-MCNC: 14.4 G/DL (ref 11.2–15.7)
HGB UR QL STRIP: NEGATIVE
IMM GRANULOCYTES # BLD: 0.1 K/UL
IMM GRANULOCYTES NFR BLD: 0.5 %
KETONES UR STRIP-MCNC: NEGATIVE MG/DL
LEUKOCYTE ESTERASE UR QL STRIP: NORMAL
LYMPHOCYTES # BLD: 2.9 K/UL (ref 1.2–3.7)
LYMPHOCYTES NFR BLD: 21.2 %
MCH RBC QN AUTO: 27.9 PG (ref 25.6–32.2)
MCHC RBC AUTO-ENTMCNC: 32.1 % (ref 32.2–35.5)
MCV RBC AUTO: 86.8 FL (ref 79.4–94.8)
MONOCYTES # BLD: 0.8 K/UL (ref 0.2–0.9)
MONOCYTES NFR BLD: 6.2 % (ref 4.7–12.5)
NEUTROPHILS # BLD: 9.2 K/UL (ref 1.6–6.1)
NEUTS SEG NFR BLD: 68.2 % (ref 34–71.1)
NITRITE UR QL STRIP: NEGATIVE
PH UR STRIP: 5.5 [PH] (ref 5–9)
PLATELET # BLD AUTO: 293 K/UL (ref 182–369)
POTASSIUM SERPL-SCNC: 4.2 MEQ/L (ref 3.4–4.9)
PROT SERPL-MCNC: 7.2 G/DL (ref 6.3–8)
PROT UR STRIP-MCNC: NEGATIVE MG/DL
RBC # BLD AUTO: 5.17 M/UL (ref 3.93–5.22)
RBC #/AREA URNS HPF: ABNORMAL /HPF (ref 0–2)
SODIUM SERPL-SCNC: 141 MEQ/L (ref 135–144)
SP GR UR STRIP: 1.01 (ref 1–1.03)
URINE REFLEX TO CULTURE: NORMAL
UROBILINOGEN UR STRIP-ACNC: 0.2 E.U./DL
WBC # BLD AUTO: 13.5 K/UL (ref 4–10)
WBC #/AREA URNS HPF: ABNORMAL /HPF (ref 0–5)

## 2023-08-16 PROCEDURE — 96374 THER/PROPH/DIAG INJ IV PUSH: CPT

## 2023-08-16 PROCEDURE — 96376 TX/PRO/DX INJ SAME DRUG ADON: CPT

## 2023-08-16 PROCEDURE — 2580000003 HC RX 258

## 2023-08-16 PROCEDURE — 85025 COMPLETE CBC W/AUTO DIFF WBC: CPT

## 2023-08-16 PROCEDURE — 80053 COMPREHEN METABOLIC PANEL: CPT

## 2023-08-16 PROCEDURE — 74177 CT ABD & PELVIS W/CONTRAST: CPT

## 2023-08-16 PROCEDURE — 36415 COLL VENOUS BLD VENIPUNCTURE: CPT

## 2023-08-16 PROCEDURE — 99285 EMERGENCY DEPT VISIT HI MDM: CPT

## 2023-08-16 PROCEDURE — 96375 TX/PRO/DX INJ NEW DRUG ADDON: CPT

## 2023-08-16 PROCEDURE — 6360000002 HC RX W HCPCS

## 2023-08-16 PROCEDURE — 81001 URINALYSIS AUTO W/SCOPE: CPT

## 2023-08-16 PROCEDURE — 6360000004 HC RX CONTRAST MEDICATION

## 2023-08-16 RX ORDER — HYDROCODONE BITARTRATE AND ACETAMINOPHEN 5; 325 MG/1; MG/1
1 TABLET ORAL EVERY 8 HOURS PRN
Qty: 10 TABLET | Refills: 0 | Status: SHIPPED | OUTPATIENT
Start: 2023-08-16 | End: 2023-08-19

## 2023-08-16 RX ORDER — MORPHINE SULFATE 4 MG/ML
4 INJECTION, SOLUTION INTRAMUSCULAR; INTRAVENOUS ONCE
Status: COMPLETED | OUTPATIENT
Start: 2023-08-16 | End: 2023-08-16

## 2023-08-16 RX ORDER — ONDANSETRON 4 MG/1
4 TABLET, ORALLY DISINTEGRATING ORAL EVERY 8 HOURS PRN
Qty: 20 TABLET | Refills: 0 | Status: SHIPPED | OUTPATIENT
Start: 2023-08-16 | End: 2023-08-23

## 2023-08-16 RX ORDER — VENLAFAXINE 37.5 MG/1
37.5 TABLET ORAL 3 TIMES DAILY
COMMUNITY

## 2023-08-16 RX ORDER — 0.9 % SODIUM CHLORIDE 0.9 %
1000 INTRAVENOUS SOLUTION INTRAVENOUS ONCE
Status: COMPLETED | OUTPATIENT
Start: 2023-08-16 | End: 2023-08-16

## 2023-08-16 RX ORDER — CIPROFLOXACIN 500 MG/1
500 TABLET, FILM COATED ORAL 2 TIMES DAILY
Qty: 14 TABLET | Refills: 0 | Status: SHIPPED | OUTPATIENT
Start: 2023-08-16 | End: 2023-08-23

## 2023-08-16 RX ORDER — TAMSULOSIN HYDROCHLORIDE 0.4 MG/1
0.4 CAPSULE ORAL DAILY
Qty: 5 CAPSULE | Refills: 0 | Status: SHIPPED | OUTPATIENT
Start: 2023-08-16 | End: 2023-08-21

## 2023-08-16 RX ORDER — ONDANSETRON 2 MG/ML
4 INJECTION INTRAMUSCULAR; INTRAVENOUS ONCE
Status: COMPLETED | OUTPATIENT
Start: 2023-08-16 | End: 2023-08-16

## 2023-08-16 RX ORDER — FLUCONAZOLE 150 MG/1
150 TABLET ORAL ONCE
Qty: 1 TABLET | Refills: 0 | Status: SHIPPED | OUTPATIENT
Start: 2023-08-16 | End: 2023-08-16

## 2023-08-16 RX ADMIN — ONDANSETRON 4 MG: 2 INJECTION INTRAMUSCULAR; INTRAVENOUS at 17:49

## 2023-08-16 RX ADMIN — IOPAMIDOL 75 ML: 755 INJECTION, SOLUTION INTRAVENOUS at 18:35

## 2023-08-16 RX ADMIN — MORPHINE SULFATE 4 MG: 4 INJECTION INTRAVENOUS at 17:48

## 2023-08-16 RX ADMIN — MORPHINE SULFATE 4 MG: 4 INJECTION INTRAVENOUS at 19:14

## 2023-08-16 RX ADMIN — SODIUM CHLORIDE 1000 ML: 9 INJECTION, SOLUTION INTRAVENOUS at 17:48

## 2023-08-16 ASSESSMENT — PAIN SCALES - GENERAL
PAINLEVEL_OUTOF10: 6
PAINLEVEL_OUTOF10: 9
PAINLEVEL_OUTOF10: 4
PAINLEVEL_OUTOF10: 10

## 2023-08-16 ASSESSMENT — ENCOUNTER SYMPTOMS
NAUSEA: 0
SORE THROAT: 0
DIARRHEA: 0
SHORTNESS OF BREATH: 0
COUGH: 0
VOMITING: 0
BACK PAIN: 0
ABDOMINAL PAIN: 0

## 2023-08-16 ASSESSMENT — PAIN DESCRIPTION - ORIENTATION
ORIENTATION: RIGHT
ORIENTATION: LEFT

## 2023-08-16 ASSESSMENT — PAIN DESCRIPTION - LOCATION
LOCATION: FLANK;BACK
LOCATION: FLANK
LOCATION: FLANK;BACK
LOCATION: FLANK;BACK

## 2023-08-16 ASSESSMENT — PAIN - FUNCTIONAL ASSESSMENT
PAIN_FUNCTIONAL_ASSESSMENT: 0-10
PAIN_FUNCTIONAL_ASSESSMENT: 0-10

## 2023-08-16 ASSESSMENT — PAIN DESCRIPTION - DESCRIPTORS
DESCRIPTORS: ACHING;DULL
DESCRIPTORS: ACHING;DULL

## 2023-08-16 ASSESSMENT — PAIN DESCRIPTION - FREQUENCY: FREQUENCY: CONTINUOUS

## 2023-08-16 NOTE — ED PROVIDER NOTES
MEDICATIONS:  New Prescriptions    CIPROFLOXACIN (CIPRO) 500 MG TABLET    Take 1 tablet by mouth 2 times daily for 7 days    HYDROCODONE-ACETAMINOPHEN (NORCO) 5-325 MG PER TABLET    Take 1 tablet by mouth every 8 hours as needed for Pain for up to 3 days. Intended supply: 3 days. Take lowest dose possible to manage pain Max Daily Amount: 3 tablets    ONDANSETRON (ZOFRAN-ODT) 4 MG DISINTEGRATING TABLET    Place 1 tablet under the tongue every 8 hours as needed for Nausea or Vomiting May Sub regular tablet (non-ODT) if insurance does not cover ODT.     TAMSULOSIN (FLOMAX) 0.4 MG CAPSULE    Take 1 capsule by mouth daily for 5 days       (Please note that portions of this note were completed with a voice recognition program.  Efforts were made to edit the dictations but occasionally words are mis-transcribed.)    CANDY Feldman CNP, APRN - CNP  08/16/23 2019

## 2023-08-17 ENCOUNTER — HOSPITAL ENCOUNTER (EMERGENCY)
Age: 58
Discharge: HOME | End: 2023-08-17
Attending: EMERGENCY MEDICINE
Payer: MEDICARE

## 2023-08-17 VITALS
HEIGHT: 68 IN | SYSTOLIC BLOOD PRESSURE: 123 MMHG | BODY MASS INDEX: 24.25 KG/M2 | WEIGHT: 160 LBS | OXYGEN SATURATION: 96 % | TEMPERATURE: 98.1 F | HEART RATE: 72 BPM | DIASTOLIC BLOOD PRESSURE: 81 MMHG | RESPIRATION RATE: 16 BRPM

## 2023-08-17 DIAGNOSIS — E78.2 MIXED HYPERLIPIDEMIA: Primary | ICD-10-CM

## 2023-08-17 DIAGNOSIS — R10.9 FLANK PAIN: Primary | ICD-10-CM

## 2023-08-17 LAB
ALBUMIN SERPL-MCNC: 4 G/DL (ref 3.5–4.6)
ALP SERPL-CCNC: 105 U/L (ref 40–130)
ALT SERPL-CCNC: 10 U/L (ref 0–33)
ANION GAP SERPL CALCULATED.3IONS-SCNC: 7 MEQ/L (ref 9–15)
AST SERPL-CCNC: 12 U/L (ref 0–35)
BACTERIA URNS QL MICRO: NEGATIVE /HPF
BASOPHILS # BLD: 0.2 K/UL (ref 0–0.2)
BASOPHILS NFR BLD: 1.9 %
BILIRUB SERPL-MCNC: 0.3 MG/DL (ref 0.2–0.7)
BILIRUB UR QL STRIP: NEGATIVE
BUN SERPL-MCNC: 8 MG/DL (ref 6–20)
CALCIUM SERPL-MCNC: 9.2 MG/DL (ref 8.5–9.9)
CHLORIDE SERPL-SCNC: 107 MEQ/L (ref 95–107)
CLARITY UR: CLEAR
CO2 SERPL-SCNC: 28 MEQ/L (ref 20–31)
COLOR UR: YELLOW
CREAT SERPL-MCNC: 1.14 MG/DL (ref 0.5–0.9)
EOSINOPHIL # BLD: 0.5 K/UL (ref 0–0.7)
EOSINOPHIL NFR BLD: 4.6 %
EPI CELLS #/AREA URNS AUTO: ABNORMAL /HPF (ref 0–5)
ERYTHROCYTE [DISTWIDTH] IN BLOOD BY AUTOMATED COUNT: 14.4 % (ref 11.5–14.5)
GLOBULIN SER CALC-MCNC: 2.8 G/DL (ref 2.3–3.5)
GLUCOSE SERPL-MCNC: 135 MG/DL (ref 70–99)
GLUCOSE UR STRIP-MCNC: NEGATIVE MG/DL
HCT VFR BLD AUTO: 43.4 % (ref 37–47)
HGB BLD-MCNC: 14 G/DL (ref 12–16)
HGB UR QL STRIP: NEGATIVE
HYALINE CASTS #/AREA URNS AUTO: ABNORMAL /HPF (ref 0–5)
KETONES UR STRIP-MCNC: NEGATIVE MG/DL
LEUKOCYTE ESTERASE UR QL STRIP: ABNORMAL
LYMPHOCYTES # BLD: 2.6 K/UL (ref 1–4.8)
LYMPHOCYTES NFR BLD: 23 %
MCH RBC QN AUTO: 27.4 PG (ref 27–31.3)
MCHC RBC AUTO-ENTMCNC: 32.2 % (ref 33–37)
MCV RBC AUTO: 85.2 FL (ref 79.4–94.8)
MONOCYTES # BLD: 0.7 K/UL (ref 0.2–0.8)
MONOCYTES NFR BLD: 6.6 %
NEUTROPHILS # BLD: 7.1 K/UL (ref 1.4–6.5)
NEUTS SEG NFR BLD: 63.9 %
NITRITE UR QL STRIP: NEGATIVE
PH UR STRIP: 5 [PH] (ref 5–9)
PLATELET # BLD AUTO: 285 K/UL (ref 130–400)
POTASSIUM SERPL-SCNC: 4 MEQ/L (ref 3.4–4.9)
PROT SERPL-MCNC: 6.8 G/DL (ref 6.3–8)
PROT UR STRIP-MCNC: NEGATIVE MG/DL
RBC # BLD AUTO: 5.1 M/UL (ref 4.2–5.4)
RBC #/AREA URNS AUTO: ABNORMAL /HPF (ref 0–5)
SODIUM SERPL-SCNC: 142 MEQ/L (ref 135–144)
SP GR UR STRIP: 1.01 (ref 1–1.03)
URINE REFLEX TO CULTURE: YES
UROBILINOGEN UR STRIP-ACNC: 0.2 E.U./DL
WBC # BLD AUTO: 11.2 K/UL (ref 4.8–10.8)
WBC #/AREA URNS AUTO: ABNORMAL /HPF (ref 0–5)

## 2023-08-17 PROCEDURE — 81001 URINALYSIS AUTO W/SCOPE: CPT

## 2023-08-17 PROCEDURE — 80053 COMPREHEN METABOLIC PANEL: CPT

## 2023-08-17 PROCEDURE — 96375 TX/PRO/DX INJ NEW DRUG ADDON: CPT

## 2023-08-17 PROCEDURE — 99284 EMERGENCY DEPT VISIT MOD MDM: CPT

## 2023-08-17 PROCEDURE — 36415 COLL VENOUS BLD VENIPUNCTURE: CPT

## 2023-08-17 PROCEDURE — 96361 HYDRATE IV INFUSION ADD-ON: CPT

## 2023-08-17 PROCEDURE — 85025 COMPLETE CBC W/AUTO DIFF WBC: CPT

## 2023-08-17 PROCEDURE — 87086 URINE CULTURE/COLONY COUNT: CPT

## 2023-08-17 PROCEDURE — 6360000002 HC RX W HCPCS: Performed by: PHYSICIAN ASSISTANT

## 2023-08-17 PROCEDURE — 96374 THER/PROPH/DIAG INJ IV PUSH: CPT

## 2023-08-17 PROCEDURE — 2580000003 HC RX 258: Performed by: PHYSICIAN ASSISTANT

## 2023-08-17 RX ORDER — MORPHINE SULFATE 2 MG/ML
2 INJECTION, SOLUTION INTRAMUSCULAR; INTRAVENOUS ONCE
Status: COMPLETED | OUTPATIENT
Start: 2023-08-17 | End: 2023-08-17

## 2023-08-17 RX ORDER — ONDANSETRON 2 MG/ML
4 INJECTION INTRAMUSCULAR; INTRAVENOUS ONCE
Status: COMPLETED | OUTPATIENT
Start: 2023-08-17 | End: 2023-08-17

## 2023-08-17 RX ORDER — 0.9 % SODIUM CHLORIDE 0.9 %
1000 INTRAVENOUS SOLUTION INTRAVENOUS ONCE
Status: COMPLETED | OUTPATIENT
Start: 2023-08-17 | End: 2023-08-17

## 2023-08-17 RX ORDER — VENLAFAXINE 75 MG/1
75 TABLET ORAL DAILY
Qty: 90 TABLET | Refills: 0 | Status: SHIPPED | OUTPATIENT
Start: 2023-08-17 | End: 2024-01-04 | Stop reason: SDUPTHER

## 2023-08-17 RX ADMIN — SODIUM CHLORIDE 1000 ML: 9 INJECTION, SOLUTION INTRAVENOUS at 08:28

## 2023-08-17 RX ADMIN — ONDANSETRON 4 MG: 2 INJECTION INTRAMUSCULAR; INTRAVENOUS at 08:28

## 2023-08-17 RX ADMIN — MORPHINE SULFATE 2 MG: 2 INJECTION, SOLUTION INTRAMUSCULAR; INTRAVENOUS at 08:28

## 2023-08-17 ASSESSMENT — ENCOUNTER SYMPTOMS
SHORTNESS OF BREATH: 0
SORE THROAT: 0
RHINORRHEA: 0
ABDOMINAL PAIN: 0
VOMITING: 0
COUGH: 0
NAUSEA: 0
DIARRHEA: 0
BACK PAIN: 0
EYE PAIN: 0
PHOTOPHOBIA: 0

## 2023-08-17 ASSESSMENT — PAIN - FUNCTIONAL ASSESSMENT
PAIN_FUNCTIONAL_ASSESSMENT: 0-10
PAIN_FUNCTIONAL_ASSESSMENT: 0-10

## 2023-08-17 ASSESSMENT — PAIN DESCRIPTION - PAIN TYPE
TYPE: ACUTE PAIN
TYPE: ACUTE PAIN

## 2023-08-17 ASSESSMENT — PAIN DESCRIPTION - DESCRIPTORS
DESCRIPTORS: DISCOMFORT
DESCRIPTORS: ACHING

## 2023-08-17 ASSESSMENT — PAIN SCALES - GENERAL
PAINLEVEL_OUTOF10: 10
PAINLEVEL_OUTOF10: 6
PAINLEVEL_OUTOF10: 4

## 2023-08-17 ASSESSMENT — PAIN DESCRIPTION - LOCATION
LOCATION: FLANK

## 2023-08-17 ASSESSMENT — PAIN DESCRIPTION - ORIENTATION
ORIENTATION: RIGHT
ORIENTATION: RIGHT

## 2023-08-17 NOTE — ED NOTES
Discharge instructions reviewed with patient. Patient denies any further questions at this time. Pt encouraged to make follow up appointments with PCP and any speciality referrals.         Owen Lambert RN  08/17/23 [id number]

## 2023-08-17 NOTE — ED PROVIDER NOTES
[facility] ED  eMERGENCY dEPARTMENTeNCOUnter      Pt Name: Victoria Mckeon  MRN: [id number]  [address] 1965  Date ofevaluation: 8/17/2023  Provider: Marcello Franco PA-C    CHIEF COMPLAINT       Chief Complaint   Patient presents with    Flank Pain     Dx kidney stone yesterday at [address]   (Location/Symptom, Timing/Onset,Context/Setting, Quality, Duration, Modifying Factors, Severity)  Note limiting factors. Victoria Mckeon is a 56 y.o. female who presents to the emergency department kidney stone. Pt reports yesterday she was seen at [facility] and diagnosed with kidney stones. She has pmh of this. She was also diagnosed with uti. Last dose of medcation was 10hours ago last night. She has had 2 days of right flankl pain that started at lower back ache and now radiating to right lower quad. Movement makes the paiin worse. She has required lithotripsy in the past.      HPI    NursingNotes were reviewed. REVIEW OF SYSTEMS    (2-9 systems for level 4, 10 or more for level 5)     Review of Systems   Constitutional:  Negative for chills, diaphoresis, fatigue and fever. HENT:  Negative for congestion, rhinorrhea and sore throat. Eyes:  Negative for photophobia and pain. Respiratory:  Negative for cough and shortness of breath. Cardiovascular:  Negative for chest pain and palpitations. Gastrointestinal:  Negative for abdominal pain, diarrhea, nausea and vomiting. Genitourinary:  Positive for flank pain. Negative for dysuria. Musculoskeletal:  Negative for back pain. Skin:  Negative for rash. Neurological:  Negative for dizziness, light-headedness and headaches. Psychiatric/Behavioral: Negative. All other systems reviewed and are negative. Except as noted above the remainder of the review of systems was reviewed and negative.        PAST MEDICAL HISTORY     Past Medical History:   Diagnosis Date    Anxiety     Asthma     as child

## 2023-08-17 NOTE — ED TRIAGE NOTES
Pt a/o x 3 skin pink w/d resp non labored. Pt [facility] with [facility] and pain is worse. Pt reports she does not normally pass them.  Pt slt restless in chair

## 2023-08-18 LAB — BACTERIA UR CULT: NORMAL

## 2023-08-18 RX ORDER — ROSUVASTATIN CALCIUM 20 MG/1
20 TABLET, COATED ORAL DAILY
Qty: 90 TABLET | Refills: 0 | Status: SHIPPED | OUTPATIENT
Start: 2023-08-18 | End: 2023-11-09 | Stop reason: SDUPTHER

## 2023-08-25 LAB
ALANINE AMINOTRANSFERASE (SGPT) (U/L) IN SER/PLAS: 9 U/L (ref 7–45)
ALBUMIN (G/DL) IN SER/PLAS: 3.8 G/DL (ref 3.4–5)
ALKALINE PHOSPHATASE (U/L) IN SER/PLAS: 96 U/L (ref 33–110)
ANION GAP IN SER/PLAS: 13 MMOL/L (ref 10–20)
ANTI-DNA (DS): 3 IU/ML
ASPARTATE AMINOTRANSFERASE (SGOT) (U/L) IN SER/PLAS: 9 U/L (ref 9–39)
BASOPHILS (10*3/UL) IN BLOOD BY AUTOMATED COUNT: 0.05 X10E9/L (ref 0–0.1)
BASOPHILS/100 LEUKOCYTES IN BLOOD BY AUTOMATED COUNT: 0.5 % (ref 0–2)
BILIRUBIN TOTAL (MG/DL) IN SER/PLAS: 0.4 MG/DL (ref 0–1.2)
C REACTIVE PROTEIN (MG/L) IN SER/PLAS: 0.96 MG/DL
CALCIUM (MG/DL) IN SER/PLAS: 9.2 MG/DL (ref 8.6–10.6)
CARBON DIOXIDE, TOTAL (MMOL/L) IN SER/PLAS: 26 MMOL/L (ref 21–32)
CHLORIDE (MMOL/L) IN SER/PLAS: 104 MMOL/L (ref 98–107)
COMPLEMENT C3 (MG/DL) IN SER/PLAS: 173 MG/DL (ref 87–200)
COMPLEMENT C4 (MG/DL) IN SER/PLAS: 29 MG/DL (ref 10–50)
CREATININE (MG/DL) IN SER/PLAS: 1.24 MG/DL (ref 0.5–1.05)
EOSINOPHILS (10*3/UL) IN BLOOD BY AUTOMATED COUNT: 0.5 X10E9/L (ref 0–0.7)
EOSINOPHILS/100 LEUKOCYTES IN BLOOD BY AUTOMATED COUNT: 5.4 % (ref 0–6)
ERYTHROCYTE DISTRIBUTION WIDTH (RATIO) BY AUTOMATED COUNT: 13.3 % (ref 11.5–14.5)
ERYTHROCYTE MEAN CORPUSCULAR HEMOGLOBIN CONCENTRATION (G/DL) BY AUTOMATED: 31.2 G/DL (ref 32–36)
ERYTHROCYTE MEAN CORPUSCULAR VOLUME (FL) BY AUTOMATED COUNT: 88 FL (ref 80–100)
ERYTHROCYTES (10*6/UL) IN BLOOD BY AUTOMATED COUNT: 4.75 X10E12/L (ref 4–5.2)
ESTIMATED AVERAGE GLUCOSE FOR HBA1C: 143 MG/DL
GFR FEMALE: 51 ML/MIN/1.73M2
GLUCOSE (MG/DL) IN SER/PLAS: 221 MG/DL (ref 74–99)
HEMATOCRIT (%) IN BLOOD BY AUTOMATED COUNT: 42 % (ref 36–46)
HEMOGLOBIN (G/DL) IN BLOOD: 13.1 G/DL (ref 12–16)
HEMOGLOBIN A1C/HEMOGLOBIN TOTAL IN BLOOD: 6.6 %
IMMATURE GRANULOCYTES/100 LEUKOCYTES IN BLOOD BY AUTOMATED COUNT: 0.4 % (ref 0–0.9)
LEUKOCYTES (10*3/UL) IN BLOOD BY AUTOMATED COUNT: 9.3 X10E9/L (ref 4.4–11.3)
LYMPHOCYTES (10*3/UL) IN BLOOD BY AUTOMATED COUNT: 2.17 X10E9/L (ref 1.2–4.8)
LYMPHOCYTES/100 LEUKOCYTES IN BLOOD BY AUTOMATED COUNT: 23.2 % (ref 13–44)
MONOCYTES (10*3/UL) IN BLOOD BY AUTOMATED COUNT: 0.55 X10E9/L (ref 0.1–1)
MONOCYTES/100 LEUKOCYTES IN BLOOD BY AUTOMATED COUNT: 5.9 % (ref 2–10)
NEUTROPHILS (10*3/UL) IN BLOOD BY AUTOMATED COUNT: 6.03 X10E9/L (ref 1.2–7.7)
NEUTROPHILS/100 LEUKOCYTES IN BLOOD BY AUTOMATED COUNT: 64.6 % (ref 40–80)
NRBC (PER 100 WBCS) BY AUTOMATED COUNT: 0 /100 WBC (ref 0–0)
PLATELETS (10*3/UL) IN BLOOD AUTOMATED COUNT: 272 X10E9/L (ref 150–450)
POTASSIUM (MMOL/L) IN SER/PLAS: 4 MMOL/L (ref 3.5–5.3)
PROTEIN TOTAL: 6.6 G/DL (ref 6.4–8.2)
SEDIMENTATION RATE, ERYTHROCYTE: 18 MM/H (ref 0–30)
SODIUM (MMOL/L) IN SER/PLAS: 139 MMOL/L (ref 136–145)
UREA NITROGEN (MG/DL) IN SER/PLAS: 12 MG/DL (ref 6–23)

## 2023-08-30 DIAGNOSIS — G89.4 CHRONIC PAIN SYNDROME: ICD-10-CM

## 2023-08-31 ENCOUNTER — LAB (OUTPATIENT)
Dept: LAB | Facility: LAB | Age: 58
End: 2023-08-31
Payer: MEDICARE

## 2023-08-31 DIAGNOSIS — E78.2 MIXED HYPERLIPIDEMIA: ICD-10-CM

## 2023-08-31 DIAGNOSIS — D72.829 LEUKOCYTOSIS, UNSPECIFIED TYPE: ICD-10-CM

## 2023-08-31 DIAGNOSIS — I10 ESSENTIAL HYPERTENSION: ICD-10-CM

## 2023-08-31 DIAGNOSIS — Z11.59 ENCOUNTER FOR HEPATITIS C SCREENING TEST FOR LOW RISK PATIENT: ICD-10-CM

## 2023-08-31 DIAGNOSIS — N18.32 TYPE 2 DIABETES MELLITUS WITH STAGE 3B CHRONIC KIDNEY DISEASE, WITHOUT LONG-TERM CURRENT USE OF INSULIN (MULTI): ICD-10-CM

## 2023-08-31 DIAGNOSIS — R11.0 NAUSEA IN ADULT: ICD-10-CM

## 2023-08-31 DIAGNOSIS — E11.22 TYPE 2 DIABETES MELLITUS WITH STAGE 3B CHRONIC KIDNEY DISEASE, WITHOUT LONG-TERM CURRENT USE OF INSULIN (MULTI): ICD-10-CM

## 2023-08-31 DIAGNOSIS — E55.9 VITAMIN D DEFICIENCY: ICD-10-CM

## 2023-08-31 DIAGNOSIS — E53.8 VITAMIN B12 DEFICIENCY: ICD-10-CM

## 2023-08-31 DIAGNOSIS — G89.4 CHRONIC PAIN SYNDROME: ICD-10-CM

## 2023-08-31 LAB
ALANINE AMINOTRANSFERASE (SGPT) (U/L) IN SER/PLAS: 9 U/L (ref 7–45)
ALBUMIN (G/DL) IN SER/PLAS: 4.2 G/DL (ref 3.4–5)
ALKALINE PHOSPHATASE (U/L) IN SER/PLAS: 103 U/L (ref 33–110)
AMYLASE (U/L) IN SER/PLAS: 25 U/L (ref 29–103)
ANION GAP IN SER/PLAS: 13 MMOL/L (ref 10–20)
ASPARTATE AMINOTRANSFERASE (SGOT) (U/L) IN SER/PLAS: 15 U/L (ref 9–39)
BASOPHILS (10*3/UL) IN BLOOD BY AUTOMATED COUNT: 0.08 X10E9/L (ref 0–0.1)
BASOPHILS/100 LEUKOCYTES IN BLOOD BY AUTOMATED COUNT: 0.7 % (ref 0–2)
BILIRUBIN TOTAL (MG/DL) IN SER/PLAS: 0.4 MG/DL (ref 0–1.2)
CALCIDIOL (25 OH VITAMIN D3) (NG/ML) IN SER/PLAS: 20 NG/ML
CALCIUM (MG/DL) IN SER/PLAS: 9.5 MG/DL (ref 8.6–10.3)
CARBON DIOXIDE, TOTAL (MMOL/L) IN SER/PLAS: 30 MMOL/L (ref 21–32)
CHLORIDE (MMOL/L) IN SER/PLAS: 103 MMOL/L (ref 98–107)
CHOLESTEROL (MG/DL) IN SER/PLAS: 173 MG/DL (ref 0–199)
CHOLESTEROL IN HDL (MG/DL) IN SER/PLAS: 40.7 MG/DL
CHOLESTEROL/HDL RATIO: 4.3
COBALAMIN (VITAMIN B12) (PG/ML) IN SER/PLAS: 162 PG/ML (ref 211–911)
CREATINE KINASE (U/L) IN SER/PLAS: 64 U/L (ref 0–215)
CREATININE (MG/DL) IN SER/PLAS: 1.27 MG/DL (ref 0.5–1.05)
EOSINOPHILS (10*3/UL) IN BLOOD BY AUTOMATED COUNT: 0.48 X10E9/L (ref 0–0.7)
EOSINOPHILS/100 LEUKOCYTES IN BLOOD BY AUTOMATED COUNT: 3.9 % (ref 0–6)
ERYTHROCYTE DISTRIBUTION WIDTH (RATIO) BY AUTOMATED COUNT: 13.3 % (ref 11.5–14.5)
ERYTHROCYTE MEAN CORPUSCULAR HEMOGLOBIN CONCENTRATION (G/DL) BY AUTOMATED: 32.5 G/DL (ref 32–36)
ERYTHROCYTE MEAN CORPUSCULAR VOLUME (FL) BY AUTOMATED COUNT: 87 FL (ref 80–100)
ERYTHROCYTES (10*6/UL) IN BLOOD BY AUTOMATED COUNT: 4.97 X10E12/L (ref 4–5.2)
FOLATE (NG/ML) IN SER/PLAS: 7.4 NG/ML
GFR FEMALE: 50 ML/MIN/1.73M2
GLUCOSE (MG/DL) IN SER/PLAS: 110 MG/DL (ref 74–99)
HEMATOCRIT (%) IN BLOOD BY AUTOMATED COUNT: 43.4 % (ref 36–46)
HEMOGLOBIN (G/DL) IN BLOOD: 14.1 G/DL (ref 12–16)
IMMATURE GRANULOCYTES/100 LEUKOCYTES IN BLOOD BY AUTOMATED COUNT: 0.5 % (ref 0–0.9)
LDL: 66 MG/DL (ref 0–99)
LEUKOCYTES (10*3/UL) IN BLOOD BY AUTOMATED COUNT: 12.3 X10E9/L (ref 4.4–11.3)
LIPASE (U/L) IN SER/PLAS: 17 U/L (ref 9–82)
LYMPHOCYTES (10*3/UL) IN BLOOD BY AUTOMATED COUNT: 2.19 X10E9/L (ref 1.2–4.8)
LYMPHOCYTES/100 LEUKOCYTES IN BLOOD BY AUTOMATED COUNT: 17.9 % (ref 13–44)
MAGNESIUM (MG/DL) IN SER/PLAS: 2.08 MG/DL (ref 1.6–2.4)
MONOCYTES (10*3/UL) IN BLOOD BY AUTOMATED COUNT: 0.65 X10E9/L (ref 0.1–1)
MONOCYTES/100 LEUKOCYTES IN BLOOD BY AUTOMATED COUNT: 5.3 % (ref 2–10)
NEUTROPHILS (10*3/UL) IN BLOOD BY AUTOMATED COUNT: 8.8 X10E9/L (ref 1.2–7.7)
NEUTROPHILS/100 LEUKOCYTES IN BLOOD BY AUTOMATED COUNT: 71.7 % (ref 40–80)
NON HDL CHOLESTEROL: 132 MG/DL
PLATELETS (10*3/UL) IN BLOOD AUTOMATED COUNT: 324 X10E9/L (ref 150–450)
POTASSIUM (MMOL/L) IN SER/PLAS: 3.9 MMOL/L (ref 3.5–5.3)
PROTEIN TOTAL: 7.5 G/DL (ref 6.4–8.2)
SODIUM (MMOL/L) IN SER/PLAS: 142 MMOL/L (ref 136–145)
TRIGLYCERIDE (MG/DL) IN SER/PLAS: 332 MG/DL (ref 0–149)
URATE (MG/DL) IN SER/PLAS: 6.9 MG/DL (ref 2.3–6.7)
UREA NITROGEN (MG/DL) IN SER/PLAS: 9 MG/DL (ref 6–23)
VLDL: 66 MG/DL (ref 0–40)

## 2023-08-31 PROCEDURE — 82150 ASSAY OF AMYLASE: CPT

## 2023-08-31 PROCEDURE — 85025 COMPLETE CBC W/AUTO DIFF WBC: CPT

## 2023-08-31 PROCEDURE — 83690 ASSAY OF LIPASE: CPT

## 2023-08-31 PROCEDURE — 36415 COLL VENOUS BLD VENIPUNCTURE: CPT

## 2023-08-31 PROCEDURE — 82306 VITAMIN D 25 HYDROXY: CPT

## 2023-08-31 PROCEDURE — 80061 LIPID PANEL: CPT

## 2023-08-31 PROCEDURE — 84550 ASSAY OF BLOOD/URIC ACID: CPT

## 2023-08-31 PROCEDURE — 82746 ASSAY OF FOLIC ACID SERUM: CPT

## 2023-08-31 PROCEDURE — 86803 HEPATITIS C AB TEST: CPT

## 2023-08-31 PROCEDURE — 83735 ASSAY OF MAGNESIUM: CPT

## 2023-08-31 PROCEDURE — 82607 VITAMIN B-12: CPT

## 2023-08-31 PROCEDURE — 80053 COMPREHEN METABOLIC PANEL: CPT

## 2023-08-31 PROCEDURE — 82550 ASSAY OF CK (CPK): CPT

## 2023-09-01 LAB — HEPATITIS C VIRUS AB PRESENCE IN SERUM: NONREACTIVE

## 2023-09-01 RX ORDER — OXYCODONE AND ACETAMINOPHEN 5; 325 MG/1; MG/1
1 TABLET ORAL EVERY 8 HOURS PRN
Qty: 60 TABLET | Refills: 0 | Status: SHIPPED | OUTPATIENT
Start: 2023-09-01 | End: 2023-09-27 | Stop reason: SDUPTHER

## 2023-09-08 DIAGNOSIS — E53.8 VITAMIN B12 DEFICIENCY: Primary | ICD-10-CM

## 2023-09-08 DIAGNOSIS — E55.9 VITAMIN D DEFICIENCY: ICD-10-CM

## 2023-09-09 RX ORDER — ASPIRIN 325 MG
50000 TABLET, DELAYED RELEASE (ENTERIC COATED) ORAL
Qty: 90 CAPSULE | Refills: 1 | Status: SHIPPED | OUTPATIENT
Start: 2023-09-09 | End: 2024-05-30 | Stop reason: WASHOUT

## 2023-09-09 RX ORDER — CYANOCOBALAMIN 1000 UG/ML
1000 INJECTION, SOLUTION INTRAMUSCULAR; SUBCUTANEOUS
Qty: 90 ML | Refills: 3 | Status: SHIPPED | OUTPATIENT
Start: 2023-09-09 | End: 2024-01-29 | Stop reason: SDUPTHER

## 2023-09-11 ENCOUNTER — HOSPITAL ENCOUNTER (OUTPATIENT)
Dept: LAB | Age: 58
Discharge: HOME | End: 2023-09-11
Payer: MEDICARE

## 2023-09-11 DIAGNOSIS — E11.65 UNCONTROLLED TYPE 2 DIABETES MELLITUS WITH HYPERGLYCEMIA (HCC): ICD-10-CM

## 2023-09-11 LAB
ANION GAP SERPL CALCULATED.3IONS-SCNC: 10 MEQ/L (ref 9–15)
BUN SERPL-MCNC: 11 MG/DL (ref 6–20)
CALCIUM SERPL-MCNC: 9.5 MG/DL (ref 8.5–9.9)
CHLORIDE SERPL-SCNC: 104 MEQ/L (ref 95–107)
CO2 SERPL-SCNC: 28 MEQ/L (ref 20–31)
CREAT SERPL-MCNC: 1.26 MG/DL (ref 0.5–0.9)
GLUCOSE SERPL-MCNC: 76 MG/DL (ref 70–99)
HBA1C MFR BLD: 6.7 % (ref 4.8–5.9)
POTASSIUM SERPL-SCNC: 4 MEQ/L (ref 3.4–4.9)
SODIUM SERPL-SCNC: 142 MEQ/L (ref 135–144)

## 2023-09-11 PROCEDURE — 80048 BASIC METABOLIC PNL TOTAL CA: CPT

## 2023-09-11 PROCEDURE — 36415 COLL VENOUS BLD VENIPUNCTURE: CPT

## 2023-09-11 PROCEDURE — 83036 HEMOGLOBIN GLYCOSYLATED A1C: CPT

## 2023-09-18 DIAGNOSIS — N95.1 HOT FLASH, MENOPAUSAL: ICD-10-CM

## 2023-09-19 ENCOUNTER — OFFICE VISIT (OUTPATIENT)
Dept: ENDOCRINOLOGY | Age: 58
End: 2023-09-19

## 2023-09-19 VITALS
OXYGEN SATURATION: 98 % | HEART RATE: 74 BPM | DIASTOLIC BLOOD PRESSURE: 63 MMHG | WEIGHT: 170 LBS | HEIGHT: 68 IN | SYSTOLIC BLOOD PRESSURE: 100 MMHG | BODY MASS INDEX: 25.76 KG/M2

## 2023-09-19 DIAGNOSIS — R61 HYPERHIDROSIS: ICD-10-CM

## 2023-09-19 DIAGNOSIS — E11.65 UNCONTROLLED TYPE 2 DIABETES MELLITUS WITH HYPERGLYCEMIA (HCC): Primary | ICD-10-CM

## 2023-09-19 LAB
CHP ED QC CHECK: ABNORMAL
GLUCOSE BLD-MCNC: 228 MG/DL

## 2023-09-19 RX ORDER — OXYCODONE HYDROCHLORIDE AND ACETAMINOPHEN 5; 325 MG/1; MG/1
TABLET ORAL
COMMUNITY
Start: 2023-08-06

## 2023-09-19 RX ORDER — ESTRADIOL 0.03 MG/D
1 PATCH TRANSDERMAL
Qty: 12 PATCH | Refills: 0 | Status: SHIPPED | OUTPATIENT
Start: 2023-09-19 | End: 2023-12-14 | Stop reason: SDUPTHER

## 2023-09-19 RX ORDER — GLYCOPYRROLATE 1 MG/1
1 TABLET ORAL 2 TIMES DAILY
Qty: 60 TABLET | Refills: 3 | Status: SHIPPED | OUTPATIENT
Start: 2023-09-19

## 2023-09-19 RX ORDER — VENLAFAXINE 75 MG/1
75 TABLET ORAL DAILY
COMMUNITY
Start: 2023-08-07

## 2023-09-19 RX ORDER — SEMAGLUTIDE 0.68 MG/ML
INJECTION, SOLUTION SUBCUTANEOUS
COMMUNITY
Start: 2023-08-14

## 2023-09-19 NOTE — PROGRESS NOTES
Uli Dhillon MD at [address] History     Socioeconomic History    Marital status: Legally      Spouse name: Not on file    Number of children: Not on file    Years of education: Not on file    Highest education level: Not on file   Occupational History    Not on file   Tobacco Use    Smoking status: Never    Smokeless tobacco: Never   Vaping Use    Vaping Use: Never used   Substance and Sexual Activity    Alcohol use: No     Alcohol/week: 0.0 standard drinks of alcohol     Comment: denies    Drug use: No     Comment: denies    Sexual activity: Not on file   Other Topics Concern    Not on file   Social History Narrative    Not on file     Social Determinants of Health     Financial Resource Strain: Low Risk  (9/3/2021)    Overall Financial Resource Strain (CARDIA)     Difficulty of Paying Living Expenses: Not hard at all   Food Insecurity: No Food Insecurity (9/3/2021)    Hunger Vital Sign     Worried About Running Out of Food in the Last Year: Never true     Ran Out of Food in the Last Year: Never true   Transportation Needs: Not on file   Physical Activity: Not on file   Stress: Not on file   Social Connections: Not on file   Intimate Partner Violence: Not on file   Housing Stability: Not on file     Family History   Adopted: Yes   Problem Relation Age of Onset    Colon Cancer Neg Hx      Allergies   Allergen Reactions    Hm Lidocaine Patch [Lidocaine] Rash     Patient states allergic    Aspirin Swelling     Bloody noses    Cephalosporins     Imitrex [Sumatriptan] Hives    Pcn [Penicillins] Swelling    Seasonal Itching     Itch, sneezing,runny nose, watery eyes    Toradol [Ketorolac Tromethamine] Hives     Shortness of breath    Ultram [Tramadol] Hives       Current Outpatient Medications:     venlafaxine (EFFEXOR) 75 MG tablet, Take 1 tablet by mouth daily, Disp: , Rfl:     OZEMPIC, 0.25 OR 0.5 MG/DOSE, 2 MG/3ML SOPN, INJECT 0.5 MG UNDER THE SKIN EVERY 7 DAYS, Disp: , Rfl:

## 2023-09-22 ENCOUNTER — APPOINTMENT (OUTPATIENT)
Dept: PRIMARY CARE | Facility: CLINIC | Age: 58
End: 2023-09-22
Payer: COMMERCIAL

## 2023-09-26 ENCOUNTER — TELEPHONE (OUTPATIENT)
Dept: PRIMARY CARE | Facility: CLINIC | Age: 58
End: 2023-09-26
Payer: COMMERCIAL

## 2023-09-26 DIAGNOSIS — G89.4 CHRONIC PAIN SYNDROME: ICD-10-CM

## 2023-09-26 DIAGNOSIS — J30.1 SEASONAL ALLERGIC RHINITIS DUE TO POLLEN: Primary | ICD-10-CM

## 2023-09-26 DIAGNOSIS — B37.31 CANDIDAL VAGINITIS: ICD-10-CM

## 2023-09-27 ENCOUNTER — HOSPITAL ENCOUNTER (EMERGENCY)
Age: 58
Discharge: HOME | End: 2023-09-27
Attending: EMERGENCY MEDICINE
Payer: MEDICARE

## 2023-09-27 VITALS
OXYGEN SATURATION: 98 % | TEMPERATURE: 97.2 F | RESPIRATION RATE: 20 BRPM | SYSTOLIC BLOOD PRESSURE: 138 MMHG | HEART RATE: 92 BPM | WEIGHT: 158 LBS | BODY MASS INDEX: 23.95 KG/M2 | HEIGHT: 68 IN | DIASTOLIC BLOOD PRESSURE: 88 MMHG

## 2023-09-27 DIAGNOSIS — R30.0 DYSURIA: Primary | ICD-10-CM

## 2023-09-27 LAB
BACTERIA URNS QL MICRO: ABNORMAL /HPF
BILIRUB UR QL STRIP: NEGATIVE
CHP ED QC CHECK: YES
CLARITY UR: CLEAR
COLOR UR: YELLOW
EPI CELLS #/AREA URNS HPF: ABNORMAL /HPF
GLUCOSE BLD-MCNC: 128 MG/DL
GLUCOSE BLD-MCNC: 128 MG/DL (ref 70–99)
GLUCOSE UR STRIP-MCNC: NEGATIVE MG/DL
HGB UR QL STRIP: NORMAL
KETONES UR STRIP-MCNC: NEGATIVE MG/DL
LEUKOCYTE ESTERASE UR QL STRIP: NORMAL
NITRITE UR QL STRIP: NEGATIVE
PERFORMED ON: ABNORMAL
PH UR STRIP: 5.5 [PH] (ref 5–9)
PROT UR STRIP-MCNC: NEGATIVE MG/DL
RBC #/AREA URNS HPF: ABNORMAL /HPF (ref 0–2)
SP GR UR STRIP: 1.01 (ref 1–1.03)
URINE REFLEX TO CULTURE: NORMAL
UROBILINOGEN UR STRIP-ACNC: 0.2 E.U./DL
WBC #/AREA URNS HPF: ABNORMAL /HPF (ref 0–5)

## 2023-09-27 PROCEDURE — 99283 EMERGENCY DEPT VISIT LOW MDM: CPT

## 2023-09-27 PROCEDURE — 81001 URINALYSIS AUTO W/SCOPE: CPT

## 2023-09-27 RX ORDER — FLUCONAZOLE 150 MG/1
150 TABLET ORAL ONCE
Qty: 1 TABLET | Refills: 0 | Status: SHIPPED | OUTPATIENT
Start: 2023-09-27 | End: 2023-09-27

## 2023-09-27 RX ORDER — MINERAL OIL
ENEMA (ML) RECTAL
Qty: 90 TABLET | Refills: 1 | Status: SHIPPED | OUTPATIENT
Start: 2023-09-27 | End: 2024-01-05 | Stop reason: ALTCHOICE

## 2023-09-27 RX ORDER — PHENAZOPYRIDINE HYDROCHLORIDE 200 MG/1
200 TABLET, FILM COATED ORAL 3 TIMES DAILY PRN
Qty: 6 TABLET | Refills: 0 | Status: SHIPPED | OUTPATIENT
Start: 2023-09-27 | End: 2023-09-30

## 2023-09-27 RX ORDER — FLUCONAZOLE 150 MG/1
TABLET ORAL
Qty: 3 TABLET | Refills: 0 | Status: SHIPPED | OUTPATIENT
Start: 2023-09-27 | End: 2023-11-28 | Stop reason: SDUPTHER

## 2023-09-27 RX ORDER — CIPROFLOXACIN 500 MG/1
500 TABLET, FILM COATED ORAL 2 TIMES DAILY
Qty: 6 TABLET | Refills: 0 | Status: SHIPPED | OUTPATIENT
Start: 2023-09-27 | End: 2023-09-30

## 2023-09-27 RX ORDER — OXYCODONE AND ACETAMINOPHEN 5; 325 MG/1; MG/1
1 TABLET ORAL EVERY 8 HOURS PRN
Qty: 60 TABLET | Refills: 0 | Status: SHIPPED | OUTPATIENT
Start: 2023-09-27 | End: 2023-10-27 | Stop reason: SDUPTHER

## 2023-09-27 ASSESSMENT — ENCOUNTER SYMPTOMS
ABDOMINAL PAIN: 0
WHEEZING: 0
VOICE CHANGE: 0
EYE REDNESS: 0
BLOOD IN STOOL: 0
VOMITING: 0
SORE THROAT: 0
CHOKING: 0
FACIAL SWELLING: 0
DIARRHEA: 0
SHORTNESS OF BREATH: 0
EYE DISCHARGE: 0
EYE PAIN: 0
BACK PAIN: 0
CHEST TIGHTNESS: 0
SINUS PRESSURE: 0
COUGH: 0
TROUBLE SWALLOWING: 0
STRIDOR: 0
CONSTIPATION: 0

## 2023-09-27 ASSESSMENT — PAIN - FUNCTIONAL ASSESSMENT: PAIN_FUNCTIONAL_ASSESSMENT: NONE - DENIES PAIN

## 2023-09-27 NOTE — ED PROVIDER NOTES
swelling, tenderness, deformity or signs of injury. Normal range of motion. Cervical back: Neck supple. No rigidity or tenderness. Right lower leg: No edema. Left lower leg: No edema. Lymphadenopathy:      Cervical: No cervical adenopathy. Skin:     General: Skin is warm. Capillary Refill: Capillary refill takes less than 2 seconds. Coloration: Skin is not jaundiced. Findings: No bruising, erythema, lesion or rash. Neurological:      General: No focal deficit present. Mental Status: She is alert and oriented to person, place, and time. Cranial Nerves: No cranial nerve deficit. Sensory: No sensory deficit. Motor: No weakness or abnormal muscle tone. Coordination: Coordination normal.      Gait: Gait normal.   Psychiatric:         Behavior: Behavior normal.         Thought Content: Thought content normal.         DIAGNOSTIC RESULTS     EKG: All EKG's are interpreted by the Emergency Department Physician who either signs or Co-signsthis chart in the absence of a cardiologist.        RADIOLOGY:   [facility]yn such as CT, Ultrasound and MRI are read by the radiologist. Plain radiographic images are visualized and preliminarily interpreted by the emergency physician with the below findings:        Interpretation per the Radiologist below, if available at the time ofthis note:    No orders to display         ED BEDSIDE ULTRASOUND:   Performed by ED Physician - none    LABS:  Labs Reviewed   MICROSCOPIC URINALYSIS - Abnormal; Notable for the following components:       Result Value    Bacteria, UA RARE (*)     All other components within normal limits   POCT GLUCOSE - Abnormal; Notable for the following components:    POC Glucose 128 (*)     All other components within normal limits   POCT GLUCOSE - Normal   URINALYSIS WITH REFLEX TO CULTURE       All other labs were within normal range or not returned as of this dictation.     EMERGENCY DEPARTMENT COURSE

## 2023-09-27 NOTE — ED TRIAGE NOTES
Patient presents to ED with c/o UTI symptoms that started a few days ago and include urgency,frequency, and burning

## 2023-10-14 ENCOUNTER — HOSPITAL ENCOUNTER (OUTPATIENT)
Dept: RADIOLOGY | Facility: HOSPITAL | Age: 58
Discharge: HOME | End: 2023-10-14
Payer: MEDICARE

## 2023-10-14 DIAGNOSIS — Z12.31 ENCOUNTER FOR SCREENING MAMMOGRAM FOR MALIGNANT NEOPLASM OF BREAST: ICD-10-CM

## 2023-10-14 PROCEDURE — 77063 BREAST TOMOSYNTHESIS BI: CPT | Mod: BILATERAL PROCEDURE | Performed by: RADIOLOGY

## 2023-10-14 PROCEDURE — 77063 BREAST TOMOSYNTHESIS BI: CPT | Mod: 50

## 2023-10-14 PROCEDURE — 77067 SCR MAMMO BI INCL CAD: CPT | Mod: BILATERAL PROCEDURE | Performed by: RADIOLOGY

## 2023-10-27 DIAGNOSIS — G89.4 CHRONIC PAIN SYNDROME: ICD-10-CM

## 2023-10-28 ENCOUNTER — HOSPITAL ENCOUNTER (EMERGENCY)
Age: 58
Discharge: HOME | End: 2023-10-28
Attending: STUDENT IN AN ORGANIZED HEALTH CARE EDUCATION/TRAINING PROGRAM
Payer: MEDICARE

## 2023-10-28 ENCOUNTER — APPOINTMENT (OUTPATIENT)
Dept: GENERAL RADIOLOGY | Age: 58
End: 2023-10-28
Payer: MEDICARE

## 2023-10-28 VITALS
HEIGHT: 68 IN | DIASTOLIC BLOOD PRESSURE: 62 MMHG | SYSTOLIC BLOOD PRESSURE: 112 MMHG | WEIGHT: 158 LBS | RESPIRATION RATE: 15 BRPM | HEART RATE: 97 BPM | OXYGEN SATURATION: 100 % | TEMPERATURE: 98.3 F | BODY MASS INDEX: 23.95 KG/M2

## 2023-10-28 DIAGNOSIS — R07.89 ATYPICAL CHEST PAIN: ICD-10-CM

## 2023-10-28 DIAGNOSIS — R07.89 CHEST WALL PAIN: Primary | ICD-10-CM

## 2023-10-28 LAB
ALBUMIN SERPL-MCNC: 4.1 G/DL (ref 3.5–4.6)
ALP SERPL-CCNC: 102 U/L (ref 40–130)
ALT SERPL-CCNC: 6 U/L (ref 0–33)
ANION GAP SERPL CALCULATED.3IONS-SCNC: 9 MEQ/L (ref 9–15)
AST SERPL-CCNC: 14 U/L (ref 0–35)
BASOPHILS # BLD: 0.1 K/UL (ref 0–0.2)
BASOPHILS NFR BLD: 0.8 %
BILIRUB SERPL-MCNC: 0.3 MG/DL (ref 0.2–0.7)
BUN SERPL-MCNC: 19 MG/DL (ref 6–20)
CALCIUM SERPL-MCNC: 9.2 MG/DL (ref 8.5–9.9)
CHLORIDE SERPL-SCNC: 103 MEQ/L (ref 95–107)
CO2 SERPL-SCNC: 27 MEQ/L (ref 20–31)
CREAT SERPL-MCNC: 1.48 MG/DL (ref 0.5–0.9)
EKG ATRIAL RATE: 73 BPM
EKG P AXIS: 39 DEGREES
EKG P-R INTERVAL: 146 MS
EKG Q-T INTERVAL: 368 MS
EKG QRS DURATION: 66 MS
EKG QTC CALCULATION (BAZETT): 405 MS
EKG R AXIS: 68 DEGREES
EKG T AXIS: 82 DEGREES
EKG VENTRICULAR RATE: 73 BPM
EOSINOPHIL # BLD: 0.7 K/UL (ref 0–0.7)
EOSINOPHIL NFR BLD: 5.3 %
ERYTHROCYTE [DISTWIDTH] IN BLOOD BY AUTOMATED COUNT: 13 % (ref 11.5–14.5)
GLOBULIN SER CALC-MCNC: 3.3 G/DL (ref 2.3–3.5)
GLUCOSE SERPL-MCNC: 78 MG/DL (ref 70–99)
HCT VFR BLD AUTO: 45.7 % (ref 37–47)
HGB BLD-MCNC: 14.5 G/DL (ref 12–16)
LIPASE SERPL-CCNC: 30 U/L (ref 12–95)
LYMPHOCYTES # BLD: 3 K/UL (ref 1–4.8)
LYMPHOCYTES NFR BLD: 22.7 %
MAGNESIUM SERPL-MCNC: 2.1 MG/DL (ref 1.7–2.4)
MCH RBC QN AUTO: 27.7 PG (ref 27–31.3)
MCHC RBC AUTO-ENTMCNC: 31.7 % (ref 33–37)
MCV RBC AUTO: 87.2 FL (ref 79.4–94.8)
MONOCYTES # BLD: 0.8 K/UL (ref 0.2–0.8)
MONOCYTES NFR BLD: 5.8 %
NEUTROPHILS # BLD: 8.6 K/UL (ref 1.4–6.5)
NEUTS SEG NFR BLD: 64.9 %
PLATELET # BLD AUTO: 281 K/UL (ref 130–400)
POTASSIUM SERPL-SCNC: 4.3 MEQ/L (ref 3.4–4.9)
PROT SERPL-MCNC: 7.4 G/DL (ref 6.3–8)
RBC # BLD AUTO: 5.24 M/UL (ref 4.2–5.4)
SODIUM SERPL-SCNC: 139 MEQ/L (ref 135–144)
TROPONIN, HIGH SENSITIVITY: <6 NG/L (ref 0–19)
TROPONIN, HIGH SENSITIVITY: <6 NG/L (ref 0–19)
WBC # BLD AUTO: 13.3 K/UL (ref 4.8–10.8)

## 2023-10-28 PROCEDURE — 83735 ASSAY OF MAGNESIUM: CPT

## 2023-10-28 PROCEDURE — 84484 ASSAY OF TROPONIN QUANT: CPT

## 2023-10-28 PROCEDURE — 85025 COMPLETE CBC W/AUTO DIFF WBC: CPT

## 2023-10-28 PROCEDURE — 6370000000 HC RX 637 (ALT 250 FOR IP): Performed by: STUDENT IN AN ORGANIZED HEALTH CARE EDUCATION/TRAINING PROGRAM

## 2023-10-28 PROCEDURE — 71045 X-RAY EXAM CHEST 1 VIEW: CPT

## 2023-10-28 PROCEDURE — 6360000002 HC RX W HCPCS: Performed by: STUDENT IN AN ORGANIZED HEALTH CARE EDUCATION/TRAINING PROGRAM

## 2023-10-28 PROCEDURE — 83690 ASSAY OF LIPASE: CPT

## 2023-10-28 PROCEDURE — 99285 EMERGENCY DEPT VISIT HI MDM: CPT | Performed by: STUDENT IN AN ORGANIZED HEALTH CARE EDUCATION/TRAINING PROGRAM

## 2023-10-28 PROCEDURE — 96374 THER/PROPH/DIAG INJ IV PUSH: CPT | Performed by: STUDENT IN AN ORGANIZED HEALTH CARE EDUCATION/TRAINING PROGRAM

## 2023-10-28 PROCEDURE — 93005 ELECTROCARDIOGRAM TRACING: CPT | Performed by: STUDENT IN AN ORGANIZED HEALTH CARE EDUCATION/TRAINING PROGRAM

## 2023-10-28 PROCEDURE — 36415 COLL VENOUS BLD VENIPUNCTURE: CPT

## 2023-10-28 PROCEDURE — 80053 COMPREHEN METABOLIC PANEL: CPT

## 2023-10-28 PROCEDURE — 96375 TX/PRO/DX INJ NEW DRUG ADDON: CPT | Performed by: STUDENT IN AN ORGANIZED HEALTH CARE EDUCATION/TRAINING PROGRAM

## 2023-10-28 RX ORDER — METOCLOPRAMIDE 10 MG/1
10 TABLET ORAL 4 TIMES DAILY PRN
Qty: 120 TABLET | Refills: 0 | Status: SHIPPED | OUTPATIENT
Start: 2023-10-28

## 2023-10-28 RX ORDER — METHOCARBAMOL 500 MG/1
1500 TABLET, FILM COATED ORAL ONCE
Status: COMPLETED | OUTPATIENT
Start: 2023-10-28 | End: 2023-10-28

## 2023-10-28 RX ORDER — DIPHENHYDRAMINE HYDROCHLORIDE 50 MG/ML
25 INJECTION INTRAMUSCULAR; INTRAVENOUS ONCE
Status: COMPLETED | OUTPATIENT
Start: 2023-10-28 | End: 2023-10-28

## 2023-10-28 RX ORDER — METHOCARBAMOL 500 MG/1
1000 TABLET, FILM COATED ORAL 4 TIMES DAILY PRN
Qty: 20 TABLET | Refills: 0 | Status: SHIPPED | OUTPATIENT
Start: 2023-10-28 | End: 2023-11-02

## 2023-10-28 RX ORDER — ACETAMINOPHEN 500 MG
1000 TABLET ORAL ONCE
Status: COMPLETED | OUTPATIENT
Start: 2023-10-28 | End: 2023-10-28

## 2023-10-28 RX ORDER — METOCLOPRAMIDE HYDROCHLORIDE 5 MG/ML
10 INJECTION INTRAMUSCULAR; INTRAVENOUS ONCE
Status: COMPLETED | OUTPATIENT
Start: 2023-10-28 | End: 2023-10-28

## 2023-10-28 RX ADMIN — DIPHENHYDRAMINE HYDROCHLORIDE 25 MG: 50 INJECTION INTRAMUSCULAR; INTRAVENOUS at 15:33

## 2023-10-28 RX ADMIN — METOCLOPRAMIDE HYDROCHLORIDE 10 MG: 5 INJECTION INTRAMUSCULAR; INTRAVENOUS at 15:33

## 2023-10-28 RX ADMIN — ACETAMINOPHEN 1000 MG: 500 TABLET ORAL at 14:54

## 2023-10-28 RX ADMIN — METHOCARBAMOL TABLETS 1500 MG: 500 TABLET, COATED ORAL at 14:54

## 2023-10-28 ASSESSMENT — PAIN DESCRIPTION - PAIN TYPE: TYPE: ACUTE PAIN

## 2023-10-28 ASSESSMENT — PAIN - FUNCTIONAL ASSESSMENT
PAIN_FUNCTIONAL_ASSESSMENT: NONE - DENIES PAIN
PAIN_FUNCTIONAL_ASSESSMENT: 0-10

## 2023-10-28 ASSESSMENT — PAIN DESCRIPTION - LOCATION: LOCATION: BREAST;SHOULDER

## 2023-10-28 ASSESSMENT — PAIN SCALES - GENERAL: PAINLEVEL_OUTOF10: 9

## 2023-10-28 ASSESSMENT — PAIN DESCRIPTION - ORIENTATION: ORIENTATION: LEFT

## 2023-10-28 NOTE — ED TRIAGE NOTES
A/o x 3 skin pink w/d resp non labored, lungs clear. Pt reports at 0100 this am she was awakened d/t lt breast and shoulder squeezing radiating down arm. Took zofran for nausea. Reports slt diaphoresis with episode. No edema noted.

## 2023-10-29 RX ORDER — OXYCODONE AND ACETAMINOPHEN 5; 325 MG/1; MG/1
1 TABLET ORAL EVERY 8 HOURS PRN
Qty: 60 TABLET | Refills: 0 | Status: SHIPPED | OUTPATIENT
Start: 2023-10-29 | End: 2023-11-01 | Stop reason: SDUPTHER

## 2023-10-30 LAB
EKG ATRIAL RATE: 73 BPM
EKG P AXIS: 39 DEGREES
EKG P-R INTERVAL: 146 MS
EKG Q-T INTERVAL: 368 MS
EKG QRS DURATION: 66 MS
EKG QTC CALCULATION (BAZETT): 405 MS
EKG R AXIS: 68 DEGREES
EKG T AXIS: 82 DEGREES
EKG VENTRICULAR RATE: 73 BPM

## 2023-10-31 ENCOUNTER — TELEPHONE (OUTPATIENT)
Dept: PRIMARY CARE | Facility: CLINIC | Age: 58
End: 2023-10-31
Payer: COMMERCIAL

## 2023-10-31 DIAGNOSIS — N18.32 TYPE 2 DIABETES MELLITUS WITH STAGE 3B CHRONIC KIDNEY DISEASE, WITHOUT LONG-TERM CURRENT USE OF INSULIN (MULTI): ICD-10-CM

## 2023-10-31 DIAGNOSIS — E11.22 TYPE 2 DIABETES MELLITUS WITH STAGE 3B CHRONIC KIDNEY DISEASE, WITHOUT LONG-TERM CURRENT USE OF INSULIN (MULTI): ICD-10-CM

## 2023-10-31 DIAGNOSIS — G89.4 CHRONIC PAIN SYNDROME: ICD-10-CM

## 2023-10-31 RX ORDER — SEMAGLUTIDE 0.68 MG/ML
INJECTION, SOLUTION SUBCUTANEOUS
Qty: 3 ML | Refills: 1 | Status: SHIPPED | OUTPATIENT
Start: 2023-10-31 | End: 2023-11-17 | Stop reason: ALTCHOICE

## 2023-11-01 ENCOUNTER — OFFICE VISIT (OUTPATIENT)
Dept: GASTROENTEROLOGY | Age: 58
End: 2023-11-01
Payer: MEDICAID

## 2023-11-01 VITALS
DIASTOLIC BLOOD PRESSURE: 80 MMHG | BODY MASS INDEX: 25.85 KG/M2 | WEIGHT: 170 LBS | SYSTOLIC BLOOD PRESSURE: 128 MMHG | OXYGEN SATURATION: 97 % | HEART RATE: 80 BPM

## 2023-11-01 DIAGNOSIS — K22.4 ESOPHAGEAL DYSMOTILITY: ICD-10-CM

## 2023-11-01 DIAGNOSIS — K31.84 GASTROPARESIS: Primary | ICD-10-CM

## 2023-11-01 PROCEDURE — 99213 OFFICE O/P EST LOW 20 MIN: CPT | Performed by: NURSE PRACTITIONER

## 2023-11-01 PROCEDURE — 3074F SYST BP LT 130 MM HG: CPT | Performed by: NURSE PRACTITIONER

## 2023-11-01 PROCEDURE — G8484 FLU IMMUNIZE NO ADMIN: HCPCS | Performed by: NURSE PRACTITIONER

## 2023-11-01 PROCEDURE — 3079F DIAST BP 80-89 MM HG: CPT | Performed by: NURSE PRACTITIONER

## 2023-11-01 PROCEDURE — 3017F COLORECTAL CA SCREEN DOC REV: CPT | Performed by: NURSE PRACTITIONER

## 2023-11-01 PROCEDURE — G8427 DOCREV CUR MEDS BY ELIG CLIN: HCPCS | Performed by: NURSE PRACTITIONER

## 2023-11-01 PROCEDURE — G8419 CALC BMI OUT NRM PARAM NOF/U: HCPCS | Performed by: NURSE PRACTITIONER

## 2023-11-01 PROCEDURE — 1036F TOBACCO NON-USER: CPT | Performed by: NURSE PRACTITIONER

## 2023-11-01 RX ORDER — OXYCODONE AND ACETAMINOPHEN 5; 325 MG/1; MG/1
1 TABLET ORAL EVERY 8 HOURS PRN
Qty: 60 TABLET | Refills: 0 | Status: SHIPPED | OUTPATIENT
Start: 2023-11-01 | End: 2023-11-20 | Stop reason: SDUPTHER

## 2023-11-01 ASSESSMENT — ENCOUNTER SYMPTOMS
VOICE CHANGE: 0
VOMITING: 0
NAUSEA: 1
RECTAL PAIN: 0
EYE REDNESS: 0
ABDOMINAL DISTENTION: 0
BLOOD IN STOOL: 0
WHEEZING: 0
CHEST TIGHTNESS: 0
PHOTOPHOBIA: 0
DIARRHEA: 0
SHORTNESS OF BREATH: 0
TROUBLE SWALLOWING: 1
CONSTIPATION: 0
EYE PAIN: 0
COLOR CHANGE: 0
ANAL BLEEDING: 0

## 2023-11-01 NOTE — PROGRESS NOTES
Subjective:      Patient ID: Jovana Matt is a 66 y.o. female who presents today for:  Chief Complaint   Patient presents with    Follow-up       HPI  Patient came in as follow-up, as recall was seen approximately 8 months prior had EGD which noted esophageal dysmotility and retained gastric contents, results were discussed with the patient over the phone as she was in the process of [place] to another [place], she ended up not moving and came in today as follow-up. She has persistent symptoms of esophageal dysphagia, no trouble initiating a swallow, has persistent nausea and sulfur burps in the context of diabetes and lupus. Most recent hemoglobin A1c is 6.7, she does take chronic opioids on occasion for lupus pain. Has been on a PPI daily. Otherwise no new complaints or concerns. OV 2/6/23  Patient came in as a follow-up and further management of esophageal dysphagia, reports 2-month history of dysphagia, feels as if food gets stuck, no trouble initiating a swallow, occasional vomiting, no history of food impaction, no history of EOE, unintentional weight loss, or esophagitis. Noted history of diabetes previously poorly controlled now with most recent hemoglobin A1c of 6.4. As a recall patient has had 2 prior EGDs requiring esophageal dilation most recently August 2021. Has been on a PPI once daily. Background  Patient came in as follow-up and further evaluation of abdominal pain and dysphagia, was previously seen and under the care of Dr. Jone Limon. Reports intermittent history of abdominal pain and dysphagia with previous EGD notable for retained gastric contents in the context of poorly controlled diabetes and esophageal stricturing, which required dilation with #20 savary dilator. Currently has intermittent dysphagia to liquids and solids, no trouble initiating a swallow, has been on a PPI, has increased epigastric pain and heartburn.   No nausea or vomiting, hematemesis, melena, or

## 2023-11-03 ENCOUNTER — APPOINTMENT (OUTPATIENT)
Dept: ORTHOPEDIC SURGERY | Facility: CLINIC | Age: 58
End: 2023-11-03
Payer: MEDICARE

## 2023-11-09 DIAGNOSIS — E78.2 MIXED HYPERLIPIDEMIA: ICD-10-CM

## 2023-11-09 DIAGNOSIS — K21.9 GASTROESOPHAGEAL REFLUX DISEASE, UNSPECIFIED WHETHER ESOPHAGITIS PRESENT: ICD-10-CM

## 2023-11-09 DIAGNOSIS — K31.84 GASTROPARESIS: ICD-10-CM

## 2023-11-10 RX ORDER — PANTOPRAZOLE SODIUM 40 MG/1
40 TABLET, DELAYED RELEASE ORAL NIGHTLY
Qty: 90 TABLET | Refills: 1 | Status: SHIPPED | OUTPATIENT
Start: 2023-11-10 | End: 2024-11-09

## 2023-11-10 RX ORDER — ROSUVASTATIN CALCIUM 20 MG/1
20 TABLET, COATED ORAL DAILY
Qty: 90 TABLET | Refills: 1 | Status: SHIPPED | OUTPATIENT
Start: 2023-11-10 | End: 2024-03-19 | Stop reason: SDUPTHER

## 2023-11-17 DIAGNOSIS — N18.32 TYPE 2 DIABETES MELLITUS WITH STAGE 3B CHRONIC KIDNEY DISEASE, WITHOUT LONG-TERM CURRENT USE OF INSULIN (MULTI): ICD-10-CM

## 2023-11-17 DIAGNOSIS — E11.22 TYPE 2 DIABETES MELLITUS WITH STAGE 3B CHRONIC KIDNEY DISEASE, WITHOUT LONG-TERM CURRENT USE OF INSULIN (MULTI): ICD-10-CM

## 2023-11-17 RX ORDER — SEMAGLUTIDE 0.68 MG/ML
INJECTION, SOLUTION SUBCUTANEOUS
Qty: 3 ML | Refills: 1 | Status: CANCELLED | OUTPATIENT
Start: 2023-11-17

## 2023-11-20 ENCOUNTER — OFFICE VISIT (OUTPATIENT)
Dept: PRIMARY CARE | Facility: CLINIC | Age: 58
End: 2023-11-20
Payer: MEDICARE

## 2023-11-20 VITALS
OXYGEN SATURATION: 96 % | SYSTOLIC BLOOD PRESSURE: 136 MMHG | DIASTOLIC BLOOD PRESSURE: 85 MMHG | HEIGHT: 68 IN | WEIGHT: 172 LBS | RESPIRATION RATE: 20 BRPM | HEART RATE: 80 BPM | BODY MASS INDEX: 26.07 KG/M2

## 2023-11-20 DIAGNOSIS — N18.32 TYPE 2 DIABETES MELLITUS WITH STAGE 3B CHRONIC KIDNEY DISEASE, WITHOUT LONG-TERM CURRENT USE OF INSULIN (MULTI): ICD-10-CM

## 2023-11-20 DIAGNOSIS — E53.8 VITAMIN B12 DEFICIENCY: ICD-10-CM

## 2023-11-20 DIAGNOSIS — R39.15 URGENCY OF MICTURITION: ICD-10-CM

## 2023-11-20 DIAGNOSIS — F33.0 MILD RECURRENT MAJOR DEPRESSION (CMS-HCC): Primary | ICD-10-CM

## 2023-11-20 DIAGNOSIS — E11.22 TYPE 2 DIABETES MELLITUS WITH STAGE 3B CHRONIC KIDNEY DISEASE, WITHOUT LONG-TERM CURRENT USE OF INSULIN (MULTI): ICD-10-CM

## 2023-11-20 DIAGNOSIS — G89.4 CHRONIC PAIN SYNDROME: ICD-10-CM

## 2023-11-20 DIAGNOSIS — E78.2 MIXED HYPERLIPIDEMIA: ICD-10-CM

## 2023-11-20 DIAGNOSIS — Z28.21 COVID-19 VACCINATION DECLINED: ICD-10-CM

## 2023-11-20 DIAGNOSIS — N30.00 ACUTE CYSTITIS WITHOUT HEMATURIA: ICD-10-CM

## 2023-11-20 DIAGNOSIS — E55.9 VITAMIN D DEFICIENCY: ICD-10-CM

## 2023-11-20 DIAGNOSIS — D72.829 LEUKOCYTOSIS, UNSPECIFIED TYPE: ICD-10-CM

## 2023-11-20 DIAGNOSIS — Z79.899 POLYPHARMACY: ICD-10-CM

## 2023-11-20 DIAGNOSIS — I10 ESSENTIAL HYPERTENSION: ICD-10-CM

## 2023-11-20 DIAGNOSIS — E79.0 HYPERURICEMIA: ICD-10-CM

## 2023-11-20 PROBLEM — F32.1 EPISODE OF MODERATE MAJOR DEPRESSION (MULTI): Status: RESOLVED | Noted: 2023-02-21 | Resolved: 2023-11-20

## 2023-11-20 PROBLEM — I82.431 ACUTE DEEP VEIN THROMBOSIS (DVT) OF POPLITEAL VEIN OF RIGHT LOWER EXTREMITY (MULTI): Status: RESOLVED | Noted: 2023-02-21 | Resolved: 2023-11-20

## 2023-11-20 LAB
APPEARANCE UR: ABNORMAL
BACTERIA #/AREA URNS AUTO: ABNORMAL /HPF
BILIRUB UR STRIP.AUTO-MCNC: NEGATIVE MG/DL
CAOX CRY #/AREA UR COMP ASSIST: ABNORMAL /HPF
COLOR UR: YELLOW
GLUCOSE UR STRIP.AUTO-MCNC: NEGATIVE MG/DL
HOLD SPECIMEN: NORMAL
KETONES UR STRIP.AUTO-MCNC: NEGATIVE MG/DL
LEUKOCYTE ESTERASE UR QL STRIP.AUTO: ABNORMAL
NITRITE UR QL STRIP.AUTO: NEGATIVE
PH UR STRIP.AUTO: 5 [PH]
PROT UR STRIP.AUTO-MCNC: ABNORMAL MG/DL
RBC # UR STRIP.AUTO: NEGATIVE /UL
RBC #/AREA URNS AUTO: ABNORMAL /HPF
SP GR UR STRIP.AUTO: 1.02
SQUAMOUS #/AREA URNS AUTO: ABNORMAL /HPF
UROBILINOGEN UR STRIP.AUTO-MCNC: <2 MG/DL
WBC #/AREA URNS AUTO: >50 /HPF
WBC CLUMPS #/AREA URNS AUTO: ABNORMAL /HPF

## 2023-11-20 PROCEDURE — 3044F HG A1C LEVEL LT 7.0%: CPT | Performed by: INTERNAL MEDICINE

## 2023-11-20 PROCEDURE — 3075F SYST BP GE 130 - 139MM HG: CPT | Performed by: INTERNAL MEDICINE

## 2023-11-20 PROCEDURE — 82043 UR ALBUMIN QUANTITATIVE: CPT

## 2023-11-20 PROCEDURE — 82570 ASSAY OF URINE CREATININE: CPT

## 2023-11-20 PROCEDURE — 99213 OFFICE O/P EST LOW 20 MIN: CPT | Performed by: INTERNAL MEDICINE

## 2023-11-20 PROCEDURE — 87086 URINE CULTURE/COLONY COUNT: CPT

## 2023-11-20 PROCEDURE — 81001 URINALYSIS AUTO W/SCOPE: CPT

## 2023-11-20 PROCEDURE — 3061F NEG MICROALBUMINURIA REV: CPT | Performed by: INTERNAL MEDICINE

## 2023-11-20 PROCEDURE — 1036F TOBACCO NON-USER: CPT | Performed by: INTERNAL MEDICINE

## 2023-11-20 PROCEDURE — 3079F DIAST BP 80-89 MM HG: CPT | Performed by: INTERNAL MEDICINE

## 2023-11-20 PROCEDURE — 87186 SC STD MICRODIL/AGAR DIL: CPT

## 2023-11-20 RX ORDER — PROPRANOLOL HYDROCHLORIDE 120 MG/1
240 CAPSULE, EXTENDED RELEASE ORAL DAILY
COMMUNITY
Start: 2023-11-18 | End: 2024-05-14 | Stop reason: WASHOUT

## 2023-11-20 RX ORDER — OXYCODONE AND ACETAMINOPHEN 5; 325 MG/1; MG/1
1 TABLET ORAL EVERY 8 HOURS PRN
Qty: 60 TABLET | Refills: 0 | Status: SHIPPED | OUTPATIENT
Start: 2023-11-20 | End: 2023-12-26 | Stop reason: SDUPTHER

## 2023-11-20 NOTE — PROGRESS NOTES
"Patient does not want to have cpe.     Subjective   Patient ID: Eunice Schroeder is a 58 y.o. female who presents for Follow-up.    HPI   The patient is here for reevaluation of POLYPHARMACY and any inherent risks.  She follows closely with PSYCHIATRY, and is very careful about her medications.  She does not run out earlier than expected.  No confusion or delirium.  No lethargy.  No recent falls.  Has been in very good spirits, and continues to want to stay healthy, independent, and productive, and does not wish harm to self or others.    States that her urine has been extra pungent perhaps, and in the meantime, some modest urgency, but otherwise, no whitney dysuria.  Otherwise, no other accompanying symptoms.  Appetite preserved, no abdominal distress.  No skin changes.  No coughing, no sputum production.  CONSTITUTIONALLY, no fever, no chills.  No night sweats.  No lingering anorexia or nausea.  No apparent lymphadenopathy.  No apparent weight loss.    Follows closely with ENDOCRINOLOGY, recent hemoglobin A1c 6.7.  Compliant with medications, tolerating regimens.  Tries to be physically active, limited by pain exacerbation.  Otherwise, no headache, blurred vision, diplopia.  No dysphagia.  Continues to want to improve quality of life, and does not wish harm to self or others.  No whitney substernal chest pain, no orthopnea, no paroxysmal nocturnal dyspnea.        Review of Systems  Review of systems as in history of present illness, and otherwise, reviewed separately as well, and was unremarkable/negative/noncontributory.          Objective   /85 (BP Location: Right arm, Patient Position: Sitting, BP Cuff Size: Adult)   Pulse 80   Resp 20   Ht 1.727 m (5' 8\")   Wt 78 kg (172 lb)   SpO2 96%   BMI 26.15 kg/m²     Physical Exam  In otherwise good spirits.  Not in distress or diaphoresis.  Alert, oriented x3.  Amiable.  Not unkempt.  Receptive, cheerful, appropriate.  Eager to get better.  Not wishing harm to " self or others.  Moderately built.  Remaining capable and appropriate.    HEAD pink palpebral conjunctivae, anicteric sclerae.  NECK supple, no apparent jugular venous distention.  CARDIOVASCULAR not in distress or diaphoresis.  No bipedal edema.  LUNGS not in distress or diaphoresis.  Not using accessory muscles.  ABDOMEN soft, nontender.  BACK no costovertebral angle tenderness.  EXTREMITIES no clubbing, no cyanosis.  NEURO no facial asymmetry.  No apparent cranial nerve deficits.  Romberg negative.  Ambulating without need of assistance.  No apparent focal weakness.  No tremors.  PSYCH receptive, appropriate, and eager to maintain and improve quality of life.      LABORATORY results reviewed with patient.  Medication list updated as well.        Assessment/Plan   Eunice was seen today for follow-up.  Diagnoses and all orders for this visit:  Mild recurrent major depression (CMS/HCC) (Primary)  -     Follow Up In Primary Care - Established; Future  -     TSH with reflex to Free T4 if abnormal; Future  Chronic pain syndrome  -     oxyCODONE-acetaminophen (Percocet) 5-325 mg tablet; Take 1 tablet by mouth every 8 hours if needed for severe pain (7 - 10) (Please take only as needed for severe pain.  No driving for 6 hours.  Never drive if you are drowsy.  Never take with alcohol.  Thank you.).  -     Follow Up In Primary Care - Established; Future  -     TSH with reflex to Free T4 if abnormal; Future  Polypharmacy  -     Follow Up In Primary Care - Established; Future  -     TSH with reflex to Free T4 if abnormal; Future  Urgency of micturition  -     Urinalysis with Reflex Microscopic and Culture  -     Follow Up In Primary Care - Established; Future  -     TSH with reflex to Free T4 if abnormal; Future  Leukocytosis, unspecified type  -     Follow Up In Primary Care - Established; Future  -     CBC and Auto Differential; Future  -     TSH with reflex to Free T4 if abnormal; Future  Hyperuricemia  -     Follow Up  In Primary Care - Established; Future  -     TSH with reflex to Free T4 if abnormal; Future  Type 2 diabetes mellitus with stage 3b chronic kidney disease, without long-term current use of insulin (CMS/HCC)  -     Urinalysis with Reflex Microscopic and Culture  -     Follow Up In Primary Care - Established; Future  -     TSH with reflex to Free T4 if abnormal; Future  -     Albumin , Urine Random  -     Comprehensive Metabolic Panel; Future  Mixed hyperlipidemia  -     Follow Up In Primary Care - Established; Future  -     TSH with reflex to Free T4 if abnormal; Future  -     Lipid Panel; Future  -     Comprehensive Metabolic Panel; Future  Vitamin B12 deficiency  -     Follow Up In Primary Care - Established; Future  -     Vitamin B12; Future  -     TSH with reflex to Free T4 if abnormal; Future  Vitamin D deficiency  -     Follow Up In Primary Care - Established; Future  -     TSH with reflex to Free T4 if abnormal; Future  COVID-19 vaccination declined  -     Follow Up In Primary Care - Established; Future  -     TSH with reflex to Free T4 if abnormal; Future  Essential hypertension  -     Magnesium; Future  -     Comprehensive Metabolic Panel; Future         Thank you very much for coming.  I am very glad to see you.    I am sorry to hear that you were bruising with your syringe and needle set up.  You have 3 syringes with the needle in place.  Use this needle to draw up the vitamin B12.  Afterwards, you have the other needle x4.  Replace the initial needle with this needle so that you can use a fresh needle to inject yourself with VITAMIN B12.    I do understand that your urine is uncharacteristically pungent.  Let us check your urine today.  I will send word regarding results and possible changes.    As always, please be very careful when using your generic PERCOCET.  No driving for 6 hours.  Never drive if you are drowsy.  Do not take with alcohol.  Secure your medication, so that only you have access to your  generic Percocet.    Remember that your generic Percocet can increase your chances of forgetfulness and falling, and can be habit-forming.  Use only when absolutely necessary.  Please do not take your BENZODIAZEPINE and opiates at the same time.  Please keep these medications at least 6 hours apart.  Very important.  Watch out for excessive sleepiness.    Please come back in 2 months.  Let us have you repeat some FASTING laboratory examinations via [facility], then see me soon after.  You might even need to do some Medicare paperwork, depending on what your insurance recommends.    Until then, please continue to take care of yourself and your family and each other, and please continue to pray for our recovery from this pandemic.  I hope you have a good Thanksgiving, a good [place], and a happy new year!  See you in 2 months.            0  Return in 2 months.  40 minutes please.  FASTING laboratory examination via [facility], then see me for possible Medicare wellness evaluation.  Reassess mood, energy, function, preventive strategies, cardiovascular risk.  Coordinate with psychiatry, caution with POLYPHARMACY.  Coordinate with rheumatology, endocrinology, consider nephrology once more.            0

## 2023-11-20 NOTE — PATIENT INSTRUCTIONS
Thank you very much for coming.  I am very glad to see you.    I am sorry to hear that you were bruising with your syringe and needle set up.  You have 3 syringes with the needle in place.  Use this needle to draw up the vitamin B12.  Afterwards, you have the other needle x4.  Replace the initial needle with this needle so that you can use a fresh needle to inject yourself with VITAMIN B12.    I do understand that your urine is uncharacteristically pungent.  Let us check your urine today.  I will send word regarding results and possible changes.    As always, please be very careful when using your generic PERCOCET.  No driving for 6 hours.  Never drive if you are drowsy.  Do not take with alcohol.  Secure your medication, so that only you have access to your generic Percocet.    Remember that your generic Percocet can increase your chances of forgetfulness and falling, and can be habit-forming.  Use only when absolutely necessary.  Please do not take your BENZODIAZEPINE and opiates at the same time.  Please keep these medications at least 6 hours apart.  Very important.  Watch out for excessive sleepiness.    Please come back in 2 months.  Let us have you repeat some FASTING laboratory examinations via [facility], then see me soon after.  You might even need to do some Medicare paperwork, depending on what your insurance recommends.    Until then, please continue to take care of yourself and your family and each other, and please continue to pray for our recovery from this pandemic.  I hope you have a good Thanksgiving, a good [place], and a happy new year!  See you in 2 months.            0  Return in 2 months.  40 minutes please.  FASTING laboratory examination via [facility], then see me for possible Medicare wellness evaluation.  Reassess mood, energy, function, preventive strategies, cardiovascular risk.  Coordinate with psychiatry, caution with POLYPHARMACY.  Coordinate with rheumatology,  endocrinology, consider nephrology once more.            0

## 2023-11-21 LAB
CREAT UR-MCNC: 217.3 MG/DL (ref 20–320)
MICROALBUMIN UR-MCNC: 23.1 MG/L
MICROALBUMIN/CREAT UR: 10.6 UG/MG CREAT

## 2023-11-22 ENCOUNTER — TELEPHONE (OUTPATIENT)
Dept: PRIMARY CARE | Facility: CLINIC | Age: 58
End: 2023-11-22
Payer: COMMERCIAL

## 2023-11-23 LAB — BACTERIA UR CULT: ABNORMAL

## 2023-11-24 ENCOUNTER — HOSPITAL ENCOUNTER (OUTPATIENT)
Dept: LAB | Age: 58
Discharge: HOME | End: 2023-11-24
Payer: MEDICARE

## 2023-11-24 DIAGNOSIS — E11.65 UNCONTROLLED TYPE 2 DIABETES MELLITUS WITH HYPERGLYCEMIA (HCC): ICD-10-CM

## 2023-11-24 LAB
ANION GAP SERPL CALCULATED.3IONS-SCNC: 12 MEQ/L (ref 9–15)
BUN SERPL-MCNC: 10 MG/DL (ref 6–20)
CALCIUM SERPL-MCNC: 9.3 MG/DL (ref 8.5–9.9)
CHLORIDE SERPL-SCNC: 103 MEQ/L (ref 95–107)
CO2 SERPL-SCNC: 27 MEQ/L (ref 20–31)
CREAT SERPL-MCNC: 1.3 MG/DL (ref 0.5–0.9)
GLUCOSE SERPL-MCNC: 83 MG/DL (ref 70–99)
HBA1C MFR BLD: 6 % (ref 4.8–5.9)
POTASSIUM SERPL-SCNC: 4.6 MEQ/L (ref 3.4–4.9)
SODIUM SERPL-SCNC: 142 MEQ/L (ref 135–144)

## 2023-11-24 PROCEDURE — 36415 COLL VENOUS BLD VENIPUNCTURE: CPT

## 2023-11-24 PROCEDURE — 83036 HEMOGLOBIN GLYCOSYLATED A1C: CPT

## 2023-11-24 PROCEDURE — 80048 BASIC METABOLIC PNL TOTAL CA: CPT

## 2023-11-24 RX ORDER — SULFAMETHOXAZOLE AND TRIMETHOPRIM 400; 80 MG/1; MG/1
TABLET ORAL
Qty: 14 TABLET | Refills: 0 | Status: SHIPPED | OUTPATIENT
Start: 2023-11-24 | End: 2023-12-19 | Stop reason: SINTOL

## 2023-11-27 ENCOUNTER — TELEPHONE (OUTPATIENT)
Dept: PRIMARY CARE | Facility: CLINIC | Age: 58
End: 2023-11-27
Payer: COMMERCIAL

## 2023-11-27 DIAGNOSIS — B37.31 CANDIDAL VAGINITIS: ICD-10-CM

## 2023-11-27 DIAGNOSIS — E11.65 UNCONTROLLED TYPE 2 DIABETES MELLITUS WITH HYPERGLYCEMIA (HCC): ICD-10-CM

## 2023-11-27 RX ORDER — INSULIN ASPART INJECTION 100 [IU]/ML
INJECTION, SOLUTION SUBCUTANEOUS
Qty: 50 ML | Refills: 3 | Status: SHIPPED | OUTPATIENT
Start: 2023-11-27

## 2023-11-28 RX ORDER — FLUCONAZOLE 150 MG/1
TABLET ORAL
Qty: 3 TABLET | Refills: 0 | Status: SHIPPED | OUTPATIENT
Start: 2023-11-28 | End: 2023-12-19 | Stop reason: SDUPTHER

## 2023-11-28 RX ORDER — INSULIN ASPART 100 [IU]/ML
INJECTION, SOLUTION INTRAVENOUS; SUBCUTANEOUS
Qty: 15 ML | Refills: 3 | Status: CANCELLED | OUTPATIENT
Start: 2023-11-28

## 2023-11-28 RX ORDER — BLOOD SUGAR DIAGNOSTIC
STRIP MISCELLANEOUS
Qty: 100 EACH | Refills: 3 | Status: SHIPPED | OUTPATIENT
Start: 2023-11-28

## 2023-11-28 RX ORDER — BLOOD SUGAR DIAGNOSTIC
STRIP MISCELLANEOUS
Qty: 100 EACH | Refills: 3 | Status: CANCELLED | OUTPATIENT
Start: 2023-11-28

## 2023-11-28 RX ORDER — INSULIN ASPART 100 [IU]/ML
INJECTION, SOLUTION INTRAVENOUS; SUBCUTANEOUS
Qty: 15 ML | Refills: 3 | Status: SHIPPED | OUTPATIENT
Start: 2023-11-28

## 2023-11-29 DIAGNOSIS — B37.31 CANDIDAL VAGINITIS: ICD-10-CM

## 2023-11-29 RX ORDER — FLUCONAZOLE 150 MG/1
TABLET ORAL
Qty: 3 TABLET | Refills: 0 | OUTPATIENT
Start: 2023-11-29

## 2023-12-08 ENCOUNTER — TELEPHONE (OUTPATIENT)
Dept: PRIMARY CARE | Facility: CLINIC | Age: 58
End: 2023-12-08
Payer: COMMERCIAL

## 2023-12-08 DIAGNOSIS — N18.32 STAGE 3B CHRONIC KIDNEY DISEASE (MULTI): Primary | ICD-10-CM

## 2023-12-08 DIAGNOSIS — M32.9 SYSTEMIC LUPUS ERYTHEMATOSUS, UNSPECIFIED SLE TYPE, UNSPECIFIED ORGAN INVOLVEMENT STATUS (MULTI): ICD-10-CM

## 2023-12-08 DIAGNOSIS — N39.0 RECURRENT UTI: ICD-10-CM

## 2023-12-13 ENCOUNTER — OFFICE VISIT (OUTPATIENT)
Dept: ENDOCRINOLOGY | Age: 58
End: 2023-12-13
Payer: MEDICARE

## 2023-12-13 ENCOUNTER — LAB (OUTPATIENT)
Dept: LAB | Facility: LAB | Age: 58
End: 2023-12-13
Payer: MEDICARE

## 2023-12-13 VITALS
SYSTOLIC BLOOD PRESSURE: 101 MMHG | DIASTOLIC BLOOD PRESSURE: 66 MMHG | BODY MASS INDEX: 26.67 KG/M2 | WEIGHT: 176 LBS | OXYGEN SATURATION: 98 % | HEIGHT: 68 IN | HEART RATE: 67 BPM

## 2023-12-13 DIAGNOSIS — N39.0 RECURRENT UTI: ICD-10-CM

## 2023-12-13 DIAGNOSIS — M32.9 SYSTEMIC LUPUS ERYTHEMATOSUS, UNSPECIFIED SLE TYPE, UNSPECIFIED ORGAN INVOLVEMENT STATUS (MULTI): ICD-10-CM

## 2023-12-13 DIAGNOSIS — B37.31 CANDIDAL VAGINITIS: Primary | ICD-10-CM

## 2023-12-13 DIAGNOSIS — E11.65 UNCONTROLLED TYPE 2 DIABETES MELLITUS WITH HYPERGLYCEMIA (HCC): Primary | ICD-10-CM

## 2023-12-13 DIAGNOSIS — N18.32 STAGE 3B CHRONIC KIDNEY DISEASE (MULTI): ICD-10-CM

## 2023-12-13 LAB
APPEARANCE UR: ABNORMAL
BACTERIA #/AREA URNS AUTO: ABNORMAL /HPF
BILIRUB UR STRIP.AUTO-MCNC: NEGATIVE MG/DL
CHP ED QC CHECK: NORMAL
COLOR UR: YELLOW
GLUCOSE BLD-MCNC: 132 MG/DL
GLUCOSE UR STRIP.AUTO-MCNC: NEGATIVE MG/DL
HOLD SPECIMEN: NORMAL
KETONES UR STRIP.AUTO-MCNC: NEGATIVE MG/DL
LEUKOCYTE ESTERASE UR QL STRIP.AUTO: ABNORMAL
MUCOUS THREADS #/AREA URNS AUTO: ABNORMAL /LPF
NITRITE UR QL STRIP.AUTO: NEGATIVE
PH UR STRIP.AUTO: 5 [PH]
PROT UR STRIP.AUTO-MCNC: NEGATIVE MG/DL
RBC # UR STRIP.AUTO: NEGATIVE /UL
RBC #/AREA URNS AUTO: ABNORMAL /HPF
SP GR UR STRIP.AUTO: 1.02
SQUAMOUS #/AREA URNS AUTO: ABNORMAL /HPF
UROBILINOGEN UR STRIP.AUTO-MCNC: <2 MG/DL
WBC #/AREA URNS AUTO: ABNORMAL /HPF

## 2023-12-13 PROCEDURE — 3078F DIAST BP <80 MM HG: CPT | Performed by: INTERNAL MEDICINE

## 2023-12-13 PROCEDURE — 3017F COLORECTAL CA SCREEN DOC REV: CPT | Performed by: INTERNAL MEDICINE

## 2023-12-13 PROCEDURE — G8427 DOCREV CUR MEDS BY ELIG CLIN: HCPCS | Performed by: INTERNAL MEDICINE

## 2023-12-13 PROCEDURE — G8484 FLU IMMUNIZE NO ADMIN: HCPCS | Performed by: INTERNAL MEDICINE

## 2023-12-13 PROCEDURE — 87086 URINE CULTURE/COLONY COUNT: CPT

## 2023-12-13 PROCEDURE — 99213 OFFICE O/P EST LOW 20 MIN: CPT | Performed by: INTERNAL MEDICINE

## 2023-12-13 PROCEDURE — 2022F DILAT RTA XM EVC RTNOPTHY: CPT | Performed by: INTERNAL MEDICINE

## 2023-12-13 PROCEDURE — 81001 URINALYSIS AUTO W/SCOPE: CPT

## 2023-12-13 PROCEDURE — 82962 GLUCOSE BLOOD TEST: CPT | Performed by: INTERNAL MEDICINE

## 2023-12-13 PROCEDURE — 3074F SYST BP LT 130 MM HG: CPT | Performed by: INTERNAL MEDICINE

## 2023-12-13 PROCEDURE — 1036F TOBACCO NON-USER: CPT | Performed by: INTERNAL MEDICINE

## 2023-12-13 PROCEDURE — 87186 SC STD MICRODIL/AGAR DIL: CPT

## 2023-12-13 PROCEDURE — G8419 CALC BMI OUT NRM PARAM NOF/U: HCPCS | Performed by: INTERNAL MEDICINE

## 2023-12-13 PROCEDURE — 3044F HG A1C LEVEL LT 7.0%: CPT | Performed by: INTERNAL MEDICINE

## 2023-12-13 RX ORDER — NARATRIPTAN 2.5 MG/1
2.5 TABLET ORAL
COMMUNITY
Start: 2023-12-04

## 2023-12-13 NOTE — PROGRESS NOTES
pump max daily dose 100 units, Disp: 15 mL, Rfl: 3    Insulin Pen Needle (PEN NEEDLES) 32G X 6 MM MISC, Use 3 daily, Disp: 100 each, Rfl: 3    Insulin Aspart, w/Niacinamide, (FIASP) 100 UNIT/ML SOLN, Use via pump max dose 150 units/day, Disp: 50 mL, Rfl: 3    metoclopramide (REGLAN) 10 MG tablet, Take 1 tablet by mouth 4 times daily as needed (nausea), Disp: 120 tablet, Rfl: 0    venlafaxine (EFFEXOR) 75 MG tablet, Take 1 tablet by mouth daily, Disp: , Rfl:     OZEMPIC, 0.25 OR 0.5 MG/DOSE, 2 MG/3ML SOPN, INJECT 0.5 MG UNDER THE SKIN EVERY 7 DAYS, Disp: , Rfl:     oxyCODONE-acetaminophen (PERCOCET) 5-325 MG per tablet, TAKE 1 TABLET BY MOUTH EVERY 8 HOURS AS NEEDED FOR SEVERE PAIN (7...  (REFER TO PRESCRIPTION NOTES). , Disp: , Rfl:     glycopyrrolate (ROBINUL) 1 MG tablet, Take 1 tablet by mouth 2 times daily, Disp: 60 tablet, Rfl: 3    Insulin Disposable Pump (OMNIPOD DASH PODS, GEN 4,) MISC, Change every 48 hrs, Disp: 15 each, Rfl: 3    ondansetron (ZOFRAN) 4 MG tablet, Take 1 tablet by mouth every 8 hours as needed for Nausea or Vomiting, Disp: 30 tablet, Rfl: 0    pantoprazole (PROTONIX) 40 MG tablet, Please take 1 (ONE) tablet by mouth at bedtime. CALL MD WHEN ALMOST OUT OF MEDICATION, Disp: , Rfl:     QUEtiapine (SEROQUEL) 25 MG tablet, , Disp: , Rfl:     pregabalin (LYRICA) 75 MG capsule, Take by mouth., Disp: , Rfl:     fluticasone (FLONASE) 50 MCG/ACT nasal spray, 2 sprays by Each Nostril route daily, Disp: 16 g, Rfl: 0    propranolol (INDERAL LA) 120 MG extended release capsule, TAKE 2 CAPSULES BY MOUTH EVERY DAY, Disp: , Rfl:     rosuvastatin (CRESTOR) 20 MG tablet, TAKE 1 TABLET DAILY. , Disp: , Rfl:     esomeprazole (NEXIUM) 40 MG delayed release capsule, Take 1 capsule by mouth daily, Disp: 30 capsule, Rfl: 3    Lancets MISC, Pt test 3x daily Dx E11.65, Disp: 100 each, Rfl: 3    blood glucose test strips (ASCENSIA AUTODISC VI;ONE TOUCH ULTRA TEST VI) strip, Check blood sugars twice daily or as

## 2023-12-14 DIAGNOSIS — N95.1 HOT FLASH, MENOPAUSAL: ICD-10-CM

## 2023-12-15 LAB — BACTERIA UR CULT: ABNORMAL

## 2023-12-15 RX ORDER — ESTRADIOL 0.03 MG/D
1 PATCH TRANSDERMAL
Qty: 12 PATCH | Refills: 0 | Status: SHIPPED | OUTPATIENT
Start: 2023-12-15 | End: 2024-02-02 | Stop reason: SDUPTHER

## 2023-12-18 ENCOUNTER — TELEPHONE (OUTPATIENT)
Dept: PRIMARY CARE | Facility: CLINIC | Age: 58
End: 2023-12-18
Payer: COMMERCIAL

## 2023-12-18 DIAGNOSIS — G89.4 CHRONIC PAIN SYNDROME: ICD-10-CM

## 2023-12-18 RX ORDER — SULFAMETHOXAZOLE AND TRIMETHOPRIM 400; 80 MG/1; MG/1
TABLET ORAL
Qty: 14 TABLET | Refills: 0 | Status: SHIPPED | OUTPATIENT
Start: 2023-12-18 | End: 2023-12-19 | Stop reason: SINTOL

## 2023-12-18 NOTE — TELEPHONE ENCOUNTER
Pt is having a reaction to the Bactrim, says before when she took it she has a small rash but today she broke out really bad.  Can you change antibiotic and also send Diflucan for years infection, she will need afterwards.

## 2023-12-19 DIAGNOSIS — G89.4 CHRONIC PAIN SYNDROME: ICD-10-CM

## 2023-12-19 RX ORDER — NITROFURANTOIN (MACROCRYSTALS) 100 MG/1
CAPSULE ORAL
Qty: 20 CAPSULE | Refills: 0 | Status: SHIPPED | OUTPATIENT
Start: 2023-12-19 | End: 2024-01-05 | Stop reason: ALTCHOICE

## 2023-12-19 RX ORDER — OXYCODONE AND ACETAMINOPHEN 5; 325 MG/1; MG/1
1 TABLET ORAL EVERY 8 HOURS PRN
Qty: 60 TABLET | Refills: 0 | Status: CANCELLED | OUTPATIENT
Start: 2023-12-19 | End: 2024-01-18

## 2023-12-19 RX ORDER — FLUCONAZOLE 150 MG/1
TABLET ORAL
Qty: 3 TABLET | Refills: 1 | Status: SHIPPED | OUTPATIENT
Start: 2023-12-19 | End: 2024-01-05 | Stop reason: ALTCHOICE

## 2023-12-26 DIAGNOSIS — G89.4 CHRONIC PAIN SYNDROME: ICD-10-CM

## 2023-12-26 RX ORDER — OXYCODONE AND ACETAMINOPHEN 5; 325 MG/1; MG/1
1 TABLET ORAL EVERY 8 HOURS PRN
Qty: 60 TABLET | Refills: 0 | Status: SHIPPED | OUTPATIENT
Start: 2023-12-26 | End: 2024-01-23 | Stop reason: SDUPTHER

## 2024-01-02 ENCOUNTER — APPOINTMENT (OUTPATIENT)
Dept: CT IMAGING | Age: 59
End: 2024-01-02
Payer: MEDICARE

## 2024-01-02 ENCOUNTER — LAB (OUTPATIENT)
Dept: LAB | Facility: LAB | Age: 59
End: 2024-01-02
Payer: MEDICARE

## 2024-01-02 ENCOUNTER — HOSPITAL ENCOUNTER (EMERGENCY)
Age: 59
Discharge: HOME | End: 2024-01-02
Payer: MEDICARE

## 2024-01-02 VITALS
OXYGEN SATURATION: 97 % | TEMPERATURE: 98.2 F | RESPIRATION RATE: 16 BRPM | WEIGHT: 158 LBS | HEART RATE: 88 BPM | SYSTOLIC BLOOD PRESSURE: 136 MMHG | DIASTOLIC BLOOD PRESSURE: 99 MMHG | HEIGHT: 68 IN | BODY MASS INDEX: 23.95 KG/M2

## 2024-01-02 DIAGNOSIS — M32.9 SYSTEMIC LUPUS ERYTHEMATOSUS, UNSPECIFIED SLE TYPE, UNSPECIFIED ORGAN INVOLVEMENT STATUS (MULTI): ICD-10-CM

## 2024-01-02 DIAGNOSIS — I10 ESSENTIAL HYPERTENSION: ICD-10-CM

## 2024-01-02 DIAGNOSIS — E55.9 VITAMIN D DEFICIENCY: ICD-10-CM

## 2024-01-02 DIAGNOSIS — Z79.899 POLYPHARMACY: ICD-10-CM

## 2024-01-02 DIAGNOSIS — E53.8 VITAMIN B12 DEFICIENCY: ICD-10-CM

## 2024-01-02 DIAGNOSIS — R39.15 URGENCY OF MICTURITION: ICD-10-CM

## 2024-01-02 DIAGNOSIS — D72.829 LEUKOCYTOSIS, UNSPECIFIED TYPE: ICD-10-CM

## 2024-01-02 DIAGNOSIS — F33.0 MILD RECURRENT MAJOR DEPRESSION (CMS-HCC): ICD-10-CM

## 2024-01-02 DIAGNOSIS — K59.00 CONSTIPATION, UNSPECIFIED CONSTIPATION TYPE: ICD-10-CM

## 2024-01-02 DIAGNOSIS — R10.9 FLANK PAIN: Primary | ICD-10-CM

## 2024-01-02 DIAGNOSIS — G89.4 CHRONIC PAIN SYNDROME: ICD-10-CM

## 2024-01-02 DIAGNOSIS — E11.22 TYPE 2 DIABETES MELLITUS WITH STAGE 3B CHRONIC KIDNEY DISEASE, WITHOUT LONG-TERM CURRENT USE OF INSULIN (MULTI): ICD-10-CM

## 2024-01-02 DIAGNOSIS — Z91.89 AT RISK FOR POLYPHARMACY: ICD-10-CM

## 2024-01-02 DIAGNOSIS — E79.0 HYPERURICEMIA: ICD-10-CM

## 2024-01-02 DIAGNOSIS — Z28.21 COVID-19 VACCINATION DECLINED: ICD-10-CM

## 2024-01-02 DIAGNOSIS — E78.2 MIXED HYPERLIPIDEMIA: ICD-10-CM

## 2024-01-02 DIAGNOSIS — N18.32 TYPE 2 DIABETES MELLITUS WITH STAGE 3B CHRONIC KIDNEY DISEASE, WITHOUT LONG-TERM CURRENT USE OF INSULIN (MULTI): ICD-10-CM

## 2024-01-02 LAB
ALBUMIN SERPL BCP-MCNC: 4 G/DL (ref 3.4–5)
ALBUMIN SERPL-MCNC: 4.8 G/DL (ref 3.5–4.6)
ALP SERPL-CCNC: 117 U/L (ref 40–130)
ALP SERPL-CCNC: 79 U/L (ref 33–110)
ALT SERPL W P-5'-P-CCNC: 7 U/L (ref 7–45)
ALT SERPL-CCNC: 8 U/L (ref 0–33)
ANION GAP SERPL CALC-SCNC: 13 MMOL/L (ref 10–20)
ANION GAP SERPL CALCULATED.3IONS-SCNC: 13 MEQ/L (ref 9–15)
AST SERPL W P-5'-P-CCNC: 11 U/L (ref 9–39)
AST SERPL-CCNC: 12 U/L (ref 0–35)
BASOPHILS # BLD AUTO: 0.08 X10*3/UL (ref 0–0.1)
BASOPHILS # BLD: 0.1 K/UL (ref 0–0.1)
BASOPHILS NFR BLD AUTO: 0.7 %
BASOPHILS NFR BLD: 1 % (ref 0.1–1.2)
BILIRUB SERPL-MCNC: 0.3 MG/DL (ref 0.2–0.7)
BILIRUB SERPL-MCNC: 0.3 MG/DL (ref 0–1.2)
BILIRUB UR QL STRIP: NEGATIVE
BUN SERPL-MCNC: 13 MG/DL (ref 6–20)
BUN SERPL-MCNC: 13 MG/DL (ref 6–23)
CALCIUM SERPL-MCNC: 8.9 MG/DL (ref 8.6–10.3)
CALCIUM SERPL-MCNC: 9.7 MG/DL (ref 8.5–9.9)
CHLORIDE SERPL-SCNC: 104 MEQ/L (ref 95–107)
CHLORIDE SERPL-SCNC: 105 MMOL/L (ref 98–107)
CHOLEST SERPL-MCNC: 135 MG/DL (ref 0–199)
CHOLESTEROL/HDL RATIO: 3.2
CLARITY UR: CLEAR
CO2 SERPL-SCNC: 27 MEQ/L (ref 20–31)
CO2 SERPL-SCNC: 31 MMOL/L (ref 21–32)
COLOR UR: YELLOW
CREAT SERPL-MCNC: 1.16 MG/DL (ref 0.5–0.9)
CREAT SERPL-MCNC: 1.21 MG/DL (ref 0.5–1.05)
EOSINOPHIL # BLD AUTO: 0.56 X10*3/UL (ref 0–0.7)
EOSINOPHIL # BLD: 0.6 K/UL (ref 0–0.4)
EOSINOPHIL NFR BLD AUTO: 4.7 %
EOSINOPHIL NFR BLD: 4.6 % (ref 0.7–5.8)
ERYTHROCYTE [DISTWIDTH] IN BLOOD BY AUTOMATED COUNT: 12.7 % (ref 11.7–14.4)
ERYTHROCYTE [DISTWIDTH] IN BLOOD BY AUTOMATED COUNT: 12.8 % (ref 11.5–14.5)
GFR SERPL CREATININE-BSD FRML MDRD: 53 ML/MIN/1.73M*2
GLOBULIN SER CALC-MCNC: 4.2 G/DL (ref 2.3–3.5)
GLUCOSE SERPL-MCNC: 74 MG/DL (ref 70–99)
GLUCOSE SERPL-MCNC: 75 MG/DL (ref 74–99)
GLUCOSE UR STRIP-MCNC: NEGATIVE MG/DL
HCT VFR BLD AUTO: 44.9 % (ref 36–46)
HCT VFR BLD AUTO: 49.6 % (ref 37–47)
HDLC SERPL-MCNC: 42.8 MG/DL
HGB BLD-MCNC: 14.6 G/DL (ref 12–16)
HGB BLD-MCNC: 16.1 G/DL (ref 11.2–15.7)
HGB UR QL STRIP: NEGATIVE
IMM GRANULOCYTES # BLD AUTO: 0.06 X10*3/UL (ref 0–0.7)
IMM GRANULOCYTES # BLD: 0.1 K/UL
IMM GRANULOCYTES NFR BLD AUTO: 0.5 % (ref 0–0.9)
IMM GRANULOCYTES NFR BLD: 0.6 %
KETONES UR STRIP-MCNC: NORMAL MG/DL
LDLC SERPL CALC-MCNC: 49 MG/DL
LEUKOCYTE ESTERASE UR QL STRIP: NEGATIVE
LYMPHOCYTES # BLD AUTO: 3.14 X10*3/UL (ref 1.2–4.8)
LYMPHOCYTES # BLD: 3.2 K/UL (ref 1.2–3.7)
LYMPHOCYTES NFR BLD AUTO: 26.6 %
LYMPHOCYTES NFR BLD: 25.8 %
MAGNESIUM SERPL-MCNC: 2.18 MG/DL (ref 1.6–2.4)
MCH RBC QN AUTO: 28.2 PG (ref 25.6–32.2)
MCH RBC QN AUTO: 28.3 PG (ref 26–34)
MCHC RBC AUTO-ENTMCNC: 32.5 % (ref 32.2–35.5)
MCHC RBC AUTO-ENTMCNC: 32.5 G/DL (ref 32–36)
MCV RBC AUTO: 87 FL (ref 79.4–94.8)
MCV RBC AUTO: 87 FL (ref 80–100)
MONOCYTES # BLD AUTO: 0.77 X10*3/UL (ref 0.1–1)
MONOCYTES # BLD: 0.8 K/UL (ref 0.2–0.9)
MONOCYTES NFR BLD AUTO: 6.5 %
MONOCYTES NFR BLD: 6.1 % (ref 4.7–12.5)
NEUTROPHILS # BLD AUTO: 7.19 X10*3/UL (ref 1.2–7.7)
NEUTROPHILS # BLD: 7.8 K/UL (ref 1.6–6.1)
NEUTROPHILS NFR BLD AUTO: 61 %
NEUTS SEG NFR BLD: 61.9 % (ref 34–71.1)
NITRITE UR QL STRIP: NEGATIVE
NON HDL CHOLESTEROL: 92 MG/DL (ref 0–149)
NRBC BLD-RTO: 0 /100 WBCS (ref 0–0)
PH UR STRIP: 5.5 [PH] (ref 5–9)
PLATELET # BLD AUTO: 303 X10*3/UL (ref 150–450)
PLATELET # BLD AUTO: 335 K/UL (ref 182–369)
POTASSIUM SERPL-SCNC: 4 MEQ/L (ref 3.4–4.9)
POTASSIUM SERPL-SCNC: 4.3 MMOL/L (ref 3.5–5.3)
PROT SERPL-MCNC: 6.9 G/DL (ref 6.4–8.2)
PROT SERPL-MCNC: 9 G/DL (ref 6.3–8)
PROT UR STRIP-MCNC: NEGATIVE MG/DL
RBC # BLD AUTO: 5.15 X10*6/UL (ref 4–5.2)
RBC # BLD AUTO: 5.7 M/UL (ref 3.93–5.22)
SODIUM SERPL-SCNC: 144 MEQ/L (ref 135–144)
SODIUM SERPL-SCNC: 145 MMOL/L (ref 136–145)
SP GR UR STRIP: >=1.03 (ref 1–1.03)
TRIGL SERPL-MCNC: 214 MG/DL (ref 0–149)
TSH SERPL-ACNC: 2.71 MIU/L (ref 0.44–3.98)
URINE REFLEX TO CULTURE: NORMAL
UROBILINOGEN UR STRIP-ACNC: 0.2 E.U./DL
VIT B12 SERPL-MCNC: 151 PG/ML (ref 211–911)
VLDL: 43 MG/DL (ref 0–40)
WBC # BLD AUTO: 11.8 X10*3/UL (ref 4.4–11.3)
WBC # BLD AUTO: 12.5 K/UL (ref 4–10)

## 2024-01-02 PROCEDURE — 96375 TX/PRO/DX INJ NEW DRUG ADDON: CPT

## 2024-01-02 PROCEDURE — 82607 VITAMIN B-12: CPT

## 2024-01-02 PROCEDURE — 6360000002 HC RX W HCPCS

## 2024-01-02 PROCEDURE — 80053 COMPREHEN METABOLIC PANEL: CPT

## 2024-01-02 PROCEDURE — 74176 CT ABD & PELVIS W/O CONTRAST: CPT

## 2024-01-02 PROCEDURE — 85025 COMPLETE CBC W/AUTO DIFF WBC: CPT

## 2024-01-02 PROCEDURE — 83735 ASSAY OF MAGNESIUM: CPT

## 2024-01-02 PROCEDURE — 84443 ASSAY THYROID STIM HORMONE: CPT

## 2024-01-02 PROCEDURE — 80061 LIPID PANEL: CPT

## 2024-01-02 PROCEDURE — 36415 COLL VENOUS BLD VENIPUNCTURE: CPT

## 2024-01-02 PROCEDURE — 81003 URINALYSIS AUTO W/O SCOPE: CPT

## 2024-01-02 PROCEDURE — 96374 THER/PROPH/DIAG INJ IV PUSH: CPT

## 2024-01-02 PROCEDURE — 99284 EMERGENCY DEPT VISIT MOD MDM: CPT

## 2024-01-02 PROCEDURE — 2580000003 HC RX 258

## 2024-01-02 RX ORDER — MORPHINE SULFATE 4 MG/ML
4 INJECTION, SOLUTION INTRAMUSCULAR; INTRAVENOUS ONCE
Status: COMPLETED | OUTPATIENT
Start: 2024-01-02 | End: 2024-01-02

## 2024-01-02 RX ORDER — ONDANSETRON 2 MG/ML
4 INJECTION INTRAMUSCULAR; INTRAVENOUS ONCE
Status: COMPLETED | OUTPATIENT
Start: 2024-01-02 | End: 2024-01-02

## 2024-01-02 RX ORDER — 0.9 % SODIUM CHLORIDE 0.9 %
1000 INTRAVENOUS SOLUTION INTRAVENOUS ONCE
Status: COMPLETED | OUTPATIENT
Start: 2024-01-02 | End: 2024-01-02

## 2024-01-02 RX ORDER — POLYETHYLENE GLYCOL 3350 17 G/17G
17 POWDER, FOR SOLUTION ORAL DAILY PRN
Qty: 527 G | Refills: 0 | Status: SHIPPED | OUTPATIENT
Start: 2024-01-02 | End: 2024-02-01

## 2024-01-02 RX ADMIN — MORPHINE SULFATE 4 MG: 4 INJECTION INTRAVENOUS at 12:44

## 2024-01-02 RX ADMIN — SODIUM CHLORIDE 1000 ML: 9 INJECTION, SOLUTION INTRAVENOUS at 12:40

## 2024-01-02 RX ADMIN — ONDANSETRON 4 MG: 2 INJECTION INTRAMUSCULAR; INTRAVENOUS at 12:42

## 2024-01-02 ASSESSMENT — PAIN DESCRIPTION - ONSET: ONSET: ON-GOING

## 2024-01-02 ASSESSMENT — PAIN DESCRIPTION - PAIN TYPE: TYPE: ACUTE PAIN

## 2024-01-02 ASSESSMENT — PAIN SCALES - GENERAL: PAINLEVEL_OUTOF10: 9

## 2024-01-02 ASSESSMENT — PAIN DESCRIPTION - LOCATION: LOCATION: FLANK

## 2024-01-02 ASSESSMENT — PAIN DESCRIPTION - ORIENTATION: ORIENTATION: RIGHT;LEFT

## 2024-01-02 ASSESSMENT — ENCOUNTER SYMPTOMS
NAUSEA: 0
VOMITING: 0
SHORTNESS OF BREATH: 0
COUGH: 0
DIARRHEA: 0

## 2024-01-02 ASSESSMENT — PAIN DESCRIPTION - FREQUENCY: FREQUENCY: CONTINUOUS

## 2024-01-02 ASSESSMENT — PAIN - FUNCTIONAL ASSESSMENT: PAIN_FUNCTIONAL_ASSESSMENT: 0-10

## 2024-01-02 ASSESSMENT — PAIN DESCRIPTION - DESCRIPTORS: DESCRIPTORS: SHARP

## 2024-01-02 NOTE — ED PROVIDER NOTES
[facility] ED  eMERGENCYdEPARTMENT eNCOUnter      Pt Name: Suzanne Kim  MRN: [id number]  Birthdate 1965of evaluation: 1/2/2024  Provider:CANDY Capps - TARA    CHIEF COMPLAINT       Chief Complaint   Patient presents with    Flank Pain     Bilater flank pain x3 days, hx stage 3 kidney disease.         HISTORY OF PRESENT ILLNESS  (Location/Symptom, Timing/Onset, Context/Setting, Quality, Duration, Modifying Factors, Severity.)   Suzanne Kim is a 58 y.o. female  hx of GERD, Kidney stones, Asthma, DM, Anxiety, Depression, CKD who presents to the emergency department with flank pain.  Presents to the emergency department with complaints of flank pain for the last 3 days.  She states the pain started in her left flank felt similar to previous kidney stones.  She states she was recently treated for a UTI.  She rates her pain is a 9 out of 10 sharp ache.  Unrelieved by Tylenol.  She denies any chest pain, shortness of breath, nausea, vomiting, diarrhea, fever, chills, or headache.    HPI    Nursing Notes were reviewed and I agree.    REVIEW OF SYSTEMS    (2-9 systems for level 4, 10 or more for level 5)     Review of Systems   Constitutional:  Negative for chills and fever.   HENT:  Negative for congestion.    Respiratory:  Negative for cough and shortness of breath.    Cardiovascular:  Negative for chest pain.   Gastrointestinal:  Negative for diarrhea, nausea and vomiting.   Genitourinary:  Positive for flank pain. Negative for dysuria.   Neurological:  Negative for headaches.        as noted above the remainder of the review of systems was reviewed and negative.       PAST MEDICAL HISTORY     Past Medical History:   Diagnosis Date    Anxiety     Asthma     as child    Chronic kidney disease     Depression     Depression     dysthymia - following lupus dx age 22    Diabetes mellitus (HCC)     Foot fracture, left     GERD (gastroesophageal reflux disease)     Hyperlipidemia     Hypothyroidism

## 2024-01-02 NOTE — DISCHARGE INSTRUCTIONS
Please take MiraLAX as needed for constipation.  Increase oral hydration.  Follow-up with PCP.  Return to ED for any new or worsening symptoms.

## 2024-01-04 PROBLEM — N20.1 RIGHT URETERAL CALCULUS: Status: RESOLVED | Noted: 2021-05-05 | Resolved: 2024-01-04

## 2024-01-04 RX ORDER — INSULIN ASPART 100 [IU]/ML
INJECTION, SOLUTION INTRAVENOUS; SUBCUTANEOUS
COMMUNITY
End: 2024-05-14 | Stop reason: WASHOUT

## 2024-01-04 RX ORDER — QUETIAPINE FUMARATE 25 MG/1
1-2 TABLET, FILM COATED ORAL NIGHTLY
COMMUNITY
Start: 2023-12-31 | End: 2024-05-14 | Stop reason: WASHOUT

## 2024-01-04 RX ORDER — VENLAFAXINE HYDROCHLORIDE 75 MG/1
75 CAPSULE, EXTENDED RELEASE ORAL DAILY
COMMUNITY
Start: 2023-12-29

## 2024-01-05 ENCOUNTER — LAB (OUTPATIENT)
Dept: LAB | Facility: LAB | Age: 59
End: 2024-01-05
Payer: MEDICARE

## 2024-01-05 ENCOUNTER — OFFICE VISIT (OUTPATIENT)
Dept: RHEUMATOLOGY | Facility: CLINIC | Age: 59
End: 2024-01-05
Payer: MEDICARE

## 2024-01-05 VITALS
DIASTOLIC BLOOD PRESSURE: 75 MMHG | WEIGHT: 176.9 LBS | TEMPERATURE: 98.1 F | HEART RATE: 74 BPM | HEIGHT: 68 IN | SYSTOLIC BLOOD PRESSURE: 133 MMHG | BODY MASS INDEX: 26.81 KG/M2

## 2024-01-05 DIAGNOSIS — D84.821 IMMUNOSUPPRESSION DUE TO CHRONIC STEROID USE (MULTI): ICD-10-CM

## 2024-01-05 DIAGNOSIS — Z79.899 ENCOUNTER FOR MONITORING OF HYDROXYCHLOROQUINE THERAPY: ICD-10-CM

## 2024-01-05 DIAGNOSIS — Z79.52 IMMUNOSUPPRESSION DUE TO CHRONIC STEROID USE (MULTI): ICD-10-CM

## 2024-01-05 DIAGNOSIS — N18.32 TYPE 2 DIABETES MELLITUS WITH STAGE 3B CHRONIC KIDNEY DISEASE, WITHOUT LONG-TERM CURRENT USE OF INSULIN (MULTI): ICD-10-CM

## 2024-01-05 DIAGNOSIS — T38.0X5A IMMUNOSUPPRESSION DUE TO CHRONIC STEROID USE (MULTI): ICD-10-CM

## 2024-01-05 DIAGNOSIS — M32.9 SYSTEMIC LUPUS ERYTHEMATOSUS, UNSPECIFIED SLE TYPE, UNSPECIFIED ORGAN INVOLVEMENT STATUS (MULTI): ICD-10-CM

## 2024-01-05 DIAGNOSIS — E11.22 TYPE 2 DIABETES MELLITUS WITH STAGE 3B CHRONIC KIDNEY DISEASE, WITHOUT LONG-TERM CURRENT USE OF INSULIN (MULTI): ICD-10-CM

## 2024-01-05 DIAGNOSIS — F31.81 BIPOLAR II DISORDER (MULTI): ICD-10-CM

## 2024-01-05 DIAGNOSIS — M32.9 SYSTEMIC LUPUS ERYTHEMATOSUS, UNSPECIFIED SLE TYPE, UNSPECIFIED ORGAN INVOLVEMENT STATUS (MULTI): Primary | ICD-10-CM

## 2024-01-05 DIAGNOSIS — Z51.81 ENCOUNTER FOR MONITORING OF HYDROXYCHLOROQUINE THERAPY: ICD-10-CM

## 2024-01-05 DIAGNOSIS — F33.0 MILD RECURRENT MAJOR DEPRESSION (CMS-HCC): ICD-10-CM

## 2024-01-05 PROBLEM — E66.3 OVERWEIGHT (BMI 25.0-29.9): Status: ACTIVE | Noted: 2023-02-21

## 2024-01-05 PROBLEM — E66.3 OVERWEIGHT WITH BODY MASS INDEX (BMI) OF 29 TO 29.9 IN ADULT: Status: RESOLVED | Noted: 2023-02-21 | Resolved: 2024-01-05

## 2024-01-05 LAB
C3 SERPL-MCNC: 157 MG/DL (ref 87–200)
C4 SERPL-MCNC: 30 MG/DL (ref 10–50)
CRP SERPL-MCNC: 0.2 MG/DL
DSDNA AB SER-ACNC: 3 IU/ML
ERYTHROCYTE [SEDIMENTATION RATE] IN BLOOD BY WESTERGREN METHOD: 8 MM/H (ref 0–30)

## 2024-01-05 PROCEDURE — 3078F DIAST BP <80 MM HG: CPT | Performed by: INTERNAL MEDICINE

## 2024-01-05 PROCEDURE — 96372 THER/PROPH/DIAG INJ SC/IM: CPT | Performed by: INTERNAL MEDICINE

## 2024-01-05 PROCEDURE — 36415 COLL VENOUS BLD VENIPUNCTURE: CPT

## 2024-01-05 PROCEDURE — 3075F SYST BP GE 130 - 139MM HG: CPT | Performed by: INTERNAL MEDICINE

## 2024-01-05 PROCEDURE — 85652 RBC SED RATE AUTOMATED: CPT

## 2024-01-05 PROCEDURE — 99214 OFFICE O/P EST MOD 30 MIN: CPT | Performed by: INTERNAL MEDICINE

## 2024-01-05 PROCEDURE — 86225 DNA ANTIBODY NATIVE: CPT

## 2024-01-05 PROCEDURE — 1036F TOBACCO NON-USER: CPT | Performed by: INTERNAL MEDICINE

## 2024-01-05 PROCEDURE — 86160 COMPLEMENT ANTIGEN: CPT

## 2024-01-05 PROCEDURE — 86140 C-REACTIVE PROTEIN: CPT

## 2024-01-05 PROCEDURE — 3048F LDL-C <100 MG/DL: CPT | Performed by: INTERNAL MEDICINE

## 2024-01-05 RX ORDER — TRIAMCINOLONE ACETONIDE 40 MG/ML
40 INJECTION, SUSPENSION INTRA-ARTICULAR; INTRAMUSCULAR ONCE
Status: COMPLETED | OUTPATIENT
Start: 2024-01-05 | End: 2024-01-05

## 2024-01-05 RX ADMIN — TRIAMCINOLONE ACETONIDE 40 MG: 40 INJECTION, SUSPENSION INTRA-ARTICULAR; INTRAMUSCULAR at 08:16

## 2024-01-05 ASSESSMENT — ENCOUNTER SYMPTOMS
FEVER: 0
ARTHRALGIAS: 1
MYALGIAS: 1
WEAKNESS: 0
COUGH: 0
NUMBNESS: 0
JOINT SWELLING: 0
SHORTNESS OF BREATH: 0
FATIGUE: 1
BACK PAIN: 0
COLOR CHANGE: 0

## 2024-01-05 ASSESSMENT — PAIN SCALES - GENERAL: PAINLEVEL: 8

## 2024-01-05 ASSESSMENT — PATIENT HEALTH QUESTIONNAIRE - PHQ9
SUM OF ALL RESPONSES TO PHQ9 QUESTIONS 1 AND 2: 0
1. LITTLE INTEREST OR PLEASURE IN DOING THINGS: NOT AT ALL
2. FEELING DOWN, DEPRESSED OR HOPELESS: NOT AT ALL

## 2024-01-05 NOTE — LETTER
January 5, 2024     Carlos Gómez MD  [address]   [facility], [address]  [facility] [place] [address]    Patient: Eunice Schroeder   YOB: 1965   Date of Visit: 1/5/2024       Dear Dr. Carlos Gómez MD:    Thank you for referring Eunice Schroeder to me for evaluation. Below are my notes for this consultation.  If you have questions, please do not hesitate to call me. I look forward to following your patient along with you.       Sincerely,     Felicita Chirinos MD      CC: No Recipients  ______________________________________________________________________________________    Subjective  Patient ID: Eunice Schroeder is a 58 y.o. female who presents for Lupus.  Lupus  This is a chronic problem. Associated symptoms include arthralgias, fatigue, myalgias and a rash. Pertinent negatives include no coughing, fever, joint swelling, numbness or weakness. Associated symptoms comments: Since Fanny, pt has noticed more symptoms and desires kenalog injection today.  Since last seen, did taper oral steroids to off.  Still taking plaquenil.   Overall is doing better but does still get flares.     Patient Active Problem List    Diagnosis Date Noted   • Encounter for monitoring of hydroxychloroquine therapy 01/05/2024   • Immunosuppression due to chronic steroid use (CMS/[facility]) 01/05/2024   • Bipolar II disorder (CMS/[facility]) 01/05/2024   • Hyperuricemia 11/20/2023   • Mild recurrent major depression (CMS/[facility]) 11/20/2023   • COVID-19 vaccination declined 11/20/2023   • Polypharmacy 05/11/2023   • Generalized anxiety disorder with panic attacks 05/11/2023   • Stage 3a chronic kidney disease (CMS/HCC) 05/11/2023   • Hypercalcemia 05/11/2023   • Chronic gout of multiple sites 02/21/2023   • Chronic pain syndrome 02/21/2023   • Fibromyalgia 02/21/2023   • Debility 02/21/2023   • Elevated alkaline phosphatase level 02/21/2023   • Essential hypertension 02/21/2023   • Essential tremor  02/21/2023   • Gastroesophageal reflux disease 02/21/2023   • Gastroparesis 02/21/2023   • Hepatic steatosis 02/21/2023   • Hypothyroidism due to acquired atrophy of thyroid 02/21/2023   • IBS (irritable bowel syndrome) 02/21/2023   • Insulin pump in place 02/21/2023   • Leukocytosis 02/21/2023   • Microalbuminuria 02/21/2023   • Migraine without aura and without status migrainosus, not intractable 02/21/2023   • Hypertriglyceridemia 02/21/2023   • Mixed hyperlipidemia 02/21/2023   • Nephrolithiasis 02/21/2023   • Osteopenia 02/21/2023   • Polycythemia 02/21/2023   • Seasonal allergic rhinitis due to pollen 02/21/2023   • Spondylosis of lumbar region without myelopathy or radiculopathy 02/21/2023   • Systemic lupus erythematosus (CMS/[facility]) 02/21/2023   • Telogen hair loss 02/21/2023   • Trochanteric bursitis of left hip 02/21/2023   • Type 2 diabetes mellitus with stage 3b chronic kidney disease, without long-term current use of insulin (CMS/[facility]) 02/21/2023   • Vitamin B12 deficiency 02/21/2023   • Vitamin D deficiency 02/21/2023   • Moderate persistent asthma with acute exacerbation 02/21/2023   • Overweight (BMI 25.0-29.9) 02/21/2023   • Hot flash, menopausal 02/21/2023     Past Medical History:   Diagnosis Date   • Calculus of kidney     Recurrent nephrolithiasis   • Foot fracture, left 12/16/2016   • Moderate persistent asthma, uncomplicated     Moderate persistent asthma, unspecified whether complicated   • Other seasonal allergic rhinitis 04/22/2019    Seasonal allergies   • Right ureteral calculus 05/05/2021   • SBO (small bowel obstruction) (CMS/[facility]) 04/06/2020    History of small bowel obstruction   • Sinus tachycardia 11/04/2019    History of sinus tachycardia   • Unspecified sensorineural hearing loss     Sensory - neural hearing loss     Current Outpatient Medications   Medication Instructions   • acetaminophen (Tylenol) 500 mg tablet oral   • albuterol (Ventolin HFA) 90 mcg/actuation inhaler 2 puffs,  inhalation, Every 4 hours PRN   • beclomethasone dipropionate (Qvar RediHaler) 80 mcg/actuation inhaler Start with 1 puff every 12 hours for one week then go to 2 puffs every 12 hours - rinse mouth after use   • cholecalciferol (VITAMIN D-3) 50,000 Units, oral, Weekly, Sundays please with food and full glass of water   • clonazePAM (KLONOPIN) 0.5 mg, oral, Daily PRN   • cyanocobalamin (VITAMIN B-12) 1,000 mcg, intramuscular, Every 30 days   • Dexcom G4 platinum transmitter (Dexcom G6 Transmitter) device Use with sensor replace every 30 days. Please follow-up as instructed   • diclofenac sodium (Voltaren) 1 % gel gel Apply to lower extremities, 4 GM of gel to affected area 4 times daily. Do not apply more than 16 GM daily to any one affected joint   • docosahexaenoic acid/epa (FISH OIL ORAL) Fish Oil OIL   Refills: 0       Active   • EPINEPHrine (EPIPEN) 0.3 mg, intramuscular   • estradiol (Climara) 0.025 mg/24 hr patch 1 patch, transdermal, Weekly, As directed   • famotidine (Pepcid) 20 mg tablet 1 tablet, oral, 2 times daily before meals   • fenofibrate 50 mg, oral, Every evening, With supper   • Fiasp U-100 Insulin 100 unit/mL injection Use via pump max dose 150 units/day   • fluticasone (Flonase) 50 mcg/actuation nasal spray 2 sprays   • glycopyrrolate (Robinul) 1 mg tablet 1 tablet, oral, 2 times daily   • hydroxychloroquine (Plaquenil) 200 mg tablet 1 tablet, oral, 2 times daily, With food   • levothyroxine (Synthroid, Levoxyl) 50 mcg tablet 1 tablet, oral, Daily   • metoprolol succinate XL (Toprol-XL) 25 mg 24 hr tablet 0.5 tablets, oral, Every evening, With supper   • naloxone (Narcan) 4 mg/0.1 mL nasal spray nasal   • naratriptan (Amerge) 2.5 mg tablet take 1 tablet by mouth AT ONSET OF HEADACHE AS NEEDED---MAY REPEA...  (REFER TO PRESCRIPTION NOTES).   • nitroglycerin (Nitrostat) 0.4 mg SL tablet 1 tablet, sublingual, Every 5 min PRN   • NovoLOG Flexpen U-100 Insulin 100 unit/mL (3 mL) pen USE  "SUBCUTANEOUSLY FOR BACK UP TO PUMP---MAXIMUM DAILY DOSE 100 UNITS   • Omnipod Dash Pods, Gen 4, cartridge    • oxyCODONE-acetaminophen (Percocet) 5-325 mg tablet 1 tablet, oral, Every 8 hours PRN   • pantoprazole (PROTONIX) 40 mg, oral, Nightly   • pen needle, diabetic 32 gauge x 5/32\" needle Twice daily   • pregabalin (Lyrica) 75 mg capsule Pregabalin 75 MG Oral Capsule   Refills: 0        Carlos Gómez MD   Start : 29-Apr-2022   Active   • propranolol LA (INDERAL LA) 240 mg, oral, Daily   • QUEtiapine (SEROquel) 25 mg tablet 1-2 tablets, oral, Nightly   • rosuvastatin (CRESTOR) 20 mg, oral, Daily   • semaglutide 0.5 mg, subcutaneous, Weekly   • syringe with needle, safety (BD Integra Syringe) 3 mL 25 gauge x 5/8\" syringe Please use with cyanocobalamin once a month   • tiZANidine (Zanaflex) 4 mg tablet 1 tablet, oral, 3 times daily, Pt takes as needed   • venlafaxine XR (EFFEXOR-XR) 75 mg, oral, Daily       Allergies   Allergen Reactions   • Ketorolac Hives and Swelling   • Sumatriptan Hives, Shortness of breath and Swelling   • Tramadol Hives and Swelling   • Lidocaine Rash     Patient states allergic   • Aspirin Unknown   • Cephalosporins Unknown   • Diclofenac Unknown   • Nsaids (Non-Steroidal Anti-Inflammatory Drug) Unknown   • Penicillins Hives and Swelling   • Fentanyl Rash   • Sulfa (Sulfonamide Antibiotics) Rash       Review of Systems   Constitutional:  Positive for fatigue. Negative for fever.   Respiratory:  Negative for cough and shortness of breath.    Cardiovascular:  Negative for leg swelling.   Musculoskeletal:  Positive for arthralgias and myalgias. Negative for back pain, gait problem and joint swelling.   Skin:  Positive for rash. Negative for color change.   Neurological:  Negative for weakness and numbness.       Objective /75   Pulse 74   Temp 36.7 °C (98.1 °F) (Temporal)   Ht 1.727 m (5' 8\")   Wt 80.2 kg (176 lb 14.4 oz)   BMI 26.90 kg/m²     Physical Exam  Vitals reviewed. "   Constitutional:       General: She is not in acute distress.     Appearance: Normal appearance.   HENT:      Head: Normocephalic and atraumatic.   Eyes:      Conjunctiva/sclera: Conjunctivae normal.   Pulmonary:      Effort: Pulmonary effort is normal. No respiratory distress.   Musculoskeletal:         General: Swelling present. No tenderness or deformity.      Cervical back: Normal range of motion.      Right lower leg: No edema.      Left lower leg: No edema.      Comments: Mild swelling dorsum of R hand   Skin:     Coloration: Skin is not pale.      Findings: Rash present. No erythema.      Comments: Malar rash   Neurological:      General: No focal deficit present.      Mental Status: She is alert and oriented to person, place, and time. Mental status is at baseline.      Gait: Gait normal.   Psychiatric:         Mood and Affect: Mood normal.       Lab Results   Component Value Date    GLUCOSE 75 01/02/2024    CALCIUM 8.9 01/02/2024     01/02/2024    K 4.3 01/02/2024    CO2 31 01/02/2024     01/02/2024    BUN 13 01/02/2024    CREATININE 1.21 (H) 01/02/2024     Lab Results   Component Value Date    WBC 11.8 (H) 01/02/2024    HGB 14.6 01/02/2024    HCT 44.9 01/02/2024    MCV 87 01/02/2024     01/02/2024     Lab Results   Component Value Date    ALT 7 01/02/2024    AST 11 01/02/2024    GGT 25 11/01/2021    ALKPHOS 79 01/02/2024    BILITOT 0.3 01/02/2024       Assessment/Plan  Problem List Items Addressed This Visit             ICD-10-CM    Systemic lupus erythematosus (CMS/[facility]) - Primary M32.9     On chronic plaquenil therapy.  Kenalog IM given today to help symptoms.  Labs ordered today to monitor for increased disease activity, end organ manifestations and to monitor for any drug toxicities due to chronic medications           Relevant Medications    triamcinolone acetonide (Kenalog-40) injection 40 mg (Completed) (Start on 1/5/2024  8:30 AM)    Other Relevant Orders    Anti-DNA Antibody,  Double-Stranded    C3 Complement    C4 Complement    C-Reactive Protein    Sedimentation Rate    Type 2 diabetes mellitus with stage 3b chronic kidney disease, without long-term current use of insulin (CMS/[facility]) E11.22, N18.32     Chronic Condition Documentation: Stable based on symptoms and exam. Continue      established treatment plan and follow-up at least yearly.           Mild recurrent major depression (CMS/[facility]) F33.0     Chronic Condition Documentation: Stable based on symptoms and exam. Continue      established treatment plan and follow-up at least yearly.           Encounter for monitoring of hydroxychloroquine therapy Z51.81, Z79.899     You are on chronic plaquenil.     Make sure you see your eye doctor yearly.  If you need an eye doctor, let me know and I can place a referral    Plaquenil is dosed based on your weight.   We will make sure your dose is below the maximum daily dose of 5mg/kg           Immunosuppression due to chronic steroid use (CMS/[facility]) D84.821, T38.0X5A, Z79.52     Has tapered off steroids         Bipolar II disorder (CMS/[facility]) F31.81     Chronic Condition Documentation: Stable based on symptoms and exam. Continue      established treatment plan and follow-up at least yearly.            Follow up 4-6 mo       Felicita Chirinos MD 01/05/24 8:21 AM

## 2024-01-05 NOTE — ASSESSMENT & PLAN NOTE
On chronic plaquenil therapy.  Kenalog IM given today to help symptoms.  Labs ordered today to monitor for increased disease activity, end organ manifestations and to monitor for any drug toxicities due to chronic medications

## 2024-01-05 NOTE — PATIENT INSTRUCTIONS
It was a pleasure to see you today  Please call if your symptoms worsen  Please review your summary for education and reminders.  Follow up at your next appointment.    If you had labs/xrays done today, you will be able to view on [facility].   We will contact you when the results are reviewed for further discussion.  Please note that you may receive your results before I have had a chance to review.  Please know I will be contacting you for discussion  Homegoing instructions for all patient  A healthy lifestyle helps chronic diseases  These are all the goals you should strive to improve your overall health   Blood pressure <130/85   BMI of <30 or waist circumference that is 1/2 of your height   Fasting blood sugar <107 (if you are diabetic, aim for an A1c <6.4%_   LDL cholesterol <130   Avoid smoking   Manage your stress   Get your preventive exams   Get your immunizations

## 2024-01-05 NOTE — PROGRESS NOTES
Subjective   Patient ID: Eunice Schroeder is a 58 y.o. female who presents for Lupus.  Lupus  This is a chronic problem. Associated symptoms include arthralgias, fatigue, myalgias and a rash. Pertinent negatives include no coughing, fever, joint swelling, numbness or weakness. Associated symptoms comments: Since Fanny, pt has noticed more symptoms and desires kenalog injection today.  Since last seen, did taper oral steroids to off.  Still taking plaquenil.   Overall is doing better but does still get flares.     Patient Active Problem List    Diagnosis Date Noted    Encounter for monitoring of hydroxychloroquine therapy 01/05/2024    Immunosuppression due to chronic steroid use (CMS/[facility]) 01/05/2024    Bipolar II disorder (CMS/[facility]) 01/05/2024    Hyperuricemia 11/20/2023    Mild recurrent major depression (CMS/[facility]) 11/20/2023    COVID-19 vaccination declined 11/20/2023    Polypharmacy 05/11/2023    Generalized anxiety disorder with panic attacks 05/11/2023    Stage 3a chronic kidney disease (CMS/[facility]) 05/11/2023    Hypercalcemia 05/11/2023    Chronic gout of multiple sites 02/21/2023    Chronic pain syndrome 02/21/2023    Fibromyalgia 02/21/2023    Debility 02/21/2023    Elevated alkaline phosphatase level 02/21/2023    Essential hypertension 02/21/2023    Essential tremor 02/21/2023    Gastroesophageal reflux disease 02/21/2023    Gastroparesis 02/21/2023    Hepatic steatosis 02/21/2023    Hypothyroidism due to acquired atrophy of thyroid 02/21/2023    IBS (irritable bowel syndrome) 02/21/2023    Insulin pump in place 02/21/2023    Leukocytosis 02/21/2023    Microalbuminuria 02/21/2023    Migraine without aura and without status migrainosus, not intractable 02/21/2023    Hypertriglyceridemia 02/21/2023    Mixed hyperlipidemia 02/21/2023    Nephrolithiasis 02/21/2023    Osteopenia 02/21/2023    Polycythemia 02/21/2023    Seasonal allergic rhinitis due to pollen 02/21/2023    Spondylosis of lumbar region without  myelopathy or radiculopathy 02/21/2023    Systemic lupus erythematosus (CMS/[facility]) 02/21/2023    Telogen hair loss 02/21/2023    Trochanteric bursitis of left hip 02/21/2023    Type 2 diabetes mellitus with stage 3b chronic kidney disease, without long-term current use of insulin (CMS/HCC) 02/21/2023    Vitamin B12 deficiency 02/21/2023    Vitamin D deficiency 02/21/2023    Moderate persistent asthma with acute exacerbation 02/21/2023    Overweight (BMI 25.0-29.9) 02/21/2023    Hot flash, menopausal 02/21/2023     Past Medical History:   Diagnosis Date    Calculus of kidney     Recurrent nephrolithiasis    Foot fracture, left 12/16/2016    Moderate persistent asthma, uncomplicated     Moderate persistent asthma, unspecified whether complicated    Other seasonal allergic rhinitis 04/22/2019    Seasonal allergies    Right ureteral calculus 05/05/2021    SBO (small bowel obstruction) (CMS/HCC) 04/06/2020    History of small bowel obstruction    Sinus tachycardia 11/04/2019    History of sinus tachycardia    Unspecified sensorineural hearing loss     Sensory - neural hearing loss     Current Outpatient Medications   Medication Instructions    acetaminophen (Tylenol) 500 mg tablet oral    albuterol (Ventolin HFA) 90 mcg/actuation inhaler 2 puffs, inhalation, Every 4 hours PRN    beclomethasone dipropionate (Qvar RediHaler) 80 mcg/actuation inhaler Start with 1 puff every 12 hours for one week then go to 2 puffs every 12 hours - rinse mouth after use    cholecalciferol (VITAMIN D-3) 50,000 Units, oral, Weekly, Sundays please with food and full glass of water    clonazePAM (KLONOPIN) 0.5 mg, oral, Daily PRN    cyanocobalamin (VITAMIN B-12) 1,000 mcg, intramuscular, Every 30 days    Dexcom G4 platinum transmitter (Dexcom G6 Transmitter) device Use with sensor replace every 30 days. Please follow-up as instructed    diclofenac sodium (Voltaren) 1 % gel gel Apply to lower extremities, 4 GM of gel to affected area 4 times daily.  "Do not apply more than 16 GM daily to any one affected joint    docosahexaenoic acid/epa (FISH OIL ORAL) Fish Oil OIL   Refills: 0       Active    EPINEPHrine (EPIPEN) 0.3 mg, intramuscular    estradiol (Climara) 0.025 mg/24 hr patch 1 patch, transdermal, Weekly, As directed    famotidine (Pepcid) 20 mg tablet 1 tablet, oral, 2 times daily before meals    fenofibrate 50 mg, oral, Every evening, With supper    Fiasp U-100 Insulin 100 unit/mL injection Use via pump max dose 150 units/day    fluticasone (Flonase) 50 mcg/actuation nasal spray 2 sprays    glycopyrrolate (Robinul) 1 mg tablet 1 tablet, oral, 2 times daily    hydroxychloroquine (Plaquenil) 200 mg tablet 1 tablet, oral, 2 times daily, With food    levothyroxine (Synthroid, Levoxyl) 50 mcg tablet 1 tablet, oral, Daily    metoprolol succinate XL (Toprol-XL) 25 mg 24 hr tablet 0.5 tablets, oral, Every evening, With supper    naloxone (Narcan) 4 mg/0.1 mL nasal spray nasal    naratriptan (Amerge) 2.5 mg tablet take 1 tablet by mouth AT ONSET OF HEADACHE AS NEEDED---MAY REPEA...  (REFER TO PRESCRIPTION NOTES).    nitroglycerin (Nitrostat) 0.4 mg SL tablet 1 tablet, sublingual, Every 5 min PRN    NovoLOG Flexpen U-100 Insulin 100 unit/mL (3 mL) pen USE SUBCUTANEOUSLY FOR BACK UP TO PUMP---MAXIMUM DAILY DOSE 100 UNITS    Omnipod Dash Pods, Gen 4, cartridge     oxyCODONE-acetaminophen (Percocet) 5-325 mg tablet 1 tablet, oral, Every 8 hours PRN    pantoprazole (PROTONIX) 40 mg, oral, Nightly    pen needle, diabetic 32 gauge x 5/32\" needle Twice daily    pregabalin (Lyrica) 75 mg capsule Pregabalin 75 MG Oral Capsule   Refills: 0        Carlos Gómez MD   Start : 29-Apr-2022   Active    propranolol LA (INDERAL LA) 240 mg, oral, Daily    QUEtiapine (SEROquel) 25 mg tablet 1-2 tablets, oral, Nightly    rosuvastatin (CRESTOR) 20 mg, oral, Daily    semaglutide 0.5 mg, subcutaneous, Weekly    syringe with needle, safety (BD Integra Syringe) 3 mL 25 gauge x 5/8\" syringe " "Please use with cyanocobalamin once a month    tiZANidine (Zanaflex) 4 mg tablet 1 tablet, oral, 3 times daily, Pt takes as needed    venlafaxine XR (EFFEXOR-XR) 75 mg, oral, Daily       Allergies   Allergen Reactions    Ketorolac Hives and Swelling    Sumatriptan Hives, Shortness of breath and Swelling    Tramadol Hives and Swelling    Lidocaine Rash     Patient states allergic    Aspirin Unknown    Cephalosporins Unknown    Diclofenac Unknown    Nsaids (Non-Steroidal Anti-Inflammatory Drug) Unknown    Penicillins Hives and Swelling    Fentanyl Rash    Sulfa (Sulfonamide Antibiotics) Rash       Review of Systems   Constitutional:  Positive for fatigue. Negative for fever.   Respiratory:  Negative for cough and shortness of breath.    Cardiovascular:  Negative for leg swelling.   Musculoskeletal:  Positive for arthralgias and myalgias. Negative for back pain, gait problem and joint swelling.   Skin:  Positive for rash. Negative for color change.   Neurological:  Negative for weakness and numbness.       Objective  /75   Pulse 74   Temp 36.7 °C (98.1 °F) (Temporal)   Ht 1.727 m (5' 8\")   Wt 80.2 kg (176 lb 14.4 oz)   BMI 26.90 kg/m²     Physical Exam  Vitals reviewed.   Constitutional:       General: She is not in acute distress.     Appearance: Normal appearance.   HENT:      Head: Normocephalic and atraumatic.   Eyes:      Conjunctiva/sclera: Conjunctivae normal.   Pulmonary:      Effort: Pulmonary effort is normal. No respiratory distress.   Musculoskeletal:         General: Swelling present. No tenderness or deformity.      Cervical back: Normal range of motion.      Right lower leg: No edema.      Left lower leg: No edema.      Comments: Mild swelling dorsum of R hand   Skin:     Coloration: Skin is not pale.      Findings: Rash present. No erythema.      Comments: Malar rash   Neurological:      General: No focal deficit present.      Mental Status: She is alert and oriented to person, place, and " time. Mental status is at baseline.      Gait: Gait normal.   Psychiatric:         Mood and Affect: Mood normal.       Lab Results   Component Value Date    GLUCOSE 75 01/02/2024    CALCIUM 8.9 01/02/2024     01/02/2024    K 4.3 01/02/2024    CO2 31 01/02/2024     01/02/2024    BUN 13 01/02/2024    CREATININE 1.21 (H) 01/02/2024     Lab Results   Component Value Date    WBC 11.8 (H) 01/02/2024    HGB 14.6 01/02/2024    HCT 44.9 01/02/2024    MCV 87 01/02/2024     01/02/2024     Lab Results   Component Value Date    ALT 7 01/02/2024    AST 11 01/02/2024    GGT 25 11/01/2021    ALKPHOS 79 01/02/2024    BILITOT 0.3 01/02/2024       Assessment/Plan   Problem List Items Addressed This Visit             ICD-10-CM    Systemic lupus erythematosus (CMS/[facility]) - Primary M32.9     On chronic plaquenil therapy.  Kenalog IM given today to help symptoms.  Labs ordered today to monitor for increased disease activity, end organ manifestations and to monitor for any drug toxicities due to chronic medications           Relevant Medications    triamcinolone acetonide (Kenalog-40) injection 40 mg (Completed) (Start on 1/5/2024  8:30 AM)    Other Relevant Orders    Anti-DNA Antibody, Double-Stranded    C3 Complement    C4 Complement    C-Reactive Protein    Sedimentation Rate    Type 2 diabetes mellitus with stage 3b chronic kidney disease, without long-term current use of insulin (CMS/[facility]) E11.22, N18.32     Chronic Condition Documentation: Stable based on symptoms and exam. Continue      established treatment plan and follow-up at least yearly.           Mild recurrent major depression (CMS/HCC) F33.0     Chronic Condition Documentation: Stable based on symptoms and exam. Continue      established treatment plan and follow-up at least yearly.           Encounter for monitoring of hydroxychloroquine therapy Z51.81, Z79.899     You are on chronic plaquenil.     Make sure you see your eye doctor yearly.  If you need an  eye doctor, let me know and I can place a referral    Plaquenil is dosed based on your weight.   We will make sure your dose is below the maximum daily dose of 5mg/kg           Immunosuppression due to chronic steroid use (CMS/[facility]) D84.821, T38.0X5A, Z79.52     Has tapered off steroids         Bipolar II disorder (CMS/[facility]) F31.81     Chronic Condition Documentation: Stable based on symptoms and exam. Continue      established treatment plan and follow-up at least yearly.            Follow up 4-6 mo       Felicita Chirinos MD 01/05/24 8:21 AM

## 2024-01-11 DIAGNOSIS — E03.4 HYPOTHYROIDISM DUE TO ACQUIRED ATROPHY OF THYROID: Primary | ICD-10-CM

## 2024-01-11 DIAGNOSIS — J30.1 SEASONAL ALLERGIC RHINITIS DUE TO POLLEN: ICD-10-CM

## 2024-01-12 ENCOUNTER — TELEPHONE (OUTPATIENT)
Dept: PRIMARY CARE | Facility: CLINIC | Age: 59
End: 2024-01-12
Payer: COMMERCIAL

## 2024-01-12 DIAGNOSIS — J30.1 SEASONAL ALLERGIC RHINITIS DUE TO POLLEN: Primary | ICD-10-CM

## 2024-01-12 RX ORDER — MINERAL OIL
ENEMA (ML) RECTAL
Qty: 90 TABLET | Refills: 3 | Status: SHIPPED | OUTPATIENT
Start: 2024-01-12 | End: 2024-05-14 | Stop reason: SDUPTHER

## 2024-01-12 RX ORDER — LEVOTHYROXINE SODIUM 50 UG/1
50 TABLET ORAL DAILY
Qty: 90 TABLET | Refills: 1 | Status: SHIPPED | OUTPATIENT
Start: 2024-01-12

## 2024-01-22 ENCOUNTER — LAB (OUTPATIENT)
Dept: LAB | Facility: LAB | Age: 59
End: 2024-01-22
Payer: MEDICARE

## 2024-01-22 DIAGNOSIS — N18.9 CHRONIC KIDNEY DISEASE, UNSPECIFIED: Primary | ICD-10-CM

## 2024-01-22 LAB
ALBUMIN SERPL BCP-MCNC: 4.2 G/DL (ref 3.4–5)
AMORPH CRY #/AREA UR COMP ASSIST: ABNORMAL /HPF
ANION GAP SERPL CALC-SCNC: 9 MMOL/L (ref 10–20)
APPEARANCE UR: ABNORMAL
BACTERIA #/AREA URNS AUTO: ABNORMAL /HPF
BILIRUB UR STRIP.AUTO-MCNC: NEGATIVE MG/DL
BUN SERPL-MCNC: 12 MG/DL (ref 6–23)
CALCIUM SERPL-MCNC: 9.1 MG/DL (ref 8.6–10.3)
CHLORIDE SERPL-SCNC: 105 MMOL/L (ref 98–107)
CO2 SERPL-SCNC: 30 MMOL/L (ref 21–32)
COLOR UR: YELLOW
CREAT SERPL-MCNC: 1.14 MG/DL (ref 0.5–1.05)
CREAT UR-MCNC: 225.6 MG/DL (ref 20–320)
EGFRCR SERPLBLD CKD-EPI 2021: 57 ML/MIN/1.73M*2
ERYTHROCYTE [DISTWIDTH] IN BLOOD BY AUTOMATED COUNT: 13 % (ref 11.5–14.5)
GLUCOSE SERPL-MCNC: 56 MG/DL (ref 74–99)
GLUCOSE UR STRIP.AUTO-MCNC: NEGATIVE MG/DL
HCT VFR BLD AUTO: 45.1 % (ref 36–46)
HGB BLD-MCNC: 14.6 G/DL (ref 12–16)
KETONES UR STRIP.AUTO-MCNC: NEGATIVE MG/DL
LEUKOCYTE ESTERASE UR QL STRIP.AUTO: ABNORMAL
MCH RBC QN AUTO: 28.7 PG (ref 26–34)
MCHC RBC AUTO-ENTMCNC: 32.4 G/DL (ref 32–36)
MCV RBC AUTO: 89 FL (ref 80–100)
NITRITE UR QL STRIP.AUTO: NEGATIVE
NRBC BLD-RTO: 0 /100 WBCS (ref 0–0)
PH UR STRIP.AUTO: 5 [PH]
PHOSPHATE SERPL-MCNC: 3.2 MG/DL (ref 2.5–4.9)
PLATELET # BLD AUTO: 300 X10*3/UL (ref 150–450)
POTASSIUM SERPL-SCNC: 3.8 MMOL/L (ref 3.5–5.3)
PROT UR STRIP.AUTO-MCNC: NEGATIVE MG/DL
PROT UR-ACNC: 20 MG/DL (ref 5–24)
PROT/CREAT UR: 0.09 MG/MG CREAT (ref 0–0.17)
RBC # BLD AUTO: 5.08 X10*6/UL (ref 4–5.2)
RBC # UR STRIP.AUTO: NEGATIVE /UL
RBC #/AREA URNS AUTO: ABNORMAL /HPF
SODIUM SERPL-SCNC: 140 MMOL/L (ref 136–145)
SP GR UR STRIP.AUTO: 1.03
SQUAMOUS #/AREA URNS AUTO: ABNORMAL /HPF
UROBILINOGEN UR STRIP.AUTO-MCNC: 2 MG/DL
WBC # BLD AUTO: 11.6 X10*3/UL (ref 4.4–11.3)
WBC #/AREA URNS AUTO: ABNORMAL /HPF

## 2024-01-22 PROCEDURE — 80069 RENAL FUNCTION PANEL: CPT

## 2024-01-22 PROCEDURE — 36415 COLL VENOUS BLD VENIPUNCTURE: CPT

## 2024-01-22 PROCEDURE — 81001 URINALYSIS AUTO W/SCOPE: CPT

## 2024-01-22 PROCEDURE — 84156 ASSAY OF PROTEIN URINE: CPT

## 2024-01-22 PROCEDURE — 85027 COMPLETE CBC AUTOMATED: CPT

## 2024-01-22 PROCEDURE — 82570 ASSAY OF URINE CREATININE: CPT

## 2024-01-23 DIAGNOSIS — G89.4 CHRONIC PAIN SYNDROME: ICD-10-CM

## 2024-01-24 ENCOUNTER — HOSPITAL ENCOUNTER (EMERGENCY)
Age: 59
Discharge: HOME | End: 2024-01-24
Payer: MEDICARE

## 2024-01-24 VITALS
TEMPERATURE: 98.6 F | OXYGEN SATURATION: 98 % | HEART RATE: 85 BPM | BODY MASS INDEX: 23.95 KG/M2 | DIASTOLIC BLOOD PRESSURE: 70 MMHG | RESPIRATION RATE: 18 BRPM | HEIGHT: 68 IN | WEIGHT: 158 LBS | SYSTOLIC BLOOD PRESSURE: 110 MMHG

## 2024-01-24 DIAGNOSIS — M54.42 ACUTE LEFT-SIDED BACK PAIN WITH SCIATICA: Primary | ICD-10-CM

## 2024-01-24 PROCEDURE — 6370000000 HC RX 637 (ALT 250 FOR IP)

## 2024-01-24 PROCEDURE — 96372 THER/PROPH/DIAG INJ SC/IM: CPT

## 2024-01-24 PROCEDURE — 99284 EMERGENCY DEPT VISIT MOD MDM: CPT

## 2024-01-24 PROCEDURE — 6360000002 HC RX W HCPCS

## 2024-01-24 RX ORDER — MORPHINE SULFATE 4 MG/ML
4 INJECTION, SOLUTION INTRAMUSCULAR; INTRAVENOUS ONCE
Status: DISCONTINUED | OUTPATIENT
Start: 2024-01-24 | End: 2024-01-24

## 2024-01-24 RX ORDER — METHYLPREDNISOLONE 4 MG/1
TABLET ORAL
Qty: 1 KIT | Refills: 0 | Status: SHIPPED | OUTPATIENT
Start: 2024-01-24 | End: 2024-01-30

## 2024-01-24 RX ORDER — OXYCODONE AND ACETAMINOPHEN 5; 325 MG/1; MG/1
1 TABLET ORAL EVERY 8 HOURS PRN
Qty: 60 TABLET | Refills: 0 | Status: SHIPPED | OUTPATIENT
Start: 2024-01-24 | End: 2024-02-19 | Stop reason: SDUPTHER

## 2024-01-24 RX ORDER — ONDANSETRON 4 MG/1
4 TABLET, ORALLY DISINTEGRATING ORAL ONCE
Status: COMPLETED | OUTPATIENT
Start: 2024-01-24 | End: 2024-01-24

## 2024-01-24 RX ORDER — METHYLPREDNISOLONE ACETATE 40 MG/ML
40 INJECTION, SUSPENSION INTRA-ARTICULAR; INTRALESIONAL; INTRAMUSCULAR; SOFT TISSUE ONCE
Status: COMPLETED | OUTPATIENT
Start: 2024-01-24 | End: 2024-01-24

## 2024-01-24 RX ORDER — MORPHINE SULFATE 4 MG/ML
4 INJECTION, SOLUTION INTRAMUSCULAR; INTRAVENOUS ONCE
Status: COMPLETED | OUTPATIENT
Start: 2024-01-24 | End: 2024-01-24

## 2024-01-24 RX ADMIN — ONDANSETRON 4 MG: 4 TABLET, ORALLY DISINTEGRATING ORAL at 11:05

## 2024-01-24 RX ADMIN — METHYLPREDNISOLONE ACETATE 40 MG: 40 INJECTION, SUSPENSION INTRA-ARTICULAR; INTRALESIONAL; INTRAMUSCULAR; INTRASYNOVIAL; SOFT TISSUE at 11:05

## 2024-01-24 RX ADMIN — MORPHINE SULFATE 4 MG: 4 INJECTION, SOLUTION INTRAMUSCULAR; INTRAVENOUS at 11:05

## 2024-01-24 ASSESSMENT — PAIN - FUNCTIONAL ASSESSMENT: PAIN_FUNCTIONAL_ASSESSMENT: 0-10

## 2024-01-24 ASSESSMENT — PAIN DESCRIPTION - FREQUENCY: FREQUENCY: INTERMITTENT

## 2024-01-24 ASSESSMENT — PAIN SCALES - GENERAL
PAINLEVEL_OUTOF10: 9
PAINLEVEL_OUTOF10: 9

## 2024-01-24 ASSESSMENT — PAIN DESCRIPTION - DESCRIPTORS: DESCRIPTORS: BURNING

## 2024-01-24 ASSESSMENT — PAIN DESCRIPTION - ORIENTATION: ORIENTATION: LEFT

## 2024-01-24 ASSESSMENT — PAIN DESCRIPTION - PAIN TYPE: TYPE: ACUTE PAIN

## 2024-01-24 ASSESSMENT — ENCOUNTER SYMPTOMS: BACK PAIN: 1

## 2024-01-24 ASSESSMENT — PAIN DESCRIPTION - LOCATION: LOCATION: BUTTOCKS;LEG

## 2024-01-24 ASSESSMENT — PAIN DESCRIPTION - ONSET: ONSET: ON-GOING

## 2024-01-24 NOTE — DISCHARGE INSTRUCTIONS
Take medications as directed. Continue taking percocet as directed by prescribing provider.     Follow-up with PCP.     Return to ED if any new, or worsening symptoms.

## 2024-01-24 NOTE — ED PROVIDER NOTES
Capillary Refill: Capillary refill takes less than 2 seconds.   Neurological:      General: No focal deficit present.      Mental Status: She is alert and oriented to person, place, and time. Mental status is at baseline.   Psychiatric:         Mood and Affect: Mood normal.         DIAGNOSTIC RESULTS     EKG: All EKG's are interpreted by the Emergency Department Physician who either signs or Co-signs this chart in the absence of a cardiologist.    RADIOLOGY:   Non-plain film images such as CT, Ultrasound and MRI are read by the radiologist. Plain radiographic images are visualized and preliminarily interpreted by the emergency physician with the below findings:    Interpretation per the Radiologist below, if available at the time of this note:    No orders to display         ED BEDSIDE ULTRASOUND:   Performed by ED Physician - none    LABS:  Labs Reviewed - No data to display    All other labs were within normal range or not returned as of this dictation.    EMERGENCY DEPARTMENT COURSE and DIFFERENTIAL DIAGNOSIS/MDM:   Vitals:    Vitals:    01/24/24 1048   BP: 110/70   Pulse: 85   Resp: 18   Temp: 98.6 °F (37 °C)   TempSrc: Oral   SpO2: 98%   Weight: 71.7 kg (158 lb)   Height: 1.727 m (5' 8\")     Medical Decision Making  58-year-old female presents to ED for evaluation of L sided sciatica.  Patient is afebrile, hemodynamically stable, nontoxic.  Patient given IM Depo-Medrol, IM morphine, ODT Zofran while in ED.  I do not feel that imaging is appropriate at this time given the patient has a known history of sciatica and has had no new injury, trauma.  Patient states that this exacerbation is similar to previous.  Following medication administration patient was reassessed and patient reports significant treatment in symptoms.  Patient is ambulatory while in ED with steady gait noted.  Again, patient is without saddle anesthesia, incontinence of bowel and bladder, numbness/tingling/weakness to BUE and BLE.  Patient

## 2024-01-24 NOTE — ED TRIAGE NOTES
The patient came to the ED for left buttocks pain that radiates down left leg. Pt states the pain began yesterday and describes the pain as \"burning\".

## 2024-01-29 ENCOUNTER — HOSPITAL ENCOUNTER (OUTPATIENT)
Dept: RADIOLOGY | Facility: CLINIC | Age: 59
Discharge: HOME | End: 2024-01-29
Payer: MEDICARE

## 2024-01-29 ENCOUNTER — OFFICE VISIT (OUTPATIENT)
Dept: PRIMARY CARE | Facility: CLINIC | Age: 59
End: 2024-01-29
Payer: MEDICARE

## 2024-01-29 VITALS
WEIGHT: 173 LBS | OXYGEN SATURATION: 97 % | HEIGHT: 68 IN | SYSTOLIC BLOOD PRESSURE: 142 MMHG | HEART RATE: 81 BPM | DIASTOLIC BLOOD PRESSURE: 88 MMHG | BODY MASS INDEX: 26.22 KG/M2

## 2024-01-29 DIAGNOSIS — E55.9 VITAMIN D DEFICIENCY: ICD-10-CM

## 2024-01-29 DIAGNOSIS — Z79.899 POLYPHARMACY: ICD-10-CM

## 2024-01-29 DIAGNOSIS — N18.31 STAGE 3A CHRONIC KIDNEY DISEASE (MULTI): ICD-10-CM

## 2024-01-29 DIAGNOSIS — Z00.00 ROUTINE GENERAL MEDICAL EXAMINATION AT HEALTH CARE FACILITY: Primary | ICD-10-CM

## 2024-01-29 DIAGNOSIS — E11.22 TYPE 2 DIABETES MELLITUS WITH STAGE 3B CHRONIC KIDNEY DISEASE, WITHOUT LONG-TERM CURRENT USE OF INSULIN (MULTI): ICD-10-CM

## 2024-01-29 DIAGNOSIS — R22.31 LOCALIZED SWELLING ON RIGHT HAND: ICD-10-CM

## 2024-01-29 DIAGNOSIS — E79.0 HYPERURICEMIA: ICD-10-CM

## 2024-01-29 DIAGNOSIS — N18.32 TYPE 2 DIABETES MELLITUS WITH STAGE 3B CHRONIC KIDNEY DISEASE, WITHOUT LONG-TERM CURRENT USE OF INSULIN (MULTI): ICD-10-CM

## 2024-01-29 DIAGNOSIS — B37.2 CANDIDAL INTERTRIGO: ICD-10-CM

## 2024-01-29 DIAGNOSIS — G89.4 CHRONIC PAIN SYNDROME: ICD-10-CM

## 2024-01-29 DIAGNOSIS — R80.9 MICROALBUMINURIA: ICD-10-CM

## 2024-01-29 DIAGNOSIS — D72.829 LEUKOCYTOSIS, UNSPECIFIED TYPE: ICD-10-CM

## 2024-01-29 DIAGNOSIS — E78.2 MIXED HYPERLIPIDEMIA: ICD-10-CM

## 2024-01-29 DIAGNOSIS — E53.8 VITAMIN B12 DEFICIENCY: ICD-10-CM

## 2024-01-29 DIAGNOSIS — Z28.21 COVID-19 VACCINATION DECLINED: ICD-10-CM

## 2024-01-29 DIAGNOSIS — E03.4 HYPOTHYROIDISM DUE TO ACQUIRED ATROPHY OF THYROID: ICD-10-CM

## 2024-01-29 DIAGNOSIS — F33.0 MILD RECURRENT MAJOR DEPRESSION (CMS-HCC): ICD-10-CM

## 2024-01-29 PROCEDURE — 3048F LDL-C <100 MG/DL: CPT | Performed by: INTERNAL MEDICINE

## 2024-01-29 PROCEDURE — 3079F DIAST BP 80-89 MM HG: CPT | Performed by: INTERNAL MEDICINE

## 2024-01-29 PROCEDURE — 73130 X-RAY EXAM OF HAND: CPT | Mod: RT

## 2024-01-29 PROCEDURE — G0439 PPPS, SUBSEQ VISIT: HCPCS | Performed by: INTERNAL MEDICINE

## 2024-01-29 PROCEDURE — 1036F TOBACCO NON-USER: CPT | Performed by: INTERNAL MEDICINE

## 2024-01-29 PROCEDURE — 99213 OFFICE O/P EST LOW 20 MIN: CPT | Performed by: INTERNAL MEDICINE

## 2024-01-29 PROCEDURE — 73130 X-RAY EXAM OF HAND: CPT | Mod: RIGHT SIDE | Performed by: RADIOLOGY

## 2024-01-29 PROCEDURE — 3061F NEG MICROALBUMINURIA REV: CPT | Performed by: INTERNAL MEDICINE

## 2024-01-29 PROCEDURE — 3077F SYST BP >= 140 MM HG: CPT | Performed by: INTERNAL MEDICINE

## 2024-01-29 PROCEDURE — 1123F ACP DISCUSS/DSCN MKR DOCD: CPT | Performed by: INTERNAL MEDICINE

## 2024-01-29 RX ORDER — NEEDLES, FILTER 19GX1 1/2"
NEEDLE, DISPOSABLE MISCELLANEOUS
Qty: 7 EACH | Refills: 3 | Status: SHIPPED | OUTPATIENT
Start: 2024-01-29

## 2024-01-29 RX ORDER — NYSTATIN 100000 [USP'U]/G
1 POWDER TOPICAL 3 TIMES DAILY
Qty: 120 G | Refills: 5 | Status: SHIPPED | OUTPATIENT
Start: 2024-01-29 | End: 2024-05-30 | Stop reason: WASHOUT

## 2024-01-29 RX ORDER — CYANOCOBALAMIN 1000 UG/ML
1000 INJECTION, SOLUTION INTRAMUSCULAR; SUBCUTANEOUS
Qty: 7 ML | Refills: 3 | Status: SHIPPED | OUTPATIENT
Start: 2024-01-29

## 2024-01-29 ASSESSMENT — ACTIVITIES OF DAILY LIVING (ADL)
GROCERY_SHOPPING: INDEPENDENT
DOING_HOUSEWORK: INDEPENDENT
DRESSING: INDEPENDENT
MANAGING_FINANCES: INDEPENDENT
TAKING_MEDICATION: INDEPENDENT
BATHING: INDEPENDENT

## 2024-01-29 ASSESSMENT — ENCOUNTER SYMPTOMS
LOSS OF SENSATION IN FEET: 0
DEPRESSION: 0
OCCASIONAL FEELINGS OF UNSTEADINESS: 0

## 2024-01-29 NOTE — PROGRESS NOTES
Subjective   Reason for Visit: Eunice Schroeder is an 58 y.o. female here for a Medicare Wellness visit.     Past Medical, Surgical, and Family History reviewed and updated in chart.    Reviewed all medications by prescribing practitioner or clinical pharmacist (such as prescriptions, OTCs, herbal therapies and supplements) and documented in the medical record.    HPI  Patient here for reevaluation of hemodynamics, cardiovascular risk, preventive strategies, management of polypharmacy with help of PSYCHIATRY.  Also here for her yearly Medicare Wellness evaluation, which has not been done to our recollection.    Compliant to medications, tolerating regimens.  Physically active.  Does point out that during the winter, she walks less, and she swims less, but does agree to look into joining a local gym to continue to stay physically active.  Again, during the summer, she is much more active.  With good pain control, she has been able to manage this, and does enjoy swimming.    Scheduled to see NEPHROLOGY again sometime soon.  In the meantime, no change in urine character or habits.  No dysuria, flank, suprapubic pain.  No apparent blood or mucus in urine.  Likewise, no lingering anorexia or nausea, no lethargy, no confusion, no irritability.  No easy bruisability, or any other symptoms that might be referable to uremia.    Also being followed closely by ENDOCRINOLOGY, and has been tolerating her OZEMPIC, and is requesting increased regimen from 0.5 mg, now 1 mg.  Appetite controlled, with no abdominal distress.  No constipation, no diarrhea.  No change in stool character or habits.  ENDOCRINE with no polyuria, polydipsia, polyphagia.  No blurred vision.  No skin, hair, nail changes.  No dramatic weight loss or weight gain.      Continues to want to stay healthy, independent, and productive, and does not wish harm to self or others.  No headache, blurred vision, diplopia.  No dysphagia.  No whitney substernal chest pain,  "no orthopnea, no paroxysmal nocturnal dyspnea.  No particular cough, no particular sputum production.  CONSTITUTIONALLY, no fever, no chills.  No night sweats.  No lingering anorexia or nausea.  No apparent lymphadenopathy.  No apparent weight loss.    Breakdown and sweating along skin folds, with some degree of malodor.  Alleviated by cleansing and drying, with patient wondering if there is anything to be done to prevent it.    Pain and swelling affecting right hand.  Remote history of trauma, fracture, but none as of late.  The patient is left-handed, and does not have history of overuse of right hand.  No overlying skin changes noted.    Patient admits that she probably needs to increase more fluids.    CODE STATUS, living will wishes, as discussed, below.    Patient Care Team:  Carlos Gómez MD as PCP - General  Carlos G[facility]mez MD as [facility] - [facility]  [facility]  Felicita Chirinos MD as Referring Physician (Rheumatology)         Review of Systems  Review of systems as in history of present illness, and otherwise, reviewed separately as well, and was unremarkable/negative/noncontributory.        Objective   Vitals:  /88 (BP Location: Right arm, Patient Position: Sitting)   Pulse 81   Ht 1.727 m (5' 8\")   Wt 78.5 kg (173 lb)   SpO2 97%   BMI 26.30 kg/m²       Physical Exam  In good spirits.  Not in distress or diaphoresis.  Alert, oriented x 3.  Amiable.  Not unkempt.  Receptive, cheerful, appropriate.  Eager to maintain and perhaps improve quality of life.  Does not wish harm to self or others.    HEAD pink palpebral conjunctivae, anicteric sclerae.  NECK supple, no apparent jugular venous distention.  CARDIOVASCULAR not in distress or diaphoresis.  No bipedal edema.  LUNGS not in distress or diaphoresis.  Not using accessory muscles.  Clear to auscultation bilaterally.  ABDOMEN soft, nontender.  BACK no costovertebral angle " "tenderness.  EXTREMITIES no clubbing, no cyanosis.  Loss of folds, loss of prominence of tendons, blood vessels, right hand, but otherwise, no apparent bruising, no discoloration.  No tenderness.  No crepitus.  SKIN currently with no breakdown along intertriginous areas.  Some residual redness only.  No malodor.  NEURO no facial asymmetry.  No apparent cranial nerve deficits.  Romberg negative.  Ambulating without need of assistance.  No apparent focal weakness.  No tremors.  PSYCH receptive, appropriate, and eager to maintain and improve quality of life.      LABORATORY results reviewed with patient.      Assessment/Plan   Eunice was seen today for w.  Diagnoses and all orders for this visit:  Routine general medical examination at health care facility (Primary)  Mild recurrent major depression (CMS/HCC)  -     Follow Up In Primary Care - Established; Future  Chronic pain syndrome  -     Follow Up In Primary Care - [facility]  -     Follow Up In [facility] - [facility]; Future  Polypharmacy  -     Follow Up In [facility] Care - [facility]  -     Follow Up In Primary Care - [facility]; Future  Leukocytosis, unspecified type  -     Follow Up In Primary Care - [facility]; Future  Hyperuricemia  -     Follow Up In [facility] Care - [facility]; Future  Type 2 diabetes mellitus with stage 3b chronic kidney disease, without long-term current use of insulin (CMS/HCC)  -     semaglutide (OZEMPIC) 1 mg/dose (4 mg/3 mL) pen injector; Inject 1 mg under the skin 1 (one) time per week.  -     Follow Up In Primary Care - Established; Future  Mixed hyperlipidemia  -     Follow Up In [facility]; Future  Vitamin B12 deficiency  -     cyanocobalamin (Vitamin B-12) 1,000 mcg/mL injection; Inject 1 mL (1,000 mcg) into the muscle every 14 (fourteen) days.  -     syringe with needle, safety (BD Integra Syringe) 3 mL 25 gauge x 5/8\" syringe; Please use with cyanocobalamin every 2 weeks.  -     Follow Up In Primary " Care - Established; Future  Vitamin D deficiency  -     Follow Up In Primary Care - Established; Future  COVID-19 vaccination declined  -     Follow Up In Primary Care - Established; Future  Stage 3a chronic kidney disease (CMS/HCC)  -     Follow Up In Primary Care - Established; Future  Microalbuminuria  -     Follow Up In Primary Care - Established; Future  Hypothyroidism due to acquired atrophy of thyroid  -     Follow Up In Primary Care - Established; Future  Localized swelling on right hand  -     XR hand right 1-2 views; Future  -     Follow Up In Primary Care - Established; Future  Candidal intertrigo  -     nystatin (Mycostatin) 100,000 unit/gram powder; Apply 1 Application topically 3 times a day.  -     Follow Up In Primary Care - Established; Future        Thank you very much for coming.  I am very happy to see you!    We did your Medicare Wellness evaluation today, and thanks to you and your diligence, you seem to be doing very well!  Please continue following with your specialists, especially your rheumatologist, endocrinologist, gastroenterologist, and I am also glad that you will be seeing your nephrologist again soon.  Likewise, please continue seeing your psychiatrist and your orthopedist.    As always, we are trying to be very careful with the risk of POLYPHARMACY.  Remember not to take your narcotic pain medication together with your clonazepam to decrease the chance of complications, including excessive sleepiness, confusion, increased risk of falling.  I am glad to hear that you have not had any episodes of falling at all.    Your kidneys are stable.  Helping this along with increased fluid intake and avoiding salt, these are very good ideas.  Again, please keep your appointment with your nephrologist.    You can join a local gym so that you can keep walking indoors when it is cold.  You can also go swimming.  Remember that you do not have to be self-conscious, because everybody else swimming at  this time is interested mainly in keeping themselves healthy, and no one is worried about there looks!  Besides, you actually are doing very well, and you looks like you have been able to take care of yourself!  Please continue to do so.    We will have your , George, as your DURABLE POWER OF  for healthcare matters.  This means that if anything happens to you, and if we have to contact anybody, it will be him first, then your daughter, Paula.  This also means that if you are unable to speak for yourself, he will be the 1 to speak on your behalf.    Your CODE STATUS is FULL CODE, meaning that if you are on the verge of dying, we will use extraordinary means of treatment, including CPR and even intubation of your throat and lungs if it will save your life!  Afterwards, when you are stabilized, we will discuss your prognosis and your overall quality of life, as well as your options.    Please come back in 2 months.  Until then, please continue to take care of yourself and your family, and please continue to pray for our recovery from this pandemic.    Again, please keep your appointment with NEPHROLOGY, very important.    Please call if you need any refills on your pain regimens.  Of course, they are very particular about this, and we will have to keep your medications at least 30 days apart.    Please increase your Vitamin B12 dosage to every 2 weeks.  I have reordered this medication for you.    X-ray of your HAND anytime soon, any  facility.  Just bring your ID.    You have described CANDIDAL INTERTRIGO or breakdown of the skin under the folds, especially when with sweat.  Keep the areas clean and dry, and apply powder to minimize sweating.  I have ordered this medicated powder for you.    Again, thank you very much for coming.  Please do not hesitate to call with questions or concerns.  Please continue to take care of yourself and each other, and please continue to pray for our recovery from this  pandemic.  I hope you had a good Fanny.  Congratulations on taking your lights down, that probably was challenging, especially in the cold!  I hope you have a happy new year!  See you late March.            0  Return in 2 months, late March, 20 minutes please.  Reassess mood, energy, function, management of polypharmacy with the help of psychiatry.  Coordinate with nephrology, endocrinology, rheumatology, GI colonoscopy if not yet done or equivalent.  Coordinate with psychiatry, orthopedics.  Patient has been doing well as of late.            0

## 2024-01-29 NOTE — PATIENT INSTRUCTIONS
Thank you very much for coming.  I am very happy to see you!    We did your Medicare Wellness evaluation today, and thanks to you and your diligence, you seem to be doing very well!  Please continue following with your specialists, especially your rheumatologist, endocrinologist, gastroenterologist, and I am also glad that you will be seeing your nephrologist again soon.  Likewise, please continue seeing your psychiatrist and your orthopedist.    As always, we are trying to be very careful with the risk of POLYPHARMACY.  Remember not to take your narcotic pain medication together with your clonazepam to decrease the chance of complications, including excessive sleepiness, confusion, increased risk of falling.  I am glad to hear that you have not had any episodes of falling at all.    Your kidneys are stable.  Helping this along with increased fluid intake and avoiding salt, these are very good ideas.  Again, please keep your appointment with your nephrologist.    You can join a local gym so that you can keep walking indoors when it is cold.  You can also go swimming.  Remember that you do not have to be self-conscious, because everybody else swimming at this time is interested mainly in keeping themselves healthy, and no one is worried about there looks!  Besides, you actually are doing very well, and you looks like you have been able to take care of yourself!  Please continue to do so.    We will have your , George, as your DURABLE POWER OF  for healthcare matters.  This means that if anything happens to you, and if we have to contact anybody, it will be him first, then your daughter, Paula.  This also means that if you are unable to speak for yourself, he will be the 1 to speak on your behalf.    Your CODE STATUS is FULL CODE, meaning that if you are on the verge of dying, we will use extraordinary means of treatment, including CPR and even intubation of your throat and lungs if it will save your  life!  Afterwards, when you are stabilized, we will discuss your prognosis and your overall quality of life, as well as your options.    Please come back in 2 months.  Until then, please continue to take care of yourself and your family, and please continue to pray for our recovery from this pandemic.    Again, please keep your appointment with NEPHROLOGY, very important.    Please call if you need any refills on your pain regimens.  Of course, they are very particular about this, and we will have to keep your medications at least 30 days apart.    Please increase your Vitamin B12 dosage to every 2 weeks.  I have reordered this medication for you.    X-ray of your HAND anytime soon, any  facility.  Just bring your ID.    You have described CANDIDAL INTERTRIGO or breakdown of the skin under the folds, especially when with sweat.  Keep the areas clean and dry, and apply powder to minimize sweating.  I have ordered this medicated powder for you.    Again, thank you very much for coming.  Please do not hesitate to call with questions or concerns.  Please continue to take care of yourself and each other, and please continue to pray for our recovery from this pandemic.  I hope you had a good Paola.  Congratulations on taking your lights down, that probably was challenging, especially in the cold!  I hope you have a happy new year!  See you late March.            0  Return in 2 months, late March, 20 minutes please.  Reassess mood, energy, function, management of polypharmacy with the help of psychiatry.  Coordinate with nephrology, endocrinology, rheumatology, GI colonoscopy if not yet done or equivalent.  Coordinate with psychiatry, orthopedics.  Patient has been doing well as of late.            0

## 2024-01-30 ENCOUNTER — TELEPHONE (OUTPATIENT)
Dept: ENDOCRINOLOGY | Age: 59
End: 2024-01-30

## 2024-02-02 DIAGNOSIS — N95.1 HOT FLASH, MENOPAUSAL: ICD-10-CM

## 2024-02-04 RX ORDER — ESTRADIOL 0.03 MG/D
1 PATCH TRANSDERMAL
Qty: 12 PATCH | Refills: 0 | Status: SHIPPED | OUTPATIENT
Start: 2024-02-04 | End: 2024-05-06 | Stop reason: SDUPTHER

## 2024-02-15 ENCOUNTER — OFFICE VISIT (OUTPATIENT)
Dept: PRIMARY CARE | Facility: CLINIC | Age: 59
End: 2024-02-15
Payer: MEDICARE

## 2024-02-15 VITALS
OXYGEN SATURATION: 96 % | DIASTOLIC BLOOD PRESSURE: 81 MMHG | RESPIRATION RATE: 20 BRPM | HEART RATE: 100 BPM | WEIGHT: 162 LBS | HEIGHT: 68 IN | SYSTOLIC BLOOD PRESSURE: 141 MMHG | BODY MASS INDEX: 24.55 KG/M2

## 2024-02-15 DIAGNOSIS — M32.9 SYSTEMIC LUPUS ERYTHEMATOSUS, UNSPECIFIED SLE TYPE, UNSPECIFIED ORGAN INVOLVEMENT STATUS (MULTI): ICD-10-CM

## 2024-02-15 DIAGNOSIS — N18.32 TYPE 2 DIABETES MELLITUS WITH STAGE 3B CHRONIC KIDNEY DISEASE, WITHOUT LONG-TERM CURRENT USE OF INSULIN (MULTI): ICD-10-CM

## 2024-02-15 DIAGNOSIS — E11.22 TYPE 2 DIABETES MELLITUS WITH STAGE 3B CHRONIC KIDNEY DISEASE, WITHOUT LONG-TERM CURRENT USE OF INSULIN (MULTI): ICD-10-CM

## 2024-02-15 DIAGNOSIS — L40.9 PSORIASIS OF SCALP: Primary | ICD-10-CM

## 2024-02-15 PROCEDURE — 3077F SYST BP >= 140 MM HG: CPT | Performed by: INTERNAL MEDICINE

## 2024-02-15 PROCEDURE — 99213 OFFICE O/P EST LOW 20 MIN: CPT | Performed by: INTERNAL MEDICINE

## 2024-02-15 PROCEDURE — 3048F LDL-C <100 MG/DL: CPT | Performed by: INTERNAL MEDICINE

## 2024-02-15 PROCEDURE — 3061F NEG MICROALBUMINURIA REV: CPT | Performed by: INTERNAL MEDICINE

## 2024-02-15 PROCEDURE — 3079F DIAST BP 80-89 MM HG: CPT | Performed by: INTERNAL MEDICINE

## 2024-02-15 PROCEDURE — 1036F TOBACCO NON-USER: CPT | Performed by: INTERNAL MEDICINE

## 2024-02-15 NOTE — PROGRESS NOTES
"Subjective   Patient ID: Eunice Schroeder is a 58 y.o. female who presents for Rash (She is having extreme itching on neck and head , 1 week./Right wrist and leg pain.).    HPI   History of lupus, here earlier than expected because of itching scalp.  States that usually, with lupus, she presents with a MALAR RASH, not active at this time.  She also has noted some scaling and thickness affecting the extensor surface of her elbows.  The skin involvement is usually without pruritus.  Likewise, no breakdown, no bleeding noted.    The patient points out that she has not changed anything.  She usually uses tar shampoo to cut down on the greasiness of her hair.  No new shampoos or soaps or perfumes.  Likewise, no new medications.  No new foods.  No history of contact with any plants or other possible irritants.    Itching affecting scalp, mainly along the top of the head.  Otherwise, no particular scaling.  No hair loss.  No apparent bleeding.  Not accompanied by odynophagia or dysphagia, no wheezing.    CONSTITUTIONALLY, no fever, no chills.  No night sweats.  No lingering anorexia or nausea.  No apparent lymphadenopathy.  No apparent weight loss.        Review of Systems  Review of systems as in history of present illness, and otherwise, reviewed separately as well, and was unremarkable/negative/noncontributory.        Objective   /81 (BP Location: Left arm, Patient Position: Sitting, BP Cuff Size: Adult)   Pulse 100   Resp 20   Ht 1.727 m (5' 8\")   Wt 73.5 kg (162 lb)   SpO2 96%   BMI 24.63 kg/m²     Physical Exam  In good spirits.  Not in distress or diaphoresis.  Alert, oriented x 3.  Amiable.  Not unkempt.  Moderately obese build, and remaining completely independent and capable.  Receptive, cheerful, appropriate.  Eager to get better.    HEAD pink palpebral conjunctivae, anicteric sclerae.  NECK supple, no apparent jugular venous distention.  CARDIOVASCULAR not in distress or diaphoresis.  No bipedal " edema.  LUNGS not in distress or diaphoresis.  Not using accessory muscles.  ABDOMEN soft, nontender.  BACK no costovertebral angle tenderness.  EXTREMITIES no clubbing, no cyanosis.  SKIN with some white scales along the scalp on the crown of the head.  Otherwise, no breakdown, no bleeding.  ALSO, hyperpigmentation and scaling with minimal overlying excoriation noted along the extensor surface, right elbow.  No breakdown.  NEURO no facial asymmetry.  No apparent cranial nerve deficits.  Ambulating without need of assistance.  No apparent focal weakness.  No tremors.  PSYCH receptive, appropriate, and eager to maintain and improve quality of life.    Assessment/Plan   Diagnoses and all orders for this visit:  Psoriasis of scalp  -     fluocinolone 0.01 % shampoo; Lather into hair and scalp.  Leave on for 5 minutes, then rinse thoroughly.  Please do daily for 1 week.  Thank you.  -     Referral to Dermatology  Systemic lupus erythematosus, unspecified SLE type, unspecified organ involvement status (CMS/[facility])  -     Referral to Dermatology  Type 2 diabetes mellitus with stage 3b chronic kidney disease, without long-term current use of insulin (CMS/[facility])       Thank you very much for coming.  I am very happy to see you.    With the scaling and itching, it looks like you may have PSORIASIS of your scalp.  In fact, your right elbow also may have psoriasis, but it is hard to differentiate this from LUPUS.    The best thing for you to do would be to see DERMATOLOGY, Dr. Ghotra.    In the meantime, to control symptoms, it is a good idea to also get you started on a steroid shampoo, FLUOCINOLONE.  Lather into your scalp, let stay for 5 minutes, then rinse thoroughly.  Do this every day for 1 week.    Please drink lots of fluids throughout the day.  Very important to stay hydrated, because this will help you get better faster.    Please call me next week for your pain regimen.  Until then, please continue to take care of  yourself and your family, and please continue to pray for our recovery from this pandemic.    See you in March.  Please call sooner as needed.            0

## 2024-02-15 NOTE — PATIENT INSTRUCTIONS
Thank you very much for coming.  I am very happy to see you.    With the scaling and itching, it looks like you may have PSORIASIS of your scalp.  In fact, your right elbow also may have psoriasis, but it is hard to differentiate this from LUPUS.    The best thing for you to do would be to see DERMATOLOGY, Dr. Ghotra.    In the meantime, to control symptoms, it is a good idea to also get you started on a steroid shampoo, FLUOCINOLONE.  Lather into your scalp, let stay for 5 minutes, then rinse thoroughly.  Do this every day for 1 week.    Please drink lots of fluids throughout the day.  Very important to stay hydrated, because this will help you get better faster.    Please call me next week for your pain regimen.  Until then, please continue to take care of yourself and your family, and please continue to pray for our recovery from this pandemic.    See you in March.  Please call sooner as needed.            0

## 2024-02-19 ENCOUNTER — HOSPITAL ENCOUNTER (OUTPATIENT)
Dept: LAB | Age: 59
Discharge: HOME | End: 2024-02-19
Payer: MEDICARE

## 2024-02-19 DIAGNOSIS — G89.4 CHRONIC PAIN SYNDROME: ICD-10-CM

## 2024-02-19 DIAGNOSIS — E11.65 UNCONTROLLED TYPE 2 DIABETES MELLITUS WITH HYPERGLYCEMIA (HCC): ICD-10-CM

## 2024-02-19 LAB
ANION GAP SERPL CALCULATED.3IONS-SCNC: 11 MEQ/L (ref 9–15)
BUN SERPL-MCNC: 11 MG/DL (ref 6–20)
CALCIUM SERPL-MCNC: 9.1 MG/DL (ref 8.5–9.9)
CHLORIDE SERPL-SCNC: 104 MEQ/L (ref 95–107)
CO2 SERPL-SCNC: 27 MEQ/L (ref 20–31)
CREAT SERPL-MCNC: 1.19 MG/DL (ref 0.5–0.9)
GLUCOSE SERPL-MCNC: 92 MG/DL (ref 70–99)
HBA1C MFR BLD: 5.7 % (ref 4.8–5.9)
POTASSIUM SERPL-SCNC: 4.3 MEQ/L (ref 3.4–4.9)
SODIUM SERPL-SCNC: 142 MEQ/L (ref 135–144)

## 2024-02-19 PROCEDURE — 36415 COLL VENOUS BLD VENIPUNCTURE: CPT

## 2024-02-19 PROCEDURE — 83036 HEMOGLOBIN GLYCOSYLATED A1C: CPT

## 2024-02-19 PROCEDURE — 80048 BASIC METABOLIC PNL TOTAL CA: CPT

## 2024-02-20 RX ORDER — OXYCODONE AND ACETAMINOPHEN 5; 325 MG/1; MG/1
1 TABLET ORAL EVERY 8 HOURS PRN
Qty: 60 TABLET | Refills: 0 | Status: SHIPPED | OUTPATIENT
Start: 2024-02-20 | End: 2024-03-14 | Stop reason: SDUPTHER

## 2024-03-11 DIAGNOSIS — R61 HYPERHIDROSIS: ICD-10-CM

## 2024-03-11 DIAGNOSIS — E11.65 UNCONTROLLED TYPE 2 DIABETES MELLITUS WITH HYPERGLYCEMIA (HCC): ICD-10-CM

## 2024-03-11 RX ORDER — GLYCOPYRROLATE 1 MG/1
1 TABLET ORAL 2 TIMES DAILY
Qty: 60 TABLET | Refills: 3 | Status: SHIPPED | OUTPATIENT
Start: 2024-03-11

## 2024-03-11 RX ORDER — INSULIN ASPART INJECTION 100 [IU]/ML
INJECTION, SOLUTION SUBCUTANEOUS
Qty: 50 ML | Refills: 3 | Status: SHIPPED | OUTPATIENT
Start: 2024-03-11

## 2024-03-13 ENCOUNTER — OFFICE VISIT (OUTPATIENT)
Dept: ENDOCRINOLOGY | Age: 59
End: 2024-03-13
Payer: MEDICARE

## 2024-03-13 VITALS
WEIGHT: 171 LBS | BODY MASS INDEX: 25.91 KG/M2 | DIASTOLIC BLOOD PRESSURE: 68 MMHG | SYSTOLIC BLOOD PRESSURE: 104 MMHG | HEIGHT: 68 IN | HEART RATE: 84 BPM | OXYGEN SATURATION: 95 %

## 2024-03-13 DIAGNOSIS — Z96.41 INSULIN PUMP IN PLACE: ICD-10-CM

## 2024-03-13 DIAGNOSIS — E11.65 UNCONTROLLED TYPE 2 DIABETES MELLITUS WITH HYPERGLYCEMIA (HCC): Primary | ICD-10-CM

## 2024-03-13 LAB
CHP ED QC CHECK: NORMAL
GLUCOSE BLD-MCNC: 99 MG/DL

## 2024-03-13 PROCEDURE — 3044F HG A1C LEVEL LT 7.0%: CPT | Performed by: INTERNAL MEDICINE

## 2024-03-13 PROCEDURE — 99213 OFFICE O/P EST LOW 20 MIN: CPT | Performed by: INTERNAL MEDICINE

## 2024-03-13 PROCEDURE — 1036F TOBACCO NON-USER: CPT | Performed by: INTERNAL MEDICINE

## 2024-03-13 PROCEDURE — 3017F COLORECTAL CA SCREEN DOC REV: CPT | Performed by: INTERNAL MEDICINE

## 2024-03-13 PROCEDURE — G8427 DOCREV CUR MEDS BY ELIG CLIN: HCPCS | Performed by: INTERNAL MEDICINE

## 2024-03-13 PROCEDURE — 3078F DIAST BP <80 MM HG: CPT | Performed by: INTERNAL MEDICINE

## 2024-03-13 PROCEDURE — 82962 GLUCOSE BLOOD TEST: CPT | Performed by: INTERNAL MEDICINE

## 2024-03-13 PROCEDURE — G8484 FLU IMMUNIZE NO ADMIN: HCPCS | Performed by: INTERNAL MEDICINE

## 2024-03-13 PROCEDURE — 3074F SYST BP LT 130 MM HG: CPT | Performed by: INTERNAL MEDICINE

## 2024-03-13 PROCEDURE — G8419 CALC BMI OUT NRM PARAM NOF/U: HCPCS | Performed by: INTERNAL MEDICINE

## 2024-03-13 PROCEDURE — 2022F DILAT RTA XM EVC RTNOPTHY: CPT | Performed by: INTERNAL MEDICINE

## 2024-03-13 RX ORDER — PROCHLORPERAZINE 25 MG/1
1 SUPPOSITORY RECTAL
Qty: 3 EACH | Refills: 3 | Status: SHIPPED | OUTPATIENT
Start: 2024-03-13

## 2024-03-13 RX ORDER — PROCHLORPERAZINE 25 MG/1
1 SUPPOSITORY RECTAL
Qty: 1 EACH | Refills: 3 | Status: SHIPPED | OUTPATIENT
Start: 2024-03-13

## 2024-03-13 ASSESSMENT — ENCOUNTER SYMPTOMS: EYES NEGATIVE: 1

## 2024-03-13 NOTE — PROGRESS NOTES
alert and oriented to person, place, and time.   Psychiatric:         Mood and Affect: Mood normal.         Behavior: Behavior normal.

## 2024-03-14 DIAGNOSIS — G89.4 CHRONIC PAIN SYNDROME: ICD-10-CM

## 2024-03-15 RX ORDER — OXYCODONE AND ACETAMINOPHEN 5; 325 MG/1; MG/1
1 TABLET ORAL EVERY 8 HOURS PRN
Qty: 60 TABLET | Refills: 0 | Status: SHIPPED | OUTPATIENT
Start: 2024-03-15 | End: 2024-04-09 | Stop reason: SDUPTHER

## 2024-03-19 DIAGNOSIS — E78.2 MIXED HYPERLIPIDEMIA: ICD-10-CM

## 2024-03-20 DIAGNOSIS — R11.0 NAUSEA: Primary | ICD-10-CM

## 2024-03-20 RX ORDER — ONDANSETRON 4 MG/1
4 TABLET, ORALLY DISINTEGRATING ORAL EVERY 8 HOURS PRN
COMMUNITY
End: 2024-03-20 | Stop reason: SDUPTHER

## 2024-03-20 RX ORDER — ROSUVASTATIN CALCIUM 20 MG/1
20 TABLET, COATED ORAL DAILY
Qty: 90 TABLET | Refills: 1 | Status: SHIPPED | OUTPATIENT
Start: 2024-03-20

## 2024-03-25 RX ORDER — ONDANSETRON 4 MG/1
4 TABLET, ORALLY DISINTEGRATING ORAL EVERY 8 HOURS PRN
Qty: 30 TABLET | Refills: 0 | Status: SHIPPED | OUTPATIENT
Start: 2024-03-25 | End: 2024-05-14 | Stop reason: SDUPTHER

## 2024-03-27 ENCOUNTER — TELEPHONE (OUTPATIENT)
Dept: PRIMARY CARE | Facility: CLINIC | Age: 59
End: 2024-03-27
Payer: COMMERCIAL

## 2024-03-27 ENCOUNTER — TELEPHONE (OUTPATIENT)
Dept: ENDOCRINOLOGY | Age: 59
End: 2024-03-27

## 2024-03-27 DIAGNOSIS — E11.65 UNCONTROLLED TYPE 2 DIABETES MELLITUS WITH HYPERGLYCEMIA (HCC): Primary | ICD-10-CM

## 2024-03-27 DIAGNOSIS — M32.9 SYSTEMIC LUPUS ERYTHEMATOSUS, UNSPECIFIED SLE TYPE, UNSPECIFIED ORGAN INVOLVEMENT STATUS (MULTI): Primary | ICD-10-CM

## 2024-03-27 RX ORDER — DIPHENHYDRAMINE HCL 25 MG
TABLET ORAL
Qty: 1 KIT | Refills: 0 | Status: SHIPPED | OUTPATIENT
Start: 2024-03-27

## 2024-03-27 RX ORDER — BLOOD SUGAR DIAGNOSTIC
1 STRIP MISCELLANEOUS DAILY
Qty: 100 EACH | Refills: 3 | Status: SHIPPED | OUTPATIENT
Start: 2024-03-27

## 2024-03-27 RX ORDER — PREDNISONE 10 MG/1
TABLET ORAL
Qty: 30 TABLET | Refills: 0 | Status: SHIPPED | OUTPATIENT
Start: 2024-03-27 | End: 2024-04-07

## 2024-03-27 RX ORDER — LANCETS 30 GAUGE
1 EACH MISCELLANEOUS 3 TIMES DAILY
Qty: 100 EACH | Refills: 3 | Status: SHIPPED | OUTPATIENT
Start: 2024-03-27

## 2024-03-27 NOTE — TELEPHONE ENCOUNTER
Pt called asking for prednisone to be sent to the pharmacy. She is having a lupus flare up        [id number] - [place], [place] - [address]  [address]  [facility] [address]  Phone: [phone] Fax: [phone]

## 2024-03-27 NOTE — TELEPHONE ENCOUNTER
Patient called in her insurance will not cover dexcom G6 only trumetrix. Please send new script to [facility] in [place]

## 2024-03-29 ENCOUNTER — APPOINTMENT (OUTPATIENT)
Dept: PRIMARY CARE | Facility: CLINIC | Age: 59
End: 2024-03-29
Payer: COMMERCIAL

## 2024-04-09 DIAGNOSIS — G89.4 CHRONIC PAIN SYNDROME: ICD-10-CM

## 2024-04-10 ENCOUNTER — APPOINTMENT (OUTPATIENT)
Dept: CT IMAGING | Age: 59
End: 2024-04-10
Payer: MEDICARE

## 2024-04-10 ENCOUNTER — HOSPITAL ENCOUNTER (EMERGENCY)
Age: 59
Discharge: HOME | End: 2024-04-10
Payer: MEDICARE

## 2024-04-10 VITALS
SYSTOLIC BLOOD PRESSURE: 135 MMHG | DIASTOLIC BLOOD PRESSURE: 77 MMHG | HEART RATE: 63 BPM | HEIGHT: 68 IN | OXYGEN SATURATION: 93 % | WEIGHT: 148 LBS | RESPIRATION RATE: 17 BRPM | BODY MASS INDEX: 22.43 KG/M2

## 2024-04-10 DIAGNOSIS — G43.909 MIGRAINE WITHOUT STATUS MIGRAINOSUS, NOT INTRACTABLE, UNSPECIFIED MIGRAINE TYPE: Primary | ICD-10-CM

## 2024-04-10 LAB
ALBUMIN SERPL-MCNC: 4.2 G/DL (ref 3.5–4.6)
ALP SERPL-CCNC: 115 U/L (ref 40–130)
ALT SERPL-CCNC: 12 U/L (ref 0–33)
ANION GAP SERPL CALCULATED.3IONS-SCNC: 10 MEQ/L (ref 9–15)
AST SERPL-CCNC: 13 U/L (ref 0–35)
BASOPHILS # BLD: 0.1 K/UL (ref 0–0.1)
BASOPHILS NFR BLD: 1 % (ref 0.1–1.2)
BILIRUB SERPL-MCNC: 0.4 MG/DL (ref 0.2–0.7)
BUN SERPL-MCNC: 11 MG/DL (ref 6–20)
CALCIUM SERPL-MCNC: 9.7 MG/DL (ref 8.5–9.9)
CHLORIDE SERPL-SCNC: 106 MEQ/L (ref 95–107)
CO2 SERPL-SCNC: 26 MEQ/L (ref 20–31)
CREAT SERPL-MCNC: 1.14 MG/DL (ref 0.5–0.9)
EOSINOPHIL # BLD: 0.8 K/UL (ref 0–0.4)
EOSINOPHIL NFR BLD: 6.3 % (ref 0.7–5.8)
ERYTHROCYTE [DISTWIDTH] IN BLOOD BY AUTOMATED COUNT: 13 % (ref 11.7–14.4)
GLOBULIN SER CALC-MCNC: 3.4 G/DL (ref 2.3–3.5)
GLUCOSE SERPL-MCNC: 161 MG/DL (ref 70–99)
HCT VFR BLD AUTO: 45.1 % (ref 37–47)
HGB BLD-MCNC: 14.7 G/DL (ref 11.2–15.7)
IMM GRANULOCYTES # BLD: 0.1 K/UL
IMM GRANULOCYTES NFR BLD: 0.5 %
LYMPHOCYTES # BLD: 2.3 K/UL (ref 1.2–3.7)
LYMPHOCYTES NFR BLD: 18.1 %
MCH RBC QN AUTO: 28.3 PG (ref 25.6–32.2)
MCHC RBC AUTO-ENTMCNC: 32.6 % (ref 32.2–35.5)
MCV RBC AUTO: 86.7 FL (ref 79.4–94.8)
MONOCYTES # BLD: 0.8 K/UL (ref 0.2–0.9)
MONOCYTES NFR BLD: 6 % (ref 4.7–12.5)
NEUTROPHILS # BLD: 8.6 K/UL (ref 1.6–6.1)
NEUTS SEG NFR BLD: 68.1 % (ref 34–71.1)
PLATELET # BLD AUTO: 346 K/UL (ref 182–369)
POTASSIUM SERPL-SCNC: 4.3 MEQ/L (ref 3.4–4.9)
PROT SERPL-MCNC: 7.6 G/DL (ref 6.3–8)
RBC # BLD AUTO: 5.2 M/UL (ref 3.93–5.22)
SODIUM SERPL-SCNC: 142 MEQ/L (ref 135–144)
WBC # BLD AUTO: 12.6 K/UL (ref 4–10)

## 2024-04-10 PROCEDURE — 96374 THER/PROPH/DIAG INJ IV PUSH: CPT

## 2024-04-10 PROCEDURE — 6370000000 HC RX 637 (ALT 250 FOR IP): Performed by: NURSE PRACTITIONER

## 2024-04-10 PROCEDURE — 70450 CT HEAD/BRAIN W/O DYE: CPT

## 2024-04-10 PROCEDURE — 2580000003 HC RX 258: Performed by: NURSE PRACTITIONER

## 2024-04-10 PROCEDURE — 85025 COMPLETE CBC W/AUTO DIFF WBC: CPT

## 2024-04-10 PROCEDURE — 80053 COMPREHEN METABOLIC PANEL: CPT

## 2024-04-10 PROCEDURE — 96375 TX/PRO/DX INJ NEW DRUG ADDON: CPT

## 2024-04-10 PROCEDURE — 96372 THER/PROPH/DIAG INJ SC/IM: CPT

## 2024-04-10 PROCEDURE — 99284 EMERGENCY DEPT VISIT MOD MDM: CPT

## 2024-04-10 PROCEDURE — 6360000002 HC RX W HCPCS: Performed by: NURSE PRACTITIONER

## 2024-04-10 PROCEDURE — 36415 COLL VENOUS BLD VENIPUNCTURE: CPT

## 2024-04-10 RX ORDER — BUTALBITAL, ACETAMINOPHEN AND CAFFEINE 300; 40; 50 MG/1; MG/1; MG/1
1 CAPSULE ORAL EVERY 4 HOURS PRN
Qty: 60 CAPSULE | Refills: 0 | Status: SHIPPED | OUTPATIENT
Start: 2024-04-10 | End: 2024-04-20

## 2024-04-10 RX ORDER — BUTALBITAL, ACETAMINOPHEN AND CAFFEINE 300; 40; 50 MG/1; MG/1; MG/1
2 CAPSULE ORAL ONCE
Status: COMPLETED | OUTPATIENT
Start: 2024-04-10 | End: 2024-04-10

## 2024-04-10 RX ORDER — MORPHINE SULFATE 4 MG/ML
4 INJECTION, SOLUTION INTRAMUSCULAR; INTRAVENOUS ONCE
Status: COMPLETED | OUTPATIENT
Start: 2024-04-10 | End: 2024-04-10

## 2024-04-10 RX ORDER — METOCLOPRAMIDE HYDROCHLORIDE 5 MG/ML
10 INJECTION INTRAMUSCULAR; INTRAVENOUS ONCE
Status: COMPLETED | OUTPATIENT
Start: 2024-04-10 | End: 2024-04-10

## 2024-04-10 RX ORDER — ONDANSETRON 4 MG/1
4 TABLET, ORALLY DISINTEGRATING ORAL EVERY 8 HOURS PRN
Qty: 20 TABLET | Refills: 0 | Status: SHIPPED | OUTPATIENT
Start: 2024-04-10 | End: 2024-04-17

## 2024-04-10 RX ORDER — DIPHENHYDRAMINE HYDROCHLORIDE 50 MG/ML
25 INJECTION INTRAMUSCULAR; INTRAVENOUS ONCE
Status: COMPLETED | OUTPATIENT
Start: 2024-04-10 | End: 2024-04-10

## 2024-04-10 RX ORDER — 0.9 % SODIUM CHLORIDE 0.9 %
1000 INTRAVENOUS SOLUTION INTRAVENOUS ONCE
Status: COMPLETED | OUTPATIENT
Start: 2024-04-10 | End: 2024-04-10

## 2024-04-10 RX ADMIN — SODIUM CHLORIDE 1000 ML: 9 INJECTION, SOLUTION INTRAVENOUS at 13:55

## 2024-04-10 RX ADMIN — DIPHENHYDRAMINE HYDROCHLORIDE 25 MG: 50 INJECTION INTRAMUSCULAR; INTRAVENOUS at 13:55

## 2024-04-10 RX ADMIN — METOCLOPRAMIDE HYDROCHLORIDE 10 MG: 5 INJECTION INTRAMUSCULAR; INTRAVENOUS at 13:55

## 2024-04-10 RX ADMIN — MORPHINE SULFATE 4 MG: 4 INJECTION INTRAVENOUS at 15:18

## 2024-04-10 RX ADMIN — BUTALBITA,ACETAMINOPHEN AND CAFFEINE 2 CAPSULE: 50; 300; 40 CAPSULE ORAL at 13:55

## 2024-04-10 ASSESSMENT — PAIN DESCRIPTION - DESCRIPTORS: DESCRIPTORS: THROBBING

## 2024-04-10 ASSESSMENT — PAIN SCALES - GENERAL
PAINLEVEL_OUTOF10: 8
PAINLEVEL_OUTOF10: 10
PAINLEVEL_OUTOF10: 8
PAINLEVEL_OUTOF10: 10

## 2024-04-10 ASSESSMENT — LIFESTYLE VARIABLES
HOW MANY STANDARD DRINKS CONTAINING ALCOHOL DO YOU HAVE ON A TYPICAL DAY: PATIENT DOES NOT DRINK
HOW OFTEN DO YOU HAVE A DRINK CONTAINING ALCOHOL: NEVER

## 2024-04-10 ASSESSMENT — PAIN DESCRIPTION - FREQUENCY
FREQUENCY: CONTINUOUS
FREQUENCY: CONTINUOUS

## 2024-04-10 ASSESSMENT — PAIN - FUNCTIONAL ASSESSMENT
PAIN_FUNCTIONAL_ASSESSMENT: 0-10
PAIN_FUNCTIONAL_ASSESSMENT: 0-10

## 2024-04-10 ASSESSMENT — PAIN DESCRIPTION - LOCATION
LOCATION: HEAD
LOCATION: HEAD

## 2024-04-10 ASSESSMENT — PAIN DESCRIPTION - PAIN TYPE: TYPE: ACUTE PAIN

## 2024-04-10 ASSESSMENT — PAIN DESCRIPTION - ONSET: ONSET: ON-GOING

## 2024-04-10 NOTE — ED NOTES
IV removed as it is unable to infuse fluids; Lakshmi CNP aware.  IV medications given; fluids stopped.  Patient to CT via wheelchair.

## 2024-04-10 NOTE — ED NOTES
Patient tolerated IM morphine injection. She rates her pain 6/10 now.  Discharge instructions reviewed with patient.  Follow up and prescriptions discussed.  No questions at this time.  Patient ambulatory in stable condition with  at discharge.

## 2024-04-11 ASSESSMENT — ENCOUNTER SYMPTOMS
DIARRHEA: 0
RHINORRHEA: 0
SHORTNESS OF BREATH: 0
VOICE CHANGE: 0
BACK PAIN: 0
ALLERGIC/IMMUNOLOGIC NEGATIVE: 1
STRIDOR: 0
SORE THROAT: 0
ABDOMINAL PAIN: 0
ABDOMINAL DISTENTION: 0
VOMITING: 0
EYE ITCHING: 0
EYE REDNESS: 0
TROUBLE SWALLOWING: 0
EYE DISCHARGE: 0
CHEST TIGHTNESS: 0
APNEA: 0
COUGH: 0
EYE PAIN: 0
FACIAL SWELLING: 0
CONSTIPATION: 0
SINUS PRESSURE: 0
SINUS PAIN: 0
COLOR CHANGE: 0
WHEEZING: 0
CHOKING: 0
PHOTOPHOBIA: 0
NAUSEA: 1
BLOOD IN STOOL: 0

## 2024-04-11 NOTE — ED PROVIDER NOTES
by mouth once as neededHistorical Med      insulin aspart (NOVOLOG FLEXPEN) 100 UNIT/ML injection pen Use for back up to pump max daily dose 100 units, Disp-15 mL, R-3Normal      Insulin Pen Needle (PEN NEEDLES) 32G X 6 MM MISC Use 3 daily, Disp-100 each, R-3Normal      metoclopramide (REGLAN) 10 MG tablet Take 1 tablet by mouth 4 times daily as needed (nausea), Disp-120 tablet, R-0Normal      venlafaxine (EFFEXOR) 75 MG tablet Take 1 tablet by mouth dailyHistorical Med      OZEMPIC, 0.25 OR 0.5 MG/DOSE, 2 MG/3ML SOPN INJECT 0.5 MG UNDER THE SKIN EVERY 7 DAYS, DAWHistorical Med      oxyCODONE-acetaminophen (PERCOCET) 5-325 MG per tablet TAKE 1 TABLET BY MOUTH EVERY 8 HOURS AS NEEDED FOR SEVERE PAIN (7...  (REFER TO PRESCRIPTION NOTES).Historical Med      tamsulosin (FLOMAX) 0.4 MG capsule Take 1 capsule by mouth daily for 5 days, Disp-5 capsule, R-0Print      Insulin Disposable Pump (OMNIPOD DASH PODS, GEN 4,) MISC Change every 48 hrs, Disp-15 each, R-3Normal      ondansetron (ZOFRAN) 4 MG tablet Take 1 tablet by mouth every 8 hours as needed for Nausea or Vomiting, Disp-30 tablet, R-0Normal      pantoprazole (PROTONIX) 40 MG tablet Please take 1 (ONE) tablet by mouth at bedtime. CALL MD WHEN ALMOST OUT OF MEDICATIONHistorical Med      QUEtiapine (SEROQUEL) 25 MG tablet Historical Med      pregabalin (LYRICA) 75 MG capsule Take by mouth.Historical Med      fluticasone (FLONASE) 50 MCG/ACT nasal spray 2 sprays by Each Nostril route daily, Disp-16 g, R-0Normal      propranolol (INDERAL LA) 120 MG extended release capsule TAKE 2 CAPSULES BY MOUTH EVERY DAYHistorical Med      rosuvastatin (CRESTOR) 20 MG tablet TAKE 1 TABLET DAILY.Historical Med      esomeprazole (NEXIUM) 40 MG delayed release capsule Take 1 capsule by mouth daily, Disp-30 capsule, R-3Normal      !! Lancets MISC Disp-100 each, R-3, NormalPt test 3x daily Dx E11.65      !! blood glucose test strips (ASCENSIA AUTODISC VI;ONE TOUCH ULTRA TEST VI) strip

## 2024-04-12 DIAGNOSIS — E11.22 TYPE 2 DIABETES MELLITUS WITH STAGE 3B CHRONIC KIDNEY DISEASE, WITHOUT LONG-TERM CURRENT USE OF INSULIN (MULTI): ICD-10-CM

## 2024-04-12 DIAGNOSIS — N18.32 TYPE 2 DIABETES MELLITUS WITH STAGE 3B CHRONIC KIDNEY DISEASE, WITHOUT LONG-TERM CURRENT USE OF INSULIN (MULTI): ICD-10-CM

## 2024-04-12 RX ORDER — OXYCODONE AND ACETAMINOPHEN 5; 325 MG/1; MG/1
1 TABLET ORAL EVERY 8 HOURS PRN
Qty: 60 TABLET | Refills: 0 | Status: SHIPPED | OUTPATIENT
Start: 2024-04-12 | End: 2024-05-06 | Stop reason: SDUPTHER

## 2024-04-15 RX ORDER — SEMAGLUTIDE 1.34 MG/ML
1 INJECTION, SOLUTION SUBCUTANEOUS
Qty: 3 ML | Refills: 1 | Status: SHIPPED | OUTPATIENT
Start: 2024-04-21

## 2024-04-19 ENCOUNTER — APPOINTMENT (OUTPATIENT)
Dept: PRIMARY CARE | Facility: CLINIC | Age: 59
End: 2024-04-19
Payer: COMMERCIAL

## 2024-04-22 ENCOUNTER — TELEPHONE (OUTPATIENT)
Dept: ENDOCRINOLOGY | Age: 59
End: 2024-04-22

## 2024-04-22 RX ORDER — INSULIN LISPRO 100 [IU]/ML
INJECTION, SOLUTION INTRAVENOUS; SUBCUTANEOUS
Qty: 40 ML | Refills: 5 | Status: SHIPPED | OUTPATIENT
Start: 2024-04-22

## 2024-04-22 NOTE — TELEPHONE ENCOUNTER
Patient's insurance will not cover patient's insulin. They will cover Humalog 100 or lyumjev 100. Please send a new Rx to [facility].     Patient also needed to know what company her Dexcom was sent to? It says [employer], but she says that is not correct, it was a DME Company? She is looking for he phone number. Please call her with that information. Thanks!

## 2024-04-25 ENCOUNTER — TELEPHONE (OUTPATIENT)
Dept: RHEUMATOLOGY | Facility: CLINIC | Age: 59
End: 2024-04-25
Payer: COMMERCIAL

## 2024-04-25 DIAGNOSIS — M79.7 FIBROMYALGIA: Primary | ICD-10-CM

## 2024-04-25 RX ORDER — TIZANIDINE 4 MG/1
4 TABLET ORAL EVERY 8 HOURS PRN
Qty: 90 TABLET | Refills: 11 | Status: SHIPPED | OUTPATIENT
Start: 2024-04-25 | End: 2025-04-25

## 2024-04-25 NOTE — TELEPHONE ENCOUNTER
Patient called requesting rx refill-Tizandine 4 mg.  Patient has an appointment scheduled 5-31-24 with Dr. Chirinos.  Pharm-[facility] [phone]  Patient [phone]

## 2024-05-03 ENCOUNTER — HOSPITAL ENCOUNTER (OUTPATIENT)
Dept: LAB | Age: 59
Discharge: HOME | End: 2024-05-03
Payer: MEDICARE

## 2024-05-03 DIAGNOSIS — E11.65 UNCONTROLLED TYPE 2 DIABETES MELLITUS WITH HYPERGLYCEMIA (HCC): ICD-10-CM

## 2024-05-03 LAB
ANION GAP SERPL CALCULATED.3IONS-SCNC: 12 MEQ/L (ref 9–15)
BUN SERPL-MCNC: 11 MG/DL (ref 6–20)
CALCIUM SERPL-MCNC: 9.5 MG/DL (ref 8.5–9.9)
CHLORIDE SERPL-SCNC: 102 MEQ/L (ref 95–107)
CHOLEST SERPL-MCNC: 146 MG/DL (ref 0–199)
CO2 SERPL-SCNC: 28 MEQ/L (ref 20–31)
CREAT SERPL-MCNC: 1.2 MG/DL (ref 0.5–0.9)
GLUCOSE SERPL-MCNC: 66 MG/DL (ref 70–99)
HDLC SERPL-MCNC: 42 MG/DL (ref 40–59)
LDLC SERPL CALC-MCNC: 58 MG/DL (ref 0–129)
POTASSIUM SERPL-SCNC: 4.3 MEQ/L (ref 3.4–4.9)
SODIUM SERPL-SCNC: 142 MEQ/L (ref 135–144)
TRIGL SERPL-MCNC: 232 MG/DL (ref 0–150)

## 2024-05-03 PROCEDURE — 83036 HEMOGLOBIN GLYCOSYLATED A1C: CPT

## 2024-05-03 PROCEDURE — 80061 LIPID PANEL: CPT

## 2024-05-03 PROCEDURE — 36415 COLL VENOUS BLD VENIPUNCTURE: CPT

## 2024-05-03 PROCEDURE — 80048 BASIC METABOLIC PNL TOTAL CA: CPT

## 2024-05-04 LAB
ESTIMATED AVERAGE GLUCOSE: 114 MG/DL
HBA1C MFR BLD: 5.6 % (ref 4–6)

## 2024-05-06 DIAGNOSIS — N95.1 HOT FLASH, MENOPAUSAL: ICD-10-CM

## 2024-05-06 DIAGNOSIS — G89.4 CHRONIC PAIN SYNDROME: ICD-10-CM

## 2024-05-07 RX ORDER — OXYCODONE AND ACETAMINOPHEN 5; 325 MG/1; MG/1
1 TABLET ORAL EVERY 8 HOURS PRN
Qty: 60 TABLET | Refills: 0 | Status: SHIPPED | OUTPATIENT
Start: 2024-05-07 | End: 2024-06-03 | Stop reason: SDUPTHER

## 2024-05-07 RX ORDER — ESTRADIOL 0.03 MG/D
1 PATCH TRANSDERMAL
Qty: 12 PATCH | Refills: 0 | Status: SHIPPED | OUTPATIENT
Start: 2024-05-12 | End: 2024-08-10

## 2024-05-13 RX ORDER — INSULIN LISPRO 100 [IU]/ML
INJECTION, SOLUTION INTRAVENOUS; SUBCUTANEOUS
COMMUNITY
Start: 2024-04-23 | End: 2024-05-14 | Stop reason: WASHOUT

## 2024-05-14 ENCOUNTER — OFFICE VISIT (OUTPATIENT)
Dept: PRIMARY CARE | Facility: CLINIC | Age: 59
End: 2024-05-14
Payer: COMMERCIAL

## 2024-05-14 VITALS
WEIGHT: 170 LBS | HEART RATE: 77 BPM | SYSTOLIC BLOOD PRESSURE: 88 MMHG | OXYGEN SATURATION: 97 % | HEIGHT: 68 IN | BODY MASS INDEX: 25.76 KG/M2 | DIASTOLIC BLOOD PRESSURE: 64 MMHG

## 2024-05-14 DIAGNOSIS — N18.31 STAGE 3A CHRONIC KIDNEY DISEASE (MULTI): ICD-10-CM

## 2024-05-14 DIAGNOSIS — J30.1 SEASONAL ALLERGIC RHINITIS DUE TO POLLEN: ICD-10-CM

## 2024-05-14 DIAGNOSIS — G89.4 CHRONIC PAIN SYNDROME: ICD-10-CM

## 2024-05-14 DIAGNOSIS — R11.0 NAUSEA: ICD-10-CM

## 2024-05-14 DIAGNOSIS — E03.4 HYPOTHYROIDISM DUE TO ACQUIRED ATROPHY OF THYROID: ICD-10-CM

## 2024-05-14 DIAGNOSIS — Z79.899 POLYPHARMACY: ICD-10-CM

## 2024-05-14 DIAGNOSIS — E11.22 TYPE 2 DIABETES MELLITUS WITH STAGE 3B CHRONIC KIDNEY DISEASE, WITHOUT LONG-TERM CURRENT USE OF INSULIN (MULTI): ICD-10-CM

## 2024-05-14 DIAGNOSIS — Z91.89 FRAMINGHAM CARDIAC RISK <10% IN NEXT 10 YEARS: Chronic | ICD-10-CM

## 2024-05-14 DIAGNOSIS — E53.8 VITAMIN B12 DEFICIENCY: ICD-10-CM

## 2024-05-14 DIAGNOSIS — E55.9 VITAMIN D DEFICIENCY: ICD-10-CM

## 2024-05-14 DIAGNOSIS — E78.2 MIXED HYPERLIPIDEMIA: ICD-10-CM

## 2024-05-14 DIAGNOSIS — F33.0 MILD RECURRENT MAJOR DEPRESSION (CMS-HCC): ICD-10-CM

## 2024-05-14 DIAGNOSIS — E79.0 HYPERURICEMIA: ICD-10-CM

## 2024-05-14 DIAGNOSIS — N18.32 TYPE 2 DIABETES MELLITUS WITH STAGE 3B CHRONIC KIDNEY DISEASE, WITHOUT LONG-TERM CURRENT USE OF INSULIN (MULTI): ICD-10-CM

## 2024-05-14 DIAGNOSIS — R80.9 MICROALBUMINURIA: ICD-10-CM

## 2024-05-14 PROCEDURE — 3074F SYST BP LT 130 MM HG: CPT | Performed by: INTERNAL MEDICINE

## 2024-05-14 PROCEDURE — 3061F NEG MICROALBUMINURIA REV: CPT | Performed by: INTERNAL MEDICINE

## 2024-05-14 PROCEDURE — 3048F LDL-C <100 MG/DL: CPT | Performed by: INTERNAL MEDICINE

## 2024-05-14 PROCEDURE — 3078F DIAST BP <80 MM HG: CPT | Performed by: INTERNAL MEDICINE

## 2024-05-14 PROCEDURE — 1036F TOBACCO NON-USER: CPT | Performed by: INTERNAL MEDICINE

## 2024-05-14 PROCEDURE — 99213 OFFICE O/P EST LOW 20 MIN: CPT | Performed by: INTERNAL MEDICINE

## 2024-05-14 RX ORDER — MINERAL OIL
ENEMA (ML) RECTAL
Qty: 90 TABLET | Refills: 3 | Status: SHIPPED | OUTPATIENT
Start: 2024-05-14

## 2024-05-14 RX ORDER — ONDANSETRON 4 MG/1
4 TABLET, ORALLY DISINTEGRATING ORAL EVERY 8 HOURS PRN
Qty: 30 TABLET | Refills: 0 | Status: SHIPPED | OUTPATIENT
Start: 2024-05-14 | End: 2024-05-31 | Stop reason: WASHOUT

## 2024-05-14 NOTE — PATIENT INSTRUCTIONS
Thank you very much for coming.  I am very happy to see you.    I am glad to hear that you have been doing well with your pain regimens, and that you have not had any untoward side effects like confusion, excessive tiredness, or recent falling.  I am glad that you have been doing relatively well.  As always, please use your medications only when absolutely necessary to avoid any complications.    After taking your pain regimen, no driving for 6 hours.  Never drive if you are drowsy.  Do not take with alcohol.    You will be due for repeat FASTING laboratory examinations to be done in about 2 months.  Please do these a few days before your next appointment.  You can have them done at [facility].    Until then, please continue to take care of yourself and your family, and please continue to pray for our recovery from this pandemic.  I hope you continue to stay healthy and independent, and I do hope that you have a good summer time!            0  Return in 2 months.  20 minutes please.  Repeat FASTING laboratory examinations a few days prior, then see me for reevaluation of mood, energy, function, preventive strategies, cardiovascular risk.            0  Patient respectfully refusing colonoscopy.  We will discuss other options next visit.            0

## 2024-05-17 ENCOUNTER — HOSPITAL ENCOUNTER (OUTPATIENT)
Dept: RADIOLOGY | Facility: CLINIC | Age: 59
Discharge: HOME | End: 2024-05-17
Payer: COMMERCIAL

## 2024-05-17 ENCOUNTER — OFFICE VISIT (OUTPATIENT)
Dept: PLASTIC SURGERY | Facility: CLINIC | Age: 59
End: 2024-05-17
Payer: MEDICARE

## 2024-05-17 VITALS
HEIGHT: 67 IN | SYSTOLIC BLOOD PRESSURE: 118 MMHG | BODY MASS INDEX: 26.68 KG/M2 | WEIGHT: 170 LBS | DIASTOLIC BLOOD PRESSURE: 64 MMHG

## 2024-05-17 DIAGNOSIS — G56.31 RADIAL TUNNEL SYNDROME, RIGHT: ICD-10-CM

## 2024-05-17 DIAGNOSIS — G56.01 CARPAL TUNNEL SYNDROME ON RIGHT: ICD-10-CM

## 2024-05-17 PROCEDURE — 3074F SYST BP LT 130 MM HG: CPT | Performed by: PLASTIC SURGERY

## 2024-05-17 PROCEDURE — 3061F NEG MICROALBUMINURIA REV: CPT | Performed by: PLASTIC SURGERY

## 2024-05-17 PROCEDURE — 72040 X-RAY EXAM NECK SPINE 2-3 VW: CPT

## 2024-05-17 PROCEDURE — 72040 X-RAY EXAM NECK SPINE 2-3 VW: CPT | Performed by: RADIOLOGY

## 2024-05-17 PROCEDURE — 3078F DIAST BP <80 MM HG: CPT | Performed by: PLASTIC SURGERY

## 2024-05-17 PROCEDURE — 1036F TOBACCO NON-USER: CPT | Performed by: PLASTIC SURGERY

## 2024-05-17 PROCEDURE — 99203 OFFICE O/P NEW LOW 30 MIN: CPT | Performed by: PLASTIC SURGERY

## 2024-05-17 PROCEDURE — 3048F LDL-C <100 MG/DL: CPT | Performed by: PLASTIC SURGERY

## 2024-05-17 ASSESSMENT — PAIN SCALES - GENERAL: PAINLEVEL: 8

## 2024-05-17 ASSESSMENT — ENCOUNTER SYMPTOMS
CHILLS: 0
UNEXPECTED WEIGHT CHANGE: 0
FEVER: 0

## 2024-05-17 NOTE — PROGRESS NOTES
Subjective   Patient ID: Eunice Schroeder is a 58 y.o. female.    HPI  88-year-old female left-hand-dominant status post right carpal tunnel release 6 years ago now complaining of paresthesias on the dorsum of the right hand as well as swelling in the forearm that extends to the arm and the right neck.  Review of Systems   Constitutional:  Negative for chills, fever and unexpected weight change.   Musculoskeletal:         Right hand and wrist pain and swelling with numbness and tingling up the entire arm       Objective   Physical Exam  Well-developed patient in no acute distress  Examination HEENT within normal limits  Neck supple no observable masses  Lungs clear to auscultation  Heart regular rate and rhythm  Abdomen soft nontender  Extremities full range of motion; paresthesias in the distribution of the superficial branch of the radial nerve there is no tenderness in the antecubital region.  Tenderness upon abduction of the right arm at the shoulder.  Patient with a negative middle finger test  Neurological exam is grossly within normal limits  Assessment/Plan   There are no diagnoses linked to this encounter.    Possible right radial tunnel syndrome with shoulder pathology and possibly cervical radiculopathy  Follow-up after EMG and C-spine x-ray

## 2024-05-30 PROBLEM — D84.821 IMMUNOSUPPRESSION DUE TO CHRONIC STEROID USE (MULTI): Status: RESOLVED | Noted: 2024-01-05 | Resolved: 2024-05-30

## 2024-05-30 PROBLEM — B37.2 CANDIDAL INTERTRIGO: Status: RESOLVED | Noted: 2024-01-29 | Resolved: 2024-05-30

## 2024-05-30 PROBLEM — T38.0X5A IMMUNOSUPPRESSION DUE TO CHRONIC STEROID USE (MULTI): Status: RESOLVED | Noted: 2024-01-05 | Resolved: 2024-05-30

## 2024-05-30 PROBLEM — Z79.52 IMMUNOSUPPRESSION DUE TO CHRONIC STEROID USE (MULTI): Status: RESOLVED | Noted: 2024-01-05 | Resolved: 2024-05-30

## 2024-05-31 ENCOUNTER — OFFICE VISIT (OUTPATIENT)
Dept: RHEUMATOLOGY | Facility: CLINIC | Age: 59
End: 2024-05-31
Payer: COMMERCIAL

## 2024-05-31 ENCOUNTER — LAB (OUTPATIENT)
Dept: LAB | Facility: LAB | Age: 59
End: 2024-05-31
Payer: COMMERCIAL

## 2024-05-31 VITALS
OXYGEN SATURATION: 97 % | TEMPERATURE: 97.2 F | HEIGHT: 68 IN | DIASTOLIC BLOOD PRESSURE: 68 MMHG | WEIGHT: 169.3 LBS | BODY MASS INDEX: 25.66 KG/M2 | HEART RATE: 93 BPM | SYSTOLIC BLOOD PRESSURE: 97 MMHG

## 2024-05-31 DIAGNOSIS — Z51.81 ENCOUNTER FOR MONITORING OF HYDROXYCHLOROQUINE THERAPY: ICD-10-CM

## 2024-05-31 DIAGNOSIS — M79.7 FIBROMYALGIA: ICD-10-CM

## 2024-05-31 DIAGNOSIS — M32.9 SYSTEMIC LUPUS ERYTHEMATOSUS, UNSPECIFIED SLE TYPE, UNSPECIFIED ORGAN INVOLVEMENT STATUS (MULTI): Primary | ICD-10-CM

## 2024-05-31 DIAGNOSIS — M32.9 SYSTEMIC LUPUS ERYTHEMATOSUS, UNSPECIFIED SLE TYPE, UNSPECIFIED ORGAN INVOLVEMENT STATUS (MULTI): ICD-10-CM

## 2024-05-31 DIAGNOSIS — G89.4 CHRONIC PAIN SYNDROME: ICD-10-CM

## 2024-05-31 DIAGNOSIS — Z79.899 ENCOUNTER FOR MONITORING OF HYDROXYCHLOROQUINE THERAPY: ICD-10-CM

## 2024-05-31 LAB
NON-UH HIE A/G RATIO: 1.1
NON-UH HIE ALB: 3.9 G/DL (ref 3.4–5)
NON-UH HIE ALK PHOS: 92 UNIT/L (ref 45–117)
NON-UH HIE BASO COUNT: 0.13 X1000 (ref 0–0.2)
NON-UH HIE BASOS %: 1.3 %
NON-UH HIE BILIRUBIN, TOTAL: 0.3 MG/DL (ref 0.3–1.2)
NON-UH HIE BUN/CREAT RATIO: 10
NON-UH HIE BUN: 12 MG/DL (ref 9–23)
NON-UH HIE C-REACTIVE PROTEIN, QUANTITATIVE: 0.5 MG/DL (ref 0–0.9)
NON-UH HIE CALCIUM: 9.7 MG/DL (ref 8.7–10.4)
NON-UH HIE CALCULATED OSMOLALITY: 284 MOSM/KG (ref 275–295)
NON-UH HIE CHLORIDE: 104 MMOL/L (ref 98–107)
NON-UH HIE CO2, VENOUS: 29 MMOL/L (ref 20–31)
NON-UH HIE CREATININE: 1.2 MG/DL (ref 0.5–0.8)
NON-UH HIE DIFF?: NO
NON-UH HIE EOS COUNT: 0.54 X1000 (ref 0–0.5)
NON-UH HIE EOSIN %: 5.5 %
NON-UH HIE GFR AA: 56
NON-UH HIE GLOBULIN: 3.4 G/DL
NON-UH HIE GLOMERULAR FILTRATION RATE: 46 ML/MIN/1.73M?
NON-UH HIE GLUCOSE: 219 MG/DL (ref 74–106)
NON-UH HIE GOT: 14 UNIT/L (ref 15–37)
NON-UH HIE GPT: 9 UNIT/L (ref 10–49)
NON-UH HIE HCT: 41.9 % (ref 36–46)
NON-UH HIE HGB: 14 G/DL (ref 12–16)
NON-UH HIE INSTR WBC: 9.9
NON-UH HIE K: 4.2 MMOL/L (ref 3.5–5.1)
NON-UH HIE LYMPH %: 17.2 %
NON-UH HIE LYMPH COUNT: 1.7 X1000 (ref 1.2–4.8)
NON-UH HIE MCH: 28.7 PG (ref 27–34)
NON-UH HIE MCHC: 33.4 G/DL (ref 32–37)
NON-UH HIE MCV: 86 FL (ref 80–100)
NON-UH HIE MONO %: 4.9 %
NON-UH HIE MONO COUNT: 0.49 X1000 (ref 0.1–1)
NON-UH HIE MPV: 9.2 FL (ref 7.4–10.4)
NON-UH HIE NA: 139 MMOL/L (ref 135–145)
NON-UH HIE NEUTROPHIL %: 71.1 %
NON-UH HIE NEUTROPHIL COUNT (ANC): 7.04 X1000 (ref 1.4–8.8)
NON-UH HIE NUCLEATED RBC: 0 /100WBC
NON-UH HIE PLATELET: 270 X10 (ref 150–450)
NON-UH HIE RBC: 4.87 X10 (ref 4.2–5.4)
NON-UH HIE RDW: 13.3 % (ref 11.5–14.5)
NON-UH HIE SED RATE WESTERGREN: 11 MM/HR (ref 0–30)
NON-UH HIE TOTAL PROTEIN: 7.3 G/DL (ref 5.7–8.2)
NON-UH HIE WBC: 9.9 X10 (ref 4.5–11)

## 2024-05-31 PROCEDURE — 3061F NEG MICROALBUMINURIA REV: CPT | Performed by: INTERNAL MEDICINE

## 2024-05-31 PROCEDURE — 3048F LDL-C <100 MG/DL: CPT | Performed by: INTERNAL MEDICINE

## 2024-05-31 PROCEDURE — 3078F DIAST BP <80 MM HG: CPT | Performed by: INTERNAL MEDICINE

## 2024-05-31 PROCEDURE — 3074F SYST BP LT 130 MM HG: CPT | Performed by: INTERNAL MEDICINE

## 2024-05-31 PROCEDURE — 99214 OFFICE O/P EST MOD 30 MIN: CPT | Performed by: INTERNAL MEDICINE

## 2024-05-31 PROCEDURE — 1036F TOBACCO NON-USER: CPT | Performed by: INTERNAL MEDICINE

## 2024-05-31 PROCEDURE — 96372 THER/PROPH/DIAG INJ SC/IM: CPT | Performed by: INTERNAL MEDICINE

## 2024-05-31 RX ORDER — HYDROXYCHLOROQUINE SULFATE 200 MG/1
200 TABLET, FILM COATED ORAL 2 TIMES DAILY
Qty: 180 TABLET | Refills: 3 | Status: SHIPPED | OUTPATIENT
Start: 2024-05-31 | End: 2025-05-31

## 2024-05-31 RX ORDER — OXYCODONE AND ACETAMINOPHEN 5; 325 MG/1; MG/1
1 TABLET ORAL EVERY 8 HOURS PRN
Qty: 60 TABLET | Refills: 0 | OUTPATIENT
Start: 2024-05-31 | End: 2024-06-30

## 2024-05-31 RX ORDER — TRIAMCINOLONE ACETONIDE 40 MG/ML
40 INJECTION, SUSPENSION INTRA-ARTICULAR; INTRAMUSCULAR ONCE
Status: COMPLETED | OUTPATIENT
Start: 2024-05-31 | End: 2024-05-31

## 2024-05-31 RX ADMIN — TRIAMCINOLONE ACETONIDE 40 MG: 40 INJECTION, SUSPENSION INTRA-ARTICULAR; INTRAMUSCULAR at 08:22

## 2024-05-31 ASSESSMENT — ENCOUNTER SYMPTOMS
BACK PAIN: 0
NUMBNESS: 0
JOINT SWELLING: 1
FEVER: 0
COLOR CHANGE: 0
ARTHRALGIAS: 1
FATIGUE: 1
WEAKNESS: 0
SHORTNESS OF BREATH: 0
MYALGIAS: 1
COUGH: 0

## 2024-05-31 ASSESSMENT — PATIENT HEALTH QUESTIONNAIRE - PHQ9
2. FEELING DOWN, DEPRESSED OR HOPELESS: NOT AT ALL
SUM OF ALL RESPONSES TO PHQ9 QUESTIONS 1 & 2: 0
1. LITTLE INTEREST OR PLEASURE IN DOING THINGS: NOT AT ALL

## 2024-05-31 NOTE — PROGRESS NOTES
[facility] RHEUMATOLOGY     AND INTERNAL MEDICINE    RHEUMATOLOGY PROGRESS NOTE  Eunice Schroeder 58 y.o. female  Chief Complaint   Patient presents with    Follow-up    Fibromyalgia    Lupus       SUBJECTIVE  Re:  lupus and FMS  Pt reports increased pain this week--combination of doing more work around the house and the rainy weather.   Hurts everywhere and notes swelling in joints.      No worsening rash  +fatigue          Records since last seen reviewed in [facility], [facility] Record  Patient Active Problem List    Diagnosis Date Noted    Nausea 05/14/2024    Baker cardiac risk <10% in next 10 years 05/14/2024    Encounter for monitoring of hydroxychloroquine therapy 01/05/2024    Bipolar II disorder (Multi) 01/05/2024    Hyperuricemia 11/20/2023    Mild recurrent major depression (CMS-HCC) 11/20/2023    COVID-19 vaccination declined 11/20/2023    Polypharmacy 05/11/2023    Generalized anxiety disorder with panic attacks 05/11/2023    Stage 3a chronic kidney disease (Multi) 05/11/2023    Hypercalcemia 05/11/2023    Chronic gout of multiple sites 02/21/2023    Chronic pain syndrome 02/21/2023    Fibromyalgia 02/21/2023    Debility 02/21/2023    Elevated alkaline phosphatase level 02/21/2023    Essential hypertension 02/21/2023    Essential tremor 02/21/2023    Gastroesophageal reflux disease 02/21/2023    Gastroparesis 02/21/2023    Hepatic steatosis 02/21/2023    Hypothyroidism due to acquired atrophy of thyroid 02/21/2023    IBS (irritable bowel syndrome) 02/21/2023    Insulin pump in place 02/21/2023    Leukocytosis 02/21/2023    Microalbuminuria 02/21/2023    Migraine without aura and without status migrainosus, not intractable 02/21/2023    Hypertriglyceridemia 02/21/2023    Mixed hyperlipidemia 02/21/2023    Nephrolithiasis 02/21/2023    Osteopenia 02/21/2023    Polycythemia 02/21/2023    Seasonal allergic rhinitis due to pollen  02/21/2023    Spondylosis of lumbar region without myelopathy or radiculopathy 02/21/2023    Systemic lupus erythematosus (Multi) 02/21/2023    Telogen hair loss 02/21/2023    Trochanteric bursitis of left hip 02/21/2023    Type 2 diabetes mellitus with stage 3b chronic kidney disease, without long-term current use of insulin (Multi) 02/21/2023    Vitamin B12 deficiency 02/21/2023    Vitamin D deficiency 02/21/2023    Moderate persistent asthma with acute exacerbation ([facility]) 02/21/2023    Overweight (BMI 25.0-29.9) 02/21/2023    Hot flash, menopausal 02/21/2023     Past Medical History:   Diagnosis Date    Calculus of kidney     Recurrent nephrolithiasis    Candidal intertrigo 01/29/2024    Foot fracture, left 12/16/2016    Immunosuppression due to chronic steroid use (Multi) 01/05/2024    Moderate persistent asthma, uncomplicated ([facility])     Moderate persistent asthma, unspecified whether complicated    Other seasonal allergic rhinitis 04/22/2019    Seasonal allergies    Right ureteral calculus 05/05/2021    SBO (small bowel obstruction) (Multi) 04/06/2020    History of small bowel obstruction    Sinus tachycardia 11/04/2019    History of sinus tachycardia    Unspecified sensorineural hearing loss     Sensory - neural hearing loss     Current Outpatient Medications   Medication Instructions    clonazePAM (KLONOPIN) 0.5 mg, oral, Daily PRN    cyanocobalamin (VITAMIN B-12) 1,000 mcg, intramuscular, Every 14 days    estradiol (Climara) 0.025 mg/24 hr patch 1 patch, transdermal, Once Weekly, As directed    fexofenadine (Allegra) 180 mg tablet Please take 1 tablet by mouth with supper.  Thank you.  Lots of fluids throughout the day.    Fiasp U-100 Insulin 100 unit/mL injection Use via pump max dose 150 units/day    hydroxychloroquine (PLAQUENIL) 200 mg, oral, 2 times daily, With food    levothyroxine (SYNTHROID, LEVOXYL) 50 mcg, oral, Daily    Omnipod Dash Pods, Gen 4, cartridge     oxyCODONE-acetaminophen  "(Percocet) 5-325 mg tablet 1 tablet, oral, Every 8 hours PRN    Ozempic 1 mg, subcutaneous, Once Weekly    pantoprazole (PROTONIX) 40 mg, oral, Nightly    pen needle, diabetic 32 gauge x 5/32\" needle Twice daily    rosuvastatin (CRESTOR) 20 mg, oral, Daily    syringe with needle, safety (BD Integra Syringe) 3 mL 25 gauge x 5/8\" syringe Please use with cyanocobalamin every 2 weeks.    tiZANidine (ZANAFLEX) 4 mg, oral, Every 8 hours PRN    venlafaxine XR (EFFEXOR-XR) 75 mg, oral, Daily     Allergies   Allergen Reactions    Ketorolac Hives and Swelling    Sumatriptan Hives, Shortness of breath and Swelling    Tramadol Hives and Swelling    Lidocaine Rash     Patient states allergic    Aspirin Unknown    Cephalosporins Unknown    Diclofenac Unknown    Nsaids (Non-Steroidal Anti-Inflammatory Drug) Unknown    Penicillins Hives and Swelling    Fentanyl Rash    Sulfa (Sulfonamide Antibiotics) Rash     Review of Systems   Constitutional:  Positive for fatigue. Negative for fever.   Respiratory:  Negative for cough and shortness of breath.    Cardiovascular:  Negative for leg swelling.   Musculoskeletal:  Positive for arthralgias, joint swelling and myalgias. Negative for back pain and gait problem.   Skin:  Negative for color change and rash.   Neurological:  Negative for weakness and numbness.     Social History     Tobacco Use    Smoking status: Never    Smokeless tobacco: Never   Vaping Use    Vaping status: Never Used   Substance Use Topics    Alcohol use: Never    Drug use: Never     PHYSICAL EXAM  BP 97/68   Pulse 93   Temp 36.2 °C (97.2 °F) (Temporal)   Ht 1.727 m (5' 8\")   Wt 76.8 kg (169 lb 4.8 oz)   SpO2 97%   BMI 25.74 kg/m²   Physical Exam  Vitals reviewed.   Constitutional:       General: She is not in acute distress.     Appearance: Normal appearance.   HENT:      Head: Normocephalic and atraumatic.   Eyes:      General: No scleral icterus.     Extraocular Movements: Extraocular movements intact.      " Conjunctiva/sclera: Conjunctivae normal.      Pupils: Pupils are equal, round, and reactive to light.   Pulmonary:      Effort: Pulmonary effort is normal. No respiratory distress.   Musculoskeletal:         General: Swelling (dorsum of R hand) present. No tenderness or deformity.      Cervical back: Normal range of motion and neck supple. No tenderness.      Right lower leg: No edema.      Left lower leg: No edema.      Comments: No synovitis of examined joints   Skin:     Coloration: Skin is not pale.      Findings: Rash (redness along scalp line at nape of neck) present. No erythema.   Neurological:      General: No focal deficit present.      Mental Status: She is alert and oriented to person, place, and time. Mental status is at baseline.      Gait: Gait normal.   Psychiatric:         Mood and Affect: Mood normal.       Health Maintenance Due   Topic Date Due    HIV Screening  Never done    Colorectal Cancer Screening  Never done    Diabetes: Foot Exam  Never done    Diabetes: Retinopathy Screening  Never done    Hepatitis A Vaccines (1 of 2 - Risk 2-dose series) Never done     Assessment/plan  Problem List Items Addressed This Visit       Fibromyalgia    Systemic lupus erythematosus (Multi) - Primary    Current Assessment & Plan     On plaquenil therapy.  Kenalog injection given for relief of symptoms of flare.   Labs ordered today to monitor for increased disease activity, end organ manifestations and to monitor for any drug toxicities due to chronic medications           Relevant Medications    hydroxychloroquine (Plaquenil) 200 mg tablet    triamcinolone acetonide (Kenalog-40) injection 40 mg (Completed) (Start on 5/31/2024  8:45 AM)    Other Relevant Orders    Anti-DNA Antibody, Double-Stranded    C3 Complement    C4 Complement    CBC and Auto Differential    Comprehensive Metabolic Panel    C-Reactive Protein    Sedimentation Rate    Encounter for monitoring of hydroxychloroquine therapy    Current  Assessment & Plan     You are on chronic plaquenil.     Make sure you see your eye doctor yearly.  If you need an eye doctor, let me know and I can place a referral    Plaquenil is dosed based on your weight.   We will make sure your dose is below the maximum daily dose of 5mg/kg            Follow up: ___4__months

## 2024-05-31 NOTE — ASSESSMENT & PLAN NOTE
On plaquenil therapy.  Kenalog injection given for relief of symptoms of flare.   Labs ordered today to monitor for increased disease activity, end organ manifestations and to monitor for any drug toxicities due to chronic medications

## 2024-05-31 NOTE — PATIENT INSTRUCTIONS

## 2024-06-01 LAB
NON-UH HIE COMPLEMENT C3: 167 MG/DL (ref 90–180)
NON-UH HIE COMPLEMENT C4: 25 MG/DL (ref 10–40)

## 2024-06-03 DIAGNOSIS — Z79.899 POLYPHARMACY: Primary | ICD-10-CM

## 2024-06-03 DIAGNOSIS — G89.4 CHRONIC PAIN SYNDROME: ICD-10-CM

## 2024-06-03 LAB — NON-UH HIE ANTI-DNA: NEGATIVE

## 2024-06-03 RX ORDER — OXYCODONE AND ACETAMINOPHEN 5; 325 MG/1; MG/1
1 TABLET ORAL EVERY 8 HOURS PRN
Qty: 60 TABLET | Refills: 0 | Status: SHIPPED | OUTPATIENT
Start: 2024-06-03 | End: 2024-07-03

## 2024-06-04 RX ORDER — OXYCODONE AND ACETAMINOPHEN 5; 325 MG/1; MG/1
1 TABLET ORAL EVERY 8 HOURS PRN
Qty: 60 TABLET | Refills: 0 | OUTPATIENT
Start: 2024-06-04 | End: 2024-07-04

## 2024-06-05 ENCOUNTER — HOSPITAL ENCOUNTER (OUTPATIENT)
Dept: RADIOLOGY | Facility: HOSPITAL | Age: 59
Discharge: HOME | End: 2024-06-05
Payer: MEDICARE

## 2024-06-05 ENCOUNTER — OFFICE VISIT (OUTPATIENT)
Dept: ORTHOPEDIC SURGERY | Facility: CLINIC | Age: 59
End: 2024-06-05
Payer: MEDICARE

## 2024-06-05 DIAGNOSIS — M54.2 NECK PAIN: ICD-10-CM

## 2024-06-05 DIAGNOSIS — M54.12 CERVICAL RADICULOPATHY AT C5: Primary | ICD-10-CM

## 2024-06-05 DIAGNOSIS — M25.511 ACUTE PAIN OF RIGHT SHOULDER: ICD-10-CM

## 2024-06-05 DIAGNOSIS — M75.01 ADHESIVE CAPSULITIS OF RIGHT SHOULDER: ICD-10-CM

## 2024-06-05 PROCEDURE — 73030 X-RAY EXAM OF SHOULDER: CPT | Mod: RT

## 2024-06-05 PROCEDURE — 3061F NEG MICROALBUMINURIA REV: CPT | Performed by: ORTHOPAEDIC SURGERY

## 2024-06-05 PROCEDURE — 1036F TOBACCO NON-USER: CPT | Performed by: ORTHOPAEDIC SURGERY

## 2024-06-05 PROCEDURE — 99214 OFFICE O/P EST MOD 30 MIN: CPT | Performed by: ORTHOPAEDIC SURGERY

## 2024-06-05 PROCEDURE — 3048F LDL-C <100 MG/DL: CPT | Performed by: ORTHOPAEDIC SURGERY

## 2024-06-05 PROCEDURE — 73030 X-RAY EXAM OF SHOULDER: CPT | Mod: RIGHT SIDE | Performed by: RADIOLOGY

## 2024-06-05 PROCEDURE — 20610 DRAIN/INJ JOINT/BURSA W/O US: CPT | Performed by: ORTHOPAEDIC SURGERY

## 2024-06-05 RX ORDER — BETAMETHASONE SODIUM PHOSPHATE AND BETAMETHASONE ACETATE 3; 3 MG/ML; MG/ML
2 INJECTION, SUSPENSION INTRA-ARTICULAR; INTRALESIONAL; INTRAMUSCULAR; SOFT TISSUE
Status: COMPLETED | OUTPATIENT
Start: 2024-06-05 | End: 2024-06-05

## 2024-06-05 RX ORDER — LIDOCAINE HYDROCHLORIDE 10 MG/ML
5 INJECTION INFILTRATION; PERINEURAL
Status: COMPLETED | OUTPATIENT
Start: 2024-06-05 | End: 2024-06-05

## 2024-06-05 RX ADMIN — LIDOCAINE HYDROCHLORIDE 5 ML: 10 INJECTION INFILTRATION; PERINEURAL at 08:23

## 2024-06-05 RX ADMIN — BETAMETHASONE SODIUM PHOSPHATE AND BETAMETHASONE ACETATE 2 MG: 3; 3 INJECTION, SUSPENSION INTRA-ARTICULAR; INTRALESIONAL; INTRAMUSCULAR; SOFT TISSUE at 08:23

## 2024-06-05 NOTE — PROGRESS NOTES
History of present illness: Patient with a combination of neck and shoulder pain    She was referred over from Dr. Castillo's clinic where she had carpal tunnel done but she had severe neck shoulder pain down the arm radiating from the base of the neck    EMG is ordered for 6/18/2024    She had cervical spine x-rays that showed moderate degenerative change    She had x-rays of her shoulder today which are unremarkable        Physical exam:    General: No acute distress or breathing difficulty or discomfort, pleasant and cooperative with the examination.    Extremities: Pain over the lateral portion of the right side of the neck with head tilt and chin rotation she has a moderate Spurling sign    She has a motor intact C5-T1 but seems to have pain more in the C5-6 distribution    It does radiate into the shoulder    She is diabetic    She has some limited motion in the shoulder    She can flex to 90 abduct to 50 externally to 60    She has reasonable push pull strength with her arm at her side she is moving hand wrist and elbow there is a little bit of swelling into her hand and wrist she said that is chronic and she does have a history of lupus she had prior carpal tunnel but the incisions clean and dry and healed intact    Her left shoulder is uninvolved    Before aspiration injection the benefits of a cortisone injection including infection, local skin irritation, skin atrophy, calcification, continued pain and discomfort, elevated blood sugar, burning, failure to relieve pain, possible late infection were discussed with the patient.    Postprocedure discomfort can be alleviated with additional medications, ice, elevation, rest over the first 24 hours as recommended.    Patient verbalized understanding and wanted to proceed with the planned procedure.    After informed consent was provided and allergies verified, the patient was positioned appropriately on thel bed.  The joint to be aspirated and injected was prepped  and draped in a sterile fashion.  The skin was anesthetized with ethyl chloride spray.  A joint aspiration was to be performed an 18-gauge needle was used otherwise a 22-gauge needle was used to inject the appropriate joint.    Joint injection was performed with a mixture of 5 cc 1% lidocaine plain and 2 cc Celestone Soluspan 6 mg per mL.  The needle was removed and the puncture site closed and sealed with a Band-Aid.  The patient tolerated the procedure well.    If fluid was aspirated it was sent for further studies and a sterile specimen container as ordered to the lab.    Diagnostic studies: 2 view x-rays are unremarkable right shoulder    Impression: Combination of cervical arthritis sprain strain possibly some early shoulder adhesions or adhesive capsulitis    Plan: Cortisone injection in the shoulder for both diagnostic and therapeutic reasons obviously if she gets no relief whatsoever it makes us more suspicious of a cervical pathology    Will start therapy to prevent the restricted shoulder motion especially in a diabetic with shoulder and upper extremity pain I will see her back in 2 to 3 weeks if she had good relief that was temporary shoulder scanning or MRI may be appropriate she has an EMG scheduled coming up    I will have her see our cervical spine group due to the arthritic change in her neck she may need advanced imaging studies of the C-spine    L Inj/Asp: R subacromial bursa on 6/5/2024 8:23 AM  Indications: pain  Details: 22 G needle, lateral approach  Medications: 2 mg betamethasone acet,sod phos 6 mg/mL; 5 mL lidocaine 10 mg/mL (1 %)  Aspirate: serous  Immediately prior to procedure a time out was called to verify the correct patient, procedure, equipment, support staff and site/side marked as required. Patient was prepped and draped in the usual sterile fashion.

## 2024-06-06 ENCOUNTER — OFFICE VISIT (OUTPATIENT)
Dept: ORTHOPEDIC SURGERY | Facility: CLINIC | Age: 59
End: 2024-06-06
Payer: MEDICARE

## 2024-06-06 DIAGNOSIS — M54.12 CERVICAL RADICULOPATHY: Primary | ICD-10-CM

## 2024-06-06 PROCEDURE — 99214 OFFICE O/P EST MOD 30 MIN: CPT | Performed by: PHYSICIAN ASSISTANT

## 2024-06-06 PROCEDURE — 3061F NEG MICROALBUMINURIA REV: CPT | Performed by: PHYSICIAN ASSISTANT

## 2024-06-06 PROCEDURE — 3048F LDL-C <100 MG/DL: CPT | Performed by: PHYSICIAN ASSISTANT

## 2024-06-06 PROCEDURE — 1036F TOBACCO NON-USER: CPT | Performed by: PHYSICIAN ASSISTANT

## 2024-06-06 RX ORDER — DIAZEPAM 10 MG/1
10 TABLET ORAL EVERY 8 HOURS PRN
Qty: 2 TABLET | Refills: 0 | Status: SHIPPED | OUTPATIENT
Start: 2024-06-06

## 2024-06-06 NOTE — PROGRESS NOTES
New patient to us established to the practice comes to the office complaining of right-sided neck pain into the right arm only down to the hand.  He denies numbness and tingling.  This been going on about 6 to 8 months.  No trauma accident or fall to cause it.  She denies bowel or bladder complaints, saddle anesthesia, gait or balance changes.  Denies any spine surgeries in the past.  She saw Dr. Turner yesterday had a shoulder injection and it did help some for her shoulder but still having the pain down the arm.  Physical therapy was ordered.  She also saw Dr. Reyes who ordered cervical x-ray and an EMG nerve conduction study.  She has not had that study done yet.    Recheck patient recent lab work.  Check the vitamin D level which was 20 we checked the last hemoglobin A1c which was 6.0.  We checked a metabolic panel sodium 139 potassium 4.3 glucose was 78 we looked at primary doctors note check medication list to see if the patient is on a statin medication and she is on a statin medication which can cause muscular aches and pains.  We also read Dr. Rivas's note and agree with his treatment referral to us for right arm pain neck pain shoulder pain    Physical exam: Well-nourished, well kept.  No lymphangitis or lymphadenopathy in the examined extremities.  Good perfusion to the extremities ×4.  Radial and dorsalis pedis pulses 2+.  Capillary refill to all 4 digits brisk.  No distal edema x 4.  Gait normal.  Can walk on heels and toes.  There is decreased range of motion flexion-extension rotation cervical spine tenderness cervical spinal musculature good strength no instability examination of the back reveals no swelling, step-off, or point tenderness.  Range of motion with flexion, extension, side bending and rotation is well maintained without crepitance, instability, or exacerbation of pain.  Strength is within normal limits.  Patient has decreased range of motion to the right upper extremity reaching overhead  reaching behind her back etc.  Positive Neer's positive Michael positive empty can with pain but fairly good strength full range of motion to the elbow wrist fingers no instability nontender.   comparison examination of the contralateral side is normal with regards to palpation, range of motion, strength and stability.  No redness, abrasions, or lesions on all 4 extremities, head and neck, or trunk.  Examination of the lower extremities reveals no point tenderness, swelling, or deformity.  Range of motion of the hips, knees, and ankles are full without crepitance, instability, or exacerbation of pain.  Strength is 5/5 throughout.  Gross sensation intact in the extremities ×4.  Deep tendon reflexes 2+ and symmetric bilaterally.  Carmen, clonus, and Babinski were negative.  Straight leg raise negative.  Affect normal.  Alert and oriented ×3.  Coordination normal.    X-ray: AP AP lateral cervical x-rays taken prior report reviewed as well showing arthritic degenerative changes mild anterior listhesis C3 on 4.  Degenerative disc disease multiple levels no fractures dislocations bony lytic lesions.  Shoulder x-ray was also reviewed and report was reviewed as well showing degenerative changes.  No dislocations or fractures noted.    Assessment: This a patient was definitely some shoulder issues going on which had some relief with an injection but I think she is got some cervical issues going on.  Not quite so sure she has a carpal tunnel syndrome.    Plan: We will add cervical physical therapy to her shoulder therapy.  Will check the results of the EMG.  Will get an MRI at [facility] for diagnostic purposes and she will follow-up after that scan this will be an open scanner at [facility] in [place].  Due to claustrophobia.

## 2024-06-13 DIAGNOSIS — K31.84 GASTROPARESIS: ICD-10-CM

## 2024-06-13 DIAGNOSIS — K21.9 GASTROESOPHAGEAL REFLUX DISEASE, UNSPECIFIED WHETHER ESOPHAGITIS PRESENT: ICD-10-CM

## 2024-06-14 RX ORDER — PANTOPRAZOLE SODIUM 40 MG/1
40 TABLET, DELAYED RELEASE ORAL NIGHTLY
Qty: 90 TABLET | Refills: 1 | Status: SHIPPED | OUTPATIENT
Start: 2024-06-14 | End: 2025-06-14

## 2024-06-18 ENCOUNTER — HOSPITAL ENCOUNTER (OUTPATIENT)
Dept: NEUROLOGY | Facility: HOSPITAL | Age: 59
End: 2024-06-18
Payer: MEDICARE

## 2024-06-21 DIAGNOSIS — R61 HYPERHIDROSIS: ICD-10-CM

## 2024-06-21 RX ORDER — GLYCOPYRROLATE 1 MG/1
1 TABLET ORAL 2 TIMES DAILY
Qty: 60 TABLET | Refills: 3 | Status: SHIPPED | OUTPATIENT
Start: 2024-06-21

## 2024-06-24 DIAGNOSIS — E11.22 TYPE 2 DIABETES MELLITUS WITH STAGE 3B CHRONIC KIDNEY DISEASE, WITHOUT LONG-TERM CURRENT USE OF INSULIN (MULTI): ICD-10-CM

## 2024-06-24 DIAGNOSIS — N18.32 TYPE 2 DIABETES MELLITUS WITH STAGE 3B CHRONIC KIDNEY DISEASE, WITHOUT LONG-TERM CURRENT USE OF INSULIN (MULTI): ICD-10-CM

## 2024-06-25 RX ORDER — SEMAGLUTIDE 1.34 MG/ML
1 INJECTION, SOLUTION SUBCUTANEOUS
Qty: 3 ML | Refills: 1 | Status: SHIPPED | OUTPATIENT
Start: 2024-06-30

## 2024-06-26 ENCOUNTER — APPOINTMENT (OUTPATIENT)
Dept: ORTHOPEDIC SURGERY | Facility: CLINIC | Age: 59
End: 2024-06-26
Payer: COMMERCIAL

## 2024-06-27 ENCOUNTER — LAB (OUTPATIENT)
Dept: LAB | Facility: LAB | Age: 59
End: 2024-06-27
Payer: COMMERCIAL

## 2024-06-27 DIAGNOSIS — E79.0 HYPERURICEMIA: ICD-10-CM

## 2024-06-27 DIAGNOSIS — G89.4 CHRONIC PAIN SYNDROME: ICD-10-CM

## 2024-06-27 DIAGNOSIS — N18.31 STAGE 3A CHRONIC KIDNEY DISEASE (MULTI): ICD-10-CM

## 2024-06-27 DIAGNOSIS — Z91.89 FRAMINGHAM CARDIAC RISK <10% IN NEXT 10 YEARS: Chronic | ICD-10-CM

## 2024-06-27 DIAGNOSIS — E55.9 VITAMIN D DEFICIENCY: ICD-10-CM

## 2024-06-27 DIAGNOSIS — N18.32 TYPE 2 DIABETES MELLITUS WITH STAGE 3B CHRONIC KIDNEY DISEASE, WITHOUT LONG-TERM CURRENT USE OF INSULIN (MULTI): ICD-10-CM

## 2024-06-27 DIAGNOSIS — E53.8 VITAMIN B12 DEFICIENCY: ICD-10-CM

## 2024-06-27 DIAGNOSIS — Z79.899 POLYPHARMACY: ICD-10-CM

## 2024-06-27 DIAGNOSIS — E03.4 HYPOTHYROIDISM DUE TO ACQUIRED ATROPHY OF THYROID: ICD-10-CM

## 2024-06-27 DIAGNOSIS — R80.9 MICROALBUMINURIA: ICD-10-CM

## 2024-06-27 DIAGNOSIS — E78.2 MIXED HYPERLIPIDEMIA: ICD-10-CM

## 2024-06-27 DIAGNOSIS — J30.1 SEASONAL ALLERGIC RHINITIS DUE TO POLLEN: ICD-10-CM

## 2024-06-27 DIAGNOSIS — E11.22 TYPE 2 DIABETES MELLITUS WITH STAGE 3B CHRONIC KIDNEY DISEASE, WITHOUT LONG-TERM CURRENT USE OF INSULIN (MULTI): ICD-10-CM

## 2024-06-27 DIAGNOSIS — R11.0 NAUSEA: ICD-10-CM

## 2024-06-27 DIAGNOSIS — F33.0 MILD RECURRENT MAJOR DEPRESSION (CMS-HCC): ICD-10-CM

## 2024-06-27 LAB
25(OH)D3 SERPL-MCNC: 27 NG/ML (ref 30–100)
ALBUMIN SERPL BCP-MCNC: 4.3 G/DL (ref 3.4–5)
ALP SERPL-CCNC: 97 U/L (ref 33–110)
ALT SERPL W P-5'-P-CCNC: 9 U/L (ref 7–45)
AMPHETAMINES UR QL SCN: ABNORMAL
ANION GAP SERPL CALC-SCNC: 14 MMOL/L (ref 10–20)
APPEARANCE UR: ABNORMAL
AST SERPL W P-5'-P-CCNC: 10 U/L (ref 9–39)
BACTERIA #/AREA URNS AUTO: ABNORMAL /HPF
BARBITURATES UR QL SCN: ABNORMAL
BASOPHILS # BLD AUTO: 0.08 X10*3/UL (ref 0–0.1)
BASOPHILS NFR BLD AUTO: 0.8 %
BENZODIAZ UR QL SCN: ABNORMAL
BILIRUB SERPL-MCNC: 0.5 MG/DL (ref 0–1.2)
BILIRUB UR STRIP.AUTO-MCNC: NEGATIVE MG/DL
BUN SERPL-MCNC: 13 MG/DL (ref 6–23)
BZE UR QL SCN: ABNORMAL
CALCIUM SERPL-MCNC: 9.6 MG/DL (ref 8.6–10.3)
CANNABINOIDS UR QL SCN: ABNORMAL
CAOX CRY #/AREA UR COMP ASSIST: ABNORMAL /HPF
CHLORIDE SERPL-SCNC: 103 MMOL/L (ref 98–107)
CHOLEST SERPL-MCNC: 173 MG/DL (ref 0–199)
CHOLESTEROL/HDL RATIO: 3.3
CK SERPL-CCNC: 47 U/L (ref 0–215)
CO2 SERPL-SCNC: 29 MMOL/L (ref 21–32)
COLOR UR: YELLOW
CREAT SERPL-MCNC: 1.21 MG/DL (ref 0.5–1.05)
EGFRCR SERPLBLD CKD-EPI 2021: 53 ML/MIN/1.73M*2
EOSINOPHIL # BLD AUTO: 0.32 X10*3/UL (ref 0–0.7)
EOSINOPHIL NFR BLD AUTO: 3 %
ERYTHROCYTE [DISTWIDTH] IN BLOOD BY AUTOMATED COUNT: 12.5 % (ref 11.5–14.5)
FENTANYL+NORFENTANYL UR QL SCN: ABNORMAL
GLUCOSE SERPL-MCNC: 118 MG/DL (ref 74–99)
GLUCOSE UR STRIP.AUTO-MCNC: NORMAL MG/DL
HCT VFR BLD AUTO: 45.1 % (ref 36–46)
HDLC SERPL-MCNC: 52.6 MG/DL
HGB BLD-MCNC: 14.8 G/DL (ref 12–16)
HOLD SPECIMEN: NORMAL
IMM GRANULOCYTES # BLD AUTO: 0.03 X10*3/UL (ref 0–0.7)
IMM GRANULOCYTES NFR BLD AUTO: 0.3 % (ref 0–0.9)
KETONES UR STRIP.AUTO-MCNC: NEGATIVE MG/DL
LDLC SERPL CALC-MCNC: 59 MG/DL
LEUKOCYTE ESTERASE UR QL STRIP.AUTO: ABNORMAL
LYMPHOCYTES # BLD AUTO: 2.4 X10*3/UL (ref 1.2–4.8)
LYMPHOCYTES NFR BLD AUTO: 22.6 %
MAGNESIUM SERPL-MCNC: 2.2 MG/DL (ref 1.6–2.4)
MCH RBC QN AUTO: 28.7 PG (ref 26–34)
MCHC RBC AUTO-ENTMCNC: 32.8 G/DL (ref 32–36)
MCV RBC AUTO: 88 FL (ref 80–100)
METHADONE UR QL SCN: ABNORMAL
MONOCYTES # BLD AUTO: 0.59 X10*3/UL (ref 0.1–1)
MONOCYTES NFR BLD AUTO: 5.6 %
MUCOUS THREADS #/AREA URNS AUTO: ABNORMAL /LPF
NEUTROPHILS # BLD AUTO: 7.18 X10*3/UL (ref 1.2–7.7)
NEUTROPHILS NFR BLD AUTO: 67.7 %
NITRITE UR QL STRIP.AUTO: NEGATIVE
NON HDL CHOLESTEROL: 120 MG/DL (ref 0–149)
NRBC BLD-RTO: 0 /100 WBCS (ref 0–0)
OPIATES UR QL SCN: ABNORMAL
OXYCODONE+OXYMORPHONE UR QL SCN: ABNORMAL
PCP UR QL SCN: ABNORMAL
PH UR STRIP.AUTO: 5 [PH]
PLATELET # BLD AUTO: 286 X10*3/UL (ref 150–450)
POTASSIUM SERPL-SCNC: 4.5 MMOL/L (ref 3.5–5.3)
PROT SERPL-MCNC: 7 G/DL (ref 6.4–8.2)
PROT UR STRIP.AUTO-MCNC: NEGATIVE MG/DL
RBC # BLD AUTO: 5.15 X10*6/UL (ref 4–5.2)
RBC # UR STRIP.AUTO: NEGATIVE /UL
RBC #/AREA URNS AUTO: ABNORMAL /HPF
SODIUM SERPL-SCNC: 141 MMOL/L (ref 136–145)
SP GR UR STRIP.AUTO: 1.02
SQUAMOUS #/AREA URNS AUTO: ABNORMAL /HPF
TRIGL SERPL-MCNC: 306 MG/DL (ref 0–149)
TSH SERPL-ACNC: 2.52 MIU/L (ref 0.44–3.98)
URATE SERPL-MCNC: 6.7 MG/DL (ref 2.3–6.7)
UROBILINOGEN UR STRIP.AUTO-MCNC: NORMAL MG/DL
VIT B12 SERPL-MCNC: 236 PG/ML (ref 211–911)
VLDL: 61 MG/DL (ref 0–40)
WBC # BLD AUTO: 10.6 X10*3/UL (ref 4.4–11.3)
WBC #/AREA URNS AUTO: ABNORMAL /HPF

## 2024-06-27 PROCEDURE — 82550 ASSAY OF CK (CPK): CPT

## 2024-06-27 PROCEDURE — 80361 OPIATES 1 OR MORE: CPT

## 2024-06-27 PROCEDURE — 82607 VITAMIN B-12: CPT

## 2024-06-27 PROCEDURE — 80307 DRUG TEST PRSMV CHEM ANLYZR: CPT

## 2024-06-27 PROCEDURE — 84550 ASSAY OF BLOOD/URIC ACID: CPT

## 2024-06-27 PROCEDURE — 83735 ASSAY OF MAGNESIUM: CPT

## 2024-06-27 PROCEDURE — 80365 DRUG SCREENING OXYCODONE: CPT

## 2024-06-27 PROCEDURE — 84443 ASSAY THYROID STIM HORMONE: CPT

## 2024-06-27 PROCEDURE — 85025 COMPLETE CBC W/AUTO DIFF WBC: CPT

## 2024-06-27 PROCEDURE — 81001 URINALYSIS AUTO W/SCOPE: CPT

## 2024-06-27 PROCEDURE — 87086 URINE CULTURE/COLONY COUNT: CPT

## 2024-06-27 PROCEDURE — 82306 VITAMIN D 25 HYDROXY: CPT

## 2024-06-27 PROCEDURE — 80061 LIPID PANEL: CPT

## 2024-06-27 PROCEDURE — 80053 COMPREHEN METABOLIC PANEL: CPT

## 2024-06-28 LAB — BACTERIA UR CULT: NORMAL

## 2024-07-01 ENCOUNTER — APPOINTMENT (OUTPATIENT)
Dept: PHYSICAL THERAPY | Facility: HOSPITAL | Age: 59
End: 2024-07-01
Payer: MEDICARE

## 2024-07-02 LAB
6MAM UR CFM-MCNC: <25 NG/ML
CODEINE UR CFM-MCNC: <50 NG/ML
HYDROCODONE CTO UR CFM-MCNC: <25 NG/ML
HYDROMORPHONE UR CFM-MCNC: <25 NG/ML
MORPHINE UR CFM-MCNC: <50 NG/ML
NORHYDROCODONE UR CFM-MCNC: <25 NG/ML
NOROXYCODONE UR CFM-MCNC: 344 NG/ML
OXYCODONE UR CFM-MCNC: 233 NG/ML
OXYMORPHONE UR CFM-MCNC: 769 NG/ML

## 2024-07-02 RX ORDER — OXYCODONE AND ACETAMINOPHEN 5; 325 MG/1; MG/1
1 TABLET ORAL EVERY 8 HOURS PRN
Qty: 60 TABLET | Refills: 0 | Status: SHIPPED | OUTPATIENT
Start: 2024-07-02 | End: 2024-08-01

## 2024-07-03 ENCOUNTER — APPOINTMENT (OUTPATIENT)
Dept: PLASTIC SURGERY | Facility: CLINIC | Age: 59
End: 2024-07-03
Payer: COMMERCIAL

## 2024-07-10 ENCOUNTER — APPOINTMENT (OUTPATIENT)
Dept: PLASTIC SURGERY | Facility: CLINIC | Age: 59
End: 2024-07-10
Payer: COMMERCIAL

## 2024-07-15 DIAGNOSIS — E03.4 HYPOTHYROIDISM DUE TO ACQUIRED ATROPHY OF THYROID: ICD-10-CM

## 2024-07-16 RX ORDER — LEVOTHYROXINE SODIUM 50 UG/1
50 TABLET ORAL DAILY
Qty: 90 TABLET | Refills: 0 | Status: SHIPPED | OUTPATIENT
Start: 2024-07-16

## 2024-07-17 ENCOUNTER — APPOINTMENT (OUTPATIENT)
Dept: PRIMARY CARE | Facility: CLINIC | Age: 59
End: 2024-07-17
Payer: COMMERCIAL

## 2024-07-17 VITALS
HEIGHT: 68 IN | OXYGEN SATURATION: 96 % | DIASTOLIC BLOOD PRESSURE: 82 MMHG | WEIGHT: 169 LBS | BODY MASS INDEX: 25.61 KG/M2 | SYSTOLIC BLOOD PRESSURE: 128 MMHG | HEART RATE: 74 BPM

## 2024-07-17 DIAGNOSIS — F33.0 MILD RECURRENT MAJOR DEPRESSION (CMS-HCC): ICD-10-CM

## 2024-07-17 DIAGNOSIS — E78.2 MIXED HYPERLIPIDEMIA: Primary | ICD-10-CM

## 2024-07-17 DIAGNOSIS — Z91.89 FRAMINGHAM CARDIAC RISK <10% IN NEXT 10 YEARS: Chronic | ICD-10-CM

## 2024-07-17 DIAGNOSIS — G89.4 CHRONIC PAIN SYNDROME: ICD-10-CM

## 2024-07-17 DIAGNOSIS — E11.22 TYPE 2 DIABETES MELLITUS WITH STAGE 3B CHRONIC KIDNEY DISEASE, WITHOUT LONG-TERM CURRENT USE OF INSULIN (MULTI): ICD-10-CM

## 2024-07-17 DIAGNOSIS — Z79.899 POLYPHARMACY: ICD-10-CM

## 2024-07-17 DIAGNOSIS — E55.9 VITAMIN D DEFICIENCY: ICD-10-CM

## 2024-07-17 DIAGNOSIS — E79.0 HYPERURICEMIA: ICD-10-CM

## 2024-07-17 DIAGNOSIS — E03.4 HYPOTHYROIDISM DUE TO ACQUIRED ATROPHY OF THYROID: ICD-10-CM

## 2024-07-17 DIAGNOSIS — E53.8 VITAMIN B12 DEFICIENCY: ICD-10-CM

## 2024-07-17 DIAGNOSIS — N18.32 TYPE 2 DIABETES MELLITUS WITH STAGE 3B CHRONIC KIDNEY DISEASE, WITHOUT LONG-TERM CURRENT USE OF INSULIN (MULTI): ICD-10-CM

## 2024-07-17 DIAGNOSIS — R80.9 MICROALBUMINURIA: ICD-10-CM

## 2024-07-17 PROCEDURE — 3061F NEG MICROALBUMINURIA REV: CPT | Performed by: INTERNAL MEDICINE

## 2024-07-17 PROCEDURE — 3079F DIAST BP 80-89 MM HG: CPT | Performed by: INTERNAL MEDICINE

## 2024-07-17 PROCEDURE — 3048F LDL-C <100 MG/DL: CPT | Performed by: INTERNAL MEDICINE

## 2024-07-17 PROCEDURE — 3074F SYST BP LT 130 MM HG: CPT | Performed by: INTERNAL MEDICINE

## 2024-07-17 PROCEDURE — 3008F BODY MASS INDEX DOCD: CPT | Performed by: INTERNAL MEDICINE

## 2024-07-17 PROCEDURE — 99213 OFFICE O/P EST LOW 20 MIN: CPT | Performed by: INTERNAL MEDICINE

## 2024-07-17 RX ORDER — CYANOCOBALAMIN 1000 UG/ML
1000 INJECTION, SOLUTION INTRAMUSCULAR; SUBCUTANEOUS
Qty: 7 ML | Refills: 3 | Status: SHIPPED | OUTPATIENT
Start: 2024-07-17

## 2024-07-17 RX ORDER — CHOLECALCIFEROL (VITAMIN D3) 1250 MCG
50000 TABLET ORAL
Qty: 13 TABLET | Refills: 1 | Status: SHIPPED | OUTPATIENT
Start: 2024-07-17

## 2024-07-17 NOTE — PROGRESS NOTES
Subjective   Patient ID: Eunice Schroeder is a 58 y.o. female who presents for Follow-up (Patient presented today for a 2 month follow up./).    HPI   OARRS:  Carlos Gómez MD on 7/17/2024  8:11 AM  I have personally reviewed the OARRS report for Eunice Schroeder. I have considered the risks of abuse, dependence, addiction and diversion    Is the patient prescribed a combination of a benzodiazepine and opioid?  Yes, I feel it is clincially indicated to continue the medication and have discussed with the patient risks/benefits/alternatives.    Last Urine Drug Screen / ordered today: No  Recent Results (from the past 8760 hour(s))   Opiate Confirmation, Urine    Collection Time: 06/27/24 10:37 AM   Result Value Ref Range    6-Acetylmorphine <25 <25 ng/mL    Codeine <50 <50 ng/mL    Hydrocodone <25 <25 ng/mL    Hydromorphone <25 <25 ng/mL    Morphine  <50 <50 ng/mL    Norhydrocodone <25 <25 ng/mL    Noroxycodone 344 (H) <25 ng/mL    Oxycodone 233 (H) <25 ng/mL    Oxymorphone 769 (H) <25 ng/mL   Drug Screen, Urine With Reflex to Confirmation    Collection Time: 06/27/24 10:37 AM   Result Value Ref Range    Amphetamine Screen, Urine Presumptive Negative Presumptive Negative    Barbiturate Screen, Urine Presumptive Negative Presumptive Negative    Benzodiazepines Screen, Urine Presumptive Negative Presumptive Negative    Cannabinoid Screen, Urine Presumptive Negative Presumptive Negative    Cocaine Metabolite Screen, Urine Presumptive Negative Presumptive Negative    Fentanyl Screen, Urine Presumptive Negative Presumptive Negative    Opiate Screen, Urine Presumptive Negative Presumptive Negative    Oxycodone Screen, Urine Presumptive Positive (A) Presumptive Negative    PCP Screen, Urine Presumptive Negative Presumptive Negative    Methadone Screen, Urine Presumptive Negative Presumptive Negative     Results are as expected.         Controlled Substance Agreement:  Date of the Last Agreement: July 17,  "2024  Reviewed Controlled Substance Agreement including but not limited to the benefits, risks, and alternatives to treatment with a Controlled Substance medication(s).    Opioids:  What is the patient's goal of therapy?  Safe and reasonable pain control.  Is this being achieved with current treatment?  Yes, with precautions reviewed periodically.  Patient has been careful, and has not run out of medication earlier than expected.    I have calculated the patient's Morphine Dose Equivalent (MED):   I have considered referral to Pain Management and/or a specialist, and do not feel it is necessary at this time.  The patient would rather not follow with pain management at this time.    I feel that it is clinically indicated to continue this current medication regimen after consideration of alternative therapies, and other non-opioid treatment.    Opioid Risk Screening:  No data recorded    Pain Assessment:  No data recorded        Review of Systems  Review of systems as in history of present illness, and otherwise, reviewed separately as well, and was unremarkable/negative/noncontributory.        Objective   /82 (BP Location: Right arm, Patient Position: Sitting, BP Cuff Size: Adult)   Pulse 74   Ht 1.727 m (5' 8\")   Wt 76.7 kg (169 lb)   SpO2 96%   BMI 25.70 kg/m²     Physical Exam  In good spirits.  Not in distress or diaphoresis.  Alert, oriented x 3.  Amiable.  Not unkempt.  Receptive, cheerful, appropriate, and eager to hopefully improve quality of life.  Remaining independent and capable.  Moderately built.    HEAD pink palpebral conjunctivae, anicteric sclerae.  NECK supple, no apparent jugular venous distention.  CARDIOVASCULAR not in distress or diaphoresis.  No bipedal edema.  LUNGS not in distress or diaphoresis.  Not using accessory muscles.  ABDOMEN soft, nontender.  BACK no costovertebral angle tenderness.  EXTREMITIES no clubbing, no cyanosis.  NEURO no facial asymmetry.  No apparent cranial " nerve deficits.  Romberg negative.  Ambulating without need of assistance.  No apparent focal weakness.  No tremors.  PSYCH receptive, appropriate, and eager to maintain and improve quality of life.        LABORATORY results reviewed with patient.  Urine drug screen noted.  Urinalysis noted.  Kidney function stable.  Preserved liver function.  Triglycerides noted.  Older hemoglobin A1c noted as well.        Assessment/Plan   Diagnoses and all orders for this visit:  Mixed hyperlipidemia  -     Follow Up In Primary Care - Established  -     Follow Up In Primary Care - Established; Future  Type 2 diabetes mellitus with stage 3b chronic kidney disease, without long-term current use of insulin (Multi)  -     Follow Up In Primary Care - Established; Future  [place] cardiac risk <10% in next 10 years  Comments:  5.8% 05/24  Orders:  -     Follow Up In Primary Care - Established; Future  Polypharmacy  -     Follow Up In Primary Care - Established; Future  Chronic pain syndrome  -     Follow Up In Primary Care - Established; Future  Mild recurrent major depression (CMS-HCC)  -     Follow Up In Primary Care - Established; Future  Hyperuricemia  -     Follow Up In Primary Care - Established; Future  Microalbuminuria  -     Follow Up In Primary Care - Established; Future  Vitamin B12 deficiency  -     Follow Up In Primary Care - Established; Future  -     cyanocobalamin (Vitamin B-12) 1,000 mcg/mL injection; Inject 1 mL (1,000 mcg) into the muscle every 14 (fourteen) days.  Vitamin D deficiency  -     cholecalciferol (Vitamin D3) 1,250 mcg (50,000 unit) tablet; Take 1 tablet (50,000 Units) by mouth every 7 days.  -     Follow Up In Primary Care - Established; Future  Hypothyroidism due to acquired atrophy of thyroid  -     Follow Up In Primary Care - Established; Future       Thank you very much for coming.  I am very happy to see you.    We completed your CONTROLLED substance agreement paperwork for supervised use of  generic PERCOCET.  Please call me when you are running low.  As always, please be careful.  No driving for 6 hours.  Never drive if drowsy.  Do not take with alcohol.  Do not take together with your CLONAZEPAM and keep these medications at least 6 hours apart.    Please continue following with your PSYCHIATRIST.    I am sorry to hear about your BRUISING.  Let us see how you do with gauge 25 needles.  When you are running low, let me know, and I will order them for you if they work out.    Please follow-up with Dr. Sears, ENDOCRINOLOGY.  Please tell him that you had your lab work done, and that your triglycerides were elevated at 300.  Afterwards, please let me know that you saw him, and I will catch up with his recommendations.    Please take your VITAMIN D once a week with lunch and a full glass of water.  Lots of fluids throughout the day.    Thank you for doing your FASTING laboratory examinations.  Your latest hemoglobin A1c was good.  You have lost weight.  Please continue eating sensibly and staying physically active.  I do appreciate your efforts.    Likewise, your latest urine is clean.  Drink lots of fluids throughout the day.  Urinate often while awake.    Let me see you again in 2 months.  Call sooner as needed.  Please continue to take care of yourself and your family, and please continue to pray for our recovery from this pandemic.  Take care and God bless.            0  Return in 2 months.  40 minutes please.  COMPLETE physical examination.  Coordinate with endocrinology, rheumatology, psychiatry, other specialists as the patient is seen.  Review preventive strategies, offer colonoscopy once more.            0  Triglycerides to be reevaluated after patient is seen by ENDOCRINOLOGY.            0

## 2024-07-17 NOTE — PATIENT INSTRUCTIONS
Thank you very much for coming.  I am very happy to see you.    We completed your CONTROLLED substance agreement paperwork for supervised use of generic PERCOCET.  Please call me when you are running low.  As always, please be careful.  No driving for 6 hours.  Never drive if drowsy.  Do not take with alcohol.  Do not take together with your CLONAZEPAM and keep these medications at least 6 hours apart.    Please continue following with your PSYCHIATRIST.    I am sorry to hear about your BRUISING.  Let us see how you do with gauge 25 needles.  When you are running low, let me know, and I will order them for you if they work out.    Please follow-up with Dr. Sears, ENDOCRINOLOGY.  Please tell him that you had your lab work done, and that your triglycerides were elevated at 300.  Afterwards, please let me know that you saw him, and I will catch up with his recommendations.    Please take your VITAMIN D once a week with lunch and a full glass of water.  Lots of fluids throughout the day.    Thank you for doing your FASTING laboratory examinations.  Your latest hemoglobin A1c was good.  You have lost weight.  Please continue eating sensibly and staying physically active.  I do appreciate your efforts.    Likewise, your latest urine is clean.  Drink lots of fluids throughout the day.  Urinate often while awake.    Let me see you again in 2 months.  Call sooner as needed.  Please continue to take care of yourself and your family, and please continue to pray for our recovery from this pandemic.  Take care and God bless.            0  Return in 2 months.  40 minutes please.  COMPLETE physical examination.  Coordinate with endocrinology, rheumatology, psychiatry, other specialists as the patient is seen.  Review preventive strategies, offer colonoscopy once more.            0

## 2024-07-19 ENCOUNTER — OFFICE VISIT (OUTPATIENT)
Dept: ENDOCRINOLOGY | Age: 59
End: 2024-07-19
Payer: MEDICARE

## 2024-07-19 VITALS
HEART RATE: 78 BPM | SYSTOLIC BLOOD PRESSURE: 97 MMHG | HEIGHT: 68 IN | DIASTOLIC BLOOD PRESSURE: 57 MMHG | BODY MASS INDEX: 24.55 KG/M2 | WEIGHT: 162 LBS | OXYGEN SATURATION: 97 %

## 2024-07-19 DIAGNOSIS — E11.65 UNCONTROLLED TYPE 2 DIABETES MELLITUS WITH HYPERGLYCEMIA (HCC): Primary | ICD-10-CM

## 2024-07-19 LAB
CHP ED QC CHECK: ABNORMAL
GLUCOSE BLD-MCNC: 205 MG/DL

## 2024-07-19 PROCEDURE — 82962 GLUCOSE BLOOD TEST: CPT | Performed by: INTERNAL MEDICINE

## 2024-07-19 PROCEDURE — 3044F HG A1C LEVEL LT 7.0%: CPT | Performed by: INTERNAL MEDICINE

## 2024-07-19 PROCEDURE — 1036F TOBACCO NON-USER: CPT | Performed by: INTERNAL MEDICINE

## 2024-07-19 PROCEDURE — 99213 OFFICE O/P EST LOW 20 MIN: CPT | Performed by: INTERNAL MEDICINE

## 2024-07-19 PROCEDURE — 3017F COLORECTAL CA SCREEN DOC REV: CPT | Performed by: INTERNAL MEDICINE

## 2024-07-19 PROCEDURE — G8427 DOCREV CUR MEDS BY ELIG CLIN: HCPCS | Performed by: INTERNAL MEDICINE

## 2024-07-19 PROCEDURE — 3078F DIAST BP <80 MM HG: CPT | Performed by: INTERNAL MEDICINE

## 2024-07-19 PROCEDURE — G8420 CALC BMI NORM PARAMETERS: HCPCS | Performed by: INTERNAL MEDICINE

## 2024-07-19 PROCEDURE — 3074F SYST BP LT 130 MM HG: CPT | Performed by: INTERNAL MEDICINE

## 2024-07-19 PROCEDURE — 2022F DILAT RTA XM EVC RTNOPTHY: CPT | Performed by: INTERNAL MEDICINE

## 2024-07-19 RX ORDER — ONDANSETRON 4 MG/1
4 TABLET, ORALLY DISINTEGRATING ORAL EVERY 8 HOURS PRN
COMMUNITY
Start: 2024-05-14

## 2024-07-19 RX ORDER — VENLAFAXINE HYDROCHLORIDE 75 MG/1
75 CAPSULE, EXTENDED RELEASE ORAL DAILY
COMMUNITY
Start: 2024-06-19

## 2024-07-19 RX ORDER — SEMAGLUTIDE 1.34 MG/ML
INJECTION, SOLUTION SUBCUTANEOUS
COMMUNITY
Start: 2024-06-28

## 2024-07-19 RX ORDER — CLONAZEPAM 0.5 MG/1
0.5 TABLET ORAL DAILY PRN
COMMUNITY
Start: 2024-07-17

## 2024-07-19 RX ORDER — TIZANIDINE 4 MG/1
TABLET ORAL
COMMUNITY

## 2024-07-19 RX ORDER — PROPRANOLOL HYDROCHLORIDE 40 MG/1
40 TABLET ORAL EVERY MORNING
COMMUNITY
Start: 2024-07-15

## 2024-07-19 NOTE — PROGRESS NOTES
05/03/2024    CO2 28 05/03/2024    BUN 11 05/03/2024    CREATININE 1.20 (H) 05/03/2024    GLUCOSE 66 (L) 05/03/2024    CALCIUM 9.5 05/03/2024    BILITOT 0.4 04/10/2024    ALKPHOS 115 04/10/2024    AST 13 04/10/2024    ALT 12 04/10/2024    LABGLOM 52.9 (L) 05/03/2024    GFRAA >60.0 09/25/2022    GLOB 3.4 04/10/2024     Lab Results   Component Value Date    WBC 12.6 (H) 04/10/2024    HGB 14.7 04/10/2024    HCT 45.1 04/10/2024    MCV 86.7 04/10/2024     04/10/2024     Lab Results   Component Value Date    LABA1C 5.6 05/03/2024    LABA1C 5.7 02/19/2024    LABA1C 6.0 (H) 11/24/2023     Lab Results   Component Value Date    CHOLFAST 346 (H) 05/20/2021    TRIGLYCFAST 340 (H) 05/20/2021    HDL 42 05/03/2024    HDL 42 05/20/2021    HDL 73 (H) 08/02/2017    CHOL 146 05/03/2024    CHOL 182 08/02/2017    CHOL 189 05/12/2017    TRIG 232 (H) 05/03/2024    TRIG 264 (H) 08/02/2017    TRIG 323 (H) 05/12/2017     No results found for: \"TESTM\"  Lab Results   Component Value Date    TSH 0.741 10/11/2021    TSH 0.819 11/04/2019    TSH 2.500 05/12/2017     No results found for: \"TPOABS\"    Review of Systems   Eyes: Negative.    Cardiovascular: Negative.    Endocrine: Negative for polydipsia and polyuria.   All other systems reviewed and are negative.      Objective:   Physical Exam  Vitals reviewed.   Constitutional:       General: She is not in acute distress.     Appearance: Normal appearance. She is normal weight.   HENT:      Head: Normocephalic and atraumatic.      Right Ear: External ear normal.      Left Ear: External ear normal.      Nose: Nose normal.   Eyes:      General: No scleral icterus.        Right eye: No discharge.         Left eye: No discharge.      Extraocular Movements: Extraocular movements intact.      Conjunctiva/sclera: Conjunctivae normal.   Cardiovascular:      Rate and Rhythm: Normal rate.   Pulmonary:      Effort: Pulmonary effort is normal.   Musculoskeletal:         General: Normal range of motion.

## 2024-07-30 DIAGNOSIS — G89.4 CHRONIC PAIN SYNDROME: ICD-10-CM

## 2024-07-30 RX ORDER — OXYCODONE AND ACETAMINOPHEN 5; 325 MG/1; MG/1
1 TABLET ORAL EVERY 8 HOURS PRN
Qty: 60 TABLET | Refills: 0 | Status: SHIPPED | OUTPATIENT
Start: 2024-07-30 | End: 2024-08-29

## 2024-08-08 DIAGNOSIS — N95.1 HOT FLASH, MENOPAUSAL: ICD-10-CM

## 2024-08-08 RX ORDER — ESTRADIOL 0.03 MG/D
1 PATCH TRANSDERMAL
Qty: 12 PATCH | Refills: 0 | Status: SHIPPED | OUTPATIENT
Start: 2024-08-11 | End: 2024-11-09

## 2024-08-09 ENCOUNTER — HOSPITAL ENCOUNTER (OUTPATIENT)
Age: 59
End: 2024-08-09
Payer: MEDICARE

## 2024-08-09 ENCOUNTER — HOSPITAL ENCOUNTER (OUTPATIENT)
Dept: LAB | Age: 59
Discharge: HOME | End: 2024-08-09
Payer: MEDICARE

## 2024-08-09 ENCOUNTER — HOSPITAL ENCOUNTER (OUTPATIENT)
Dept: GENERAL RADIOLOGY | Age: 59
End: 2024-08-09
Payer: MEDICARE

## 2024-08-09 DIAGNOSIS — R52 PAIN: ICD-10-CM

## 2024-08-09 DIAGNOSIS — E11.65 UNCONTROLLED TYPE 2 DIABETES MELLITUS WITH HYPERGLYCEMIA (HCC): ICD-10-CM

## 2024-08-09 LAB
ANION GAP SERPL CALCULATED.3IONS-SCNC: 11 MEQ/L (ref 9–15)
BUN SERPL-MCNC: 8 MG/DL (ref 6–20)
CALCIUM SERPL-MCNC: 9.5 MG/DL (ref 8.5–9.9)
CHLORIDE SERPL-SCNC: 105 MEQ/L (ref 95–107)
CO2 SERPL-SCNC: 25 MEQ/L (ref 20–31)
CREAT SERPL-MCNC: 1.02 MG/DL (ref 0.5–0.9)
CREAT UR-MCNC: 64.1 MG/DL
ESTIMATED AVERAGE GLUCOSE: 140 MG/DL
GLUCOSE SERPL-MCNC: 150 MG/DL (ref 70–99)
HBA1C MFR BLD: 6.5 % (ref 4–6)
MICROALBUMIN UR-MCNC: 2 MG/DL
MICROALBUMIN/CREAT UR-RTO: 31.2 MG/G (ref 0–30)
POTASSIUM SERPL-SCNC: 4.1 MEQ/L (ref 3.4–4.9)
SODIUM SERPL-SCNC: 141 MEQ/L (ref 135–144)

## 2024-08-09 PROCEDURE — 82043 UR ALBUMIN QUANTITATIVE: CPT

## 2024-08-09 PROCEDURE — 83036 HEMOGLOBIN GLYCOSYLATED A1C: CPT

## 2024-08-09 PROCEDURE — 36415 COLL VENOUS BLD VENIPUNCTURE: CPT

## 2024-08-09 PROCEDURE — 73030 X-RAY EXAM OF SHOULDER: CPT

## 2024-08-09 PROCEDURE — 82570 ASSAY OF URINE CREATININE: CPT

## 2024-08-09 PROCEDURE — 80048 BASIC METABOLIC PNL TOTAL CA: CPT

## 2024-08-19 ENCOUNTER — APPOINTMENT (OUTPATIENT)
Dept: ORTHOPEDIC SURGERY | Facility: CLINIC | Age: 59
End: 2024-08-19
Payer: COMMERCIAL

## 2024-08-26 ENCOUNTER — APPOINTMENT (OUTPATIENT)
Dept: ORTHOPEDIC SURGERY | Facility: CLINIC | Age: 59
End: 2024-08-26
Payer: MEDICARE

## 2024-08-26 DIAGNOSIS — M75.81 TENDINITIS OF RIGHT ROTATOR CUFF: ICD-10-CM

## 2024-08-26 DIAGNOSIS — M25.511 ACUTE PAIN OF RIGHT SHOULDER: ICD-10-CM

## 2024-08-26 DIAGNOSIS — M75.01 ADHESIVE CAPSULITIS OF RIGHT SHOULDER: Primary | ICD-10-CM

## 2024-08-26 PROCEDURE — 3048F LDL-C <100 MG/DL: CPT | Performed by: ORTHOPAEDIC SURGERY

## 2024-08-26 PROCEDURE — 3061F NEG MICROALBUMINURIA REV: CPT | Performed by: ORTHOPAEDIC SURGERY

## 2024-08-26 PROCEDURE — 99213 OFFICE O/P EST LOW 20 MIN: CPT | Performed by: ORTHOPAEDIC SURGERY

## 2024-08-26 RX ORDER — DIAZEPAM 10 MG/1
TABLET ORAL
Qty: 1 TABLET | Refills: 0 | Status: SHIPPED | OUTPATIENT
Start: 2024-08-26

## 2024-08-26 NOTE — PROGRESS NOTES
Patient here follow-up right shoulder injection    Patient did get good temporary relief she certainly has some concurrent radiculopathy cervical spine issues EMG and cervical spine MRI imaging is pending for the cervical spine MRI it sounds like she may require a full general anesthetic    The shoulder MRI she feels she can complete with an open MRI and some Valium    History of present illness: Shoulder pain temporarily relieved with cortisone concurrent cervical spine issues    Physical exam:    General: No acute distress or breathing difficulty or discomfort, pleasant and cooperative with the examination.    Extremities: The right shoulder was inspected and was found to have no obvious deformity.  There was tenderness to touch over the lateral edges of the shoulder over the rotator cuff insertion.  Active forward flexion 110 degrees, external rotation to 50 degrees, abduction to 45 degrees, and internal rotation to the level of L2.    At this time the shoulder is neurovascular tact but neurosensory wise she complains of diffuse paresthesias burning mostly in the C5-6 distribution.     There was no gross instability or adhesive capsulitis symptoms.  There was no evidence of apprehension or apprehension suppression.    Strength was tested in 4 planes with weakness in the supraspinatus strength testing and external rotation position.  There was no strength deficit in internal rotation.  Impingement signs were positive both supine and standing for impingement test type I and II.  There was mild pain over the bicipital groove with a positive speeds sign    Diagnostic studies: No new study    Impression: Combination of cervical spine radiculopathy and shoulder pain but she did get good temporary relief from her cortisone shot injection    Plan: Open MRI right shoulder will be done in the meantime we recommended she complete her cervical workup including EMG and cervical spine MRI

## 2024-08-27 ENCOUNTER — HOSPITAL ENCOUNTER (EMERGENCY)
Age: 59
Discharge: HOME | End: 2024-08-27
Attending: EMERGENCY MEDICINE
Payer: MEDICARE

## 2024-08-27 VITALS
TEMPERATURE: 98.3 F | RESPIRATION RATE: 18 BRPM | WEIGHT: 160 LBS | SYSTOLIC BLOOD PRESSURE: 150 MMHG | HEART RATE: 107 BPM | DIASTOLIC BLOOD PRESSURE: 82 MMHG | OXYGEN SATURATION: 96 % | HEIGHT: 68 IN | BODY MASS INDEX: 24.25 KG/M2

## 2024-08-27 DIAGNOSIS — G89.29 CHRONIC RIGHT SHOULDER PAIN: Primary | ICD-10-CM

## 2024-08-27 DIAGNOSIS — M25.511 CHRONIC RIGHT SHOULDER PAIN: Primary | ICD-10-CM

## 2024-08-27 PROCEDURE — 99282 EMERGENCY DEPT VISIT SF MDM: CPT

## 2024-08-27 ASSESSMENT — PAIN SCALES - GENERAL: PAINLEVEL_OUTOF10: 10

## 2024-08-27 ASSESSMENT — PAIN - FUNCTIONAL ASSESSMENT: PAIN_FUNCTIONAL_ASSESSMENT: 0-10

## 2024-08-27 ASSESSMENT — PAIN DESCRIPTION - ORIENTATION: ORIENTATION: RIGHT

## 2024-08-27 ASSESSMENT — PAIN DESCRIPTION - LOCATION: LOCATION: SHOULDER

## 2024-08-27 NOTE — ED TRIAGE NOTES
Patient brought by private car following right shoulder/arm pain. States been worsening x1 year. Rheumatologist states they think its her lupus and then maybe a pinched nerve. Patient states has been taking percocet without relief. States pain is unbearable today.

## 2024-08-27 NOTE — ED PROVIDER NOTES
10:43 AM EDT  [facility] ED  EMERGENCY DEPARTMENT ENCOUNTER      Pt Name: Suzanne Kim  MRN: [id number]  Birthdate 1965  Date of evaluation: 8/27/2024  Provider: Dada Duran MD    CHIEF COMPLAINT       Chief Complaint   Patient presents with    Shoulder Pain     Right hand swelling x1 year Pain traveled into shoulder.         HISTORY OF PRESENT ILLNESS   (Location/Symptom, Timing/Onset, Context/Setting, Quality, Duration, Modifying Factors, Severity)  Note limiting factors.   58-year-old female presenting with right shoulder pain.  She has had pain in her hand for a year and recently saw an orthopedic doctor yesterday and states that the pain is stemming from her shoulder.  She is scheduled for an MRI but has not received that yet.  She comes here because she has continued pain.  Took a Percocet prior to arrival.  No fevers.  No new/ different symptoms.        Nursing Notes were reviewed.    REVIEW OF SYSTEMS    (2-9 systems for level 4, 10 or more for level 5)     Review of Systems   All other systems reviewed and are negative.      Except as noted above the remainder of the review of systems was reviewed and negative.       PAST MEDICAL HISTORY     Past Medical History:   Diagnosis Date    Anxiety     Asthma     as child    Chronic kidney disease     Depression     Depression     dysthymia - following lupus dx age 22    Diabetes mellitus (HCC)     Foot fracture, left     GERD (gastroesophageal reflux disease)     Hyperlipidemia     Hypothyroidism     Kidney stones     Lupus (HCC)     dr barrera -     Migraine     PONV (postoperative nausea and vomiting)     Renal stone 04/25/2021    Sciatica     left sided         SURGICAL HISTORY       Past Surgical History:   Procedure Laterality Date    APPENDECTOMY      ARTHRODESIS Left 11/26/2019    ARTHRODESIS OF THE FIRST METATARSAL PHALANGEAL JOINT LEFT FOOT performed by Samir Agudelo DPM at [facility]    BLADDER SURGERY N/A 4/26/2021    CYSTOSCOPY  and DIFFERENTIAL DIAGNOSIS/MDM:   Vitals:    Vitals:    08/27/24 1001   BP: (!) 150/82   Pulse: (!) 107   Resp: 18   Temp: 98.3 °F (36.8 °C)   TempSrc: Oral   SpO2: 96%   Weight: 72.6 kg (160 lb)   Height: 1.727 m (5' 8\")       MDM  Number of Diagnoses or Management Options  Chronic right shoulder pain  Diagnosis management comments: Reviewed x-ray yesterday, patient has MRI scheduled.  I recommend that she follow-up as directed by orthopedics.  Patient will be discharged home in good condition.  Patient has been hemodynamically stable throughout ED course and is appropriate for outpatient follow up.  Patient should follow up with PCP in 2-3 days or return to ED immediately for any new or worsening symptoms.  Patient is well appearing on discharge and agreeable with plan of care.            Procedures    CRITICAL CARE TIME   Total Critical Care time was 0 minutes, excluding separately reportable procedures.  There was a high probability of clinically significant/life threatening deterioration in the patient's condition which required my urgent intervention.       FINAL IMPRESSION      1. Chronic right shoulder pain          DISPOSITION/PLAN   DISPOSITION Decision To Discharge 08/27/2024 10:41:38 AM  Condition at Disposition: Good      (Please note that portions of this note were completed with a voice recognition program.  Efforts were made to edit the dictations but occasionally words are mis-transcribed.)    Dada Duran MD (electronically signed)  Attending Emergency Physician        Dada Duran MD  08/27/24 0011

## 2024-09-08 DIAGNOSIS — E78.2 MIXED HYPERLIPIDEMIA: ICD-10-CM

## 2024-09-10 ENCOUNTER — HOSPITAL ENCOUNTER (OUTPATIENT)
Dept: NEUROLOGY | Facility: HOSPITAL | Age: 59
Discharge: HOME | End: 2024-09-10
Payer: MEDICARE

## 2024-09-10 DIAGNOSIS — G56.31 RADIAL TUNNEL SYNDROME, RIGHT: ICD-10-CM

## 2024-09-10 PROCEDURE — 95911 NRV CNDJ TEST 9-10 STUDIES: CPT

## 2024-09-13 ENCOUNTER — HOSPITAL ENCOUNTER (OUTPATIENT)
Dept: MRI IMAGING | Age: 59
Discharge: HOME | End: 2024-09-15
Payer: MEDICARE

## 2024-09-13 DIAGNOSIS — M25.511 ACUTE PAIN OF RIGHT SHOULDER: ICD-10-CM

## 2024-09-13 DIAGNOSIS — M75.81 TENDINITIS OF RIGHT ROTATOR CUFF: ICD-10-CM

## 2024-09-13 DIAGNOSIS — M75.01 ADHESIVE CAPSULITIS OF RIGHT SHOULDER: ICD-10-CM

## 2024-09-13 DIAGNOSIS — E11.65 UNCONTROLLED TYPE 2 DIABETES MELLITUS WITH HYPERGLYCEMIA (HCC): ICD-10-CM

## 2024-09-13 PROCEDURE — 73221 MRI JOINT UPR EXTREM W/O DYE: CPT

## 2024-09-13 RX ORDER — ROSUVASTATIN CALCIUM 20 MG/1
20 TABLET, COATED ORAL DAILY
Qty: 90 TABLET | Refills: 0 | Status: SHIPPED | OUTPATIENT
Start: 2024-09-13

## 2024-09-16 RX ORDER — INSULIN PUMP CONTROLLER
EACH MISCELLANEOUS
Qty: 15 EACH | Refills: 3 | Status: SHIPPED | OUTPATIENT
Start: 2024-09-16

## 2024-09-16 RX ORDER — PROCHLORPERAZINE 25 MG/1
1 SUPPOSITORY RECTAL
Qty: 3 EACH | Refills: 3 | Status: SHIPPED | OUTPATIENT
Start: 2024-09-16

## 2024-09-18 ENCOUNTER — APPOINTMENT (OUTPATIENT)
Dept: ORTHOPEDIC SURGERY | Facility: CLINIC | Age: 59
End: 2024-09-18
Payer: MEDICARE

## 2024-09-18 DIAGNOSIS — M75.81 TENDINITIS OF RIGHT ROTATOR CUFF: Primary | ICD-10-CM

## 2024-09-18 PROCEDURE — 3048F LDL-C <100 MG/DL: CPT | Performed by: ORTHOPAEDIC SURGERY

## 2024-09-18 PROCEDURE — 3061F NEG MICROALBUMINURIA REV: CPT | Performed by: ORTHOPAEDIC SURGERY

## 2024-09-18 PROCEDURE — 99214 OFFICE O/P EST MOD 30 MIN: CPT | Performed by: ORTHOPAEDIC SURGERY

## 2024-09-18 NOTE — PROGRESS NOTES
History of present illness: Patient with continued shoulder pain she had an excellent response to subacromial traction with near complete pain relief for up to 2 months pain returned a combination of some neurogenic symptoms EMG were happy report just show some mild old carpal tunnel but nothing cervical or nothing radicular    Follow-up MRI shows the cuff to be intact but does show some labral tearing and some bursitis    Physical exam:    General: No acute distress or breathing difficulty or discomfort, pleasant and cooperative with the examination.    Extremities: The right shoulder was inspected and was found to have no obvious deformity.  There was tenderness to touch over the lateral edges of the shoulder over the rotator cuff insertion.  Active forward flexion 110 degrees, external rotation to 50 degrees, abduction to 45 degrees, and internal rotation to the level of L2.    At this time the shoulder is neurovascular tact and neurosensory intact.  Motor intact C5-T1.  There was no obvious neck pain or radiculopathy noted.  There was no gross instability or adhesive capsulitis symptoms.  There was no evidence of apprehension or apprehension suppression.    Strength was tested in 4 planes with weakness in the supraspinatus strength testing and external rotation position.  There was no strength deficit in internal rotation.  Impingement signs were positive both supine and standing for impingement test type I and II.  There was mild pain over the bicipital groove with a positive speeds sign    Diagnostic studies: MRI shows cuff bursitis labral tearing but no rotator cuff tear    EMG shows old just some mild chronic underlying carpal tunnel    Impression: Right shoulder impingement bursitis excellent relief with cortisone shot temporarily also some labral tearing as well    Plan: Options were discussed conservative treatment options discussed she like to proceed with arthroscopy decompression we will debride the  labrum always a remote chance of a tenodesis but most likely just debridement cleanout and decompression PT rehab program was explained surgical risks are always apparent    Risk and benefits of surgery discussed extensively with the patient.    Surgical risk included but were not limited to infection, wear, loosening, need for further surgery blood clot, failure to heal, failure of the surgery, stiffness, loss of limb life, extremity function change in length change, and associated risks of  any surgery.    We talked about the rehab program and being in the sling from anywhere from 2 weeks to 6 weeks with a rehab time anywhere between 6 weeks and 12 weeks we give her about 80% success rate of surgical

## 2024-09-20 ENCOUNTER — TELEPHONE (OUTPATIENT)
Dept: RHEUMATOLOGY | Facility: CLINIC | Age: 59
End: 2024-09-20
Payer: COMMERCIAL

## 2024-09-20 DIAGNOSIS — M32.9 SYSTEMIC LUPUS ERYTHEMATOSUS, UNSPECIFIED SLE TYPE, UNSPECIFIED ORGAN INVOLVEMENT STATUS (MULTI): Primary | ICD-10-CM

## 2024-09-20 NOTE — TELEPHONE ENCOUNTER
Pt would like to have an order in for any labs you would want on her, going to PCP prior to appt and having BW done there- she would like to get anything you want done at that time as well. Please Advise

## 2024-09-20 NOTE — TELEPHONE ENCOUNTER
Pt called stating she needs a clearance letter from you for her upcoming shoulder surgery on October 18th. She was advised by ortho Dr. Turner he needs letter from you and her primary. Please Advise.     Dr. Turner [phone]

## 2024-09-23 ENCOUNTER — APPOINTMENT (OUTPATIENT)
Dept: PRIMARY CARE | Facility: CLINIC | Age: 59
End: 2024-09-23
Payer: COMMERCIAL

## 2024-09-24 ENCOUNTER — OFFICE VISIT (OUTPATIENT)
Dept: ENDOCRINOLOGY | Age: 59
End: 2024-09-24

## 2024-09-24 VITALS
HEART RATE: 92 BPM | SYSTOLIC BLOOD PRESSURE: 138 MMHG | DIASTOLIC BLOOD PRESSURE: 82 MMHG | HEIGHT: 68 IN | WEIGHT: 171 LBS | OXYGEN SATURATION: 97 % | BODY MASS INDEX: 25.91 KG/M2

## 2024-09-24 DIAGNOSIS — Z96.41 INSULIN PUMP IN PLACE: ICD-10-CM

## 2024-09-24 DIAGNOSIS — E11.65 UNCONTROLLED TYPE 2 DIABETES MELLITUS WITH HYPERGLYCEMIA (HCC): Primary | ICD-10-CM

## 2024-09-24 DIAGNOSIS — R61 HYPERHIDROSIS: ICD-10-CM

## 2024-09-24 LAB
CHP ED QC CHECK: NORMAL
GLUCOSE BLD-MCNC: 73 MG/DL

## 2024-09-24 RX ORDER — GLYCOPYRROLATE 1 MG/1
1 TABLET ORAL 2 TIMES DAILY
Qty: 60 TABLET | Refills: 3 | Status: SHIPPED | OUTPATIENT
Start: 2024-09-24

## 2024-09-27 ENCOUNTER — LAB (OUTPATIENT)
Dept: LAB | Facility: LAB | Age: 59
End: 2024-09-27
Payer: COMMERCIAL

## 2024-09-27 DIAGNOSIS — M32.9 SYSTEMIC LUPUS ERYTHEMATOSUS, UNSPECIFIED SLE TYPE, UNSPECIFIED ORGAN INVOLVEMENT STATUS (MULTI): ICD-10-CM

## 2024-09-27 LAB
ALBUMIN SERPL BCP-MCNC: 3.9 G/DL (ref 3.4–5)
ALP SERPL-CCNC: 74 U/L (ref 33–110)
ALT SERPL W P-5'-P-CCNC: 7 U/L (ref 7–45)
ANION GAP SERPL CALC-SCNC: 10 MMOL/L (ref 10–20)
AST SERPL W P-5'-P-CCNC: 11 U/L (ref 9–39)
BASOPHILS # BLD AUTO: 0.06 X10*3/UL (ref 0–0.1)
BASOPHILS NFR BLD AUTO: 0.7 %
BILIRUB SERPL-MCNC: 0.3 MG/DL (ref 0–1.2)
BUN SERPL-MCNC: 6 MG/DL (ref 6–23)
C3 SERPL-MCNC: 153 MG/DL (ref 87–200)
C4 SERPL-MCNC: 30 MG/DL (ref 10–50)
CALCIUM SERPL-MCNC: 8.8 MG/DL (ref 8.6–10.3)
CHLORIDE SERPL-SCNC: 107 MMOL/L (ref 98–107)
CO2 SERPL-SCNC: 28 MMOL/L (ref 21–32)
CREAT SERPL-MCNC: 1.04 MG/DL (ref 0.5–1.05)
CRP SERPL-MCNC: 0.27 MG/DL
DSDNA AB SER-ACNC: 2 IU/ML
EGFRCR SERPLBLD CKD-EPI 2021: 63 ML/MIN/1.73M*2
EOSINOPHIL # BLD AUTO: 0.5 X10*3/UL (ref 0–0.7)
EOSINOPHIL NFR BLD AUTO: 6 %
ERYTHROCYTE [DISTWIDTH] IN BLOOD BY AUTOMATED COUNT: 13.2 % (ref 11.5–14.5)
ERYTHROCYTE [SEDIMENTATION RATE] IN BLOOD BY WESTERGREN METHOD: 6 MM/H (ref 0–30)
GLUCOSE SERPL-MCNC: 60 MG/DL (ref 74–99)
HCT VFR BLD AUTO: 41.8 % (ref 36–46)
HGB BLD-MCNC: 13.6 G/DL (ref 12–16)
IMM GRANULOCYTES # BLD AUTO: 0.02 X10*3/UL (ref 0–0.7)
IMM GRANULOCYTES NFR BLD AUTO: 0.2 % (ref 0–0.9)
LYMPHOCYTES # BLD AUTO: 2.22 X10*3/UL (ref 1.2–4.8)
LYMPHOCYTES NFR BLD AUTO: 26.6 %
MCH RBC QN AUTO: 28.3 PG (ref 26–34)
MCHC RBC AUTO-ENTMCNC: 32.5 G/DL (ref 32–36)
MCV RBC AUTO: 87 FL (ref 80–100)
MONOCYTES # BLD AUTO: 0.58 X10*3/UL (ref 0.1–1)
MONOCYTES NFR BLD AUTO: 6.9 %
NEUTROPHILS # BLD AUTO: 4.97 X10*3/UL (ref 1.2–7.7)
NEUTROPHILS NFR BLD AUTO: 59.6 %
NRBC BLD-RTO: 0 /100 WBCS (ref 0–0)
PLATELET # BLD AUTO: 322 X10*3/UL (ref 150–450)
POTASSIUM SERPL-SCNC: 4.2 MMOL/L (ref 3.5–5.3)
PROT SERPL-MCNC: 6.5 G/DL (ref 6.4–8.2)
RBC # BLD AUTO: 4.81 X10*6/UL (ref 4–5.2)
SODIUM SERPL-SCNC: 141 MMOL/L (ref 136–145)
WBC # BLD AUTO: 8.4 X10*3/UL (ref 4.4–11.3)

## 2024-09-27 PROCEDURE — 86160 COMPLEMENT ANTIGEN: CPT

## 2024-09-27 PROCEDURE — 86225 DNA ANTIBODY NATIVE: CPT

## 2024-09-27 PROCEDURE — 85652 RBC SED RATE AUTOMATED: CPT

## 2024-09-27 PROCEDURE — 86140 C-REACTIVE PROTEIN: CPT

## 2024-09-27 PROCEDURE — 85025 COMPLETE CBC W/AUTO DIFF WBC: CPT

## 2024-09-27 PROCEDURE — 36415 COLL VENOUS BLD VENIPUNCTURE: CPT

## 2024-09-27 PROCEDURE — 80053 COMPREHEN METABOLIC PANEL: CPT

## 2024-10-08 PROBLEM — Z28.21 COVID-19 VACCINATION DECLINED: Status: RESOLVED | Noted: 2023-11-20 | Resolved: 2024-10-08

## 2024-10-08 RX ORDER — INSULIN LISPRO 100 [IU]/ML
INJECTION, SOLUTION INTRAVENOUS; SUBCUTANEOUS
COMMUNITY
Start: 2024-08-29

## 2024-10-08 RX ORDER — GLYCOPYRROLATE 1 MG/1
1 TABLET ORAL
COMMUNITY
Start: 2024-09-07

## 2024-10-08 RX ORDER — QUETIAPINE FUMARATE 25 MG/1
1-2 TABLET, FILM COATED ORAL NIGHTLY
COMMUNITY
Start: 2024-06-21 | End: 2024-10-11 | Stop reason: ALTCHOICE

## 2024-10-08 RX ORDER — NARATRIPTAN 2.5 MG/1
2.5 TABLET ORAL ONCE AS NEEDED
COMMUNITY
End: 2024-10-11 | Stop reason: ALTCHOICE

## 2024-10-08 RX ORDER — PROPRANOLOL HYDROCHLORIDE 40 MG/1
40 TABLET ORAL
COMMUNITY
Start: 2024-07-15

## 2024-10-08 RX ORDER — PRIMIDONE 50 MG/1
50 TABLET ORAL
COMMUNITY

## 2024-10-11 ENCOUNTER — APPOINTMENT (OUTPATIENT)
Dept: RHEUMATOLOGY | Facility: CLINIC | Age: 59
End: 2024-10-11
Payer: COMMERCIAL

## 2024-10-11 VITALS
HEIGHT: 68 IN | DIASTOLIC BLOOD PRESSURE: 80 MMHG | OXYGEN SATURATION: 99 % | WEIGHT: 168.5 LBS | BODY MASS INDEX: 25.54 KG/M2 | SYSTOLIC BLOOD PRESSURE: 127 MMHG | TEMPERATURE: 97.7 F | HEART RATE: 93 BPM

## 2024-10-11 DIAGNOSIS — M32.9 SYSTEMIC LUPUS ERYTHEMATOSUS, UNSPECIFIED SLE TYPE, UNSPECIFIED ORGAN INVOLVEMENT STATUS (MULTI): Primary | ICD-10-CM

## 2024-10-11 DIAGNOSIS — M79.7 FIBROMYALGIA: ICD-10-CM

## 2024-10-11 DIAGNOSIS — M24.111 LABRAL TEAR OF SHOULDER, DEGENERATIVE, RIGHT: ICD-10-CM

## 2024-10-11 DIAGNOSIS — Z51.81 ENCOUNTER FOR MONITORING OF HYDROXYCHLOROQUINE THERAPY: ICD-10-CM

## 2024-10-11 DIAGNOSIS — Z79.899 ENCOUNTER FOR MONITORING OF HYDROXYCHLOROQUINE THERAPY: ICD-10-CM

## 2024-10-11 PROCEDURE — 99214 OFFICE O/P EST MOD 30 MIN: CPT | Performed by: INTERNAL MEDICINE

## 2024-10-11 PROCEDURE — 3008F BODY MASS INDEX DOCD: CPT | Performed by: INTERNAL MEDICINE

## 2024-10-11 PROCEDURE — 3061F NEG MICROALBUMINURIA REV: CPT | Performed by: INTERNAL MEDICINE

## 2024-10-11 PROCEDURE — 3079F DIAST BP 80-89 MM HG: CPT | Performed by: INTERNAL MEDICINE

## 2024-10-11 PROCEDURE — 3074F SYST BP LT 130 MM HG: CPT | Performed by: INTERNAL MEDICINE

## 2024-10-11 PROCEDURE — 1036F TOBACCO NON-USER: CPT | Performed by: INTERNAL MEDICINE

## 2024-10-11 PROCEDURE — 3048F LDL-C <100 MG/DL: CPT | Performed by: INTERNAL MEDICINE

## 2024-10-11 RX ORDER — HYDROXYCHLOROQUINE SULFATE 200 MG/1
200 TABLET, FILM COATED ORAL 2 TIMES DAILY
Qty: 180 TABLET | Refills: 3 | Status: SHIPPED | OUTPATIENT
Start: 2024-10-11 | End: 2025-10-11

## 2024-10-11 RX ORDER — VENLAFAXINE HYDROCHLORIDE 150 MG/1
150 CAPSULE, EXTENDED RELEASE ORAL DAILY
COMMUNITY
Start: 2024-09-25

## 2024-10-11 ASSESSMENT — PATIENT HEALTH QUESTIONNAIRE - PHQ9
1. LITTLE INTEREST OR PLEASURE IN DOING THINGS: NOT AT ALL
SUM OF ALL RESPONSES TO PHQ9 QUESTIONS 1 & 2: 0
2. FEELING DOWN, DEPRESSED OR HOPELESS: NOT AT ALL

## 2024-10-11 ASSESSMENT — ENCOUNTER SYMPTOMS
BACK PAIN: 0
ARTHRALGIAS: 1
WEAKNESS: 0
COLOR CHANGE: 0
MYALGIAS: 0
COUGH: 0
NUMBNESS: 0
JOINT SWELLING: 1
FEVER: 0
FATIGUE: 0
SHORTNESS OF BREATH: 0

## 2024-10-11 NOTE — PROGRESS NOTES
[facility] RHEUMATOLOGY     AND INTERNAL MEDICINE    RHEUMATOLOGY PROGRESS NOTE  Eunice Schroeder 59 y.o. female  Chief Complaint   Patient presents with    Follow-up     Surgery Clearance    Lupus       SUBJECTIVE  Pt reports lupus has been stable.   Sought further evaluation of her chronically swollen R hand-thinking it was a recurrence of carpal tunnel and found to have tear within shoulder.  Scheduled for surgery next week    No lupus symptoms and didn't have any flares throughout the summer.   No new areas of pain.  No other swelling  No rashes      Records since last seen reviewed in [facility], [facility] and [facility] Record  Patient Active Problem List    Diagnosis Date Noted    Nausea 05/14/2024    [place] cardiac risk <10% in next 10 years 05/14/2024    Encounter for monitoring of hydroxychloroquine therapy 01/05/2024    Bipolar II disorder (Multi) 01/05/2024    Hyperuricemia 11/20/2023    Mild recurrent major depression (CMS-HCC) 11/20/2023    Polypharmacy 05/11/2023    Generalized anxiety disorder with panic attacks 05/11/2023    Stage 3a chronic kidney disease (Multi) 05/11/2023    Hypercalcemia 05/11/2023    Chronic gout of multiple sites 02/21/2023    Chronic pain syndrome 02/21/2023    Fibromyalgia 02/21/2023    Debility 02/21/2023    Elevated alkaline phosphatase level 02/21/2023    Essential hypertension 02/21/2023    Essential tremor 02/21/2023    Gastroesophageal reflux disease 02/21/2023    Gastroparesis 02/21/2023    Hepatic steatosis 02/21/2023    Hypothyroidism due to acquired atrophy of thyroid 02/21/2023    IBS (irritable bowel syndrome) 02/21/2023    Insulin pump in place 02/21/2023    Leukocytosis 02/21/2023    Microalbuminuria 02/21/2023    Migraine without aura and without status migrainosus, not intractable 02/21/2023    Hypertriglyceridemia 02/21/2023    Mixed hyperlipidemia 02/21/2023    Nephrolithiasis 02/21/2023    Osteopenia  02/21/2023    Polycythemia 02/21/2023    Seasonal allergic rhinitis due to pollen 02/21/2023    Spondylosis of lumbar region without myelopathy or radiculopathy 02/21/2023    Systemic lupus erythematosus (Multi) 02/21/2023    Telogen hair loss 02/21/2023    Trochanteric bursitis of left hip 02/21/2023    Type 2 diabetes mellitus with stage 3b chronic kidney disease, without long-term current use of insulin (Multi) 02/21/2023    Vitamin B12 deficiency 02/21/2023    Vitamin D deficiency 02/21/2023    Moderate persistent asthma with acute exacerbation (HHS-HCC) 02/21/2023    Overweight (BMI 25.0-29.9) 02/21/2023    Hot flash, menopausal 02/21/2023     Past Medical History:   Diagnosis Date    Calculus of kidney     Recurrent nephrolithiasis    Candidal intertrigo 01/29/2024    COVID-19 vaccination declined 11/20/2023    Foot fracture, left 12/16/2016    Immunosuppression due to chronic steroid use 01/05/2024    Moderate persistent asthma, uncomplicated (HHS-HCC)     Moderate persistent asthma, unspecified whether complicated    Other seasonal allergic rhinitis 04/22/2019    Seasonal allergies    Right ureteral calculus 05/05/2021    SBO (small bowel obstruction) (Multi) 04/06/2020    History of small bowel obstruction    Sinus tachycardia 11/04/2019    History of sinus tachycardia    Unspecified sensorineural hearing loss     Sensory - neural hearing loss     Current Outpatient Medications on File Prior to Visit   Medication Sig Dispense Refill    cholecalciferol (Vitamin D3) 1,250 mcg (50,000 unit) tablet Take 1 tablet (50,000 Units) by mouth every 7 days. 13 tablet 1    clonazePAM (KlonoPIN) 0.5 mg tablet Take 1 tablet (0.5 mg) by mouth once daily as needed.      estradiol (Climara) 0.025 mg/24 hr patch Place 1 patch on the skin 1 (one) time per week. As directed 12 patch 0    Fiasp U-100 Insulin 100 unit/mL injection Use via pump max dose 150 units/day      glycopyrrolate (Robinul) 1 mg tablet Take 1 tablet (1 mg)  "by mouth every 12 hours.      HumaLOG U-100 Insulin 100 unit/mL injection Inject under the skin 3 times daily (morning, midday, late afternoon).      levothyroxine (Synthroid, Levoxyl) 50 mcg tablet TAKE 1 TABLET BY MOUTH ONCE DAILY on an empty stomach. 90 tablet 0    Omnipod Dash Pods, Gen 4, cartridge       pantoprazole (ProtoNix) 40 mg EC tablet Take 1 tablet (40 mg) by mouth once daily at bedtime. 90 tablet 1    pen needle, diabetic 32 gauge x 5/32\" needle Twice daily      primidone (Mysoline) 50 mg tablet Take 1 tablet (50 mg) by mouth once daily in the morning. Take before meals.      propranolol (Inderal) 40 mg tablet Take 1 tablet (40 mg) by mouth once daily in the morning. Take before meals.      rosuvastatin (Crestor) 20 mg tablet Take 1 tablet (20 mg) by mouth once daily. 90 tablet 0    syringe with needle, safety (BD Integra Syringe) 3 mL 25 gauge x 5/8\" syringe Please use with cyanocobalamin every 2 weeks. 7 each 3    tiZANidine (Zanaflex) 4 mg tablet Take 1 tablet (4 mg) by mouth every 8 hours if needed for muscle spasms. 90 tablet 11    venlafaxine XR (Effexor-XR) 150 mg 24 hr capsule Take 1 capsule (150 mg) by mouth once daily.      [DISCONTINUED] hydroxychloroquine (Plaquenil) 200 mg tablet Take 1 tablet (200 mg) by mouth 2 times a day. With food 180 tablet 3    [DISCONTINUED] cyanocobalamin (Vitamin B-12) 1,000 mcg/mL injection Inject 1 mL (1,000 mcg) into the muscle every 14 (fourteen) days. (Patient not taking: Reported on 10/11/2024) 7 mL 3    [DISCONTINUED] diazePAM (Valium) 10 mg tablet Sig 1 tab po 1 hour prior to procedure (Patient not taking: Reported on 10/11/2024) 1 tablet 0    [DISCONTINUED] fexofenadine (Allegra) 180 mg tablet Please take 1 tablet by mouth with supper.  Thank you.  Lots of fluids throughout the day. (Patient not taking: Reported on 10/11/2024) 90 tablet 3    [DISCONTINUED] naratriptan (Amerge) 2.5 mg tablet Take 1 tablet (2.5 mg) by mouth 1 time if needed.      " "[DISCONTINUED] semaglutide (Ozempic) 1 mg/dose (4 mg/3 mL) pen injector Inject 1 mg under the skin 1 (one) time per week. (Patient not taking: Reported on 10/11/2024) 3 mL 1    [DISCONTINUED] SeroqueL 25 mg tablet Take 1-2 tablets (25-50 mg) by mouth once daily at bedtime.      [DISCONTINUED] venlafaxine XR (Effexor-XR) 75 mg 24 hr capsule Take 1 capsule (75 mg) by mouth once daily.       No current facility-administered medications on file prior to visit.     Allergies   Allergen Reactions    Ketorolac Hives and Swelling    Sumatriptan Hives, Shortness of breath and Swelling    Tramadol Hives and Swelling    Lidocaine Rash     Patient states allergic    Aspirin Unknown    Cephalosporins Unknown    Diclofenac Unknown    Nsaids (Non-Steroidal Anti-Inflammatory Drug) Unknown    Penicillins Hives and Swelling    Fentanyl Rash    Sulfa (Sulfonamide Antibiotics) Rash     Social History     Tobacco Use    Smoking status: Never    Smokeless tobacco: Never   Vaping Use    Vaping status: Never Used   Substance Use Topics    Alcohol use: Never    Drug use: Never     Review of Systems   Constitutional:  Negative for fatigue and fever.   Respiratory:  Negative for cough and shortness of breath.    Cardiovascular:  Negative for leg swelling.   Musculoskeletal:  Positive for arthralgias and joint swelling. Negative for back pain, gait problem and myalgias.   Skin:  Negative for color change and rash.   Neurological:  Negative for weakness and numbness.       PHYSICAL EXAM  /80   Pulse 93   Temp 36.5 °C (97.7 °F) (Temporal)   Ht 1.727 m (5' 8\")   Wt 76.4 kg (168 lb 8 oz)   SpO2 99%   BMI 25.62 kg/m²   Depression: Not at risk (10/11/2024)    PHQ-2     PHQ-2 Score: 0     Physical Exam  Vitals reviewed.   Constitutional:       General: She is not in acute distress.     Appearance: Normal appearance.   HENT:      Head: Normocephalic and atraumatic.   Eyes:      General: No scleral icterus.     Extraocular Movements: " Extraocular movements intact.      Conjunctiva/sclera: Conjunctivae normal.      Pupils: Pupils are equal, round, and reactive to light.   Cardiovascular:      Rate and Rhythm: Normal rate and regular rhythm.      Pulses: Normal pulses.      Heart sounds: Normal heart sounds.   Pulmonary:      Effort: Pulmonary effort is normal. No respiratory distress.   Musculoskeletal:         General: Swelling (dorsum of R hand) present. No tenderness or deformity.      Cervical back: Normal range of motion and neck supple. No tenderness.      Right lower leg: No edema.      Left lower leg: No edema.      Comments: No synovitis of examined joints  Pain with ROM of R shoulder   Skin:     Coloration: Skin is not pale.      Findings: No erythema or rash.   Neurological:      General: No focal deficit present.      Mental Status: She is alert and oriented to person, place, and time. Mental status is at baseline.      Gait: Gait normal.   Psychiatric:         Mood and Affect: Mood normal.       Health Maintenance Due   Topic Date Due    HIV Screening  Never done    Colorectal Cancer Screening  Never done    Hepatitis A Vaccines (1 of 2 - Risk 2-dose series) Never done    Diabetes: Retinopathy Screening  07/14/2016    Mammogram  10/14/2024    Diabetes: Hemoglobin A1C  11/09/2024     No visits with results within 2 Week(s) from this visit.   Latest known visit with results is:   Lab on 09/27/2024   Component Date Value Ref Range Status    Anti-DNA (DS) 09/27/2024 2.0  <5.0 IU/mL Final    NEGATIVE: <= 4 IU/ML  EQUIVOCAL: 5- 9 IU/ML  POSITIVE: >=10 IU/ML    C3 Complement 09/27/2024 153  87 - 200 mg/dL Final    C4 Complement 09/27/2024 30  10 - 50 mg/dL Final    WBC 09/27/2024 8.4  4.4 - 11.3 x10*3/uL Final    nRBC 09/27/2024 0.0  0.0 - 0.0 /100 WBCs Final    RBC 09/27/2024 4.81  4.00 - 5.20 x10*6/uL Final    Hemoglobin 09/27/2024 13.6  12.0 - 16.0 g/dL Final    Hematocrit 09/27/2024 41.8  36.0 - 46.0 % Final    MCV 09/27/2024 87  80 -  100 fL Final    MCH 09/27/2024 28.3  26.0 - 34.0 pg Final    MCHC 09/27/2024 32.5  32.0 - 36.0 g/dL Final    RDW 09/27/2024 13.2  11.5 - 14.5 % Final    Platelets 09/27/2024 322  150 - 450 x10*3/uL Final    Neutrophils % 09/27/2024 59.6  40.0 - 80.0 % Final    Immature Granulocytes %, Automated 09/27/2024 0.2  0.0 - 0.9 % Final    Immature Granulocyte Count (IG) includes promyelocytes, myelocytes and metamyelocytes but does not include bands. Percent differential counts (%) should be interpreted in the context of the absolute cell counts (cells/UL).    Lymphocytes % 09/27/2024 26.6  13.0 - 44.0 % Final    Monocytes % 09/27/2024 6.9  2.0 - 10.0 % Final    Eosinophils % 09/27/2024 6.0  0.0 - 6.0 % Final    Basophils % 09/27/2024 0.7  0.0 - 2.0 % Final    Neutrophils Absolute 09/27/2024 4.97  1.20 - 7.70 x10*3/uL Final    Percent differential counts (%) should be interpreted in the context of the absolute cell counts (cells/uL).    Immature Granulocytes Absolute, Au* 09/27/2024 0.02  0.00 - 0.70 x10*3/uL Final    Lymphocytes Absolute 09/27/2024 2.22  1.20 - 4.80 x10*3/uL Final    Monocytes Absolute 09/27/2024 0.58  0.10 - 1.00 x10*3/uL Final    Eosinophils Absolute 09/27/2024 0.50  0.00 - 0.70 x10*3/uL Final    Basophils Absolute 09/27/2024 0.06  0.00 - 0.10 x10*3/uL Final    Glucose 09/27/2024 60 (L)  74 - 99 mg/dL Final    Sodium 09/27/2024 141  136 - 145 mmol/L Final    Potassium 09/27/2024 4.2  3.5 - 5.3 mmol/L Final    Chloride 09/27/2024 107  98 - 107 mmol/L Final    Bicarbonate 09/27/2024 28  21 - 32 mmol/L Final    Anion Gap 09/27/2024 10  10 - 20 mmol/L Final    Urea Nitrogen 09/27/2024 6  6 - 23 mg/dL Final    Creatinine 09/27/2024 1.04  0.50 - 1.05 mg/dL Final    eGFR 09/27/2024 63  >60 mL/min/1.73m*2 Final    Calculations of estimated GFR are performed using the 2021 CKD-EPI Study Refit equation without the race variable for the IDMS-Traceable creatinine  methods.  https://jasn.asnjournals.org/content/early/2021/09/22/ASN.7357872984    Calcium 09/27/2024 8.8  8.6 - 10.3 mg/dL Final    Albumin 09/27/2024 3.9  3.4 - 5.0 g/dL Final    Alkaline Phosphatase 09/27/2024 74  33 - 110 U/L Final    Total Protein 09/27/2024 6.5  6.4 - 8.2 g/dL Final    AST 09/27/2024 11  9 - 39 U/L Final    Bilirubin, Total 09/27/2024 0.3  0.0 - 1.2 mg/dL Final    ALT 09/27/2024 7  7 - 45 U/L Final    Patients treated with Sulfasalazine may generate falsely decreased results for ALT.    C-Reactive Protein 09/27/2024 0.27  <1.00 mg/dL Final    Sedimentation Rate 09/27/2024 6  0 - 30 mm/h Final         Assessment/plan  Assessment & Plan  Systemic lupus erythematosus, unspecified SLE type, unspecified organ involvement status (Multi)  On plaquenil therapy.  Pt is stable without signs of disease activity by exam or by lab.  OK from a rheumatologic standpoint to proceed with surgery.   Ok to stay on plaquenil --no need to hold.  Pt advised not to take any NSAID over the next week.  If pt needs cardiac clearance, need to get from PCP  Orders:    hydroxychloroquine (Plaquenil) 200 mg tablet; Take 1 tablet (200 mg) by mouth 2 times a day. With food    Anti-DNA Antibody, Double-Stranded; Future    C3 Complement; Future    C4 Complement; Future    CBC and Auto Differential; Future    Comprehensive Metabolic Panel; Future    C-Reactive Protein; Future    Sedimentation Rate; Future    Fibromyalgia         Encounter for monitoring of hydroxychloroquine therapy  You are on chronic plaquenil.     Make sure you see your eye doctor yearly.  If you need an eye doctor, let me know and I can place a referral    Plaquenil is dosed based on your weight.   We will make sure your dose is below the maximum daily dose of 5mg/kg         Labral tear of shoulder, degenerative, right  Planned for surgery next week         Follow up: __4___months    Immunization History   Administered Date(s) Administered    Flu vaccine  (IIV4), preservative free *Check age/dose* 09/01/2017, 09/06/2018, 09/06/2019, 09/07/2020, 09/08/2021, 09/08/2022, 09/08/2023    Flu vaccine, quadrivalent, high-dose, preservative free, age 65y+ (FLUZONE) 08/29/2016    Flu vaccine, trivalent, preservative free, age 6 months and greater (Fluarix/Fluzone/Flulaval) 09/09/2024    Hep B, Unspecified 10/18/1999, 11/16/1999, 04/12/2000    Hepatitis B vaccine, adult *Check Product/Dose* 10/18/1999, 11/16/1999, 04/12/2000    Influenza Whole 09/27/2014, 09/26/2015    Influenza, Unspecified 10/21/2004, 08/29/2016, 09/01/2017, 08/31/2018, 10/01/2020, 09/08/2022    Influenza, injectable, quadrivalent, preservative free, pediatric 09/06/2018, 09/06/2019, 09/07/2020    Influenza, seasonal, injectable 09/06/2017, 09/08/2022    Novel influenza-H1N1-09, preservative-free 12/31/2009    PPD Test 02/16/2005    Pfizer COVID-19 vaccine, bivalent, age 12 years and older (30 mcg/0.3 mL) 12/14/2022    Pfizer Gray Cap SARS-CoV-2 05/15/2022    Pfizer Purple Cap SARS-CoV-2 04/28/2021, 05/21/2021, 11/17/2021    Pneumococcal Conjugate PCV 7 09/20/2014, 10/01/2020    Pneumococcal conjugate vaccine, 13-valent (PREVNAR 13) 09/03/2020    Pneumococcal conjugate vaccine, 20-valent (PREVNAR 20) 09/08/2023    Pneumococcal polysaccharide vaccine, 23-valent, age 2 years and older (PNEUMOVAX 23) 09/15/2014, 01/01/2017    SARS-CoV-2, Unspecified 05/15/2022    Td vaccine, age 7 years and older (TDVAX) 10/18/1999, 02/16/2005    Td vaccine, age 7 years and older (TENIVAC) 08/06/2015    Tdap vaccine, age 7 year and older (BOOSTRIX, ADACEL) 10/18/1999, 02/16/2005, 08/06/2015, 07/01/2017, 07/12/2017    Zoster vaccine, recombinant, adult (SHINGRIX) 09/21/2020, 12/06/2020    Zoster, Unspecified 09/21/2020, 12/06/2020

## 2024-10-11 NOTE — PATIENT INSTRUCTIONS
It was a pleasure to see you today  Please call if your symptoms worsen  Please review your summary for education and reminders.  Follow up at your next appointment.    If you had labs/xrays done today, you will be able to view on [employer].   We will contact you when the results are reviewed for further discussion.  Please note that you may receive your results before I have had a chance to review.  Please know I will be contacting you for discussion  Homegoing instructions for all patient  A healthy lifestyle helps chronic diseases  These are all the goals you should strive to improve your overall health   Blood pressure <130/85   BMI of <30 or waist circumference that is 1/2 of your height   Fasting blood sugar <107 (if you are diabetic, aim for an A1c <6.4%_   LDL cholesterol <130   Avoid smoking   Manage your stress   Get your preventive exams   Get your immunizations     Some guidelines for all patients with lupus  Wear sunscreen, even on cloudy days.   Sun can worsening rashes and cause fatigue and flares  Exercise.  Movement is medicine.   Even stretches and light yoga count  If you smoke, stop.  Smoking makes skin manifestations of lupus much harder to treat.  Lupus (and the medications you need to be on) increase infection risk.  Keep up to date with your vaccinations  Lupus increases heart and stroke risk.  Make sure you work to get your cholesterol and blood pressure under control. If you have diabetes, it is important to get your A1c below 7%  If you are on hydroxychloroquine (plaquenil), make sure you see your eye doctor regularly and get testing to monitor for any potential changes  Think about your bone health.   Osteoporosis is a risk, especially if you are or have been on steroids.      Mental health can be an issue.   Not only does having a diagnosis of lupus cause depression but lupus itself can be the cause of mental health issues.  If you feel you need help, please call me or your primary care  doctor.   We are here for you     Common Emergency Awareness Tips   IS IT A STROKE?   Act FAST and Check for these signs:   FACE Does the face look uneven?   ARM Does one arm drift down?   SPEECH Does their speech sound strange?   TIME Call 9-1-1 at any sign of stroke     Heart Attack Signs   Chest discomfort: Most heart attacks involve discomfort in the center of the chest and lasts more than a few minutes, or goes away and comes back. It can feel like uncomfortable pressure, squeezing, fullness or pain.   Discomfort in upper body: Symptoms can include pain or discomfort in one or both arms, back, neck, jaw or stomach.   Shortness of breath: With or without discomfort.   Other signs: Breaking out in a cold sweat, nausea, or lightheaded.   Remember, MINUTES DO MATTER. If you experience any of these heart attack warning signs, call 9-1-1 to get immediate medical attention!

## 2024-10-11 NOTE — LETTER
October 11, 2024     Hany Sidhu MD  [address] [address]    Patient: Eunice Schroeder   YOB: 1965   Date of Visit: 10/11/2024       Dear Dr. Hany Sidhu MD:    Thank you for referring Eunice Schroeder to me for evaluation. Below are my notes for this visit.  If you have questions, please do not hesitate to call me. I look forward to following your patient along with you.       Sincerely,     Felicita Chirinos MD      CC: No Recipients  ______________________________________________________________________________________                                                    [facility] RHEUMATOLOGY     AND INTERNAL MEDICINE    RHEUMATOLOGY PROGRESS NOTE  Eunice Schroeder 59 y.o. female  Chief Complaint   Patient presents with   • Follow-up     Surgery Clearance   • Lupus       SUBJECTIVE  Pt reports lupus has been stable.   Sought further evaluation of her chronically swollen R hand-thinking it was a recurrence of carpal tunnel and found to have tear within shoulder.  Scheduled for surgery next week    No lupus symptoms and didn't have any flares throughout the summer.   No new areas of pain.  No other swelling  No rashes      Records since last seen reviewed in [facility]  Patient Active Problem List    Diagnosis Date Noted   • Nausea 05/14/2024   • [place] cardiac risk <10% in next 10 years 05/14/2024   • Encounter for monitoring of hydroxychloroquine therapy 01/05/2024   • Bipolar II disorder (Multi) 01/05/2024   • Hyperuricemia 11/20/2023   • Mild recurrent major depression (CMS-HCC) 11/20/2023   • Polypharmacy 05/11/2023   • Generalized anxiety disorder with panic attacks 05/11/2023   • Stage 3a chronic kidney disease (Multi) 05/11/2023   • Hypercalcemia 05/11/2023   • Chronic gout of multiple sites 02/21/2023   • Chronic pain syndrome 02/21/2023   • Fibromyalgia 02/21/2023   • Debility 02/21/2023   • Elevated alkaline  phosphatase level 02/21/2023   • Essential hypertension 02/21/2023   • Essential tremor 02/21/2023   • Gastroesophageal reflux disease 02/21/2023   • Gastroparesis 02/21/2023   • Hepatic steatosis 02/21/2023   • Hypothyroidism due to acquired atrophy of thyroid 02/21/2023   • IBS (irritable bowel syndrome) 02/21/2023   • Insulin pump in place 02/21/2023   • Leukocytosis 02/21/2023   • Microalbuminuria 02/21/2023   • Migraine without aura and without status migrainosus, not intractable 02/21/2023   • Hypertriglyceridemia 02/21/2023   • Mixed hyperlipidemia 02/21/2023   • Nephrolithiasis 02/21/2023   • Osteopenia 02/21/2023   • Polycythemia 02/21/2023   • Seasonal allergic rhinitis due to pollen 02/21/2023   • Spondylosis of lumbar region without myelopathy or radiculopathy 02/21/2023   • Systemic lupus erythematosus (Multi) 02/21/2023   • Telogen hair loss 02/21/2023   • Trochanteric bursitis of left hip 02/21/2023   • Type 2 diabetes mellitus with stage 3b chronic kidney disease, without long-term current use of insulin (Multi) 02/21/2023   • Vitamin B12 deficiency 02/21/2023   • Vitamin D deficiency 02/21/2023   • Moderate persistent asthma with acute exacerbation (HHS-HCC) 02/21/2023   • Overweight (BMI 25.0-29.9) 02/21/2023   • Hot flash, menopausal 02/21/2023     Past Medical History:   Diagnosis Date   • Calculus of kidney     Recurrent nephrolithiasis   • Candidal intertrigo 01/29/2024   • COVID-19 vaccination declined 11/20/2023   • Foot fracture, left 12/16/2016   • Immunosuppression due to chronic steroid use 01/05/2024   • Moderate persistent asthma, uncomplicated (HHS-HCC)     Moderate persistent asthma, unspecified whether complicated   • Other seasonal allergic rhinitis 04/22/2019    Seasonal allergies   • Right ureteral calculus 05/05/2021   • SBO (small bowel obstruction) (Multi) 04/06/2020    History of small bowel obstruction   • Sinus tachycardia 11/04/2019    History of sinus tachycardia   •  "Unspecified sensorineural hearing loss     Sensory - neural hearing loss     Current Outpatient Medications on File Prior to Visit   Medication Sig Dispense Refill   • cholecalciferol (Vitamin D3) 1,250 mcg (50,000 unit) tablet Take 1 tablet (50,000 Units) by mouth every 7 days. 13 tablet 1   • clonazePAM (KlonoPIN) 0.5 mg tablet Take 1 tablet (0.5 mg) by mouth once daily as needed.     • estradiol (Climara) 0.025 mg/24 hr patch Place 1 patch on the skin 1 (one) time per week. As directed 12 patch 0   • Fiasp U-100 Insulin 100 unit/mL injection Use via pump max dose 150 units/day     • glycopyrrolate (Robinul) 1 mg tablet Take 1 tablet (1 mg) by mouth every 12 hours.     • HumaLOG U-100 Insulin 100 unit/mL injection Inject under the skin 3 times daily (morning, midday, late afternoon).     • levothyroxine (Synthroid, Levoxyl) 50 mcg tablet TAKE 1 TABLET BY MOUTH ONCE DAILY on an empty stomach. 90 tablet 0   • Omnipod Dash Pods, Gen 4, cartridge      • pantoprazole (ProtoNix) 40 mg EC tablet Take 1 tablet (40 mg) by mouth once daily at bedtime. 90 tablet 1   • pen needle, diabetic 32 gauge x 5/32\" needle Twice daily     • primidone (Mysoline) 50 mg tablet Take 1 tablet (50 mg) by mouth once daily in the morning. Take before meals.     • propranolol (Inderal) 40 mg tablet Take 1 tablet (40 mg) by mouth once daily in the morning. Take before meals.     • rosuvastatin (Crestor) 20 mg tablet Take 1 tablet (20 mg) by mouth once daily. 90 tablet 0   • syringe with needle, safety (BD Integra Syringe) 3 mL 25 gauge x 5/8\" syringe Please use with cyanocobalamin every 2 weeks. 7 each 3   • tiZANidine (Zanaflex) 4 mg tablet Take 1 tablet (4 mg) by mouth every 8 hours if needed for muscle spasms. 90 tablet 11   • venlafaxine XR (Effexor-XR) 150 mg 24 hr capsule Take 1 capsule (150 mg) by mouth once daily.     • [DISCONTINUED] hydroxychloroquine (Plaquenil) 200 mg tablet Take 1 tablet (200 mg) by mouth 2 times a day. With food " 180 tablet 3   • [DISCONTINUED] cyanocobalamin (Vitamin B-12) 1,000 mcg/mL injection Inject 1 mL (1,000 mcg) into the muscle every 14 (fourteen) days. (Patient not taking: Reported on 10/11/2024) 7 mL 3   • [DISCONTINUED] diazePAM (Valium) 10 mg tablet Sig 1 tab po 1 hour prior to procedure (Patient not taking: Reported on 10/11/2024) 1 tablet 0   • [DISCONTINUED] fexofenadine (Allegra) 180 mg tablet Please take 1 tablet by mouth with supper.  Thank you.  Lots of fluids throughout the day. (Patient not taking: Reported on 10/11/2024) 90 tablet 3   • [DISCONTINUED] naratriptan (Amerge) 2.5 mg tablet Take 1 tablet (2.5 mg) by mouth 1 time if needed.     • [DISCONTINUED] semaglutide (Ozempic) 1 mg/dose (4 mg/3 mL) pen injector Inject 1 mg under the skin 1 (one) time per week. (Patient not taking: Reported on 10/11/2024) 3 mL 1   • [DISCONTINUED] SeroqueL 25 mg tablet Take 1-2 tablets (25-50 mg) by mouth once daily at bedtime.     • [DISCONTINUED] venlafaxine XR (Effexor-XR) 75 mg 24 hr capsule Take 1 capsule (75 mg) by mouth once daily.       No current facility-administered medications on file prior to visit.     Allergies   Allergen Reactions   • Ketorolac Hives and Swelling   • Sumatriptan Hives, Shortness of breath and Swelling   • Tramadol Hives and Swelling   • Lidocaine Rash     Patient states allergic   • Aspirin Unknown   • Cephalosporins Unknown   • Diclofenac Unknown   • Nsaids (Non-Steroidal Anti-Inflammatory Drug) Unknown   • Penicillins Hives and Swelling   • Fentanyl Rash   • Sulfa (Sulfonamide Antibiotics) Rash     Social History     Tobacco Use   • Smoking status: Never   • Smokeless tobacco: Never   Vaping Use   • Vaping status: Never Used   Substance Use Topics   • Alcohol use: Never   • Drug use: Never     Review of Systems   Constitutional:  Negative for fatigue and fever.   Respiratory:  Negative for cough and shortness of breath.    Cardiovascular:  Negative for leg swelling.   Musculoskeletal:   "Positive for arthralgias and joint swelling. Negative for back pain, gait problem and myalgias.   Skin:  Negative for color change and rash.   Neurological:  Negative for weakness and numbness.       PHYSICAL EXAM  /80   Pulse 93   Temp 36.5 °C (97.7 °F) (Temporal)   Ht 1.727 m (5' 8\")   Wt 76.4 kg (168 lb 8 oz)   SpO2 99%   BMI 25.62 kg/m²   Depression: Not at risk (10/11/2024)    PHQ-2    • PHQ-2 Score: 0     Physical Exam  Vitals reviewed.   Constitutional:       General: She is not in acute distress.     Appearance: Normal appearance.   HENT:      Head: Normocephalic and atraumatic.   Eyes:      General: No scleral icterus.     Extraocular Movements: Extraocular movements intact.      Conjunctiva/sclera: Conjunctivae normal.      Pupils: Pupils are equal, round, and reactive to light.   Cardiovascular:      Rate and Rhythm: Normal rate and regular rhythm.      Pulses: Normal pulses.      Heart sounds: Normal heart sounds.   Pulmonary:      Effort: Pulmonary effort is normal. No respiratory distress.   Musculoskeletal:         General: Swelling (dorsum of R hand) present. No tenderness or deformity.      Cervical back: Normal range of motion and neck supple. No tenderness.      Right lower leg: No edema.      Left lower leg: No edema.      Comments: No synovitis of examined joints  Pain with ROM of R shoulder   Skin:     Coloration: Skin is not pale.      Findings: No erythema or rash.   Neurological:      General: No focal deficit present.      Mental Status: She is alert and oriented to person, place, and time. Mental status is at baseline.      Gait: Gait normal.   Psychiatric:         Mood and Affect: Mood normal.       Health Maintenance Due   Topic Date Due   • HIV Screening  Never done   • Colorectal Cancer Screening  Never done   • Hepatitis A Vaccines (1 of 2 - Risk 2-dose series) Never done   • Diabetes: Retinopathy Screening  07/14/2016   • Mammogram  10/14/2024   • Diabetes: Hemoglobin A1C "  11/09/2024     No visits with results within 2 Week(s) from this visit.   Latest known visit with results is:   Lab on 09/27/2024   Component Date Value Ref Range Status   • Anti-DNA (DS) 09/27/2024 2.0  <5.0 IU/mL Final    NEGATIVE: <= 4 IU/ML  EQUIVOCAL: 5- 9 IU/ML  POSITIVE: >=10 IU/ML   • C3 Complement 09/27/2024 153  87 - 200 mg/dL Final   • C4 Complement 09/27/2024 30  10 - 50 mg/dL Final   • WBC 09/27/2024 8.4  4.4 - 11.3 x10*3/uL Final   • nRBC 09/27/2024 0.0  0.0 - 0.0 /100 WBCs Final   • RBC 09/27/2024 4.81  4.00 - 5.20 x10*6/uL Final   • Hemoglobin 09/27/2024 13.6  12.0 - 16.0 g/dL Final   • Hematocrit 09/27/2024 41.8  36.0 - 46.0 % Final   • MCV 09/27/2024 87  80 - 100 fL Final   • MCH 09/27/2024 28.3  26.0 - 34.0 pg Final   • MCHC 09/27/2024 32.5  32.0 - 36.0 g/dL Final   • RDW 09/27/2024 13.2  11.5 - 14.5 % Final   • Platelets 09/27/2024 322  150 - 450 x10*3/uL Final   • Neutrophils % 09/27/2024 59.6  40.0 - 80.0 % Final   • Immature Granulocytes %, Automated 09/27/2024 0.2  0.0 - 0.9 % Final    Immature Granulocyte Count (IG) includes promyelocytes, myelocytes and metamyelocytes but does not include bands. Percent differential counts (%) should be interpreted in the context of the absolute cell counts (cells/UL).   • Lymphocytes % 09/27/2024 26.6  13.0 - 44.0 % Final   • Monocytes % 09/27/2024 6.9  2.0 - 10.0 % Final   • Eosinophils % 09/27/2024 6.0  0.0 - 6.0 % Final   • Basophils % 09/27/2024 0.7  0.0 - 2.0 % Final   • Neutrophils Absolute 09/27/2024 4.97  1.20 - 7.70 x10*3/uL Final    Percent differential counts (%) should be interpreted in the context of the absolute cell counts (cells/uL).   • Immature Granulocytes Absolute, Au* 09/27/2024 0.02  0.00 - 0.70 x10*3/uL Final   • Lymphocytes Absolute 09/27/2024 2.22  1.20 - 4.80 x10*3/uL Final   • Monocytes Absolute 09/27/2024 0.58  0.10 - 1.00 x10*3/uL Final   • Eosinophils Absolute 09/27/2024 0.50  0.00 - 0.70 x10*3/uL Final   • Basophils Absolute  09/27/2024 0.06  0.00 - 0.10 x10*3/uL Final   • Glucose 09/27/2024 60 (L)  74 - 99 mg/dL Final   • Sodium 09/27/2024 141  136 - 145 mmol/L Final   • Potassium 09/27/2024 4.2  3.5 - 5.3 mmol/L Final   • Chloride 09/27/2024 107  98 - 107 mmol/L Final   • Bicarbonate 09/27/2024 28  21 - 32 mmol/L Final   • Anion Gap 09/27/2024 10  10 - 20 mmol/L Final   • Urea Nitrogen 09/27/2024 6  6 - 23 mg/dL Final   • Creatinine 09/27/2024 1.04  0.50 - 1.05 mg/dL Final   • eGFR 09/27/2024 63  >60 mL/min/1.73m*2 Final    Calculations of estimated GFR are performed using the 2021 CKD-EPI Study Refit equation without the race variable for the IDMS-Traceable creatinine methods.  https://jasn.asnjournals.org/content/early/2021/09/22/ASN.6114387446   • Calcium 09/27/2024 8.8  8.6 - 10.3 mg/dL Final   • Albumin 09/27/2024 3.9  3.4 - 5.0 g/dL Final   • Alkaline Phosphatase 09/27/2024 74  33 - 110 U/L Final   • Total Protein 09/27/2024 6.5  6.4 - 8.2 g/dL Final   • AST 09/27/2024 11  9 - 39 U/L Final   • Bilirubin, Total 09/27/2024 0.3  0.0 - 1.2 mg/dL Final   • ALT 09/27/2024 7  7 - 45 U/L Final    Patients treated with Sulfasalazine may generate falsely decreased results for ALT.   • C-Reactive Protein 09/27/2024 0.27  <1.00 mg/dL Final   • Sedimentation Rate 09/27/2024 6  0 - 30 mm/h Final         Assessment/plan  Assessment & Plan  Systemic lupus erythematosus, unspecified SLE type, unspecified organ involvement status (Multi)  On plaquenil therapy.  Pt is stable without signs of disease activity by exam or by lab.  OK from a rheumatologic standpoint to proceed with surgery.   Ok to stay on plaquenil --no need to hold.  Pt advised not to take any NSAID over the next week.  If pt needs cardiac clearance, need to get from PCP  Orders:  •  hydroxychloroquine (Plaquenil) 200 mg tablet; Take 1 tablet (200 mg) by mouth 2 times a day. With food  •  Anti-DNA Antibody, Double-Stranded; Future  •  C3 Complement; Future  •  C4 Complement;  Future  •  CBC and Auto Differential; Future  •  Comprehensive Metabolic Panel; Future  •  C-Reactive Protein; Future  •  Sedimentation Rate; Future    Fibromyalgia         Encounter for monitoring of hydroxychloroquine therapy  You are on chronic plaquenil.     Make sure you see your eye doctor yearly.  If you need an eye doctor, let me know and I can place a referral    Plaquenil is dosed based on your weight.   We will make sure your dose is below the maximum daily dose of 5mg/kg         Labral tear of shoulder, degenerative, right  Planned for surgery next week         Follow up: __4___months    Immunization History   Administered Date(s) Administered   • Flu vaccine (IIV4), preservative free *Check age/dose* 09/01/2017, 09/06/2018, 09/06/2019, 09/07/2020, 09/08/2021, 09/08/2022, 09/08/2023   • Flu vaccine, quadrivalent, high-dose, preservative free, age 65y+ (FLUZONE) 08/29/2016   • Flu vaccine, trivalent, preservative free, age 6 months and greater (Fluarix/Fluzone/Flulaval) 09/09/2024   • Hep B, Unspecified 10/18/1999, 11/16/1999, 04/12/2000   • Hepatitis B vaccine, adult *Check Product/Dose* 10/18/1999, 11/16/1999, 04/12/2000   • Influenza Whole 09/27/2014, 09/26/2015   • Influenza, Unspecified 10/21/2004, 08/29/2016, 09/01/2017, 08/31/2018, 10/01/2020, 09/08/2022   • Influenza, injectable, quadrivalent, preservative free, pediatric 09/06/2018, 09/06/2019, 09/07/2020   • Influenza, seasonal, injectable 09/06/2017, 09/08/2022   • Novel influenza-H1N1-09, preservative-free 12/31/2009   • PPD Test 02/16/2005   • Pfizer COVID-19 vaccine, bivalent, age 12 years and older (30 mcg/0.3 mL) 12/14/2022   • Pfizer Gray Cap SARS-CoV-2 05/15/2022   • Pfizer Purple Cap SARS-CoV-2 04/28/2021, 05/21/2021, 11/17/2021   • Pneumococcal Conjugate PCV 7 09/20/2014, 10/01/2020   • Pneumococcal conjugate vaccine, 13-valent (PREVNAR 13) 09/03/2020   • Pneumococcal conjugate vaccine, 20-valent (PREVNAR 20) 09/08/2023   •  Pneumococcal polysaccharide vaccine, 23-valent, age 2 years and older (PNEUMOVAX 23) 09/15/2014, 01/01/2017   • SARS-CoV-2, Unspecified 05/15/2022   • Td vaccine, age 7 years and older (TDVAX) 10/18/1999, 02/16/2005   • Td vaccine, age 7 years and older (TENIVAC) 08/06/2015   • Tdap vaccine, age 7 year and older (BOOSTRIX, ADACEL) 10/18/1999, 02/16/2005, 08/06/2015, 07/01/2017, 07/12/2017   • Zoster vaccine, recombinant, adult (SHINGRIX) 09/21/2020, 12/06/2020   • Zoster, Unspecified 09/21/2020, 12/06/2020

## 2024-10-11 NOTE — ASSESSMENT & PLAN NOTE
On plaquenil therapy.  Pt is stable without signs of disease activity by exam or by lab.  OK from a rheumatologic standpoint to proceed with surgery.   Ok to stay on plaquenil --no need to hold.  Pt advised not to take any NSAID over the next week.  If pt needs cardiac clearance, need to get from PCP  Orders:    hydroxychloroquine (Plaquenil) 200 mg tablet; Take 1 tablet (200 mg) by mouth 2 times a day. With food    Anti-DNA Antibody, Double-Stranded; Future    C3 Complement; Future    C4 Complement; Future    CBC and Auto Differential; Future    Comprehensive Metabolic Panel; Future    C-Reactive Protein; Future    Sedimentation Rate; Future

## 2024-10-11 NOTE — ASSESSMENT & PLAN NOTE
You are on chronic plaquenil.     Make sure you see your eye doctor yearly.  If you need an eye doctor, let me know and I can place a referral    Plaquenil is dosed based on your weight.   We will make sure your dose is below the maximum daily dose of 5mg/kg

## 2024-10-14 ENCOUNTER — HOSPITAL ENCOUNTER (OUTPATIENT)
Dept: PREADMISSION TESTING | Age: 59
Discharge: HOME | End: 2024-10-18
Payer: MEDICARE

## 2024-10-14 ENCOUNTER — OFFICE VISIT (OUTPATIENT)
Dept: ORTHOPEDIC SURGERY | Facility: CLINIC | Age: 59
End: 2024-10-14
Payer: COMMERCIAL

## 2024-10-14 VITALS
WEIGHT: 171 LBS | HEIGHT: 67 IN | BODY MASS INDEX: 26.84 KG/M2 | RESPIRATION RATE: 18 BRPM | OXYGEN SATURATION: 97 % | TEMPERATURE: 98 F | HEART RATE: 89 BPM | DIASTOLIC BLOOD PRESSURE: 87 MMHG | SYSTOLIC BLOOD PRESSURE: 120 MMHG

## 2024-10-14 DIAGNOSIS — G89.18 ACUTE POSTOPERATIVE PAIN: Primary | ICD-10-CM

## 2024-10-14 DIAGNOSIS — M75.81 ROTATOR CUFF TENDONITIS, RIGHT: ICD-10-CM

## 2024-10-14 DIAGNOSIS — M75.101 TEAR OF RIGHT ROTATOR CUFF, UNSPECIFIED TEAR EXTENT, UNSPECIFIED WHETHER TRAUMATIC: ICD-10-CM

## 2024-10-14 LAB
ALBUMIN SERPL-MCNC: 4.3 G/DL (ref 3.5–4.6)
ALP SERPL-CCNC: 93 U/L (ref 40–130)
ALT SERPL-CCNC: 6 U/L (ref 0–33)
ANION GAP SERPL CALCULATED.3IONS-SCNC: 9 MEQ/L (ref 9–15)
APTT PPP: 22.6 SEC (ref 24.4–36.8)
AST SERPL-CCNC: 11 U/L (ref 0–35)
BASOPHILS # BLD: 0.1 K/UL (ref 0–0.2)
BASOPHILS NFR BLD: 0.7 %
BILIRUB SERPL-MCNC: 0.3 MG/DL (ref 0.2–0.7)
BUN SERPL-MCNC: 13 MG/DL (ref 6–20)
CALCIUM SERPL-MCNC: 9 MG/DL (ref 8.5–9.9)
CHLORIDE SERPL-SCNC: 104 MEQ/L (ref 95–107)
CO2 SERPL-SCNC: 28 MEQ/L (ref 20–31)
CREAT SERPL-MCNC: 1.17 MG/DL (ref 0.5–0.9)
EOSINOPHIL # BLD: 0.4 K/UL (ref 0–0.7)
EOSINOPHIL NFR BLD: 3.4 %
ERYTHROCYTE [DISTWIDTH] IN BLOOD BY AUTOMATED COUNT: 12.9 % (ref 11.5–14.5)
GLOBULIN SER CALC-MCNC: 2.8 G/DL (ref 2.3–3.5)
GLUCOSE SERPL-MCNC: 63 MG/DL (ref 70–99)
HCT VFR BLD AUTO: 42.7 % (ref 37–47)
HGB BLD-MCNC: 14 G/DL (ref 12–16)
INR PPP: 1
LYMPHOCYTES # BLD: 2.1 K/UL (ref 1–4.8)
LYMPHOCYTES NFR BLD: 18.5 %
MCH RBC QN AUTO: 28.2 PG (ref 27–31.3)
MCHC RBC AUTO-ENTMCNC: 32.8 % (ref 33–37)
MCV RBC AUTO: 85.9 FL (ref 79.4–94.8)
MONOCYTES # BLD: 0.7 K/UL (ref 0.2–0.8)
MONOCYTES NFR BLD: 6 %
NEUTROPHILS # BLD: 8.1 K/UL (ref 1.4–6.5)
NEUTS SEG NFR BLD: 71.1 %
PLATELET # BLD AUTO: 294 K/UL (ref 130–400)
POTASSIUM SERPL-SCNC: 4.6 MEQ/L (ref 3.4–4.9)
PROT SERPL-MCNC: 7.1 G/DL (ref 6.3–8)
PROTHROMBIN TIME: 13.5 SEC (ref 12.3–14.9)
RBC # BLD AUTO: 4.97 M/UL (ref 4.2–5.4)
SODIUM SERPL-SCNC: 141 MEQ/L (ref 135–144)
WBC # BLD AUTO: 11.3 K/UL (ref 4.8–10.8)

## 2024-10-14 PROCEDURE — 85025 COMPLETE CBC W/AUTO DIFF WBC: CPT

## 2024-10-14 PROCEDURE — 3061F NEG MICROALBUMINURIA REV: CPT | Performed by: PHYSICIAN ASSISTANT

## 2024-10-14 PROCEDURE — 80053 COMPREHEN METABOLIC PANEL: CPT

## 2024-10-14 PROCEDURE — L3670 SO ACRO/CLAV CAN WEB PRE OTS: HCPCS | Performed by: PHYSICIAN ASSISTANT

## 2024-10-14 PROCEDURE — 99024 POSTOP FOLLOW-UP VISIT: CPT | Performed by: PHYSICIAN ASSISTANT

## 2024-10-14 PROCEDURE — 93005 ELECTROCARDIOGRAM TRACING: CPT | Performed by: ORTHOPAEDIC SURGERY

## 2024-10-14 PROCEDURE — 85730 THROMBOPLASTIN TIME PARTIAL: CPT

## 2024-10-14 PROCEDURE — 3048F LDL-C <100 MG/DL: CPT | Performed by: PHYSICIAN ASSISTANT

## 2024-10-14 PROCEDURE — 99211 OFF/OP EST MAY X REQ PHY/QHP: CPT | Performed by: PHYSICIAN ASSISTANT

## 2024-10-14 PROCEDURE — 85610 PROTHROMBIN TIME: CPT

## 2024-10-14 RX ORDER — OXYCODONE AND ACETAMINOPHEN 5; 325 MG/1; MG/1
1 TABLET ORAL EVERY 6 HOURS PRN
Qty: 28 TABLET | Refills: 0 | Status: SHIPPED | OUTPATIENT
Start: 2024-10-14 | End: 2024-10-21

## 2024-10-14 RX ORDER — PRIMIDONE 50 MG/1
50 TABLET ORAL
COMMUNITY
Start: 2024-08-08

## 2024-10-14 NOTE — PROGRESS NOTES
History and Physical      CHIEF COMPLAINT: Right shoulder pain    HISTORY OF PRESENT ILLNESS:      The patient is a59 y.o. female with significant past medical history of right shoulder pain.  Diagnostic studies show labral tear and impingement.  Conservative therapy is not providing relief.  After risk benefits alternatives were discussed patient likes to proceed right shoulder arthroscopy.      Past Medical History:  Past Medical History:   Diagnosis Date    Calculus of kidney     Recurrent nephrolithiasis    Candidal intertrigo 2024    COVID-19 vaccination declined 2023    Foot fracture, left 2016    Immunosuppression due to chronic steroid use 2024    Moderate persistent asthma, uncomplicated (HHS-HCC)     Moderate persistent asthma, unspecified whether complicated    Other seasonal allergic rhinitis 2019    Seasonal allergies    Right ureteral calculus 2021    SBO (small bowel obstruction) (Multi) 2020    History of small bowel obstruction    Sinus tachycardia 2019    History of sinus tachycardia    Unspecified sensorineural hearing loss     Sensory - neural hearing loss        Past Surgical History:    Past Surgical History:   Procedure Laterality Date    APPENDECTOMY  2018    Appendectomy    CARPAL TUNNEL RELEASE Bilateral      SECTION, CLASSIC N/A     CHOLECYSTECTOMY  2018    Cholecystotomy    ESOPHAGOGASTRODUODENOSCOPY  2018    Esophagogastroduodenoscopy    FOOT SURGERY  2017    Foot Surgery    HERNIA REPAIR  2018    Hernia Repair    TOTAL ABDOMINAL HYSTERECTOMY  2018    Total Abdominal Hysterectomy       Medications Prior to Admission:    Current Outpatient Medications on File Prior to Visit   Medication Sig Dispense Refill    cholecalciferol (Vitamin D3) 1,250 mcg (50,000 unit) tablet Take 1 tablet (50,000 Units) by mouth every 7 days. 13 tablet 1    clonazePAM (KlonoPIN) 0.5 mg tablet Take 1 tablet (0.5 mg)  "by mouth once daily as needed.      estradiol (Climara) 0.025 mg/24 hr patch Place 1 patch on the skin 1 (one) time per week. As directed 12 patch 0    Fiasp U-100 Insulin 100 unit/mL injection Use via pump max dose 150 units/day      glycopyrrolate (Robinul) 1 mg tablet Take 1 tablet (1 mg) by mouth every 12 hours.      HumaLOG U-100 Insulin 100 unit/mL injection Inject under the skin 3 times daily (morning, midday, late afternoon).      hydroxychloroquine (Plaquenil) 200 mg tablet Take 1 tablet (200 mg) by mouth 2 times a day. With food 180 tablet 3    levothyroxine (Synthroid, Levoxyl) 50 mcg tablet TAKE 1 TABLET BY MOUTH ONCE DAILY on an empty stomach. 90 tablet 0    Omnipod Dash Pods, Gen 4, cartridge       pantoprazole (ProtoNix) 40 mg EC tablet Take 1 tablet (40 mg) by mouth once daily at bedtime. 90 tablet 1    pen needle, diabetic 32 gauge x 5/32\" needle Twice daily      primidone (Mysoline) 50 mg tablet Take 1 tablet (50 mg) by mouth once daily in the morning. Take before meals.      propranolol (Inderal) 40 mg tablet Take 1 tablet (40 mg) by mouth once daily in the morning. Take before meals.      rosuvastatin (Crestor) 20 mg tablet Take 1 tablet (20 mg) by mouth once daily. 90 tablet 0    syringe with needle, safety (BD Integra Syringe) 3 mL 25 gauge x 5/8\" syringe Please use with cyanocobalamin every 2 weeks. 7 each 3    tiZANidine (Zanaflex) 4 mg tablet Take 1 tablet (4 mg) by mouth every 8 hours if needed for muscle spasms. 90 tablet 11    venlafaxine XR (Effexor-XR) 150 mg 24 hr capsule Take 1 capsule (150 mg) by mouth once daily.      [DISCONTINUED] cyanocobalamin (Vitamin B-12) 1,000 mcg/mL injection Inject 1 mL (1,000 mcg) into the muscle every 14 (fourteen) days. (Patient not taking: Reported on 10/11/2024) 7 mL 3    [DISCONTINUED] diazePAM (Valium) 10 mg tablet Sig 1 tab po 1 hour prior to procedure (Patient not taking: Reported on 10/11/2024) 1 tablet 0    [DISCONTINUED] fexofenadine (Allegra) " 180 mg tablet Please take 1 tablet by mouth with supper.  Thank you.  Lots of fluids throughout the day. (Patient not taking: Reported on 10/11/2024) 90 tablet 3    [DISCONTINUED] hydroxychloroquine (Plaquenil) 200 mg tablet Take 1 tablet (200 mg) by mouth 2 times a day. With food 180 tablet 3    [DISCONTINUED] naratriptan (Amerge) 2.5 mg tablet Take 1 tablet (2.5 mg) by mouth 1 time if needed.      [DISCONTINUED] semaglutide (Ozempic) 1 mg/dose (4 mg/3 mL) pen injector Inject 1 mg under the skin 1 (one) time per week. (Patient not taking: Reported on 10/11/2024) 3 mL 1    [DISCONTINUED] SeroqueL 25 mg tablet Take 1-2 tablets (25-50 mg) by mouth once daily at bedtime.      [DISCONTINUED] venlafaxine XR (Effexor-XR) 75 mg 24 hr capsule Take 1 capsule (75 mg) by mouth once daily.       No current facility-administered medications on file prior to visit.        Allergies:  Ketorolac, Sumatriptan, Tramadol, Lidocaine, Aspirin, Cephalosporins, Diclofenac, Nsaids (non-steroidal anti-inflammatory drug), Penicillins, Fentanyl, and Sulfa (sulfonamide antibiotics)    Social History:   Social History     Socioeconomic History    Marital status: Legally      Spouse name: Not on file    Number of children: 1    Years of education: Not on file    Highest education level: Not on file   Occupational History    Occupation: disability   Tobacco Use    Smoking status: Never    Smokeless tobacco: Never   Vaping Use    Vaping status: Never Used   Substance and Sexual Activity    Alcohol use: Never    Drug use: Never    Sexual activity: Defer   Other Topics Concern    Not on file   Social History Narrative    Not on file     Social Determinants of Health     Financial Resource Strain: Low Risk  (9/3/2021)    [facility]    Overall Financial Resource Strain (CARDIA)     Difficulty of Paying Living Expenses: Not hard at all   Food Insecurity: No Food Insecurity  (9/3/2021)    [facility]    Hunger Vital Sign     Worried About Running Out of Food in the Last Year: Never true     Ran Out of Food in the Last Year: Never true   Transportation Needs: Not on file   Physical Activity: Not on file   Stress: Not on file   Social Connections: Not on file   Intimate Partner Violence: Not on file   Housing Stability: Not on file       Family History:   Family History   Adopted: Yes   Problem Relation Name Age of Onset    Other (systemic lupus erythematosus) Mother      Diabetes Father      Brain cancer Sister      Other (systemic lupus erythematosus) Other Grandfather         Review of systems: Noncontributory for orthopedics    Vitals:  There were no vitals taken for this visit.    Physical examination:  Head normocephalic atraumatic  Heart regular rate and rhythm  Lungs clear  Abdominal exam nontender nondistended  Extremity: Right shoulder forward flexion to 140 degrees abduction of 70 degrees external rotation of 60 degrees internal rotation L4-5    Impression : Right shoulder impingement and labral tear      Plan:  Scheduled for right shoulder arthroscopy subacromial decompression labral debridement possible rotator cuff repair    Risk and benefits of surgery discussed extensively with the patient.    Surgical risk included but were not limited to infection, wear, loosening, need for further surgery blood clot, failure to heal, failure of the surgery, stiffness, loss of limb life, extremity function change in length change, and associated risks of surgery during the coronavirus epidemic.    Risk with any surgery including arthroplasty and replacements include but are not limited to:       Wear, loosening, infection, blood clot, DVT, loss of limb, life, delayed recovery, limb length change, instability, dislocation, discomfort with new implant and failure of the procedure.

## 2024-10-15 ENCOUNTER — HOSPITAL ENCOUNTER (OUTPATIENT)
Dept: RADIOLOGY | Facility: HOSPITAL | Age: 59
Discharge: HOME | End: 2024-10-15
Payer: MEDICARE

## 2024-10-15 VITALS — HEIGHT: 68 IN | WEIGHT: 165 LBS | BODY MASS INDEX: 25.01 KG/M2

## 2024-10-15 DIAGNOSIS — Z12.31 ENCOUNTER FOR SCREENING MAMMOGRAM FOR MALIGNANT NEOPLASM OF BREAST: ICD-10-CM

## 2024-10-15 DIAGNOSIS — E11.65 UNCONTROLLED TYPE 2 DIABETES MELLITUS WITH HYPERGLYCEMIA (HCC): ICD-10-CM

## 2024-10-15 PROCEDURE — 77063 BREAST TOMOSYNTHESIS BI: CPT | Performed by: RADIOLOGY

## 2024-10-15 PROCEDURE — 77067 SCR MAMMO BI INCL CAD: CPT

## 2024-10-15 PROCEDURE — 77067 SCR MAMMO BI INCL CAD: CPT | Performed by: RADIOLOGY

## 2024-10-15 RX ORDER — INSULIN PUMP CONTROLLER
EACH MISCELLANEOUS
Qty: 15 EACH | Refills: 3 | Status: SHIPPED | OUTPATIENT
Start: 2024-10-15

## 2024-10-17 LAB
EKG ATRIAL RATE: 75 BPM
EKG P AXIS: 34 DEGREES
EKG P-R INTERVAL: 144 MS
EKG Q-T INTERVAL: 374 MS
EKG QRS DURATION: 64 MS
EKG QTC CALCULATION (BAZETT): 417 MS
EKG R AXIS: 42 DEGREES
EKG T AXIS: 51 DEGREES
EKG VENTRICULAR RATE: 75 BPM

## 2024-10-17 NOTE — H&P
[facility]                   [address] [place] [address]                           HISTORY & PHYSICAL      PATIENT NAME: BERTRAND SONG             : 1965  MED REC NO: [id number]                        ROOM:   ACCOUNT NO: [id number]                       ADMIT DATE:   PROVIDER: Doug Soria MD      PRIMARY CARE PHYSICIAN:  Tina Eller    CHIEF COMPLAINT:  Right shoulder pain.    HISTORY OF PRESENT ILLNESS:  The patient with known right shoulder pain, night pain.  Diagnostic studies show impingement and labral tearing.  Conservative therapy was felt to provide relief.  After risks, benefits, and alternatives were discussed, the patient elected to proceed with right shoulder arthroscopy.  This will be performed 10/18/2024 at [facility] in [place].    PAST MEDICAL HISTORY:  Significant for osteoarthritis, psoriasis, depression, elevated lipids, diabetes, hypothyroid, hypertension.    CURRENT MEDICATIONS:  Plaquenil, Effexor, Crestor, Humalog, Robinul, Synthroid, propranolol.    ALLERGIES:  AFTER DISCUSSION WITH THE PATIENT, SHE REPORTS THEM MORE AS SENSITIVITIES.  SHE DID REPORT THAT SHE HAS HAD NO PROBLEMS WITH CEPHALOSPORINS FOR PREOPERATIVE ANTIBIOTICS.  SHE REPORTS SENSITIVITIES TO TORADOL WHICH CAUSED SWELLING, ITCHING, HIVES, IMITREX, TRAMADOL, PENICILLIN, FENTANYL, SULFA.  ANESTHESIA NOTE THE PATIENT REPORTS BEING VERY CLAUSTROPHOBIC.  SHE WOULD LIKE TO NOT HAVE THE MASK HELD AGAINST HER FACE WHILE SHE IS AWAKE, IF POSSIBLE.      SOCIAL HISTORY:  The patient denies any substance abuse, any tobacco use.    FAMILY HISTORY:  Significant for osteoarthritis.    REVIEW OF SYSTEMS:  Noncontributory.    PHYSICAL EXAM:  GENERAL:  A 59-year-old  female, weighs 165 pounds.  EYES:  Pupils equal, round, and reactive to light and accommodation.  Extraocular muscles are intact.  CHEST:  Lungs are clear to auscultation bilaterally.  CARDIAC:  Regular rate

## 2024-10-18 ENCOUNTER — ANESTHESIA EVENT (OUTPATIENT)
Dept: OPERATING ROOM | Age: 59
End: 2024-10-18
Payer: MEDICARE

## 2024-10-18 ENCOUNTER — HOSPITAL ENCOUNTER (OUTPATIENT)
Age: 59
Setting detail: OUTPATIENT SURGERY
Discharge: HOME | End: 2024-10-18
Attending: ORTHOPAEDIC SURGERY | Admitting: ORTHOPAEDIC SURGERY
Payer: MEDICARE

## 2024-10-18 ENCOUNTER — ANESTHESIA (OUTPATIENT)
Dept: OPERATING ROOM | Age: 59
End: 2024-10-18
Payer: MEDICARE

## 2024-10-18 VITALS
SYSTOLIC BLOOD PRESSURE: 127 MMHG | BODY MASS INDEX: 25.91 KG/M2 | DIASTOLIC BLOOD PRESSURE: 67 MMHG | TEMPERATURE: 98 F | RESPIRATION RATE: 16 BRPM | HEIGHT: 68 IN | HEART RATE: 111 BPM | WEIGHT: 171 LBS | OXYGEN SATURATION: 94 %

## 2024-10-18 DIAGNOSIS — G89.18 POST-OP PAIN: Primary | ICD-10-CM

## 2024-10-18 LAB
GLUCOSE BLD-MCNC: 119 MG/DL (ref 70–99)
GLUCOSE BLD-MCNC: 133 MG/DL (ref 70–99)
PERFORMED ON: ABNORMAL
PERFORMED ON: ABNORMAL

## 2024-10-18 PROCEDURE — 6360000002 HC RX W HCPCS: Performed by: NURSE ANESTHETIST, CERTIFIED REGISTERED

## 2024-10-18 PROCEDURE — 3700000001 HC ADD 15 MINUTES (ANESTHESIA): Performed by: ORTHOPAEDIC SURGERY

## 2024-10-18 PROCEDURE — 6360000002 HC RX W HCPCS: Performed by: STUDENT IN AN ORGANIZED HEALTH CARE EDUCATION/TRAINING PROGRAM

## 2024-10-18 PROCEDURE — 2720000010 HC SURG SUPPLY STERILE: Performed by: ORTHOPAEDIC SURGERY

## 2024-10-18 PROCEDURE — 29826 SHO ARTHRS SRG DECOMPRESSION: CPT | Performed by: ORTHOPAEDIC SURGERY

## 2024-10-18 PROCEDURE — 2500000003 HC RX 250 WO HCPCS: Performed by: NURSE ANESTHETIST, CERTIFIED REGISTERED

## 2024-10-18 PROCEDURE — 2580000003 HC RX 258: Performed by: ORTHOPAEDIC SURGERY

## 2024-10-18 PROCEDURE — 7100000011 HC PHASE II RECOVERY - ADDTL 15 MIN: Performed by: ORTHOPAEDIC SURGERY

## 2024-10-18 PROCEDURE — A4217 STERILE WATER/SALINE, 500 ML: HCPCS | Performed by: ORTHOPAEDIC SURGERY

## 2024-10-18 PROCEDURE — 3700000000 HC ANESTHESIA ATTENDED CARE: Performed by: ORTHOPAEDIC SURGERY

## 2024-10-18 PROCEDURE — 7100000010 HC PHASE II RECOVERY - FIRST 15 MIN: Performed by: ORTHOPAEDIC SURGERY

## 2024-10-18 PROCEDURE — 7100000000 HC PACU RECOVERY - FIRST 15 MIN: Performed by: ORTHOPAEDIC SURGERY

## 2024-10-18 PROCEDURE — 29823 SHO ARTHRS SRG XTNSV DBRDMT: CPT | Performed by: ORTHOPAEDIC SURGERY

## 2024-10-18 PROCEDURE — 29823 SHO ARTHRS SRG XTNSV DBRDMT: CPT | Performed by: PHYSICIAN ASSISTANT

## 2024-10-18 PROCEDURE — 3600000004 HC SURGERY LEVEL 4 BASE: Performed by: ORTHOPAEDIC SURGERY

## 2024-10-18 PROCEDURE — 29826 SHO ARTHRS SRG DECOMPRESSION: CPT | Performed by: PHYSICIAN ASSISTANT

## 2024-10-18 PROCEDURE — 6360000002 HC RX W HCPCS: Performed by: ORTHOPAEDIC SURGERY

## 2024-10-18 PROCEDURE — 2709999900 HC NON-CHARGEABLE SUPPLY: Performed by: ORTHOPAEDIC SURGERY

## 2024-10-18 PROCEDURE — 7100000001 HC PACU RECOVERY - ADDTL 15 MIN: Performed by: ORTHOPAEDIC SURGERY

## 2024-10-18 PROCEDURE — 3600000014 HC SURGERY LEVEL 4 ADDTL 15MIN: Performed by: ORTHOPAEDIC SURGERY

## 2024-10-18 PROCEDURE — 2580000003 HC RX 258: Performed by: STUDENT IN AN ORGANIZED HEALTH CARE EDUCATION/TRAINING PROGRAM

## 2024-10-18 PROCEDURE — 64415 NJX AA&/STRD BRCH PLXS IMG: CPT | Performed by: STUDENT IN AN ORGANIZED HEALTH CARE EDUCATION/TRAINING PROGRAM

## 2024-10-18 RX ORDER — SODIUM CHLORIDE 9 MG/ML
INJECTION, SOLUTION INTRAVENOUS CONTINUOUS
Status: DISCONTINUED | OUTPATIENT
Start: 2024-10-18 | End: 2024-10-18 | Stop reason: HOSPADM

## 2024-10-18 RX ORDER — KETOROLAC TROMETHAMINE 15 MG/ML
7.5 INJECTION, SOLUTION INTRAMUSCULAR; INTRAVENOUS EVERY 6 HOURS
Status: CANCELLED | OUTPATIENT
Start: 2024-10-18 | End: 2024-10-18

## 2024-10-18 RX ORDER — MIDAZOLAM HYDROCHLORIDE 1 MG/ML
INJECTION INTRAMUSCULAR; INTRAVENOUS
Status: COMPLETED | OUTPATIENT
Start: 2024-10-18 | End: 2024-10-18

## 2024-10-18 RX ORDER — SODIUM CHLORIDE 0.9 % (FLUSH) 0.9 %
5-40 SYRINGE (ML) INJECTION EVERY 12 HOURS SCHEDULED
Status: DISCONTINUED | OUTPATIENT
Start: 2024-10-18 | End: 2024-10-18 | Stop reason: HOSPADM

## 2024-10-18 RX ORDER — SODIUM CHLORIDE 0.9 % (FLUSH) 0.9 %
5-40 SYRINGE (ML) INJECTION PRN
Status: CANCELLED | OUTPATIENT
Start: 2024-10-18

## 2024-10-18 RX ORDER — CEFAZOLIN SODIUM 1 G/3ML
INJECTION, POWDER, FOR SOLUTION INTRAMUSCULAR; INTRAVENOUS
Status: DISCONTINUED | OUTPATIENT
Start: 2024-10-18 | End: 2024-10-18 | Stop reason: SDUPTHER

## 2024-10-18 RX ORDER — SODIUM CHLORIDE 0.9 % (FLUSH) 0.9 %
5-40 SYRINGE (ML) INJECTION PRN
Status: DISCONTINUED | OUTPATIENT
Start: 2024-10-18 | End: 2024-10-18 | Stop reason: HOSPADM

## 2024-10-18 RX ORDER — SODIUM CHLORIDE 0.9 % (FLUSH) 0.9 %
5-40 SYRINGE (ML) INJECTION EVERY 12 HOURS SCHEDULED
Status: CANCELLED | OUTPATIENT
Start: 2024-10-18

## 2024-10-18 RX ORDER — FENTANYL CITRATE 50 UG/ML
INJECTION, SOLUTION INTRAMUSCULAR; INTRAVENOUS
Status: DISCONTINUED | OUTPATIENT
Start: 2024-10-18 | End: 2024-10-18 | Stop reason: SDUPTHER

## 2024-10-18 RX ORDER — SODIUM CHLORIDE 9 MG/ML
INJECTION, SOLUTION INTRAVENOUS CONTINUOUS
Status: CANCELLED | OUTPATIENT
Start: 2024-10-18 | End: 2024-10-18

## 2024-10-18 RX ORDER — ONDANSETRON 2 MG/ML
4 INJECTION INTRAMUSCULAR; INTRAVENOUS
Status: DISCONTINUED | OUTPATIENT
Start: 2024-10-18 | End: 2024-10-18 | Stop reason: HOSPADM

## 2024-10-18 RX ORDER — DEXAMETHASONE SODIUM PHOSPHATE 4 MG/ML
INJECTION, SOLUTION INTRA-ARTICULAR; INTRALESIONAL; INTRAMUSCULAR; INTRAVENOUS; SOFT TISSUE
Status: DISCONTINUED | OUTPATIENT
Start: 2024-10-18 | End: 2024-10-18 | Stop reason: SDUPTHER

## 2024-10-18 RX ORDER — ROPIVACAINE HYDROCHLORIDE 5 MG/ML
INJECTION, SOLUTION EPIDURAL; INFILTRATION; PERINEURAL
Status: COMPLETED | OUTPATIENT
Start: 2024-10-18 | End: 2024-10-18

## 2024-10-18 RX ORDER — SODIUM CHLORIDE 9 MG/ML
INJECTION, SOLUTION INTRAVENOUS PRN
Status: DISCONTINUED | OUTPATIENT
Start: 2024-10-18 | End: 2024-10-18 | Stop reason: HOSPADM

## 2024-10-18 RX ORDER — ONDANSETRON 4 MG/1
4 TABLET, ORALLY DISINTEGRATING ORAL EVERY 8 HOURS PRN
Status: CANCELLED | OUTPATIENT
Start: 2024-10-18

## 2024-10-18 RX ORDER — NALOXONE HYDROCHLORIDE 0.4 MG/ML
INJECTION, SOLUTION INTRAMUSCULAR; INTRAVENOUS; SUBCUTANEOUS PRN
Status: DISCONTINUED | OUTPATIENT
Start: 2024-10-18 | End: 2024-10-18 | Stop reason: HOSPADM

## 2024-10-18 RX ORDER — FENTANYL CITRATE 0.05 MG/ML
50 INJECTION, SOLUTION INTRAMUSCULAR; INTRAVENOUS EVERY 10 MIN PRN
Status: DISCONTINUED | OUTPATIENT
Start: 2024-10-18 | End: 2024-10-18 | Stop reason: HOSPADM

## 2024-10-18 RX ORDER — ONDANSETRON 2 MG/ML
INJECTION INTRAMUSCULAR; INTRAVENOUS
Status: DISCONTINUED | OUTPATIENT
Start: 2024-10-18 | End: 2024-10-18 | Stop reason: SDUPTHER

## 2024-10-18 RX ORDER — MORPHINE SULFATE 2 MG/ML
2 INJECTION, SOLUTION INTRAMUSCULAR; INTRAVENOUS
Status: CANCELLED | OUTPATIENT
Start: 2024-10-18

## 2024-10-18 RX ORDER — MAGNESIUM HYDROXIDE 1200 MG/15ML
LIQUID ORAL CONTINUOUS PRN
Status: COMPLETED | OUTPATIENT
Start: 2024-10-18 | End: 2024-10-18

## 2024-10-18 RX ORDER — METOCLOPRAMIDE HYDROCHLORIDE 5 MG/ML
10 INJECTION INTRAMUSCULAR; INTRAVENOUS
Status: DISCONTINUED | OUTPATIENT
Start: 2024-10-18 | End: 2024-10-18 | Stop reason: HOSPADM

## 2024-10-18 RX ORDER — VANCOMYCIN HYDROCHLORIDE 1 G/20ML
INJECTION, POWDER, LYOPHILIZED, FOR SOLUTION INTRAVENOUS
Status: DISCONTINUED | OUTPATIENT
Start: 2024-10-18 | End: 2024-10-18 | Stop reason: SDUPTHER

## 2024-10-18 RX ORDER — VANCOMYCIN 1 G/200ML
1000 INJECTION, SOLUTION INTRAVENOUS
Status: COMPLETED | OUTPATIENT
Start: 2024-10-18 | End: 2024-10-18

## 2024-10-18 RX ORDER — ROCURONIUM BROMIDE 10 MG/ML
INJECTION, SOLUTION INTRAVENOUS
Status: DISCONTINUED | OUTPATIENT
Start: 2024-10-18 | End: 2024-10-18 | Stop reason: SDUPTHER

## 2024-10-18 RX ORDER — HYDROCODONE BITARTRATE AND ACETAMINOPHEN 5; 325 MG/1; MG/1
1 TABLET ORAL EVERY 6 HOURS PRN
Qty: 16 TABLET | Refills: 0 | Status: SHIPPED | OUTPATIENT
Start: 2024-10-18 | End: 2024-10-22

## 2024-10-18 RX ORDER — OXYCODONE HYDROCHLORIDE 5 MG/1
5 TABLET ORAL
Status: DISCONTINUED | OUTPATIENT
Start: 2024-10-18 | End: 2024-10-18 | Stop reason: HOSPADM

## 2024-10-18 RX ORDER — ONDANSETRON 2 MG/ML
4 INJECTION INTRAMUSCULAR; INTRAVENOUS EVERY 6 HOURS PRN
Status: CANCELLED | OUTPATIENT
Start: 2024-10-18

## 2024-10-18 RX ORDER — DIPHENHYDRAMINE HYDROCHLORIDE 50 MG/ML
12.5 INJECTION INTRAMUSCULAR; INTRAVENOUS
Status: DISCONTINUED | OUTPATIENT
Start: 2024-10-18 | End: 2024-10-18 | Stop reason: HOSPADM

## 2024-10-18 RX ORDER — PROPOFOL 10 MG/ML
INJECTION, EMULSION INTRAVENOUS
Status: DISCONTINUED | OUTPATIENT
Start: 2024-10-18 | End: 2024-10-18 | Stop reason: SDUPTHER

## 2024-10-18 RX ORDER — SODIUM CHLORIDE 9 MG/ML
INJECTION, SOLUTION INTRAVENOUS PRN
Status: CANCELLED | OUTPATIENT
Start: 2024-10-18

## 2024-10-18 RX ADMIN — MIDAZOLAM HYDROCHLORIDE 2 MG: 1 INJECTION, SOLUTION INTRAMUSCULAR; INTRAVENOUS at 11:04

## 2024-10-18 RX ADMIN — MIDAZOLAM HYDROCHLORIDE 2 MG: 1 INJECTION, SOLUTION INTRAMUSCULAR; INTRAVENOUS at 12:10

## 2024-10-18 RX ADMIN — SUGAMMADEX 200 MG: 100 INJECTION, SOLUTION INTRAVENOUS at 13:24

## 2024-10-18 RX ADMIN — ONDANSETRON 4 MG: 2 INJECTION, SOLUTION INTRAMUSCULAR; INTRAVENOUS at 12:40

## 2024-10-18 RX ADMIN — VANCOMYCIN 1000 MG: 1 INJECTION, SOLUTION INTRAVENOUS at 10:18

## 2024-10-18 RX ADMIN — PHENYLEPHRINE HYDROCHLORIDE 100 MCG: 10 INJECTION INTRAVENOUS at 12:45

## 2024-10-18 RX ADMIN — DEXAMETHASONE SODIUM PHOSPHATE 4 MG: 4 INJECTION INTRA-ARTICULAR; INTRALESIONAL; INTRAMUSCULAR; INTRAVENOUS; SOFT TISSUE at 12:40

## 2024-10-18 RX ADMIN — PROPOFOL 150 MG: 10 INJECTION, EMULSION INTRAVENOUS at 12:32

## 2024-10-18 RX ADMIN — SUGAMMADEX 200 MG: 100 INJECTION, SOLUTION INTRAVENOUS at 13:11

## 2024-10-18 RX ADMIN — ROCURONIUM BROMIDE 40 MG: 10 INJECTION, SOLUTION INTRAVENOUS at 12:32

## 2024-10-18 RX ADMIN — PHENYLEPHRINE HYDROCHLORIDE 100 MCG: 10 INJECTION INTRAVENOUS at 12:53

## 2024-10-18 RX ADMIN — PROPOFOL 150 MG: 10 INJECTION, EMULSION INTRAVENOUS at 12:14

## 2024-10-18 RX ADMIN — SODIUM CHLORIDE: 9 INJECTION, SOLUTION INTRAVENOUS at 10:17

## 2024-10-18 RX ADMIN — VANCOMYCIN HYDROCHLORIDE 200 MG: 1 INJECTION, POWDER, LYOPHILIZED, FOR SOLUTION INTRAVENOUS at 12:10

## 2024-10-18 RX ADMIN — ROPIVACAINE HYDROCHLORIDE 20 ML: 5 INJECTION, SOLUTION EPIDURAL; INFILTRATION; PERINEURAL at 11:04

## 2024-10-18 RX ADMIN — CEFAZOLIN 1 G: 1 INJECTION, POWDER, FOR SOLUTION INTRAMUSCULAR; INTRAVENOUS at 12:46

## 2024-10-18 RX ADMIN — PHENYLEPHRINE HYDROCHLORIDE 100 MCG: 10 INJECTION INTRAVENOUS at 13:01

## 2024-10-18 RX ADMIN — FENTANYL CITRATE 100 MCG: 50 INJECTION, SOLUTION INTRAMUSCULAR; INTRAVENOUS at 12:12

## 2024-10-18 RX ADMIN — FENTANYL CITRATE 100 MCG: 50 INJECTION, SOLUTION INTRAMUSCULAR; INTRAVENOUS at 12:31

## 2024-10-18 ASSESSMENT — PAIN - FUNCTIONAL ASSESSMENT
PAIN_FUNCTIONAL_ASSESSMENT: PREVENTS OR INTERFERES SOME ACTIVE ACTIVITIES AND ADLS
PAIN_FUNCTIONAL_ASSESSMENT: 0-10

## 2024-10-18 ASSESSMENT — PAIN SCALES - GENERAL
PAINLEVEL_OUTOF10: 0
PAINLEVEL_OUTOF10: 0

## 2024-10-18 NOTE — H&P
Interval History and Physical    I have interviewed and examined the patient and reviewed the recent History and Physical.  There have been no changes to the recent H&P documentation. No change in ROS or PE. Pt's allergies reviewed and no change List of meds on original H&P    No significant findings with ROS, family hx, social  Hx, ALL, surgical hx, med list or medical history     The patient understands the planned operation and its associated risks and benefits and agrees to proceed.    The surgical consent form has been signed.    BP (!) 149/73   Pulse (!) 104   Temp 98.4 °F (36.9 °C) (Temporal)   Resp 18   Ht 1.727 m (5' 8\")   Wt 77.6 kg (171 lb)   LMP  (LMP Unknown)   SpO2 98%   BMI 26.00 kg/m²      Electronically signed by Doug Soria MD on 10/18/2024 at 10:15 AM

## 2024-10-18 NOTE — ANESTHESIA PROCEDURE NOTES
Peripheral Block    Patient location during procedure: pre-op  Reason for block: post-op pain management and at surgeon's request  Start time: 10/18/2024 11:04 AM  End time: 10/18/2024 11:15 AM  Staffing  Performed: anesthesiologist   Anesthesiologist: Bryant Romeo DO  Performed by: Bryant Romeo DO  Authorized by: Bryant Romeo DO    Preanesthetic Checklist  Completed: patient identified, IV checked, site marked, risks and benefits discussed, surgical/procedural consents, equipment checked, pre-op evaluation, timeout performed, anesthesia consent given, oxygen available and monitors applied/VS acknowledged  Peripheral Block   Patient position: supine  Prep: ChloraPrep  Provider prep: mask and sterile gloves (Sterile probe cover)  Patient monitoring: cardiac monitor, continuous pulse ox, frequent blood pressure checks and IV access  Block type: Brachial plexus  Supraclavicular  Laterality: right  Injection technique: single-shot  Guidance: ultrasound guided  Local infiltration: lidocaine  Infiltration strength: 1 %  Local infiltration: lidocaine  Dose: 5 mL    Needle   Needle type: combined needle/nerve stimulator   Needle gauge: 22 G  Needle localization: anatomical landmarks and ultrasound guidance  Needle length: 5 cm  Assessment   Injection assessment: negative aspiration for heme, no paresthesia on injection and local visualized surrounding nerve on ultrasound  Paresthesia pain: immediately resolved  Slow fractionated injection: yes  Hemodynamics: stable    Additional Notes  Ultrasound guidance used to view needle for placement.    Ultrasound image stored,  printed and saved in patient chart.    Sterile probe cover used    Medications Administered  midazolam (VERSED) injection 2 mg/2mL - IntraVENous   2 mg - 10/18/2024 11:04:00 AM  ropivacaine (NAROPIN) injection 0.5% - Perineural   20 mL - 10/18/2024 11:04:00 AM

## 2024-10-18 NOTE — ANESTHESIA PRE PROCEDURE
Counseling given: Not Answered      Vital Signs (Current):   Vitals:    10/18/24 0936 10/18/24 0938   BP: (!) 149/73    Pulse: (!) 104    Resp: 18    Temp:  98.4 °F (36.9 °C)   TempSrc: Temporal Temporal   SpO2: 98%    Weight: 77.6 kg (171 lb)    Height: 1.727 m (5' 8\")                                               BP Readings from Last 3 Encounters:   10/18/24 (!) 149/73   10/14/24 120/87   09/24/24 138/82       NPO Status: Time of last liquid consumption: 2100                        Time of last solid consumption: 1900                        Date of last liquid consumption: 10/17/24                        Date of last solid food consumption: 10/17/24    BMI:   Wt Readings from Last 3 Encounters:   10/18/24 77.6 kg (171 lb)   10/14/24 77.6 kg (171 lb)   09/24/24 77.6 kg (171 lb)     Body mass index is 26 kg/m².    CBC:   Lab Results   Component Value Date/Time    WBC 11.3 10/14/2024 02:03 PM    RBC 4.97 10/14/2024 02:03 PM    HGB 14.0 10/14/2024 02:03 PM    HCT 42.7 10/14/2024 02:03 PM    MCV 85.9 10/14/2024 02:03 PM    RDW 12.9 10/14/2024 02:03 PM     10/14/2024 02:03 PM       CMP:   Lab Results   Component Value Date/Time     10/14/2024 02:03 PM    K 4.6 10/14/2024 02:03 PM    K 4.0 01/02/2024 12:33 PM     10/14/2024 02:03 PM    CO2 28 10/14/2024 02:03 PM    BUN 13 10/14/2024 02:03 PM    CREATININE 1.17 10/14/2024 02:03 PM    GFRAA >60.0 09/25/2022 04:45 PM    LABGLOM 54.4 10/14/2024 02:03 PM    LABGLOM 56.3 04/10/2024 01:54 PM    GLUCOSE 63 10/14/2024 02:03 PM    GLUCOSE 228 04/08/2019 09:49 AM    CALCIUM 9.0 10/14/2024 02:03 PM    BILITOT 0.3 10/14/2024 02:03 PM    ALKPHOS 93 10/14/2024 02:03 PM    AST 11 10/14/2024 02:03 PM    ALT 6 10/14/2024 02:03 PM       POC Tests: No results for input(s): \"POCGLU\", \"POCNA\", \"POCK\", \"POCCL\", \"POCBUN\", \"POCHEMO\", \"POCHCT\" in the last 72 hours.    Coags:   Lab Results   Component Value Date/Time    PROTIME 13.5 10/14/2024 02:03 PM    INR 1.0 10/14/2024

## 2024-10-18 NOTE — OP NOTE
Operative Note      Patient: Suzanne Kim  YOB: 1965  MRN: [id number]    Date of Procedure: 10/18/2024    Pre-Op Diagnosis Codes:      * Labral tear of shoulder, right, initial encounter [S43.431A]     * Shoulder impingement, right [M25.811]    Post-Op Diagnosis:  Right shoulder impingement, right shoulder partial rotator cuff tear, right shoulder labral tear       Procedure(s):  RIGHT SHOULDER ARTHROSCOPY SUBACROMIAL DECOMPRESSION, ARTHROSCOPY DEBRIDEMENT LABRAL TEAR AND PARTIAL CUFF TEAR    Surgeon(s):  Doug Soria MD    Assistant: TRAM Chatterjee PA-C was required and present throughout the entire case.    Given the nature of the disease process and the procedure, a skilled surgical first assistant was necessary during entire case.  The assistant was necessary to hold retractors and manipulate the extremity during the procedure.  A certified scrub tech was also present at the back table managing instruments with supplies for the surgical case.  Anesthesia: General    Estimated Blood Loss (mL): Minimal    Complications: None    Specimens:   * No specimens in log *    Implants:  * No implants in log *      Drains: * No LDAs found *    Findings:  Infection Present At Time Of Surgery (PATOS) (choose all levels that have infection present):  No infection present  Other Findings: Right shoulder labral tear right shoulder partial undersurface cuff tear right shoulder impingement    Detailed Description of Procedure:   Patient is brought to operative room [facility] at [facility].  Induction of anesthesia routine prep and drape lateral position    Timeout was completed site was marked identified    The arm had full motion with no instability or adhesions in the lateral position.    Standard portals were established posteriorly and then anteriorly the aid of a switching stick we inspected the glenohumeral joint    Humeral head glenoid was intact    The labrum had a large  meniscus type tear involving the base of the labral bicep complex which is smooth and debrided back to stable circumferential labral tissue standard portals were established posteriorly and then anteriorly the aid of a switching stick we inspected the glenohumeral joint    Humeral head glenoid was intact    The labrum had a large meniscus type tear involving the base of the labral bicep complex which is smooth and debrided back to stable circumferential labral tissue the posterior superior labrum was frayed and was smoothed and debrided.  The inferior labrum was intact the anterior inferior labrum was intact.  There was a sublabral hole with a Mike type complex with a direct insertion of the middle and superior glenohumeral ligaments into the bicep base with some mild fraying as well which was smoothed and debrided    Once this was completed we inspected the rotator cuff.  Rotator cuff anterior to posterior was intact there was some mild cuff tearing superiorly but minimal in nature only involving 5 to 10% of the Surface this was smoothed and debrided there was no full-thickness tear    The axillary pouch was open there were no loose bodies there was no chondral damage    The arm was repositioned we established our bursal portals we completed a bursectomy there was a large bursitis after a thorough bursectomy we can see bursal side Fraying again superficial in nature that involves only 510% of the Surface but this was over a 2 x 3 cm area underneath the acromion    We completed a decompression sweeping from back to front with the bur removing a 12 mm osteophyte completing a smooth decompression allowed for full visualization of the rotator cuff which was just moved and debrided on the bursal side.  The AC joint was not involved in the impingement process    Final photodocumentation was completed the bursal space was drained.  The portals were closed with nylon sutures a compressive dressing was applied sling was

## 2024-10-18 NOTE — DISCHARGE INSTRUCTIONS
Medication given may have significant effects after discharge. Therefore on the day of surgery:  1) you must be accompanied by a responsible adult upon discharge and for 24 hours after surgery.  Do not drive a motor vehicle, operate machinery, power tools or appliance, drink alcoholic beverages, or make critical decisions for 24 hours  2) Be aware of dizziness, which may cause a fall. Change positions slowly.  3) Eating: you may resume your regular diet but it is better to increase intake slowly with mild foods and working up to your regular diet. No greasy, fried or spicy foods today.  4) Nausea/Vomiting: Nausea and vomiting may occur as you become more active or begin to increase food intake. If this should happen, decrease activity and return to liquids. If the problem persists, call your surgeon  5) Pain: Your surgeon may have given you a prescription for pain medication. Take pain medication with food as prescribed. Pain medication may cause constipation, so drink plenty of fluids. If your pain medication does not provide adequate relief, call your surgeon  6) Urinating: Notify your surgeon if you have not urinated within 12 hours after discharge  7) Ice: Apply ice to operative site for 20 min 5-6 times a day or use Polar care as instructed  8) Dressing:   [x]   Remove dressing in 24hr    []  Remove dressing in 48hr   [x]  Leave open to air after initial dressing is taken off and incision is dry, then okay to shower  Do not remove the steri-strips. (no bath/ hot tubs/ pools)   []  Leave dressing in place. Keep dressing/ incision clean and dry    9) Activity    Shoulder/ elbow/Hand   [x]  Elevate extremity    [x]  Sling   [x] at all times (except for exercises and showering)  [] as needed only for comfort   [] Begin daily motion exercises out of sling as instructed   [x]  Bend and flex fingers/ wrist/elbow frequently   [] other   Knee/ Ankle/ Foot   [] elevate extremity   [] crutches        [] non-weight  small amount of food. Eating will decrease the chance of an upset stomach.  Pain medication can take about 20-30 minutes to start working, so do not wait until the pain worsens before taking a dose. Remember, always take over-the-counter and prescription drugs only as directed by your provider.  Unless otherwise instructed by your provider, applying ice on or around your surgical site can be a great way to help minimize pain and swelling after surgery.  Apply ice to the affected area a minimum of 4 times daily for no longer than 15-20 minutes at a time. It is very important to protect your skin by NOT applying ice or ice pack directly to your skin. Always have a towel or pillow case between your skin and the ice. Frozen vegetable packs like peas or corn make great inexpensive alternatives for use as an icepack.    FOLLOW UP CARE - Call your Physician if any of the following occur:  Increased swelling, redness, warmth, hardness around operative area,  Blood soaked dressings (small amounts of oozing may be normal),  Numb, tingling, or cold fingers or toes (for surgeries on extremities)  Fever over 101° F,  Increased drainage, puss, and/or odor from surgical site,  Pain not relieved by medications ordered  Unable to urinate

## 2024-10-18 NOTE — PROGRESS NOTES
Received from or [facility] accompanied by ROBIN Ricardo crna. O2 on at 10L mask, SAO2 89%, breath sounds clear to anterior auscultation. MP ST . See assessment. Left forearm noted reddened and edemetous, iv site taut.Dr Jane notified, IV discontinued, warm compress applied to arm, left radial pulse 2+, left fingers warm and mobile with brisk capillary refill noted.

## 2024-10-18 NOTE — ANESTHESIA POSTPROCEDURE EVALUATION
Department of Anesthesiology  Postprocedure Note    Patient: Suzanne Kim  MRN: [id number]  YOB: 1965  Date of evaluation: 10/18/2024    Procedure Summary       Date: 10/18/24 Room / Location: [address]    Anesthesia Start: 1210 Anesthesia Stop: 1337    Procedure: RIGHT SHOULDER ARTHROSCOPY SUBACROMIAL DECOMPRESSION, ARTHROSCOPY DEBRIDEMENT LABRAL TEAR AND PARTIAL CUFF TEAR (Right) Diagnosis:       Labral tear of shoulder, right, initial encounter      Shoulder impingement, right      (Labral tear of shoulder, right, initial encounter [S43.431A])      (Shoulder impingement, right [M25.811])    Surgeons: Doug Soria MD Responsible Provider: Bryant Romeo DO    Anesthesia Type: General ASA Status: 3            Anesthesia Type: General    Akshat Phase I: Akshat Score: 10    Akshat Phase II:      Anesthesia Post Evaluation    Patient location during evaluation: bedside  Patient participation: complete - patient participated  Level of consciousness: awake and awake and alert  Pain score: 0  Airway patency: patent  Nausea & Vomiting: no nausea and no vomiting  Cardiovascular status: blood pressure returned to baseline and hemodynamically stable  Respiratory status: acceptable and face mask  Hydration status: euvolemic  Pain management: adequate        No notable events documented.

## 2024-10-18 NOTE — PROGRESS NOTES
Pt discharged via wheelchair to .  All personal belongings nd discharge instructions taken with patient.

## 2024-10-19 ENCOUNTER — APPOINTMENT (OUTPATIENT)
Dept: RADIOLOGY | Facility: HOSPITAL | Age: 59
End: 2024-10-19
Payer: MEDICARE

## 2024-10-21 ENCOUNTER — APPOINTMENT (OUTPATIENT)
Dept: ORTHOPEDIC SURGERY | Facility: CLINIC | Age: 59
End: 2024-10-21
Payer: COMMERCIAL

## 2024-10-21 DIAGNOSIS — M75.81 ROTATOR CUFF TENDONITIS, RIGHT: Primary | ICD-10-CM

## 2024-10-21 PROCEDURE — 3048F LDL-C <100 MG/DL: CPT | Performed by: PHYSICIAN ASSISTANT

## 2024-10-21 PROCEDURE — 99024 POSTOP FOLLOW-UP VISIT: CPT | Performed by: PHYSICIAN ASSISTANT

## 2024-10-21 PROCEDURE — 3061F NEG MICROALBUMINURIA REV: CPT | Performed by: PHYSICIAN ASSISTANT

## 2024-10-28 ENCOUNTER — APPOINTMENT (OUTPATIENT)
Dept: RHEUMATOLOGY | Facility: CLINIC | Age: 59
End: 2024-10-28
Payer: COMMERCIAL

## 2024-11-06 ENCOUNTER — HOSPITAL ENCOUNTER (EMERGENCY)
Age: 59
Discharge: HOME | End: 2024-11-06
Attending: EMERGENCY MEDICINE
Payer: MEDICARE

## 2024-11-06 VITALS
HEART RATE: 98 BPM | SYSTOLIC BLOOD PRESSURE: 112 MMHG | OXYGEN SATURATION: 96 % | TEMPERATURE: 98.2 F | DIASTOLIC BLOOD PRESSURE: 92 MMHG | RESPIRATION RATE: 18 BRPM | BODY MASS INDEX: 32.08 KG/M2 | HEIGHT: 61 IN

## 2024-11-06 DIAGNOSIS — B85.0 HEAD LICE INFESTATION: Primary | ICD-10-CM

## 2024-11-06 PROCEDURE — 99283 EMERGENCY DEPT VISIT LOW MDM: CPT

## 2024-11-06 RX ORDER — LINDANE 10 MG/ML
SHAMPOO, SUSPENSION TOPICAL
Qty: 240 ML | Refills: 0 | Status: SHIPPED | OUTPATIENT
Start: 2024-11-06 | End: 2024-11-09

## 2024-11-06 ASSESSMENT — ENCOUNTER SYMPTOMS
EYE DISCHARGE: 0
BLOOD IN STOOL: 0
FACIAL SWELLING: 0
COUGH: 0
SHORTNESS OF BREATH: 0
VOICE CHANGE: 0
CHEST TIGHTNESS: 0
SINUS PRESSURE: 0
CHOKING: 0
SORE THROAT: 0
BACK PAIN: 0
STRIDOR: 0
ABDOMINAL PAIN: 0
TROUBLE SWALLOWING: 0
EYE PAIN: 0
DIARRHEA: 0
VOMITING: 0
CONSTIPATION: 0
WHEEZING: 0
EYE REDNESS: 0

## 2024-11-06 NOTE — ED PROVIDER NOTES
[facility] ED  eMERGENCY dEPARTMENT eNCOUnter      Pt Name: Suzanne Kim  MRN: [id number]  Birthdate 1965  Date of evaluation: 11/6/2024  Provider: Meg Alicea MD    CHIEF COMPLAINT       Chief Complaint   Patient presents with    Head Lice         HISTORY OF PRESENT ILLNESS   (Location/Symptom, Timing/Onset,Context/Setting, Quality, Duration, Modifying Factors, Severity)  Note limiting factors.   Suzanne Kim is a 59 y.o. female who presents to the emergency department and well-known to her previous multiple encounters emergency for chronic back pain and shoulder pain history hypothyroidism and diabetes mellitus SLE asthma along with anxiety depression patient went to [facility] and noted to have a head lice so came here requesting some treatment has no rash elsewhere    HPI    NursingNotes were reviewed.    REVIEW OF SYSTEMS    (2-9 systems for level 4, 10 or more for level 5)     Review of Systems   Constitutional: Negative.  Negative for activity change and fever.   HENT:  Negative for congestion, drooling, facial swelling, mouth sores, nosebleeds, sinus pressure, sore throat, trouble swallowing and voice change.    Eyes:  Negative for pain, discharge, redness and visual disturbance.   Respiratory:  Negative for cough, choking, chest tightness, shortness of breath, wheezing and stridor.    Cardiovascular:  Negative for chest pain, palpitations and leg swelling.   Gastrointestinal:  Negative for abdominal pain, blood in stool, constipation, diarrhea and vomiting.   Endocrine: Negative for cold intolerance, polyphagia and polyuria.   Genitourinary:  Negative for dysuria, flank pain, frequency, genital sores and urgency.   Musculoskeletal:  Negative for back pain, joint swelling, neck pain and neck stiffness.   Skin:  Negative for pallor and rash.   Neurological:  Negative for tremors, seizures, syncope, weakness, numbness and headaches.   Hematological:  Negative for adenopathy. Does not  urgent intervention.    CONSULTS:  None    PROCEDURES:  Unless otherwise noted below, none     Procedures    FINAL IMPRESSION      1. Head lice infestation          DISPOSITION/PLAN   DISPOSITION             PATIENT REFERRED TO:  Tina Eller MD  [address] [address] [phone]      As needed      DISCHARGE MEDICATIONS:  New Prescriptions    LINDANE 1 % SHAMPOO    Apply topically once.          (Please note that portions of this note were completed with a voice recognition program.  Efforts were made to edit the dictations but occasionally words are mis-transcribed.)    Meg Alicea MD (electronically signed)  Attending Emergency Physician        Meg Alicea MD  11/06/24 [id number]

## 2024-11-13 ENCOUNTER — APPOINTMENT (OUTPATIENT)
Dept: ORTHOPEDIC SURGERY | Facility: CLINIC | Age: 59
End: 2024-11-13
Payer: COMMERCIAL

## 2024-11-23 ENCOUNTER — HOSPITAL ENCOUNTER (OUTPATIENT)
Dept: LAB | Age: 59
Discharge: HOME | End: 2024-11-23
Payer: MEDICARE

## 2024-11-23 DIAGNOSIS — E11.65 UNCONTROLLED TYPE 2 DIABETES MELLITUS WITH HYPERGLYCEMIA (HCC): ICD-10-CM

## 2024-11-23 LAB
ANION GAP SERPL CALCULATED.3IONS-SCNC: 8 MEQ/L (ref 9–15)
BUN SERPL-MCNC: 10 MG/DL (ref 6–20)
CALCIUM SERPL-MCNC: 8.5 MG/DL (ref 8.5–9.9)
CHLORIDE SERPL-SCNC: 103 MEQ/L (ref 95–107)
CO2 SERPL-SCNC: 28 MEQ/L (ref 20–31)
CREAT SERPL-MCNC: 1.09 MG/DL (ref 0.5–0.9)
ESTIMATED AVERAGE GLUCOSE: 111 MG/DL
GLUCOSE SERPL-MCNC: 50 MG/DL (ref 70–99)
HBA1C MFR BLD: 5.5 % (ref 4–6)
POTASSIUM SERPL-SCNC: 4.1 MEQ/L (ref 3.4–4.9)
SODIUM SERPL-SCNC: 139 MEQ/L (ref 135–144)

## 2024-11-23 PROCEDURE — 36415 COLL VENOUS BLD VENIPUNCTURE: CPT

## 2024-11-23 PROCEDURE — 83036 HEMOGLOBIN GLYCOSYLATED A1C: CPT

## 2024-11-23 PROCEDURE — 80048 BASIC METABOLIC PNL TOTAL CA: CPT

## 2024-11-27 ENCOUNTER — HOSPITAL ENCOUNTER (EMERGENCY)
Age: 59
Discharge: HOME | End: 2024-11-27
Attending: EMERGENCY MEDICINE
Payer: MEDICARE

## 2024-11-27 ENCOUNTER — APPOINTMENT (OUTPATIENT)
Dept: CT IMAGING | Age: 59
End: 2024-11-27
Payer: MEDICARE

## 2024-11-27 VITALS
HEART RATE: 97 BPM | SYSTOLIC BLOOD PRESSURE: 117 MMHG | TEMPERATURE: 98.5 F | DIASTOLIC BLOOD PRESSURE: 69 MMHG | HEIGHT: 68 IN | WEIGHT: 160 LBS | BODY MASS INDEX: 24.25 KG/M2 | RESPIRATION RATE: 16 BRPM | OXYGEN SATURATION: 99 %

## 2024-11-27 DIAGNOSIS — K59.00 CONSTIPATION, UNSPECIFIED CONSTIPATION TYPE: ICD-10-CM

## 2024-11-27 DIAGNOSIS — R10.9 LEFT FLANK PAIN: Primary | ICD-10-CM

## 2024-11-27 LAB
BILIRUB UR QL STRIP: NEGATIVE
CHP ED QC CHECK: YES
CLARITY UR: CLEAR
COLOR UR: YELLOW
GLUCOSE BLD-MCNC: 102 MG/DL
GLUCOSE BLD-MCNC: 102 MG/DL (ref 70–99)
GLUCOSE UR STRIP-MCNC: NEGATIVE MG/DL
HGB UR QL STRIP: NEGATIVE
KETONES UR STRIP-MCNC: NEGATIVE MG/DL
LEUKOCYTE ESTERASE UR QL STRIP: NEGATIVE
NITRITE UR QL STRIP: NEGATIVE
PERFORMED ON: ABNORMAL
PH UR STRIP: 5 [PH] (ref 5–9)
PROT UR STRIP-MCNC: NEGATIVE MG/DL
SP GR UR STRIP: >=1.03 (ref 1–1.03)
URINE REFLEX TO CULTURE: NORMAL
UROBILINOGEN UR STRIP-ACNC: 0.2 E.U./DL

## 2024-11-27 PROCEDURE — 81003 URINALYSIS AUTO W/O SCOPE: CPT

## 2024-11-27 PROCEDURE — 74176 CT ABD & PELVIS W/O CONTRAST: CPT

## 2024-11-27 PROCEDURE — 99284 EMERGENCY DEPT VISIT MOD MDM: CPT

## 2024-11-27 PROCEDURE — 6370000000 HC RX 637 (ALT 250 FOR IP): Performed by: EMERGENCY MEDICINE

## 2024-11-27 RX ORDER — HYDROCODONE BITARTRATE AND ACETAMINOPHEN 5; 325 MG/1; MG/1
1 TABLET ORAL ONCE
Status: COMPLETED | OUTPATIENT
Start: 2024-11-27 | End: 2024-11-27

## 2024-11-27 RX ADMIN — HYDROCODONE BITARTRATE AND ACETAMINOPHEN 1 TABLET: 5; 325 TABLET ORAL at 12:30

## 2024-11-27 ASSESSMENT — PAIN DESCRIPTION - ORIENTATION
ORIENTATION: LEFT
ORIENTATION: LEFT

## 2024-11-27 ASSESSMENT — ENCOUNTER SYMPTOMS
SINUS PRESSURE: 0
WHEEZING: 0
SHORTNESS OF BREATH: 0
TROUBLE SWALLOWING: 0
VOICE CHANGE: 0
CONSTIPATION: 0
STRIDOR: 0
DIARRHEA: 0
EYE PAIN: 0
BLOOD IN STOOL: 0
BACK PAIN: 0
CHOKING: 0
SORE THROAT: 0
VOMITING: 0
ABDOMINAL PAIN: 0
CHEST TIGHTNESS: 0
EYE DISCHARGE: 0
FACIAL SWELLING: 0
COUGH: 0
EYE REDNESS: 0

## 2024-11-27 ASSESSMENT — PAIN DESCRIPTION - LOCATION
LOCATION: FLANK
LOCATION: HEAD
LOCATION: FLANK

## 2024-11-27 ASSESSMENT — PAIN - FUNCTIONAL ASSESSMENT
PAIN_FUNCTIONAL_ASSESSMENT: 0-10
PAIN_FUNCTIONAL_ASSESSMENT: PREVENTS OR INTERFERES SOME ACTIVE ACTIVITIES AND ADLS
PAIN_FUNCTIONAL_ASSESSMENT: NONE - DENIES PAIN

## 2024-11-27 ASSESSMENT — PAIN SCALES - GENERAL
PAINLEVEL_OUTOF10: 9
PAINLEVEL_OUTOF10: 9

## 2024-11-27 ASSESSMENT — PAIN DESCRIPTION - FREQUENCY: FREQUENCY: CONTINUOUS

## 2024-11-27 ASSESSMENT — PAIN DESCRIPTION - ONSET: ONSET: GRADUAL

## 2024-11-27 ASSESSMENT — PAIN DESCRIPTION - DESCRIPTORS
DESCRIPTORS: ACHING
DESCRIPTORS: SHARP

## 2024-11-27 ASSESSMENT — PAIN DESCRIPTION - PAIN TYPE: TYPE: ACUTE PAIN

## 2024-11-27 NOTE — ED PROVIDER NOTES
[facility] ED  eMERGENCY dEPARTMENT eNCOUnter      Pt Name: Suzanne Kim  MRN: [id number]  Birthdate 1965  Date of evaluation: 11/27/2024  Provider: Meg Alicea MD    CHIEF COMPLAINT       Chief Complaint   Patient presents with    Flank Pain     Left flank pain.     Urinary Frequency     Onset- Tuesday.         HISTORY OF PRESENT ILLNESS   (Location/Symptom, Timing/Onset,Context/Setting, Quality, Duration, Modifying Factors, Severity)  Note limiting factors.   Suzanne Kim is a 59 y.o. female who presents to the emergency department patient think that she may have a kidney stone as she having a left flank pain after being diagnosed with a UTI over the phone and patient is taking some Omnicef patient denies any blood in the urine no nausea vomiting pain is worse with movement history of remote kidney stone history of chronic pain management diabetes mellitus hyperlipidemia anxiety disorder hypothyroidism SLE well-known to us from previous multiple encounters emergency mostly for migraine and pain control patient dealing with the shoulder injury which is being handled by the orthopedic doctors at this time    HPI    NursingNotes were reviewed.    REVIEW OF SYSTEMS    (2-9 systems for level 4, 10 or more for level 5)     Review of Systems   Constitutional: Negative.  Negative for activity change and fever.   HENT:  Negative for congestion, drooling, facial swelling, mouth sores, nosebleeds, sinus pressure, sore throat, trouble swallowing and voice change.    Eyes:  Negative for pain, discharge, redness and visual disturbance.   Respiratory:  Negative for cough, choking, chest tightness, shortness of breath, wheezing and stridor.    Cardiovascular:  Negative for chest pain, palpitations and leg swelling.   Gastrointestinal:  Negative for abdominal pain, blood in stool, constipation, diarrhea and vomiting.   Endocrine: Negative for cold intolerance, polyphagia and polyuria.   Genitourinary:

## 2024-12-02 ENCOUNTER — HOSPITAL ENCOUNTER (EMERGENCY)
Age: 59
Discharge: HOME | End: 2024-12-02
Attending: EMERGENCY MEDICINE
Payer: MEDICARE

## 2024-12-02 VITALS
RESPIRATION RATE: 16 BRPM | HEART RATE: 109 BPM | TEMPERATURE: 97 F | WEIGHT: 160 LBS | HEIGHT: 68 IN | DIASTOLIC BLOOD PRESSURE: 92 MMHG | OXYGEN SATURATION: 90 % | SYSTOLIC BLOOD PRESSURE: 146 MMHG | BODY MASS INDEX: 24.25 KG/M2

## 2024-12-02 DIAGNOSIS — M54.30 SCIATICA, UNSPECIFIED LATERALITY: Primary | ICD-10-CM

## 2024-12-02 PROCEDURE — 96375 TX/PRO/DX INJ NEW DRUG ADDON: CPT

## 2024-12-02 PROCEDURE — 99284 EMERGENCY DEPT VISIT MOD MDM: CPT

## 2024-12-02 PROCEDURE — 6360000002 HC RX W HCPCS: Performed by: EMERGENCY MEDICINE

## 2024-12-02 PROCEDURE — 2580000003 HC RX 258: Performed by: EMERGENCY MEDICINE

## 2024-12-02 PROCEDURE — 96374 THER/PROPH/DIAG INJ IV PUSH: CPT

## 2024-12-02 RX ORDER — ORPHENADRINE CITRATE 30 MG/ML
60 INJECTION INTRAMUSCULAR; INTRAVENOUS ONCE
Status: COMPLETED | OUTPATIENT
Start: 2024-12-02 | End: 2024-12-02

## 2024-12-02 RX ORDER — 0.9 % SODIUM CHLORIDE 0.9 %
250 INTRAVENOUS SOLUTION INTRAVENOUS ONCE
Status: COMPLETED | OUTPATIENT
Start: 2024-12-02 | End: 2024-12-02

## 2024-12-02 RX ORDER — MORPHINE SULFATE 4 MG/ML
4 INJECTION, SOLUTION INTRAMUSCULAR; INTRAVENOUS ONCE
Status: COMPLETED | OUTPATIENT
Start: 2024-12-02 | End: 2024-12-02

## 2024-12-02 RX ORDER — ONDANSETRON 2 MG/ML
4 INJECTION INTRAMUSCULAR; INTRAVENOUS ONCE
Status: COMPLETED | OUTPATIENT
Start: 2024-12-02 | End: 2024-12-02

## 2024-12-02 RX ORDER — ORPHENADRINE CITRATE 100 MG/1
100 TABLET ORAL 2 TIMES DAILY
Qty: 20 TABLET | Refills: 0 | Status: SHIPPED | OUTPATIENT
Start: 2024-12-02 | End: 2024-12-12

## 2024-12-02 RX ORDER — DEXAMETHASONE SODIUM PHOSPHATE 10 MG/ML
10 INJECTION INTRAMUSCULAR; INTRAVENOUS ONCE
Status: COMPLETED | OUTPATIENT
Start: 2024-12-02 | End: 2024-12-02

## 2024-12-02 RX ORDER — FENTANYL CITRATE 0.05 MG/ML
50 INJECTION, SOLUTION INTRAMUSCULAR; INTRAVENOUS ONCE
Status: COMPLETED | OUTPATIENT
Start: 2024-12-02 | End: 2024-12-02

## 2024-12-02 RX ORDER — PREDNISONE 20 MG/1
40 TABLET ORAL DAILY
Qty: 10 TABLET | Refills: 0 | Status: SHIPPED | OUTPATIENT
Start: 2024-12-02 | End: 2024-12-07

## 2024-12-02 RX ADMIN — SODIUM CHLORIDE 250 ML: 9 INJECTION, SOLUTION INTRAVENOUS at 08:24

## 2024-12-02 RX ADMIN — ONDANSETRON 4 MG: 2 INJECTION, SOLUTION INTRAMUSCULAR; INTRAVENOUS at 08:31

## 2024-12-02 RX ADMIN — MORPHINE SULFATE 4 MG: 4 INJECTION, SOLUTION INTRAMUSCULAR; INTRAVENOUS at 08:28

## 2024-12-02 RX ADMIN — ORPHENADRINE CITRATE 60 MG: 30 INJECTION, SOLUTION INTRAMUSCULAR; INTRAVENOUS at 08:34

## 2024-12-02 RX ADMIN — DEXAMETHASONE SODIUM PHOSPHATE 10 MG: 10 INJECTION INTRAMUSCULAR; INTRAVENOUS at 08:25

## 2024-12-02 RX ADMIN — FENTANYL CITRATE 50 MCG: 0.05 INJECTION, SOLUTION INTRAMUSCULAR; INTRAVENOUS at 09:53

## 2024-12-02 ASSESSMENT — ENCOUNTER SYMPTOMS
VOMITING: 0
SORE THROAT: 0
NAUSEA: 0
BACK PAIN: 1
CHEST TIGHTNESS: 0
ABDOMINAL PAIN: 0
EYE PAIN: 0
SHORTNESS OF BREATH: 0

## 2024-12-02 ASSESSMENT — PAIN DESCRIPTION - LOCATION
LOCATION: HIP
LOCATION: BACK
LOCATION: BACK

## 2024-12-02 ASSESSMENT — PAIN SCALES - GENERAL
PAINLEVEL_OUTOF10: 8
PAINLEVEL_OUTOF10: 9
PAINLEVEL_OUTOF10: 7
PAINLEVEL_OUTOF10: 0

## 2024-12-02 ASSESSMENT — PAIN DESCRIPTION - ORIENTATION
ORIENTATION: LEFT
ORIENTATION: LEFT;LOWER

## 2024-12-02 ASSESSMENT — PAIN DESCRIPTION - DESCRIPTORS: DESCRIPTORS: BURNING

## 2024-12-02 ASSESSMENT — PAIN DESCRIPTION - PAIN TYPE: TYPE: ACUTE PAIN

## 2024-12-02 ASSESSMENT — PAIN - FUNCTIONAL ASSESSMENT: PAIN_FUNCTIONAL_ASSESSMENT: 0-10

## 2024-12-02 NOTE — ED NOTES
Ambulatory to bathroom. States pain remains 7/10. Electronically signed by Simran Mitchell RN on 12/2/2024 at 9:51 AM

## 2024-12-02 NOTE — ED PROVIDER NOTES
[facility] ED  EMERGENCY DEPARTMENT ENCOUNTER      Pt Name: Suzanne Kim  MRN: [id number]  Birthdate 1965  Date of evaluation: 12/2/2024  Provider: Dolly Carmen DO    CHIEF COMPLAINT       Chief Complaint   Patient presents with    Back Pain     States left sided sciatica          HISTORY OF PRESENT ILLNESS   (Location/Symptom, Timing/Onset, Context/Setting, Quality, Duration, Modifying Factors, Severity)  Note limiting factors.   Suzanne Kim is a 59 y.o. female who presents to the emergency department .  Patient presents with left-sided low back pain with radiation down her left buttock and left leg.  She tells me that she gets this intermittently and in between episodes she does not have it at all.  She did not do anything to cause it to flare up.  She did take a Percocet last night which did not help.  Patient has CKD secondary to lupus and diabetes and cannot take NSAIDs.  Patient is able to ambulate control bowel and bladder but cannot get comfortable.    HPI    Nursing Notes were reviewed.    REVIEW OF SYSTEMS    (2-9 systems for level 4, 10 or more for level 5)     Review of Systems   Constitutional:  Negative for activity change, appetite change and fatigue.   HENT:  Negative for congestion and sore throat.    Eyes:  Negative for pain and visual disturbance.   Respiratory:  Negative for chest tightness and shortness of breath.    Cardiovascular:  Negative for chest pain.   Gastrointestinal:  Negative for abdominal pain, nausea and vomiting.   Endocrine: Negative for polydipsia.   Genitourinary:  Negative for flank pain and urgency.   Musculoskeletal:  Positive for back pain. Negative for gait problem and neck stiffness.   Skin:  Negative for rash.   Neurological:  Negative for weakness, light-headedness and headaches.   Psychiatric/Behavioral:  Negative for confusion and sleep disturbance.        Except as noted above the remainder of the review of systems was reviewed and

## 2024-12-19 ENCOUNTER — OFFICE VISIT (OUTPATIENT)
Dept: ENDOCRINOLOGY | Age: 59
End: 2024-12-19
Payer: MEDICARE

## 2024-12-19 VITALS
HEART RATE: 95 BPM | DIASTOLIC BLOOD PRESSURE: 87 MMHG | BODY MASS INDEX: 25.06 KG/M2 | HEIGHT: 68 IN | WEIGHT: 165.34 LBS | SYSTOLIC BLOOD PRESSURE: 131 MMHG

## 2024-12-19 DIAGNOSIS — E11.65 UNCONTROLLED TYPE 2 DIABETES MELLITUS WITH HYPERGLYCEMIA (HCC): Primary | ICD-10-CM

## 2024-12-19 DIAGNOSIS — Z96.41 INSULIN PUMP IN PLACE: ICD-10-CM

## 2024-12-19 DIAGNOSIS — R61 HYPERHIDROSIS: ICD-10-CM

## 2024-12-19 LAB
CHP ED QC CHECK: NORMAL
GLUCOSE BLD-MCNC: 106 MG/DL

## 2024-12-19 PROCEDURE — 99213 OFFICE O/P EST LOW 20 MIN: CPT | Performed by: INTERNAL MEDICINE

## 2024-12-19 PROCEDURE — 3044F HG A1C LEVEL LT 7.0%: CPT | Performed by: INTERNAL MEDICINE

## 2024-12-19 PROCEDURE — 3075F SYST BP GE 130 - 139MM HG: CPT | Performed by: INTERNAL MEDICINE

## 2024-12-19 PROCEDURE — G8427 DOCREV CUR MEDS BY ELIG CLIN: HCPCS | Performed by: INTERNAL MEDICINE

## 2024-12-19 PROCEDURE — 82962 GLUCOSE BLOOD TEST: CPT | Performed by: INTERNAL MEDICINE

## 2024-12-19 PROCEDURE — 3017F COLORECTAL CA SCREEN DOC REV: CPT | Performed by: INTERNAL MEDICINE

## 2024-12-19 PROCEDURE — 2022F DILAT RTA XM EVC RTNOPTHY: CPT | Performed by: INTERNAL MEDICINE

## 2024-12-19 PROCEDURE — 3079F DIAST BP 80-89 MM HG: CPT | Performed by: INTERNAL MEDICINE

## 2024-12-19 PROCEDURE — G8484 FLU IMMUNIZE NO ADMIN: HCPCS | Performed by: INTERNAL MEDICINE

## 2024-12-19 PROCEDURE — G8419 CALC BMI OUT NRM PARAM NOF/U: HCPCS | Performed by: INTERNAL MEDICINE

## 2024-12-19 PROCEDURE — 1036F TOBACCO NON-USER: CPT | Performed by: INTERNAL MEDICINE

## 2024-12-19 RX ORDER — QUETIAPINE FUMARATE 25 MG/1
25 TABLET, FILM COATED ORAL 2 TIMES DAILY
COMMUNITY
Start: 2024-12-18

## 2024-12-19 RX ORDER — GLYCOPYRROLATE 1 MG/1
1 TABLET ORAL 2 TIMES DAILY
Qty: 60 TABLET | Refills: 3 | Status: SHIPPED | OUTPATIENT
Start: 2024-12-19

## 2024-12-19 RX ORDER — INSULIN LISPRO 100 [IU]/ML
INJECTION, SOLUTION INTRAVENOUS; SUBCUTANEOUS
Qty: 40 ML | Refills: 5 | Status: SHIPPED | OUTPATIENT
Start: 2024-12-19

## 2024-12-19 ASSESSMENT — ENCOUNTER SYMPTOMS: EYES NEGATIVE: 1

## 2024-12-19 NOTE — PROGRESS NOTES
12/19/2024    Assessment:       Diagnosis Orders   1. Uncontrolled type 2 diabetes mellitus with hyperglycemia (HCC)  POCT Glucose    Basic Metabolic Panel    Hemoglobin A1C    insulin lispro (HUMALOG,ADMELOG) 100 UNIT/ML SOLN injection vial      2. Hyperhidrosis  glycopyrrolate (ROBINUL) 1 MG tablet      3. Insulin pump in place              PLAN:     Continue patient on insulin pump OmniPod with Dexcom CGM follow-up in 3 to 6 months time labs to be done prior to next visit    Orders Placed This Encounter   Medications    insulin lispro (HUMALOG,ADMELOG) 100 UNIT/ML SOLN injection vial     Sig: Use via pump max dose 100 units/day     Dispense:  40 mL     Refill:  5    glycopyrrolate (ROBINUL) 1 MG tablet     Sig: Take 1 tablet by mouth 2 times daily     Dispense:  60 tablet     Refill:  3       Orders Placed This Encounter   Procedures    Basic Metabolic Panel     Standing Status:   Future     Standing Expiration Date:   12/19/2025    Hemoglobin A1C     Standing Status:   Future     Standing Expiration Date:   12/19/2025    POCT Glucose         Orders Placed This Encounter   Procedures    POCT Glucose     No orders of the defined types were placed in this encounter.    No follow-ups on file.  Subjective:     Chief Complaint   Patient presents with    Diabetes    Excessive Sweating     Vitals:    12/19/24 1117   BP: 131/87   Pulse: 95   Weight: 75 kg (165 lb 5.5 oz)   Height: 1.727 m (5' 7.99\")     Wt Readings from Last 3 Encounters:   12/19/24 75 kg (165 lb 5.5 oz)   12/02/24 72.6 kg (160 lb)   11/27/24 72.6 kg (160 lb)     BP Readings from Last 3 Encounters:   12/19/24 131/87   12/02/24 (!) 146/92   11/27/24 117/69       Follow-up with type 2 diabetes patient's hemoglobin A1c has improved from 6.5-5.5 has also lost some weight using Dexcom CGM and OmniPod currently not wearing the pump using the Dexcom due to different reasons prescription issues denies any severe hypoglycemia average blood sugars close to

## 2025-01-15 ENCOUNTER — TELEPHONE (OUTPATIENT)
Dept: ENDOCRINOLOGY | Age: 60
End: 2025-01-15

## 2025-01-15 NOTE — TELEPHONE ENCOUNTER
[employer] is stating OMNIPOD DASH PODS (GEN 4) 5PK is not a covered drug the their formulary.  Please send an alternative drug

## 2025-01-15 NOTE — TELEPHONE ENCOUNTER
Which OmniPod is covered?  Confusing message OmniPod 4 was prescribed in October check with insurance pharmacy and then we can send the correct OmniPod

## 2025-01-17 NOTE — TELEPHONE ENCOUNTER
Left patient a message, unable to do PA for her omni pod dash pod. Her insurance changed  to [employer] and there is no information in her chart.     Patient called back  [facility] ID [id number]

## 2025-01-27 ENCOUNTER — TELEPHONE (OUTPATIENT)
Dept: ENDOCRINOLOGY | Age: 60
End: 2025-01-27

## 2025-01-27 RX ORDER — INSULIN LISPRO 100 [IU]/ML
INJECTION, SOLUTION INTRAVENOUS; SUBCUTANEOUS
Qty: 5 ADJUSTABLE DOSE PRE-FILLED PEN SYRINGE | Refills: 3 | Status: SHIPPED | OUTPATIENT
Start: 2025-01-27

## 2025-01-27 RX ORDER — PEN NEEDLE, DIABETIC 32 GX 1/6"
1 NEEDLE, DISPOSABLE MISCELLANEOUS 4 TIMES DAILY
Qty: 200 EACH | Refills: 3 | Status: SHIPPED | OUTPATIENT
Start: 2025-01-27

## 2025-01-27 RX ORDER — INSULIN GLARGINE 100 [IU]/ML
INJECTION, SOLUTION SUBCUTANEOUS
Qty: 5 ADJUSTABLE DOSE PRE-FILLED PEN SYRINGE | Refills: 3 | Status: SHIPPED | OUTPATIENT
Start: 2025-01-27

## 2025-01-27 NOTE — TELEPHONE ENCOUNTER
Suzanne is calling, she is currently using the omnipod insulin pump and she has been having issues with it so she was wondering if you can call her in some insulin pens and pen needles into her pharmacy for back up to her omnipod with what doses she should take if she can't use her pump

## 2025-02-05 DIAGNOSIS — E11.65 UNCONTROLLED TYPE 2 DIABETES MELLITUS WITH HYPERGLYCEMIA (HCC): ICD-10-CM

## 2025-02-05 LAB
ANION GAP SERPL CALCULATED.3IONS-SCNC: 12 MEQ/L (ref 9–15)
BUN SERPL-MCNC: 10 MG/DL (ref 6–20)
CALCIUM SERPL-MCNC: 8.8 MG/DL (ref 8.5–9.9)
CHLORIDE SERPL-SCNC: 104 MEQ/L (ref 95–107)
CO2 SERPL-SCNC: 25 MEQ/L (ref 20–31)
CREAT SERPL-MCNC: 0.96 MG/DL (ref 0.5–0.9)
ESTIMATED AVERAGE GLUCOSE: 117 MG/DL
GLUCOSE SERPL-MCNC: 122 MG/DL (ref 70–99)
HBA1C MFR BLD: 5.7 % (ref 4–6)
POTASSIUM SERPL-SCNC: 4.3 MEQ/L (ref 3.4–4.9)
SODIUM SERPL-SCNC: 141 MEQ/L (ref 135–144)

## 2025-02-20 ENCOUNTER — APPOINTMENT (OUTPATIENT)
Dept: RHEUMATOLOGY | Facility: CLINIC | Age: 60
End: 2025-02-20
Payer: COMMERCIAL

## 2025-03-12 ENCOUNTER — APPOINTMENT (OUTPATIENT)
Dept: RHEUMATOLOGY | Facility: CLINIC | Age: 60
End: 2025-03-12
Payer: COMMERCIAL

## 2025-03-21 ENCOUNTER — OFFICE VISIT (OUTPATIENT)
Dept: ENDOCRINOLOGY | Age: 60
End: 2025-03-21
Payer: MEDICARE

## 2025-03-21 VITALS
SYSTOLIC BLOOD PRESSURE: 84 MMHG | BODY MASS INDEX: 25.31 KG/M2 | HEART RATE: 79 BPM | HEIGHT: 68 IN | WEIGHT: 167 LBS | DIASTOLIC BLOOD PRESSURE: 54 MMHG

## 2025-03-21 DIAGNOSIS — E11.65 UNCONTROLLED TYPE 2 DIABETES MELLITUS WITH HYPERGLYCEMIA (HCC): Primary | ICD-10-CM

## 2025-03-21 DIAGNOSIS — Z96.41 INSULIN PUMP IN PLACE: ICD-10-CM

## 2025-03-21 DIAGNOSIS — R61 HYPERHIDROSIS: ICD-10-CM

## 2025-03-21 LAB
CHP ED QC CHECK: ABNORMAL
GLUCOSE BLD-MCNC: 267 MG/DL

## 2025-03-21 PROCEDURE — 3044F HG A1C LEVEL LT 7.0%: CPT | Performed by: INTERNAL MEDICINE

## 2025-03-21 PROCEDURE — 3078F DIAST BP <80 MM HG: CPT | Performed by: INTERNAL MEDICINE

## 2025-03-21 PROCEDURE — 3017F COLORECTAL CA SCREEN DOC REV: CPT | Performed by: INTERNAL MEDICINE

## 2025-03-21 PROCEDURE — 99213 OFFICE O/P EST LOW 20 MIN: CPT | Performed by: INTERNAL MEDICINE

## 2025-03-21 PROCEDURE — 82962 GLUCOSE BLOOD TEST: CPT | Performed by: INTERNAL MEDICINE

## 2025-03-21 PROCEDURE — G8427 DOCREV CUR MEDS BY ELIG CLIN: HCPCS | Performed by: INTERNAL MEDICINE

## 2025-03-21 PROCEDURE — G8419 CALC BMI OUT NRM PARAM NOF/U: HCPCS | Performed by: INTERNAL MEDICINE

## 2025-03-21 PROCEDURE — 1036F TOBACCO NON-USER: CPT | Performed by: INTERNAL MEDICINE

## 2025-03-21 PROCEDURE — 2022F DILAT RTA XM EVC RTNOPTHY: CPT | Performed by: INTERNAL MEDICINE

## 2025-03-21 PROCEDURE — 3074F SYST BP LT 130 MM HG: CPT | Performed by: INTERNAL MEDICINE

## 2025-03-21 RX ORDER — GLYCOPYRROLATE 1 MG/1
1 TABLET ORAL 2 TIMES DAILY
Qty: 60 TABLET | Refills: 3 | Status: SHIPPED | OUTPATIENT
Start: 2025-03-21

## 2025-03-21 RX ORDER — PROPRANOLOL HYDROCHLORIDE 10 MG/1
10 TABLET ORAL 2 TIMES DAILY
COMMUNITY
Start: 2025-03-11

## 2025-03-21 RX ORDER — ESTRADIOL 0.5 MG/1
0.5 TABLET ORAL DAILY
COMMUNITY
Start: 2025-03-19

## 2025-03-21 RX ORDER — VENLAFAXINE HYDROCHLORIDE 150 MG/1
150 CAPSULE, EXTENDED RELEASE ORAL DAILY
COMMUNITY
Start: 2025-02-27

## 2025-03-21 NOTE — PROGRESS NOTES
Lab Results   Component Value Date    LABA1C 5.7 02/05/2025    LABA1C 5.5 11/23/2024    LABA1C 6.5 (H) 08/09/2024     Lab Results   Component Value Date    CHOLFAST 346 (H) 05/20/2021    TRIGLYCFAST 340 (H) 05/20/2021    HDL 42 05/03/2024    HDL 42 05/20/2021    HDL 73 (H) 08/02/2017    CHOL 146 05/03/2024    CHOL 182 08/02/2017    CHOL 189 05/12/2017    TRIG 232 (H) 05/03/2024    TRIG 264 (H) 08/02/2017    TRIG 323 (H) 05/12/2017     No results found for: \"TESTM\"  Lab Results   Component Value Date    TSH 0.741 10/11/2021    TSH 0.819 11/04/2019    TSH 2.500 05/12/2017     No results found for: \"TPOABS\"    Review of Systems   Cardiovascular: Negative.    Endocrine: Negative for polydipsia and polyuria.   All other systems reviewed and are negative.      Objective:   Physical Exam  Vitals reviewed.   Constitutional:       General: She is not in acute distress.     Appearance: Normal appearance.   HENT:      Head: Normocephalic and atraumatic.      Right Ear: External ear normal.      Left Ear: External ear normal.      Nose: Nose normal.   Eyes:      General: No scleral icterus.        Right eye: No discharge.         Left eye: No discharge.      Extraocular Movements: Extraocular movements intact.      Conjunctiva/sclera: Conjunctivae normal.   Cardiovascular:      Rate and Rhythm: Normal rate.   Pulmonary:      Effort: Pulmonary effort is normal.   Musculoskeletal:         General: Normal range of motion.      Cervical back: Normal range of motion and neck supple.   Neurological:      General: No focal deficit present.      Mental Status: She is alert and oriented to person, place, and time.   Psychiatric:         Mood and Affect: Mood normal.         Behavior: Behavior normal.

## 2025-03-28 ENCOUNTER — APPOINTMENT (OUTPATIENT)
Dept: RHEUMATOLOGY | Facility: CLINIC | Age: 60
End: 2025-03-28
Payer: COMMERCIAL

## 2025-03-28 VITALS
DIASTOLIC BLOOD PRESSURE: 128 MMHG | WEIGHT: 162.7 LBS | HEIGHT: 67 IN | BODY MASS INDEX: 25.54 KG/M2 | SYSTOLIC BLOOD PRESSURE: 157 MMHG | HEART RATE: 103 BPM | TEMPERATURE: 97.2 F | OXYGEN SATURATION: 97 %

## 2025-03-28 DIAGNOSIS — E11.22 TYPE 2 DIABETES MELLITUS WITH STAGE 3B CHRONIC KIDNEY DISEASE, WITHOUT LONG-TERM CURRENT USE OF INSULIN (MULTI): ICD-10-CM

## 2025-03-28 DIAGNOSIS — M32.9 SYSTEMIC LUPUS ERYTHEMATOSUS, UNSPECIFIED SLE TYPE, UNSPECIFIED ORGAN INVOLVEMENT STATUS (MULTI): Primary | ICD-10-CM

## 2025-03-28 DIAGNOSIS — E11.69 TYPE 2 DIABETES MELLITUS WITH OTHER SPECIFIED COMPLICATION, WITH LONG-TERM CURRENT USE OF INSULIN: ICD-10-CM

## 2025-03-28 DIAGNOSIS — F31.81 BIPOLAR II DISORDER (MULTI): ICD-10-CM

## 2025-03-28 DIAGNOSIS — M79.7 FIBROMYALGIA: ICD-10-CM

## 2025-03-28 DIAGNOSIS — Z79.899 ENCOUNTER FOR MONITORING OF HYDROXYCHLOROQUINE THERAPY: ICD-10-CM

## 2025-03-28 DIAGNOSIS — Z79.4 TYPE 2 DIABETES MELLITUS WITH OTHER SPECIFIED COMPLICATION, WITH LONG-TERM CURRENT USE OF INSULIN: ICD-10-CM

## 2025-03-28 DIAGNOSIS — Z51.81 ENCOUNTER FOR MONITORING OF HYDROXYCHLOROQUINE THERAPY: ICD-10-CM

## 2025-03-28 DIAGNOSIS — N18.32 TYPE 2 DIABETES MELLITUS WITH STAGE 3B CHRONIC KIDNEY DISEASE, WITHOUT LONG-TERM CURRENT USE OF INSULIN (MULTI): ICD-10-CM

## 2025-03-28 RX ORDER — ESTRADIOL 0.5 MG/1
1 TABLET ORAL
COMMUNITY
Start: 2025-03-19

## 2025-03-28 RX ORDER — INSULIN LISPRO 100 [IU]/ML
INJECTION, SOLUTION INTRAVENOUS; SUBCUTANEOUS
COMMUNITY
Start: 2025-01-27

## 2025-03-28 RX ORDER — QUETIAPINE FUMARATE 25 MG/1
TABLET, FILM COATED ORAL
COMMUNITY
Start: 2025-01-13 | End: 2025-03-28 | Stop reason: WASHOUT

## 2025-03-28 RX ORDER — ERGOCALCIFEROL 1.25 MG/1
CAPSULE ORAL
COMMUNITY
Start: 2024-11-15

## 2025-03-28 RX ORDER — TRIAMCINOLONE ACETONIDE 40 MG/ML
40 INJECTION, SUSPENSION INTRA-ARTICULAR; INTRAMUSCULAR ONCE
Status: COMPLETED | OUTPATIENT
Start: 2025-03-28 | End: 2025-03-28

## 2025-03-28 RX ORDER — OXYCODONE AND ACETAMINOPHEN 5; 325 MG/1; MG/1
1 TABLET ORAL EVERY 6 HOURS
COMMUNITY
Start: 2025-03-26

## 2025-03-28 RX ORDER — INSULIN GLARGINE 100 [IU]/ML
INJECTION, SOLUTION SUBCUTANEOUS
COMMUNITY
Start: 2025-01-28

## 2025-03-28 RX ORDER — PROPRANOLOL HYDROCHLORIDE 10 MG/1
1 TABLET ORAL
COMMUNITY
Start: 2025-03-11

## 2025-03-28 RX ORDER — NARATRIPTAN 2.5 MG/1
TABLET ORAL
COMMUNITY
Start: 2025-02-10 | End: 2025-03-28 | Stop reason: WASHOUT

## 2025-03-28 RX ADMIN — TRIAMCINOLONE ACETONIDE 40 MG: 40 INJECTION, SUSPENSION INTRA-ARTICULAR; INTRAMUSCULAR at 08:31

## 2025-03-28 ASSESSMENT — ENCOUNTER SYMPTOMS
ENDOCRINE NEGATIVE: 1
GASTROINTESTINAL NEGATIVE: 1
EYES NEGATIVE: 1
FATIGUE: 1
JOINT SWELLING: 0
FEVER: 0
MYALGIAS: 1
SHORTNESS OF BREATH: 0
ARTHRALGIAS: 1
BACK PAIN: 0
COUGH: 0
NUMBNESS: 0
COLOR CHANGE: 0
WEAKNESS: 0
PSYCHIATRIC NEGATIVE: 1

## 2025-03-28 ASSESSMENT — PATIENT HEALTH QUESTIONNAIRE - PHQ9
SUM OF ALL RESPONSES TO PHQ9 QUESTIONS 1 AND 2: 0
2. FEELING DOWN, DEPRESSED OR HOPELESS: NOT AT ALL
1. LITTLE INTEREST OR PLEASURE IN DOING THINGS: NOT AT ALL

## 2025-03-28 NOTE — ASSESSMENT & PLAN NOTE
Largely stable on plaquenil therapy.   Pt to continue meds   IM kenalog given today for relief  Labs ordered today to monitor for increased disease activity, end organ manifestations and to monitor for any drug toxicities due to chronic medications    Orders:    Follow Up In Rheumatology; Future    Anti-DNA Antibody, Double-Stranded; Future    C3 Complement; Future    C4 Complement; Future    CBC and Auto Differential; Future    Comprehensive Metabolic Panel; Future    C-Reactive Protein; Future    Sedimentation Rate; Future    triamcinolone acetonide (Kenalog-40) injection 40 mg

## 2025-03-28 NOTE — PROGRESS NOTES
[facility] RHEUMATOLOGY     AND INTERNAL MEDICINE    RHEUMATOLOGY PROGRESS NOTE    Eunice Schroeder 59 y.o. female  :  1965  PCP:  Hany Sidhu MD    Chief Complaint   Patient presents with    Lupus       SUBJECTIVE  Pt reports that she is stable.   Has been having more pain in her right wrist which was fractured 6-7 yrs ago.   Notes diffuse achiness and desires injection today for relief.  No other new symptoms--no rashes or worsening swelling.   Reports health has been stable      Records since last seen reviewed in [facility]  Patient Active Problem List    Diagnosis Date Noted    Labral tear of shoulder, degenerative, right 10/11/2024    Nausea 2024    [place] cardiac risk <10% in next 10 years 2024    Encounter for monitoring of hydroxychloroquine therapy 2024    Bipolar II disorder (Multi) 2024    Hyperuricemia 2023    Mild recurrent major depression (CMS-HCC) 2023    Polypharmacy 2023    Generalized anxiety disorder with panic attacks 2023    Stage 3a chronic kidney disease (Multi) 2023    Hypercalcemia 2023    Chronic gout of multiple sites 2023    Chronic pain syndrome 2023    Fibromyalgia 2023    Debility 2023    Elevated alkaline phosphatase level 2023    Essential hypertension 2023    Essential tremor 2023    Gastroesophageal reflux disease 2023    Gastroparesis 2023    Hepatic steatosis 2023    Hypothyroidism due to acquired atrophy of thyroid 2023    IBS (irritable bowel syndrome) 2023    Insulin pump in place 2023    Leukocytosis 2023    Microalbuminuria 2023    Migraine without aura and without status migrainosus, not intractable 2023    Hypertriglyceridemia 2023    Mixed hyperlipidemia 2023    Nephrolithiasis 2023    Osteopenia  02/21/2023    Polycythemia 02/21/2023    Seasonal allergic rhinitis due to pollen 02/21/2023    Spondylosis of lumbar region without myelopathy or radiculopathy 02/21/2023    Systemic lupus erythematosus (Multi) 02/21/2023    Telogen hair loss 02/21/2023    Trochanteric bursitis of left hip 02/21/2023    Type 2 diabetes mellitus with stage 3b chronic kidney disease, without long-term current use of insulin (Multi) 02/21/2023    Vitamin B12 deficiency 02/21/2023    Vitamin D deficiency 02/21/2023    Moderate persistent asthma with acute exacerbation (HHS-HCC) 02/21/2023    Overweight (BMI 25.0-29.9) 02/21/2023    Hot flash, menopausal 02/21/2023     Past Medical History:   Diagnosis Date    Calculus of kidney     Recurrent nephrolithiasis    Candidal intertrigo 01/29/2024    COVID-19 vaccination declined 11/20/2023    Foot fracture, left 12/16/2016    Immunosuppression due to chronic steroid use 01/05/2024    Moderate persistent asthma, uncomplicated (HHS-HCC)     Moderate persistent asthma, unspecified whether complicated    Other seasonal allergic rhinitis 04/22/2019    Seasonal allergies    Right ureteral calculus 05/05/2021    SBO (small bowel obstruction) (Multi) 04/06/2020    History of small bowel obstruction    Sinus tachycardia 11/04/2019    History of sinus tachycardia    Unspecified sensorineural hearing loss     Sensory - neural hearing loss     Current Outpatient Medications on File Prior to Visit   Medication Sig Dispense Refill    ergocalciferol (Vitamin D-2) 1250 mcg (50,000 units) capsule Take by mouth.      estradiol (Estrace) 0.5 mg tablet Take 1 tablet (0.5 mg) by mouth early in the morning..      Fiasp U-100 Insulin 100 unit/mL injection Use via pump max dose 150 units/day      glycopyrrolate (Robinul) 1 mg tablet Take 1 tablet (1 mg) by mouth every 12 hours.      HumaLOG U-100 Insulin 100 unit/mL injection Inject under the skin 3 times daily (morning, midday, late afternoon).       "hydroxychloroquine (Plaquenil) 200 mg tablet Take 1 tablet (200 mg) by mouth 2 times a day. With food 180 tablet 3    insulin lispro (HumaLOG) 100 unit/mL pen INJECT 8 TO 10 UNITS 3 TIMES DAILY.      Lantus Solostar U-100 Insulin 100 unit/mL (3 mL) pen INJECT 30 units at bedtime      levothyroxine (Synthroid, Levoxyl) 50 mcg tablet TAKE 1 TABLET BY MOUTH ONCE DAILY on an empty stomach. 90 tablet 0    Omnipod Dash Pods, Gen 4, cartridge       oxyCODONE-acetaminophen (Percocet) 5-325 mg tablet Take 1 tablet by mouth every 6 hours.      pantoprazole (ProtoNix) 40 mg EC tablet Take 1 tablet (40 mg) by mouth once daily at bedtime. 90 tablet 1    pen needle, diabetic 32 gauge x 5/32\" needle Twice daily      propranolol (Inderal) 10 mg tablet Take 1 tablet (10 mg) by mouth every 12 hours.      rosuvastatin (Crestor) 20 mg tablet Take 1 tablet (20 mg) by mouth once daily. 90 tablet 0    syringe with needle, safety (BD Integra Syringe) 3 mL 25 gauge x 5/8\" syringe Please use with cyanocobalamin every 2 weeks. 7 each 3    tiZANidine (Zanaflex) 4 mg tablet Take 1 tablet (4 mg) by mouth every 8 hours if needed for muscle spasms. 90 tablet 11    venlafaxine XR (Effexor-XR) 150 mg 24 hr capsule Take 1 capsule (150 mg) by mouth once daily.      estradiol (Climara) 0.025 mg/24 hr patch Place 1 patch on the skin 1 (one) time per week. As directed 12 patch 0    [DISCONTINUED] cholecalciferol (Vitamin D3) 1,250 mcg (50,000 unit) tablet Take 1 tablet (50,000 Units) by mouth every 7 days. 13 tablet 1    [DISCONTINUED] clonazePAM (KlonoPIN) 0.5 mg tablet Take 1 tablet (0.5 mg) by mouth once daily as needed. (Patient not taking: Reported on 3/28/2025)      [DISCONTINUED] naratriptan (Amerge) 2.5 mg tablet TAKE 1 TABLET at onset OF headache. may repeat in 4 hours as needed. max of 2 tabs in 24 hours (Patient not taking: Reported on 3/28/2025)      [DISCONTINUED] primidone (Mysoline) 50 mg tablet Take 1 tablet (50 mg) by mouth once daily in " "the morning. Take before meals. (Patient not taking: Reported on 3/28/2025)      [DISCONTINUED] propranolol (Inderal) 40 mg tablet Take 1 tablet (40 mg) by mouth once daily in the morning. Take before meals.      [DISCONTINUED] SeroqueL 25 mg tablet TAKE 1 TO 2 TABLETS DAILY AS NEEDED AT BEDTIME (Patient not taking: Reported on 3/28/2025)       No current facility-administered medications on file prior to visit.     Allergies   Allergen Reactions    Ketorolac Hives and Swelling    Sumatriptan Hives, Shortness of breath and Swelling    Tramadol Hives and Swelling    Lidocaine Rash     Patient states allergic    Aspirin Unknown    Cephalosporins Unknown    Diclofenac Unknown    Nsaids (Non-Steroidal Anti-Inflammatory Drug) Unknown    Penicillins Hives and Swelling    Fentanyl Rash    Sulfa (Sulfonamide Antibiotics) Rash     Social History     Tobacco Use    Smoking status: Never    Smokeless tobacco: Never   Vaping Use    Vaping status: Never Used   Substance Use Topics    Alcohol use: Never    Drug use: Never     Review of Systems   Constitutional:  Positive for fatigue. Negative for fever.   HENT: Negative.     Eyes: Negative.    Respiratory:  Negative for cough and shortness of breath.    Cardiovascular:  Negative for leg swelling.   Gastrointestinal: Negative.    Endocrine: Negative.    Genitourinary: Negative.    Musculoskeletal:  Positive for arthralgias and myalgias. Negative for back pain, gait problem and joint swelling.   Skin:  Negative for color change and rash.   Neurological:  Negative for weakness and numbness.   Psychiatric/Behavioral: Negative.         PHYSICAL EXAM  BP (!) 157/128   Pulse 103   Temp 36.2 °C (97.2 °F)   Ht 1.702 m (5' 7\")   Wt 73.8 kg (162 lb 11.2 oz)   SpO2 97%   BMI 25.48 kg/m²   Depression: Not at risk (3/28/2025)    PHQ-2     PHQ-2 Score: 0     Physical Exam  Vitals reviewed.   Constitutional:       General: She is not in acute distress.     Appearance: Normal appearance. "   HENT:      Head: Normocephalic and atraumatic.   Eyes:      General: No scleral icterus.     Extraocular Movements: Extraocular movements intact.      Conjunctiva/sclera: Conjunctivae normal.      Pupils: Pupils are equal, round, and reactive to light.   Cardiovascular:      Rate and Rhythm: Normal rate and regular rhythm.      Pulses: Normal pulses.      Heart sounds: Normal heart sounds.   Pulmonary:      Effort: Pulmonary effort is normal. No respiratory distress.   Musculoskeletal:         General: Swelling (dorsum of R hand) and deformity (angulation deformity of R wrist from remote fracture) present. No tenderness.      Cervical back: Normal range of motion and neck supple. No tenderness.      Right lower leg: No edema.      Left lower leg: No edema.      Comments: No synovitis of examined joints  Pain with ROM of R shoulder   Skin:     Coloration: Skin is not pale.      Findings: No erythema or rash.   Neurological:      General: No focal deficit present.      Mental Status: She is alert and oriented to person, place, and time. Mental status is at baseline.      Gait: Gait normal.   Psychiatric:         Mood and Affect: Mood normal.           Health Maintenance Due   Topic Date Due    HIV Screening  Never done    Colorectal Cancer Screening  Never done    Hepatitis A Vaccines (1 of 2 - Risk 2-dose series) Never done    Diabetes: Retinopathy Screening  07/14/2017    Medicare Annual Wellness Visit (AWV)  01/30/2025    Diabetes: Hemoglobin A1C  05/05/2025       Assessment/plan  Assessment & Plan  Systemic lupus erythematosus, unspecified SLE type, unspecified organ involvement status (Multi)  Largely stable on plaquenil therapy.   Pt to continue meds   IM kenalog given today for relief  Labs ordered today to monitor for increased disease activity, end organ manifestations and to monitor for any drug toxicities due to chronic medications    Orders:    Follow Up In Rheumatology; Future    Anti-DNA Antibody,  Double-Stranded; Future    C3 Complement; Future    C4 Complement; Future    CBC and Auto Differential; Future    Comprehensive Metabolic Panel; Future    C-Reactive Protein; Future    Sedimentation Rate; Future    triamcinolone acetonide (Kenalog-40) injection 40 mg    Fibromyalgia  Stable on tizanidine  Orders:    Follow Up In Rheumatology; Future    Encounter for monitoring of hydroxychloroquine therapy  You are on chronic plaquenil.     Make sure you see your eye doctor yearly.  If you need an eye doctor, let me know and I can place a referral    Plaquenil is dosed based on your weight.   We will make sure your dose is below the maximum daily dose of 5mg/kg    Orders:    Follow Up In Rheumatology; Future    Bipolar II disorder (Multi)  No clinical signs of disease progression since last visit.  No increase in symptoms  Recent labs/imaging/procedures reviewed  Medical records reviewed  Disease process and pertinent med refills are managed by PCP.    I reviewed that no conflict in medications with the current condition I am treating           Type 2 diabetes mellitus with other specified complication, with long-term current use of insulin  No clinical signs of disease progression since last visit.  No increase in symptoms  Recent labs/imaging/procedures reviewed  Medical records reviewed  Disease process and pertinent med refills are managed by PCP.    I reviewed that no conflict in medications with the current condition I am treating           Type 2 diabetes mellitus with stage 3b chronic kidney disease, without long-term current use of insulin (Multi)  No clinical signs of disease progression since last visit.  No increase in symptoms  Recent labs/imaging/procedures reviewed  Medical records reviewed  Disease process and pertinent med refills are managed by PCP.    I reviewed that no conflict in medications with the current condition I am treating             Follow up: __4___months, sooner if change in  symptoms    Immunization History   Administered Date(s) Administered    Flu vaccine (IIV4), preservative free *Check age/dose* 09/01/2017, 09/06/2018, 09/06/2019, 09/07/2020, 09/08/2021, 09/08/2022, 09/08/2023    Flu vaccine, quadrivalent, high-dose, preservative free, age 65y+ (FLUZONE) 08/29/2016    Flu vaccine, trivalent, preservative free, age 6 months and greater (Fluarix/Fluzone/Flulaval) 09/09/2024    Hep B, Unspecified 10/18/1999, 11/16/1999, 04/12/2000    Hepatitis B vaccine, adult *Check Product/Dose* 10/18/1999, 11/16/1999, 04/12/2000    Influenza Whole 09/27/2014, 09/26/2015    Influenza, Unspecified 10/21/2004, 08/29/2016, 09/01/2017, 08/31/2018, 10/01/2020, 09/08/2022    Influenza, injectable, quadrivalent, preservative free, pediatric 09/06/2018, 09/06/2019, 09/07/2020    Influenza, seasonal, injectable 09/06/2017, 09/08/2022, 09/09/2024    Novel influenza-H1N1-09, preservative-free 12/31/2009    PPD Test 02/16/2005    Pfizer COVID-19 vaccine, bivalent, age 12 years and older (30 mcg/0.3 mL) 12/14/2022    Pfizer Gray Cap SARS-CoV-2 05/15/2022    Pfizer Purple Cap SARS-CoV-2 04/28/2021, 05/21/2021, 11/17/2021    Pneumococcal Conjugate PCV 7 09/20/2014, 10/01/2020    Pneumococcal conjugate vaccine, 13-valent (PREVNAR 13) 09/03/2020    Pneumococcal conjugate vaccine, 20-valent (PREVNAR 20) 09/08/2023    Pneumococcal polysaccharide vaccine, 23-valent, age 2 years and older (PNEUMOVAX 23) 09/15/2014, 01/01/2017    SARS-CoV-2, Unspecified 05/15/2022    Td vaccine, age 7 years and older (TDVAX) 10/18/1999, 02/16/2005    Td vaccine, age 7 years and older (TENIVAC) 08/06/2015    Tdap vaccine, age 7 year and older (BOOSTRIX, ADACEL) 10/18/1999, 02/16/2005, 08/06/2015, 07/01/2017, 07/12/2017    Zoster vaccine, recombinant, adult (SHINGRIX) 09/21/2020, 12/06/2020    Zoster, Unspecified 09/21/2020, 12/06/2020

## 2025-03-28 NOTE — PATIENT INSTRUCTIONS
It was a pleasure to see you today  Please call if your symptoms worsen  Please review your summary for education and reminders.  Follow up at your next appointment.    If you had labs/xrays done today, you will be able to view on [employer].   We will contact you when the results are reviewed for further discussion.  Please note that you may receive your results before I have had a chance to review.  Please know I will be contacting you for discussion  Homegoing instructions for all patient  A healthy lifestyle helps chronic diseases  These are all the goals you should strive to improve your overall health   Blood pressure <130/85   BMI of <30 or waist circumference that is 1/2 of your height   Fasting blood sugar <107 (if you are diabetic, aim for an A1c <6.4%_   LDL cholesterol <130   Avoid smoking   Manage your stress   Get your preventive exams   Get your immunizations   Some guidelines for all patients with lupus  Wear sunscreen, even on cloudy days.   Sun can worsening rashes and cause fatigue and flares  Exercise.  Movement is medicine.   Even stretches and light yoga count  If you smoke, stop.  Smoking makes skin manifestations of lupus much harder to treat.  Lupus (and the medications you need to be on) increase infection risk.  Keep up to date with your vaccinations  Lupus increases heart and stroke risk.  Make sure you work to get your cholesterol and blood pressure under control. If you have diabetes, it is important to get your A1c below 7%  If you are on hydroxychloroquine (plaquenil), make sure you see your eye doctor regularly and get testing to monitor for any potential changes  Think about your bone health.   Osteoporosis is a risk, especially if you are or have been on steroids.      Mental health can be an issue.   Not only does having a diagnosis of lupus cause depression but lupus itself can be the cause of mental health issues.  If you feel you need help, please call me or your primary care  doctor.   We are here for you

## 2025-03-28 NOTE — ASSESSMENT & PLAN NOTE
You are on chronic plaquenil.     Make sure you see your eye doctor yearly.  If you need an eye doctor, let me know and I can place a referral    Plaquenil is dosed based on your weight.   We will make sure your dose is below the maximum daily dose of 5mg/kg    Orders:    Follow Up In Rheumatology; Future

## 2025-03-28 NOTE — ASSESSMENT & PLAN NOTE
No clinical signs of disease progression since last visit.  No increase in symptoms  Recent labs/imaging/procedures reviewed  Medical records reviewed  Disease process and pertinent med refills are managed by PCP.    I reviewed that no conflict in medications with the current condition I am treating

## 2025-03-29 LAB
ALBUMIN SERPL-MCNC: 4.9 G/DL (ref 3.6–5.1)
ALP SERPL-CCNC: 115 U/L (ref 37–153)
ALT SERPL-CCNC: 10 U/L (ref 6–29)
ANION GAP SERPL CALCULATED.4IONS-SCNC: 11 MMOL/L (CALC) (ref 7–17)
AST SERPL-CCNC: 14 U/L (ref 10–35)
BASOPHILS # BLD AUTO: 116 CELLS/UL (ref 0–200)
BASOPHILS NFR BLD AUTO: 1.1 %
BILIRUB SERPL-MCNC: 0.5 MG/DL (ref 0.2–1.2)
BUN SERPL-MCNC: 10 MG/DL (ref 7–25)
C3 SERPL-MCNC: NORMAL MG/DL
C4 SERPL-MCNC: NORMAL MG/DL
CALCIUM SERPL-MCNC: 10 MG/DL (ref 8.6–10.4)
CHLORIDE SERPL-SCNC: 104 MMOL/L (ref 98–110)
CO2 SERPL-SCNC: 25 MMOL/L (ref 20–32)
CREAT SERPL-MCNC: 1.01 MG/DL (ref 0.5–1.03)
CRP SERPL-MCNC: NORMAL MG/L
DSDNA AB SER-ACNC: NORMAL [IU]/ML
EGFRCR SERPLBLD CKD-EPI 2021: 65 ML/MIN/1.73M2
EOSINOPHIL # BLD AUTO: 599 CELLS/UL (ref 15–500)
EOSINOPHIL NFR BLD AUTO: 5.7 %
ERYTHROCYTE [DISTWIDTH] IN BLOOD BY AUTOMATED COUNT: 13.6 % (ref 11–15)
ERYTHROCYTE [SEDIMENTATION RATE] IN BLOOD BY WESTERGREN METHOD: 2 MM/H
GLUCOSE SERPL-MCNC: 249 MG/DL (ref 65–99)
HCT VFR BLD AUTO: 50.8 % (ref 35–45)
HGB BLD-MCNC: 16.8 G/DL (ref 11.7–15.5)
LYMPHOCYTES # BLD AUTO: 2153 CELLS/UL (ref 850–3900)
LYMPHOCYTES NFR BLD AUTO: 20.5 %
MCH RBC QN AUTO: 28.8 PG (ref 27–33)
MCHC RBC AUTO-ENTMCNC: 33.1 G/DL (ref 32–36)
MCV RBC AUTO: 87 FL (ref 80–100)
MONOCYTES # BLD AUTO: 546 CELLS/UL (ref 200–950)
MONOCYTES NFR BLD AUTO: 5.2 %
NEUTROPHILS # BLD AUTO: 7088 CELLS/UL (ref 1500–7800)
NEUTROPHILS NFR BLD AUTO: 67.5 %
PLATELET # BLD AUTO: 329 THOUSAND/UL (ref 140–400)
PMV BLD REES-ECKER: 10.5 FL (ref 7.5–12.5)
POTASSIUM SERPL-SCNC: 4.2 MMOL/L (ref 3.5–5.3)
PROT SERPL-MCNC: 7.8 G/DL (ref 6.1–8.1)
RBC # BLD AUTO: 5.84 MILLION/UL (ref 3.8–5.1)
SODIUM SERPL-SCNC: 140 MMOL/L (ref 135–146)
WBC # BLD AUTO: 10.5 THOUSAND/UL (ref 3.8–10.8)

## 2025-03-31 LAB
ALBUMIN SERPL-MCNC: 4.9 G/DL (ref 3.6–5.1)
ALP SERPL-CCNC: 115 U/L (ref 37–153)
ALT SERPL-CCNC: 10 U/L (ref 6–29)
ANION GAP SERPL CALCULATED.4IONS-SCNC: 11 MMOL/L (CALC) (ref 7–17)
AST SERPL-CCNC: 14 U/L (ref 10–35)
BASOPHILS # BLD AUTO: 116 CELLS/UL (ref 0–200)
BASOPHILS NFR BLD AUTO: 1.1 %
BILIRUB SERPL-MCNC: 0.5 MG/DL (ref 0.2–1.2)
BUN SERPL-MCNC: 10 MG/DL (ref 7–25)
C3 SERPL-MCNC: 184 MG/DL (ref 83–193)
C4 SERPL-MCNC: 20 MG/DL (ref 15–57)
CALCIUM SERPL-MCNC: 10 MG/DL (ref 8.6–10.4)
CHLORIDE SERPL-SCNC: 104 MMOL/L (ref 98–110)
CO2 SERPL-SCNC: 25 MMOL/L (ref 20–32)
CREAT SERPL-MCNC: 1.01 MG/DL (ref 0.5–1.03)
CRP SERPL-MCNC: <3 MG/L
DSDNA AB SER-ACNC: 1 IU/ML
EGFRCR SERPLBLD CKD-EPI 2021: 65 ML/MIN/1.73M2
EOSINOPHIL # BLD AUTO: 599 CELLS/UL (ref 15–500)
EOSINOPHIL NFR BLD AUTO: 5.7 %
ERYTHROCYTE [DISTWIDTH] IN BLOOD BY AUTOMATED COUNT: 13.6 % (ref 11–15)
ERYTHROCYTE [SEDIMENTATION RATE] IN BLOOD BY WESTERGREN METHOD: 2 MM/H
GLUCOSE SERPL-MCNC: 249 MG/DL (ref 65–99)
HCT VFR BLD AUTO: 50.8 % (ref 35–45)
HGB BLD-MCNC: 16.8 G/DL (ref 11.7–15.5)
LYMPHOCYTES # BLD AUTO: 2153 CELLS/UL (ref 850–3900)
LYMPHOCYTES NFR BLD AUTO: 20.5 %
MCH RBC QN AUTO: 28.8 PG (ref 27–33)
MCHC RBC AUTO-ENTMCNC: 33.1 G/DL (ref 32–36)
MCV RBC AUTO: 87 FL (ref 80–100)
MONOCYTES # BLD AUTO: 546 CELLS/UL (ref 200–950)
MONOCYTES NFR BLD AUTO: 5.2 %
NEUTROPHILS # BLD AUTO: 7088 CELLS/UL (ref 1500–7800)
NEUTROPHILS NFR BLD AUTO: 67.5 %
PLATELET # BLD AUTO: 329 THOUSAND/UL (ref 140–400)
PMV BLD REES-ECKER: 10.5 FL (ref 7.5–12.5)
POTASSIUM SERPL-SCNC: 4.2 MMOL/L (ref 3.5–5.3)
PROT SERPL-MCNC: 7.8 G/DL (ref 6.1–8.1)
RBC # BLD AUTO: 5.84 MILLION/UL (ref 3.8–5.1)
SODIUM SERPL-SCNC: 140 MMOL/L (ref 135–146)
WBC # BLD AUTO: 10.5 THOUSAND/UL (ref 3.8–10.8)

## 2025-05-27 ENCOUNTER — HOSPITAL ENCOUNTER (OUTPATIENT)
Dept: LAB | Age: 60
Discharge: HOME | End: 2025-05-27
Payer: MEDICARE

## 2025-05-27 DIAGNOSIS — E11.65 UNCONTROLLED TYPE 2 DIABETES MELLITUS WITH HYPERGLYCEMIA (HCC): ICD-10-CM

## 2025-05-27 LAB
ANION GAP SERPL CALCULATED.3IONS-SCNC: 10 MEQ/L (ref 9–15)
BUN SERPL-MCNC: 10 MG/DL (ref 6–20)
CALCIUM SERPL-MCNC: 9.4 MG/DL (ref 8.5–9.9)
CHLORIDE SERPL-SCNC: 107 MEQ/L (ref 95–107)
CO2 SERPL-SCNC: 28 MEQ/L (ref 20–31)
CREAT SERPL-MCNC: 1.04 MG/DL (ref 0.5–0.9)
GLUCOSE SERPL-MCNC: 74 MG/DL (ref 70–99)
POTASSIUM SERPL-SCNC: 4.1 MEQ/L (ref 3.4–4.9)
SODIUM SERPL-SCNC: 145 MEQ/L (ref 135–144)

## 2025-05-27 PROCEDURE — 83036 HEMOGLOBIN GLYCOSYLATED A1C: CPT

## 2025-05-27 PROCEDURE — 36415 COLL VENOUS BLD VENIPUNCTURE: CPT

## 2025-05-27 PROCEDURE — 80048 BASIC METABOLIC PNL TOTAL CA: CPT

## 2025-05-28 LAB
ESTIMATED AVERAGE GLUCOSE: 126 MG/DL
HBA1C MFR BLD: 6 % (ref 4–6)

## 2025-05-30 ENCOUNTER — TELEPHONE (OUTPATIENT)
Age: 60
End: 2025-05-30

## 2025-05-30 NOTE — TELEPHONE ENCOUNTER
[facility] is requesting a PA for ADMELOG 100UNIT/ML    Pt has also called this morning saying she only has enough left for 1 more change. Pt said she has been on this since December, doesn't understand why they are asking for this now.           KEY:[id number]

## 2025-06-01 NOTE — PATIENT INSTRUCTIONS
Thank you very much for coming.  I am very happy to see you.    I am sorry to hear about your LUPUS FLAREUPS.  I am glad that you are following closely with RHEUMATOLOGY, Dr. Suazo.  I will do my part by making sure that you are safely able to take your generic PERCOCET to control pain reasonably.  Remember, the pain will never be 1 or general, but we will try to minimize it to 4 or under.  Thank you for being careful.  Thank you for securing your medication, so that only you have access to your regimens.  Thank you for using only when absolutely necessary.  Remember, these kinds of medications can be habit-forming, and can increase your risk of falling and forgetfulness, especially if used with CLONAZEPAM/BENZODIAZEPINE.    NEVER TAKE CLONAZEPAM AND GENERIC PERCOCET TOGETHER.  KEEP THESE MEDICATIONS AT LEAST 6 HOURS APART.    I will authorize an early release of your generic Percocet, especially since you are leaving for out of state.  Again, just please be very careful.  The goal is reasonable pain control, but it will never be complete absence of pain.  Thank you for your your patients, and thank you for taking care of yourself.    Your latest laboratory results are stable.  If your kidneys show any signs of strain, or if your calcium is persistently elevated, we will ask for help from NEPHROLOGY, especially with your history of lupus.  Until then, please drink lots of fluids throughout the day, and please continue to avoid salt.  Very good for not only keeping your kidneys healthy, but also very good for blood pressure control.    Please continue seeing your PSYCHIATRIST.  As always, please be very careful with your medications, especially your BENZODIAZEPINE/CLONAZEPAM.  No driving for 6 hours after taking this medication.  Never drive if you are drowsy.  Never take together with generic PERCOCET.  Keep these medications at least 6 hours apart.  Likewise, when you take your PERCOCET, no driving for 6 hours.   Again, never drive if you are drowsy.  Do not take any of these medications with alcohol.    Please come back in 2 months.  You will benefit from a Medicare Wellness evaluation for the year, as well as some repeat NONFASTING blood examinations which can be done on the same day.    Until then, please continue taking care of yourself and your family, and please continue to pray for our recovery from this pandemic.    Please do not forget to take your VITAMIN D, which is very helpful in keeping your bones healthy, especially when you are using STEROIDS.    Please continue following with your ENDOCRINOLOGIST, Dr. Sears.  Your latest hemoglobin A1c is good at 7.2.  Likewise, your cholesterol panel is acceptable.  Your thyroid function also came back good.    I do understand that you have been tolerating 0.25 Ozempic, but have had some episodes of bloating.  See if you are stable enough to try 0.5 mg.  If not, you can continue using 0.25 mg.  Again, side effects will be bloating, loss of appetite, nausea.  You may have an episode of vomiting, but if persistent, please let me know.    Again, thank you very much for coming.  I hope you have a happy Mother's Day!  See you in 2 months.            0  Return in 2 months.  40 minutes please.  Reassess debility, reassess polypharmacy, review precautions, update CSA.  Coordinate with psychiatry, rheumatology, endocrinology, possibly nephrology.  Reassess mood, energy, function, preventive strategies, cardiovascular risk.  Repeat NONFASTING blood examination, possible urine examination, during this visit.  CBC, CMP, TSH, uric acid, hepatitis C screening.            0     80

## 2025-06-04 ENCOUNTER — TELEPHONE (OUTPATIENT)
Dept: RHEUMATOLOGY | Facility: CLINIC | Age: 60
End: 2025-06-04
Payer: MEDICARE

## 2025-06-04 DIAGNOSIS — M79.7 FIBROMYALGIA: ICD-10-CM

## 2025-06-04 RX ORDER — TIZANIDINE 4 MG/1
4 TABLET ORAL EVERY 8 HOURS PRN
Qty: 90 TABLET | Refills: 11 | Status: SHIPPED | OUTPATIENT
Start: 2025-06-04 | End: 2026-06-04

## 2025-06-04 NOTE — TELEPHONE ENCOUNTER
Pt calling to [facility] RX RF for:     Tizanidine  4 mg     Please send to:     [facility]  (verified)

## 2025-06-27 ENCOUNTER — OFFICE VISIT (OUTPATIENT)
Age: 60
End: 2025-06-27
Payer: MEDICARE

## 2025-06-27 VITALS
HEART RATE: 86 BPM | DIASTOLIC BLOOD PRESSURE: 73 MMHG | HEIGHT: 68 IN | SYSTOLIC BLOOD PRESSURE: 125 MMHG | WEIGHT: 167 LBS | BODY MASS INDEX: 25.31 KG/M2

## 2025-06-27 DIAGNOSIS — E11.65 UNCONTROLLED TYPE 2 DIABETES MELLITUS WITH HYPERGLYCEMIA (HCC): Primary | ICD-10-CM

## 2025-06-27 DIAGNOSIS — Z96.41 INSULIN PUMP IN PLACE: ICD-10-CM

## 2025-06-27 DIAGNOSIS — R61 HYPERHIDROSIS: ICD-10-CM

## 2025-06-27 LAB
CHP ED QC CHECK: NORMAL
GLUCOSE BLD-MCNC: 93 MG/DL

## 2025-06-27 PROCEDURE — 3044F HG A1C LEVEL LT 7.0%: CPT | Performed by: INTERNAL MEDICINE

## 2025-06-27 PROCEDURE — PBSHW POCT GLUCOSE: Performed by: INTERNAL MEDICINE

## 2025-06-27 PROCEDURE — 1036F TOBACCO NON-USER: CPT | Performed by: INTERNAL MEDICINE

## 2025-06-27 PROCEDURE — 82962 GLUCOSE BLOOD TEST: CPT | Performed by: INTERNAL MEDICINE

## 2025-06-27 PROCEDURE — G8419 CALC BMI OUT NRM PARAM NOF/U: HCPCS | Performed by: INTERNAL MEDICINE

## 2025-06-27 PROCEDURE — G8427 DOCREV CUR MEDS BY ELIG CLIN: HCPCS | Performed by: INTERNAL MEDICINE

## 2025-06-27 PROCEDURE — 2022F DILAT RTA XM EVC RTNOPTHY: CPT | Performed by: INTERNAL MEDICINE

## 2025-06-27 PROCEDURE — 3074F SYST BP LT 130 MM HG: CPT | Performed by: INTERNAL MEDICINE

## 2025-06-27 PROCEDURE — 99213 OFFICE O/P EST LOW 20 MIN: CPT | Performed by: INTERNAL MEDICINE

## 2025-06-27 PROCEDURE — 99214 OFFICE O/P EST MOD 30 MIN: CPT | Performed by: INTERNAL MEDICINE

## 2025-06-27 PROCEDURE — 3017F COLORECTAL CA SCREEN DOC REV: CPT | Performed by: INTERNAL MEDICINE

## 2025-06-27 PROCEDURE — 3078F DIAST BP <80 MM HG: CPT | Performed by: INTERNAL MEDICINE

## 2025-06-27 RX ORDER — GLYCOPYRROLATE 1 MG/1
1 TABLET ORAL 2 TIMES DAILY
Qty: 60 TABLET | Refills: 3 | Status: SHIPPED | OUTPATIENT
Start: 2025-06-27

## 2025-06-27 RX ORDER — FAMOTIDINE 20 MG/1
TABLET, FILM COATED ORAL
COMMUNITY
Start: 2025-06-19

## 2025-06-27 RX ORDER — ERGOCALCIFEROL 1.25 MG/1
50000 CAPSULE, LIQUID FILLED ORAL WEEKLY
COMMUNITY
Start: 2025-06-20

## 2025-06-27 ASSESSMENT — ENCOUNTER SYMPTOMS: EYES NEGATIVE: 1

## 2025-06-27 NOTE — PROGRESS NOTES
6/27/2025    Assessment:       Diagnosis Orders   1. Uncontrolled type 2 diabetes mellitus with hyperglycemia (HCC)  POCT Glucose      2. Hyperhidrosis        3. Insulin pump in place              PLAN:     Orders Placed This Encounter   Procedures    Basic Metabolic Panel     Standing Status:   Future     Expected Date:   6/27/2025     Expiration Date:   6/27/2026    Hemoglobin A1C     Standing Status:   Future     Expected Date:   6/27/2025     Expiration Date:   6/27/2026    Lipid Panel     Standing Status:   Future     Expected Date:   6/27/2025     Expiration Date:   6/27/2026    POCT Glucose     Orders Placed This Encounter   Medications    glycopyrrolate (ROBINUL) 1 MG tablet     Sig: Take 1 tablet by mouth 2 times daily     Dispense:  60 tablet     Refill:  3     Diabetes education provided today:    Nutrition as a mainstream of diabetes therapy. [place] about label reading.  Insulin pumps, how they work and how they affect blood sugar levels.  Continuous Glucose monitor. How it works and checks blood sugars every 5 min. for 4 days during our tests.    Continue current dose of glycopyrrolate continue insulin pump as per current pump settings to adjust the dose if needed if she has frequent hypoglycemic episodes follow-up in 6 months time more than 50% of 30 spent in patient education counseling    Orders Placed This Encounter   Procedures    POCT Glucose     No orders of the defined types were placed in this encounter.    No follow-ups on file.  Subjective:     Chief Complaint   Patient presents with    Diabetes     Insulin pump in place and CGM Dexcom  Patient left both meters at home          Excessive Sweating     Vitals:    06/27/25 0904   BP: 125/73   Pulse: 86   Weight: 75.8 kg (167 lb)   Height: 1.727 m (5' 7.99\")     Wt Readings from Last 3 Encounters:   06/27/25 75.8 kg (167 lb)   03/21/25 75.8 kg (167 lb)   12/19/24 75 kg (165 lb 5.5 oz)     BP Readings from Last 3 Encounters:   06/27/25 125/73

## 2025-07-30 RX ORDER — FAMOTIDINE 20 MG/1
TABLET, FILM COATED ORAL
COMMUNITY
Start: 2025-05-24

## 2025-07-31 ENCOUNTER — APPOINTMENT (OUTPATIENT)
Dept: RHEUMATOLOGY | Facility: CLINIC | Age: 60
End: 2025-07-31
Payer: MEDICARE

## 2025-07-31 VITALS
OXYGEN SATURATION: 99 % | WEIGHT: 167 LBS | BODY MASS INDEX: 26.21 KG/M2 | HEIGHT: 67 IN | SYSTOLIC BLOOD PRESSURE: 167 MMHG | HEART RATE: 100 BPM | TEMPERATURE: 97.3 F | DIASTOLIC BLOOD PRESSURE: 108 MMHG

## 2025-07-31 DIAGNOSIS — M79.7 FIBROMYALGIA: ICD-10-CM

## 2025-07-31 DIAGNOSIS — I10 ESSENTIAL HYPERTENSION: ICD-10-CM

## 2025-07-31 DIAGNOSIS — Z51.81 ENCOUNTER FOR MONITORING OF HYDROXYCHLOROQUINE THERAPY: ICD-10-CM

## 2025-07-31 DIAGNOSIS — Z79.899 ENCOUNTER FOR MONITORING OF HYDROXYCHLOROQUINE THERAPY: ICD-10-CM

## 2025-07-31 DIAGNOSIS — M32.9 SYSTEMIC LUPUS ERYTHEMATOSUS, UNSPECIFIED SLE TYPE, UNSPECIFIED ORGAN INVOLVEMENT STATUS (MULTI): Primary | ICD-10-CM

## 2025-07-31 LAB
NON-UH HIE A/G RATIO: 1.1
NON-UH HIE ALB: 4.5 G/DL (ref 3.4–5)
NON-UH HIE ALK PHOS: 121 UNIT/L (ref 45–117)
NON-UH HIE BASO COUNT: 0.09 X1000 (ref 0–0.2)
NON-UH HIE BASOS %: 0.9 %
NON-UH HIE BILIRUBIN, TOTAL: 0.4 MG/DL (ref 0.3–1.2)
NON-UH HIE BUN/CREAT RATIO: 7.3
NON-UH HIE BUN: 8 MG/DL (ref 9–23)
NON-UH HIE C-REACTIVE PROTEIN, QUANTITATIVE: <0.5 MG/DL (ref 0–0.5)
NON-UH HIE CALCIUM: 10.3 MG/DL (ref 8.7–10.4)
NON-UH HIE CALCULATED OSMOLALITY: 286 MOSM/KG (ref 275–295)
NON-UH HIE CHLORIDE: 105 MMOL/L (ref 98–107)
NON-UH HIE CO2, VENOUS: 25 MMOL/L (ref 20–31)
NON-UH HIE CREATININE: 1.1 MG/DL (ref 0.5–0.8)
NON-UH HIE DIFF?: ABNORMAL
NON-UH HIE EOS COUNT: 0.29 X1000 (ref 0–0.5)
NON-UH HIE EOSIN %: 2.9 %
NON-UH HIE GFR AA: >60
NON-UH HIE GLOBULIN: 4.1 G/DL
NON-UH HIE GLOMERULAR FILTRATION RATE: 51 ML/MIN/1.73M?
NON-UH HIE GLUCOSE: 208 MG/DL (ref 74–106)
NON-UH HIE GOT: 14 UNIT/L (ref 15–37)
NON-UH HIE GPT: 9 UNIT/L (ref 10–49)
NON-UH HIE HCT: 48.2 % (ref 36–46)
NON-UH HIE HGB: 16.5 G/DL (ref 12–16)
NON-UH HIE INSTR WBC: 9.9
NON-UH HIE K: 3.9 MMOL/L (ref 3.5–5.1)
NON-UH HIE LYMPH %: 13 %
NON-UH HIE LYMPH COUNT: 1.28 X1000 (ref 1.2–4.8)
NON-UH HIE MCH: 29.4 PG (ref 27–34)
NON-UH HIE MCHC: 34.2 G/DL (ref 32–37)
NON-UH HIE MCV: 86 FL (ref 80–100)
NON-UH HIE MONO %: 4.1 %
NON-UH HIE MONO COUNT: 0.41 X1000 (ref 0.1–1)
NON-UH HIE MPV: 9 FL (ref 7.4–10.4)
NON-UH HIE NA: 141 MMOL/L (ref 135–145)
NON-UH HIE NEUTROPHIL %: 79 %
NON-UH HIE NEUTROPHIL COUNT (ANC): 7.8 X1000 (ref 1.4–8.8)
NON-UH HIE NUCLEATED RBC: 0 /100WBC
NON-UH HIE PLATELET: 305 X10 (ref 150–450)
NON-UH HIE RBC: 5.61 X10 (ref 4.2–5.4)
NON-UH HIE RDW: 13.1 % (ref 11.5–14.5)
NON-UH HIE SED RATE WESTERGREN: 11 MM/HR (ref 0–30)
NON-UH HIE TOTAL PROTEIN: 8.6 G/DL (ref 5.7–8.2)
NON-UH HIE WBC: 9.9 X10 (ref 4.5–11)

## 2025-07-31 PROCEDURE — 1036F TOBACCO NON-USER: CPT | Performed by: INTERNAL MEDICINE

## 2025-07-31 PROCEDURE — 96372 THER/PROPH/DIAG INJ SC/IM: CPT | Performed by: INTERNAL MEDICINE

## 2025-07-31 PROCEDURE — 3080F DIAST BP >= 90 MM HG: CPT | Performed by: INTERNAL MEDICINE

## 2025-07-31 PROCEDURE — 3077F SYST BP >= 140 MM HG: CPT | Performed by: INTERNAL MEDICINE

## 2025-07-31 PROCEDURE — 3008F BODY MASS INDEX DOCD: CPT | Performed by: INTERNAL MEDICINE

## 2025-07-31 PROCEDURE — 99214 OFFICE O/P EST MOD 30 MIN: CPT | Performed by: INTERNAL MEDICINE

## 2025-07-31 RX ORDER — TRIAMCINOLONE ACETONIDE 40 MG/ML
40 INJECTION, SUSPENSION INTRA-ARTICULAR; INTRAMUSCULAR ONCE
Status: COMPLETED | OUTPATIENT
Start: 2025-07-31 | End: 2025-07-31

## 2025-07-31 RX ADMIN — TRIAMCINOLONE ACETONIDE 40 MG: 40 INJECTION, SUSPENSION INTRA-ARTICULAR; INTRAMUSCULAR at 08:28

## 2025-07-31 ASSESSMENT — ENCOUNTER SYMPTOMS
ENDOCRINE NEGATIVE: 1
COLOR CHANGE: 0
WEAKNESS: 0
ARTHRALGIAS: 1
GASTROINTESTINAL NEGATIVE: 1
FATIGUE: 1
COUGH: 0
SHORTNESS OF BREATH: 0
PSYCHIATRIC NEGATIVE: 1
FEVER: 0
NUMBNESS: 0
MYALGIAS: 1
BACK PAIN: 0
JOINT SWELLING: 1
EYES NEGATIVE: 1

## 2025-07-31 NOTE — ASSESSMENT & PLAN NOTE
On plaquenil(Hydroxychloroquine) therapy.   Increased symptoms due to summer weather and sun exposure.  Kenalog IM given for relief of symptoms.  Labs ordered today to monitor for increased disease activity, end organ manifestations and to monitor for any drug toxicities due to chronic medications    Orders:    Follow Up In Rheumatology; Future    Anti-DNA Antibody, Double-Stranded; Future    C3 Complement; Future    C4 Complement; Future    CBC and Auto Differential; Future    Comprehensive Metabolic Panel; Future    C-Reactive Protein; Future    Sedimentation Rate; Future    Follow Up In Rheumatology    triamcinolone acetonide (Kenalog-40) injection 40 mg

## 2025-07-31 NOTE — PATIENT INSTRUCTIONS
It was a pleasure to see you today  Please call if your symptoms worsen  Please review your summary for education and reminders.  Follow up at your next appointment.    If you had labs/xrays done today, you will be able to view on [facility].   We will contact you when the results are reviewed for further discussion.  Please note that you may receive your results before I have had a chance to review.  Please know I will be contacting you for discussion  Homegoing instructions for all patient  A healthy lifestyle helps chronic diseases  These are all the goals you should strive to improve your overall health   Blood pressure <130/85   BMI of <30 or waist circumference that is 1/2 of your height   Fasting blood sugar <107 (if you are diabetic, aim for an A1c <6.4%_   LDL cholesterol <130   Avoid smoking   Manage your stress   Get your preventive exams   Get your immunizations   Some guidelines for all patients with lupus  Wear sunscreen, even on cloudy days.   Sun can worsening rashes and cause fatigue and flares  Exercise.  Movement is medicine.   Even stretches and light yoga count  If you smoke, stop.  Smoking makes skin manifestations of lupus much harder to treat.  Lupus (and the medications you need to be on) increase infection risk.  Keep up to date with your vaccinations  Lupus increases heart and stroke risk.  Make sure you work to get your cholesterol and blood pressure under control. If you have diabetes, it is important to get your A1c below 7%  If you are on hydroxychloroquine (plaquenil), make sure you see your eye doctor regularly and get testing to monitor for any potential changes  Think about your bone health.   Osteoporosis is a risk, especially if you are or have been on steroids.      Mental health can be an issue.   Not only does having a diagnosis of lupus cause depression but lupus itself can be the cause of mental health issues.  If you feel you need help, please call me or your primary care  doctor.   We are here for you   Common Emergency Awareness Tips   IS IT A STROKE?   Act FAST and Check for these signs:   FACE Does the face look uneven?   ARM Does one arm drift down?   SPEECH Does their speech sound strange?   TIME Call 9-1-1 at any sign of stroke     Heart Attack Signs   Chest discomfort: Most heart attacks involve discomfort in the center of the chest and lasts more than a few minutes, or goes away and comes back. It can feel like uncomfortable pressure, squeezing, fullness or pain.   Discomfort in upper body: Symptoms can include pain or discomfort in one or both arms, back, neck, jaw or stomach.   Shortness of breath: With or without discomfort.   Other signs: Breaking out in a cold sweat, nausea, or lightheaded.   Remember, MINUTES DO MATTER. If you experience any of these heart attack warning signs, call 9-1-1 to get immediate medical attention!

## 2025-07-31 NOTE — ASSESSMENT & PLAN NOTE
You are on chronic plaquenil.     Make sure you see your eye doctor yearly.  If you need an eye doctor, let me know and I can place a referral    Plaquenil is dosed based on your weight.   We will make sure your dose is below the maximum daily dose of 5mg/kg    Orders:    Follow Up In Rheumatology

## 2025-07-31 NOTE — PROGRESS NOTES
[facility] RHEUMATOLOGY     AND INTERNAL MEDICINE    RHEUMATOLOGY PROGRESS NOTE    Eunice Schroeder 59 y.o. female  :  1965  PCP:  Hany Sidhu MD      Chief Complaint   Patient presents with    Lupus     4 month follow up        SUBJECTIVE  HPI  Hot humid weather has really affected her.   Patient notes a lot of diffuse pain and no energy to do anything.   Notes pain and swelling in the top of her feet.  No rashes.  No new areas of issues.  Patient hopes a cortisone shot will help alleviate symptoms    Records since last seen reviewed in [facility], [facility] and [facility] Record  Patient's active problems include the following:  has Chronic gout of multiple sites; Chronic pain syndrome; Fibromyalgia; Debility; Elevated alkaline phosphatase level; Essential hypertension; Essential tremor; Gastroesophageal reflux disease; Gastroparesis; Hepatic steatosis; Hypothyroidism due to acquired atrophy of thyroid; IBS (irritable bowel syndrome); Insulin pump in place; Leukocytosis; Microalbuminuria; Migraine without aura and without status migrainosus, not intractable; Hypertriglyceridemia; Mixed hyperlipidemia; Nephrolithiasis; Osteopenia; Polycythemia; Seasonal allergic rhinitis due to pollen; Spondylosis of lumbar region without myelopathy or radiculopathy; Systemic lupus erythematosus (Multi); Telogen hair loss; Trochanteric bursitis of left hip; Type 2 diabetes mellitus with stage 3b chronic kidney disease, without long-term current use of insulin (Multi); Vitamin B12 deficiency; Vitamin D deficiency; Moderate persistent asthma with acute exacerbation ([facility]); Overweight (BMI 25.0-29.9); Hot flash, menopausal; Polypharmacy; Generalized anxiety disorder with panic attacks; Stage 3a chronic kidney disease (Multi); Hypercalcemia; Hyperuricemia; Mild recurrent major depression; Encounter for monitoring of hydroxychloroquine therapy; Bipolar II disorder  "(Multi); Nausea; [facility] cardiac risk <10% in next 10 years; and Labral tear of shoulder, degenerative, right on their problem list.    Reviewed:  Medical History[1] Medications Ordered Prior to Encounter[2] RX Allergies[3]  Social History[4]   Review of Systems   Constitutional:  Positive for fatigue. Negative for fever.   HENT: Negative.     Eyes: Negative.    Respiratory:  Negative for cough and shortness of breath.    Cardiovascular:  Negative for leg swelling.   Gastrointestinal: Negative.    Endocrine: Negative.    Genitourinary: Negative.    Musculoskeletal:  Positive for arthralgias, gait problem, joint swelling and myalgias. Negative for back pain.   Skin:  Negative for color change and rash.   Neurological:  Negative for weakness and numbness.   Psychiatric/Behavioral: Negative.         PHYSICAL EXAM  BP (!) 167/108 (BP Location: Left arm)   Pulse 100   Temp 36.3 °C (97.3 °F)   Ht 1.702 m (5' 7\")   Wt 75.8 kg (167 lb)   SpO2 99%   BMI 26.16 kg/m²   Depression: Not at risk (3/28/2025)    PHQ-2     PHQ-2 Score: 0     Physical Exam  Vitals reviewed.   Constitutional:       General: She is not in acute distress.     Appearance: Normal appearance.   HENT:      Head: Normocephalic and atraumatic.     Eyes:      General: No scleral icterus.     Extraocular Movements: Extraocular movements intact.      Conjunctiva/sclera: Conjunctivae normal.      Pupils: Pupils are equal, round, and reactive to light.       Cardiovascular:      Rate and Rhythm: Normal rate and regular rhythm.      Pulses: Normal pulses.      Heart sounds: Normal heart sounds.   Pulmonary:      Effort: Pulmonary effort is normal. No respiratory distress.     Musculoskeletal:         General: Deformity (angulation deformity of R wrist from remote fracture) present. No swelling or tenderness.      Cervical back: Normal range of motion and neck supple. No tenderness.      Right lower leg: No edema.      Left lower leg: No edema.      " Comments: No synovitis of examined joints  Slow gait     Skin:     Coloration: Skin is not pale.      Findings: No erythema or rash.      Comments: tan     Neurological:      General: No focal deficit present.      Mental Status: She is alert and oriented to person, place, and time. Mental status is at baseline.      Gait: Gait abnormal.     Psychiatric:         Mood and Affect: Mood normal.         Health Maintenance   Topic Date Due    HIV Screening  Never done    Colorectal Cancer Screening  Never done    Hepatitis A Vaccines (1 of 2 - Risk 2-dose series) Never done    Diabetes: Retinopathy Screening  07/14/2017    Medicare Annual Wellness Visit (AWV)  01/30/2025    TSH Level  06/27/2025    Lipid Panel  06/27/2025    Diabetes: Hemoglobin A1C  08/27/2025    Influenza Vaccine (1) 09/01/2025    Mammogram  10/15/2025    DTaP/Tdap/Td Vaccines (9 - Td or Tdap) 07/12/2027    Hepatitis B Vaccines  Completed    Pneumococcal Vaccine  Completed    Zoster Vaccines  Completed    Hepatitis C Screening  Completed    HIB Vaccines  Aged Out    IPV Vaccines  Aged Out    Meningococcal Vaccine  Aged Out    Rotavirus Vaccines  Aged Out    HPV Vaccines  Aged Out    Diabetes: Urine Protein Screening  Discontinued    MMR Vaccines  Discontinued    COVID-19 Vaccine  Discontinued       Assessment/plan  Assessment & Plan  Systemic lupus erythematosus, unspecified SLE type, unspecified organ involvement status (Multi)  On plaquenil(Hydroxychloroquine) therapy.   Increased symptoms due to summer weather and sun exposure.  Kenalog IM given for relief of symptoms.  Labs ordered today to monitor for increased disease activity, end organ manifestations and to monitor for any drug toxicities due to chronic medications    Orders:    Follow Up In Rheumatology; Future    Anti-DNA Antibody, Double-Stranded; Future    C3 Complement; Future    C4 Complement; Future    CBC and Auto Differential; Future    Comprehensive Metabolic Panel; Future     C-Reactive Protein; Future    Sedimentation Rate; Future    Follow Up In Rheumatology    triamcinolone acetonide (Kenalog-40) injection 40 mg    Fibromyalgia  On muscle relaxer.  Continue meds  Orders:    Follow Up In Rheumatology    Encounter for monitoring of hydroxychloroquine therapy  You are on chronic plaquenil.     Make sure you see your eye doctor yearly.  If you need an eye doctor, let me know and I can place a referral    Plaquenil is dosed based on your weight.   We will make sure your dose is below the maximum daily dose of 5mg/kg    Orders:    Follow Up In Rheumatology    Essential hypertension  Elevated today-follow up with PCP           Follow up: _____months, sooner if change in symptoms    Immunization History   Administered Date(s) Administered    Flu vaccine (IIV4), preservative free *Check age/dose* 09/01/2017, 09/06/2018, 09/06/2019, 09/07/2020, 09/08/2021, 09/08/2022, 09/08/2023    Flu vaccine, quadrivalent, high-dose, preservative free, age 65y+ (FLUZONE) 08/29/2016    Flu vaccine, trivalent, preservative free, age 6 months and greater (Fluarix/Fluzone/Flulaval) 09/09/2024    Hep B, Unspecified 10/18/1999, 11/16/1999, 04/12/2000    Hepatitis B vaccine, adult *Check Product/Dose* 10/18/1999, 11/16/1999, 04/12/2000    Influenza Whole 09/27/2014, 09/26/2015    Influenza, Unspecified 10/21/2004, 08/29/2016, 09/01/2017, 08/31/2018, 10/01/2020, 09/08/2022    Influenza, injectable, quadrivalent, preservative free, pediatric 09/06/2018, 09/06/2019, 09/07/2020    Influenza, seasonal, injectable 09/06/2017, 09/08/2022, 09/09/2024    Novel influenza-H1N1-09, preservative-free 12/31/2009    PPD Test 02/16/2005    Pfizer COVID-19 vaccine, bivalent, age 12 years and older (30 mcg/0.3 mL) 12/14/2022    Pfizer Gray Cap SARS-CoV-2 05/15/2022    Pfizer Purple Cap SARS-CoV-2 04/28/2021, 05/21/2021, 11/17/2021    Pneumococcal Conjugate PCV 7 09/20/2014, 10/01/2020    Pneumococcal conjugate vaccine, 13-valent  (PREVNAR 13) 09/03/2020    Pneumococcal conjugate vaccine, 20-valent (PREVNAR 20) 09/08/2023    Pneumococcal polysaccharide vaccine, 23-valent, age 2 years and older (PNEUMOVAX 23) 09/15/2014, 01/01/2017    SARS-CoV-2, Unspecified 05/15/2022    Td vaccine, age 7 years and older (TDVAX) 10/18/1999, 02/16/2005    Td vaccine, age 7 years and older (TENIVAC) 08/06/2015    Tdap vaccine, age 7 year and older (BOOSTRIX, ADACEL) 10/18/1999, 02/16/2005, 08/06/2015, 07/01/2017, 07/12/2017    Zoster vaccine, recombinant, adult (SHINGRIX) 09/21/2020, 12/06/2020    Zoster, Unspecified 09/21/2020, 12/06/2020       Felicita Chirinos MD                     [1]   Past Medical History:  Diagnosis Date    Calculus of kidney     Recurrent nephrolithiasis    Candidal intertrigo 01/29/2024    COVID-19 vaccination declined 11/20/2023    Foot fracture, left 12/16/2016    Immunosuppression due to chronic steroid use 01/05/2024    Moderate persistent asthma, uncomplicated ([facility])     Moderate persistent asthma, unspecified whether complicated    Other seasonal allergic rhinitis 04/22/2019    Seasonal allergies    Right ureteral calculus 05/05/2021    SBO (small bowel obstruction) (Multi) 04/06/2020    History of small bowel obstruction    Sinus tachycardia 11/04/2019    History of sinus tachycardia    Unspecified sensorineural hearing loss     Sensory - neural hearing loss   [2]   Current Outpatient Medications on File Prior to Visit   Medication Sig Dispense Refill    ergocalciferol (Vitamin D-2) 1250 mcg (50,000 units) capsule Take by mouth.      estradiol (Estrace) 0.5 mg tablet Take 1 tablet (0.5 mg) by mouth early in the morning..      famotidine (Pepcid) 20 mg tablet Take 1 tab by mouth twice a day (30 minutes before A meal)      Fiasp U-100 Insulin 100 unit/mL injection Use via pump max dose 150 units/day      glycopyrrolate (Robinul) 1 mg tablet Take 1 tablet (1 mg) by mouth every 12 hours.      HumaLOG U-100 Insulin 100 unit/mL  "injection Inject under the skin 3 times daily (morning, midday, late afternoon).      hydroxychloroquine (Plaquenil) 200 mg tablet Take 1 tablet (200 mg) by mouth 2 times a day. With food 180 tablet 3    insulin lispro (HumaLOG) 100 unit/mL pen INJECT 8 TO 10 UNITS 3 TIMES DAILY.      Lantus Solostar U-100 Insulin 100 unit/mL (3 mL) pen INJECT 30 units at bedtime      levothyroxine (Synthroid, Levoxyl) 50 mcg tablet TAKE 1 TABLET BY MOUTH ONCE DAILY on an empty stomach. 90 tablet 0    Omnipod Dash Pods, Gen 4, cartridge       oxyCODONE-acetaminophen (Percocet) 5-325 mg tablet Take 1 tablet by mouth every 6 hours.      pen needle, diabetic 32 gauge x 5/32\" needle Twice daily      propranolol (Inderal) 10 mg tablet Take 1 tablet (10 mg) by mouth every 12 hours.      rosuvastatin (Crestor) 20 mg tablet Take 1 tablet (20 mg) by mouth once daily. 90 tablet 0    syringe with needle, safety (BD Integra Syringe) 3 mL 25 gauge x 5/8\" syringe Please use with cyanocobalamin every 2 weeks. 7 each 3    tiZANidine (Zanaflex) 4 mg tablet Take 1 tablet (4 mg) by mouth every 8 hours if needed for muscle spasms. 90 tablet 11    venlafaxine XR (Effexor-XR) 150 mg 24 hr capsule Take 1 capsule (150 mg) by mouth once daily.      estradiol (Climara) 0.025 mg/24 hr patch Place 1 patch on the skin 1 (one) time per week. As directed 12 patch 0    pantoprazole (ProtoNix) 40 mg EC tablet Take 1 tablet (40 mg) by mouth once daily at bedtime. 90 tablet 1     No current facility-administered medications on file prior to visit.   [3]   Allergies  Allergen Reactions    Ketorolac Hives and Swelling    Sumatriptan Hives, Shortness of breath and Swelling    Tramadol Hives and Swelling    Lidocaine Rash     Patient states allergic    Aspirin Unknown    Cephalosporins Unknown    Diclofenac Unknown    Nsaids (Non-Steroidal Anti-Inflammatory Drug) Unknown    Penicillins Hives and Swelling    Fentanyl Rash    Sulfa (Sulfonamide Antibiotics) Rash   [4] "   Social History  Tobacco Use    Smoking status: Never    Smokeless tobacco: Never   Vaping Use    Vaping status: Never Used   Substance Use Topics    Alcohol use: Never    Drug use: Never

## 2025-07-31 NOTE — LETTER
2025     Hany Sidhu MD  [address] [address]    Patient: Eunice Schroeder   YOB: 1965   Date of Visit: 2025       Dear Dr. Hany Sidhu MD:    Thank you for referring Eunice Schroeder to me for evaluation. Below are my notes for this visit  If you have questions, please do not hesitate to call me. I look forward to following your patient along with you.       Sincerely,     Felicita Chirinos MD      CC: No Recipients  ______________________________________________________________________________________                                                    [facility] RHEUMATOLOGY     AND INTERNAL MEDICINE    RHEUMATOLOGY PROGRESS NOTE    Eunice Schroeder 59 y.o. female  :  1965  PCP:  Hany Sidhu MD      Chief Complaint   Patient presents with   • Lupus     4 month follow up        SUBJECTIVE  HPI  Hot humid weather has really affected her.   Patient notes a lot of diffuse pain and no energy to do anything.   Notes pain and swelling in the top of her feet.  No rashes.  No new areas of issues.  Patient hopes a cortisone shot will help alleviate symptoms    Records since last seen reviewed in [facility], [facility] Record  Patient's active problems include the following:  has Chronic gout of multiple sites; Chronic pain syndrome; Fibromyalgia; Debility; Elevated alkaline phosphatase level; Essential hypertension; Essential tremor; Gastroesophageal reflux disease; Gastroparesis; Hepatic steatosis; Hypothyroidism due to acquired atrophy of thyroid; IBS (irritable bowel syndrome); Insulin pump in place; Leukocytosis; Microalbuminuria; Migraine without aura and without status migrainosus, not intractable; Hypertriglyceridemia; Mixed hyperlipidemia; Nephrolithiasis; Osteopenia; Polycythemia; Seasonal allergic rhinitis due to pollen; Spondylosis of lumbar region without myelopathy or radiculopathy; Systemic lupus erythematosus (Multi);  "Telogen hair loss; Trochanteric bursitis of left hip; Type 2 diabetes mellitus with stage 3b chronic kidney disease, without long-term current use of insulin (Multi); Vitamin B12 deficiency; Vitamin D deficiency; Moderate persistent asthma with acute exacerbation ([facility]); Overweight (BMI 25.0-29.9); Hot flash, menopausal; Polypharmacy; Generalized anxiety disorder with panic attacks; Stage 3a chronic kidney disease (Multi); Hypercalcemia; Hyperuricemia; Mild recurrent major depression; Encounter for monitoring of hydroxychloroquine therapy; Bipolar II disorder ([facility]); Nausea; [place] cardiac risk <10% in next 10 years; and Labral tear of shoulder, degenerative, right on their problem list.    Reviewed:  Medical History[1] Medications Ordered Prior to Encounter[2] RX Allergies[3]  Social History[4]   Review of Systems   Constitutional:  Positive for fatigue. Negative for fever.   HENT: Negative.     Eyes: Negative.    Respiratory:  Negative for cough and shortness of breath.    Cardiovascular:  Negative for leg swelling.   Gastrointestinal: Negative.    Endocrine: Negative.    Genitourinary: Negative.    Musculoskeletal:  Positive for arthralgias, gait problem, joint swelling and myalgias. Negative for back pain.   Skin:  Negative for color change and rash.   Neurological:  Negative for weakness and numbness.   Psychiatric/Behavioral: Negative.         PHYSICAL EXAM  BP (!) 167/108 (BP Location: Left arm)   Pulse 100   Temp 36.3 °C (97.3 °F)   Ht 1.702 m (5' 7\")   Wt 75.8 kg (167 lb)   SpO2 99%   BMI 26.16 kg/m²   Depression: Not at risk (3/28/2025)    PHQ-2    • PHQ-2 Score: 0     Physical Exam  Vitals reviewed.   Constitutional:       General: She is not in acute distress.     Appearance: Normal appearance.   HENT:      Head: Normocephalic and atraumatic.     Eyes:      General: No scleral icterus.     Extraocular Movements: Extraocular movements intact.      Conjunctiva/sclera: Conjunctivae normal.     "  Pupils: Pupils are equal, round, and reactive to light.       Cardiovascular:      Rate and Rhythm: Normal rate and regular rhythm.      Pulses: Normal pulses.      Heart sounds: Normal heart sounds.   Pulmonary:      Effort: Pulmonary effort is normal. No respiratory distress.     Musculoskeletal:         General: Deformity (angulation deformity of R wrist from remote fracture) present. No swelling or tenderness.      Cervical back: Normal range of motion and neck supple. No tenderness.      Right lower leg: No edema.      Left lower leg: No edema.      Comments: No synovitis of examined joints  Slow gait     Skin:     Coloration: Skin is not pale.      Findings: No erythema or rash.      Comments: tan     Neurological:      General: No focal deficit present.      Mental Status: She is alert and oriented to person, place, and time. Mental status is at baseline.      Gait: Gait abnormal.     Psychiatric:         Mood and Affect: Mood normal.         Health Maintenance   Topic Date Due   • HIV Screening  Never done   • Colorectal Cancer Screening  Never done   • Hepatitis A Vaccines (1 of 2 - Risk 2-dose series) Never done   • Diabetes: Retinopathy Screening  07/14/2017   • Medicare Annual Wellness Visit (AWV)  01/30/2025   • TSH Level  06/27/2025   • Lipid Panel  06/27/2025   • Diabetes: Hemoglobin A1C  08/27/2025   • Influenza Vaccine (1) 09/01/2025   • Mammogram  10/15/2025   • DTaP/Tdap/Td Vaccines (9 - Td or Tdap) 07/12/2027   • Hepatitis B Vaccines  Completed   • Pneumococcal Vaccine  Completed   • Zoster Vaccines  Completed   • Hepatitis C Screening  Completed   • HIB Vaccines  Aged Out   • IPV Vaccines  Aged Out   • Meningococcal Vaccine  Aged Out   • Rotavirus Vaccines  Aged Out   • HPV Vaccines  Aged Out   • Diabetes: Urine Protein Screening  Discontinued   • MMR Vaccines  Discontinued   • COVID-19 Vaccine  Discontinued       Assessment/plan  Assessment & Plan  Systemic lupus erythematosus, unspecified SLE  type, unspecified organ involvement status (Multi)  On plaquenil(Hydroxychloroquine) therapy.   Increased symptoms due to summer weather and sun exposure.  Kenalog IM given for relief of symptoms.  Labs ordered today to monitor for increased disease activity, end organ manifestations and to monitor for any drug toxicities due to chronic medications    Orders:  •  Follow Up In Rheumatology; Future  •  Anti-DNA Antibody, Double-Stranded; Future  •  C3 Complement; Future  •  C4 Complement; Future  •  CBC and Auto Differential; Future  •  Comprehensive Metabolic Panel; Future  •  C-Reactive Protein; Future  •  Sedimentation Rate; Future  •  Follow Up In Rheumatology  •  triamcinolone acetonide (Kenalog-40) injection 40 mg    Fibromyalgia  On muscle relaxer.  Continue meds  Orders:  •  Follow Up In Rheumatology    Encounter for monitoring of hydroxychloroquine therapy  You are on chronic plaquenil.     Make sure you see your eye doctor yearly.  If you need an eye doctor, let me know and I can place a referral    Plaquenil is dosed based on your weight.   We will make sure your dose is below the maximum daily dose of 5mg/kg    Orders:  •  Follow Up In Rheumatology    Essential hypertension  Elevated today-follow up with PCP           Follow up: _____months, sooner if change in symptoms    Immunization History   Administered Date(s) Administered   • Flu vaccine (IIV4), preservative free *Check age/dose* 09/01/2017, 09/06/2018, 09/06/2019, 09/07/2020, 09/08/2021, 09/08/2022, 09/08/2023   • Flu vaccine, quadrivalent, high-dose, preservative free, age 65y+ (FLUZONE) 08/29/2016   • Flu vaccine, trivalent, preservative free, age 6 months and greater (Fluarix/Fluzone/Flulaval) 09/09/2024   • Hep B, Unspecified 10/18/1999, 11/16/1999, 04/12/2000   • Hepatitis B vaccine, adult *Check Product/Dose* 10/18/1999, 11/16/1999, 04/12/2000   • Influenza Whole 09/27/2014, 09/26/2015   • Influenza, Unspecified 10/21/2004, 08/29/2016,  09/01/2017, 08/31/2018, 10/01/2020, 09/08/2022   • Influenza, injectable, quadrivalent, preservative free, pediatric 09/06/2018, 09/06/2019, 09/07/2020   • Influenza, seasonal, injectable 09/06/2017, 09/08/2022, 09/09/2024   • Novel influenza-H1N1-09, preservative-free 12/31/2009   • PPD Test 02/16/2005   • Pfizer COVID-19 vaccine, bivalent, age 12 years and older (30 mcg/0.3 mL) 12/14/2022   • Pfizer Gray Cap SARS-CoV-2 05/15/2022   • Pfizer Purple Cap SARS-CoV-2 04/28/2021, 05/21/2021, 11/17/2021   • Pneumococcal Conjugate PCV 7 09/20/2014, 10/01/2020   • Pneumococcal conjugate vaccine, 13-valent (PREVNAR 13) 09/03/2020   • Pneumococcal conjugate vaccine, 20-valent (PREVNAR 20) 09/08/2023   • Pneumococcal polysaccharide vaccine, 23-valent, age 2 years and older (PNEUMOVAX 23) 09/15/2014, 01/01/2017   • SARS-CoV-2, Unspecified 05/15/2022   • Td vaccine, age 7 years and older (TDVAX) 10/18/1999, 02/16/2005   • Td vaccine, age 7 years and older (TENIVAC) 08/06/2015   • Tdap vaccine, age 7 year and older (BOOSTRIX, ADACEL) 10/18/1999, 02/16/2005, 08/06/2015, 07/01/2017, 07/12/2017   • Zoster vaccine, recombinant, adult (SHINGRIX) 09/21/2020, 12/06/2020   • Zoster, Unspecified 09/21/2020, 12/06/2020       Felicita Chirinos MD                     [1]  Past Medical History:  Diagnosis Date   • Calculus of kidney     Recurrent nephrolithiasis   • Candidal intertrigo 01/29/2024   • COVID-19 vaccination declined 11/20/2023   • Foot fracture, left 12/16/2016   • Immunosuppression due to chronic steroid use 01/05/2024   • Moderate persistent asthma, uncomplicated ([facility])     Moderate persistent asthma, unspecified whether complicated   • Other seasonal allergic rhinitis 04/22/2019    Seasonal allergies   • Right ureteral calculus 05/05/2021   • SBO (small bowel obstruction) (Multi) 04/06/2020    History of small bowel obstruction   • Sinus tachycardia 11/04/2019    History of sinus tachycardia   • Unspecified sensorineural  "hearing loss     Sensory - neural hearing loss   [2]  Current Outpatient Medications on File Prior to Visit   Medication Sig Dispense Refill   • ergocalciferol (Vitamin D-2) 1250 mcg (50,000 units) capsule Take by mouth.     • estradiol (Estrace) 0.5 mg tablet Take 1 tablet (0.5 mg) by mouth early in the morning..     • famotidine (Pepcid) 20 mg tablet Take 1 tab by mouth twice a day (30 minutes before A meal)     • Fiasp U-100 Insulin 100 unit/mL injection Use via pump max dose 150 units/day     • glycopyrrolate (Robinul) 1 mg tablet Take 1 tablet (1 mg) by mouth every 12 hours.     • HumaLOG U-100 Insulin 100 unit/mL injection Inject under the skin 3 times daily (morning, midday, late afternoon).     • hydroxychloroquine (Plaquenil) 200 mg tablet Take 1 tablet (200 mg) by mouth 2 times a day. With food 180 tablet 3   • insulin lispro (HumaLOG) 100 unit/mL pen INJECT 8 TO 10 UNITS 3 TIMES DAILY.     • Lantus Solostar U-100 Insulin 100 unit/mL (3 mL) pen INJECT 30 units at bedtime     • levothyroxine (Synthroid, Levoxyl) 50 mcg tablet TAKE 1 TABLET BY MOUTH ONCE DAILY on an empty stomach. 90 tablet 0   • Omnipod Dash Pods, Gen 4, cartridge      • oxyCODONE-acetaminophen (Percocet) 5-325 mg tablet Take 1 tablet by mouth every 6 hours.     • pen needle, diabetic 32 gauge x 5/32\" needle Twice daily     • propranolol (Inderal) 10 mg tablet Take 1 tablet (10 mg) by mouth every 12 hours.     • rosuvastatin (Crestor) 20 mg tablet Take 1 tablet (20 mg) by mouth once daily. 90 tablet 0   • syringe with needle, safety (BD Integra Syringe) 3 mL 25 gauge x 5/8\" syringe Please use with cyanocobalamin every 2 weeks. 7 each 3   • tiZANidine (Zanaflex) 4 mg tablet Take 1 tablet (4 mg) by mouth every 8 hours if needed for muscle spasms. 90 tablet 11   • venlafaxine XR (Effexor-XR) 150 mg 24 hr capsule Take 1 capsule (150 mg) by mouth once daily.     • estradiol (Climara) 0.025 mg/24 hr patch Place 1 patch on the skin 1 (one) time " per week. As directed 12 patch 0   • pantoprazole (ProtoNix) 40 mg EC tablet Take 1 tablet (40 mg) by mouth once daily at bedtime. 90 tablet 1     No current facility-administered medications on file prior to visit.   [3]  Allergies  Allergen Reactions   • Ketorolac Hives and Swelling   • Sumatriptan Hives, Shortness of breath and Swelling   • Tramadol Hives and Swelling   • Lidocaine Rash     Patient states allergic   • Aspirin Unknown   • Cephalosporins Unknown   • Diclofenac Unknown   • Nsaids (Non-Steroidal Anti-Inflammatory Drug) Unknown   • Penicillins Hives and Swelling   • Fentanyl Rash   • Sulfa (Sulfonamide Antibiotics) Rash   [4]  Social History  Tobacco Use   • Smoking status: Never   • Smokeless tobacco: Never   Vaping Use   • Vaping status: Never Used   Substance Use Topics   • Alcohol use: Never   • Drug use: Never          [1]  Past Medical History:  Diagnosis Date   • Calculus of kidney     Recurrent nephrolithiasis   • Candidal intertrigo 01/29/2024   • COVID-19 vaccination declined 11/20/2023   • Foot fracture, left 12/16/2016   • Immunosuppression due to chronic steroid use 01/05/2024   • Moderate persistent asthma, uncomplicated ([facility])     Moderate persistent asthma, unspecified whether complicated   • Other seasonal allergic rhinitis 04/22/2019    Seasonal allergies   • Right ureteral calculus 05/05/2021   • SBO (small bowel obstruction) (Multi) 04/06/2020    History of small bowel obstruction   • Sinus tachycardia 11/04/2019    History of sinus tachycardia   • Unspecified sensorineural hearing loss     Sensory - neural hearing loss   [2]  Current Outpatient Medications on File Prior to Visit   Medication Sig Dispense Refill   • ergocalciferol (Vitamin D-2) 1250 mcg (50,000 units) capsule Take by mouth.     • estradiol (Estrace) 0.5 mg tablet Take 1 tablet (0.5 mg) by mouth early in the morning..     • famotidine (Pepcid) 20 mg tablet Take 1 tab by mouth twice a day (30 minutes before A  "meal)     • Fiasp U-100 Insulin 100 unit/mL injection Use via pump max dose 150 units/day     • glycopyrrolate (Robinul) 1 mg tablet Take 1 tablet (1 mg) by mouth every 12 hours.     • HumaLOG U-100 Insulin 100 unit/mL injection Inject under the skin 3 times daily (morning, midday, late afternoon).     • hydroxychloroquine (Plaquenil) 200 mg tablet Take 1 tablet (200 mg) by mouth 2 times a day. With food 180 tablet 3   • insulin lispro (HumaLOG) 100 unit/mL pen INJECT 8 TO 10 UNITS 3 TIMES DAILY.     • Lantus Solostar U-100 Insulin 100 unit/mL (3 mL) pen INJECT 30 units at bedtime     • levothyroxine (Synthroid, Levoxyl) 50 mcg tablet TAKE 1 TABLET BY MOUTH ONCE DAILY on an empty stomach. 90 tablet 0   • Omnipod Dash Pods, Gen 4, cartridge      • oxyCODONE-acetaminophen (Percocet) 5-325 mg tablet Take 1 tablet by mouth every 6 hours.     • pen needle, diabetic 32 gauge x 5/32\" needle Twice daily     • propranolol (Inderal) 10 mg tablet Take 1 tablet (10 mg) by mouth every 12 hours.     • rosuvastatin (Crestor) 20 mg tablet Take 1 tablet (20 mg) by mouth once daily. 90 tablet 0   • syringe with needle, safety (BD Integra Syringe) 3 mL 25 gauge x 5/8\" syringe Please use with cyanocobalamin every 2 weeks. 7 each 3   • tiZANidine (Zanaflex) 4 mg tablet Take 1 tablet (4 mg) by mouth every 8 hours if needed for muscle spasms. 90 tablet 11   • venlafaxine XR (Effexor-XR) 150 mg 24 hr capsule Take 1 capsule (150 mg) by mouth once daily.     • estradiol (Climara) 0.025 mg/24 hr patch Place 1 patch on the skin 1 (one) time per week. As directed 12 patch 0   • pantoprazole (ProtoNix) 40 mg EC tablet Take 1 tablet (40 mg) by mouth once daily at bedtime. 90 tablet 1     No current facility-administered medications on file prior to visit.   [3]  Allergies  Allergen Reactions   • Ketorolac Hives and Swelling   • Sumatriptan Hives, Shortness of breath and Swelling   • Tramadol Hives and Swelling   • Lidocaine Rash     Patient " states allergic   • Aspirin Unknown   • Cephalosporins Unknown   • Diclofenac Unknown   • Nsaids (Non-Steroidal Anti-Inflammatory Drug) Unknown   • Penicillins Hives and Swelling   • Fentanyl Rash   • Sulfa (Sulfonamide Antibiotics) Rash   [4]  Social History  Tobacco Use   • Smoking status: Never   • Smokeless tobacco: Never   Vaping Use   • Vaping status: Never Used   Substance Use Topics   • Alcohol use: Never   • Drug use: Never

## 2025-08-01 LAB — NON-UH HIE ANTI-DNA: NORMAL

## 2025-08-02 LAB
NON-UH HIE COMPLEMENT C3: 199 MG/DL (ref 90–180)
NON-UH HIE COMPLEMENT C4: 27 MG/DL (ref 10–40)

## 2025-08-06 ENCOUNTER — HOSPITAL ENCOUNTER (EMERGENCY)
Age: 60
Discharge: HOME | End: 2025-08-06
Payer: MEDICARE

## 2025-08-06 ENCOUNTER — APPOINTMENT (OUTPATIENT)
Dept: CT IMAGING | Age: 60
End: 2025-08-06
Payer: MEDICARE

## 2025-08-06 VITALS
BODY MASS INDEX: 24.13 KG/M2 | SYSTOLIC BLOOD PRESSURE: 121 MMHG | RESPIRATION RATE: 20 BRPM | OXYGEN SATURATION: 99 % | WEIGHT: 159.2 LBS | DIASTOLIC BLOOD PRESSURE: 74 MMHG | HEART RATE: 103 BPM | HEIGHT: 68 IN | TEMPERATURE: 98.8 F

## 2025-08-06 DIAGNOSIS — R19.7 DIARRHEA, UNSPECIFIED TYPE: ICD-10-CM

## 2025-08-06 DIAGNOSIS — R10.32 ABDOMINAL PAIN, LEFT LOWER QUADRANT: Primary | ICD-10-CM

## 2025-08-06 LAB
ALBUMIN SERPL-MCNC: 5.1 G/DL (ref 3.5–4.6)
ALP SERPL-CCNC: 122 U/L (ref 40–130)
ALT SERPL-CCNC: 8 U/L (ref 0–33)
ANION GAP SERPL CALCULATED.3IONS-SCNC: 16 MEQ/L (ref 9–15)
AST SERPL-CCNC: 12 U/L (ref 0–35)
BASOPHILS # BLD: 0.1 K/UL (ref 0–0.2)
BASOPHILS NFR BLD: 0.5 %
BILIRUB SERPL-MCNC: 0.5 MG/DL (ref 0.2–0.7)
BILIRUB UR QL STRIP: NEGATIVE
BUN SERPL-MCNC: 7 MG/DL (ref 6–20)
CALCIUM SERPL-MCNC: 9.5 MG/DL (ref 8.5–9.9)
CHLORIDE SERPL-SCNC: 106 MEQ/L (ref 95–107)
CLARITY UR: CLEAR
CO2 SERPL-SCNC: 22 MEQ/L (ref 20–31)
COLOR UR: YELLOW
CREAT SERPL-MCNC: 1.03 MG/DL (ref 0.5–0.9)
EOSINOPHIL # BLD: 0.4 K/UL (ref 0–0.7)
EOSINOPHIL NFR BLD: 2.7 %
ERYTHROCYTE [DISTWIDTH] IN BLOOD BY AUTOMATED COUNT: 12.3 % (ref 11.5–14.5)
GLOBULIN SER CALC-MCNC: 3.5 G/DL (ref 2.3–3.5)
GLUCOSE SERPL-MCNC: 163 MG/DL (ref 70–99)
GLUCOSE UR STRIP-MCNC: NEGATIVE MG/DL
HCT VFR BLD AUTO: 48.9 % (ref 37–47)
HGB BLD-MCNC: 16.9 G/DL (ref 12–16)
HGB UR QL STRIP: NEGATIVE
KETONES UR STRIP-MCNC: ABNORMAL MG/DL
LEUKOCYTE ESTERASE UR QL STRIP: NEGATIVE
LIPASE SERPL-CCNC: 14 U/L (ref 12–95)
LYMPHOCYTES # BLD: 2.6 K/UL (ref 1–4.8)
LYMPHOCYTES NFR BLD: 17.4 %
MAGNESIUM SERPL-MCNC: 2.4 MG/DL (ref 1.7–2.4)
MCH RBC QN AUTO: 29.6 PG (ref 27–31.3)
MCHC RBC AUTO-ENTMCNC: 34.6 % (ref 33–37)
MCV RBC AUTO: 85.6 FL (ref 79.4–94.8)
MONOCYTES # BLD: 0.8 K/UL (ref 0.2–0.8)
MONOCYTES NFR BLD: 5.7 %
NEUTROPHILS # BLD: 10.9 K/UL (ref 1.4–6.5)
NEUTS SEG NFR BLD: 73.2 %
NITRITE UR QL STRIP: NEGATIVE
PH UR STRIP: 5 [PH] (ref 5–9)
PLATELET # BLD AUTO: 357 K/UL (ref 130–400)
POC CREATININE WHOLE BLOOD: 1.1
POTASSIUM SERPL-SCNC: 3.9 MEQ/L (ref 3.4–4.9)
PROT SERPL-MCNC: 8.6 G/DL (ref 6.3–8)
PROT UR STRIP-MCNC: NEGATIVE MG/DL
RBC # BLD AUTO: 5.71 M/UL (ref 4.2–5.4)
SODIUM SERPL-SCNC: 144 MEQ/L (ref 135–144)
SP GR UR STRIP: 1.05 (ref 1–1.03)
URINE REFLEX TO CULTURE: ABNORMAL
UROBILINOGEN UR STRIP-ACNC: 0.2 E.U./DL
WBC # BLD AUTO: 14.8 K/UL (ref 4.8–10.8)

## 2025-08-06 PROCEDURE — 83735 ASSAY OF MAGNESIUM: CPT

## 2025-08-06 PROCEDURE — 6370000000 HC RX 637 (ALT 250 FOR IP): Performed by: PERSONAL EMERGENCY RESPONSE ATTENDANT

## 2025-08-06 PROCEDURE — 2580000003 HC RX 258: Performed by: PERSONAL EMERGENCY RESPONSE ATTENDANT

## 2025-08-06 PROCEDURE — 96375 TX/PRO/DX INJ NEW DRUG ADDON: CPT

## 2025-08-06 PROCEDURE — 85025 COMPLETE CBC W/AUTO DIFF WBC: CPT

## 2025-08-06 PROCEDURE — 83690 ASSAY OF LIPASE: CPT

## 2025-08-06 PROCEDURE — 6360000004 HC RX CONTRAST MEDICATION: Performed by: PERSONAL EMERGENCY RESPONSE ATTENDANT

## 2025-08-06 PROCEDURE — 74177 CT ABD & PELVIS W/CONTRAST: CPT

## 2025-08-06 PROCEDURE — 6360000002 HC RX W HCPCS: Performed by: PERSONAL EMERGENCY RESPONSE ATTENDANT

## 2025-08-06 PROCEDURE — 96374 THER/PROPH/DIAG INJ IV PUSH: CPT

## 2025-08-06 PROCEDURE — 96361 HYDRATE IV INFUSION ADD-ON: CPT

## 2025-08-06 PROCEDURE — 99285 EMERGENCY DEPT VISIT HI MDM: CPT

## 2025-08-06 PROCEDURE — 81003 URINALYSIS AUTO W/O SCOPE: CPT

## 2025-08-06 PROCEDURE — 80053 COMPREHEN METABOLIC PANEL: CPT

## 2025-08-06 RX ORDER — 0.9 % SODIUM CHLORIDE 0.9 %
500 INTRAVENOUS SOLUTION INTRAVENOUS ONCE
Status: COMPLETED | OUTPATIENT
Start: 2025-08-06 | End: 2025-08-06

## 2025-08-06 RX ORDER — ONDANSETRON 2 MG/ML
4 INJECTION INTRAMUSCULAR; INTRAVENOUS ONCE
Status: COMPLETED | OUTPATIENT
Start: 2025-08-06 | End: 2025-08-06

## 2025-08-06 RX ORDER — DICYCLOMINE HYDROCHLORIDE 10 MG/1
10 CAPSULE ORAL ONCE
Status: COMPLETED | OUTPATIENT
Start: 2025-08-06 | End: 2025-08-06

## 2025-08-06 RX ORDER — DICYCLOMINE HCL 20 MG
20 TABLET ORAL 4 TIMES DAILY
Qty: 20 TABLET | Refills: 0 | Status: SHIPPED | OUTPATIENT
Start: 2025-08-06

## 2025-08-06 RX ORDER — MORPHINE SULFATE 4 MG/ML
4 INJECTION, SOLUTION INTRAMUSCULAR; INTRAVENOUS ONCE
Status: COMPLETED | OUTPATIENT
Start: 2025-08-06 | End: 2025-08-06

## 2025-08-06 RX ORDER — IOPAMIDOL 755 MG/ML
75 INJECTION, SOLUTION INTRAVASCULAR
Status: COMPLETED | OUTPATIENT
Start: 2025-08-06 | End: 2025-08-06

## 2025-08-06 RX ADMIN — DICYCLOMINE HYDROCHLORIDE 10 MG: 10 CAPSULE ORAL at 18:47

## 2025-08-06 RX ADMIN — IOPAMIDOL 75 ML: 755 INJECTION, SOLUTION INTRAVENOUS at 19:15

## 2025-08-06 RX ADMIN — MORPHINE SULFATE 4 MG: 4 INJECTION, SOLUTION INTRAMUSCULAR; INTRAVENOUS at 20:17

## 2025-08-06 RX ADMIN — SODIUM CHLORIDE 500 ML: 0.9 INJECTION, SOLUTION INTRAVENOUS at 18:50

## 2025-08-06 RX ADMIN — ONDANSETRON 4 MG: 2 INJECTION, SOLUTION INTRAMUSCULAR; INTRAVENOUS at 20:17

## 2025-08-06 ASSESSMENT — ENCOUNTER SYMPTOMS
BLOOD IN STOOL: 0
SORE THROAT: 0
SHORTNESS OF BREATH: 0
COUGH: 0
DIARRHEA: 1
ABDOMINAL PAIN: 1
NAUSEA: 0
VOMITING: 0
COLOR CHANGE: 0
RHINORRHEA: 0

## 2025-08-06 ASSESSMENT — PAIN - FUNCTIONAL ASSESSMENT: PAIN_FUNCTIONAL_ASSESSMENT: 0-10

## 2025-08-06 ASSESSMENT — PAIN SCALES - GENERAL
PAINLEVEL_OUTOF10: 9
PAINLEVEL_OUTOF10: 9

## 2025-08-06 ASSESSMENT — PAIN DESCRIPTION - PAIN TYPE: TYPE: ACUTE PAIN

## 2025-08-06 ASSESSMENT — PAIN DESCRIPTION - ORIENTATION: ORIENTATION: LEFT;LOWER

## 2025-08-06 ASSESSMENT — PAIN DESCRIPTION - FREQUENCY: FREQUENCY: CONTINUOUS

## 2025-08-06 ASSESSMENT — PAIN DESCRIPTION - LOCATION: LOCATION: ABDOMEN

## 2025-08-07 LAB
PERFORMED ON: ABNORMAL
POC CREATININE: 1.1 MG/DL (ref 0.6–1.2)
POC SAMPLE TYPE: ABNORMAL

## 2025-11-26 ENCOUNTER — APPOINTMENT (OUTPATIENT)
Dept: RHEUMATOLOGY | Facility: CLINIC | Age: 60
End: 2025-11-26
Payer: COMMERCIAL

## (undated) DEVICE — SHEET,DRAPE,53X77,STERILE: Brand: MEDLINE

## (undated) DEVICE — Z CONVERTED USE 2271043 CONTAINER SPEC COLL 4OZ SCR ON LID PEEL PCH

## (undated) DEVICE — SET,IRRIGATION,CYSTO/TUR: Brand: MEDLINE

## (undated) DEVICE — GOWN,AURORA,NONREINFORCED,LARGE: Brand: MEDLINE

## (undated) DEVICE — TRAY PREP DRY W/ PREM GLV 2 APPL 6 SPNG 2 UNDPD 1 OVERWRAP

## (undated) DEVICE — GUIDEWIRE ORTH L150MM DIA0.062IN W/ TRCR TIP DISP FOR ANK

## (undated) DEVICE — Device

## (undated) DEVICE — ENDO CARRY-ON PROCEDURE KIT: Brand: ENDO CARRY-ON PROCEDURE KIT

## (undated) DEVICE — TOWEL,OR,DSP,ST,BLUE,STD,4/PK,20PK/CS: Brand: MEDLINE

## (undated) DEVICE — EVACUATOR URO BLDR W/ ADPT UROVAC

## (undated) DEVICE — COVER FT SWCH W15XL17IN GRY ALL OEC SYS

## (undated) DEVICE — GLOVE ORANGE PI 7 1/2   MSG9075

## (undated) DEVICE — STRIP,CLOSURE,WOUND,MEDI-STRIP,1/2X4: Brand: MEDLINE

## (undated) DEVICE — TUBE IRRIG L8IN LNG PT W/ CONN FOR PMP SYS REDEUCE

## (undated) DEVICE — CHLORAPREP 26ML ORANGE

## (undated) DEVICE — GOWN,SIRUS,NONRNF,3XL,18/CS: Brand: MEDLINE

## (undated) DEVICE — Device: Brand: ENDO SMARTCAP

## (undated) DEVICE — JELLY,LUBE,STERILE,FLIP TOP,TUBE,2-OZ: Brand: MEDLINE

## (undated) DEVICE — UROLOGIC DRAIN BAG: Brand: UNBRANDED

## (undated) DEVICE — ELECTRODE PT RET AD L9FT HI MOIST COND ADH HYDRGEL CORDED

## (undated) DEVICE — PADDING CAST W4INXL4YD HIGHLY ABSRB THAN COT EZ APPL

## (undated) DEVICE — TUBING, SUCTION, 1/4" X 10', STRAIGHT: Brand: MEDLINE

## (undated) DEVICE — SINGLE PORT MANIFOLD: Brand: NEPTUNE 2

## (undated) DEVICE — FORCEPS BX L240CM JAW DIA2.8MM L CAP W/ NDL MIC MESH TOOTH

## (undated) DEVICE — BANDAGE,GAUZE,BULKEE II,4.5"X4.1YD,STRL: Brand: MEDLINE

## (undated) DEVICE — HOOKED-PRONG GRASPING FORCEPS: Brand: TRICEP

## (undated) DEVICE — SPONGE,LAP,4"X18",XR,ST,5/PK,40PK/CS: Brand: MEDLINE INDUSTRIES, INC.

## (undated) DEVICE — PACK ARTHRO III ST SIRUS

## (undated) DEVICE — REAMER SURG DIA20MM METATARSAL FOR FOREFOOT FUS

## (undated) DEVICE — LABEL MED MINI W/ MARKER

## (undated) DEVICE — SPONGE GZ W4XL4IN COT 12 PLY TYP VII WVN C FLD DSGN

## (undated) DEVICE — GLOVE ORTHO 8   MSG9480

## (undated) DEVICE — CONMED SCOPE SAVER BITE BLOCK, 20X27 MM: Brand: SCOPE SAVER

## (undated) DEVICE — GOWN,SIRUS,POLYRNF,BRTHSLV,XLN/XL,20/CS: Brand: MEDLINE

## (undated) DEVICE — SUTURE PROL SZ 0 L30IN NONABSORBABLE BLU L36MM CT-1 1/2 CIR 8424H

## (undated) DEVICE — SHOULDER: Brand: MEDLINE INDUSTRIES, INC.

## (undated) DEVICE — 4-PORT MANIFOLD: Brand: NEPTUNE 2

## (undated) DEVICE — [RESECTOR CUTTER, ARTHROSCOPIC SHAVER BLADE,  DO NOT RESTERILIZE,  DO NOT USE IF PACKAGE IS DAMAGED,  KEEP DRY,  KEEP AWAY FROM SUNLIGHT]: Brand: FORMULA

## (undated) DEVICE — ADAPTER FLSH PMP FLD MGMT GI IRRIG OFP 2 DISPOSABLE

## (undated) DEVICE — BRUSH ENDO CLN L90.5IN SHTH DIA1.7MM BRIST DIA5-7MM 2-6MM

## (undated) DEVICE — CONTAINER,SPECIMEN,OR STERILE,4OZ: Brand: MEDLINE

## (undated) DEVICE — BLADE SAW W9XL10MM THK0.38MM CUT THK0.43MM REPL SAG OSC THN

## (undated) DEVICE — FOOT SWITCH DRAPE: Brand: UNBRANDED

## (undated) DEVICE — RENTAL ESWL SERVICES UNILATERAL

## (undated) DEVICE — SYRINGE MED 10ML LUERLOCK TIP W/O SFTY DISP

## (undated) DEVICE — GLOVE ORANGE PI 8   MSG9080

## (undated) DEVICE — DRAPE CARM MINI FOR IMAG SYS INSIGHT FLROSCN

## (undated) DEVICE — DILATOR SURG URET 16FR

## (undated) DEVICE — GLOVE ORANGE PI 8 1/2   MSG9085

## (undated) DEVICE — TUBE SET 96 MM 64 MM H2O PERISTALTIC STD AUX CHANNEL

## (undated) DEVICE — TUBING, SUCTION, 9/32" X 12', STRAIGHT: Brand: MEDLINE INDUSTRIES, INC.

## (undated) DEVICE — SUTURE VCRL SZ 2-0 L27IN ABSRB UD L26MM SH 1/2 CIR J417H

## (undated) DEVICE — CANNULA ARTHSCP L7CM ID575MM CRYS SMOOTH W OBT

## (undated) DEVICE — PENCIL SMOKE EVAC PUSH BUTTON COATED

## (undated) DEVICE — NEEDLE HYPO 25GA L1.5IN BLU POLYPR HUB S STL REG BVL STR

## (undated) DEVICE — GLOVE SURG SZ 9 THK91MIL LTX FREE SYN POLYISOPRENE ANTI

## (undated) DEVICE — COUNTER NDL 40 COUNT HLD 70 FOAM BLK ADH W/ MAG

## (undated) DEVICE — BIT DRL DIA2.7MM STR CANN FOR MINI COMPR FULL THRD SCR

## (undated) DEVICE — SLEEVE TRAC FOAM COBAN DISP FOR 3 PNT SHLDR DISTR SYS STAR

## (undated) DEVICE — SUTURE NONABSORBABLE MONOFILAMENT 4-0 PS-2 18 IN BLU PROLENE 8682H

## (undated) DEVICE — GUIDEWIRE URO L150CM DIA0.035IN TAPR 8CM STR TIP STD SHFT

## (undated) DEVICE — BANDAGE COBAN 4 IN COMPR W4INXL5YD FOAM COHESIVE QUIK STK SELF ADH SFT

## (undated) DEVICE — 90-S CRUISE, SUCTION PROBE, NON-BENDABLE, MAX CUT LEVEL 1: Brand: SERFAS ENERGY

## (undated) DEVICE — SUTURE ETHILON SZ 3-0 L18IN NONABSORBABLE BLK PS-2 L19MM 3/8 1669H

## (undated) DEVICE — TUOHY-BORST SIDE-ARM ADAPTER: Brand: COOK

## (undated) DEVICE — SUTURE VCRL SZ 3-0 L27IN ABSRB UD L26MM SH 1/2 CIR J416H

## (undated) DEVICE — SPONGE GZ W4XL4IN COT 12 PLY TYP VII WVN C FLD DSGN STERILE

## (undated) DEVICE — DBD-PACK,CYSTOSCOPY,PK VI,AURORA: Brand: MEDLINE

## (undated) DEVICE — DRESSING PETRO W3XL8IN OIL EMUL N ADH GZ KNIT IMPREG CELOS

## (undated) DEVICE — ANGLED TIP URETERAL CATHETER WITH BENTSON PTFE WIRE GUIDE: Brand: ANGLED TIP

## (undated) DEVICE — SOLUTION PREP POVIDONE IOD FOR SKIN MUCOUS MEM PRIOR TO

## (undated) DEVICE — INTENDED FOR TISSUE SEPARATION, AND OTHER PROCEDURES THAT REQUIRE A SHARP SURGICAL BLADE TO PUNCTURE OR CUT.: Brand: BARD-PARKER ® CARBON RIB-BACK BLADES

## (undated) DEVICE — SYRINGE IRRIG 60ML SFT PLIABLE BLB EZ TO GRP 1 HND USE W/

## (undated) DEVICE — MARKER SURG SKIN GENTIAN VLT REG TIP W/ 6IN RUL

## (undated) DEVICE — PACK,SHOULDER,DRAPE,POUCH: Brand: MEDLINE

## (undated) DEVICE — PADDING UNDERCAST W6INXL4YD RAYON POLY SYN NONADHESIVE

## (undated) DEVICE — PIN FIX THRD DISP BB-TAK

## (undated) DEVICE — [AGGRESSIVE 6-FLUTE BARREL BUR, ARTHROSCOPIC SHAVER BLADE,  DO NOT RESTERILIZE,  DO NOT USE IF PACKAGE IS DAMAGED,  KEEP DRY,  KEEP AWAY FROM SUNLIGHT]: Brand: FORMULA

## (undated) DEVICE — PAD,ABDOMINAL,8"X10",ST,LF: Brand: MEDLINE

## (undated) DEVICE — NEEDLE SPNL 18GA L3.5IN W/ QNCKE SHARPER BVL DURA CLICK

## (undated) DEVICE — BANDAGE COMPR M W6INXL10YD WHT BGE VELC E MTRX HK AND LOOP